# Patient Record
Sex: MALE | Race: WHITE | NOT HISPANIC OR LATINO | Employment: OTHER | URBAN - METROPOLITAN AREA
[De-identification: names, ages, dates, MRNs, and addresses within clinical notes are randomized per-mention and may not be internally consistent; named-entity substitution may affect disease eponyms.]

---

## 2017-01-17 ENCOUNTER — ALLSCRIPTS OFFICE VISIT (OUTPATIENT)
Dept: OTHER | Facility: OTHER | Age: 67
End: 2017-01-17

## 2017-03-31 ENCOUNTER — ALLSCRIPTS OFFICE VISIT (OUTPATIENT)
Dept: OTHER | Facility: OTHER | Age: 67
End: 2017-03-31

## 2017-04-18 ENCOUNTER — TRANSCRIBE ORDERS (OUTPATIENT)
Dept: ADMINISTRATIVE | Facility: HOSPITAL | Age: 67
End: 2017-04-18

## 2017-04-18 DIAGNOSIS — I71.4 ABDOMINAL AORTIC ANEURYSM WITHOUT RUPTURE (HCC): Primary | ICD-10-CM

## 2017-05-19 ENCOUNTER — ALLSCRIPTS OFFICE VISIT (OUTPATIENT)
Dept: OTHER | Facility: OTHER | Age: 67
End: 2017-05-19

## 2017-06-09 ENCOUNTER — GENERIC CONVERSION - ENCOUNTER (OUTPATIENT)
Dept: OTHER | Facility: OTHER | Age: 67
End: 2017-06-09

## 2017-06-19 ENCOUNTER — GENERIC CONVERSION - ENCOUNTER (OUTPATIENT)
Dept: OTHER | Facility: OTHER | Age: 67
End: 2017-06-19

## 2017-06-29 ENCOUNTER — HOSPITAL ENCOUNTER (OUTPATIENT)
Dept: NON INVASIVE DIAGNOSTICS | Facility: CLINIC | Age: 67
Discharge: HOME | End: 2017-06-29
Payer: MEDICARE

## 2017-06-29 DIAGNOSIS — I71.4 ABDOMINAL AORTIC ANEURYSM WITHOUT RUPTURE (HCC): ICD-10-CM

## 2017-06-29 PROCEDURE — 93978 VASCULAR STUDY: CPT

## 2017-08-01 ENCOUNTER — ALLSCRIPTS OFFICE VISIT (OUTPATIENT)
Dept: OTHER | Facility: OTHER | Age: 67
End: 2017-08-01

## 2017-09-27 DIAGNOSIS — R10.9 ABDOMINAL PAIN: ICD-10-CM

## 2017-10-06 ENCOUNTER — HOSPITAL ENCOUNTER (OUTPATIENT)
Dept: RADIOLOGY | Facility: HOSPITAL | Age: 67
Discharge: HOME | End: 2017-10-06
Attending: INTERNAL MEDICINE
Payer: MEDICARE

## 2017-10-06 DIAGNOSIS — R10.9 ABDOMINAL PAIN: ICD-10-CM

## 2017-10-06 PROCEDURE — 74177 CT ABD & PELVIS W/CONTRAST: CPT

## 2017-10-06 RX ADMIN — IOHEXOL 100 ML: 350 INJECTION, SOLUTION INTRAVENOUS at 13:10

## 2017-10-16 ENCOUNTER — GENERIC CONVERSION - ENCOUNTER (OUTPATIENT)
Dept: OTHER | Facility: OTHER | Age: 67
End: 2017-10-16

## 2017-10-19 ENCOUNTER — GENERIC CONVERSION - ENCOUNTER (OUTPATIENT)
Dept: OTHER | Facility: OTHER | Age: 67
End: 2017-10-19

## 2017-11-21 ENCOUNTER — ALLSCRIPTS OFFICE VISIT (OUTPATIENT)
Dept: OTHER | Facility: OTHER | Age: 67
End: 2017-11-21

## 2017-11-21 DIAGNOSIS — R06.02 SHORTNESS OF BREATH: ICD-10-CM

## 2017-11-21 DIAGNOSIS — Z01.810 ENCOUNTER FOR PREPROCEDURAL CARDIOVASCULAR EXAMINATION: ICD-10-CM

## 2017-11-21 DIAGNOSIS — E78.00 PURE HYPERCHOLESTEROLEMIA: ICD-10-CM

## 2017-11-21 DIAGNOSIS — I70.219 ATHEROSCLEROSIS OF NATIVE ARTERIES OF EXTREMITY WITH INTERMITTENT CLAUDICATION (HCC): ICD-10-CM

## 2017-11-21 DIAGNOSIS — I71.4 ABDOMINAL AORTIC ANEURYSM WITHOUT RUPTURE (HCC): ICD-10-CM

## 2017-11-22 NOTE — PROGRESS NOTES
Assessment  1  Aneurysm of abdominal aorta (441 4) (I71 4)    Plan  Aneurysm of abdominal aorta    · (1) BASIC METABOLIC PROFILE; Status:Active; Requested DUK:56HQH2833;    Perform:[facility]; BNP:19HMU8893; Ordered; For:Aneurysm of abdominal aorta; Ordered By:Jeremy Jackson;   · (1) CBC/ PLT (NO DIFF); Status:Active; Requested MLJ:18GSE2781;    Perform:[facility]; SCZ:54WWE8325; Ordered;of abdominal aorta; Ordered By:Ze Jackson;   · CTA ABDOMEN PELVIS W WO CONTRAST; Status:Hold For - Scheduling; Requestedfor:2017;    Perform:[facility]; [phone]; Ordered;of abdominal aorta; Ordered By:Ze Jackson;   · *1 -  [facility] (CARDIOLOGY ) Co-Management  *  Status: Active Requested for: 93GEN9379   Ordered; Aneurysm of abdominal aorta; Ordered By: Glenn Oreilly Performed:  Due: 37GAT4700  Care Summary provided  : Yes   · Schedule Surgery Treatment  Procedure  Status: Hold For - Scheduling  Requested [id number]   Ordered; Aneurysm of abdominal aorta; Ordered By: Glenn Oreilly Performed:  Due: 07KZE1689    Discussion/Summary  Discussion Summary:   5 5 centimeter infrarenal aortic aneurysm appears amenable to endovascular repair  His initial CT scan was with contrast and not a CT angiogram  Therefore I am scheduling a CT angiogram for further definition of his neck as well as the iliac circulation bilaterally  After cardiac risk assessment has been obtained and review of the CT angiogram will proceed with likely endovascular repair of his aortic aneurysm  In the hybrid OR at [place] [address]  Counseling Documentation With Imm: The patient was counseled regarding diagnostic results,-- instructions for management,-- risk factor reductions,-- prognosis,-- patient and family education,-- risks and benefits of treatment options,-- importance of compliance with treatment  total time of encounter was 20 minutes-- and-- 15 minutes was spent counseling        Chief Complaint  Chief Complaint Free Text Note Form: I am here because of pain in my stomach and to review my test results  is new to the practice and referred by Dr Elias Jimenez Patient had CT of the abd/pelvis on 9/27/17  Patient is concerned about the growth of the AAA  Patient has pain in his stomach that has been present for 3 years  The pain has been constant  He admits to stomach pain before, during and after eating  He admits to chronic back pain  He has pain in the legs that is not constant  It is present when he walks around the grocery store  When he rests for a few minutes it resolves  He admits to tobacco use 1PPD  History of Present Illness  HPI: asymptomatic 5 5 centimeter infrarenal aortic aneurysm possible aortoiliac stenosis with claudication symptoms short distance      Review of Systems  Complete Male - Vasc:  Constitutional: No fever or chills, feels well, no tiredness, no recent weight gain or weight loss  Eyes: eyesight problems,-- no sudden vision loss,-- no blurred vision-- and-- no double vision, but-- no eye pain,-- no dryness of the eyes,-- eyes not red,-- no purulent discharge from the eyes,-- no itching of the eyes-- and-- wears glasses  ENT: hearing loss, but-- no earache,-- no nosebleeds,-- no sore throat,-- no nasal discharge-- and-- no hoarseness  Cardiovascular: intermittent leg claudication,-- no painful veins-- and-- no bleeding veins, but-- regular heart rate,-- no chest pain,-- no palpitations-- and-- leg pain with walking  Respiratory: No sob, no wheezing, no cough, no sob with exertion, no orthopnea  Gastrointestinal: nausea-- and-- diarrhea, but-- no vomiting,-- no constipation-- and-- no blood in stools  no hemorrhoids  Genitourinary: no dysuria, no hematuria, No urinary incontinence, no erectile dysfunction  Musculoskeletal: limb pain, but-- lumbar pain,-- no myalgias-- and-- no limb swelling  Integumentary: no rash, no lesions, no wounds, no ulcer    Neurological: no dementia-- and-- limb weakness, but-- no headache,-- no numbness,-- no confusion,-- no dizziness,-- no convulsions,-- no fainting-- and-- no difficulty walking  Psychiatric: anxiety-- and-- no mood disorder, but-- no depression  Hematologic/Lymphatic: no bleeding disorder, no easy bruising  ROS Reviewed:   ROS reviewed  Active Problems  1  Abdominal pain (789 00) (R10 9)   2  Anemia (285 9) (D64 9)   3  Aneurysm of abdominal aorta (441 4) (I71 4)   4  Aortic aneurysm (441 9) (I71 9)   5  Benign colon polyp (211 3) (K63 5)   6  Change in bowel habits (787 99) (R19 4)   7  Chronic pancreatitis (577 1) (K86 1)   8  Constipation (564 00) (K59 00)   9  Gastritis (535 50) (K29 70)   10  Gastroesophageal reflux disease without esophagitis (530 81) (K21 9)   11  Hypercholesteremia (272 0) (E78 00)   12  Hyponatremia (276 1) (E87 1)   13  Pancreatic disorder (577 9) (K86 9)   14  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)   15  Pancreatic pseudocyst (577 2) (K86 3)   16  Shortness of breath (786 05) (R06 02)    Past Medical History  1  History of Benign essential hypertension (401 1) (I10)   2  History of abdominal aortic aneurysm (V12 59) (Z86 79)   3  History of abnormal weight loss (V13 89) (Z87 898)   4  History of acute pancreatitis (V12 79) (Z87 19)  Active Problems And Past Medical History Reviewed: The active problems and past medical history were reviewed and updated today  Surgical History  1  Denied: History of Recent Surgery  Surgical History Reviewed: The surgical history was reviewed and updated today  Family History  Mother    1  Family history of Hypertension (V17 49)  Father    2  Family history of Hypertension (V17 49)  Family History    3  Family history of Essential Hypercholesterolemia   4  Family history of Hypertension (V17 49)  Family History Reviewed: The family history was reviewed and updated today         Social History     · Cigarette smoker (305 1) (F17 210)   · Current Every Day Smoker (305 1)   · Never Drank Alcohol  Social History Reviewed: The social history was reviewed and updated today  Current Meds   1  Acetaminophen CAPS; Therapy: (Recorded:21Nov2017) to Recorded   2  ALPRAZolam 1 MG Oral Tablet; Therapy: 20PIS6970 to Recorded   3  Amitriptyline HCl - 10 MG Oral Tablet; TAKE 1 TABLET AT BEDTIME; Therapy: 50NWO7423 to (Evaluate:59Usv7117)  Requested for: 53KRX1054; Last Rx:30Oct2017 Ordered   4  AmLODIPine Besylate 5 MG Oral Tablet; TAKE 1 TABLET DAILY AS DIRECTED; Therapy: 43YBP6866 to Recorded   5  Caltrate 600+D 600-400 MG-UNIT TABS; Therapy: (Recorded:18Nov2013) to Recorded   6  Centrum Silver Oral Tablet; Therapy: (Recorded:18Nov2013) to Recorded   7  Citalopram Hydrobromide 20 MG Oral Tablet; Therapy: (Recorded:18Nov2013) to Recorded   8  Creon 28871 UNIT Oral Capsule Delayed Release Particles; TAKE 1 CAPSULE WITH MEALS; Therapy: 14ADH4341 to (Evaluate:90Mcn2306)  Requested for: 75DZM5751; Last JX:36BYN6567 Ordered   9  Lisinopril-Hydrochlorothiazide 20-12 5 MG Oral Tablet; Therapy: (Recorded:18Nov2013) to Recorded   10  Metoprolol Tartrate 50 MG Oral Tablet; Therapy: (Recorded:18Nov2013) to Recorded   11  MiraLax PACK; Therapy: ([facility]) to Recorded   12  Mylanta TABS; Therapy: (Recorded:21Nov2017) to Recorded   13  Omeprazole 40 MG Oral Capsule Delayed Release; TAKE 1 CAPSULE DAILY; Therapy: 81HVI0529 to (Evaluate:92Xws8888)  Requested for: 60ODF2681; Last  Rx:15Bms0784 Ordered   14  Pravastatin Sodium 40 MG Oral Tablet; Therapy: (Recorded:18Nov2013) to Recorded   15  Sodium Chloride 1 GM Oral Tablet; Therapy: (Recorded:21Nov2017) to Recorded   16  Tamsulosin HCl - 0 4 MG Oral Capsule; Therapy: (Recorded:18Nov2013) to Recorded   17  Tylenol Extra Strength 500 MG Oral Tablet; Therapy: (Recorded:21Nov2017) to Recorded  Medication List Reviewed: The medication list was reviewed and updated today  Allergies  1   No Known Drug Allergies    Vitals  Vital Signs    Recorded: 21Nov2017 02:21PM   Temperature 95 7 F, Tympanic   Heart Rate 72, R Radial   Pulse Quality Normal, R Radial   Respiration Quality Normal   Respiration 18   Systolic 349, RUE, Sitting   Diastolic 80, RUE, Sitting   Height 5 ft 2 in   Weight 119 lb    BMI Calculated 21 77   BSA Calculated 1 53       Physical Exam   Carotid: right 2+,-- no bruit heard on the right,-- left 2+-- and-- no bruit on the left  Brachial: right 2+-- and-- left 2+  Radial: right 2+-- and-- left 2+  Femoral: right 2+,-- no bruit heard on the right,-- left 2+-- and-- no bruit on the left  Popliteal: right 2+-- and-- left 2+  Posterior tibialis: right 2+-- and-- left 2+  Dorsalis pedis: right 2+-- and-- left 2+  Distal Pulse Exam: Normal Capillary Refill  The heart rate was normal   Abdomen  Abdomen:-- 5 5 centimeter aneurysm palpable infrarenal       Results/Data  Diagnostic Studies Reviewed Vasc: I personally reviewed the films/images/results in the office today  My interpretation follows  CT Scan Review CT scan with contrast reviewed  Future Appointments    Date/Time Provider Specialty Site   02/21/2018 09:00 AM Deepak Prado MD Gastroenterology Adult [address]       Surgery Scheduling Form  Vascular Surgery Scheduling Form [facility] Standard:    Location: [facility],-- OR Type: Hybrid Room Needed   Confirmation Number:   Procedure Date:   Requested Time:  Physician Jackson  Co-Surgeon: IR Physician [facility] Required:   Bed:  Anesthesia: General        PROCEDURE DETAILS  Procedure: endovascular aneurysm repair possible open  excluder graft     Laterality:   Anticipated frozen section:   Procedure Codes:   Pre-op diagnosis:   Diagnosis Code(s):   Case Length:   Equipment:   Equipment Needs:   Implants/Representative:     REGISTRATION & FINANCIAL CLEARANCE     Amount Paid/Date:   FA Initials:   Insurance:   Policy Number: Group Number:     PRE-ADMISSION TESTING & CLINICAL INFORMATION   PAT Location:     Consults Needed   Anesthesia Consult:   Medical Consult:   Cardiac Consult:        ALLERGIES AND ALERTS   Latex Allergy:   Penicillin Allergy:   Malignant Hyperthermia:   Diabetic Patient:     COMMENTS   Scheduling Information Provided By:     IN OFFICE USE   Urgency:   Additional Bed Requirements:   CD to Hospital   Is the patient able to walk up a flight of stairs, walk up a hill or do heavy housework WITHOUT having chest pain or shortness of breath?                      Signatures   Electronically signed by : Ashish Lubin MD; Nov 21 2017  2:58PM EST                       (Author)

## 2017-11-25 ENCOUNTER — HOSPITAL ENCOUNTER (OUTPATIENT)
Dept: CT IMAGING | Facility: HOSPITAL | Age: 67
Discharge: HOME | End: 2017-11-25
Attending: SURGERY
Payer: MEDICARE

## 2017-11-25 DIAGNOSIS — I71.4 ABDOMINAL AORTIC ANEURYSM WITHOUT RUPTURE (HCC): ICD-10-CM

## 2017-11-25 PROCEDURE — 74174 CTA ABD&PLVS W/CONTRAST: CPT

## 2017-11-25 RX ADMIN — IOHEXOL 100 ML: 350 INJECTION, SOLUTION INTRAVENOUS at 08:15

## 2017-12-15 ENCOUNTER — TRANSCRIBE ORDERS (OUTPATIENT)
Dept: ADMINISTRATIVE | Facility: HOSPITAL | Age: 67
End: 2017-12-15

## 2017-12-15 ENCOUNTER — ALLSCRIPTS OFFICE VISIT (OUTPATIENT)
Dept: OTHER | Facility: OTHER | Age: 67
End: 2017-12-15

## 2017-12-16 NOTE — CONSULTS
Assessment  1  Preoperative cardiovascular examination (V72 81) (Z01 810)   2  Aneurysm of abdominal aorta (441 4) (I71 4)   3  Hypercholesteremia (272 0) (E78 00)   4  Benign essential hypertension (401 1) (I10)   5  Atherosclerosis of leg with intermittent claudication (440 21) (I70 219)   6  Shortness of breath (786 05) (R06 02)    Plan  Atherosclerosis of leg with intermittent claudication, Hypercholesteremia, Preoperativecardiovascular examination, Shortness of breath    · ECHO COMPLETE WITH CONTRAST IF INDICATED; Status:Hold For - Scheduling;Requested for:29Uql2838;    Perform:[facility]; Due:35Cvx1076; Ordered; For:Atherosclerosis of leg with intermittent claudication, Hypercholesteremia, Preoperative cardiovascular examination, Shortness of breath; Ordered By:Dakota Coronado;   · NM MYOCARDIAL PERFUSION SPECT (RX STRESS AND/OR REST); Status:Hold For -Scheduling; Requested for:75Toa4148;    Perform:[facility]; Due:77Wwp4048; Ordered; For:Atherosclerosis of leg with intermittent claudication, Hypercholesteremia, Preoperative cardiovascular examination, Shortness of breath; Ordered By:Dakota Coronado;   · Follow-up Visit in 4 Weeks Evaluation and Treatment  Follow-up  Status: Complete Done: 99SCN2977   Ordered; For: Atherosclerosis of leg with intermittent claudication, Hypercholesteremia, Preoperative cardiovascular examination, Shortness of breath; Ordered By: Ryan Frost Performed:  Due: 94VGB8962; Last Updated By: Alphonso Lee; 12/15/2017 2:21:56 PM  Preoperative cardiovascular examination,     · EKG/ECG- POC; Status:Complete;   Done: 98LAZ6660 02:08PM   Perform: In Office;Ordered; For:Preoperative cardiovascular examination, ; Ordered By:Rosana Coronado;    Discussion/Summary    Dear Dr Bertha Schilder for sending Marly Baldwin to see me for a preoperative risk assessment prior to undergoing vascular surgery for the intervention over recently enlarging AAA  He has peripheral vascular disease with claudication   He has exertional shortness of breath  He is a long-time smoker  He has controlled hypertension, takes statin therapy but unknown lipid status  We will need records  While he does have a normal EKG, his current symptoms and higher risk status considering the diagnosis of peripheral vascular disease certainly suggest the possibility of coronary artery disease and therefore he will need workup prior to vascular intervention  Recommend a Lexiscan Myoview stress test  Recommend echocardiogram Will follow up on these tests and have recommended a four-week follow-up  If abnormal I will discuss further testing at my follow-up  However if these tests are unremarkable he will be released/cleared for surgery prior to my visit  Chief Complaint  New patient evaluation Ref by Dr Meryle Leigh Clearance AAA      History of Present Illness  Cardiology HPI Free Text Note Form [facility]: Mike Ghotra is a thin 56-year-old smoker with recently discovered enlarging AAA, at the point of needing intervention and following with Dr Ilsa Juárez   He is here for preoperative risk assessment the request of Dr Emily Brody has attempted to quit smoking before, but has been unable to with many interventions tells not eager to discuss Ezequiel Soto has hypertension, this is treated primarily by primary care physician and it is well controlled he takes pravastatin, seems to tolerate it well and lipid status is unknown to us this time Ezequiel Soto has exertional claudication of the bilateral lower extremities, but denies any skin lesions, sores or nonhealing Ezequiel Soto denies any lightheadedness, chest pain but does have exertional shortness of breath which has been present for a little bit over a year he has not had any recent cardiovascular testing his EKG in the office today normal  His recent vascular studies aside from his abdominal aortic aneurysm also revealed moderate to severe bilateral peripheral vascular disease consistent with his claudication symptoms      Review of Systems     Cardiac: no chest pain,-- no rhythm problems-- and-- no palpitations present  Skin: No complaints of nonhealing sores or skin rash  Genitourinary: No complaints of recurrent urinary tract infections, frequent urination at night, difficult urination, blood in urine, kidney stones, loss of bladder control, no kidney or prostate problems, no erectile dysfunction  Psychological: No complaints of feeling depressed, anxiety, panic attacks, or difficulty concentrating  General: No complaints of trouble sleeping, lack of energy, fatigue, appetite changes, weight changes, fever, frequent infections, or night sweats  Respiratory: shortness of breath-- and-- cough/sputum  HEENT: No complaints of serious problems, hearing problems, nose problems, throat problems, or snoring  Gastrointestinal: No complaints of liver problems, nausea, vomiting, heartburn, constipation, bloody stools, diarrhea, problems swallowing, adbominal pain, or rectal bleeding  Hematologic: No complaints of bleeding disorders, anemia, blood clots, or excessive brusing  Neurological: No complaints of numbness, tingling, dizziness, weakness, seizures, headaches, syncope or fainting, AM fatigue, daytime sleepiness, no witnessed apnea episodes  Musculoskeletal: back pain-- and-- swelling/pain     ROS reviewed  Active Problems  1  Abdominal pain (789 00) (R10 9)   2  Anemia (285 9) (D64 9)   3  Aneurysm of abdominal aorta (441 4) (I71 4)   4  Aortic aneurysm (441 9) (I71 9)   5  Benign colon polyp (211 3) (K63 5)   6  Change in bowel habits (787 99) (R19 4)   7  Chronic pancreatitis (577 1) (K86 1)   8  Constipation (564 00) (K59 00)   9  Gastritis (535 50) (K29 70)   10  Gastroesophageal reflux disease without esophagitis (530 81) (K21 9)   11  Hypercholesteremia (272 0) (E78 00)   12  Hyponatremia (276 1) (E87 1)   13  Pancreatic disorder (577 9) (K86 9)   14   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)   15  Pancreatic pseudocyst (577 2) (K86 3)   16  Shortness of breath (786 05) (R06 02)    Past Medical History   · History of abdominal aortic aneurysm (V12 59) (Z86 79)   · History of abnormal weight loss (V13 89) (Q61 109)   · History of acute pancreatitis (V12 79) (Z87 19)    The active problems and past medical history were reviewed and updated today  Surgical History   · Denied: History of Recent Surgery    The surgical history was reviewed and updated today  Family History  Mother    · Family history of Hypertension (V17 49)  Father    · Family history of Hypertension (V17 49)  Family History    · Family history of Essential Hypercholesterolemia   · Family history of Hypertension (V17 49)  Family History Reviewed: The family history was reviewed and updated today  Social History   · Cigarette smoker (305 1) (F17 210)   · Current Every Day Smoker (305 1)   · Never Drank Alcohol  The social history was reviewed and updated today  The social history was reviewed and is unchanged  Current Meds   1  Acetaminophen-Codeine #2 300-15 MG Oral Tablet; TAKE 1 TABLET 3 times daily PRN; Therapy: 82XVV6211 to Recorded   2  ALPRAZolam 1 MG Oral Tablet; TAKE 1 TABLET TWICE DAILY; Therapy: 45OTV6642 to Recorded   3  Amitriptyline HCl - 10 MG Oral Tablet; TAKE 1 TABLET AT BEDTIME; Therapy: 00AWP6062 to (Evaluate:26Bmc4944)  Requested for: 03OQU2869; Last Rx:30Oct2017 Ordered   4  AmLODIPine Besylate 5 MG Oral Tablet; TAKE 1 TABLET DAILY AS DIRECTED; Therapy: 61YRC4652 to Recorded   5  Caltrate 600+D 600-400 MG-UNIT TABS; Therapy: (Recorded:18Nov2013) to Recorded   6  Centrum Silver Oral Tablet; Therapy: (Recorded:18Nov2013) to Recorded   7  Citalopram Hydrobromide 20 MG Oral Tablet; Take 1 tablet daily; Therapy: (Recorded:79Akw0870) to Recorded   8  Creon 00109 UNIT Oral Capsule Delayed Release Particles; TAKE ONE CAPSULE BY MOUTH DAILY WITH MEALS;  Therapy: 75QBT9615 to ([facility])  Requested for: 22Nov2017; Last Rx:22Nov2017 Ordered   9  HydroCHLOROthiazide 12 5 MG Oral Tablet; TAKE 1 TABLET DAILY; Therapy: 46GBV6032 to Recorded   10  Lisinopril 40 MG Oral Tablet; TAKE 1 TABLET DAILY; Therapy: 41WCM3606 to Recorded   11  Metoprolol Tartrate 50 MG Oral Tablet; TAKE 1 TABLET DAILY; Therapy: (Recorded:81Cug4747) to Recorded   12  MiraLax PACK; Therapy: (Recorded:21Nov2017) to Recorded   13  Mylanta TABS; Therapy: (Recorded:21Nov2017) to Recorded   14  Omeprazole 40 MG Oral Capsule Delayed Release; TAKE 1 CAPSULE DAILY; Therapy: 14IVO7490 to (Evaluate:48Ogl3030)  Requested for: 00HZN1060; Last  Rx:14Ius1879 Ordered   15  Pravastatin Sodium 40 MG Oral Tablet; Therapy: (Recorded:18Nov2013) to Recorded   16  Sodium Chloride 1 GM Oral Tablet; TAKE 1 TABLET 3 times daily; Therapy: (Recorded:76Vff9868) to Recorded   17  Tamsulosin HCl - 0 4 MG Oral Capsule; Therapy: (Recorded:18Nov2013) to Recorded   18  Tylenol Extra Strength 500 MG Oral Tablet; Therapy: (Recorded:21Nov2017) to Recorded    The medication list was reviewed and updated today  Allergies  1  No Known Drug Allergies    Vitals  Signs   Heart Rate: 76, Apical  Pulse Quality: Regular, Apical  Respiration: 16  Systolic: 434, LUE, Sitting  Diastolic: 72, LUE, Sitting  Height: 5 ft 2 in  Weight: 120 lb 2 oz  BMI Calculated: 21 97  BSA Calculated: 1 54    Physical Exam   Constitutional  General appearance: No acute distress, well appearing and well nourished  Eyes  Conjunctiva and Sclera examination: Conjunctiva pink, sclera anicteric  Ears, Nose, Mouth, and Throat - Oropharynx: Clear, nares are clear, mucous membranes are moist   Neck  Neck and thyroid: Normal, supple, trachea midline, no thyromegaly  Pulmonary  Respiratory effort: No increased work of breathing or signs of respiratory distress  Auscultation of lungs: Clear to auscultation, no rales, no rhonchi, no wheezing, good air movement  Cardiovascular  Auscultation of heart: Normal rate and rhythm, normal S1 and S2, no murmurs  Carotid pulses: Normal, 2+ bilaterally  Peripheral vascular exam: Normal pulses throughout, no tenderness, erythema or swelling  Pedal pulses: Normal, 2+ bilaterally  -- Mildly reduced bilateral pedal pulses  Abdomen  Abdomen: Non-tender and no distention  Liver and spleen: No hepatomegaly or splenomegaly  Musculoskeletal Gait and station: Normal gait  -- Digits and nails: Normal without clubbing or cyanosis  -- Inspection/palpation of joints, bones, and muscles: Normal, ROM normal    Skin - Skin and subcutaneous tissue: Normal without rashes or lesions  Skin is warm and well perfused, normal turgor  Neurologic - Cranial nerves: II - XII intact  -- Speech: Normal    Psychiatric - Orientation to person, place, and time: Normal -- Mood and affect: Normal       Results/Data  12/17 - Normal sinus rhythm, normal EKG      Future Appointments    Date/Time Provider Specialty Site   02/21/2018 09:00 AM Dale Sandy MD Gastroenterology Adult [address]     End of Encounter Meds  1  Amitriptyline HCl - 10 MG Oral Tablet; TAKE 1 TABLET AT BEDTIME; Therapy: 54EYJ8035 to (Evaluate:48Ghp0127)  Requested for: 59WVU9778; Last Rx:30Oct2017 Ordered  2  Omeprazole 40 MG Oral Capsule Delayed Release; TAKE 1 CAPSULE DAILY; Therapy: 21BSR0299 to (Evaluate:13Lwv4558)  Requested for: 14PXX7136; Last Rx:52Tho9679 Ordered  3  Acetaminophen-Codeine #2 300-15 MG Oral Tablet; TAKE 1 TABLET 3 times daily PRN; Therapy: 65BCC3130 to Recorded  4  Creon 83730 UNIT Oral Capsule Delayed Release Particles; TAKE ONE CAPSULE BY MOUTH DAILY WITH MEALS; Therapy: 99UPL0385 to (Evaluate:76Hac3825)  Requested for: 22Nov2017; Last Rx:22Nov2017 Ordered  5  ALPRAZolam 1 MG Oral Tablet; TAKE 1 TABLET TWICE DAILY; Therapy: 68BDS2371 to Recorded   6  AmLODIPine Besylate 5 MG Oral Tablet; TAKE 1 TABLET DAILY AS DIRECTED;  Therapy: 51NVF1940 to Recorded   7  Caltrate 600+D 600-400 MG-UNIT TABS; Therapy: (Recorded:18Nov2013) to Recorded   8  Centrum Silver Oral Tablet; Therapy: (Recorded:18Nov2013) to Recorded   9  Citalopram Hydrobromide 20 MG Oral Tablet; Take 1 tablet daily; Therapy: (Recorded:25Jjh1620) to Recorded   10  HydroCHLOROthiazide 12 5 MG Oral Tablet; TAKE 1 TABLET DAILY; Therapy: 93FKI6489 to Recorded   11  Lisinopril 40 MG Oral Tablet; TAKE 1 TABLET DAILY; Therapy: 87ZUT8989 to Recorded   12  Metoprolol Tartrate 50 MG Oral Tablet; TAKE 1 TABLET DAILY; Therapy: (Recorded:91Xps8926) to Recorded   13  MiraLax PACK (Polyethylene Glycol 3350); Therapy: (Recorded:21Nov2017) to Recorded   14  Mylanta TABS; Therapy: (Recorded:21Nov2017) to Recorded   15  Pravastatin Sodium 40 MG Oral Tablet; Therapy: (Recorded:18Nov2013) to Recorded   16  Sodium Chloride 1 GM Oral Tablet; TAKE 1 TABLET 3 times daily; Therapy: (Recorded:40Waw6342) to Recorded   17  Tamsulosin HCl - 0 4 MG Oral Capsule; Therapy: (Recorded:18Nov2013) to Recorded   18  Tylenol Extra Strength 500 MG Oral Tablet;   Therapy: (Murel Lunch) to Recorded    Signatures   Electronically signed by : Genoveva Jason MD; Dec 15 2017  2:27PM EST                       (Author)

## 2018-01-03 ENCOUNTER — GENERIC CONVERSION - ENCOUNTER (OUTPATIENT)
Dept: CARDIOLOGY CLINIC | Facility: CLINIC | Age: 68
End: 2018-01-03

## 2018-01-03 ENCOUNTER — HOSPITAL ENCOUNTER (OUTPATIENT)
Dept: NON INVASIVE DIAGNOSTICS | Facility: CLINIC | Age: 68
Discharge: HOME | End: 2018-01-03
Payer: MEDICARE

## 2018-01-03 DIAGNOSIS — R06.02 SHORTNESS OF BREATH: ICD-10-CM

## 2018-01-03 DIAGNOSIS — E78.00 PURE HYPERCHOLESTEROLEMIA: ICD-10-CM

## 2018-01-03 DIAGNOSIS — Z01.810 ENCOUNTER FOR PREPROCEDURAL CARDIOVASCULAR EXAMINATION: ICD-10-CM

## 2018-01-03 DIAGNOSIS — I70.219 ATHEROSCLEROSIS OF NATIVE ARTERIES OF EXTREMITY WITH INTERMITTENT CLAUDICATION (HCC): ICD-10-CM

## 2018-01-03 PROCEDURE — 78452 HT MUSCLE IMAGE SPECT MULT: CPT

## 2018-01-03 PROCEDURE — 93306 TTE W/DOPPLER COMPLETE: CPT

## 2018-01-03 PROCEDURE — A9502 TC99M TETROFOSMIN: HCPCS

## 2018-01-03 PROCEDURE — 93017 CV STRESS TEST TRACING ONLY: CPT

## 2018-01-03 RX ADMIN — REGADENOSON 0.4 MG: 0.08 INJECTION, SOLUTION INTRAVENOUS at 09:52

## 2018-01-08 LAB
CHEST PAIN STATEMENT: NORMAL
MAX DIASTOLIC BP: 78 MMHG
MAX HEART RATE: 91 BPM
MAX PREDICTED HEART RATE: 152 BPM
MAX. SYSTOLIC BP: 133 MMHG
PROTOCOL NAME: NORMAL
REASON FOR TERMINATION: NORMAL
TARGET HR FORMULA: NORMAL
TEST INDICATION: NORMAL
TIME IN EXERCISE PHASE: NORMAL

## 2018-01-09 ENCOUNTER — GENERIC CONVERSION - ENCOUNTER (OUTPATIENT)
Dept: OTHER | Facility: OTHER | Age: 68
End: 2018-01-09

## 2018-01-09 NOTE — MISCELLANEOUS
Message  I spoke to Rosalia and reviewed his normal EKG results  I will start low dose amitriptyline at bedtime as this can cause drowsiness  I explained that the medication can take 6-8 weeks to start working  He expressed understanding and all questions were answered   He will follow-up with Dr Jeremy Anaya in August       Plan  Abdominal pain    · Amitriptyline HCl - 10 MG Oral Tablet; TAKE 1 TABLET AT BEDTIME    Signatures   Electronically signed by : YOLANDA Osuna; Jun 19 2017  5:13PM EST                       (Author)

## 2018-01-10 NOTE — RESULT NOTES
Verified Results  * CT ABDOMEN PELVIS W CONTRAST [id number] 06:52AM Mecca Kapadia Order Number: [id number]    - Patient Instructions: To schedule this appointment, please contact Central Scheduling at [phone]   Order Number: [id number]    - Patient Instructions: To schedule this appointment, please contact Central Scheduling at [phone]  Test Name Result Flag Reference   CT ABDOMEN PELVIS W CONTRAST (Report)     CT ABDOMEN AND PELVIS WITH IV CONTRAST     INDICATION: Follow-up pancreatic pseudocyst status post drainage  Also history of infrarenal abdominal aortic aneurysm  COMPARISON: CT 6/13/2016  TECHNIQUE: CT examination of the abdomen and pelvis  Axial, sagittal and coronal reformatted projections were created  This examination, like all CT scans performed in the [facility], was performed utilizing techniques to    minimize radiation dose exposure, including the use of iterative reconstruction and automated exposure control  IV Contrast: iohexol (OMNIPAQUE) 350 MG/ML injection (MULTI-DOSE) 100 mL Note: (SINGLE DOSE/MULTI DOSE) information refers to the container from which the contrast was acquired  Contrast was injected one time intravenously without immediate    complication  Enteric Contrast: Enteric contrast was not administered  FINDINGS:     ABDOMEN     LOWER CHEST: Emphysema is present  Stable likely scarring within the right middle lobe  LIVER/BILIARY TREE: Unremarkable  GALLBLADDER: No calcified gallstones  No pericholecystic inflammatory change  SPLEEN: Unremarkable  PANCREAS: No peripancreatic fluid collection or cystic lesion identified  No ductal dilatation  No peripancreatic stranding  ADRENAL GLANDS: Unremarkable  KIDNEYS/URETERS: Unremarkable  No hydronephrosis  STOMACH AND BOWEL: Unremarkable  APPENDIX: A normal appendix was visualized       ABDOMINOPELVIC CAVITY: No ascites or free intraperitoneal air  No lymphadenopathy  VESSELS: Infrarenal abdominal aortic aneurysm is stable in size, measuring 5 0 x 4 9 cm  PELVIS     REPRODUCTIVE ORGANS: Calcifications are again seen within the prostate  URINARY BLADDER: There is unchanged diffuse mild wall thickening of the bladder, likely related to chronic outlet obstruction  There is a thin tract extending from the dome of the bladder cranially without extension to the skin surface (series 602,    image 56), possibly representing urachal remnant  ABDOMINAL WALL/INGUINAL REGIONS: Likely sebaceous cyst noted within the subcutaneous tissues of the lower back on the right (series 2, image 43) sees  OSSEOUS STRUCTURES: No acute fracture or destructive osseous lesion  IMPRESSION:       1  Unremarkable appearance of the pancreas without evidence of residual or recurrent cyst    2  Stable infrarenal abdominal aortic aneurysm  3  Unchanged diffuse wall thickening of the bladder, likely related to chronic outlet obstruction  Possible urachal remnant noted         Workstation performed: [id number]     Signed by:   Khadar South MD   12/13/16

## 2018-01-10 NOTE — RESULT NOTES
Verified Results  * CT ABDOMEN PELVIS W CONTRAST 13Jun2016 09:16AM Ezio Conklin Order Number: [id number]   Performing Comments: IV contrast   - Patient Instructions: To schedule this appointment, please contact Central Scheduling at [phone]  Test Name Result Flag Reference   CT ABDOMEN PELVIS W CONTRAST (Report)     CT ABDOMEN AND PELVIS WITH IV CONTRAST     INDICATION: Follow-up pancreatic cyst  Status post drainage of the cyst  Known aortic aneurysm  Former smoker  COMPARISON: CT scan 12/7/2015     TECHNIQUE: CT examination of the abdomen and pelvis was performed after the administration of intravenous contrast  This examination, like all CT scans performed in the [facility], was performed utilizing techniques to minimize    radiation dose exposure, including the use of iterative reconstruction and automated exposure control  Axial, sagittal, and coronal reformatted images were submitted for interpretation  100 cc of intravenous Omnipaque 350 was administered for this    examination  Enteric contrast was given  FINDINGS:     ABDOMEN     LOWER CHEST: No significant abnormalities identified in the lower chest      LIVER/BILIARY TREE: Unremarkable  GALLBLADDER: No calcified gallstones  No pericholecystic inflammatory change  SPLEEN: Unremarkable  PANCREAS: Interval drainage of the pancreatic cyst  No residual cyst identified  ADRENAL GLANDS: Unremarkable  KIDNEYS/URETERS: Unremarkable  No hydronephrosis  STOMACH AND BOWEL: There is colonic diverticulosis without evidence of acute diverticulitis  APPENDIX: No findings to suggest appendicitis  ABDOMINOPELVIC CAVITY: No ascites or free intraperitoneal air  No lymphadenopathy  VESSELS: Enlarging infrarenal abdominal aortic aneurysm now measuring 4 9 x 4 9 cm (previous 4 8 x 4 6 cm)  PELVIS     REPRODUCTIVE ORGANS: Prostatomegaly with central zone calcifications       URINARY BLADDER: Mild diffuse bladder wall thickening similar to prior exam      ABDOMINAL WALL/INGUINAL REGIONS: Unremarkable  OSSEOUS STRUCTURES: No acute fracture or destructive osseous lesion  IMPRESSION:     Interval drainage of the pancreatic cyst  No residual cyst identified  Enlarging infrarenal abdominal aortic aneurysm now measuring 4 9 x 4 9 cm (previous 4 8 x 4 6 cm)  Stable prostatomegaly with mild bladder wall thickening likely related to chronic outlet obstruction  ##sigslh##sigslh                Workstation performed: [id number]     Signed by:    Pa Brito MD   6/14/16

## 2018-01-12 VITALS
TEMPERATURE: 96.5 F | HEART RATE: 68 BPM | HEIGHT: 62 IN | WEIGHT: 121.25 LBS | BODY MASS INDEX: 22.31 KG/M2 | OXYGEN SATURATION: 98 % | SYSTOLIC BLOOD PRESSURE: 118 MMHG | DIASTOLIC BLOOD PRESSURE: 72 MMHG

## 2018-01-12 NOTE — RESULT NOTES
Discussion/Summary   The CT scan showed normal-appearing pancreas without any cyst   The aneurysm had slightly enlarged  I would suggest following up with Dr Jose Pineda  Verified Results  * CT ABDOMEN PELVIS W CONTRAST [id number] 12:37PM Kaylan Galeano Order Number: [id number]    - Patient Instructions: To schedule this appointment, please contact Central Scheduling at [phone]  Test Name Result Flag Reference   CT ABDOMEN PELVIS W CONTRAST (Report)     CT ABDOMEN AND PELVIS WITH IV CONTRAST     INDICATION: Abdominal pain in the mid upper abdomen  History of prior pancreatic cyst drainage     COMPARISON: December 12, 2016     TECHNIQUE: CT examination of the abdomen and pelvis was performed  In addition to portal venous phase postcontrast scanning through the abdomen and pelvis, delayed phase postcontrast scanning was performed through the upper abdominal viscera  Reformatted images were created in axial, sagittal, and coronal planes  Radiation dose length product (DLP) for this visit: 459 09 mGy-cm   This examination, like all CT scans performed in the [facility], was performed utilizing techniques to minimize radiation dose exposure, including the use of iterative   reconstruction and automated exposure control  IV Contrast: 100 mL of iohexol (OMNIPAQUE)      Enteric Contrast: Enteric contrast was administered  FINDINGS:     ABDOMEN     LOWER CHEST: There is emphysema  LIVER/BILIARY TREE: Unremarkable  GALLBLADDER: No calcified gallstones  No pericholecystic inflammatory change  SPLEEN: Unremarkable  PANCREAS: The pancreas appears normal  No cystic or solid masses are seen  The duct is not dilated  No inflammation  ADRENAL GLANDS: Unremarkable  KIDNEYS/URETERS: Unremarkable  No hydronephrosis  STOMACH AND BOWEL: Unremarkable  APPENDIX: A normal appendix was visualized       ABDOMINOPELVIC CAVITY: No ascites or free intraperitoneal air  No lymphadenopathy  VESSELS: There is a relatively large infrarenal abdominal aortic aneurysm  This was present on the prior exam  Maximum caliber of the aneurysm sac is 5 5 x 5 3 cm and the aneurysm has fusiform shape extending from just below the renal arteries to the    aortic bifurcation spanning craniocaudal length of 10 7 cm  There is a large amount of thrombus within the aneurysm sac  There is heavy calcification of bilateral iliac arteries which appear mildly to moderately narrowed  There is no evidence of    retroperitoneal hemorrhage or fibrosis  PELVIS     REPRODUCTIVE ORGANS: There are calcifications centrally in the prostate  The prostate appears normal in size  URINARY BLADDER: The bladder is thick-walled  This is stable when compared with the previous study and might indicate sequela of chronic partial bladder outlet obstruction with bladder wall hypertrophy  Cystitis is not excluded  Correlate with    urinalysis  No stones or diverticula or pedunculated masses are seen  ABDOMINAL WALL/INGUINAL REGIONS: Unremarkable  OSSEOUS STRUCTURES: No acute fracture or destructive osseous lesion  IMPRESSION:     Mild interval enlargement of fusiform infrarenal abdominal aortic aneurysm now measuring up to 5 5 cm maximum diameter  No evidence of retroperitoneal hemorrhage  Consider consultation with interventional radiology and or vascular surgery for    management options  Pulmonary emphysema  Stable appearance of thickening of the wall of the urinary bladder which might indicate hypertrophy due to chronic partial bladder outlet obstruction  Correlate with urinalysis to help exclude cystitis          ##sigslh##sigslh       Workstation performed: [id number]     Signed by:   Lynette Aceves MD   10/11/17       Signatures   Electronically signed by : Janae Shoemaker MD; Oct 31 2017 11:02AM EST                          Electronically signed by : Tonie Davey MD; Nov 1 2017 5:53PM EST                       (Author)

## 2018-01-12 NOTE — PROGRESS NOTES
Assessment    1  Pancreatic pseudocyst (577 2) (K86 3)   2  Abdominal pain (789 00) (R10 9)    Plan  Abdominal pain, Pancreatic pseudocyst    · * CT ABDOMEN PELVIS W CONTRAST; Status:Hold For - Scheduling; Requested  for:25Apr2016;    Perform:[facility]; Order Comments:IV contrast; Due:25Apr2017;Ordered; For:Abdominal pain, Pancreatic pseudocyst; Ordered By:Joanna Comer;  Pancreatic pseudocyst    · (1) BASIC METABOLIC PROFILE; Status:Active; Requested for:25Apr2016;    Perform:[facility]; Due:25Apr2017;Ordered; For:Pancreatic pseudocyst; Ordered By:Joanna Comer;  Unlinked    · Hydrocodone-Acetaminophen 5-500 MG Oral Tablet   Dispense: 0 Days ; #:0 TABS; Refill: 0; RICHARD = N; Record; Last Updated By: Armen Caldwell; 4/25/2016 3:50:47 PM   · Hydrocodone-Acetaminophen 7 5-300 MG Oral Tablet   Dispense: 30 Days ; #:60 TABS; Refill: 0; RICHARD = N; Record; Last Updated By: Armen Caldwell; 4/25/2016 3:50:55 PM    Discussion/Summary  Discussion Summary:   43-year-old male with history of pancreatitis c/b pancreatic cyst formation status post cyst gastrostomy complaining of persistent LUQ pain, decreased appetite, early satiety, and fatigue  Last CAT scan from 12/15 showed interval enlargement in the pancreatic cyst     1) Order CAT scan abdomen/pelvis with IV contrast to re-evaluate pancreatic cyst  Patient was instructed to get blood work done 1 week prior to his CAT scan  2) Obtain CBC, CMP, and lipase reports from [address] [place] [address] laboratory  Patient requests these reports be forwarded to Dr Hayes Amen office  3) Continue Creon with every meal  4) Follow-up in 2-3 months  Understands and agrees with treatment plan: The treatment plan was reviewed with the patient/guardian   The patient/guardian understands and agrees with the treatment plan   Counseling Documentation With Imm: The patient was counseled regarding diagnostic results, instructions for management, risk factor reductions, prognosis, patient and family education, impressions, risks and benefits of treatment options, importance of compliance with treatment  Chief Complaint  Chief Complaint Free Text Note Form: pt here for follow pt presents with l side abdominal pain   Chief Complaint Chronic Condition [facility]: Patient is here today for follow up of chronic conditions described in HPI  History of Present Illness  HPI: 39-year-old male with history of pancreatitis c/b pancreatic cyst status post cyst gastrostomy presenting for follow-up of LUQ pain  Patient reports continued LUQ pain  The pain is intermittent and sharp  Nonradiating  Nothing seems to make the pain better or worse  The pain is no better than at his last visit  He also complains of increased fatigue, decreased appetite, and early satiety  CAT scan from December 2015 revealed slight increase in size of the pancreatic cyst  He reports having blood work done in early April, although reports cannot be found in the computer system at present time  He denies recent fevers, chills, weight loss, nausea, vomiting  His constipation is well controlled with fiber and MiraLAX  He denies any blood in the stool  History Reviewed: The history was obtained today from the patient and I agree with the documented history  Review of Systems  Complete-Male GI Adult:   Constitutional: no fever, no chills and no recent weight loss  Eyes: eyesight problems and glasses  ENT: no sore throat  Cardiovascular: no chest pain  Respiratory: no shortness of breath  Gastrointestinal: abdominal pain, constipation and diarrhea  Genitourinary: no dysuria  Musculoskeletal: arthralgias  Integumentary: no rashes  Neurological: no headache  Psychiatric: no sleep disturbances  Hematologic/Lymphatic: no swollen glands in the neck  ROS Reviewed:   ROS reviewed  Active Problems    1  Abdominal pain (789 00) (R10 9)   2  Acute pancreatitis (577 0) (K85 9)   3   Anemia (285  9) (D64 9)   4  Aneurysm of abdominal aorta (441 4) (I71 4)   5  Aortic aneurysm (441 9) (I71 9)   6  Benign colon polyp (211 3) (K63 5)   7  Change in bowel habits (787 99) (R19 4)   8  Constipation (564 00) (K59 00)   9  Gastritis (535 50) (K29 70)   10  Hypercholesteremia (272 0) (E78 0)   11  Pancreatic disorder (577 9) (K86 9)   12  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)   13  Pancreatic pseudocyst (577 2) (K86 3)   14  Shortness of breath (786 05) (R06 02)    Past Medical History    1  History of Benign essential hypertension (401 1) (I10)   2  History of abdominal aortic aneurysm (V12 59) (Z86 79)   3  History of abnormal weight loss (V13 89) (H79 525)  Active Problems And Past Medical History Reviewed: The active problems and past medical history were reviewed and updated today  Surgical History    1  Denied: History of Recent Surgery  Surgical History Reviewed: The surgical history was reviewed and updated today  Family History  Mother    1  Family history of Hypertension (V17 49)  Father    2  Family history of Hypertension (V17 49)  Family History    3  Family history of Essential Hypercholesterolemia   4  Family history of Hypertension (V17 49)  Family History Reviewed: The family history was reviewed and updated today  Social History    · Cigarette smoker (305 1) (F17 210)   · Current Every Day Smoker (305 1)   · Never Drank Alcohol  Social History Reviewed: The social history was reviewed and updated today  Current Meds   1  ALPRAZolam 1 MG Oral Tablet; Therapy: 41XJR0116 to Recorded   2  Caltrate 600+D 600-400 MG-UNIT TABS; Therapy: (Recorded:18Nov2013) to Recorded   3  Centrum Silver Oral Tablet; Therapy: (Recorded:18Nov2013) to Recorded   4  Citalopram Hydrobromide 20 MG Oral Tablet; Therapy: (Recorded:18Nov2013) to Recorded   5  Creon 66397 UNIT Oral Capsule Delayed Release Particles; TAKE 1 CAPSULE Before   meals;    Therapy: 23Apr2014 to (Evaluate:01Jun2016)  Requested for: 22HVI4434; Last   Rx:[id number] Ordered   6  Creon 6000 UNIT Oral Capsule Delayed Release Particles; Therapy: 30NUE8270 to Recorded   7  Dulcolax 5 MG Oral Tablet Delayed Release; TAKE 1 TABLET DAILY AS NEEDED   Recorded   8  Hydrocodone-Acetaminophen  MG Oral Tablet Recorded   9  Hydrocodone-Acetaminophen 5-500 MG Oral Tablet; Therapy: (Recorded:18Nov2013) to Recorded   10  Hydrocodone-Acetaminophen 7 5-300 MG Oral Tablet; Therapy: 57KXR7639 to Recorded   11  Lisinopril-Hydrochlorothiazide 20-12 5 MG Oral Tablet; Therapy: (Recorded:18Nov2013) to Recorded   12  Magnesium Citrate SOLN;    Therapy: (Recorded:39Hhd8370) to Recorded   13  Metoprolol Tartrate 50 MG Oral Tablet; Therapy: (Recorded:18Nov2013) to Recorded   14  MiraLax Oral Powder Recorded   15  Omeprazole 40 MG Oral Capsule Delayed Release; TAKE 1 CAPSULE DAILY; Therapy: 46YYC6368 to (Evaluate:15Jan2015)  Requested for: 58Mdt4729; Last    Rx:[id number] Ordered   16  Pravastatin Sodium 40 MG Oral Tablet; Therapy: (Recorded:18Nov2013) to Recorded   17  Sertraline HCl - 25 MG Oral Tablet; Therapy: 70Exd3061 to Recorded   18  Tamsulosin HCl - 0 4 MG Oral Capsule; Therapy: (Recorded:18Nov2013) to Recorded  Medication List Reviewed: The medication list was reviewed and updated today  Allergies    1  No Known Drug Allergies    Vitals  Vital Signs [Data Includes: Current Encounter]    Recorded: 25Apr2016 03:47PM   Heart Rate 78   Respiration 20   Systolic 982   Diastolic 79   Height 5 ft 2 in   Weight 130 lb    BMI Calculated 23 78   BSA Calculated 1 59     Physical Exam    Constitutional   General appearance: No acute distress, well appearing and well nourished  Eyes No scleral icterus  Ears, Nose, Mouth, and Throat Moist mucous membranes  Pulmonary   Respiratory effort: No increased work of breathing or signs of respiratory distress      Auscultation of lungs: Clear to auscultation, equal breath sounds bilaterally, no wheezes, no rales, no rhonci  Cardiovascular   Auscultation of heart: Normal rate and rhythm, normal S1 and S2, without murmurs  Examination of extremities for edema and/or varicosities: Normal     Abdomen   Abdomen: Non-tender, no masses  Liver and spleen: No hepatomegaly or splenomegaly  Lymphatic   Palpation of lymph nodes in neck: No lymphadenopathy  Musculoskeletal   Gait and station: Normal     Skin No jaundice  Psychiatric   Orientation to person, place and time: Normal     Mood and affect: Normal          Results/Data  Results   * CT Abdomen/Pelvis With Contrast [id number] 06:52AM Bernie Waller     Test Name Result Flag Reference   CT Abd/Pelvis W/ (Report)     [address];;[place];[address]   12/07/2015 0725 12/07/2015 0745   N/A     CT ABDOMEN AND PELVIS WITH IV CONTRAST     INDICATION- Follow-up pancreas cyst, cyst drainage  COMPARISON- CT abdomen and pelvis 4/6/2015     TECHNIQUE- CT examination of the abdomen and pelvis was performed   after the administration of intravenous contrast  Examination was   performed utilizing techniques to minimize radiation dose, including   the use of dose reduction software  Axial, sagittal, and coronal   reformatted images were submitted for interpretation  100 cc of   intravenous Omnipaque 350 was administered for this examination  Enteric contrast was given  FINDINGS-     ABDOMEN     LOWER CHEST- Emphysematous changes in the lung bases  No   consolidation  LIVER/BILIARY TREE- Fatty liver  GALLBLADDER- No calcified gallstones  No pericholecystic inflammatory   change  SPLEEN- Unremarkable  PANCREAS- 2 0 x 1 7 x 2 6 cm cyst in the body appears slightly larger,   previously measuring 1 8 x 1 4 x 1 9 cm measured in a similar fashion  No discernible mural nodularity  Mild atrophy and duct prominence in the pancreatic tail as before  ADRENAL GLANDS- Unremarkable  KIDNEYS/URETERS- Unremarkable  No hydronephrosis  STOMACH AND BOWEL-    Small bowel is normal caliber  Scattered colonic diverticulosis without evidence of diverticulitis  APPENDIX- No findings to suggest appendicitis  ABDOMINOPELVIC CAVITY- No ascites or free intraperitoneal air  No   lymphadenopathy  VESSELS- 4 8 x 4 6 cm infrarenal abdominal aortic aneurysm appears   stable  PELVIS     REPRODUCTIVE ORGANS- Coarse prostate calcifications  URINARY BLADDER- There is mild diffuse circumferential bladder wall   thickening which may be accentuated by underdistention  ABDOMINAL WALL/INGUINAL REGIONS- Unremarkable  OSSEOUS STRUCTURES- No acute fracture or destructive osseous lesion  IMPRESSION-      Slight enlargement of pancreatic body cyst  No suspicious internal   interval change  Continued cross-sectional imaging surveillance is   warranted in 4-6 months  Stable AAA  Fatty liver     Colonic diverticulosis  Mild diffuse circumferential bladder wall thickening which may be   accentuated by underdistention  Correlate clinically for cystitis and   if relevant with urinalysis  ##sigslh##sigslh         ##fuslh6##fuslh6     Transcribed on- [id number]     - Mickeal Faes, RAD DO   Reading Radiologist- Mickeal Faes, RAD DO   Electronically Signed- Mickeal Faes, RAD DO   Released Date Time- 12/08/15 1209   ------------------------------------------------------------------------------   64[address]^MICH WONG   417[address]^MICH WONG     Health Management  Benign colon polyp   COLONOSCOPY; every 3 years; Next Due: 84MYS0747; Active    Attending Note  Collaborating Physician Note: Collaborating Physician: I interviewed and examined the patient        Future Appointments    Date/Time Provider Specialty Site   07/27/2016 02:00 PM Howard Cmoer, [facility] [place] [address] Electronically signed by : YOLANDA Levi;  Apr 25 2016  5:07PM EST                       (Author)    Electronically signed by : Garo Sandra MD; Apr 26 2016  9:46AM EST                       (Author)

## 2018-01-13 VITALS
DIASTOLIC BLOOD PRESSURE: 66 MMHG | HEART RATE: 72 BPM | WEIGHT: 127.4 LBS | BODY MASS INDEX: 23.3 KG/M2 | RESPIRATION RATE: 16 BRPM | SYSTOLIC BLOOD PRESSURE: 118 MMHG | OXYGEN SATURATION: 99 % | TEMPERATURE: 94.4 F

## 2018-01-13 VITALS
HEART RATE: 72 BPM | RESPIRATION RATE: 18 BRPM | SYSTOLIC BLOOD PRESSURE: 118 MMHG | DIASTOLIC BLOOD PRESSURE: 80 MMHG | WEIGHT: 119 LBS | TEMPERATURE: 95.7 F | HEIGHT: 62 IN | BODY MASS INDEX: 21.9 KG/M2

## 2018-01-14 VITALS
DIASTOLIC BLOOD PRESSURE: 80 MMHG | OXYGEN SATURATION: 95 % | WEIGHT: 125 LBS | HEART RATE: 73 BPM | HEIGHT: 62 IN | BODY MASS INDEX: 23 KG/M2 | SYSTOLIC BLOOD PRESSURE: 130 MMHG

## 2018-01-14 VITALS
TEMPERATURE: 98.5 F | OXYGEN SATURATION: 98 % | RESPIRATION RATE: 18 BRPM | HEIGHT: 62 IN | WEIGHT: 126.38 LBS | HEART RATE: 79 BPM | SYSTOLIC BLOOD PRESSURE: 105 MMHG | BODY MASS INDEX: 23.25 KG/M2 | DIASTOLIC BLOOD PRESSURE: 64 MMHG

## 2018-01-15 NOTE — MISCELLANEOUS
Message  GI Reminder Recall [facility]:   Date: 10/16/2017   Dear Flor Valentin:     Review of our records shows you are due for the following: Colonoscopy  Our records indicate that you are due at this time to have a follow-up examination for a colonoscopy  As you now, these tests are done to prevent colon cancer, a very common disease in the [place] and responsible for the thousands of patient deaths each year  We at [facility] are concerned for your health, and would very much appreciate you getting in touch with us at your earliest convenience, Again, this examination is vital to your proper health maintenance and for the prevention of cancer  Please call the following office to schedule your appointment:   [address] [place] [address] [phone]  We look forward to hearing from you! Sincerely,     [facility]      Signatures   Electronically signed by :  Yassine Cheung, ; Oct 16 2017  9:53AM EST                       (Author)

## 2018-01-15 NOTE — MISCELLANEOUS
Message  I discussed with Dr Emelia Sifuentes' recent blood work from 4/6/16 which shows hyponatremia of 126  He would like to rule-out SIADH  He asked me to order a BMP, urine os, serum os, and urine sodium  He has a follow-up with Dr Kd Magaña scheduled on 5/3/16  Patient should get blood work drawn prior to his follow-up with Dr Kd Magaña  I informed the patient that I faxed the blood work order to [facility] in [place], [place]  He expressed understanding and all questions were answered  Plan  Hyponatremia    · (1) BASIC METABOLIC PROFILE; Status:Active; Requested for:29Apr2016;    · (1) OSMOLALITY, SERUM; Status:Active; Requested for:29Apr2016;    · (1) OSMOLALITY, URINE; Status:Active; Requested for:29Apr2016;    · (1) SODIUM, URINE RANDOM; Status:Active; Requested for:29Apr2016;     Signatures   Electronically signed by : YOLANDA Rivera;  Apr 29 2016  3:11PM EST                       (Author)

## 2018-01-16 ENCOUNTER — GENERIC CONVERSION - ENCOUNTER (OUTPATIENT)
Dept: OTHER | Facility: OTHER | Age: 68
End: 2018-01-16

## 2018-01-19 ENCOUNTER — GENERIC CONVERSION - ENCOUNTER (OUTPATIENT)
Dept: OTHER | Facility: OTHER | Age: 68
End: 2018-01-19

## 2018-01-19 DIAGNOSIS — I71.4 ABDOMINAL AORTIC ANEURYSM WITHOUT RUPTURE (HCC): ICD-10-CM

## 2018-01-22 VITALS
RESPIRATION RATE: 16 BRPM | DIASTOLIC BLOOD PRESSURE: 72 MMHG | SYSTOLIC BLOOD PRESSURE: 112 MMHG | BODY MASS INDEX: 22.11 KG/M2 | HEIGHT: 62 IN | WEIGHT: 120.13 LBS | HEART RATE: 76 BPM

## 2018-01-22 VITALS
HEART RATE: 75 BPM | SYSTOLIC BLOOD PRESSURE: 125 MMHG | TEMPERATURE: 98 F | HEIGHT: 62 IN | BODY MASS INDEX: 21.9 KG/M2 | RESPIRATION RATE: 18 BRPM | WEIGHT: 119 LBS | DIASTOLIC BLOOD PRESSURE: 70 MMHG

## 2018-01-23 NOTE — MISCELLANEOUS
Message   Recorded as Task   Date: 01/11/2018 02:29 PM, Created By: Abraham Moore   Task Name: Follow Up   Assigned To: Maricarmen Begum   Regarding Patient: Jose Beasley, Status: Active   CommentNohector Fraser - 11 Jan 2018 2:29 PM     TASK CREATED  pt had his testing and Dr Saintclair Gosselin informed Dr Gabriela Bravo he is cleared  he also completed the pre op risk assessment  just an fyi he is also sched to see Dr Saintclair Gosselin 1/23/17, not sure it that apt is still needed  if not please let pt know  Thank you  Melida Oliver - 12 Jan 2018 12:43 PM     TASK REPLIED TO: Previously Assigned To [facility]  pt does not need an appt  S/W pt and cancelled        Active Problems    1  Abdominal pain (789 00) (R10 9)   2  Anemia (285 9) (D64 9)   3  Aneurysm of abdominal aorta (441 4) (I71 4)   4  Aortic aneurysm (441 9) (I71 9)   5  Atherosclerosis of leg with intermittent claudication (440 21) (I70 219)   6  Benign colon polyp (211 3) (K63 5)   7  Benign essential hypertension (401 1) (I10)   8  Change in bowel habits (787 99) (R19 4)   9  Chronic pancreatitis (577 1) (K86 1)   10  Constipation (564 00) (K59 00)   11  Gastritis (535 50) (K29 70)   12  Gastroesophageal reflux disease without esophagitis (530 81) (K21 9)   13  Hypercholesteremia (272 0) (E78 00)   14  Hyponatremia (276 1) (E87 1)   15  Pancreatic disorder (577 9) (K86 9)   16  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)   17  Pancreatic pseudocyst (577 2) (K86 3)   18  Preoperative cardiovascular examination (V72 81) (Z01 810)   19  Shortness of breath (786 05) (R06 02)    Current Meds   1  Acetaminophen-Codeine #2 300-15 MG Oral Tablet; TAKE 1 TABLET 3 times daily PRN; Therapy: 70OKB4324 to Recorded   2  ALPRAZolam 1 MG Oral Tablet; TAKE 1 TABLET TWICE DAILY; Therapy: 64LVP5719 to Recorded   3  Amitriptyline HCl - 10 MG Oral Tablet; TAKE 1 TABLET AT BEDTIME;    Therapy: 68VDU4865 to (Evaluate:12Anu5580)  Requested for: 66JUS3179; Last   Rx:30Oct2017 Ordered   4  AmLODIPine Besylate 5 MG Oral Tablet; TAKE 1 TABLET DAILY AS DIRECTED; Therapy: 61SQQ3052 to Recorded   5  Caltrate 600+D 600-400 MG-UNIT TABS; Therapy: (Recorded:18Nov2013) to Recorded   6  Centrum Silver Oral Tablet; Therapy: (Recorded:18Nov2013) to Recorded   7  Citalopram Hydrobromide 20 MG Oral Tablet; Take 1 tablet daily; Therapy: (Recorded:92Pgr4615) to Recorded   8  Creon 83260 UNIT Oral Capsule Delayed Release Particles; TAKE ONE CAPSULE BY   MOUTH DAILY WITH MEALS; Therapy: 18KVZ0866 to (Evaluate:89Uzm7681)  Requested for: 22Nov2017; Last   Rx:22Nov2017 Ordered   9  HydroCHLOROthiazide 12 5 MG Oral Tablet; TAKE 1 TABLET DAILY; Therapy: 28NIY5914 to Recorded   10  Lisinopril 40 MG Oral Tablet; TAKE 1 TABLET DAILY; Therapy: 06CHX0850 to Recorded   11  Metoprolol Tartrate 50 MG Oral Tablet; TAKE 1 TABLET DAILY; Therapy: (Recorded:26Lbl8408) to Recorded   12  MiraLax PACK (Polyethylene Glycol 3350); Therapy: (Recorded:21Nov2017) to Recorded   13  Mylanta TABS; Therapy: (Recorded:21Nov2017) to Recorded   14  Omeprazole 40 MG Oral Capsule Delayed Release; TAKE 1 CAPSULE DAILY; Therapy: 17AJJ4556 to (Evaluate:30Tbj2352)  Requested for: 72NXG1269; Last    Rx:15Eco9203 Ordered   15  Pravastatin Sodium 40 MG Oral Tablet; Therapy: (Recorded:18Nov2013) to Recorded   16  Sodium Chloride 1 GM Oral Tablet; TAKE 1 TABLET 3 times daily; Therapy: (Recorded:77Pck9368) to Recorded   17  Tamsulosin HCl - 0 4 MG Oral Capsule; Therapy: (Recorded:18Nov2013) to Recorded   18  Tylenol Extra Strength 500 MG Oral Tablet; Therapy: (Recorded:21Nov2017) to Recorded    Allergies    1   No Known Drug Allergies    Signatures   Electronically signed by : Vidhya Smith, ; Jan 16 2018  7:49AM EST                       (Author)

## 2018-01-23 NOTE — PROGRESS NOTES
Preliminary Nursing Report                Patient Information    Initial Encounter Entry Date:   2018 11:04 AM EST (Automated Transmission Automated Transmission)       Last Modified:   {Merrill Skinner}              Legal Name: Jaskaran Flores        Social Security Number:        YOB: 1950        Age (years): 75        Gender: M        Body Mass Index (BMI): 22 kg/m2        Height: 62 in  Weight: 120 lbs (54 kgs)           Address:   [facility]              Phone: [phone]   (consent to leave messages)        Email:        Ethnicity: Decline to State        Yarsanism:        Marital Status:        Preferred Language: English        Race: Other Race                    Patient Insurance Information        Primary Insurance Information Carrier Name: {Primary  CarrierName}           Carrier Address:   {Primary  CarrierAddress}              Carrier Phone: {Primary  CarrierPhone}          Group Number: {Primary  GroupNumber}          Policy Number: {Primary  PolicyNumber}          Insured Name: {Primary  InsuredName}          Insured : {Primary  InsuredDOB}          Relationship to Insured: {Primary  RelationshiptoInsured}           Secondary Insurance Information Carrier Name: {Secondary  CarrierName}           Carrier Address:   {Secondary  CarrierAddress}              Carrier Phone: {Secondary  CarrierPhone}          Group Number: {Secondary  GroupNumber}          Policy Number: {Secondary  PolicyNumber}          Insured Name: {Secondary  InsuredName}          Insured : {Secondary  InsuredDOB}          Relationship to Insured: {Secondary  RelationshiptoInsured}                       Health Profile   Booking #:   Lolis Arango #: [id number]               DOS: 2018    Surgery : Endovascular repair of infrarenal abdominal aortic aneurysm or dissection; using aorto-aortic tube prosthesis    Add'l Procedures/Notes:     Surgery Risk: Major Precautions          Allergies    No Known Drug Allergies             Medications    Acetaminophen-Codeine #2 300-15 MG Oral Tablet       ALPRAZolam 1 MG Oral Tablet       Amitriptyline HCl - 10 MG Oral Tablet       AmLODIPine Besylate 5 MG Oral Tablet       Caltrate 600+D 600-400 MG-UNIT TABS       Centrum Silver Oral Tablet       Citalopram Hydrobromide 20 MG Oral Tablet       Creon 11687 UNIT Oral Capsule Delayed Release Particles       HydroCHLOROthiazide 12 5 MG Oral Tablet       Lisinopril 40 MG Oral Tablet       Metoprolol Tartrate 50 MG Oral Tablet       MiraLax PACK       Mylanta TABS       Omeprazole 40 MG Oral Capsule Delayed Release       Pravastatin Sodium 40 MG Oral Tablet       Sodium Chloride 1 GM Oral Tablet       Tamsulosin HCl - 0 4 MG Oral Capsule       Tylenol Extra Strength 500 MG Oral Tablet               Conditions    Abdominal pain       Anemia       Aneurysm of abdominal aorta       Aortic aneurysm       Atherosclerosis of leg with intermittent claudication       Benign colon polyp       Benign essential hypertension       Change in bowel habits       Chronic pancreatitis       Constipation       Gastritis       Gastroesophageal reflux disease without esophagitis       Hypercholesteremia       Hyponatremia       Pancreatic disorder       Pancreatic Neoplasm Of Uncertain Malignant Potential       Pancreatic pseudocyst       Preoperative cardiovascular examination       Shortness of breath               Family History    None             Surgical History    None             Social History    Cigarette smoker       Current Every Day Smoker       Never Drank Alcohol                               Patient Instructions       Medical Procedure Risk  NPO Instructions   The day before surgery it is recommended to have a light dinner at your usual time and you are allowed a light snack early in the evening   Do not eat anything heavy or eat a big meal after 7pm  Do not eat or drink anything after midnight prior to your surgery  If you are supposed to take any of your medications, do so with a sip of water  Failure to follow these instructions can lead to an increased risk of lung complications and may result in a delay or cancellation of your procedure  If you have any questions, contact your institution for further instructions  No candy, no gum, no mints, no chewing tobacco          Lisinopril 40 MG Oral Tablet  Medication Instruction (ACE/ARB - Blood Pressure Medication) 1  Please continue the following medications up to the evening before surgery, but do not take it on the day of surgery  Please restart your medications as soon as clinically feasible  ALPRAZolam 1 MG Oral Tablet  Medication Instruction (Benzodiazepine) 15  Please continue the following medications, if needed, up to and including the day of surgery (with a sip of water)  AmLODIPine Besylate 5 MG Oral Tablet  Calcium Blocker (Blood Pressure Medication) 3, 4, 6, 5, 2  Please continue to take this medication on your normal schedule  If this is an oral medication and you take in the morning, you may do so with a sip of water  HydroCHLOROthiazide 12 5 MG Oral Tablet  Medication Instruction (Diuretics - Water Pills) 28, 29  Please continue the following medications up to the evening before surgery, but do not take it on the day of surgery  Acetaminophen-Codeine #2 300-15 MG Oral Tablet  Medication Instruction (Opioids - Pain Medication) 62  Please continue the following medications, if needed, up to and including the day of surgery (with a sip of water)  Gastroesophageal reflux disease without esophagitis  Medication Instruction (Reflux Disease)   Please continue to take this medication on your normal schedule  If this is an oral medication and you take in the morning, you may do so with a sip of water           Current Every Day Smoker, Cigarette smoker  Smoking Cessation   Smoking before and after surgery can lead to complications  Patients who quit smoking at least eight weeks before surgery have complication rates almost as low as non-smokers  Smokers who can stop smoking 24 or 48 hours before surgery may also benefit from decreased amounts of nicotine and carbon monoxide in the body  For help quitting smoking, speak with your physician or contact the [facility] or [employer]  Please visit the following web address for assistance with quitting  [email or web address]  aspx  Testing Considerations       ? Chest X-ray (CXR) t  Consider a Chest X-Ray (CXR) if patient is having respiratory symptoms  Triggered by: Aneurysm of abdominal aorta, Aortic aneurysm, Shortness of breath, Age or Facility Rec         ? Coagulation Tests (PT/PTT/INR) t  Triggered by: Aneurysm of abdominal aorta, Aortic aneurysm, Chronic pancreatitis         ? Complete Blood Count (CBC) t, client, client  If test was completed and normal within last six months, repeat test is not necessary  Triggered by: Anemia, Aneurysm of abdominal aorta, Aortic aneurysm, Chronic pancreatitis, Shortness of breath, Age or Facility Rec         ? Comprehensive Metabolic Panel (CMP) t  If test was completed and normal within last six months, repeat test is not necessary  Triggered by: Aneurysm of abdominal aorta, Aortic aneurysm, Chronic pancreatitis, Shortness of breath         ? Electrocardiogram (ECG) t  Patient does not need new test if normal ECG is present within the last six months and no change in clinical condition  Triggered by: Aneurysm of abdominal aorta, Aortic aneurysm, Benign essential hypertension, Hyponatremia, Shortness of breath, Age or Facility Rec         ? Type and Screen client  Type and Screen - Blood:  If there is anticipated or possible large blood loss with this procedure, then a Type and Screen for Blood should be ordered  Triggered by: Age or Facility Rec         ? Urinalysis t  Urinalysis may be appropriate if recent urinary symptoms or implants are being placed in surgical procedure  Triggered by: Medical Procedure               Consultations       ? Cardiac Evaluation   Further evaluation of cardiopulmonary status should be strongly considered  Triggered by: Aneurysm of abdominal aorta, Aortic aneurysm         ? PCP eval/Echo t  Triggered by: Aortic aneurysm         ? Primary Care Physician Evaluation   Primary care physician may need to evaluate patient prior to surgery  This is likely NOT necessary if the listed conditions are chronic and stable  Triggered by: Anemia, Shortness of breath               Miscellaneous Questions         Question: Are you able to walk up a flight of stairs, walk up a hill or do heavy housework WITHOUT having chest pain or shortness of breath? Answer: YES                   Allergies/Conditions/Medications Not Found        The following were not recognized by our system when generating the recommendations  Please consider if this would impact any preoperative protocols  ? Never Drank Alcohol       ? Pancreatic Neoplasm Of Uncertain Malignant Potential       ? Amitriptyline HCl - 10 MG Oral Tablet       ? Centrum Silver Oral Tablet       ? Citalopram Hydrobromide 20 MG Oral Tablet       ? Metoprolol Tartrate 50 MG Oral Tablet       ? MiraLax PACK       ? Mylanta TABS       ? Pravastatin Sodium 40 MG Oral Tablet       ? Tylenol Extra Strength 500 MG Oral Tablet                  Appointment Information         Date:    02/09/2018        Location:    [place]        Address:           Directions:                      Footnotes revision 14      ?? Denotes a free-text entry  Legal Disclaimer: Any and all recommendations and services provided herein are designed to assist in the preoperative care of the patient   Nothing contained herein is designed to replace, eliminate or alleviate the responsibility of the attending physician to supervise and determine the patient?s preoperative care and course of treatment  Failure to provide complete, accurate information may negatively impact the system?s ability to recommend the proper preoperative protocol  THE ATTENDING PHYSICIAN IS RESPONSIBLE TO REVIEW THE SUGGESTED PREOPERATIVE PROTOCOLS/COURSE OF TREATMENT AND PRESCRIBE THE FINAL COURSE OF PREOPERATIVE TREATMENT IN CONSULTATION WITH THE PATIENT  THE ePREOP SYSTEM AND ITS MATERIALS ARE PROVIDED ? AS IS? WITHOUT WARRANTY OF ANY KIND, EXPRESS OR IMPLIED, INCLUDING, BUT NOT LIMITED TO, WARRANTIES OF PERFORMANCE OR MERCHANTABILITY OR FITNESS FOR A PARTICULAR PURPOSE  PATIENT AND PHYSICIANS HEREBY AGREE THAT THEIR USE OF THE MATERIALS AND RESOURCES ACT AS A CONSENT TO RELEASE AND WAIVE ePREOP FROM ANY AND ALL CLAIMS OF WARRANTY, TORT OR CONTRACT LAW OF ANY KIND

## 2018-01-23 NOTE — PROGRESS NOTES
Preliminary Nursing Report                Patient Information    Initial Encounter Entry Date:   2018 12:54 PM EST (Automated Transmission Automated Transmission)       Last Modified:   {Merrill Skinner}              Legal Name: Zandra Cooks        Social Security Number:        YOB: 1950        Age (years): 75        Gender: M        Body Mass Index (BMI): 22 kg/m2        Height: 62 in  Weight: 120 lbs (54 kgs)           Address:   [facility]              Phone: [phone]   (consent to leave messages)        Email:        Ethnicity: Decline to State        Mormon:        Marital Status:        Preferred Language: English        Race: Other Race                    Patient Insurance Information        Primary Insurance Information Carrier Name: {Primary  CarrierName}           Carrier Address:   {Primary  CarrierAddress}              Carrier Phone: {Primary  CarrierPhone}          Group Number: {Primary  GroupNumber}          Policy Number: {Primary  PolicyNumber}          Insured Name: {Primary  InsuredName}          Insured : {Primary  InsuredDOB}          Relationship to Insured: {Primary  RelationshiptoInsured}           Secondary Insurance Information Carrier Name: {Secondary  CarrierName}           Carrier Address:   {Secondary  CarrierAddress}              Carrier Phone: {Secondary  CarrierPhone}          Group Number: {Secondary  GroupNumber}          Policy Number: {Secondary  PolicyNumber}          Insured Name: {Secondary  InsuredName}          Insured : {Secondary  InsuredDOB}          Relationship to Insured: {Secondary  RelationshiptoInsured}                       Health Profile   Booking #:   Kym Theodore #: [id number]               DOS: 2018    Surgery : Endovascular repair of infrarenal abdominal aortic aneurysm or dissection; using modular bifurcated prosthesis (one docking limb)    Add'l Procedures/Notes:     Surgery Risk: Major          Precautions          Allergies    No Known Drug Allergies             Medications    Acetaminophen-Codeine #2 300-15 MG Oral Tablet       ALPRAZolam 1 MG Oral Tablet       Amitriptyline HCl - 10 MG Oral Tablet       AmLODIPine Besylate 5 MG Oral Tablet       Caltrate 600+D 600-400 MG-UNIT TABS       Centrum Silver Oral Tablet       Citalopram Hydrobromide 20 MG Oral Tablet       Creon 10322 UNIT Oral Capsule Delayed Release Particles       HydroCHLOROthiazide 12 5 MG Oral Tablet       Lisinopril 40 MG Oral Tablet       Metoprolol Tartrate 50 MG Oral Tablet       MiraLax PACK       Mylanta TABS       Omeprazole 40 MG Oral Capsule Delayed Release       Pravastatin Sodium 40 MG Oral Tablet       Sodium Chloride 1 GM Oral Tablet       Tamsulosin HCl - 0 4 MG Oral Capsule       Tylenol Extra Strength 500 MG Oral Tablet               Conditions    Abdominal pain       Anemia       Aneurysm of abdominal aorta       Aortic aneurysm       Atherosclerosis of leg with intermittent claudication       Benign colon polyp       Benign essential hypertension       Change in bowel habits       Chronic pancreatitis       Constipation       Gastritis       Gastroesophageal reflux disease without esophagitis       Hypercholesteremia       Hyponatremia       Pancreatic disorder       Pancreatic Neoplasm Of Uncertain Malignant Potential       Pancreatic pseudocyst       Preoperative cardiovascular examination       Shortness of breath               Family History    None             Surgical History    None             Social History    Cigarette smoker       Current Every Day Smoker       Never Drank Alcohol                               Patient Instructions       Medical Procedure Risk  NPO Instructions   The day before surgery it is recommended to have a light dinner at your usual time and you are allowed a light snack early in the evening   Do not eat anything heavy or eat a big meal after 7pm  Do not eat or drink anything after midnight prior to your surgery  If you are supposed to take any of your medications, do so with a sip of water  Failure to follow these instructions can lead to an increased risk of lung complications and may result in a delay or cancellation of your procedure  If you have any questions, contact your institution for further instructions  No candy, no gum, no mints, no chewing tobacco          Lisinopril 40 MG Oral Tablet  Medication Instruction (ACE/ARB - Blood Pressure Medication) 1  Please continue the following medications up to the evening before surgery, but do not take it on the day of surgery  Please restart your medications as soon as clinically feasible  ALPRAZolam 1 MG Oral Tablet  Medication Instruction (Benzodiazepine) 15  Please continue the following medications, if needed, up to and including the day of surgery (with a sip of water)  AmLODIPine Besylate 5 MG Oral Tablet  Calcium Blocker (Blood Pressure Medication) 3, 4, 6, 5, 2  Please continue to take this medication on your normal schedule  If this is an oral medication and you take in the morning, you may do so with a sip of water  HydroCHLOROthiazide 12 5 MG Oral Tablet  Medication Instruction (Diuretics - Water Pills) 28, 29  Please continue the following medications up to the evening before surgery, but do not take it on the day of surgery  Acetaminophen-Codeine #2 300-15 MG Oral Tablet  Medication Instruction (Opioids - Pain Medication) 62  Please continue the following medications, if needed, up to and including the day of surgery (with a sip of water)  Gastroesophageal reflux disease without esophagitis  Medication Instruction (Reflux Disease)   Please continue to take this medication on your normal schedule  If this is an oral medication and you take in the morning, you may do so with a sip of water           Current Every Day Smoker, Cigarette smoker  Smoking Cessation   Smoking before and after surgery can lead to complications  Patients who quit smoking at least eight weeks before surgery have complication rates almost as low as non-smokers  Smokers who can stop smoking 24 or 48 hours before surgery may also benefit from decreased amounts of nicotine and carbon monoxide in the body  For help quitting smoking, speak with your physician or contact the [facility] or American [employer]  Please visit the following web address for assistance with quitting  [facility]  [facility]/Anesthesia-Topics/Wpu-Slb-xq-Quit-Smoking  aspx  Testing Considerations       ? Chest X-ray (CXR) t  Consider a Chest X-Ray (CXR) if patient is having respiratory symptoms  Triggered by: Aneurysm of abdominal aorta, Aortic aneurysm, Shortness of breath, Age or Facility Rec         ? Coagulation Tests (PT/PTT/INR) t  Triggered by: Aneurysm of abdominal aorta, Aortic aneurysm, Chronic pancreatitis         ? Complete Blood Count (CBC) t, client, client  If test was completed and normal within last six months, repeat test is not necessary  Triggered by: Anemia, Aneurysm of abdominal aorta, Aortic aneurysm, Chronic pancreatitis, Shortness of breath, Age or Facility Rec         ? Comprehensive Metabolic Panel (CMP) t  If test was completed and normal within last six months, repeat test is not necessary  Triggered by: Aneurysm of abdominal aorta, Aortic aneurysm, Chronic pancreatitis, Shortness of breath         ? Electrocardiogram (ECG) t  Patient does not need new test if normal ECG is present within the last six months and no change in clinical condition  Triggered by: Aneurysm of abdominal aorta, Aortic aneurysm, Benign essential hypertension, Hyponatremia, Shortness of breath, Age or Facility Rec         ? Type and Screen client  Type and Screen - Blood:  If there is anticipated or possible large blood loss with this procedure, then a Type and Screen for Blood should be ordered  Triggered by: Age or Facility Rec         ? Urinalysis t  Urinalysis may be appropriate if recent urinary symptoms or implants are being placed in surgical procedure  Triggered by: Medical Procedure               Consultations       ? Cardiac Evaluation   Further evaluation of cardiopulmonary status should be strongly considered  Triggered by: Aneurysm of abdominal aorta, Aortic aneurysm         ? PCP eval/Echo t  Triggered by: Aortic aneurysm         ? Primary Care Physician Evaluation   Primary care physician may need to evaluate patient prior to surgery  This is likely NOT necessary if the listed conditions are chronic and stable  Triggered by: Anemia, Shortness of breath               Miscellaneous Questions         Question: Are you able to walk up a flight of stairs, walk up a hill or do heavy housework WITHOUT having chest pain or shortness of breath? Answer: YES                   Allergies/Conditions/Medications Not Found        The following were not recognized by our system when generating the recommendations  Please consider if this would impact any preoperative protocols  ? Never Drank Alcohol       ? Pancreatic Neoplasm Of Uncertain Malignant Potential       ? Amitriptyline HCl - 10 MG Oral Tablet       ? Centrum Silver Oral Tablet       ? Citalopram Hydrobromide 20 MG Oral Tablet       ? Metoprolol Tartrate 50 MG Oral Tablet       ? MiraLax PACK       ? Mylanta TABS       ? Pravastatin Sodium 40 MG Oral Tablet       ? Tylenol Extra Strength 500 MG Oral Tablet                  Appointment Information         Date:    02/09/2018        Location:    [place]        Address:           Directions:                      Footnotes revision 14      ?? Denotes a free-text entry  Legal Disclaimer: Any and all recommendations and services provided herein are designed to assist in the preoperative care of the patient   Nothing contained herein is designed to replace, eliminate or alleviate the responsibility of the attending physician to supervise and determine the patient?s preoperative care and course of treatment  Failure to provide complete, accurate information may negatively impact the system?s ability to recommend the proper preoperative protocol  THE ATTENDING PHYSICIAN IS RESPONSIBLE TO REVIEW THE SUGGESTED PREOPERATIVE PROTOCOLS/COURSE OF TREATMENT AND PRESCRIBE THE FINAL COURSE OF PREOPERATIVE TREATMENT IN CONSULTATION WITH THE PATIENT  THE ePREOP SYSTEM AND ITS MATERIALS ARE PROVIDED ? AS IS? WITHOUT WARRANTY OF ANY KIND, EXPRESS OR IMPLIED, INCLUDING, BUT NOT LIMITED TO, WARRANTIES OF PERFORMANCE OR MERCHANTABILITY OR FITNESS FOR A PARTICULAR PURPOSE  PATIENT AND PHYSICIANS HEREBY AGREE THAT THEIR USE OF THE MATERIALS AND RESOURCES ACT AS A CONSENT TO RELEASE AND WAIVE ePREOP FROM ANY AND ALL CLAIMS OF WARRANTY, TORT OR CONTRACT LAW OF ANY KIND

## 2018-01-23 NOTE — MISCELLANEOUS
Message   Recorded as Task   Date: 11/21/2017 03:24 PM, Created By: Pa Freeman   Task Name: Go to Order   Assigned To: vascular cardiac clearances,Team   Regarding Patient: Kayla Koroma, Status: In Progress   Rosalindfaustino Alegria - 21 Nov 2017 3:24 PM     TASK CREATED  patient scheduled 12/15 @ 140 with dr Andrew Prtaher in [facility]  patient needs CLR for EVAR possible Open   Maricarmen Begum - 21 Nov 2017 5:13 PM     TASK IN PROGRESS   Zabrina Murphy - 18 Dec 2017 9:20 AM     TASK EDITED  echo and stress 1/3 and dr Toya Snowden appt 1/23   see pre op risk assessment, pt cleared by Dr Barnes Nab  copy tasked to Dr Leland Rosario for rev and given to surgery sched  1        1 Amended By: Blade Capps; Jan 11 2018 2:31 PM EST    Active Problems   1  Abdominal pain (789 00) (R10 9)  2  Anemia (285 9) (D64 9)  3  Aneurysm of abdominal aorta (441 4) (I71 4)  4  Aortic aneurysm (441 9) (I71 9)  5  Atherosclerosis of leg with intermittent claudication (440 21) (I70 219)  6  Benign colon polyp (211 3) (K63 5)  7  Benign essential hypertension (401 1) (I10)  8  Change in bowel habits (787 99) (R19 4)  9  Chronic pancreatitis (577 1) (K86 1)  10  Constipation (564 00) (K59 00)  11  Gastritis (535 50) (K29 70)  12  Gastroesophageal reflux disease without esophagitis (530 81) (K21 9)  13  Hypercholesteremia (272 0) (E78 00)  14  Hyponatremia (276 1) (E87 1)  15  Pancreatic disorder (577 9) (K86 9)  16  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)  17  Pancreatic pseudocyst (577 2) (K86 3)  18  Preoperative cardiovascular examination (V72 81) (Z01 810)  19  Shortness of breath (786 05) (R06 02)    Current Meds  1  Acetaminophen-Codeine #2 300-15 MG Oral Tablet; TAKE 1 TABLET 3 times daily PRN; Therapy: 01WNA2091 to Recorded  2  ALPRAZolam 1 MG Oral Tablet; TAKE 1 TABLET TWICE DAILY; Therapy: 91CCQ9292 to Recorded  3  Amitriptyline HCl - 10 MG Oral Tablet; TAKE 1 TABLET AT BEDTIME;    Therapy: 73OYA8154 to (Evaluate:45Kiu8744)  Requested for: 90VWN3674; Last   Rx:30Oct2017 Ordered  4  AmLODIPine Besylate 5 MG Oral Tablet; TAKE 1 TABLET DAILY AS DIRECTED; Therapy: 68IFA8650 to Recorded  5  Caltrate 600+D 600-400 MG-UNIT TABS; Therapy: (Recorded:18Nov2013) to Recorded  6  Centrum Silver Oral Tablet; Therapy: (Recorded:18Nov2013) to Recorded  7  Citalopram Hydrobromide 20 MG Oral Tablet; Take 1 tablet daily; Therapy: (Recorded:94Zqg1561) to Recorded  8  Creon 08247 UNIT Oral Capsule Delayed Release Particles; TAKE ONE CAPSULE BY   MOUTH DAILY WITH MEALS; Therapy: 59DWM4840 to (Evaluate:53Dng5177)  Requested for: 22Nov2017; Last   Rx:22Nov2017 Ordered  9  HydroCHLOROthiazide 12 5 MG Oral Tablet; TAKE 1 TABLET DAILY; Therapy: 84WJT7279 to Recorded  10  Lisinopril 40 MG Oral Tablet; TAKE 1 TABLET DAILY; Therapy: 44FNQ0448 to Recorded  11  Metoprolol Tartrate 50 MG Oral Tablet; TAKE 1 TABLET DAILY; Therapy: (Recorded:88Thu3923) to Recorded  12  MiraLax PACK (Polyethylene Glycol 3350); Therapy: (Recorded:21Nov2017) to Recorded  13  Mylanta TABS; Therapy: (Recorded:21Nov2017) to Recorded  14  Omeprazole 40 MG Oral Capsule Delayed Release; TAKE 1 CAPSULE DAILY; Therapy: 24UKP9684 to (Evaluate:80Cwt0336)  Requested for: 44MAD1705; Last    Rx:80Cqh8410 Ordered  15  Pravastatin Sodium 40 MG Oral Tablet; Therapy: (Recorded:18Nov2013) to Recorded  16  Sodium Chloride 1 GM Oral Tablet; TAKE 1 TABLET 3 times daily; Therapy: (Recorded:35Alq1496) to Recorded  17  Tamsulosin HCl - 0 4 MG Oral Capsule; Therapy: (Recorded:18Nov2013) to Recorded  18  Tylenol Extra Strength 500 MG Oral Tablet; Therapy: (Recorded:21Nov2017) to Recorded    Allergies   1   No Known Drug Allergies    Signatures   Electronically signed by : Marta Ch RN; Jan 9 2018  4:11PM EST                       (Author)    Electronically signed by : Chelsey Alegria, ; Jan 11 2018  2:31PM EST                       (Author)

## 2018-01-24 ENCOUNTER — TELEPHONE (OUTPATIENT)
Dept: VASCULAR SURGERY | Facility: CLINIC | Age: 68
End: 2018-01-24

## 2018-01-24 NOTE — TELEPHONE ENCOUNTER
Recorded as Task   Date: 11/21/2017 03:24 PM, Created By: Vishnu Clifford   Task Name: Go to Order   Assigned To: vascular cardiac clearances,Team   Regarding Patient: Yesika Villarreal, Status: In Progress   Blaine Ulloa - 21 Nov 2017 3:24 PM    TASK CREATED  patient scheduled 12/15 @ 140 with dr Elaine Cui [facility] [place]  patient needs CLR for EVAR possible Open   Maricarmen Begum - 21 Nov 2017 5:13 PM    TASK IN PROGRESS   Zabrina Murphy - 18 Dec 2017 9:20 AM    TASK EDITED  echo and stress 1/3 and dr Eron Laguerre appt 1/23   see pre op risk assessment, pt cleared by Dr Keli Brewer  copy tasked to Dr Robert Todd for rev and given to surgery sched  1

## 2018-01-25 ENCOUNTER — TRANSCRIBE ORDERS (OUTPATIENT)
Dept: ADMINISTRATIVE | Facility: HOSPITAL | Age: 68
End: 2018-01-25

## 2018-01-25 ENCOUNTER — APPOINTMENT (OUTPATIENT)
Dept: LAB | Facility: HOSPITAL | Age: 68
End: 2018-01-25
Attending: SURGERY
Payer: MEDICARE

## 2018-01-25 ENCOUNTER — HOSPITAL ENCOUNTER (OUTPATIENT)
Dept: RADIOLOGY | Facility: HOSPITAL | Age: 68
Discharge: HOME | End: 2018-01-25
Attending: SURGERY
Payer: MEDICARE

## 2018-01-25 DIAGNOSIS — I71.4 ABDOMINAL AORTIC ANEURYSM WITHOUT RUPTURE (HCC): ICD-10-CM

## 2018-01-25 LAB
ANION GAP SERPL CALCULATED.3IONS-SCNC: 6 MMOL/L (ref 4–13)
BUN SERPL-MCNC: 11 MG/DL (ref 5–25)
CALCIUM SERPL-MCNC: 9.4 MG/DL (ref 8.3–10.1)
CHLORIDE SERPL-SCNC: 96 MMOL/L (ref 100–108)
CO2 SERPL-SCNC: 31 MMOL/L (ref 21–32)
CREAT SERPL-MCNC: 0.88 MG/DL (ref 0.6–1.3)
ERYTHROCYTE [DISTWIDTH] IN BLOOD BY AUTOMATED COUNT: 13.6 % (ref 11.6–15.1)
EST. AVERAGE GLUCOSE BLD GHB EST-MCNC: 120 MG/DL
GFR SERPL CREATININE-BSD FRML MDRD: 88 ML/MIN/1.73SQ M
GLUCOSE P FAST SERPL-MCNC: 96 MG/DL (ref 65–99)
HBA1C MFR BLD: 5.8 % (ref 4.2–6.3)
HCT VFR BLD AUTO: 41.5 % (ref 36.5–49.3)
HGB BLD-MCNC: 14.6 G/DL (ref 12–17)
MCH RBC QN AUTO: 33 PG (ref 26.8–34.3)
MCHC RBC AUTO-ENTMCNC: 35.2 G/DL (ref 31.4–37.4)
MCV RBC AUTO: 94 FL (ref 82–98)
PLATELET # BLD AUTO: 238 THOUSANDS/UL (ref 149–390)
PMV BLD AUTO: 9.9 FL (ref 8.9–12.7)
POTASSIUM SERPL-SCNC: 4.3 MMOL/L (ref 3.5–5.3)
RBC # BLD AUTO: 4.42 MILLION/UL (ref 3.88–5.62)
SODIUM SERPL-SCNC: 133 MMOL/L (ref 136–145)
WBC # BLD AUTO: 8.21 THOUSAND/UL (ref 4.31–10.16)

## 2018-01-25 PROCEDURE — 83036 HEMOGLOBIN GLYCOSYLATED A1C: CPT

## 2018-01-25 PROCEDURE — 80048 BASIC METABOLIC PNL TOTAL CA: CPT

## 2018-01-25 PROCEDURE — 36415 COLL VENOUS BLD VENIPUNCTURE: CPT

## 2018-01-25 PROCEDURE — 71046 X-RAY EXAM CHEST 2 VIEWS: CPT

## 2018-01-25 PROCEDURE — 85027 COMPLETE CBC AUTOMATED: CPT

## 2018-01-29 DIAGNOSIS — I71.4 ABDOMINAL AORTIC ANEURYSM WITHOUT RUPTURE (HCC): ICD-10-CM

## 2018-01-30 RX ORDER — LISINOPRIL 40 MG/1
40 TABLET ORAL DAILY
COMMUNITY
End: 2021-03-20 | Stop reason: SDUPTHER

## 2018-01-30 RX ORDER — SODIUM CHLORIDE 1000 MG
1 TABLET, SOLUBLE MISCELLANEOUS 3 TIMES DAILY
COMMUNITY
End: 2020-12-15

## 2018-01-30 RX ORDER — ACETAMINOPHEN AND CODEINE PHOSPHATE 300; 15 MG/1; MG/1
1 TABLET ORAL EVERY 4 HOURS PRN
COMMUNITY
End: 2019-01-04 | Stop reason: ALTCHOICE

## 2018-01-30 NOTE — PRE-PROCEDURE INSTRUCTIONS
Pre-Surgery Instructions:   Medication Instructions    acetaminophen-codeine (TYLENOL #2) 300-15 MG per tablet Instructed patient per Anesthesia Guidelines   ALPRAZolam (XANAX) 1 mg tablet Instructed patient per Anesthesia Guidelines   Calcium Carbonate-Vitamin D (CALTRATE 600+D PO) Instructed patient per Anesthesia Guidelines   lisinopril (ZESTRIL) 40 mg tablet Instructed patient per Anesthesia Guidelines   metoprolol tartrate (LOPRESSOR) 50 mg tablet Instructed patient per Anesthesia Guidelines   Multiple Vitamins-Minerals (CENTRUM SILVER PO) Instructed patient per Anesthesia Guidelines   omeprazole (PriLOSEC) 40 MG capsule Instructed patient per Anesthesia Guidelines   Pancrelipase, Lip-Prot-Amyl, (CREON PO) Instructed patient per Anesthesia Guidelines   polyethylene glycol (MIRALAX) 17 g packet Instructed patient per Anesthesia Guidelines   pravastatin (PRAVACHOL) 40 mg tablet Instructed patient per Anesthesia Guidelines   sodium chloride 1 g tablet Instructed patient per Anesthesia Guidelines   Wheat Dextrin (BENEFIBER PO) Instructed patient per Anesthesia Guidelines  ACE/ARB Med Class     Do not take this medication the day before and the morning of the day of surgery/procedure  Acetaminophen Med Class     Continue to take this medication on your normal schedule  If this is an oral medication and you take it in the morning, then you may take this medicine with a sip of water  Benzodiazepine antagonist Med Class     If this medication is needed please continue to take on your normal schedule  If you take it in the morning, then you may take this medicine with a sip of water  Beta blocker Med Class     Continue to take this heart medication on your normal schedule  If this is an oral medication and you take it in the morning, then you may take this medicine with a sip of water  Opioid Med Class     Continue to take this medication on your normal schedule    If this is an oral medication and you take it in the morning, then you may take this medicine with a sip of water  Statin Med Class     Continue to take this medication on your normal schedule    If this is an oral medication and you take it in the morning, then you may take this medicine with a sip of water  PRE OP INSTRUCTIONS REVIEWED WITH PATIENT ON 1/30 4049 [facility]

## 2018-02-01 ENCOUNTER — HOSPITAL ENCOUNTER (OUTPATIENT)
Dept: NON INVASIVE DIAGNOSTICS | Facility: CLINIC | Age: 68
Discharge: HOME | End: 2018-02-01
Payer: MEDICARE

## 2018-02-01 DIAGNOSIS — I71.4 ABDOMINAL AORTIC ANEURYSM WITHOUT RUPTURE (HCC): ICD-10-CM

## 2018-02-09 ENCOUNTER — ANESTHESIA EVENT (OUTPATIENT)
Dept: PERIOP | Facility: HOSPITAL | Age: 68
DRG: 268 | End: 2018-02-09
Payer: MEDICARE

## 2018-02-09 ENCOUNTER — ANESTHESIA (OUTPATIENT)
Dept: PERIOP | Facility: HOSPITAL | Age: 68
DRG: 268 | End: 2018-02-09
Payer: MEDICARE

## 2018-02-09 ENCOUNTER — HOSPITAL ENCOUNTER (INPATIENT)
Facility: HOSPITAL | Age: 68
LOS: 1 days | Discharge: HOME | DRG: 268 | End: 2018-02-10
Attending: SURGERY | Admitting: SURGERY
Payer: MEDICARE

## 2018-02-09 ENCOUNTER — APPOINTMENT (OUTPATIENT)
Dept: RADIOLOGY | Facility: HOSPITAL | Age: 68
DRG: 268 | End: 2018-02-09
Attending: SURGERY
Payer: MEDICARE

## 2018-02-09 DIAGNOSIS — I71.4 ABDOMINAL AORTIC ANEURYSM WITHOUT RUPTURE (HCC): ICD-10-CM

## 2018-02-09 DIAGNOSIS — I71.4 ABDOMINAL AORTIC ANEURYSM (AAA) WITHOUT RUPTURE (HCC): Primary | ICD-10-CM

## 2018-02-09 PROBLEM — I71.40 ABDOMINAL AORTIC ANEURYSM (AAA) WITHOUT RUPTURE: Status: ACTIVE | Noted: 2018-02-09

## 2018-02-09 LAB
ABO GROUP BLD: NORMAL
ANION GAP SERPL CALCULATED.3IONS-SCNC: 9 MMOL/L (ref 4–13)
APTT PPP: 30 SECONDS (ref 23–35)
ATRIAL RATE: 57 BPM
BLD GP AB SCN SERPL QL: NEGATIVE
BUN SERPL-MCNC: 10 MG/DL (ref 5–25)
CALCIUM SERPL-MCNC: 7.2 MG/DL (ref 8.3–10.1)
CHLORIDE SERPL-SCNC: 105 MMOL/L (ref 100–108)
CO2 SERPL-SCNC: 23 MMOL/L (ref 21–32)
CREAT SERPL-MCNC: 0.76 MG/DL (ref 0.6–1.3)
ERYTHROCYTE [DISTWIDTH] IN BLOOD BY AUTOMATED COUNT: 13.4 % (ref 11.6–15.1)
GFR SERPL CREATININE-BSD FRML MDRD: 94 ML/MIN/1.73SQ M
GLUCOSE P FAST SERPL-MCNC: 139 MG/DL (ref 65–99)
GLUCOSE SERPL-MCNC: 139 MG/DL (ref 65–140)
GLUCOSE SERPL-MCNC: 149 MG/DL (ref 65–140)
HCT VFR BLD AUTO: 26.7 % (ref 36.5–49.3)
HGB BLD-MCNC: 9.3 G/DL (ref 12–17)
INR PPP: 1.01 (ref 0.86–1.16)
KCT BLD-ACNC: 189 SEC (ref 89–137)
KCT BLD-ACNC: 200 SEC (ref 89–137)
KCT BLD-ACNC: 206 SEC (ref 89–137)
KCT BLD-ACNC: 206 SEC (ref 89–137)
KCT BLD-ACNC: 218 SEC (ref 89–137)
MCH RBC QN AUTO: 32.7 PG (ref 26.8–34.3)
MCHC RBC AUTO-ENTMCNC: 34.8 G/DL (ref 31.4–37.4)
MCV RBC AUTO: 94 FL (ref 82–98)
P AXIS: 80 DEGREES
PLATELET # BLD AUTO: 119 THOUSANDS/UL (ref 149–390)
PLATELET # BLD AUTO: 149 THOUSANDS/UL (ref 149–390)
PMV BLD AUTO: 9.1 FL (ref 8.9–12.7)
PMV BLD AUTO: 9.7 FL (ref 8.9–12.7)
POTASSIUM SERPL-SCNC: 4.4 MMOL/L (ref 3.5–5.3)
PR INTERVAL: 144 MS
PROTHROMBIN TIME: 13.3 SECONDS (ref 12.1–14.4)
QRS AXIS: 61 DEGREES
QRSD INTERVAL: 98 MS
QT INTERVAL: 410 MS
QTC INTERVAL: 399 MS
RBC # BLD AUTO: 2.84 MILLION/UL (ref 3.88–5.62)
RH BLD: POSITIVE
SODIUM SERPL-SCNC: 137 MMOL/L (ref 136–145)
SPECIMEN EXPIRATION DATE: NORMAL
SPECIMEN SOURCE: ABNORMAL
T WAVE AXIS: 76 DEGREES
VENTRICULAR RATE: 57 BPM
WBC # BLD AUTO: 13.85 THOUSAND/UL (ref 4.31–10.16)

## 2018-02-09 PROCEDURE — B41DYZZ FLUOROSCOPY OF AORTA AND BILATERAL LOWER EXTREMITY ARTERIES USING OTHER CONTRAST: ICD-10-PCS | Performed by: SURGERY

## 2018-02-09 PROCEDURE — C1894 INTRO/SHEATH, NON-LASER: HCPCS | Performed by: SURGERY

## 2018-02-09 PROCEDURE — 34705 EVAC RPR A-BIILIAC NDGFT: CPT | Performed by: RADIOLOGY

## 2018-02-09 PROCEDURE — 85049 AUTOMATED PLATELET COUNT: CPT | Performed by: PHYSICIAN ASSISTANT

## 2018-02-09 PROCEDURE — 82948 REAGENT STRIP/BLOOD GLUCOSE: CPT

## 2018-02-09 PROCEDURE — 86923 COMPATIBILITY TEST ELECTRIC: CPT

## 2018-02-09 PROCEDURE — 85610 PROTHROMBIN TIME: CPT | Performed by: SURGERY

## 2018-02-09 PROCEDURE — 34709 PLMT XTN PROSTH EVASC RPR: CPT | Performed by: RADIOLOGY

## 2018-02-09 PROCEDURE — 93010 ELECTROCARDIOGRAM REPORT: CPT | Performed by: INTERNAL MEDICINE

## 2018-02-09 PROCEDURE — 34713 PERQ ACCESS & CLSR FEM ART: CPT | Performed by: SURGERY

## 2018-02-09 PROCEDURE — 85347 COAGULATION TIME ACTIVATED: CPT

## 2018-02-09 PROCEDURE — C1760 CLOSURE DEV, VASC: HCPCS | Performed by: SURGERY

## 2018-02-09 PROCEDURE — 80048 BASIC METABOLIC PNL TOTAL CA: CPT | Performed by: SURGERY

## 2018-02-09 PROCEDURE — 04VC3DZ RESTRICTION OF RIGHT COMMON ILIAC ARTERY WITH INTRALUMINAL DEVICE, PERCUTANEOUS APPROACH: ICD-10-PCS | Performed by: RADIOLOGY

## 2018-02-09 PROCEDURE — 86901 BLOOD TYPING SEROLOGIC RH(D): CPT | Performed by: SURGERY

## 2018-02-09 PROCEDURE — C1725 CATH, TRANSLUMIN NON-LASER: HCPCS | Performed by: SURGERY

## 2018-02-09 PROCEDURE — C1769 GUIDE WIRE: HCPCS | Performed by: SURGERY

## 2018-02-09 PROCEDURE — 86850 RBC ANTIBODY SCREEN: CPT | Performed by: SURGERY

## 2018-02-09 PROCEDURE — 34705 EVAC RPR A-BIILIAC NDGFT: CPT | Performed by: SURGERY

## 2018-02-09 PROCEDURE — 93005 ELECTROCARDIOGRAM TRACING: CPT

## 2018-02-09 PROCEDURE — 85027 COMPLETE CBC AUTOMATED: CPT | Performed by: SURGERY

## 2018-02-09 PROCEDURE — 85730 THROMBOPLASTIN TIME PARTIAL: CPT | Performed by: SURGERY

## 2018-02-09 PROCEDURE — 34709 PLMT XTN PROSTH EVASC RPR: CPT | Performed by: SURGERY

## 2018-02-09 PROCEDURE — 34812 OPN FEM ART EXPOS: CPT | Performed by: SURGERY

## 2018-02-09 PROCEDURE — 76937 US GUIDE VASCULAR ACCESS: CPT

## 2018-02-09 PROCEDURE — 86900 BLOOD TYPING SEROLOGIC ABO: CPT | Performed by: SURGERY

## 2018-02-09 PROCEDURE — 04V03DZ RESTRICTION OF ABDOMINAL AORTA WITH INTRALUMINAL DEVICE, PERCUTANEOUS APPROACH: ICD-10-PCS | Performed by: SURGERY

## 2018-02-09 PROCEDURE — C1876 STENT, NON-COA/NON-COV W/DEL: HCPCS | Performed by: SURGERY

## 2018-02-09 PROCEDURE — 34713 PERQ ACCESS & CLSR FEM ART: CPT | Performed by: RADIOLOGY

## 2018-02-09 DEVICE — PERCLOSE PROGLIDE™ SUTURE-MEDIATED CLOSURE SYSTEM
Type: IMPLANTABLE DEVICE | Site: ARTERIAL | Status: FUNCTIONAL
Brand: PERCLOSE PROGLIDE™

## 2018-02-09 DEVICE — IMPLANTABLE DEVICE: Type: IMPLANTABLE DEVICE | Site: ARTERIAL | Status: FUNCTIONAL

## 2018-02-09 DEVICE — IMPLANTABLE DEVICE: Type: IMPLANTABLE DEVICE | Site: AORTA | Status: FUNCTIONAL

## 2018-02-09 RX ORDER — ALPRAZOLAM 0.5 MG/1
1 TABLET ORAL 2 TIMES DAILY PRN
Status: DISCONTINUED | OUTPATIENT
Start: 2018-02-09 | End: 2018-02-10 | Stop reason: HOSPADM

## 2018-02-09 RX ORDER — AMITRIPTYLINE HYDROCHLORIDE 25 MG/1
25 TABLET, FILM COATED ORAL
Status: DISCONTINUED | OUTPATIENT
Start: 2018-02-09 | End: 2018-02-10 | Stop reason: HOSPADM

## 2018-02-09 RX ORDER — OXYCODONE HYDROCHLORIDE AND ACETAMINOPHEN 5; 325 MG/1; MG/1
1 TABLET ORAL EVERY 4 HOURS PRN
Status: DISCONTINUED | OUTPATIENT
Start: 2018-02-09 | End: 2018-02-10 | Stop reason: HOSPADM

## 2018-02-09 RX ORDER — ALBUMIN, HUMAN INJ 5% 5 %
SOLUTION INTRAVENOUS CONTINUOUS PRN
Status: DISCONTINUED | OUTPATIENT
Start: 2018-02-09 | End: 2018-02-09 | Stop reason: SURG

## 2018-02-09 RX ORDER — SODIUM CHLORIDE 1000 MG
1 TABLET, SOLUBLE MISCELLANEOUS 3 TIMES DAILY
Status: DISCONTINUED | OUTPATIENT
Start: 2018-02-09 | End: 2018-02-10 | Stop reason: HOSPADM

## 2018-02-09 RX ORDER — ACETAMINOPHEN 325 MG/1
650 TABLET ORAL EVERY 6 HOURS PRN
Status: DISCONTINUED | OUTPATIENT
Start: 2018-02-09 | End: 2018-02-10 | Stop reason: HOSPADM

## 2018-02-09 RX ORDER — AMITRIPTYLINE HYDROCHLORIDE 10 MG/1
25 TABLET, FILM COATED ORAL
COMMUNITY
End: 2018-04-08 | Stop reason: SDUPTHER

## 2018-02-09 RX ORDER — CHLORHEXIDINE GLUCONATE 0.12 MG/ML
15 RINSE ORAL ONCE
Status: COMPLETED | OUTPATIENT
Start: 2018-02-09 | End: 2018-02-09

## 2018-02-09 RX ORDER — MIDAZOLAM HYDROCHLORIDE 1 MG/ML
INJECTION INTRAMUSCULAR; INTRAVENOUS AS NEEDED
Status: DISCONTINUED | OUTPATIENT
Start: 2018-02-09 | End: 2018-02-09 | Stop reason: SURG

## 2018-02-09 RX ORDER — TAMSULOSIN HYDROCHLORIDE 0.4 MG/1
0.4 CAPSULE ORAL
Status: DISCONTINUED | OUTPATIENT
Start: 2018-02-09 | End: 2018-02-10 | Stop reason: HOSPADM

## 2018-02-09 RX ORDER — POLYETHYLENE GLYCOL 3350 17 G/17G
17 POWDER, FOR SOLUTION ORAL DAILY
Status: DISCONTINUED | OUTPATIENT
Start: 2018-02-10 | End: 2018-02-10 | Stop reason: HOSPADM

## 2018-02-09 RX ORDER — HYDROCHLOROTHIAZIDE 12.5 MG/1
12.5 TABLET ORAL DAILY
Status: ON HOLD | COMMUNITY
End: 2018-06-22

## 2018-02-09 RX ORDER — PROPOFOL 10 MG/ML
INJECTION, EMULSION INTRAVENOUS AS NEEDED
Status: DISCONTINUED | OUTPATIENT
Start: 2018-02-09 | End: 2018-02-09 | Stop reason: SURG

## 2018-02-09 RX ORDER — ALBUTEROL SULFATE 2.5 MG/3ML
2.5 SOLUTION RESPIRATORY (INHALATION) ONCE AS NEEDED
Status: COMPLETED | OUTPATIENT
Start: 2018-02-09 | End: 2018-02-09

## 2018-02-09 RX ORDER — GLYCOPYRROLATE 0.2 MG/ML
INJECTION INTRAMUSCULAR; INTRAVENOUS AS NEEDED
Status: DISCONTINUED | OUTPATIENT
Start: 2018-02-09 | End: 2018-02-09 | Stop reason: SURG

## 2018-02-09 RX ORDER — SODIUM CHLORIDE, SODIUM LACTATE, POTASSIUM CHLORIDE, CALCIUM CHLORIDE 600; 310; 30; 20 MG/100ML; MG/100ML; MG/100ML; MG/100ML
125 INJECTION, SOLUTION INTRAVENOUS CONTINUOUS
Status: DISCONTINUED | OUTPATIENT
Start: 2018-02-09 | End: 2018-02-09

## 2018-02-09 RX ORDER — ONDANSETRON 2 MG/ML
4 INJECTION INTRAMUSCULAR; INTRAVENOUS EVERY 6 HOURS PRN
Status: DISCONTINUED | OUTPATIENT
Start: 2018-02-09 | End: 2018-02-10 | Stop reason: HOSPADM

## 2018-02-09 RX ORDER — PRAVASTATIN SODIUM 40 MG
40 TABLET ORAL
Status: DISCONTINUED | OUTPATIENT
Start: 2018-02-09 | End: 2018-02-10 | Stop reason: HOSPADM

## 2018-02-09 RX ORDER — AMLODIPINE BESYLATE 5 MG/1
5 TABLET ORAL DAILY
COMMUNITY
End: 2021-01-11 | Stop reason: SDUPTHER

## 2018-02-09 RX ORDER — ACETAMINOPHEN AND CODEINE PHOSPHATE 300; 15 MG/1; MG/1
1 TABLET ORAL EVERY 4 HOURS PRN
Status: DISCONTINUED | OUTPATIENT
Start: 2018-02-09 | End: 2018-02-09

## 2018-02-09 RX ORDER — SODIUM CHLORIDE 9 MG/ML
100 INJECTION, SOLUTION INTRAVENOUS CONTINUOUS
Status: DISCONTINUED | OUTPATIENT
Start: 2018-02-09 | End: 2018-02-10

## 2018-02-09 RX ORDER — ONDANSETRON 2 MG/ML
INJECTION INTRAMUSCULAR; INTRAVENOUS AS NEEDED
Status: DISCONTINUED | OUTPATIENT
Start: 2018-02-09 | End: 2018-02-09 | Stop reason: SURG

## 2018-02-09 RX ORDER — HEPARIN SODIUM 5000 [USP'U]/ML
5000 INJECTION, SOLUTION INTRAVENOUS; SUBCUTANEOUS EVERY 8 HOURS SCHEDULED
Status: DISCONTINUED | OUTPATIENT
Start: 2018-02-09 | End: 2018-02-10 | Stop reason: HOSPADM

## 2018-02-09 RX ORDER — HEPARIN SODIUM 1000 [USP'U]/ML
INJECTION, SOLUTION INTRAVENOUS; SUBCUTANEOUS AS NEEDED
Status: DISCONTINUED | OUTPATIENT
Start: 2018-02-09 | End: 2018-02-09 | Stop reason: SURG

## 2018-02-09 RX ORDER — BUPIVACAINE HYDROCHLORIDE AND EPINEPHRINE 2.5; 5 MG/ML; UG/ML
INJECTION, SOLUTION INFILTRATION; PERINEURAL AS NEEDED
Status: DISCONTINUED | OUTPATIENT
Start: 2018-02-09 | End: 2018-02-09 | Stop reason: HOSPADM

## 2018-02-09 RX ORDER — ONDANSETRON 2 MG/ML
4 INJECTION INTRAMUSCULAR; INTRAVENOUS ONCE AS NEEDED
Status: DISCONTINUED | OUTPATIENT
Start: 2018-02-09 | End: 2018-02-09 | Stop reason: HOSPADM

## 2018-02-09 RX ORDER — METOPROLOL TARTRATE 50 MG/1
50 TABLET, FILM COATED ORAL DAILY
Status: DISCONTINUED | OUTPATIENT
Start: 2018-02-10 | End: 2018-02-10 | Stop reason: HOSPADM

## 2018-02-09 RX ORDER — OXYCODONE HYDROCHLORIDE 5 MG/1
5 TABLET ORAL EVERY 4 HOURS PRN
Status: DISCONTINUED | OUTPATIENT
Start: 2018-02-09 | End: 2018-02-10 | Stop reason: HOSPADM

## 2018-02-09 RX ORDER — TAMSULOSIN HYDROCHLORIDE 0.4 MG/1
0.4 CAPSULE ORAL
Status: ON HOLD | COMMUNITY
End: 2018-06-22

## 2018-02-09 RX ORDER — LABETALOL HYDROCHLORIDE 5 MG/ML
5 INJECTION, SOLUTION INTRAVENOUS
Status: DISCONTINUED | OUTPATIENT
Start: 2018-02-09 | End: 2018-02-09 | Stop reason: HOSPADM

## 2018-02-09 RX ORDER — PROTAMINE SULFATE 10 MG/ML
INJECTION, SOLUTION INTRAVENOUS AS NEEDED
Status: DISCONTINUED | OUTPATIENT
Start: 2018-02-09 | End: 2018-02-09 | Stop reason: SURG

## 2018-02-09 RX ORDER — MEPERIDINE HYDROCHLORIDE 25 MG/ML
12.5 INJECTION INTRAMUSCULAR; INTRAVENOUS; SUBCUTANEOUS AS NEEDED
Status: DISCONTINUED | OUTPATIENT
Start: 2018-02-09 | End: 2018-02-09 | Stop reason: HOSPADM

## 2018-02-09 RX ORDER — FENTANYL CITRATE 50 UG/ML
INJECTION, SOLUTION INTRAMUSCULAR; INTRAVENOUS AS NEEDED
Status: DISCONTINUED | OUTPATIENT
Start: 2018-02-09 | End: 2018-02-09 | Stop reason: SURG

## 2018-02-09 RX ORDER — PANTOPRAZOLE SODIUM 40 MG/1
40 TABLET, DELAYED RELEASE ORAL
Status: DISCONTINUED | OUTPATIENT
Start: 2018-02-10 | End: 2018-02-10 | Stop reason: HOSPADM

## 2018-02-09 RX ORDER — FENTANYL CITRATE/PF 50 MCG/ML
25 SYRINGE (ML) INJECTION
Status: COMPLETED | OUTPATIENT
Start: 2018-02-09 | End: 2018-02-09

## 2018-02-09 RX ORDER — AMLODIPINE BESYLATE 5 MG/1
5 TABLET ORAL DAILY
Status: DISCONTINUED | OUTPATIENT
Start: 2018-02-09 | End: 2018-02-10 | Stop reason: HOSPADM

## 2018-02-09 RX ORDER — ROCURONIUM BROMIDE 10 MG/ML
INJECTION, SOLUTION INTRAVENOUS AS NEEDED
Status: DISCONTINUED | OUTPATIENT
Start: 2018-02-09 | End: 2018-02-09 | Stop reason: SURG

## 2018-02-09 RX ORDER — SODIUM CHLORIDE 9 MG/ML
INJECTION, SOLUTION INTRAVENOUS CONTINUOUS PRN
Status: DISCONTINUED | OUTPATIENT
Start: 2018-02-09 | End: 2018-02-09 | Stop reason: SURG

## 2018-02-09 RX ADMIN — CHLORHEXIDINE GLUCONATE 15 ML: 1.2 RINSE ORAL at 06:33

## 2018-02-09 RX ADMIN — PRAVASTATIN SODIUM 40 MG: 40 TABLET ORAL at 17:15

## 2018-02-09 RX ADMIN — MIDAZOLAM HYDROCHLORIDE 2 MG: 1 INJECTION, SOLUTION INTRAMUSCULAR; INTRAVENOUS at 07:43

## 2018-02-09 RX ADMIN — ROCURONIUM BROMIDE 20 MG: 10 INJECTION INTRAVENOUS at 11:01

## 2018-02-09 RX ADMIN — NEOSTIGMINE METHYLSULFATE 3 MG: 1 INJECTION, SOLUTION INTRAMUSCULAR; INTRAVENOUS; SUBCUTANEOUS at 12:53

## 2018-02-09 RX ADMIN — HEPARIN SODIUM 5000 UNITS: 5000 INJECTION, SOLUTION INTRAVENOUS; SUBCUTANEOUS at 21:54

## 2018-02-09 RX ADMIN — HEPARIN SODIUM 1000 UNITS: 1000 INJECTION INTRAVENOUS; SUBCUTANEOUS at 11:33

## 2018-02-09 RX ADMIN — CEFAZOLIN SODIUM 1000 MG: 1 SOLUTION INTRAVENOUS at 12:06

## 2018-02-09 RX ADMIN — PROPOFOL 120 MG: 10 INJECTION, EMULSION INTRAVENOUS at 07:52

## 2018-02-09 RX ADMIN — HEPARIN SODIUM 5000 UNITS: 1000 INJECTION INTRAVENOUS; SUBCUTANEOUS at 08:52

## 2018-02-09 RX ADMIN — ACETAMINOPHEN 650 MG: 325 TABLET, FILM COATED ORAL at 16:09

## 2018-02-09 RX ADMIN — ALBUMIN HUMAN: 0.05 INJECTION, SOLUTION INTRAVENOUS at 10:27

## 2018-02-09 RX ADMIN — ALBUTEROL SULFATE 2.5 MG: 2.5 SOLUTION RESPIRATORY (INHALATION) at 13:17

## 2018-02-09 RX ADMIN — FENTANYL CITRATE 25 MCG: 50 INJECTION INTRAMUSCULAR; INTRAVENOUS at 14:15

## 2018-02-09 RX ADMIN — ROCURONIUM BROMIDE 10 MG: 10 INJECTION INTRAVENOUS at 09:23

## 2018-02-09 RX ADMIN — ONDANSETRON 4 MG: 2 INJECTION INTRAMUSCULAR; INTRAVENOUS at 19:49

## 2018-02-09 RX ADMIN — TAMSULOSIN HYDROCHLORIDE 0.4 MG: 0.4 CAPSULE ORAL at 17:16

## 2018-02-09 RX ADMIN — SODIUM CHLORIDE: 0.9 INJECTION, SOLUTION INTRAVENOUS at 08:00

## 2018-02-09 RX ADMIN — GLYCOPYRROLATE 0.4 MG: 0.2 INJECTION, SOLUTION INTRAMUSCULAR; INTRAVENOUS at 12:53

## 2018-02-09 RX ADMIN — FENTANYL CITRATE 25 MCG: 50 INJECTION INTRAMUSCULAR; INTRAVENOUS at 13:40

## 2018-02-09 RX ADMIN — NOREPINEPHRINE BITARTRATE 4 MCG/MIN: 1 INJECTION, SOLUTION, CONCENTRATE INTRAVENOUS at 08:06

## 2018-02-09 RX ADMIN — SODIUM CHLORIDE TAB 1 GM 1 G: 1 TAB at 20:01

## 2018-02-09 RX ADMIN — PANCRELIPASE 12000 UNITS: 30000; 6000; 19000 CAPSULE, DELAYED RELEASE PELLETS ORAL at 17:15

## 2018-02-09 RX ADMIN — ROCURONIUM BROMIDE 10 MG: 10 INJECTION INTRAVENOUS at 11:55

## 2018-02-09 RX ADMIN — ALPRAZOLAM 1 MG: 0.5 TABLET ORAL at 20:09

## 2018-02-09 RX ADMIN — ROCURONIUM BROMIDE 40 MG: 10 INJECTION INTRAVENOUS at 07:52

## 2018-02-09 RX ADMIN — PROTAMINE SULFATE 10 MG: 10 INJECTION, SOLUTION INTRAVENOUS at 12:40

## 2018-02-09 RX ADMIN — HEPARIN SODIUM 1000 UNITS: 1000 INJECTION INTRAVENOUS; SUBCUTANEOUS at 10:02

## 2018-02-09 RX ADMIN — HEPARIN SODIUM 1000 UNITS: 1000 INJECTION INTRAVENOUS; SUBCUTANEOUS at 10:44

## 2018-02-09 RX ADMIN — SODIUM CHLORIDE, SODIUM LACTATE, POTASSIUM CHLORIDE, AND CALCIUM CHLORIDE 125 ML/HR: .6; .31; .03; .02 INJECTION, SOLUTION INTRAVENOUS at 14:37

## 2018-02-09 RX ADMIN — FENTANYL CITRATE 50 MCG: 50 INJECTION, SOLUTION INTRAMUSCULAR; INTRAVENOUS at 07:52

## 2018-02-09 RX ADMIN — CEFAZOLIN SODIUM 1000 MG: 1 SOLUTION INTRAVENOUS at 08:10

## 2018-02-09 RX ADMIN — SODIUM CHLORIDE TAB 1 GM 1 G: 1 TAB at 17:15

## 2018-02-09 RX ADMIN — FENTANYL CITRATE 50 MCG: 50 INJECTION, SOLUTION INTRAMUSCULAR; INTRAVENOUS at 09:23

## 2018-02-09 RX ADMIN — AMLODIPINE BESYLATE 5 MG: 5 TABLET ORAL at 17:15

## 2018-02-09 RX ADMIN — ONDANSETRON 4 MG: 2 INJECTION INTRAMUSCULAR; INTRAVENOUS at 12:31

## 2018-02-09 RX ADMIN — OXYCODONE HYDROCHLORIDE 5 MG: 5 TABLET ORAL at 20:01

## 2018-02-09 RX ADMIN — ROCURONIUM BROMIDE 10 MG: 10 INJECTION INTRAVENOUS at 08:42

## 2018-02-09 RX ADMIN — SODIUM CHLORIDE, SODIUM LACTATE, POTASSIUM CHLORIDE, AND CALCIUM CHLORIDE: .6; .31; .03; .02 INJECTION, SOLUTION INTRAVENOUS at 07:27

## 2018-02-09 RX ADMIN — FENTANYL CITRATE 25 MCG: 50 INJECTION INTRAMUSCULAR; INTRAVENOUS at 13:54

## 2018-02-09 RX ADMIN — SODIUM CHLORIDE 100 ML/HR: 0.9 INJECTION, SOLUTION INTRAVENOUS at 15:32

## 2018-02-09 RX ADMIN — HYDROMORPHONE HYDROCHLORIDE 0.5 MG: 1 INJECTION, SOLUTION INTRAMUSCULAR; INTRAVENOUS; SUBCUTANEOUS at 14:34

## 2018-02-09 RX ADMIN — AMITRIPTYLINE HYDROCHLORIDE 25 MG: 25 TABLET, FILM COATED ORAL at 21:54

## 2018-02-09 RX ADMIN — FENTANYL CITRATE 25 MCG: 50 INJECTION INTRAMUSCULAR; INTRAVENOUS at 13:48

## 2018-02-09 NOTE — PERIOPERATIVE NURSING NOTE
Dr Chasity Marks at bedside to evaluate patient in PACU  Pt still slightly wheezy after breathing treatment  Pt complains of difficulty breathing and SOB, denies chest pain   No further orders

## 2018-02-09 NOTE — INTERVAL H&P NOTE
H&P reviewed  After examining the patient I find no changes in the patients condition since the H&P had been written    H&P updated no changes  Plan:EVAR possible open Aneurysm repair

## 2018-02-09 NOTE — RESPIRATORY THERAPY NOTE
RT Protocol Note  Nalini Kennedy 75 y o  male MRN: [id number]  Unit/Bed#: [facility] 550-64 Encounter: [id number]    Assessment    Principal Problem:    Abdominal aortic aneurysm (AAA) without rupture ([address] )      Home Pulmonary Medications:  none    Past Medical History:   Diagnosis Date    Anxiety     GERD (gastroesophageal reflux disease)     H/O abdominal aortic aneurysm     Hyperlipidemia     Hypertension     Vertigo      Social History     Social History    Marital status:      Spouse name: N/A    Number of children: N/A    Years of education: N/A     Social History Main Topics    Smoking status: Current Every Day Smoker     Packs/day: 1 00     Types: Cigarettes    Smokeless tobacco: Never Used    Alcohol use No    Drug use: No    Sexual activity: Not Currently     Other Topics Concern    None     Social History Narrative    None       Subjective         Objective    Physical Exam:   Assessment Type: Assess only  General Appearance: Alert, Drowsy  Respiratory Pattern: Normal  Chest Assessment: Chest expansion symmetrical  Bilateral Breath Sounds: Diminished, Clear  Cough: None    Vitals:  Blood pressure 122/63, pulse 68, temperature 97 7 °F (36 5 °C), temperature source Oral, resp  rate 14, height 5' 2" (1 575 m), weight 54 4 kg (120 lb), SpO2 99 %  Imaging and other studies: no cxr avail          Plan    Respiratory Plan: Discontinue Protocol        Resp Comments: pt evaluated per protocol  pt has no pulm hist  no cxr  no pulm meds  AAA repair  initiated IS  no other therapy needed at this time

## 2018-02-09 NOTE — ANESTHESIA PREPROCEDURE EVALUATION
Review of Systems/Medical History  Patient summary reviewed  Chart reviewed      Cardiovascular  Negative cardio ROS Exercise tolerance: good,  Hyperlipidemia, Hypertension ,    Pulmonary  Negative pulmonary ROS        GI/Hepatic  Negative GI/hepatic ROS   GERD ,        Negative  ROS        Endo/Other  Negative endo/other ROS      GYN  Negative gynecology ROS          Hematology  Negative hematology ROS      Musculoskeletal  Negative musculoskeletal ROS        Neurology  Negative neurology ROS      Psychology   Negative psychology ROS              Physical Exam    Airway    Mallampati score: I  TM Distance: >3 FB       Dental   Comment: Edentulous,     Cardiovascular  Comment: Negative ROS,     Pulmonary      Other Findings        Anesthesia Plan  ASA Score- 3     Anesthesia Type- general with ASA Monitors  Additional Monitors: arterial line  Airway Plan: ETT  Plan Factors-    Induction- intravenous  Postoperative Plan-     Informed Consent- Anesthetic plan and risks discussed with patient  I personally reviewed this patient with the CRNA  Discussed and agreed on the Anesthesia Plan with the CRNA  Jose Tesfaye

## 2018-02-09 NOTE — OP NOTE
OPERATIVE REPORT  PATIENT NAME: Jaylin Villa    :  1950  MRN: [id number]  Pt Location: [facility] OR ROOM 02    SURGERY DATE: 2018    Surgeon(s) and Role:     * Robb Cadena MD - Primary     * Raz Roca PA-C - Assisting     * Rigoberto Aragon PA-C - Luis Gunderson MD - Assisting     * Lakhwinder Howard, [address] [place] [address], DO - Co-surgeon    Preop Diagnosis:  Abdominal aortic aneurysm without rupture ([address] ) [I71 4]    Post-Op Diagnosis Codes:     * Abdominal aortic aneurysm without rupture ([address] ) [I71 4]    Procedure(s) (LRB):  REPAIR ANEURYSM ENDOVASCULAR ABDOMINAL AORTIC  (EVAR) (N/A)    Specimen(s):  * No specimens in log *    Estimated Blood Loss:   300 mL    Drains:       Anesthesia Type:   General    Operative Indications:  Abdominal aortic aneurysm without rupture ([address] ) [I71 4]      Operative Findings:  See op report    Complications:   None      Procedure and Technique:  The patient Jaylin Villa was identified in the operating room after adequate general endotracheal anesthesia was obtained the abdomen and bilateral groins were prepped and draped using chloro prep prep and sterile drapes  Under ultrasound guidance bilateral common femoral arteries were noted to be of good caliber without thrombosis, using a micropuncture needle wire and catheter access was obtained followed by Saen Lipscomb wire bilaterally  2 Pro-glide closure devices were set in each groin but not deployed, 8 [place] short sheaths were placed  Using a Kumpe catheter a stiff Lunderquist  wire was positioned in the thoracic aorta  A pigtail catheter was inserted via the left groin to the T12- L1 level there was difficulty in passing the wire from the right groin up into the aorta and a mid external iliac artery dissection was identified   There was determined that a cutdown was necessary therefore the incision was made over the right common femoral artery sharp dissection down through skin subcutaneous tissue deep fascia was incised the deep proximal common/external iliac artery was identified vessel loop placed above the circumflex branches  The superficial femoral artery was then looped with a vessel loop and the profunda clamp was placed on the profunda  The previously placed 8 Pakistani sheath was removed and arteriotomy was made  A wire was then passed up and over from the left side and retrieved in the right groin this was followed by a 4 Pakistani angled glide catheter which was determined to be in the intra arterial position and a wire was then passed easily up to the thoracic aorta, an 18 Pakistani dry seal [place]Jose sheath  was then positioned in the aneurysm sac, an arteriogram was performed identifying the lowest renal artery  The main body a 28 millimeter by 12 millimeter x 16 centimeter main  C3 trunk was positioned and temporarily deployed  Access to the contra gate was performed by Dr Skylar Barrera, utilizing an angle Glidewire and a Kumpe catheter successfully gaining access and confirming access by spinning the pigtail catheter at the neck  The 8 Pakistani short sheath in the left groin was then removed and replaced with a 12 Pakistani dry seal [place]-Jose sheath  Using the marker pigtail catheter to measure the distance A 12 millimeter x 12 centimeter contra limb was deployed  A second time out was then conducted and agreed upon the position of the graft and the final deployment of the graft was performed successfully  Using a Q-50 balloon each seal zone area was treated, repeat arteriogram showed one small area of narrowing in the left limb which was then re-dilated with a Q-50 balloon, repeat arteriogram showed small type 1 endoleak therefore a 28 millimeter x 3 3 centimeter aortic cuff was positioned and repeat arteriogram showed no evidence of endoleak       Note that the right iliac system was pre-dilated with a 7 x 6 centimeter balloon a 12 by 6 centimeter self expanding stent was then placed in the distal external iliac artery covering the dissection  The patient had excellent flow into the right groin  The left Pro-glide closure device  worked successfully  and the patient had easily palpable dorsalis pedis pulses bilaterally  He was taken to the recovery room in stable condition  A co-surgeon was required because of skills and techniques relevant to speciality       I was present for the entire procedure, A qualified resident physician was not available, A co-surgeon was required because of skills and techniques relevant to speciality and A physician assistant was required during the procedure for retraction tissue handling,dissection and suturing    Patient Disposition:  Critical Care Unit and hemodynamically stable    SIGNATURE: Chi Spencer MD  DATE: February 9, 2018  TIME: 1:16 PM

## 2018-02-09 NOTE — ANESTHESIA POSTPROCEDURE EVALUATION
Post-Op Assessment Note      CV Status:  Stable    Mental Status:  Alert and awake    Hydration Status:  Stable    PONV Controlled:  None    Airway Patency:  Patent    Post Op Vitals Reviewed: Yes          Staff: CRNA           BP (P) 107/58 (02/09/18 1308)    Temp (!) (P) 97 4 °F (36 3 °C) (02/09/18 1308)    Pulse (P) 68 (02/09/18 1308)   Resp (P) 18 (02/09/18 1308)    SpO2

## 2018-02-09 NOTE — ANESTHESIA PROCEDURE NOTES
Arterial Line Insertion  Date/Time: 2/9/2018 7:55 AM  Performed by: Oziel Krause  Authorized by: Zhang Jensen   Consent: Written consent obtained  Consent given by: patient  Patient understanding: patient states understanding of the procedure being performed  Required items: required blood products, implants, devices, and special equipment available  Patient identity confirmed: arm band  Time out: Immediately prior to procedure a "time out" was called to verify the correct patient, procedure, equipment, support staff and site/side marked as required  Preparation: Patient was prepped and draped in the usual sterile fashion    Indications: hemodynamic monitoring  Orientation:  RightLocation: radial artery  Temo's test normal: yes  Needle gauge: 20  Seldinger technique: Seldinger technique used  Number of attempts: 1  Post-procedure: dressing applied  Patient tolerance: Patient tolerated the procedure well with no immediate complications

## 2018-02-09 NOTE — PROGRESS NOTES
Bilateral groin sites clean dry and intact, no evidence of bleeding   Adequate circulation to bilateral lower ext

## 2018-02-09 NOTE — OR NURSING
Staff from [facility] Waiting Room called in to OR at 1020  to receive report for family  Update given

## 2018-02-10 VITALS
DIASTOLIC BLOOD PRESSURE: 55 MMHG | HEART RATE: 89 BPM | RESPIRATION RATE: 20 BRPM | SYSTOLIC BLOOD PRESSURE: 118 MMHG | HEIGHT: 62 IN | BODY MASS INDEX: 21.83 KG/M2 | OXYGEN SATURATION: 95 % | TEMPERATURE: 98.2 F | WEIGHT: 118.61 LBS

## 2018-02-10 LAB
ANION GAP SERPL CALCULATED.3IONS-SCNC: 7 MMOL/L (ref 4–13)
BUN SERPL-MCNC: 11 MG/DL (ref 5–25)
CALCIUM SERPL-MCNC: 7.8 MG/DL (ref 8.3–10.1)
CHLORIDE SERPL-SCNC: 101 MMOL/L (ref 100–108)
CO2 SERPL-SCNC: 26 MMOL/L (ref 21–32)
CREAT SERPL-MCNC: 0.67 MG/DL (ref 0.6–1.3)
ERYTHROCYTE [DISTWIDTH] IN BLOOD BY AUTOMATED COUNT: 13.8 % (ref 11.6–15.1)
GFR SERPL CREATININE-BSD FRML MDRD: 99 ML/MIN/1.73SQ M
GLUCOSE SERPL-MCNC: 104 MG/DL (ref 65–140)
HCT VFR BLD AUTO: 21.8 % (ref 36.5–49.3)
HCT VFR BLD AUTO: 22.9 % (ref 36.5–49.3)
HGB BLD-MCNC: 7.7 G/DL (ref 12–17)
HGB BLD-MCNC: 8.3 G/DL (ref 12–17)
MCH RBC QN AUTO: 33.3 PG (ref 26.8–34.3)
MCHC RBC AUTO-ENTMCNC: 35.3 G/DL (ref 31.4–37.4)
MCV RBC AUTO: 94 FL (ref 82–98)
PLATELET # BLD AUTO: 111 THOUSANDS/UL (ref 149–390)
PMV BLD AUTO: 9.1 FL (ref 8.9–12.7)
POTASSIUM SERPL-SCNC: 3.5 MMOL/L (ref 3.5–5.3)
RBC # BLD AUTO: 2.31 MILLION/UL (ref 3.88–5.62)
SODIUM SERPL-SCNC: 134 MMOL/L (ref 136–145)
WBC # BLD AUTO: 8.6 THOUSAND/UL (ref 4.31–10.16)

## 2018-02-10 PROCEDURE — 85027 COMPLETE CBC AUTOMATED: CPT | Performed by: PHYSICIAN ASSISTANT

## 2018-02-10 PROCEDURE — 99024 POSTOP FOLLOW-UP VISIT: CPT | Performed by: STUDENT IN AN ORGANIZED HEALTH CARE EDUCATION/TRAINING PROGRAM

## 2018-02-10 PROCEDURE — 80048 BASIC METABOLIC PNL TOTAL CA: CPT | Performed by: PHYSICIAN ASSISTANT

## 2018-02-10 PROCEDURE — 85014 HEMATOCRIT: CPT | Performed by: SURGERY

## 2018-02-10 PROCEDURE — 85018 HEMOGLOBIN: CPT | Performed by: SURGERY

## 2018-02-10 PROCEDURE — 99024 POSTOP FOLLOW-UP VISIT: CPT | Performed by: SURGERY

## 2018-02-10 RX ORDER — POTASSIUM CHLORIDE 20 MEQ/1
40 TABLET, EXTENDED RELEASE ORAL ONCE
Status: COMPLETED | OUTPATIENT
Start: 2018-02-10 | End: 2018-02-10

## 2018-02-10 RX ORDER — POTASSIUM CHLORIDE 20 MEQ/1
20 TABLET, EXTENDED RELEASE ORAL ONCE
Status: COMPLETED | OUTPATIENT
Start: 2018-02-10 | End: 2018-02-10

## 2018-02-10 RX ORDER — OXYCODONE HYDROCHLORIDE AND ACETAMINOPHEN 5; 325 MG/1; MG/1
1 TABLET ORAL EVERY 4 HOURS PRN
Qty: 30 TABLET | Refills: 0 | Status: SHIPPED | OUTPATIENT
Start: 2018-02-10 | End: 2018-02-20

## 2018-02-10 RX ADMIN — HEPARIN SODIUM 5000 UNITS: 5000 INJECTION, SOLUTION INTRAVENOUS; SUBCUTANEOUS at 05:31

## 2018-02-10 RX ADMIN — AMLODIPINE BESYLATE 5 MG: 5 TABLET ORAL at 09:14

## 2018-02-10 RX ADMIN — PANCRELIPASE 12000 UNITS: 30000; 6000; 19000 CAPSULE, DELAYED RELEASE PELLETS ORAL at 11:43

## 2018-02-10 RX ADMIN — PANTOPRAZOLE SODIUM 40 MG: 40 TABLET, DELAYED RELEASE ORAL at 05:31

## 2018-02-10 RX ADMIN — POLYETHYLENE GLYCOL 3350 17 G: 17 POWDER, FOR SOLUTION ORAL at 09:14

## 2018-02-10 RX ADMIN — POTASSIUM CHLORIDE 20 MEQ: 1500 TABLET, EXTENDED RELEASE ORAL at 11:43

## 2018-02-10 RX ADMIN — POTASSIUM CHLORIDE 40 MEQ: 1500 TABLET, EXTENDED RELEASE ORAL at 09:14

## 2018-02-10 RX ADMIN — PANCRELIPASE 12000 UNITS: 30000; 6000; 19000 CAPSULE, DELAYED RELEASE PELLETS ORAL at 07:47

## 2018-02-10 RX ADMIN — ACETAMINOPHEN 650 MG: 325 TABLET, FILM COATED ORAL at 03:44

## 2018-02-10 RX ADMIN — SODIUM CHLORIDE TAB 1 GM 1 G: 1 TAB at 09:14

## 2018-02-10 NOTE — PROGRESS NOTES
Progress Note - General Surgery   Siena Cancino 75 y o  male MRN: [id number]  Unit/Bed#: [facility] 507-01 Encounter: [id number]    Assessment:  67M w/AAA s/p EVAR 2/9    Plan:  - diet as tolerated  - d/c IVF  - d/c A line, Nicole  - f/u SLIM  - prn pain control  - OOB/ambulate  - SQH/SCDs  - potential d/c today      Subjective/Objective   Subjective: ON had some tingling in feet, has since resolved  Tolerated liquids but hasn't yet had any food  Passing flatus but no BM    Objective:    Blood pressure 98/67, pulse 84, temperature 98 3 °F (36 8 °C), resp  rate 20, height 5' 2" (1 575 m), weight 53 8 kg (118 lb 9 7 oz), SpO2 98 %  ,Body mass index is 21 69 kg/m²  I/O last 24 hours:   In: 2860 4 [P O :360; I V :2250 4; IV Piggyback:250]  Out: 4811 [LZTVS:4043; Blood:300]    Invasive Devices     Peripheral Intravenous Line            Peripheral IV 02/09/18 Left Forearm 1 day    Peripheral IV 02/09/18 Left Wrist less than 1 day          Arterial Line            Arterial Line 02/09/18 Right Radial less than 1 day          Drain            Urethral Catheter Latex 16 Fr  less than 1 day                Physical Exam:   NAD  Norm resp effort  RRR  Abd soft, NT/ND  L groin cdi small incision  R groin with mepilex, cdi, soft  -c/c/e, palp DP b/l    Lab, Imaging and other studies:  Lab Results   Component Value Date    WBC 8 60 02/10/2018    HGB 7 7 (L) 02/10/2018    HCT 21 8 (L) 02/10/2018    MCV 94 02/10/2018     (L) 02/10/2018      Lab Results   Component Value Date    GLUCOSE 104 02/10/2018    CALCIUM 7 8 (L) 02/10/2018     (L) 02/10/2018    K 3 5 02/10/2018    CO2 26 02/10/2018     02/10/2018    BUN 11 02/10/2018    CREATININE 0 67 02/10/2018       VTE Pharmacologic Prophylaxis: Heparin  VTE Mechanical Prophylaxis: sequential compression device

## 2018-02-10 NOTE — PROGRESS NOTES
Post-Op Check    Assessment: 75 y o  M s/p EVAR    Plan:  Regular diet  Yo@Templ[employer]  Cont lang Rivera - d/c in AM  Anticipate d/c in AM    Subjective:  Pain well controlled        Vitals:    02/09/18 2130   BP: (!) 144/44   Pulse: 78   Resp: 14   Temp:    SpO2: 95%       PE:  Gen: NAD, AAOx3  CV: RRR  Pulm: no resp distress  Abd: Soft, non-tender, non-distended  Groin sites c/d/i  Intact distal pulses    Junaid Tse PGY2  General Surgery

## 2018-02-10 NOTE — DISCHARGE SUMMARY
Discharge Summary - Vascular Surgery   Breanna Coleman 75 y o  male MRN: [id number]  [facility] Encounter: [id number]    Admission Date: 2/9/2018     Discharge Date: 2/10/2018    Admitting Diagnosis: Abdominal aortic aneurysm without rupture [facility]) [I71 4]    Discharge Diagnosis:  AAA    Attending:  Dr Tom Houston Physician(s):  None    Procedures Performed: No orders of the defined types were placed in this encounter  Pathology:  None    Hospital Course:  Patient was admitted on 02/09 for planned repair of AAA  He underwent EVAR without complication  There was some blood loss intraoperatively, and hemoglobin postop was 9 3 from preoperative 14  On postop day 1, the patient was doing well  His abdominal exam was benign and he had palpable pulses distally, with groin site clean dry and intact  However, his hemoglobin did result at 7 7  A recheck in the afternoon showed that it was stable at 8 3  As he was tolerating a normal diet, ambulating, and voiding on his own, he was deemed stable for discharge and sent home with instructions to follow up with the vascular center in the next 2 weeks  Condition at Discharge: good     Discharge instructions/Information to patient and family:   See after visit summary for information provided to patient and family  Provisions for Follow-Up Care:  See after visit summary for information related to follow-up care and any pertinent home health orders  Disposition: Home    Planned Readmission: No    Discharge Statement   I spent 30 minutes discharging the patient  This time was spent on the day of discharge  I had direct contact with the patient on the day of discharge  Additional documentation is required if more than 30 minutes were spent on discharge  Discharge Medications:  See after visit summary for reconciled discharge medications provided to patient and family

## 2018-02-10 NOTE — PROGRESS NOTES
Patient complains of "pins and needles" sensation in his right foot with diminished sensation of his anterior right foot and ankle  Palpable +2 pedal pulse noted  Blue sx notified and at bedside  Will continue to monitor

## 2018-02-10 NOTE — DISCHARGE INSTRUCTIONS
Nonruptured Abdominal Aortic Aneurysm   AMBULATORY CARE:   What you need to know about an abdominal aortic aneurysm (AAA): The aorta is a large blood vessel that extends from your heart to your abdomen  The part of the aorta that extends into your abdomen is called your abdominal aorta  Your abdominal aorta brings blood to your stomach, pelvis, and legs  An AAA is a bulging or weak area in your abdominal aorta  Over time, the bulge may grow and is at risk for tearing or rupturing  An AAA that ruptures is a life-threatening emergency  Signs and symptoms: An AAA usually does not have signs or symptoms if it has not ruptured  If the AAA starts to leak or ruptures, you may have any of the following:  · Sudden pain in your abdomen, groin, back, legs, or buttocks    · Nausea and vomiting    · A lump or swelling in your abdomen    · Stiff abdominal muscles    · Numbness or tingling in your legs    · Pale, sweaty, or clammy skin    · Dizziness, fainting or loss of consciousness  Call 911 or have someone else call for any of the following:   · You faint or lose consciousness  · You cannot be woken  Seek care immediately if:  The following signs or symptoms may mean the AAA is at risk of rupturing:  · You have sudden sharp pain in your abdomen, groin, back, legs, or buttocks  · You have nausea and vomiting  · You feel dizzy  · You have stiffness or swelling in your abdomen, or a lump in your abdomen  · You have numbness or tingling in your legs  · Your skin is pale, sweaty, or clammy  Contact your healthcare provider if:   · You have questions or concerns about your condition or care  Treatment of an AAA  may not be needed  Your healthcare provider may monitor the size of your AAA with tests, such as an ultrasound  You may be given medicines to prevent the AAA from growing  Examples include blood pressure medicine and medicine to lower your cholesterol   If your AAA gets bigger, starts to leak, or ruptures, you may need any of the following:  · Endovascular repair  is a procedure that uses a graft to repair your AAA  The graft stops blood flow to the aneurysm and protects your abdominal aorta  You may need to have more than 1 endovascular repair  · Open repair  is surgery to repair or remove an AAA  Manage a nonruptured AAA:  You can help prevent your AAA from growing or rupturing by doing the following:  · Do not smoke  Nicotine and other chemicals in cigarettes and cigars can increase your blood pressure  It can also damage your aorta and increase the size of your AAA  Ask your healthcare provider for information if you currently smoke and need help to quit  E-cigarettes or smokeless tobacco still contain nicotine  Talk to your healthcare provider before you use these products  · Exercise as directed  Exercise can help control your blood pressure and cholesterol level  Ask your healthcare provider how much exercise you need each day and which exercises are best for you  · Follow the meal plan recommended by your healthcare provider  Talk to your dietitian about a heart-healthy or low-sodium eating plan  Meal plans will help you lower your cholesterol and blood pressure  They will also help you reach a healthy weight  · Do not lift anything heavier than 10 pounds  Heavy lifting can increase pressure in your abdominal aorta  This can increase your risk for a ruptured AAA  Follow up with your healthcare provider as directed: You will need regular tests and follow-up visits to monitor the size of your AAA  Keep all appointments  Write down your questions so you remember to ask them during your visits  © 2017 [address] Information is for End User's use only and may not be sold, redistributed or otherwise used for commercial purposes  All illustrations and images included in CareNotes® are the copyrighted property of [employer]  or Kraig Andreson    The above information is an  only  It is not intended as medical advice for individual conditions or treatments  Talk to your doctor, nurse or pharmacist before following any medical regimen to see if it is safe and effective for you

## 2018-02-12 LAB
ABO GROUP BLD BPU: NORMAL
BPU ID: NORMAL
UNIT DISPENSE STATUS: NORMAL
UNIT PRODUCT CODE: NORMAL
UNIT RH: NORMAL

## 2018-02-12 NOTE — CASE MANAGEMENT
Initial Clinical Review    Age/Sex: 75 y o  male    Surgery Date:   2/9/2018    Procedure: REPAIR ANEURYSM ENDOVASCULAR ABDOMINAL AORTIC  (EVAR) (N/A)    Anesthesia: General    Operative Indications:  Abdominal aortic aneurysm without rupture ([address] ) [I71 4]      Operative Findings:  See op report     Complications:   None       Admission Orders: Date/Time/Statement: 2/9/18 @ 1442     Orders Placed This Encounter   Procedures    Inpatient Admission     Standing Status:   Standing     Number of Occurrences:   1     Order Specific Question:   Admitting Physician     Answer:   Ursula Márquez     Order Specific Question:   Level of Care     Answer:   Level 1 Stepdown [13]     Order Specific Question:   Estimated length of stay     Answer:   Inpatient Only Surgery       Vital Signs: /55 (BP Location: Left arm)   Pulse 89   Temp 98 2 °F (36 8 °C) (Oral)   Resp 20   Ht 5' 2" (1 575 m)   Wt 53 8 kg (118 lb 9 7 oz)   SpO2 95%   BMI 21 69 kg/m²     Diet:  Regular    Mobility: activity as tolerated    DVT Prophylaxis: SCD's    Pain Control:   Pain Medications             acetaminophen-codeine (TYLENOL #2) 300-15 MG per tablet Take 1 tablet by mouth every 4 (four) hours as needed for moderate pain    amitriptyline (ELAVIL) 10 mg tablet Take 25 mg by mouth daily at bedtime    oxyCODONE-acetaminophen (PERCOCET) 5-325 mg per tablet Take 1 tablet by mouth every 4 (four) hours as needed for moderate pain for up to 10 days Max Daily Amount: 6 tablets

## 2018-02-14 ENCOUNTER — TELEPHONE (OUTPATIENT)
Dept: GASTROENTEROLOGY | Facility: CLINIC | Age: 68
End: 2018-02-14

## 2018-02-20 ENCOUNTER — OFFICE VISIT (OUTPATIENT)
Dept: VASCULAR SURGERY | Facility: CLINIC | Age: 68
End: 2018-02-20

## 2018-02-20 VITALS
HEART RATE: 76 BPM | HEIGHT: 62 IN | BODY MASS INDEX: 22.31 KG/M2 | DIASTOLIC BLOOD PRESSURE: 68 MMHG | SYSTOLIC BLOOD PRESSURE: 118 MMHG | WEIGHT: 121.2 LBS | RESPIRATION RATE: 18 BRPM

## 2018-02-20 DIAGNOSIS — I71.4 AAA (ABDOMINAL AORTIC ANEURYSM) WITHOUT RUPTURE (HCC): Primary | ICD-10-CM

## 2018-02-20 DIAGNOSIS — Z12.11 SCREENING FOR COLON CANCER: ICD-10-CM

## 2018-02-20 PROCEDURE — 99024 POSTOP FOLLOW-UP VISIT: CPT | Performed by: SURGERY

## 2018-02-20 NOTE — PROGRESS NOTES
Assessment/Plan:    AAA (abdominal aortic aneurysm) without rupture (HCC)  Doing well 12 days status post endovascular aneurysm repair requiring right groin cutdown  That is healed nicely he has easily palpable dorsalis pedis pulses bilaterally, planning endovascular aneurysm repair surveillance protocol  Diagnoses and all orders for this visit:    AAA (abdominal aortic aneurysm) without rupture (HCC)        Subjective:      Patient ID: Annita Whitt is a 75 y o  male  HPI    The following portions of the patient's history were reviewed and updated as appropriate: allergies, current medications, past family history, past medical history, past social history, past surgical history and problem list     Review of Systems      Objective:      /68 (BP Location: Right arm, Patient Position: Sitting, Cuff Size: Standard)   Pulse 76   Resp 18   Ht 5' 2" (1 575 m)   Wt 55 kg (121 lb 3 2 oz)   BMI 22 17 kg/m²          Physical Exam  abdomen soft without palpable masses, pulses palpable femoral popliteal dorsalis pedis bilaterally, staples removed right femoral cutdown will healing, no evidence of erythema or induration  Steri-Strips placed

## 2018-02-20 NOTE — ASSESSMENT & PLAN NOTE
Doing well 12 days status post endovascular aneurysm repair requiring right groin cutdown  That is healed nicely he has easily palpable dorsalis pedis pulses bilaterally, planning endovascular aneurysm repair surveillance protocol

## 2018-02-20 NOTE — PATIENT INSTRUCTIONS
Doing well status post endovascular aneurysm repair approximately 12 days postop  The procedure required a cutdown in the right groin which is healed nicely staples removed Steri-Strips placed  Follow-up endovascular aneurysm repair protocol CT/ultrasound

## 2018-03-20 ENCOUNTER — HOSPITAL ENCOUNTER (OUTPATIENT)
Dept: CT IMAGING | Facility: HOSPITAL | Age: 68
Discharge: HOME | End: 2018-03-20
Attending: SURGERY
Payer: MEDICARE

## 2018-03-20 DIAGNOSIS — I71.4 AAA (ABDOMINAL AORTIC ANEURYSM) WITHOUT RUPTURE (HCC): ICD-10-CM

## 2018-03-20 PROCEDURE — 74176 CT ABD & PELVIS W/O CONTRAST: CPT

## 2018-03-29 ENCOUNTER — TRANSCRIBE ORDERS (OUTPATIENT)
Dept: VASCULAR SURGERY | Facility: CLINIC | Age: 68
End: 2018-03-29

## 2018-03-29 DIAGNOSIS — I71.4 ABDOMINAL AORTIC ANEURYSM WITHOUT RUPTURE (HCC): Primary | ICD-10-CM

## 2018-04-05 ENCOUNTER — OFFICE VISIT (OUTPATIENT)
Dept: GASTROENTEROLOGY | Facility: CLINIC | Age: 68
End: 2018-04-05
Payer: MEDICARE

## 2018-04-05 VITALS
WEIGHT: 115.6 LBS | DIASTOLIC BLOOD PRESSURE: 77 MMHG | HEART RATE: 74 BPM | HEIGHT: 62 IN | TEMPERATURE: 97.4 F | SYSTOLIC BLOOD PRESSURE: 129 MMHG | BODY MASS INDEX: 21.27 KG/M2

## 2018-04-05 DIAGNOSIS — K21.9 GASTROESOPHAGEAL REFLUX DISEASE, ESOPHAGITIS PRESENCE NOT SPECIFIED: ICD-10-CM

## 2018-04-05 DIAGNOSIS — R19.7 DIARRHEA, UNSPECIFIED TYPE: Primary | ICD-10-CM

## 2018-04-05 DIAGNOSIS — K86.1 CHRONIC PANCREATITIS, UNSPECIFIED PANCREATITIS TYPE (HCC): ICD-10-CM

## 2018-04-05 DIAGNOSIS — R10.30 LOWER ABDOMINAL PAIN: ICD-10-CM

## 2018-04-05 PROCEDURE — 99214 OFFICE O/P EST MOD 30 MIN: CPT | Performed by: PHYSICIAN ASSISTANT

## 2018-04-05 RX ORDER — CITALOPRAM 20 MG/1
20 TABLET ORAL DAILY
Refills: 2 | COMMUNITY
Start: 2018-03-05 | End: 2020-10-30

## 2018-04-05 NOTE — PATIENT INSTRUCTIONS
EGD/Colon scheduled with Dr Jeremy Anaya on 6/22/18 at [facility] instructions given in office patient states he has prep at home already

## 2018-04-05 NOTE — PROGRESS NOTES
[facility] - Outpatient Follow-up Note  Emilia Morales 70 y o  male MRN: [id number]  Encounter: [id number]          ASSESSMENT AND PLAN:    1) Chronic post-prandial abdominal pain   -Stable  Abdominal ultrasound last year showed patent celiac and superior mesenteric arteries  Recent CT scan from 2017 shows no acute abnormality     -His pain is likely multifactorial related to chronic pancreatitis, GERD, and functional abdominal pain  He is due for repeat colonoscopy in states that he would prefer to have an upper endoscopy to evaluate his stomach as well and this is not unreasonable as he has not had one in quite some time  We discussed the risks and benefits today and he is agreeable, he will schedule this along with the colonoscopy  -Continue amitriptyline 10 mg daily at bedtime     -Continue omeprazole 40 mg daily     2) Chronic pancreatitis with pseudocyst status post cyst gastrostomy:  -Stable  Most recent CT scan shows normal-appearing pancreas without evidence of cyst formation     -Continue Creon 11989 units with meals  -Given his diarrhea, will obtain fecal elastase in case his Creon should be adjusted     3) IBS with constipation and diarrhea   -He reports  He has had diarrhea intermittently for the last month, he has discontinued the Linzess and MiraLax  -  Obtain stool studies to rule out infectious cause for his diarrhea, also ordered fecal elastase, see 2    - he is also due for colonoscopy, recommend random biopsies to evaluate for microscopic colitis given his diarrhea      4) Benign colon polyp   - Multiple colon polyps removed on colonoscopy from 2014  He is due for repeat colonoscopy  -He will schedule this today  -  Follow-up in the office after his procedures      ______________________________________________________________________    SUBJECTIVE:    1 month diarrhea  aaa repair 2/9/18    REVIEW OF SYSTEMS IS OTHERWISE NEGATIVE  Historical Information   Past Medical History:   Diagnosis Date    Anxiety     GERD (gastroesophageal reflux disease)     H/O abdominal aortic aneurysm     Hyperlipidemia     Hypertension     Vertigo      Past Surgical History:   Procedure Laterality Date    COLONOSCOPY      [place] EVASC RPR DPLMNT AORTO-AORTIC NDGFT N/A 2/9/2018    Procedure: REPAIR ANEURYSM ENDOVASCULAR ABDOMINAL AORTIC  (EVAR);   Surgeon: Kendal Saez MD;  Location: [facility];  Service: Vascular     Social History   History   Alcohol Use No     History   Drug Use No     History   Smoking Status    Current Every Day Smoker    Packs/day: 1 00    Types: Cigarettes   Smokeless Tobacco    Never Used     Comment: Virgil quiting smoking handouts     Family History   Problem Relation Age of Onset    Heart disease Father     Heart attack Father     Diabetes Maternal Grandmother     Diabetes Maternal Grandfather     Diabetes Paternal Grandmother        Meds/Allergies       Current Outpatient Prescriptions:     acetaminophen-codeine (TYLENOL #2) 300-15 MG per tablet    ALPRAZolam (XANAX) 1 mg tablet    amitriptyline (ELAVIL) 10 mg tablet    amLODIPine (NORVASC) 5 mg tablet    Calcium Carbonate-Vitamin D (CALTRATE 600+D) 600-400 MG-UNIT per tablet    citalopram (CeleXA) 20 mg tablet    hydrochlorothiazide (HYDRODIURIL) 12 5 mg tablet    lisinopril (ZESTRIL) 40 mg tablet    metoprolol tartrate (LOPRESSOR) 50 mg tablet    Multiple Vitamins-Minerals (CENTRUM SILVER PO)    omeprazole (PriLOSEC) 40 MG capsule    pancrelipase, Lip-Prot-Amyl, (CREON) 12,000 units capsule    polyethylene glycol (MIRALAX) 17 g packet    pravastatin (PRAVACHOL) 40 mg tablet    sodium chloride 1 g tablet    tamsulosin (FLOMAX) 0 4 mg    Wheat Dextrin (BENEFIBER PO)    No Known Allergies        Objective     Blood pressure 129/77, pulse 74, temperature (!) 97 4 °F (36 3 °C), temperature source Tympanic, height 5' 2" (1 575 m), weight 52 4 kg (115 lb 9 6 oz)  PHYSICAL EXAM:      General Appearance:   Alert, cooperative, no distress   HEENT:   Normocephalic, atraumatic, anicteric      Neck:  Supple, symmetrical, trachea midline   Lungs:   Clear to auscultation bilaterally; no rales, rhonchi or wheezing; respirations unlabored    Heart[de-identified]   Regular rate and rhythm; no murmur, rub, or gallop  Abdomen:   Soft, non-tender, non-distended; normal bowel sounds; no masses, no organomegaly    Genitalia:   Deferred    Rectal:   Deferred    Extremities:  No cyanosis, clubbing or edema    Pulses:  2+ and symmetric    Skin:  No jaundice, rashes, or lesions    Lymph nodes:  No palpable cervical lymphadenopathy        Lab Results:   No visits with results within 1 Day(s) from this visit     Latest known visit with results is:   Admission on 02/09/2018, Discharged on 02/10/2018   Component Date Value    ABO Grouping 02/09/2018 O     Rh Factor 02/09/2018 Positive     Antibody Screen 02/09/2018 Negative     Specimen Expiration Date 02/09/2018 [id number]     Unit Product Code 02/12/2018 [id number]     Unit Number 02/12/2018 [id number]     Unit ABO 02/12/2018 O     Unit RH 02/12/2018 POS     Unit Dispense Status 02/12/2018 Return to [address] [place] [address] Unit Product Code 02/12/2018 [id number]     Unit Number 02/12/2018 [id number]    Marjan Martines Unit ABO 02/12/2018 O     Unit RH 02/12/2018 POS     Unit Dispense Status 02/12/2018 Return to Inv     Protime 02/09/2018 13 3     INR 02/09/2018 1 01     PTT 02/09/2018 30     Unit Product Code 02/12/2018 [id number]     Unit Number 02/12/2018 [id number]     Unit ABO 02/12/2018 O     Unit RH 02/12/2018 POS     Unit Dispense Status 02/12/2018 Return to [address] [place] [address] Unit Product Code 02/12/2018 [id number]    Marjan Martines Unit Number 02/12/2018 [id number]     Unit ABO 02/12/2018 O     Unit RH 02/12/2018 POS     Unit Dispense Status 02/12/2018 Return to Inv     Ventricular Rate 02/09/2018 57     Atrial Rate 02/09/2018 57     [place] Interval 02/09/2018 144     QRSD Interval 02/09/2018 98     QT Interval 02/09/2018 410     QTC Interval 02/09/2018 399     P Axis 02/09/2018 80     QRS Axis 02/09/2018 61     T Wave Axis 02/09/2018 76     POC Glucose 02/09/2018 149*    WBC 02/09/2018 13 85*    RBC 02/09/2018 2 84*    Hemoglobin 02/09/2018 9 3*    Hematocrit 02/09/2018 26 7*    MCV 02/09/2018 94     MCH 02/09/2018 32 7     MCHC 02/09/2018 34 8     RDW 02/09/2018 13 4     Platelets 20/98/8795 149     MPV 02/09/2018 9 1     Sodium 02/09/2018 137     Potassium 02/09/2018 4 4     Chloride 02/09/2018 105     CO2 02/09/2018 23     Anion Gap 02/09/2018 9     BUN 02/09/2018 10     Creatinine 02/09/2018 0 76     Glucose 02/09/2018 139     Glucose, Fasting 02/09/2018 139*    Calcium 02/09/2018 7 2*    eGFR 02/09/2018 94     Platelets 90/58/8345 119*    MPV 02/09/2018 9 7     Activated Clotting Time,* 02/09/2018 218*    Specimen Type 02/09/2018 ARTERIAL     Activated Clotting Time,* 02/09/2018 200*    Specimen Type 02/09/2018 ARTERIAL     Activated Clotting Time,* 02/09/2018 206*    Specimen Type 02/09/2018 ARTERIAL     Activated Clotting Time,* 02/09/2018 189*    Specimen Type 02/09/2018 ARTERIAL     Activated Clotting Time,* 02/09/2018 206*    Specimen Type 02/09/2018 VENOUS     Sodium 02/10/2018 134*    Potassium 02/10/2018 3 5     Chloride 02/10/2018 101     CO2 02/10/2018 26     Anion Gap 02/10/2018 7     BUN 02/10/2018 11     Creatinine 02/10/2018 0 67     Glucose 02/10/2018 104     Calcium 02/10/2018 7 8*    eGFR 02/10/2018 99     WBC 02/10/2018 8 60     RBC 02/10/2018 2 31*    Hemoglobin 02/10/2018 7 7*    Hematocrit 02/10/2018 21 8*    MCV 02/10/2018 94     MCH 02/10/2018 33 3     MCHC 02/10/2018 35 3     RDW 02/10/2018 13 8     Platelets 95/39/6057 111*    MPV 02/10/2018 9 1     Hemoglobin 02/10/2018 8 3*    Hematocrit 02/10/2018 22 9*         Radiology Results:   Ct Abdomen Pelvis Wo Contrast    Result Date: 3/22/2018  Narrative: CT ABDOMEN AND PELVIS WITHOUT IV CONTRAST INDICATION:   I71 4: Abdominal aortic aneurysm, without rupture  Endovascular aortic aneurysm repair  Reevaluate  COMPARISON: Multiple prior examinations including most recent examination performed prior to the procedure and November 25, 2017  Also available for comparison are images from the endovascular aortic repair which was performed on February 9, 2018  TECHNIQUE:  CT examination of the abdomen and pelvis was performed without intravenous contrast   Axial, sagittal, and coronal 2D reformatted images were created from the source data and submitted for interpretation  Radiation dose length product (DLP) for this visit:  176 mGy-cm   This examination, like all CT scans performed in the [facility], was performed utilizing techniques to minimize radiation dose exposure, including the use of iterative reconstruction and automated exposure control  Enteric contrast was not administered  FINDINGS: ABDOMEN LOWER CHEST:  Emphysematous changes are noted at the lung bases  LIVER/BILIARY TREE:  Unremarkable  GALLBLADDER:  No calcified gallstones  No pericholecystic inflammatory change  SPLEEN:  Unremarkable  PANCREAS:  Unremarkable  ADRENAL GLANDS:  Unremarkable  KIDNEYS/URETERS:  Unremarkable  No hydronephrosis  STOMACH AND BOWEL:  Unremarkable  APPENDIX:  No findings to suggest appendicitis  ABDOMINOPELVIC CAVITY:  No ascites or free intraperitoneal air  No lymphadenopathy  VESSELS:  Aortobiiliac endovascular graft is noted  The graft appears intact and graft configuration appears unchanged when compared with final fluoroscopic images obtained the time of graft placement  Neither aneurysm sac size measures 55 x 50 mm which represents no significant interval change from 56 x 51 mm when measured using similar technique on preprocedural examination    No hemorrhage or edema in the retroperitoneal fat surrounding aortic aneurysm  Expected postprocedural change in the right inguinal region  PELVIS REPRODUCTIVE ORGANS:  Prostate calcifications are noted  Prostate appears mildly enlarged  URINARY BLADDER:  Incompletely distended but nonetheless bladder appears diffusely thick-walled, unchanged from previous examination suggesting either some element of chronic bladder obstruction or chronic cystitis  ABDOMINAL WALL/INGUINAL REGIONS:  An oval 11 mm soft tissue density nodule in the shallow subcutaneous tissues of the upper right buttock on image 40 of series 2 is unchanged from at least as far back as December 2016, most consistent with sebaceous cyst  OSSEOUS STRUCTURES:  No acute fracture or destructive osseous lesion  Impression: Intact endovascular aortic graft with unchanged configuration from previous examination  No interval change in the size of the native aneurysm sac is preprocedural examination  Diffusely thick-walled urinary bladder, similar from prior examinations at least as far back as 2013 suggesting either some element of chronic bladder obstruction or chronic cystitis   Workstation performed: [id number]

## 2018-04-08 DIAGNOSIS — R10.13 CHRONIC EPIGASTRIC PAIN: Primary | ICD-10-CM

## 2018-04-08 DIAGNOSIS — G89.29 CHRONIC EPIGASTRIC PAIN: Primary | ICD-10-CM

## 2018-04-08 RX ORDER — AMITRIPTYLINE HYDROCHLORIDE 10 MG/1
TABLET, FILM COATED ORAL
Qty: 90 TABLET | Refills: 2 | Status: ON HOLD | OUTPATIENT
Start: 2018-04-08 | End: 2018-06-22

## 2018-05-11 ENCOUNTER — APPOINTMENT (OUTPATIENT)
Dept: LAB | Facility: HOSPITAL | Age: 68
End: 2018-05-11
Payer: MEDICARE

## 2018-05-11 DIAGNOSIS — R19.7 DIARRHEA, UNSPECIFIED TYPE: ICD-10-CM

## 2018-05-11 LAB
ALBUMIN SERPL BCP-MCNC: 3.5 G/DL (ref 3.5–5)
ALP SERPL-CCNC: 103 U/L (ref 46–116)
ALT SERPL W P-5'-P-CCNC: 17 U/L (ref 12–78)
ANION GAP SERPL CALCULATED.3IONS-SCNC: 8 MMOL/L (ref 4–13)
AST SERPL W P-5'-P-CCNC: 11 U/L (ref 5–45)
BASOPHILS # BLD AUTO: 0.04 THOUSANDS/ΜL (ref 0–0.1)
BASOPHILS NFR BLD AUTO: 1 % (ref 0–1)
BILIRUB SERPL-MCNC: 0.37 MG/DL (ref 0.2–1)
BUN SERPL-MCNC: 12 MG/DL (ref 5–25)
CALCIUM SERPL-MCNC: 9.1 MG/DL (ref 8.3–10.1)
CHLORIDE SERPL-SCNC: 98 MMOL/L (ref 100–108)
CO2 SERPL-SCNC: 27 MMOL/L (ref 21–32)
CREAT SERPL-MCNC: 0.94 MG/DL (ref 0.6–1.3)
EOSINOPHIL # BLD AUTO: 0.11 THOUSAND/ΜL (ref 0–0.61)
EOSINOPHIL NFR BLD AUTO: 2 % (ref 0–6)
ERYTHROCYTE [DISTWIDTH] IN BLOOD BY AUTOMATED COUNT: 14.3 % (ref 11.6–15.1)
GFR SERPL CREATININE-BSD FRML MDRD: 82 ML/MIN/1.73SQ M
GLUCOSE P FAST SERPL-MCNC: 91 MG/DL (ref 65–99)
HCT VFR BLD AUTO: 35.9 % (ref 36.5–49.3)
HGB BLD-MCNC: 11.6 G/DL (ref 12–17)
IGA SERPL-MCNC: 227 MG/DL (ref 70–400)
LYMPHOCYTES # BLD AUTO: 1.43 THOUSANDS/ΜL (ref 0.6–4.47)
LYMPHOCYTES NFR BLD AUTO: 21 % (ref 14–44)
MCH RBC QN AUTO: 28.2 PG (ref 26.8–34.3)
MCHC RBC AUTO-ENTMCNC: 32.3 G/DL (ref 31.4–37.4)
MCV RBC AUTO: 87 FL (ref 82–98)
MONOCYTES # BLD AUTO: 0.58 THOUSAND/ΜL (ref 0.17–1.22)
MONOCYTES NFR BLD AUTO: 9 % (ref 4–12)
NEUTROPHILS # BLD AUTO: 4.5 THOUSANDS/ΜL (ref 1.85–7.62)
NEUTS SEG NFR BLD AUTO: 67 % (ref 43–75)
NRBC BLD AUTO-RTO: 0 /100 WBCS
PLATELET # BLD AUTO: 265 THOUSANDS/UL (ref 149–390)
PMV BLD AUTO: 9.6 FL (ref 8.9–12.7)
POTASSIUM SERPL-SCNC: 4.1 MMOL/L (ref 3.5–5.3)
PROT SERPL-MCNC: 7.1 G/DL (ref 6.4–8.2)
RBC # BLD AUTO: 4.12 MILLION/UL (ref 3.88–5.62)
SODIUM SERPL-SCNC: 133 MMOL/L (ref 136–145)
TSH SERPL DL<=0.05 MIU/L-ACNC: 1.22 UIU/ML (ref 0.36–3.74)
WBC # BLD AUTO: 6.67 THOUSAND/UL (ref 4.31–10.16)

## 2018-05-11 PROCEDURE — 82784 ASSAY IGA/IGD/IGG/IGM EACH: CPT

## 2018-05-11 PROCEDURE — 84443 ASSAY THYROID STIM HORMONE: CPT

## 2018-05-11 PROCEDURE — 87493 C DIFF AMPLIFIED PROBE: CPT

## 2018-05-11 PROCEDURE — 87329 GIARDIA AG IA: CPT

## 2018-05-11 PROCEDURE — 82656 EL-1 FECAL QUAL/SEMIQ: CPT

## 2018-05-11 PROCEDURE — 36415 COLL VENOUS BLD VENIPUNCTURE: CPT

## 2018-05-11 PROCEDURE — 87177 OVA AND PARASITES SMEARS: CPT

## 2018-05-11 PROCEDURE — 87209 SMEAR COMPLEX STAIN: CPT

## 2018-05-11 PROCEDURE — 85025 COMPLETE CBC W/AUTO DIFF WBC: CPT

## 2018-05-11 PROCEDURE — 80053 COMPREHEN METABOLIC PANEL: CPT

## 2018-05-11 PROCEDURE — 83516 IMMUNOASSAY NONANTIBODY: CPT

## 2018-05-12 LAB
C DIFF TOX GENS STL QL NAA+PROBE: NORMAL
TTG IGA SER-ACNC: <2 U/ML (ref 0–3)

## 2018-05-15 LAB — G LAMBLIA AG STL QL IA: NEGATIVE

## 2018-05-16 LAB — O+P STL CONC: NORMAL

## 2018-05-17 LAB — ELASTASE PANC STL-MCNT: 189 UG ELAST./G

## 2018-05-19 DIAGNOSIS — K86.89 PANCREATIC INSUFFICIENCY: Primary | ICD-10-CM

## 2018-05-21 ENCOUNTER — HOSPITAL ENCOUNTER (OUTPATIENT)
Dept: NON INVASIVE DIAGNOSTICS | Facility: HOSPITAL | Age: 68
Discharge: HOME | End: 2018-05-21
Attending: SURGERY
Payer: MEDICARE

## 2018-05-21 DIAGNOSIS — K86.89 PANCREATIC INSUFFICIENCY: ICD-10-CM

## 2018-05-21 DIAGNOSIS — I71.4 ABDOMINAL AORTIC ANEURYSM WITHOUT RUPTURE (HCC): ICD-10-CM

## 2018-05-21 PROCEDURE — 93978 VASCULAR STUDY: CPT

## 2018-05-22 PROCEDURE — 93978 VASCULAR STUDY: CPT | Performed by: SURGERY

## 2018-05-22 RX ORDER — PANCRELIPASE 60000; 12000; 38000 [USP'U]/1; [USP'U]/1; [USP'U]/1
CAPSULE, DELAYED RELEASE PELLETS ORAL
Qty: 90 CAPSULE | Refills: 5 | Status: SHIPPED | OUTPATIENT
Start: 2018-05-22 | End: 2018-05-23 | Stop reason: SDUPTHER

## 2018-05-23 ENCOUNTER — TELEPHONE (OUTPATIENT)
Dept: GASTROENTEROLOGY | Facility: CLINIC | Age: 68
End: 2018-05-23

## 2018-05-23 DIAGNOSIS — K86.89 PANCREATIC INSUFFICIENCY: ICD-10-CM

## 2018-05-23 NOTE — TELEPHONE ENCOUNTER
JOSELITO PATIENT      He called regarding his creon medication   It was called in as 86325 units but he was told it would be increased so he needs clarification

## 2018-05-23 NOTE — TELEPHONE ENCOUNTER
Please call Janes Larson and let him know I sent a new script for Creon 24,000 units three times daily with meals  Please apologize for the error thank you

## 2018-05-24 ENCOUNTER — TELEPHONE (OUTPATIENT)
Dept: GASTROENTEROLOGY | Facility: CLINIC | Age: 68
End: 2018-05-24

## 2018-05-24 DIAGNOSIS — K86.89 PANCREATIC INSUFFICIENCY: ICD-10-CM

## 2018-06-22 ENCOUNTER — HOSPITAL ENCOUNTER (OUTPATIENT)
Facility: HOSPITAL | Age: 68
Setting detail: OUTPATIENT SURGERY
Discharge: HOME | End: 2018-06-22
Attending: INTERNAL MEDICINE | Admitting: INTERNAL MEDICINE
Payer: MEDICARE

## 2018-06-22 ENCOUNTER — ANESTHESIA (OUTPATIENT)
Dept: GASTROENTEROLOGY | Facility: HOSPITAL | Age: 68
End: 2018-06-22
Payer: MEDICARE

## 2018-06-22 ENCOUNTER — ANESTHESIA EVENT (OUTPATIENT)
Dept: GASTROENTEROLOGY | Facility: HOSPITAL | Age: 68
End: 2018-06-22
Payer: MEDICARE

## 2018-06-22 VITALS
TEMPERATURE: 97 F | HEART RATE: 64 BPM | DIASTOLIC BLOOD PRESSURE: 74 MMHG | BODY MASS INDEX: 20.8 KG/M2 | RESPIRATION RATE: 18 BRPM | SYSTOLIC BLOOD PRESSURE: 132 MMHG | WEIGHT: 113 LBS | OXYGEN SATURATION: 99 % | HEIGHT: 62 IN

## 2018-06-22 DIAGNOSIS — R10.30 LOWER ABDOMINAL PAIN: ICD-10-CM

## 2018-06-22 DIAGNOSIS — R19.7 DIARRHEA, UNSPECIFIED TYPE: ICD-10-CM

## 2018-06-22 DIAGNOSIS — K21.9 GASTROESOPHAGEAL REFLUX DISEASE, ESOPHAGITIS PRESENCE NOT SPECIFIED: ICD-10-CM

## 2018-06-22 PROCEDURE — 88342 IMHCHEM/IMCYTCHM 1ST ANTB: CPT | Performed by: PATHOLOGY

## 2018-06-22 PROCEDURE — 43239 EGD BIOPSY SINGLE/MULTIPLE: CPT | Performed by: INTERNAL MEDICINE

## 2018-06-22 PROCEDURE — 45380 COLONOSCOPY AND BIOPSY: CPT | Performed by: INTERNAL MEDICINE

## 2018-06-22 PROCEDURE — 88305 TISSUE EXAM BY PATHOLOGIST: CPT | Performed by: PATHOLOGY

## 2018-06-22 RX ORDER — PROPOFOL 10 MG/ML
INJECTION, EMULSION INTRAVENOUS AS NEEDED
Status: DISCONTINUED | OUTPATIENT
Start: 2018-06-22 | End: 2018-06-22 | Stop reason: SURG

## 2018-06-22 RX ORDER — DICYCLOMINE HYDROCHLORIDE 10 MG/1
10 CAPSULE ORAL
Qty: 90 CAPSULE | Refills: 1 | Status: SHIPPED | OUTPATIENT
Start: 2018-06-22 | End: 2018-10-25 | Stop reason: SDUPTHER

## 2018-06-22 RX ORDER — GLYCOPYRROLATE 0.2 MG/ML
INJECTION INTRAMUSCULAR; INTRAVENOUS AS NEEDED
Status: DISCONTINUED | OUTPATIENT
Start: 2018-06-22 | End: 2018-06-22 | Stop reason: SURG

## 2018-06-22 RX ORDER — PROPOFOL 10 MG/ML
INJECTION, EMULSION INTRAVENOUS CONTINUOUS PRN
Status: DISCONTINUED | OUTPATIENT
Start: 2018-06-22 | End: 2018-06-22 | Stop reason: SURG

## 2018-06-22 RX ORDER — SODIUM CHLORIDE 9 MG/ML
INJECTION, SOLUTION INTRAVENOUS CONTINUOUS PRN
Status: DISCONTINUED | OUTPATIENT
Start: 2018-06-22 | End: 2018-06-22 | Stop reason: SURG

## 2018-06-22 RX ADMIN — SODIUM CHLORIDE: 0.9 INJECTION, SOLUTION INTRAVENOUS at 07:53

## 2018-06-22 RX ADMIN — PROPOFOL 80 MG: 10 INJECTION, EMULSION INTRAVENOUS at 08:06

## 2018-06-22 RX ADMIN — PROPOFOL 110 MCG/KG/MIN: 10 INJECTION, EMULSION INTRAVENOUS at 08:06

## 2018-06-22 RX ADMIN — LIDOCAINE HYDROCHLORIDE 60 MG: 20 INJECTION, SOLUTION INTRAVENOUS at 08:06

## 2018-06-22 RX ADMIN — GLYCOPYRROLATE 0.1 MG: 0.2 INJECTION, SOLUTION INTRAMUSCULAR; INTRAVENOUS at 08:03

## 2018-06-22 NOTE — H&P
History and Physical - [facility]  Jennifer Ng 70 y o  male MRN: [id number]    HPI: Jennifer Ng is a 70y o  year old male who presents for evaluation for abdominal pain and h/o colon polyps and change in bowel habits (diarrhea)  Review of Systems    Historical Information   Past Medical History:   Diagnosis Date    Anxiety     GERD (gastroesophageal reflux disease)     H/O abdominal aortic aneurysm     Hyperlipidemia     Hypertension     Vertigo      Past Surgical History:   Procedure Laterality Date    COLONOSCOPY      [place] EVASC RPR DPLMNT AORTO-AORTIC NDGFT N/A 2/9/2018    Procedure: REPAIR ANEURYSM ENDOVASCULAR ABDOMINAL AORTIC  (EVAR);   Surgeon: Kena Thomas MD;  Location[facility];  Service: Vascular     Social History   History   Alcohol Use No     History   Drug Use No     History   Smoking Status    Current Every Day Smoker    Packs/day: 1 00    Types: Cigarettes   Smokeless Tobacco    Never Used     Comment: Virgil quiting smoking handouts     Family History   Problem Relation Age of Onset    Heart disease Father     Heart attack Father     Diabetes Maternal Grandmother     Diabetes Maternal Grandfather     Diabetes Paternal Grandmother        Meds/Allergies     Prescriptions Prior to Admission   Medication    ALPRAZolam (XANAX) 1 mg tablet    amLODIPine (NORVASC) 5 mg tablet    citalopram (CeleXA) 20 mg tablet    lisinopril (ZESTRIL) 40 mg tablet    metoprolol tartrate (LOPRESSOR) 50 mg tablet    Multiple Vitamins-Minerals (CENTRUM SILVER PO)    omeprazole (PriLOSEC) 40 MG capsule    pancrelipase, Lip-Prot-Amyl, (CREON) 12,000 units capsule    polyethylene glycol (MIRALAX) 17 g packet    pravastatin (PRAVACHOL) 40 mg tablet    sodium chloride 1 g tablet    Wheat Dextrin (BENEFIBER PO)    acetaminophen-codeine (TYLENOL #2) 300-15 MG per tablet       No Known Allergies    Objective     Blood pressure 151/77, pulse 62, temperature (!) 97 °F (36 1 °C), temperature source Temporal, resp  rate 18, height 5' 2" (1 575 m), weight 51 3 kg (113 lb), SpO2 100 %  PHYSICAL EXAM    Gen: NAD  CV: RRR  CHEST: Clear  ABD: soft, NT/ND  EXT: no edema  Neuro: AAO      ASSESSMENT/PLAN:  This is a 70y o  year old male here for EGD for abdominal pain and colonoscopy for evaluation of prior history of polyps and diarrhea  Last colonoscopy in 2014  PLAN:   Procedure: EGD/Colonoscopy

## 2018-06-22 NOTE — OP NOTE
**** GI/ENDOSCOPY REPORT ****     PATIENT NAME: Missael Pompa ------ VISIT ID:     INTRODUCTION: Colonoscopy - A 70 male patient presents for an outpatient   Colonoscopy at [facility]  PREVIOUS COLONOSCOPY: Patient's last colonoscopy was 4 years ago  INDICATIONS: Screening for personal history of polyps  Change in bowel   habits  Diarrhea  CONSENT:  The benefits, risks, and alternatives to the procedure were   discussed and informed consent was obtained from the patient  PREPARATION: EKG, pulse, pulse oximetry and blood pressure were monitored   throughout the procedure  The patient was identified by myself both   verbally and by visual inspection of ID band  ASA Classification: Class 3   - Patient has severe systemic disturbance that may or may not be related   to the disorder requiring surgery  MEDICATIONS: Anesthesia-check records     PROCEDURE:  The pediatric colonoscope was passed without difficulty   through the anus under direct visualization and advanced to the cecum,   confirmed by appendiceal orifice and ileocecal valve  The scope was   withdrawn and the mucosa was carefully examined  The views were good  The   patient's toleration of the procedure was good  Retroflexion was performed   in the rectum  The quality of the preparation was good  Cecal Intubation   Time: 7 minutes(s)  Scope Withdrawal Time: 13 minutes(s)     RECTAL EXAM: Normal rectal exam      FINDINGS:  There was evidence of moderately severe diverticulosis in the   ascending colon, descending colon, and sigmoid colon  Multiple diminutive   polyps were found in the sigmoid colon and rectum  Polypectomy was not   attempted as these appear to be hyperplastic polyp  Otherwise, the colon   appeared to be normal  Multiple random biopsies was taken  COMPLICATIONS: There were no complications       IMPRESSIONS: Moderately severe diverticulosis found in the ascending   colon, descending colon, and sigmoid colon  Multiple diminutive polyps   found in the sigmoid colon and rectum  Polypectomy not attempted as these   appeared to be hyperplastic polyps  multiple random colon biopsies were   done to rule out microscopic colitis  RECOMMENDATIONS: Follow-up on the results of the biopsy specimens  Follow-up appointment with Dr Kenan Olivo at [phone]  PATHOLOGY SPECIMENS: Multiple random biopsies taken  ESTIMATED BLOOD LOSS:     PROCEDURE CODES:     ICD-9 Codes: V12 72 Personal history of colonic polyps 787 99 Other   symptoms involving digestive system 787 91 Diarrhea 562 10 Diverticulosis   of colon (without mention of hemorrhage) 211 4 Benign neoplasm of rectum   and anal canal 211 3 Benign neoplasm of colon     ICD-10 Codes: Z86 010 Personal history of colonic polyps R19 4 Change in   bowel habit R19 7 Diarrhea, unspecified K57 Diverticular disease of   intestine K63 5 Polyp of colon     PERFORMED BY: DONTE Gage  on 06/22/2018  Version 1, electronically signed by DONTE Gage  on 06/22/2018 at   08:43

## 2018-06-22 NOTE — ANESTHESIA POSTPROCEDURE EVALUATION
Post-Op Assessment Note      CV Status:  Stable    Mental Status:  Alert and awake    Hydration Status:  Euvolemic    PONV Controlled:  Controlled    Airway Patency:  Patent    Post Op Vitals Reviewed: Yes          Staff: CRNA           BP 94/57 (06/22/18 0844)    Temp     Pulse 62 (06/22/18 0844)   Resp 18 (06/22/18 0844)    SpO2 100 % (06/22/18 0844)

## 2018-06-22 NOTE — ANESTHESIA PREPROCEDURE EVALUATION
Review of Systems/Medical History  Patient summary reviewed  Chart reviewed      Cardiovascular  Hyperlipidemia, Hypertension controlled,    Pulmonary       GI/Hepatic    GERD ,             Endo/Other     GYN       Hematology   Musculoskeletal       Neurology   Psychology   Anxiety,              Physical Exam    Airway    Mallampati score: II  TM Distance: >3 FB  Neck ROM: full     Dental   No notable dental hx upper dentures,     Cardiovascular  Rhythm: regular, Rate: normal, Cardiovascular exam normal    Pulmonary  Pulmonary exam normal Breath sounds clear to auscultation,     Other Findings        Anesthesia Plan  ASA Score- 2     Anesthesia Type- IV sedation with anesthesia with ASA Monitors  Additional Monitors:   Airway Plan:         Plan Factors-    Induction- intravenous  Postoperative Plan- Plan for postoperative opioid use  Informed Consent- Anesthetic plan and risks discussed with patient  I personally reviewed this patient with the CRNA  Discussed and agreed on the Anesthesia Plan with the CRNA  Timothy Ayala

## 2018-06-22 NOTE — OP NOTE
**** GI/ENDOSCOPY REPORT ****     PATIENT NAME: Chaya Huitron - VISIT ID:     INTRODUCTION: Esophagogastroduodenoscopy - A 70 male patient presents for   an outpatient Esophagogastroduodenoscopy at [address] [place] [address]  INDICATIONS: Abdominal pain and weight loss  CONSENT: The benefits, risks, and alternatives to the procedure were   discussed and informed consent was obtained from the patient  PREPARATION:  EKG, pulse, pulse oximetry and blood pressure were monitored   throughout the procedure  ASA Classification: Class 3 - Patient has severe   systemic disturbance that may or may not be related to the disorder   requiring surgery  MEDICATIONS: Anesthesia-check records     PROCEDURE:  The endoscope was passed without difficulty through the mouth   under direct visualization and advanced to the 2nd portion of the   duodenum  The scope was withdrawn and the mucosa was carefully examined  FINDINGS:   Esophagus: The proximal third of the esophagus and middle   third of the esophagus appeared to be normal    A possible tongue of   Rhodes's esophagus was found in the distal third of the esophagus,   spanning 1 cm  Two forceps biopsies was taken  The GE junction was   observed 42 cm from the entry site  Stomach: Mild erosive gastritis was   found in the body of the stomach  Two biopsies was taken from the body of   the stomach  Otherwise, the stomach appeared to be normal   Duodenum: The   duodenal bulb and 2nd portion of the duodenum appeared to be normal      COMPLICATIONS: There were no complications  IMPRESSIONS: Possible Rhodes's esophagus noted  Two biopsies taken  Mild   erosive gastritis found in the body of the stomach  Two biopsies taken  Normal duodenal bulb and 2nd portion of the duodenum  RECOMMENDATIONS: Follow-up on the results of the biopsy specimens in 2   weeks  omeprazole 40 mg daily       ESTIMATED BLOOD LOSS:     PATHOLOGY SPECIMENS: Two forceps biopsies taken  Associated finding:   Rhodes's esophagus  Two biopsies taken from body of the stomach  Associated finding: Gastritis  PROCEDURE CODES:     ICD-9 Codes: 530 85 Rhodes's esophagus 535 40 Other specified   gastritides, without mention of hemorrhage     ICD-10 Codes: K22 7 Rhodes's esophagus K29 Gastritis and duodenitis     PERFORMED BY: Dr Verna Primrose, M D  on 06/22/2018  Version 1, electronically signed by Dr Verna Primrose, M D  on 06/22/2018 at   08:17

## 2018-06-29 ENCOUNTER — TELEPHONE (OUTPATIENT)
Dept: GASTROENTEROLOGY | Facility: CLINIC | Age: 68
End: 2018-06-29

## 2018-06-29 NOTE — TELEPHONE ENCOUNTER
Spoke with patient  H/O abdominal pain, pancreatitis, IBS C&D, benign polyp  Patient c/o constipation over the weekend after starting dicyclomine TID, he stopped this on Tuesday and started to have diarrhea BM Thursday after 2 doses of miralax  He had 1 bentyl 10mg today and states his symptoms are much better  Advised patient okay to continue bentyl 10mg daily for symptoms and miralax PRN for constipation  Any other suggestions?

## 2018-06-29 NOTE — TELEPHONE ENCOUNTER
Dr Naa Walden pt    Pt called in to advise he started dicyclomine after his egd and colonoscopy on 6/22 which caused him constipation which he ended up stopping, now he has diarrhea again   The patient would like call back to discuss [phone]

## 2018-07-02 NOTE — TELEPHONE ENCOUNTER
Pt called stating he needed to sched a fu appt with you only but we have nothing avail in the [place] office  Pt also stated is going to start taking the metamucil today as suggested with a half of fabio because he is still constipated  Pt wanted to know when/do he need to come in to see you   Pt can be reached at [phone]

## 2018-07-09 ENCOUNTER — OFFICE VISIT (OUTPATIENT)
Dept: GASTROENTEROLOGY | Facility: CLINIC | Age: 68
End: 2018-07-09
Payer: MEDICARE

## 2018-07-09 VITALS
BODY MASS INDEX: 20.87 KG/M2 | HEART RATE: 69 BPM | SYSTOLIC BLOOD PRESSURE: 135 MMHG | HEIGHT: 62 IN | TEMPERATURE: 97.3 F | DIASTOLIC BLOOD PRESSURE: 84 MMHG | WEIGHT: 113.4 LBS

## 2018-07-09 DIAGNOSIS — R10.13 EPIGASTRIC PAIN: ICD-10-CM

## 2018-07-09 DIAGNOSIS — K58.2 IRRITABLE BOWEL SYNDROME WITH BOTH CONSTIPATION AND DIARRHEA: ICD-10-CM

## 2018-07-09 DIAGNOSIS — K21.00 GASTROESOPHAGEAL REFLUX DISEASE WITH ESOPHAGITIS: Primary | ICD-10-CM

## 2018-07-09 PROBLEM — K21.9 GASTROESOPHAGEAL REFLUX DISEASE: Status: RESOLVED | Noted: 2018-04-05 | Resolved: 2018-07-09

## 2018-07-09 PROCEDURE — 99214 OFFICE O/P EST MOD 30 MIN: CPT | Performed by: INTERNAL MEDICINE

## 2018-07-09 RX ORDER — AMITRIPTYLINE HYDROCHLORIDE 25 MG/1
25 TABLET, FILM COATED ORAL
Qty: 30 TABLET | Refills: 2 | Status: SHIPPED | OUTPATIENT
Start: 2018-07-09 | End: 2018-09-28 | Stop reason: SDUPTHER

## 2018-07-09 NOTE — ASSESSMENT & PLAN NOTE
Diarrhea :   - imodium as needed  - bentyl as needed  - if not improved then consider rifaximin  Constipation :   - miralax as needed  - linzess 72 mcg daily  Hold probiotics  Increase Elavil to 25 mg  FODMAP instructions given

## 2018-07-09 NOTE — PROGRESS NOTES
Assessment/Plan:    GERD (gastroesophageal reflux disease)  1  Stable  2  Continue omeprazole  3  EGD with mild esophagitis  No Rhodes's  Irritable bowel syndrome with both constipation and diarrhea  Diarrhea :   - imodium as needed  - bentyl as needed  - if not improved then consider rifaximin  Constipation :   - miralax as needed  - linzess 72 mcg daily  Hold probiotics  Increase Elavil to 25 mg  FODMAP instructions given  Epigastric pain  DDx : due to IBS vs vascular pathology since this is post prandial    - increased elavil and see if that helps  - if pain persists will need to see vascular surgery  I have sent a message to Dr Juliana Box  Diagnoses and all orders for this visit:    Gastroesophageal reflux disease with esophagitis    Epigastric pain    Irritable bowel syndrome with both constipation and diarrhea  -     amitriptyline (ELAVIL) 25 mg tablet; Take 1 tablet (25 mg total) by mouth daily at bedtime for 30 days  -     Linaclotide (LINZESS) 72 MCG CAPS; Take 72 mcg by mouth daily for 30 days          Subjective:      Patient ID: Kate Gross is a 70 y o  male  HPI     He presents today for evaluation of his constipation/ diarrhea  He was having diarrhea before and had a colonoscopy with random biopsies which were unremarkable for microscopic colitis  He feels that he has intermittent diarrhea/constipation  When he has diarrhea he has 5 - 6 bm/ day  When he gets constipation he may go 2 - 3 days without a BM  This is a cycle  He has abdominal pain after eating  The pain is in the epigastric area  He feels very anxious about stressors in life  He is on celexa and Elavil (10mg)  No weight loss  No bleeding in stools  Bentyl caused him to have constipation       The following portions of the patient's history were reviewed and updated as appropriate: allergies, current medications, past family history, past medical history, past social history, past surgical history and problem list     Review of Systems   Constitutional: Negative  HENT: Negative  Eyes: Negative  Respiratory: Negative  Cardiovascular: Negative  Gastrointestinal: Positive for abdominal pain  See HPI   Endocrine: Negative  Genitourinary: Positive for difficulty urinating  Musculoskeletal: Negative  Skin: Negative  Allergic/Immunologic: Negative  Neurological: Negative  Hematological: Negative  Psychiatric/Behavioral: The patient is nervous/anxious  All other systems reviewed and are negative  Objective:      /84 (BP Location: Left arm, Patient Position: Sitting, Cuff Size: Standard)   Pulse 69   Temp (!) 97 3 °F (36 3 °C) (Tympanic)   Ht 5' 2" (1 575 m)   Wt 51 4 kg (113 lb 6 4 oz)   BMI 20 74 kg/m²          Physical Exam   Constitutional: He is oriented to person, place, and time  Vital signs are normal  He appears well-developed and well-nourished  HENT:   Head: Normocephalic and atraumatic  Eyes: Conjunctivae are normal  Pupils are equal, round, and reactive to light  No scleral icterus  Neck: Normal range of motion  Cardiovascular: Normal rate, regular rhythm and normal heart sounds  Pulmonary/Chest: Effort normal and breath sounds normal  No respiratory distress  Abdominal: Soft  Normal appearance and bowel sounds are normal  He exhibits no distension, no ascites and no mass  There is no hepatosplenomegaly  There is no tenderness  No hernia  Musculoskeletal: Normal range of motion  Lymphadenopathy:     He has no cervical adenopathy  Neurological: He is alert and oriented to person, place, and time  Skin: Skin is warm  Psychiatric: He has a normal mood and affect   His behavior is normal  Thought content normal

## 2018-07-09 NOTE — LETTER
July 9, 2018     Sherry Galeazzi, MD  [address] [address]    Patient: Georgia Conway   YOB: 1950   Date of Visit: 7/9/2018       Dear Dr Gagan Duncan: Thank you for referring Georgia Conway to me for evaluation  Below are my notes for this consultation  If you have questions, please do not hesitate to call me  I look forward to following your patient along with you  Sincerely,        Raman Gunn MD        CC: Sherry Galeazzi, MD Shellia Sailor, MD  7/9/2018  9:29 AM  Incomplete  Assessment/Plan:    No problem-specific Assessment & Plan notes found for this encounter  There are no diagnoses linked to this encounter  Subjective:      Patient ID: Georgia Conway is a 70 y o  male  HPI     He presents today for evaluation of his constipation/ diarrhea  He was having diarrhea before and had a colonoscopy with random biopsies which were unremarkable for microscopic colitis  He feels that he has intermittent diarrhea/constipation  When he has diarrhea he has 5 - 6 bm/ day  When he gets constipation he may go 2 - 3 days without a BM  This is a cycle  He has abdominal pain after eating  The pain is in the epigastric area  He feels very anxious about stressors in life  The following portions of the patient's history were reviewed and updated as appropriate: allergies, current medications, past family history, past medical history, past social history, past surgical history and problem list     Review of Systems   Constitutional: Negative  HENT: Negative  Eyes: Negative  Respiratory: Negative  Cardiovascular: Negative  Gastrointestinal: Positive for abdominal pain  See HPI   Endocrine: Negative  Genitourinary: Positive for difficulty urinating  Musculoskeletal: Negative  Skin: Negative  Allergic/Immunologic: Negative  Neurological: Negative  Hematological: Negative      Psychiatric/Behavioral: The patient is nervous/anxious  All other systems reviewed and are negative  Objective:      /84 (BP Location: Left arm, Patient Position: Sitting, Cuff Size: Standard)   Pulse 69   Temp (!) 97 3 °F (36 3 °C) (Tympanic)   Ht 5' 2" (1 575 m)   Wt 51 4 kg (113 lb 6 4 oz)   BMI 20 74 kg/m²           Physical Exam   Constitutional: He is oriented to person, place, and time  Vital signs are normal  He appears well-developed and well-nourished  HENT:   Head: Normocephalic and atraumatic  Eyes: Conjunctivae are normal  Pupils are equal, round, and reactive to light  No scleral icterus  Neck: Normal range of motion  Cardiovascular: Normal rate, regular rhythm and normal heart sounds  Pulmonary/Chest: Effort normal and breath sounds normal  No respiratory distress  Abdominal: Soft  Normal appearance and bowel sounds are normal  He exhibits no distension, no ascites and no mass  There is no hepatosplenomegaly  There is no tenderness  No hernia  Musculoskeletal: Normal range of motion  Lymphadenopathy:     He has no cervical adenopathy  Neurological: He is alert and oriented to person, place, and time  Skin: Skin is warm  Psychiatric: He has a normal mood and affect   His behavior is normal  Thought content normal

## 2018-07-09 NOTE — ASSESSMENT & PLAN NOTE
DDx : due to IBS vs vascular pathology since this is post prandial    - increased elavil and see if that helps  - if pain persists will need to see vascular surgery  I have sent a message to Dr Woo Myles

## 2018-07-13 DIAGNOSIS — R10.13 EPIGASTRIC PAIN: Primary | ICD-10-CM

## 2018-07-30 ENCOUNTER — OFFICE VISIT (OUTPATIENT)
Dept: GASTROENTEROLOGY | Facility: CLINIC | Age: 68
End: 2018-07-30
Payer: MEDICARE

## 2018-07-30 VITALS
WEIGHT: 116 LBS | SYSTOLIC BLOOD PRESSURE: 126 MMHG | HEIGHT: 62 IN | DIASTOLIC BLOOD PRESSURE: 77 MMHG | TEMPERATURE: 96.7 F | BODY MASS INDEX: 21.35 KG/M2 | HEART RATE: 61 BPM

## 2018-07-30 DIAGNOSIS — K57.30 DIVERTICULOSIS OF LARGE INTESTINE WITHOUT HEMORRHAGE: ICD-10-CM

## 2018-07-30 DIAGNOSIS — K29.70 GASTRITIS DETERMINED BY ENDOSCOPY: ICD-10-CM

## 2018-07-30 DIAGNOSIS — K58.2 IRRITABLE BOWEL SYNDROME WITH BOTH CONSTIPATION AND DIARRHEA: Primary | ICD-10-CM

## 2018-07-30 PROCEDURE — 99213 OFFICE O/P EST LOW 20 MIN: CPT | Performed by: PHYSICIAN ASSISTANT

## 2018-07-30 NOTE — PROGRESS NOTES
[facility] - Outpatient Follow-up Note  Cipriano Mendez 70 y o  male MRN: [id number]  Encounter: [id number]          ASSESSMENT AND PLAN:      Diagnoses and all orders for this visit:    Irritable bowel syndrome with both constipation and diarrhea    Diverticulosis of large intestine without hemorrhage    Gastritis determined by endoscopy    - Patient is presently doing well on his current IBS regimen  He is pleased with his results and we will continue this  He agrees to call if there are any changes in his condition  Will plan routine follow up in 3 months  - 5 year colonoscopy recall  ______________________________________________________________________    SUBJECTIVE:  Patient is a 69-year old male with a history of GERD and IBS, alternating constipation and diarrhea, as well as chronic post-prandial abdominal pain and chronic pancreatitis with pseudocyst s/p cyst gastrostomy  Workup for celiac sprue was negative in May 2018  He was seen by Dr Cyrilla Schilder for EGD and colonoscopy 06/22/18  The EGD showed mild erosive gastritis and question of a tongue of Rhodes's Esophagus with a normal duodenum  Bx were negative for Rhodes's esophagus and H pylori  Colonoscopy showed moderately severe diverticulosis in the ascending, descending and sigmoid colon  Diminutive sigmoid and rectal polyps were noted, appearing hyperplastic, and polypectomy was not attempted  Bx were taken to r/o microscopic colitis and these were negative  He was recommended for 5 year surveillance due to history of colon polyps  Since his last visit, he has been using Bentyl and Imodium PRN as well as Linzess and Miralax PRN  The Bentyl initially caused constipation and he has been using them only once daily  His current regimen is Linzess 72mcg in the morning, Bentyl 10mg in the afternoon  He has also been taking an increased dose of amitriptyline at bedtime (25mg QHS)    He uses Creon daily with meals for management of his chronic pancreatitis  Fecal elastase was 189 in May and Creon dose was adjusted  Today, he states he "is doing great "  He has been tracking his bowel movements for the past 3 weeks and has had 1-3 softly formed BMs most days of the week  He states he had 3 days without a bowel movement mid-month but this resolved with the addition of Miralax  No melena or hematochezia  His weight is stable, our scales show a 3lb weight gain per the patient's report  He is eating well  REVIEW OF SYSTEMS IS OTHERWISE NEGATIVE  Historical Information   Past Medical History:   Diagnosis Date    Anxiety     GERD (gastroesophageal reflux disease)     H/O abdominal aortic aneurysm     Hyperlipidemia     Hypertension     Vertigo      Past Surgical History:   Procedure Laterality Date    COLONOSCOPY      [place] ESOPHAGOGASTRODUODENOSCOPY TRANSORAL DIAGNOSTIC N/A 6/22/2018    Procedure: EGD AND COLONOSCOPY;  Surgeon: Js Tellez MD;  [facility]; Service: Gastroenterology    [place] [address] DPLMNT AORTO-AORTIC NDGFT N/A 2/9/2018    Procedure: REPAIR ANEURYSM ENDOVASCULAR ABDOMINAL AORTIC  (EVAR);   Surgeon: Lisette Yap MD;  [facility];  Service: Vascular    UPPER GASTROINTESTINAL ENDOSCOPY       Social History   History   Alcohol Use No     History   Drug Use No     History   Smoking Status    Current Every Day Smoker    Packs/day: 1 00    Types: Cigarettes   Smokeless Tobacco    Never Used     Comment: Ayseen quiting smoking handouts     Family History   Problem Relation Age of Onset    Heart disease Father     Heart attack Father     Diabetes Maternal Grandmother     Diabetes Maternal Grandfather     Diabetes Paternal Grandmother        Meds/Allergies     Current Outpatient Prescriptions:     acetaminophen-codeine (TYLENOL #2) 300-15 MG per tablet    ALPRAZolam (XANAX) 1 mg tablet    amitriptyline (ELAVIL) 25 mg tablet    amLODIPine (NORVASC) 5 mg tablet   citalopram (CeleXA) 20 mg tablet    Linaclotide (LINZESS) 72 MCG CAPS    lisinopril (ZESTRIL) 40 mg tablet    metoprolol tartrate (LOPRESSOR) 50 mg tablet    Multiple Vitamins-Minerals (CENTRUM SILVER PO)    omeprazole (PriLOSEC) 40 MG capsule    polyethylene glycol (MIRALAX) 17 g packet    pravastatin (PRAVACHOL) 40 mg tablet    sodium chloride 1 g tablet    Wheat Dextrin (BENEFIBER PO)    dicyclomine (BENTYL) 10 mg capsule    pancrelipase, Lip-Prot-Amyl, (CREON) 12,000 units capsule    No Known Allergies        Objective     Blood pressure 126/77, pulse 61, temperature (!) 96 7 °F (35 9 °C), temperature source Tympanic, height 5' 2" (1 575 m), weight 52 6 kg (116 lb)  Body mass index is 21 22 kg/m²  PHYSICAL EXAM:      General Appearance:   Alert, cooperative, no distress   HEENT:   Normocephalic, atraumatic, sclera anicteric      Neck:  Supple, symmetrical, no lymphadenopathy, trachea midline   Lungs:   Clear to auscultation bilaterally; no rales, rhonchi or wheezing; respirations unlabored    Heart[de-identified]   Regular rate and rhythm; no murmur, rub, or gallop     Abdomen:   Soft, non-tender without rebound or guarding, non-distended; positive bowel sounds; no masses, no organomegaly    Genitalia:   Deferred    Rectal:   Deferred    Extremities:  No cyanosis, clubbing or edema    Pulses:  2+ and symmetric    Skin:  No jaundice, rashes, or lesions          Lab Results:   Lab Results   Component Value Date    WBC 6 67 05/11/2018    HGB 11 6 (L) 05/11/2018    HCT 35 9 (L) 05/11/2018    MCV 87 05/11/2018     05/11/2018     Lab Results   Component Value Date     (L) 05/11/2018    K 4 1 05/11/2018    CL 98 (L) 05/11/2018    CO2 27 05/11/2018    ANIONGAP 8 05/11/2018    BUN 12 05/11/2018    CREATININE 0 94 05/11/2018    GLUCOSE 104 02/10/2018    GLUF 91 05/11/2018    CALCIUM 9 1 05/11/2018    AST 11 05/11/2018    ALT 17 05/11/2018    ALKPHOS 103 05/11/2018    PROT 7 1 05/11/2018    BILITOT 0 37 05/11/2018    EGFR 82 05/11/2018         Radiology Results:   No results found          Lidia Ocampo PA-C

## 2018-07-30 NOTE — LETTER
July 30, 2018     Lisa Jimenez MD  [address] [address]    Patient: Cipriano Mendez   YOB: 1950   Date of Visit: 7/30/2018       Dear Dr Juan Patten: Thank you for referring Cipriano Mendez to me for evaluation  Below are my notes for this consultation  If you have questions, please do not hesitate to call me  I look forward to following your patient along with you  Sincerely,        Monalisa Kenny PA-C        CC: No Recipients  Twila Beckmanman  7/30/2018 10:04 AM  Sign at close encounter  [facility] - Outpatient Follow-up Note  Cipriano Mendez 70 y o  male MRN: [id number]  Encounter: [id number]          ASSESSMENT AND PLAN:      Diagnoses and all orders for this visit:    Irritable bowel syndrome with both constipation and diarrhea    Diverticulosis of large intestine without hemorrhage    Gastritis determined by endoscopy    - Patient is presently doing well on his current IBS regimen  He is pleased with his results and we will continue this  He agrees to call if there are any changes in his condition  Will plan routine follow up in 3 months  - 5 year colonoscopy recall  ______________________________________________________________________    SUBJECTIVE:  Patient is a 69-year old male with a history of GERD and IBS, alternating constipation and diarrhea, as well as chronic post-prandial abdominal pain and chronic pancreatitis with pseudocyst s/p cyst gastrostomy  Workup for celiac sprue was negative in May 2018  He was seen by Dr Cyrilla Schilder for EGD and colonoscopy 06/22/18  The EGD showed mild erosive gastritis and question of a tongue of Rhodes's Esophagus with a normal duodenum  Bx were negative for Rhodes's esophagus and H pylori  Colonoscopy showed moderately severe diverticulosis in the ascending, descending and sigmoid colon    Diminutive sigmoid and rectal polyps were noted, appearing hyperplastic, and polypectomy was not attempted  Bx were taken to r/o microscopic colitis and these were negative  He was recommended for 5 year surveillance due to history of colon polyps  Since his last visit, he has been using Bentyl and Imodium PRN as well as Linzess and Miralax PRN  The Bentyl initially caused constipation and he has been using them only once daily  His current regimen is Linzess 72mcg in the morning, Bentyl 10mg in the afternoon  He has also been taking an increased dose of amitriptyline at bedtime (25mg QHS)  He uses Creon daily with meals for management of his chronic pancreatitis  Fecal elastase was 189 in May and Creon dose was adjusted  Today, he states he "is doing great "  He has been tracking his bowel movements for the past 3 weeks and has had 1-3 softly formed BMs most days of the week  He states he had 3 days without a bowel movement mid-month but this resolved with the addition of Miralax  No melena or hematochezia  His weight is stable, our scales show a 3lb weight gain per the patient's report  He is eating well  REVIEW OF SYSTEMS IS OTHERWISE NEGATIVE  Historical Information   Past Medical History:   Diagnosis Date    Anxiety     GERD (gastroesophageal reflux disease)     H/O abdominal aortic aneurysm     Hyperlipidemia     Hypertension     Vertigo      Past Surgical History:   Procedure Laterality Date    COLONOSCOPY      [place] ESOPHAGOGASTRODUODENOSCOPY TRANSORAL DIAGNOSTIC N/A 6/22/2018    Procedure: EGD AND COLONOSCOPY;  Surgeon: Diane Hernandez MD;  [facility]; Service: Gastroenterology    [place] [address] R[address] DPLMNT AORTO-AORTIC NDGFT N/A 2/9/2018    Procedure: REPAIR ANEURYSM ENDOVASCULAR ABDOMINAL AORTIC  (EVAR);   Surgeon: Arnaldo Torrez MD;  [facility];  Service: Vascular    UPPER GASTROINTESTINAL ENDOSCOPY       Social History   History   Alcohol Use No     History   Drug Use No     History   Smoking Status    Current Every Day Smoker    Packs/day: 1 00    Types: Cigarettes   Smokeless Tobacco    Never Used     Comment: Subhashshawnelke quiting smoking handouts     Family History   Problem Relation Age of Onset    Heart disease Father     Heart attack Father     Diabetes Maternal Grandmother     Diabetes Maternal Grandfather     Diabetes Paternal Grandmother        Meds/Allergies     Current Outpatient Prescriptions:     acetaminophen-codeine (TYLENOL #2) 300-15 MG per tablet    ALPRAZolam (XANAX) 1 mg tablet    amitriptyline (ELAVIL) 25 mg tablet    amLODIPine (NORVASC) 5 mg tablet    citalopram (CeleXA) 20 mg tablet    Linaclotide (LINZESS) 72 MCG CAPS    lisinopril (ZESTRIL) 40 mg tablet    metoprolol tartrate (LOPRESSOR) 50 mg tablet    Multiple Vitamins-Minerals (CENTRUM SILVER PO)    omeprazole (PriLOSEC) 40 MG capsule    polyethylene glycol (MIRALAX) 17 g packet    pravastatin (PRAVACHOL) 40 mg tablet    sodium chloride 1 g tablet    Wheat Dextrin (BENEFIBER PO)    dicyclomine (BENTYL) 10 mg capsule    pancrelipase, Lip-Prot-Amyl, (CREON) 12,000 units capsule    No Known Allergies        Objective     Blood pressure 126/77, pulse 61, temperature (!) 96 7 °F (35 9 °C), temperature source Tympanic, height 5' 2" (1 575 m), weight 52 6 kg (116 lb)  Body mass index is 21 22 kg/m²  PHYSICAL EXAM:      General Appearance:   Alert, cooperative, no distress   HEENT:   Normocephalic, atraumatic, sclera anicteric      Neck:  Supple, symmetrical, no lymphadenopathy, trachea midline   Lungs:   Clear to auscultation bilaterally; no rales, rhonchi or wheezing; respirations unlabored    Heart[de-identified]   Regular rate and rhythm; no murmur, rub, or gallop     Abdomen:   Soft, non-tender without rebound or guarding, non-distended; positive bowel sounds; no masses, no organomegaly    Genitalia:   Deferred    Rectal:   Deferred    Extremities:  No cyanosis, clubbing or edema    Pulses:  2+ and symmetric    Skin:  No jaundice, rashes, or lesions          Lab Results:   Lab Results   Component Value Date    WBC 6 67 05/11/2018    HGB 11 6 (L) 05/11/2018    HCT 35 9 (L) 05/11/2018    MCV 87 05/11/2018     05/11/2018     Lab Results   Component Value Date     (L) 05/11/2018    K 4 1 05/11/2018    CL 98 (L) 05/11/2018    CO2 27 05/11/2018    ANIONGAP 8 05/11/2018    BUN 12 05/11/2018    CREATININE 0 94 05/11/2018    GLUCOSE 104 02/10/2018    GLUF 91 05/11/2018    CALCIUM 9 1 05/11/2018    AST 11 05/11/2018    ALT 17 05/11/2018    ALKPHOS 103 05/11/2018    PROT 7 1 05/11/2018    BILITOT 0 37 05/11/2018    EGFR 82 05/11/2018         Radiology Results:   No results found          Sacha Garcia PA-C

## 2018-07-30 NOTE — PATIENT INSTRUCTIONS
- Continue current medication regimen, call for refills when needed (none needed now)  - Call if any change in condition  - Routine follow up in 3 months    - 5 year recall for surveillance of colon polyps

## 2018-09-10 ENCOUNTER — OFFICE VISIT (OUTPATIENT)
Dept: GASTROENTEROLOGY | Facility: CLINIC | Age: 68
End: 2018-09-10
Payer: MEDICARE

## 2018-09-10 VITALS
TEMPERATURE: 97 F | HEIGHT: 62 IN | SYSTOLIC BLOOD PRESSURE: 133 MMHG | BODY MASS INDEX: 21.46 KG/M2 | DIASTOLIC BLOOD PRESSURE: 78 MMHG | WEIGHT: 116.6 LBS | HEART RATE: 66 BPM

## 2018-09-10 DIAGNOSIS — K86.1 OTHER CHRONIC PANCREATITIS (HCC): ICD-10-CM

## 2018-09-10 DIAGNOSIS — K58.2 IRRITABLE BOWEL SYNDROME WITH BOTH CONSTIPATION AND DIARRHEA: Primary | ICD-10-CM

## 2018-09-10 DIAGNOSIS — F17.200 SMOKING: ICD-10-CM

## 2018-09-10 DIAGNOSIS — K21.9 GASTROESOPHAGEAL REFLUX DISEASE WITHOUT ESOPHAGITIS: ICD-10-CM

## 2018-09-10 PROCEDURE — 99213 OFFICE O/P EST LOW 20 MIN: CPT | Performed by: INTERNAL MEDICINE

## 2018-09-10 NOTE — ASSESSMENT & PLAN NOTE
Diarrhea :   - continue imodium as needed  Constipation :   - miralax as needed  - Linzess 72     Overall better  - Bentyl daily    - Elavil - 25 mg daily

## 2018-09-10 NOTE — PROGRESS NOTES
Assessment/Plan:    GERD (gastroesophageal reflux disease)  Continue omeprazole 40 mg daily  Continue Centrum  Irritable bowel syndrome with both constipation and diarrhea  Diarrhea :   - continue imodium as needed  Constipation :   - miralax as needed  - Linzess 72     Overall better  - Bentyl daily    - Elavil - 25 mg daily  Other chronic pancreatitis ([facility] )  He is on Creon - 2 tabs with meals  He is doing well with that  Smoking  Discussed smoking cessation  Diagnoses and all orders for this visit:    Irritable bowel syndrome with both constipation and diarrhea    Gastroesophageal reflux disease without esophagitis    Other chronic pancreatitis ([facility] )    Smoking          Subjective:      Patient ID: Brayden Winter is a 70 y o  male  HPI    He is doing well today  He has felt benefit with Bentyl 10 mg daily and is on Linzess 72 mcg daily (145 mcg was causing him to have diarrhea)  He is on omeprazole once daily  He is on Elavil at night  He feels well  He moves his bowels move 2 - 3 times/ day (small amounts)  He's not bloated  No heartburn or reflux  He is still smoking  The following portions of the patient's history were reviewed and updated as appropriate: allergies, current medications, past family history, past medical history, past social history, past surgical history and problem list     Review of Systems   Constitutional: Negative  HENT: Negative  Eyes: Negative  Respiratory: Negative  Cardiovascular: Negative  Gastrointestinal:        See HPI   Endocrine: Negative  Genitourinary: Negative  Musculoskeletal: Negative  Skin: Negative  Allergic/Immunologic: Negative  Neurological: Negative  Hematological: Negative  Psychiatric/Behavioral: Negative  All other systems reviewed and are negative          Objective:      /78 (BP Location: Left arm, Patient Position: Sitting, Cuff Size: Standard)   Pulse 66   Temp (!) 97 °F (36 1 °C) (Tympanic)   Ht 5' 2" (1 575 m)   Wt 52 9 kg (116 lb 9 6 oz)   BMI 21 33 kg/m²          Physical Exam   Constitutional: He is oriented to person, place, and time  Vital signs are normal  He appears well-developed and well-nourished  HENT:   Head: Normocephalic and atraumatic  Eyes: Conjunctivae are normal  Pupils are equal, round, and reactive to light  No scleral icterus  Neck: Normal range of motion  Cardiovascular: Normal rate, regular rhythm and normal heart sounds  Pulmonary/Chest: Effort normal and breath sounds normal  No respiratory distress  Abdominal: Soft  Normal appearance and bowel sounds are normal  He exhibits no distension, no ascites and no mass  There is no hepatosplenomegaly  There is no tenderness  No hernia  Musculoskeletal: Normal range of motion  Lymphadenopathy:     He has no cervical adenopathy  Neurological: He is alert and oriented to person, place, and time  Skin: Skin is warm  Psychiatric: He has a normal mood and affect   His behavior is normal  Thought content normal

## 2018-09-28 ENCOUNTER — TELEPHONE (OUTPATIENT)
Dept: GASTROENTEROLOGY | Facility: CLINIC | Age: 68
End: 2018-09-28

## 2018-09-28 DIAGNOSIS — K58.2 IRRITABLE BOWEL SYNDROME WITH BOTH CONSTIPATION AND DIARRHEA: ICD-10-CM

## 2018-09-28 RX ORDER — AMITRIPTYLINE HYDROCHLORIDE 25 MG/1
25 TABLET, FILM COATED ORAL
Qty: 30 TABLET | Refills: 2 | Status: SHIPPED | OUTPATIENT
Start: 2018-09-28 | End: 2018-10-06 | Stop reason: SDUPTHER

## 2018-10-06 DIAGNOSIS — K58.2 IRRITABLE BOWEL SYNDROME WITH BOTH CONSTIPATION AND DIARRHEA: ICD-10-CM

## 2018-10-07 RX ORDER — AMITRIPTYLINE HYDROCHLORIDE 25 MG/1
TABLET, FILM COATED ORAL
Qty: 30 TABLET | Refills: 2 | Status: SHIPPED | OUTPATIENT
Start: 2018-10-07 | End: 2019-03-22 | Stop reason: SDUPTHER

## 2018-10-09 DIAGNOSIS — K58.2 IRRITABLE BOWEL SYNDROME WITH BOTH CONSTIPATION AND DIARRHEA: ICD-10-CM

## 2018-10-09 RX ORDER — LINACLOTIDE 72 UG/1
CAPSULE, GELATIN COATED ORAL
Qty: 30 CAPSULE | Refills: 2 | Status: SHIPPED | OUTPATIENT
Start: 2018-10-09 | End: 2019-01-22 | Stop reason: SDUPTHER

## 2018-10-25 DIAGNOSIS — R10.30 LOWER ABDOMINAL PAIN: ICD-10-CM

## 2018-10-25 RX ORDER — DICYCLOMINE HYDROCHLORIDE 10 MG/1
CAPSULE ORAL
Qty: 90 CAPSULE | Refills: 1 | Status: SHIPPED | OUTPATIENT
Start: 2018-10-25 | End: 2018-12-26 | Stop reason: SDUPTHER

## 2018-11-26 DIAGNOSIS — K86.89 PANCREATIC INSUFFICIENCY: ICD-10-CM

## 2018-11-27 ENCOUNTER — HOSPITAL ENCOUNTER (OUTPATIENT)
Dept: NON INVASIVE DIAGNOSTICS | Facility: CLINIC | Age: 68
Discharge: HOME | End: 2018-11-27
Payer: MEDICARE

## 2018-11-27 DIAGNOSIS — I71.4 ABDOMINAL AORTIC ANEURYSM WITHOUT RUPTURE (HCC): ICD-10-CM

## 2018-11-27 PROCEDURE — 93978 VASCULAR STUDY: CPT

## 2018-11-27 RX ORDER — PANCRELIPASE 60000; 12000; 38000 [USP'U]/1; [USP'U]/1; [USP'U]/1
CAPSULE, DELAYED RELEASE PELLETS ORAL
Qty: 540 CAPSULE | Refills: 2 | Status: SHIPPED | OUTPATIENT
Start: 2018-11-27 | End: 2019-08-19 | Stop reason: SDUPTHER

## 2018-11-28 PROCEDURE — 93978 VASCULAR STUDY: CPT | Performed by: SURGERY

## 2018-11-29 ENCOUNTER — TELEPHONE (OUTPATIENT)
Dept: ADMINISTRATIVE | Facility: HOSPITAL | Age: 68
End: 2018-11-29

## 2018-11-29 NOTE — TELEPHONE ENCOUNTER
----- Message from Peter Wahl sent at 11/29/2018 10:38 AM EST -----  Call patient for ov with Dr Viet Zimmerman

## 2018-12-05 ENCOUNTER — OFFICE VISIT (OUTPATIENT)
Dept: VASCULAR SURGERY | Facility: CLINIC | Age: 68
End: 2018-12-05
Payer: MEDICARE

## 2018-12-05 VITALS
DIASTOLIC BLOOD PRESSURE: 80 MMHG | TEMPERATURE: 97.4 F | BODY MASS INDEX: 22.08 KG/M2 | HEIGHT: 62 IN | SYSTOLIC BLOOD PRESSURE: 122 MMHG | WEIGHT: 120 LBS | HEART RATE: 70 BPM

## 2018-12-05 DIAGNOSIS — I71.4 AAA (ABDOMINAL AORTIC ANEURYSM) WITHOUT RUPTURE (HCC): Primary | ICD-10-CM

## 2018-12-05 PROCEDURE — 99213 OFFICE O/P EST LOW 20 MIN: CPT | Performed by: SURGERY

## 2018-12-05 NOTE — PATIENT INSTRUCTIONS
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

## 2018-12-05 NOTE — PROGRESS NOTES
Assessment/Plan:    AAA (abdominal aortic aneurysm) without rupture (HCC)  Status post endovascular aneurysm repair within the last year, recent aortic duplex showed evidence of a distal left graft stenosis greater than 75%, he seems to be asymptomatic from this lesion  Plan:  Planning CT angiogram to confirm stenosis and then endovascular intervention to correct soon after  Diagnoses and all orders for this visit:    AAA (abdominal aortic aneurysm) without rupture ([address] )  -     Basic metabolic panel; Future  -     CTA abdomen pelvis w wo contrast; Future        Subjective:      Patient ID: Kirstie Landrum is a 70 y o  male  HPI asymptomatic from a left graft stenosis  The following portions of the patient's history were reviewed and updated as appropriate: allergies, current medications, past family history, past medical history, past social history, past surgical history and problem list     Review of Systems  lower back pain right leg pain, all other systems negative    Objective:      /80 (BP Location: Right arm, Patient Position: Sitting)   Pulse 70   Temp (!) 97 4 °F (36 3 °C) (Tympanic)   Ht 5' 2" (1 575 m)   Wt 54 4 kg (120 lb)   BMI 21 95 kg/m²          Physical Exam      2+ right common femoral pulse with 1+ left common femoral pulse, palpable dorsalis pedis on the right no palpable pulses below the angle ligament on the left      Vitals:    12/05/18 1057   BP: 122/80   BP Location: Right arm   Patient Position: Sitting   Pulse: 70   Temp: (!) 97 4 °F (36 3 °C)   TempSrc: Tympanic   Weight: 54 4 kg (120 lb)   Height: 5' 2" (1 575 m)       Patient Active Problem List   Diagnosis    AAA (abdominal aortic aneurysm) without rupture (HCC)    Epigastric pain    GERD (gastroesophageal reflux disease)    Diarrhea    Irritable bowel syndrome with both constipation and diarrhea    Other chronic pancreatitis ([address] )    Smoking       Past Surgical History:   Procedure Laterality Date    COLONOSCOPY      [place] ESOPHAGOGASTRODUODENOSCOPY TRANSORAL DIAGNOSTIC N/A 6/22/2018    Procedure: EGD AND COLONOSCOPY;  Surgeon: Rupali Bhatt MD;  [facility]; Service: Gastroenterology    [place] [address] DPLMNT AORTO-AORTIC NDGFT N/A 2/9/2018    Procedure: REPAIR ANEURYSM ENDOVASCULAR ABDOMINAL AORTIC  (EVAR); Surgeon: Chi Spencer MD;  [facility];  Service: Vascular    UPPER GASTROINTESTINAL ENDOSCOPY         Family History   Problem Relation Age of Onset    Heart disease Father     Heart attack Father     Diabetes Maternal Grandmother     Diabetes Maternal Grandfather     Diabetes Paternal Grandmother        Social History     Social History    Marital status:      Spouse name: N/A    Number of children: N/A    Years of education: N/A     Occupational History    Not on file       Social History Main Topics    Smoking status: Current Every Day Smoker     Packs/day: 1 00     Types: Cigarettes    Smokeless tobacco: Never Used      Comment: Virgil quiting smoking handouts    Alcohol use No    Drug use: No    Sexual activity: Not Currently     Other Topics Concern    Not on file     Social History Narrative    No narrative on file       No Known Allergies      Current Outpatient Prescriptions:     acetaminophen-codeine (TYLENOL #2) 300-15 MG per tablet, Take 1 tablet by mouth every 4 (four) hours as needed for moderate pain, Disp: , Rfl:     ALPRAZolam (XANAX) 1 mg tablet, Take 1 mg by mouth 2 (two) times a day as needed for anxiety  , Disp: , Rfl:     amitriptyline (ELAVIL) 25 mg tablet, TAKE 1 TABLET BY MOUTH AT BEDTIME, Disp: 30 tablet, Rfl: 2    amLODIPine (NORVASC) 5 mg tablet, Take 5 mg by mouth daily, Disp: , Rfl:     citalopram (CeleXA) 20 mg tablet, Take 20 mg by mouth daily, Disp: , Rfl: 2    CREON 29122 units capsule, TAKE TWO CAPSULES BY MOUTH THREE TIMES A DAY WITH MEALS, Disp: 540 capsule, Rfl: 2    dicyclomine (BENTYL) 10 mg capsule, TAKE ONE CAPSULE BY MOUTH 3 TIMES A DAY BEFORE MEALS, Disp: 90 capsule, Rfl: 1    LINZESS 72 MCG CAPS, TAKE 1 CAPSULE BY MOUTH DAILY, Disp: 30 capsule, Rfl: 2    lisinopril (ZESTRIL) 40 mg tablet, Take 40 mg by mouth daily, Disp: , Rfl:     metoprolol tartrate (LOPRESSOR) 50 mg tablet, Take 50 mg by mouth daily  , Disp: , Rfl:     Multiple Vitamins-Minerals (CENTRUM SILVER PO), Take 1 tablet by mouth daily  , Disp: , Rfl:     omeprazole (PriLOSEC) 40 MG capsule, Take 40 mg by mouth daily, Disp: , Rfl:     pancrelipase, Lip-Prot-Amyl, (CREON) 12,000 units capsule, Take 2 capsules (24,000 Units total) by mouth 3 (three) times a day with meals for 30 days, Disp: 180 capsule, Rfl: 0    polyethylene glycol (MIRALAX) 17 g packet, Take 17 g by mouth daily, Disp: , Rfl:     pravastatin (PRAVACHOL) 40 mg tablet, Take 40 mg by mouth daily, Disp: , Rfl:     sodium chloride 1 g tablet, Take 1 g by mouth 3 (three) times a day, Disp: , Rfl:     Wheat Dextrin (BENEFIBER PO), Take by mouth, Disp: , Rfl:

## 2018-12-07 ENCOUNTER — APPOINTMENT (OUTPATIENT)
Dept: LAB | Facility: HOSPITAL | Age: 68
End: 2018-12-07
Attending: SURGERY
Payer: MEDICARE

## 2018-12-07 DIAGNOSIS — I71.4 AAA (ABDOMINAL AORTIC ANEURYSM) WITHOUT RUPTURE (HCC): ICD-10-CM

## 2018-12-07 LAB
ANION GAP SERPL CALCULATED.3IONS-SCNC: 7 MMOL/L (ref 4–13)
BUN SERPL-MCNC: 16 MG/DL (ref 5–25)
CALCIUM SERPL-MCNC: 8.9 MG/DL (ref 8.3–10.1)
CHLORIDE SERPL-SCNC: 98 MMOL/L (ref 100–108)
CO2 SERPL-SCNC: 29 MMOL/L (ref 21–32)
CREAT SERPL-MCNC: 1.09 MG/DL (ref 0.6–1.3)
GFR SERPL CREATININE-BSD FRML MDRD: 69 ML/MIN/1.73SQ M
GLUCOSE P FAST SERPL-MCNC: 99 MG/DL (ref 65–99)
POTASSIUM SERPL-SCNC: 4.4 MMOL/L (ref 3.5–5.3)
SODIUM SERPL-SCNC: 134 MMOL/L (ref 136–145)

## 2018-12-07 PROCEDURE — 80048 BASIC METABOLIC PNL TOTAL CA: CPT

## 2018-12-07 PROCEDURE — 36415 COLL VENOUS BLD VENIPUNCTURE: CPT

## 2018-12-12 ENCOUNTER — HOSPITAL ENCOUNTER (OUTPATIENT)
Dept: CT IMAGING | Facility: HOSPITAL | Age: 68
Discharge: HOME | End: 2018-12-12
Attending: SURGERY
Payer: MEDICARE

## 2018-12-12 DIAGNOSIS — I71.4 AAA (ABDOMINAL AORTIC ANEURYSM) WITHOUT RUPTURE (HCC): ICD-10-CM

## 2018-12-12 PROCEDURE — 74174 CTA ABD&PLVS W/CONTRAST: CPT

## 2018-12-12 RX ADMIN — IOHEXOL 100 ML: 350 INJECTION, SOLUTION INTRAVENOUS at 09:05

## 2018-12-26 ENCOUNTER — OFFICE VISIT (OUTPATIENT)
Dept: VASCULAR SURGERY | Facility: CLINIC | Age: 68
End: 2018-12-26
Payer: MEDICARE

## 2018-12-26 ENCOUNTER — TELEPHONE (OUTPATIENT)
Dept: GASTROENTEROLOGY | Facility: MEDICAL CENTER | Age: 68
End: 2018-12-26

## 2018-12-26 VITALS
HEART RATE: 76 BPM | DIASTOLIC BLOOD PRESSURE: 80 MMHG | HEIGHT: 62 IN | TEMPERATURE: 95.7 F | BODY MASS INDEX: 22.45 KG/M2 | SYSTOLIC BLOOD PRESSURE: 134 MMHG | WEIGHT: 122 LBS

## 2018-12-26 DIAGNOSIS — R10.30 LOWER ABDOMINAL PAIN: ICD-10-CM

## 2018-12-26 DIAGNOSIS — I71.4 AAA (ABDOMINAL AORTIC ANEURYSM) WITHOUT RUPTURE (HCC): Primary | ICD-10-CM

## 2018-12-26 PROCEDURE — 99213 OFFICE O/P EST LOW 20 MIN: CPT | Performed by: SURGERY

## 2018-12-26 RX ORDER — METOPROLOL SUCCINATE 25 MG/1
25 TABLET, EXTENDED RELEASE ORAL DAILY
Refills: 2 | COMMUNITY
Start: 2018-12-03 | End: 2021-02-05

## 2018-12-26 RX ORDER — DICYCLOMINE HYDROCHLORIDE 10 MG/1
10 CAPSULE ORAL
Qty: 90 CAPSULE | Refills: 1 | Status: SHIPPED | OUTPATIENT
Start: 2018-12-26 | End: 2019-02-17 | Stop reason: SDUPTHER

## 2018-12-26 NOTE — ASSESSMENT & PLAN NOTE
History of endovascular aneurysm repair in February this year, surveillance study showing evidence of proximal external iliac artery critical stenosis      Plan:  Endovascular stenting external iliac artery stenosis on the left for the purpose of clear his claudication symptoms as well as graft patency

## 2018-12-26 NOTE — PATIENT INSTRUCTIONS
Approximately 10 months status post endovascular aneurysm repair with development of left external iliac artery stenosis with both clinical claudication symptoms as well as surveillance imaging  Plan:  Endovascular stenting of left external iliac artery stenosis both for claudication symptoms and maintenance of graft limb patency

## 2018-12-26 NOTE — TELEPHONE ENCOUNTER
Dr Denita Severs pt called stating he needs a refill on his dicyclomine 10mg capsules   Pt can be reached at [phone]

## 2018-12-26 NOTE — PROGRESS NOTES
Assessment/Plan:    AAA (abdominal aortic aneurysm) without rupture (HCC)  History of endovascular aneurysm repair in February this year, surveillance study showing evidence of proximal external iliac artery critical stenosis  Plan:  Endovascular stenting external iliac artery stenosis on the left for the purpose of clear his claudication symptoms as well as graft patency       Diagnoses and all orders for this visit:    AAA (abdominal aortic aneurysm) without rupture ([address] )    Other orders  -     metoprolol succinate (TOPROL-XL) 25 mg 24 hr tablet; Take 25 mg by mouth daily        Subjective:      Patient ID: Kj Mcintosh is a 70 y o  male  HPI mild claudication left lower extremity, surveillance study and CT angiogram shows proximal external iliac artery stenosis    The following portions of the patient's history were reviewed and updated as appropriate: allergies, current medications, past family history, past medical history, past social history, past surgical history and problem list     Review of Systems  mild claudication left lower extremity all other systems negative    Objective:      /80 (BP Location: Left arm, Patient Position: Sitting, Cuff Size: Standard)   Pulse 76   Temp (!) 95 7 °F (35 4 °C) (Tympanic)   Ht 5' 2" (1 575 m)   Wt 55 3 kg (122 lb)   BMI 22 31 kg/m²          Physical Exam      Diminished pulse left femoral artery      Vitals:    12/26/18 1424 12/26/18 1433   BP: 128/82 134/80   BP Location: Right arm Left arm   Patient Position: Sitting Sitting   Cuff Size: Standard Standard   Pulse: 76    Temp: (!) 95 7 °F (35 4 °C)    TempSrc: Tympanic    Weight: 55 3 kg (122 lb)    Height: 5' 2" (1 575 m)        Patient Active Problem List   Diagnosis    AAA (abdominal aortic aneurysm) without rupture (HCC)    Epigastric pain    GERD (gastroesophageal reflux disease)    Diarrhea    Irritable bowel syndrome with both constipation and diarrhea    Other chronic pancreatitis ([facility] )    Smoking       Past Surgical History:   Procedure Laterality Date    COLONOSCOPY      [place] ESOPHAGOGASTRODUODENOSCOPY TRANSORAL DIAGNOSTIC N/A 6/22/2018    Procedure: EGD AND COLONOSCOPY;  Surgeon: Feedrico Bonds MD;  [facility]; Service: Gastroenterology    [place] [address] DPLMNT AORTO-AORTIC NDGFT N/A 2/9/2018    Procedure: REPAIR ANEURYSM ENDOVASCULAR ABDOMINAL AORTIC  (EVAR); Surgeon: Zora Franco MD;  Location:  [facility];  Service: Vascular    UPPER GASTROINTESTINAL ENDOSCOPY         Family History   Problem Relation Age of Onset    Heart disease Father     Heart attack Father     Diabetes Maternal Grandmother     Diabetes Maternal Grandfather     Diabetes Paternal Grandmother        Social History     Social History    Marital status:      Spouse name: N/A    Number of children: N/A    Years of education: N/A     Occupational History    Not on file       Social History Main Topics    Smoking status: Current Every Day Smoker     Packs/day: 1 00     Types: Cigarettes    Smokeless tobacco: Never Used      Comment: [employer] quiting smoking handouts    Alcohol use No    Drug use: No    Sexual activity: Not Currently     Other Topics Concern    Not on file     Social History Narrative    No narrative on file       No Known Allergies      Current Outpatient Prescriptions:     ALPRAZolam (XANAX) 1 mg tablet, Take 1 mg by mouth 2 (two) times a day as needed for anxiety  , Disp: , Rfl:     amitriptyline (ELAVIL) 25 mg tablet, TAKE 1 TABLET BY MOUTH AT BEDTIME, Disp: 30 tablet, Rfl: 2    amLODIPine (NORVASC) 5 mg tablet, Take 5 mg by mouth daily, Disp: , Rfl:     citalopram (CeleXA) 20 mg tablet, Take 20 mg by mouth daily, Disp: , Rfl: 2    CREON 59925 units capsule, TAKE TWO CAPSULES BY MOUTH THREE TIMES A DAY WITH MEALS, Disp: 540 capsule, Rfl: 2    dicyclomine (BENTYL) 10 mg capsule, Take 1 capsule (10 mg total) by mouth 3 (three) times a day before meals, Disp: 90 capsule, Rfl: 1    LINZESS 72 MCG CAPS, TAKE 1 CAPSULE BY MOUTH DAILY, Disp: 30 capsule, Rfl: 2    lisinopril (ZESTRIL) 40 mg tablet, Take 40 mg by mouth daily, Disp: , Rfl:     metoprolol succinate (TOPROL-XL) 25 mg 24 hr tablet, Take 25 mg by mouth daily, Disp: , Rfl: 2    Multiple Vitamins-Minerals (CENTRUM SILVER PO), Take 1 tablet by mouth daily  , Disp: , Rfl:     omeprazole (PriLOSEC) 40 MG capsule, Take 40 mg by mouth daily, Disp: , Rfl:     polyethylene glycol (MIRALAX) 17 g packet, Take 17 g by mouth daily, Disp: , Rfl:     pravastatin (PRAVACHOL) 40 mg tablet, Take 40 mg by mouth daily, Disp: , Rfl:     sodium chloride 1 g tablet, Take 1 g by mouth 3 (three) times a day, Disp: , Rfl:     acetaminophen-codeine (TYLENOL #2) 300-15 MG per tablet, Take 1 tablet by mouth every 4 (four) hours as needed for moderate pain, Disp: , Rfl:     metoprolol tartrate (LOPRESSOR) 50 mg tablet, Take 50 mg by mouth daily  , Disp: , Rfl:     pancrelipase, Lip-Prot-Amyl, (CREON) 12,000 units capsule, Take 2 capsules (24,000 Units total) by mouth 3 (three) times a day with meals for 30 days, Disp: 180 capsule, Rfl: 0    Wheat Dextrin (BENEFIBER PO), Take by mouth, Disp: , Rfl:

## 2018-12-28 ENCOUNTER — TELEPHONE (OUTPATIENT)
Dept: VASCULAR SURGERY | Facility: CLINIC | Age: 68
End: 2018-12-28

## 2018-12-28 NOTE — TELEPHONE ENCOUNTER
Spoke to patient to schedule surgery on 1-4-19 at [facility]/ IR  Patient was told to have blood work done ASAP for the surgery No medications to be held    Patient confirmed and understood instructions LLF

## 2018-12-29 ENCOUNTER — APPOINTMENT (OUTPATIENT)
Dept: LAB | Facility: HOSPITAL | Age: 68
End: 2018-12-29
Attending: SURGERY
Payer: MEDICARE

## 2018-12-29 DIAGNOSIS — I71.4 AAA (ABDOMINAL AORTIC ANEURYSM) WITHOUT RUPTURE (HCC): ICD-10-CM

## 2018-12-29 LAB
ANION GAP SERPL CALCULATED.3IONS-SCNC: 8 MMOL/L (ref 4–13)
BUN SERPL-MCNC: 12 MG/DL (ref 5–25)
CALCIUM SERPL-MCNC: 8.7 MG/DL (ref 8.3–10.1)
CHLORIDE SERPL-SCNC: 97 MMOL/L (ref 100–108)
CO2 SERPL-SCNC: 29 MMOL/L (ref 21–32)
CREAT SERPL-MCNC: 1.06 MG/DL (ref 0.6–1.3)
ERYTHROCYTE [DISTWIDTH] IN BLOOD BY AUTOMATED COUNT: 16.6 % (ref 11.6–15.1)
GFR SERPL CREATININE-BSD FRML MDRD: 71 ML/MIN/1.73SQ M
GLUCOSE SERPL-MCNC: 125 MG/DL (ref 65–140)
HCT VFR BLD AUTO: 36.8 % (ref 36.5–49.3)
HGB BLD-MCNC: 11.9 G/DL (ref 12–17)
MCH RBC QN AUTO: 28.6 PG (ref 26.8–34.3)
MCHC RBC AUTO-ENTMCNC: 32.3 G/DL (ref 31.4–37.4)
MCV RBC AUTO: 89 FL (ref 82–98)
PLATELET # BLD AUTO: 259 THOUSANDS/UL (ref 149–390)
PMV BLD AUTO: 9.8 FL (ref 8.9–12.7)
POTASSIUM SERPL-SCNC: 4.2 MMOL/L (ref 3.5–5.3)
RBC # BLD AUTO: 4.16 MILLION/UL (ref 3.88–5.62)
SODIUM SERPL-SCNC: 134 MMOL/L (ref 136–145)
WBC # BLD AUTO: 7.52 THOUSAND/UL (ref 4.31–10.16)

## 2018-12-29 PROCEDURE — 36415 COLL VENOUS BLD VENIPUNCTURE: CPT

## 2018-12-29 PROCEDURE — 85027 COMPLETE CBC AUTOMATED: CPT

## 2018-12-29 PROCEDURE — 80048 BASIC METABOLIC PNL TOTAL CA: CPT

## 2018-12-31 ENCOUNTER — TELEPHONE (OUTPATIENT)
Dept: RADIOLOGY | Facility: HOSPITAL | Age: 68
End: 2018-12-31

## 2018-12-31 RX ORDER — SODIUM CHLORIDE 9 MG/ML
75 INJECTION, SOLUTION INTRAVENOUS CONTINUOUS
Status: CANCELLED | OUTPATIENT
Start: 2019-01-04

## 2019-01-04 ENCOUNTER — HOSPITAL ENCOUNTER (OUTPATIENT)
Dept: RADIOLOGY | Facility: HOSPITAL | Age: 69
Discharge: HOME | End: 2019-01-04
Attending: SURGERY | Admitting: RADIOLOGY
Payer: MEDICARE

## 2019-01-04 VITALS
TEMPERATURE: 97.5 F | DIASTOLIC BLOOD PRESSURE: 72 MMHG | BODY MASS INDEX: 22.45 KG/M2 | HEIGHT: 62 IN | WEIGHT: 122 LBS | HEART RATE: 70 BPM | OXYGEN SATURATION: 94 % | RESPIRATION RATE: 17 BRPM | SYSTOLIC BLOOD PRESSURE: 127 MMHG

## 2019-01-04 DIAGNOSIS — I71.4 AAA (ABDOMINAL AORTIC ANEURYSM) WITHOUT RUPTURE (HCC): ICD-10-CM

## 2019-01-04 LAB
INR PPP: 0.95 (ref 0.86–1.17)
PROTHROMBIN TIME: 12.8 SECONDS (ref 11.8–14.2)

## 2019-01-04 PROCEDURE — C1894 INTRO/SHEATH, NON-LASER: HCPCS

## 2019-01-04 PROCEDURE — 99153 MOD SED SAME PHYS/QHP EA: CPT

## 2019-01-04 PROCEDURE — 37221 HB ILIAC REVASC W/STENT: CPT

## 2019-01-04 PROCEDURE — C1769 GUIDE WIRE: HCPCS

## 2019-01-04 PROCEDURE — 1123F ACP DISCUSS/DSCN MKR DOCD: CPT | Performed by: RADIOLOGY

## 2019-01-04 PROCEDURE — C1760 CLOSURE DEV, VASC: HCPCS

## 2019-01-04 PROCEDURE — 99152 MOD SED SAME PHYS/QHP 5/>YRS: CPT | Performed by: RADIOLOGY

## 2019-01-04 PROCEDURE — 85610 PROTHROMBIN TIME: CPT | Performed by: RADIOLOGY

## 2019-01-04 PROCEDURE — 99152 MOD SED SAME PHYS/QHP 5/>YRS: CPT

## 2019-01-04 PROCEDURE — C1876 STENT, NON-COA/NON-COV W/DEL: HCPCS

## 2019-01-04 PROCEDURE — 37221 PR REVASCULARIZE ILIAC ARTERY,ANGIOPLASTY/STENT, INITIAL VESSEL: CPT | Performed by: RADIOLOGY

## 2019-01-04 RX ORDER — FENTANYL CITRATE 50 UG/ML
INJECTION, SOLUTION INTRAMUSCULAR; INTRAVENOUS CODE/TRAUMA/SEDATION MEDICATION
Status: COMPLETED | OUTPATIENT
Start: 2019-01-04 | End: 2019-01-04

## 2019-01-04 RX ORDER — SODIUM CHLORIDE 9 MG/ML
75 INJECTION, SOLUTION INTRAVENOUS CONTINUOUS
Status: DISCONTINUED | OUTPATIENT
Start: 2019-01-04 | End: 2019-01-05 | Stop reason: HOSPADM

## 2019-01-04 RX ORDER — MIDAZOLAM HYDROCHLORIDE 1 MG/ML
INJECTION INTRAMUSCULAR; INTRAVENOUS CODE/TRAUMA/SEDATION MEDICATION
Status: COMPLETED | OUTPATIENT
Start: 2019-01-04 | End: 2019-01-04

## 2019-01-04 RX ADMIN — FENTANYL CITRATE 50 MCG: 50 INJECTION INTRAMUSCULAR; INTRAVENOUS at 09:10

## 2019-01-04 RX ADMIN — IODIXANOL 40 ML: 320 INJECTION, SOLUTION INTRAVASCULAR at 09:39

## 2019-01-04 RX ADMIN — FENTANYL CITRATE 25 MCG: 50 INJECTION INTRAMUSCULAR; INTRAVENOUS at 09:08

## 2019-01-04 RX ADMIN — MIDAZOLAM 1 MG: 1 INJECTION INTRAMUSCULAR; INTRAVENOUS at 08:46

## 2019-01-04 RX ADMIN — MIDAZOLAM 1 MG: 1 INJECTION INTRAMUSCULAR; INTRAVENOUS at 08:37

## 2019-01-04 RX ADMIN — FENTANYL CITRATE 25 MCG: 50 INJECTION INTRAMUSCULAR; INTRAVENOUS at 09:03

## 2019-01-04 RX ADMIN — FENTANYL CITRATE 25 MCG: 50 INJECTION INTRAMUSCULAR; INTRAVENOUS at 08:48

## 2019-01-04 RX ADMIN — FENTANYL CITRATE 25 MCG: 50 INJECTION INTRAMUSCULAR; INTRAVENOUS at 08:46

## 2019-01-04 RX ADMIN — FENTANYL CITRATE 25 MCG: 50 INJECTION INTRAMUSCULAR; INTRAVENOUS at 08:53

## 2019-01-04 RX ADMIN — SODIUM CHLORIDE 75 ML/HR: 0.9 INJECTION, SOLUTION INTRAVENOUS at 07:33

## 2019-01-04 RX ADMIN — MIDAZOLAM 1 MG: 1 INJECTION INTRAMUSCULAR; INTRAVENOUS at 09:03

## 2019-01-04 RX ADMIN — FENTANYL CITRATE 25 MCG: 50 INJECTION INTRAMUSCULAR; INTRAVENOUS at 08:37

## 2019-01-04 NOTE — INTERVAL H&P NOTE
Update: (This section must be completed if the H&P was completed greater than 24 hrs to procedure or admission)    H&P reviewed  After examining the patient, I find no changed to the H&P since it had been written  Patient re-evaluated   Accept as history and physical     Akilah Fernández MD/January 4, 2019/8:40 AM

## 2019-01-04 NOTE — DISCHARGE INSTRUCTIONS
ARTERIOGRAM    WHAT YOU SHOULD KNOW:   An angiogram is a procedure to look at arteries in your body  Arteries are the blood vessels that carry blood from your heart to your body  AFTER YOU LEAVE:     Self-care:   · Limit activity: Rest for the remainder of the day of your procedure  Have some one with you until the next morning  Keep your arm or leg straight as much as possible  Rest as much as possible, sitting lying or reclining  Walk only to go to the bathroom, to bed or to eat  If the angiogram catheter was put in your leg, use the stairs as little as possible  No driving  · Keep your wound clean and dry  You may shower 24 hours after your procedure  The bandage you have on should fall off in 2-3 days  If there is any drainage from the puncture site, you should put on a clean bandage  · Watch for bleeding and bruising: It is normal to have a bruise and soreness where the angiogram catheter went in  · Diet:   · You may resume your regular diet, Sips of flat soda will help with mild nausea  · Drink more liquids than usual for the next 24 hours      · IMMEDIATELY Contact Interventional Radiology at [phone] Candice PATIENTS: Contact Interventional Radiology at [phone]) Morales Nogueira PATIENTS: Contact Interventional Radiology at [phone]) if any of the following occur:  · If your bruise gets larger or if you notice any active bleeding  APPLY DIRECT PRESSURE TO THE BLEEDING SITE  · If you notice increased swelling or have increased pain at the puncture site   · If you have any numbness or pain in the extremity of the puncture site   · If that extremity seems cold or pale      · You have fever greater than 101  · Persistent nausea or vomitting    Follow up with your primary healthcare provider  as directed: Write down your questions so you remember to ask them during your visits

## 2019-01-04 NOTE — PROGRESS NOTES
D/C instructions reviewed w/ pt & daughter, verbalized understanding  Bedrest maintained  L groin tegaderm/gauze site remains CD&I, no oozing, bruising or tenderness noted  +2 pedal & post tibial pulses notes post procedure  Reminded of limited activity for 48hrs   VSS

## 2019-01-04 NOTE — PROGRESS NOTES
Medications not reconciled by IR , pt told resume meds unless told otherwise by Dr or call to confirm

## 2019-01-04 NOTE — H&P (VIEW-ONLY)
Assessment/Plan:    AAA (abdominal aortic aneurysm) without rupture (HCC)  History of endovascular aneurysm repair in February this year, surveillance study showing evidence of proximal external iliac artery critical stenosis  Plan:  Endovascular stenting external iliac artery stenosis on the left for the purpose of clear his claudication symptoms as well as graft patency       Diagnoses and all orders for this visit:    AAA (abdominal aortic aneurysm) without rupture ([address] )    Other orders  -     metoprolol succinate (TOPROL-XL) 25 mg 24 hr tablet; Take 25 mg by mouth daily        Subjective:      Patient ID: Reina Saenz is a 70 y o  male  HPI mild claudication left lower extremity, surveillance study and CT angiogram shows proximal external iliac artery stenosis    The following portions of the patient's history were reviewed and updated as appropriate: allergies, current medications, past family history, past medical history, past social history, past surgical history and problem list     Review of Systems  mild claudication left lower extremity all other systems negative    Objective:      /80 (BP Location: Left arm, Patient Position: Sitting, Cuff Size: Standard)   Pulse 76   Temp (!) 95 7 °F (35 4 °C) (Tympanic)   Ht 5' 2" (1 575 m)   Wt 55 3 kg (122 lb)   BMI 22 31 kg/m²          Physical Exam      Diminished pulse left femoral artery      Vitals:    12/26/18 1424 12/26/18 1433   BP: 128/82 134/80   BP Location: Right arm Left arm   Patient Position: Sitting Sitting   Cuff Size: Standard Standard   Pulse: 76    Temp: (!) 95 7 °F (35 4 °C)    TempSrc: Tympanic    Weight: 55 3 kg (122 lb)    Height: 5' 2" (1 575 m)        Patient Active Problem List   Diagnosis    AAA (abdominal aortic aneurysm) without rupture (HCC)    Epigastric pain    GERD (gastroesophageal reflux disease)    Diarrhea    Irritable bowel syndrome with both constipation and diarrhea    Other chronic [address]itis ([facility] )    Smoking       Past Surgical History:   Procedure Laterality Date    COLONOSCOPY      [place] ESOPHAGOGASTRODUODENOSCOPY TRANSORAL DIAGNOSTIC N/A 6/22/2018    Procedure: EGD AND COLONOSCOPY;  Surgeon: Garo Sandra MD;  [facility]; Service: Gastroenterology    [place] [address] R[address] DPLMNT AORTO-AORTIC NDGFT N/A 2/9/2018    Procedure: REPAIR ANEURYSM ENDOVASCULAR ABDOMINAL AORTIC  (EVAR); Surgeon: Carey Jimenez MD;  [facility];  Service: Vascular    UPPER GASTROINTESTINAL ENDOSCOPY         Family History   Problem Relation Age of Onset    Heart disease Father     Heart attack Father     Diabetes Maternal Grandmother     Diabetes Maternal Grandfather     Diabetes Paternal Grandmother        Social History     Social History    Marital status:      Spouse name: N/A    Number of children: N/A    Years of education: N/A     Occupational History    Not on file       Social History Main Topics    Smoking status: Current Every Day Smoker     Packs/day: 1 00     Types: Cigarettes    Smokeless tobacco: Never Used      Comment: [employer] quiting smoking handouts    Alcohol use No    Drug use: No    Sexual activity: Not Currently     Other Topics Concern    Not on file     Social History Narrative    No narrative on file       No Known Allergies      Current Outpatient Prescriptions:     ALPRAZolam (XANAX) 1 mg tablet, Take 1 mg by mouth 2 (two) times a day as needed for anxiety  , Disp: , Rfl:     amitriptyline (ELAVIL) 25 mg tablet, TAKE 1 TABLET BY MOUTH AT BEDTIME, Disp: 30 tablet, Rfl: 2    amLODIPine (NORVASC) 5 mg tablet, Take 5 mg by mouth daily, Disp: , Rfl:     citalopram (CeleXA) 20 mg tablet, Take 20 mg by mouth daily, Disp: , Rfl: 2    CREON 13201 units capsule, TAKE TWO CAPSULES BY MOUTH THREE TIMES A DAY WITH MEALS, Disp: 540 capsule, Rfl: 2    dicyclomine (BENTYL) 10 mg capsule, Take 1 capsule (10 mg total) by mouth 3 (three) times a day before meals, Disp: 90 capsule, Rfl: 1    LINZESS 72 MCG CAPS, TAKE 1 CAPSULE BY MOUTH DAILY, Disp: 30 capsule, Rfl: 2    lisinopril (ZESTRIL) 40 mg tablet, Take 40 mg by mouth daily, Disp: , Rfl:     metoprolol succinate (TOPROL-XL) 25 mg 24 hr tablet, Take 25 mg by mouth daily, Disp: , Rfl: 2    Multiple Vitamins-Minerals (CENTRUM SILVER PO), Take 1 tablet by mouth daily  , Disp: , Rfl:     omeprazole (PriLOSEC) 40 MG capsule, Take 40 mg by mouth daily, Disp: , Rfl:     polyethylene glycol (MIRALAX) 17 g packet, Take 17 g by mouth daily, Disp: , Rfl:     pravastatin (PRAVACHOL) 40 mg tablet, Take 40 mg by mouth daily, Disp: , Rfl:     sodium chloride 1 g tablet, Take 1 g by mouth 3 (three) times a day, Disp: , Rfl:     acetaminophen-codeine (TYLENOL #2) 300-15 MG per tablet, Take 1 tablet by mouth every 4 (four) hours as needed for moderate pain, Disp: , Rfl:     metoprolol tartrate (LOPRESSOR) 50 mg tablet, Take 50 mg by mouth daily  , Disp: , Rfl:     pancrelipase, Lip-Prot-Amyl, (CREON) 12,000 units capsule, Take 2 capsules (24,000 Units total) by mouth 3 (three) times a day with meals for 30 days, Disp: 180 capsule, Rfl: 0    Wheat Dextrin (BENEFIBER PO), Take by mouth, Disp: , Rfl:

## 2019-01-22 DIAGNOSIS — K58.2 IRRITABLE BOWEL SYNDROME WITH BOTH CONSTIPATION AND DIARRHEA: ICD-10-CM

## 2019-01-23 ENCOUNTER — OFFICE VISIT (OUTPATIENT)
Dept: VASCULAR SURGERY | Facility: CLINIC | Age: 69
End: 2019-01-23
Payer: MEDICARE

## 2019-01-23 VITALS
DIASTOLIC BLOOD PRESSURE: 70 MMHG | SYSTOLIC BLOOD PRESSURE: 124 MMHG | BODY MASS INDEX: 23 KG/M2 | WEIGHT: 125 LBS | HEIGHT: 62 IN | HEART RATE: 80 BPM | TEMPERATURE: 97.8 F

## 2019-01-23 DIAGNOSIS — I71.4 AAA (ABDOMINAL AORTIC ANEURYSM) WITHOUT RUPTURE (HCC): Primary | ICD-10-CM

## 2019-01-23 PROCEDURE — 99213 OFFICE O/P EST LOW 20 MIN: CPT | Performed by: SURGERY

## 2019-01-23 NOTE — PROGRESS NOTES
Assessment/Plan:    AAA (abdominal aortic aneurysm) without rupture (HCC)  Underwent successful treatment of left external iliac artery stenosis below the endovascular aneurysm repair  He has had cessation of claudication symptoms and has done extremely well post procedure  Plan:  Follow-up aortoiliac ultrasound for endovascular aneurysm repair monitoring as well as left external iliac artery stent patency  Diagnoses and all orders for this visit:    AAA (abdominal aortic aneurysm) without rupture (HCC)        Subjective:      Patient ID: Annita Whitt is a 70 y o  male  HPI doing well status post left external iliac artery stent status post endovascular aneurysm repair  The following portions of the patient's history were reviewed and updated as appropriate: allergies, current medications, past family history, past medical history, past social history, past surgical history and problem list     Review of Systems    all systems negative    Objective:      /70 (BP Location: Right arm, Patient Position: Sitting)   Pulse 80   Temp 97 8 °F (36 6 °C) (Tympanic)   Ht 5' 2" (1 575 m)   Wt 56 7 kg (125 lb)   BMI 22 86 kg/m²          Physical Exam      Abdominal exam benign, easily palpable femoral popliteal and posterior tibial pulses bilaterally      Vitals:    01/23/19 1446   BP: 124/70   BP Location: Right arm   Patient Position: Sitting   Pulse: 80   Temp: 97 8 °F (36 6 °C)   TempSrc: Tympanic   Weight: 56 7 kg (125 lb)   Height: 5' 2" (1 575 m)       Patient Active Problem List   Diagnosis    AAA (abdominal aortic aneurysm) without rupture (HCC)    Epigastric pain    GERD (gastroesophageal reflux disease)    Diarrhea    Irritable bowel syndrome with both constipation and diarrhea    Other chronic [facility] ([facility] )    Smoking       Past Surgical History:   Procedure Laterality Date    COLONOSCOPY      IR LOWER EXTREMITY / INTERVENTION  1/4/2019    [place] ESOPHAGOGASTRODUODENOSCOPY TRANSORAL DIAGNOSTIC N/A 6/22/2018    Procedure: EGD AND COLONOSCOPY;  Surgeon: Dorinda Santiago MD;  [facility]; Service: Gastroenterology    [place] [address] R[address] DPLMNT AORTO-AORTIC NDGFT N/A 2/9/2018    Procedure: REPAIR ANEURYSM ENDOVASCULAR ABDOMINAL AORTIC  (EVAR); Surgeon: Jessica Vargas MD;  [facility];  Service: Vascular    UPPER GASTROINTESTINAL ENDOSCOPY         Family History   Problem Relation Age of Onset    Heart disease Father     Heart attack Father     Diabetes Maternal Grandmother     Diabetes Maternal Grandfather     Diabetes Paternal Grandmother        Social History     Social History    Marital status:      Spouse name: N/A    Number of children: N/A    Years of education: N/A     Occupational History    Not on file       Social History Main Topics    Smoking status: Current Every Day Smoker     Packs/day: 1 00     Types: Cigarettes    Smokeless tobacco: Never Used      Comment: Virgil quiting smoking handouts    Alcohol use No    Drug use: No    Sexual activity: Not Currently     Other Topics Concern    Not on file     Social History Narrative    No narrative on file       No Known Allergies      Current Outpatient Prescriptions:     ALPRAZolam (XANAX) 1 mg tablet, Take 1 mg by mouth 2 (two) times a day as needed for anxiety  , Disp: , Rfl:     amitriptyline (ELAVIL) 25 mg tablet, TAKE 1 TABLET BY MOUTH AT BEDTIME, Disp: 30 tablet, Rfl: 2    amLODIPine (NORVASC) 5 mg tablet, Take 5 mg by mouth daily, Disp: , Rfl:     citalopram (CeleXA) 20 mg tablet, Take 20 mg by mouth daily, Disp: , Rfl: 2    CREON 66921 units capsule, TAKE TWO CAPSULES BY MOUTH THREE TIMES A DAY WITH MEALS, Disp: 540 capsule, Rfl: 2    dicyclomine (BENTYL) 10 mg capsule, Take 1 capsule (10 mg total) by mouth 3 (three) times a day before meals (Patient taking differently: Take 10 mg by mouth 3 (three) times a day before meals Only takes once a day ), Disp: 90 capsule, Rfl: 1    lisinopril (ZESTRIL) 40 mg tablet, Take 40 mg by mouth daily, Disp: , Rfl:     metoprolol succinate (TOPROL-XL) 25 mg 24 hr tablet, Take 25 mg by mouth daily, Disp: , Rfl: 2    metoprolol tartrate (LOPRESSOR) 50 mg tablet, Take 50 mg by mouth daily  , Disp: , Rfl:     Multiple Vitamins-Minerals (CENTRUM SILVER PO), Take 1 tablet by mouth daily  , Disp: , Rfl:     omeprazole (PriLOSEC) 40 MG capsule, Take 40 mg by mouth daily, Disp: , Rfl:     pravastatin (PRAVACHOL) 40 mg tablet, Take 40 mg by mouth daily, Disp: , Rfl:     sodium chloride 1 g tablet, Take 1 g by mouth 3 (three) times a day, Disp: , Rfl:     Wheat Dextrin (BENEFIBER PO), Take by mouth daily as needed  , Disp: , Rfl:     Linaclotide (LINZESS) 72 MCG CAPS, Take 1 capsule by mouth daily (Patient not taking: Reported on 1/23/2019 ), Disp: 30 capsule, Rfl: 5

## 2019-01-23 NOTE — PATIENT INSTRUCTIONS
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

## 2019-02-17 DIAGNOSIS — R10.30 LOWER ABDOMINAL PAIN: ICD-10-CM

## 2019-02-18 RX ORDER — DICYCLOMINE HYDROCHLORIDE 10 MG/1
10 CAPSULE ORAL
Qty: 90 CAPSULE | Refills: 1 | Status: SHIPPED | OUTPATIENT
Start: 2019-02-18 | End: 2019-05-28 | Stop reason: SDUPTHER

## 2019-03-12 ENCOUNTER — OFFICE VISIT (OUTPATIENT)
Dept: GASTROENTEROLOGY | Facility: CLINIC | Age: 69
End: 2019-03-12
Payer: MEDICARE

## 2019-03-12 VITALS
HEIGHT: 62 IN | WEIGHT: 125.2 LBS | BODY MASS INDEX: 23.04 KG/M2 | SYSTOLIC BLOOD PRESSURE: 136 MMHG | TEMPERATURE: 95.7 F | HEART RATE: 71 BPM | DIASTOLIC BLOOD PRESSURE: 83 MMHG

## 2019-03-12 DIAGNOSIS — K21.9 GERD WITHOUT ESOPHAGITIS: Primary | ICD-10-CM

## 2019-03-12 DIAGNOSIS — K58.2 IRRITABLE BOWEL SYNDROME WITH BOTH CONSTIPATION AND DIARRHEA: ICD-10-CM

## 2019-03-12 DIAGNOSIS — F17.200 SMOKING: ICD-10-CM

## 2019-03-12 DIAGNOSIS — K86.1 OTHER CHRONIC PANCREATITIS (HCC): ICD-10-CM

## 2019-03-12 PROCEDURE — 99214 OFFICE O/P EST MOD 30 MIN: CPT | Performed by: PHYSICIAN ASSISTANT

## 2019-03-12 RX ORDER — RANITIDINE 300 MG/1
300 TABLET ORAL
Qty: 90 TABLET | Refills: 2 | Status: SHIPPED | OUTPATIENT
Start: 2019-03-12 | End: 2019-04-15 | Stop reason: DRUGHIGH

## 2019-03-12 NOTE — PROGRESS NOTES
[facility] - Outpatient Follow-up Note  Sharlene Valdivia 78 y o  male MRN: [id number]  Encounter: [id number]          ASSESSMENT AND PLAN:      1  GERD without esophagitis  He is asymptomatic on PPI therapy  We discussed side effects associated long-term PPI therapy  Recommend stopping omeprazole and starting Zantac as needed  Gave patient educational handout on GERD  Discussed cutting back on coffee consumption and quitting smoking     - ranitidine (ZANTAC) 300 MG tablet; Take 1 tablet (300 mg total) by mouth daily at bedtime  Dispense: 90 tablet; Refill: 2    2  Irritable bowel syndrome with both constipation and diarrhea  Well controlled with Linzess 72 mcg daily  Continue Bentyl 3 times daily and Elavil daily at bedtime for abdominal pain  3  Other chronic pancreatitis (HCC)  Continue Creon 2 capsules with each meal     4  Smoking  Again, I encouraged smoking cessation  Follow-up in 6 months  ______________________________________________________________________    SUBJECTIVE:  60-year-old male with history of chronic pancreatitis, tobacco abuse, IBS, GERD, and anxiety presenting for office follow-up  He has been doing well overall  He denies heartburn and indigestion  He still takes omeprazole 40 mg daily before breakfast  He denies nausea and vomiting  He does occasionally have epigastric discomfort after eating bread  He takes Creon 2 capsules with each meal  Recent CTA abdomen and pelvis w and wo contrast shows no pancreatic abnormalities  He takes Bentyl 3 times daily and amitriptyline 25 mg daily at bedtime for abdominal pain  These medications seem to control his abdominal pain  He denies diarrhea and constipation  He has [place] type 3 or 4 stools daily  He does take low-dose Linzess daily  He denies any rectal bleeding or abnormal weight loss  Last EGD and colonoscopy was June 2018   EGD showed gastritis and possible Rhodes's esophagus although biopsies were negative  Colonoscopy revealed diverticulosis and multiple small hyperplastic appearing colon polyps in the sigmoid colon and rectum  Repeat colonoscopy recommended in 5 years  REVIEW OF SYSTEMS IS OTHERWISE NEGATIVE  Historical Information   Past Medical History:   Diagnosis Date    Anxiety     GERD (gastroesophageal reflux disease)     H/O abdominal aortic aneurysm     Hyperlipidemia     Hypertension     Vertigo      Past Surgical History:   Procedure Laterality Date    COLONOSCOPY      IR LOWER EXTREMITY / INTERVENTION  1/4/2019    [place] ESOPHAGOGASTRODUODENOSCOPY TRANSORAL DIAGNOSTIC N/A 6/22/2018    Procedure: EGD AND COLONOSCOPY;  Surgeon: Garo Sandra MD;  [facility]; Service: Gastroenterology    [place] [address] DPLMNT AORTO-AORTIC NDGFT N/A 2/9/2018    Procedure: REPAIR ANEURYSM ENDOVASCULAR ABDOMINAL AORTIC  (EVAR);   Surgeon: Lito Michael MD;  [facility];  Service: Vascular    UPPER GASTROINTESTINAL ENDOSCOPY       Social History   Social History     Substance and Sexual Activity   Alcohol Use No     Social History     Substance and Sexual Activity   Drug Use No     Social History     Tobacco Use   Smoking Status Current Every Day Smoker    Packs/day: 1 00    Types: Cigarettes   Smokeless Tobacco Never Used   Tobacco Comment    Virgil quiting smoking handouts     Family History   Problem Relation Age of Onset    Heart disease Father     Heart attack Father     Diabetes Maternal Grandmother     Diabetes Maternal Grandfather     Diabetes Paternal Grandmother        Meds/Allergies       Current Outpatient Medications:     ALPRAZolam (XANAX) 1 mg tablet    amitriptyline (ELAVIL) 25 mg tablet    amLODIPine (NORVASC) 5 mg tablet    citalopram (CeleXA) 20 mg tablet    CREON 46156 units capsule    dicyclomine (BENTYL) 10 mg capsule    Linaclotide (LINZESS) 72 MCG CAPS    lisinopril (ZESTRIL) 40 mg tablet    metoprolol succinate (TOPROL-XL) 25 mg 24 hr tablet   Multiple Vitamins-Minerals (CENTRUM SILVER PO)    omeprazole (PriLOSEC) 40 MG capsule    pravastatin (PRAVACHOL) 40 mg tablet    sodium chloride 1 g tablet    Wheat Dextrin (BENEFIBER PO)    metoprolol tartrate (LOPRESSOR) 50 mg tablet    ranitidine (ZANTAC) 300 MG tablet    No Known Allergies        Objective     Blood pressure 136/83, pulse 71, temperature (!) 95 7 °F (35 4 °C), temperature source Tympanic, height 5' 2" (1 575 m), weight 56 8 kg (125 lb 3 2 oz)  Body mass index is 22 9 kg/m²  PHYSICAL EXAM:      General Appearance:   Alert, pleasant male, cooperative, no distress   HEENT:   Normocephalic, atraumatic, anicteric      Neck:  Supple, symmetrical, trachea midline   Lungs:   Decreased breath sounds bilaterally   Heart[de-identified]   Regular rate and rhythm   Abdomen:   Soft, non-tender, non-distended; normal bowel sounds; no masses, no organomegaly    Genitalia:   Deferred    Rectal:   Deferred    Extremities:  No cyanosis, clubbing or edema    Pulses:  2+ and symmetric    Skin:  No jaundice, rashes, or lesions    Lymph nodes:  No palpable cervical lymphadenopathy        Lab Results:   No visits with results within 1 Day(s) from this visit  Latest known visit with results is:   Hospital Outpatient Visit on 01/04/2019   Component Date Value    Protime 01/04/2019 12 8     INR 01/04/2019 0 95          Radiology Results:   No results found

## 2019-03-12 NOTE — LETTER
March 12, 2019     Ozzie Allen MD  [address] [address]    Patient: Gerri Oconnor   YOB: 1950   Date of Visit: 3/12/2019       Dear Dr Jeanne Perez: Thank you for referring Gerri Oconnor to me for evaluation  Below are my notes for this consultation  If you have questions, please do not hesitate to call me  I look forward to following your patient along with you  Sincerely,        Willie Gandhi PA-C        CC: No Recipients  Willie Gandhi PA-C  3/12/2019 11:01 AM  Sign at close encounter  [facility] - Outpatient Follow-up Note  Gerri Oconnor 78 y o  male MRN: [id number]  Encounter: [id number]          ASSESSMENT AND PLAN:      1  GERD without esophagitis  He is asymptomatic on PPI therapy  We discussed side effects associated long-term PPI therapy  Recommend stopping omeprazole and starting Zantac as needed  Gave patient educational handout on GERD  Discussed cutting back on coffee consumption and quitting smoking     - ranitidine (ZANTAC) 300 MG tablet; Take 1 tablet (300 mg total) by mouth daily at bedtime  Dispense: 90 tablet; Refill: 2    2  Irritable bowel syndrome with both constipation and diarrhea  Well controlled with Linzess 72 mcg daily  Continue Bentyl 3 times daily and Elavil daily at bedtime for abdominal pain  3  Other chronic pancreatitis (HCC)  Continue Creon 2 capsules with each meal     4  Smoking  Again, I encouraged smoking cessation  Follow-up in 6 months  ______________________________________________________________________    SUBJECTIVE:  26-year-old male with history of chronic pancreatitis, tobacco abuse, IBS, GERD, and anxiety presenting for office follow-up  He has been doing well overall  He denies heartburn and indigestion  He still takes omeprazole 40 mg daily before breakfast  He denies nausea and vomiting   He does occasionally have epigastric discomfort after eating bread  He takes Creon 2 capsules with each meal  Recent CTA abdomen and pelvis w and wo contrast shows no pancreatic abnormalities  He takes Bentyl 3 times daily and amitriptyline 25 mg daily at bedtime for abdominal pain  These medications seem to control his abdominal pain  He denies diarrhea and constipation  He has [place] type 3 or 4 stools daily  He does take low-dose Linzess daily  He denies any rectal bleeding or abnormal weight loss  Last EGD and colonoscopy was June 2018  EGD showed gastritis and possible Rhodes's esophagus although biopsies were negative  Colonoscopy revealed diverticulosis and multiple small hyperplastic appearing colon polyps in the sigmoid colon and rectum  Repeat colonoscopy recommended in 5 years  REVIEW OF SYSTEMS IS OTHERWISE NEGATIVE  Historical Information   Past Medical History:   Diagnosis Date    Anxiety     GERD (gastroesophageal reflux disease)     H/O abdominal aortic aneurysm     Hyperlipidemia     Hypertension     Vertigo      Past Surgical History:   Procedure Laterality Date    COLONOSCOPY      IR LOWER EXTREMITY / INTERVENTION  1/4/2019    [place] ESOPHAGOGASTRODUODENOSCOPY TRANSORAL DIAGNOSTIC N/A 6/22/2018    Procedure: EGD AND COLONOSCOPY;  Surgeon: Wanda Falcon MD;  [facility]; Service: Gastroenterology    [place] [address] DPLMNT AORTO-AORTIC NDGFT N/A 2/9/2018    Procedure: REPAIR ANEURYSM ENDOVASCULAR ABDOMINAL AORTIC  (EVAR);   Surgeon: Ken Ly MD;  [facility];  Service: Vascular    UPPER GASTROINTESTINAL ENDOSCOPY       Social History   Social History     Substance and Sexual Activity   Alcohol Use No     Social History     Substance and Sexual Activity   Drug Use No     Social History     Tobacco Use   Smoking Status Current Every Day Smoker    Packs/day: 1 00    Types: Cigarettes   Smokeless Tobacco Never Used   Tobacco Comment    [place] quiting smoking handouts     Family History   Problem Relation Age of Onset    Heart disease Father     Heart attack Father     Diabetes Maternal Grandmother     Diabetes Maternal Grandfather     Diabetes Paternal Grandmother        Meds/Allergies       Current Outpatient Medications:     ALPRAZolam (XANAX) 1 mg tablet    amitriptyline (ELAVIL) 25 mg tablet    amLODIPine (NORVASC) 5 mg tablet    citalopram (CeleXA) 20 mg tablet    CREON 84003 units capsule    dicyclomine (BENTYL) 10 mg capsule    Linaclotide (LINZESS) 72 MCG CAPS    lisinopril (ZESTRIL) 40 mg tablet    metoprolol succinate (TOPROL-XL) 25 mg 24 hr tablet    Multiple Vitamins-Minerals (CENTRUM SILVER PO)    omeprazole (PriLOSEC) 40 MG capsule    pravastatin (PRAVACHOL) 40 mg tablet    sodium chloride 1 g tablet    Wheat Dextrin (BENEFIBER PO)    metoprolol tartrate (LOPRESSOR) 50 mg tablet    ranitidine (ZANTAC) 300 MG tablet    No Known Allergies        Objective     Blood pressure 136/83, pulse 71, temperature (!) 95 7 °F (35 4 °C), temperature source Tympanic, height 5' 2" (1 575 m), weight 56 8 kg (125 lb 3 2 oz)  Body mass index is 22 9 kg/m²  PHYSICAL EXAM:      General Appearance:   Alert, pleasant male, cooperative, no distress   HEENT:   Normocephalic, atraumatic, anicteric      Neck:  Supple, symmetrical, trachea midline   Lungs:   Decreased breath sounds bilaterally   Heart[de-identified]   Regular rate and rhythm   Abdomen:   Soft, non-tender, non-distended; normal bowel sounds; no masses, no organomegaly    Genitalia:   Deferred    Rectal:   Deferred    Extremities:  No cyanosis, clubbing or edema    Pulses:  2+ and symmetric    Skin:  No jaundice, rashes, or lesions    Lymph nodes:  No palpable cervical lymphadenopathy        Lab Results:   No visits with results within 1 Day(s) from this visit  Latest known visit with results is:   Hospital Outpatient Visit on 01/04/2019   Component Date Value    Protime 01/04/2019 12 8     INR 01/04/2019 0 95          Radiology Results:   No results found

## 2019-03-12 NOTE — PATIENT INSTRUCTIONS
You can stop taking omeprazole (Prilosec) daily  If your heartburn returns, you may start Zantac as needed  Gastroesophageal Reflux Disease   WHAT YOU NEED TO KNOW:   Gastroesophageal reflux occurs when acid and food in the stomach back up into the esophagus  Gastroesophageal reflux disease (GERD) is reflux that occurs more than twice a week for a few weeks  It usually causes heartburn and other symptoms  GERD can cause other health problems over time if it is not treated  DISCHARGE INSTRUCTIONS:   Seek care immediately if:   · You feel full and cannot burp or vomit  · You have severe chest pain and sudden trouble breathing  · Your bowel movements are black, bloody, or tarry-looking  · Your vomit looks like coffee grounds or has blood in it  Contact your healthcare provider if:   · You vomit large amounts, or you vomit often  · You have trouble breathing after you vomit  · You have trouble swallowing, or pain with swallowing  · You are losing weight without trying  · Your symptoms get worse or do not improve with treatment  · You have questions or concerns about your condition or care  Medicines:   · Medicines  are used to decrease stomach acid  Medicine may also be used to help your lower esophageal sphincter and stomach contract (tighten) more  · Take your medicine as directed  Contact your healthcare provider if you think your medicine is not helping or if you have side effects  Tell him or her if you are allergic to any medicine  Keep a list of the medicines, vitamins, and herbs you take  Include the amounts, and when and why you take them  Bring the list or the pill bottles to follow-up visits  Carry your medicine list with you in case of an emergency  Manage GERD:   · Do not have foods or drinks that may increase heartburn  These include chocolate, peppermint, fried or fatty foods, drinks that contain caffeine, or carbonated drinks (soda)   Other foods include spicy foods, onions, tomatoes, and tomato-based foods  Do not have foods or drinks that can irritate your esophagus, such as citrus fruits, juices, and alcohol  · Do not eat large meals  When you eat a lot of food at one time, your stomach needs more acid to digest it  Eat 6 small meals each day instead of 3 large ones, and eat slowly  Do not eat meals 2 to 3 hours before bedtime  · Elevate the head of your bed  Place 6-inch blocks under the head of your bed frame  You may also use more than one pillow under your head and shoulders while you sleep  · Maintain a healthy weight  If you are overweight, weight loss may help relieve symptoms of GERD  · Do not smoke  Smoking weakens the lower esophageal sphincter and increases the risk of GERD  Ask your healthcare provider for information if you currently smoke and need help to quit  E-cigarettes or smokeless tobacco still contain nicotine  Talk to your healthcare provider before you use these products  · Do not wear clothing that is tight around your waist   Tight clothing can put pressure on your stomach and cause or worsen GERD symptoms  Follow up with your healthcare provider as directed:  Write down your questions so you remember to ask them during your visits  © 2017 [employer]  Information is for End User's use only and may not be sold, redistributed or otherwise used for commercial purposes  All illustrations and images included in CareNotes® are the copyrighted property of A D [employer] M [employer] Inc  or [employer]ig Anderson  The above information is an  only  It is not intended as medical advice for individual conditions or treatments  Talk to your doctor, nurse or pharmacist before following any medical regimen to see if it is safe and effective for you

## 2019-03-22 DIAGNOSIS — K58.2 IRRITABLE BOWEL SYNDROME WITH BOTH CONSTIPATION AND DIARRHEA: ICD-10-CM

## 2019-03-22 RX ORDER — AMITRIPTYLINE HYDROCHLORIDE 25 MG/1
TABLET, FILM COATED ORAL
Qty: 30 TABLET | Refills: 2 | Status: SHIPPED | OUTPATIENT
Start: 2019-03-22 | End: 2019-06-20 | Stop reason: SDUPTHER

## 2019-04-08 ENCOUNTER — TELEPHONE (OUTPATIENT)
Dept: GASTROENTEROLOGY | Facility: AMBULARY SURGERY CENTER | Age: 69
End: 2019-04-08

## 2019-04-15 ENCOUNTER — TELEPHONE (OUTPATIENT)
Dept: GASTROENTEROLOGY | Facility: MEDICAL CENTER | Age: 69
End: 2019-04-15

## 2019-04-15 DIAGNOSIS — K21.9 GERD WITHOUT ESOPHAGITIS: Primary | ICD-10-CM

## 2019-04-15 RX ORDER — RANITIDINE 150 MG/1
150 CAPSULE ORAL EVERY EVENING
Qty: 30 CAPSULE | Refills: 2 | Status: SHIPPED | OUTPATIENT
Start: 2019-04-15 | End: 2019-07-07 | Stop reason: SDUPTHER

## 2019-05-03 ENCOUNTER — APPOINTMENT (OUTPATIENT)
Dept: LAB | Facility: HOSPITAL | Age: 69
End: 2019-05-03
Payer: MEDICARE

## 2019-05-03 ENCOUNTER — TRANSCRIBE ORDERS (OUTPATIENT)
Dept: ADMINISTRATIVE | Facility: HOSPITAL | Age: 69
End: 2019-05-03

## 2019-05-03 DIAGNOSIS — I10 ESSENTIAL HYPERTENSION, MALIGNANT: Primary | ICD-10-CM

## 2019-05-03 DIAGNOSIS — E78.2 MIXED HYPERLIPIDEMIA: ICD-10-CM

## 2019-05-03 DIAGNOSIS — I10 ESSENTIAL HYPERTENSION, MALIGNANT: ICD-10-CM

## 2019-05-03 LAB
ALBUMIN SERPL BCP-MCNC: 3.5 G/DL (ref 3.5–5)
ALP SERPL-CCNC: 88 U/L (ref 46–116)
ALT SERPL W P-5'-P-CCNC: 19 U/L (ref 12–78)
ANION GAP SERPL CALCULATED.3IONS-SCNC: 7 MMOL/L (ref 4–13)
AST SERPL W P-5'-P-CCNC: 16 U/L (ref 5–45)
BILIRUB SERPL-MCNC: 0.3 MG/DL (ref 0.2–1)
BUN SERPL-MCNC: 9 MG/DL (ref 5–25)
CALCIUM SERPL-MCNC: 8.7 MG/DL (ref 8.3–10.1)
CHLORIDE SERPL-SCNC: 97 MMOL/L (ref 100–108)
CHOLEST SERPL-MCNC: 151 MG/DL (ref 50–200)
CO2 SERPL-SCNC: 29 MMOL/L (ref 21–32)
CREAT SERPL-MCNC: 1.04 MG/DL (ref 0.6–1.3)
ERYTHROCYTE [DISTWIDTH] IN BLOOD BY AUTOMATED COUNT: 16.8 % (ref 11.6–15.1)
GFR SERPL CREATININE-BSD FRML MDRD: 72 ML/MIN/1.73SQ M
GLUCOSE P FAST SERPL-MCNC: 93 MG/DL (ref 65–99)
HCT VFR BLD AUTO: 35.2 % (ref 36.5–49.3)
HDLC SERPL-MCNC: 52 MG/DL (ref 40–60)
HGB BLD-MCNC: 11.4 G/DL (ref 12–17)
LDLC SERPL CALC-MCNC: 68 MG/DL (ref 0–100)
MCH RBC QN AUTO: 28.3 PG (ref 26.8–34.3)
MCHC RBC AUTO-ENTMCNC: 32.4 G/DL (ref 31.4–37.4)
MCV RBC AUTO: 87 FL (ref 82–98)
NONHDLC SERPL-MCNC: 99 MG/DL
PLATELET # BLD AUTO: 250 THOUSANDS/UL (ref 149–390)
PMV BLD AUTO: 10.3 FL (ref 8.9–12.7)
POTASSIUM SERPL-SCNC: 4.3 MMOL/L (ref 3.5–5.3)
PROT SERPL-MCNC: 6.4 G/DL (ref 6.4–8.2)
RBC # BLD AUTO: 4.03 MILLION/UL (ref 3.88–5.62)
SODIUM SERPL-SCNC: 133 MMOL/L (ref 136–145)
TRIGL SERPL-MCNC: 154 MG/DL
WBC # BLD AUTO: 6.08 THOUSAND/UL (ref 4.31–10.16)

## 2019-05-03 PROCEDURE — 36415 COLL VENOUS BLD VENIPUNCTURE: CPT

## 2019-05-03 PROCEDURE — 85027 COMPLETE CBC AUTOMATED: CPT

## 2019-05-03 PROCEDURE — 80061 LIPID PANEL: CPT

## 2019-05-03 PROCEDURE — 80053 COMPREHEN METABOLIC PANEL: CPT

## 2019-05-28 ENCOUNTER — TELEPHONE (OUTPATIENT)
Dept: GASTROENTEROLOGY | Facility: AMBULARY SURGERY CENTER | Age: 69
End: 2019-05-28

## 2019-05-28 DIAGNOSIS — R10.30 LOWER ABDOMINAL PAIN: ICD-10-CM

## 2019-05-28 RX ORDER — DICYCLOMINE HYDROCHLORIDE 10 MG/1
10 CAPSULE ORAL
Qty: 90 CAPSULE | Refills: 1 | Status: SHIPPED | OUTPATIENT
Start: 2019-05-28 | End: 2019-07-26 | Stop reason: SDUPTHER

## 2019-05-28 RX ORDER — DICYCLOMINE HYDROCHLORIDE 10 MG/1
10 CAPSULE ORAL
Qty: 90 CAPSULE | Refills: 1 | Status: CANCELLED | OUTPATIENT
Start: 2019-05-28

## 2019-06-20 DIAGNOSIS — K58.2 IRRITABLE BOWEL SYNDROME WITH BOTH CONSTIPATION AND DIARRHEA: ICD-10-CM

## 2019-06-20 RX ORDER — AMITRIPTYLINE HYDROCHLORIDE 25 MG/1
TABLET, FILM COATED ORAL
Qty: 90 TABLET | Refills: 0 | Status: SHIPPED | OUTPATIENT
Start: 2019-06-20 | End: 2019-09-13 | Stop reason: SDUPTHER

## 2019-06-21 DIAGNOSIS — I71.4 AAA (ABDOMINAL AORTIC ANEURYSM) WITHOUT RUPTURE (HCC): Primary | ICD-10-CM

## 2019-07-03 ENCOUNTER — TELEPHONE (OUTPATIENT)
Dept: GASTROENTEROLOGY | Facility: AMBULARY SURGERY CENTER | Age: 69
End: 2019-07-03

## 2019-07-03 NOTE — TELEPHONE ENCOUNTER
KERWINI    Patient with history of GERD, IBS - C/D, chronic pancreatitis, anxiety    He is calling today to ask if he can discontinue the Linzess  He actually stopped it about a month ago  When he started taking the Zantac, he was getting severe diarrhea so he halved the tablet - 75mg  Once he did, he began having regular bowel movements each morning  He stopped the Linzess and he continued to have regular BMs  It is now over a month and his bowel habits are excellent  Advised he can stop the Linzess since it has already been a month  He is happy without it  He wanted to let everyone know how much he likes Dr Kruger and the PA's  He is happy with the entire office

## 2019-07-03 NOTE — TELEPHONE ENCOUNTER
tacos patient      He is taking zantac and wants to know if he still needs to take the linzess as well because he was given both    Call back # [phone]

## 2019-07-03 NOTE — TELEPHONE ENCOUNTER
Always nice to hear compliments! I had to pass it on  Also called Darien Moon to confirm it was ok to D/C the Linzess

## 2019-07-07 DIAGNOSIS — K21.9 GERD WITHOUT ESOPHAGITIS: ICD-10-CM

## 2019-07-08 RX ORDER — RANITIDINE 150 MG/1
TABLET ORAL
Qty: 90 TABLET | Refills: 2 | Status: SHIPPED | OUTPATIENT
Start: 2019-07-08

## 2019-07-25 ENCOUNTER — HOSPITAL ENCOUNTER (OUTPATIENT)
Dept: NON INVASIVE DIAGNOSTICS | Facility: CLINIC | Age: 69
Discharge: HOME | End: 2019-07-25
Payer: MEDICARE

## 2019-07-25 DIAGNOSIS — I71.4 AAA (ABDOMINAL AORTIC ANEURYSM) WITHOUT RUPTURE (HCC): ICD-10-CM

## 2019-07-25 PROCEDURE — 93978 VASCULAR STUDY: CPT

## 2019-07-25 PROCEDURE — 93978 VASCULAR STUDY: CPT | Performed by: SURGERY

## 2019-07-26 ENCOUNTER — TELEPHONE (OUTPATIENT)
Dept: GASTROENTEROLOGY | Facility: MEDICAL CENTER | Age: 69
End: 2019-07-26

## 2019-07-26 DIAGNOSIS — R10.30 LOWER ABDOMINAL PAIN: ICD-10-CM

## 2019-07-26 RX ORDER — DICYCLOMINE HYDROCHLORIDE 10 MG/1
10 CAPSULE ORAL 3 TIMES DAILY PRN
Qty: 90 CAPSULE | Refills: 0 | Status: SHIPPED | OUTPATIENT
Start: 2019-07-26 | End: 2019-08-20 | Stop reason: SDUPTHER

## 2019-07-30 ENCOUNTER — TELEPHONE (OUTPATIENT)
Dept: ADMINISTRATIVE | Facility: HOSPITAL | Age: 69
End: 2019-07-30

## 2019-07-30 NOTE — TELEPHONE ENCOUNTER
Dr Krysta Chu,  I spoke with patient to schedule, he stated he is having yulia le pain with walking that goes away with rest, he said this has been going on for a few months  He offers no other complaints  Your first avail office visit in [facility] is 9/11  Is this okay?

## 2019-07-30 NOTE — TELEPHONE ENCOUNTER
----- Message from Sena Bella sent at 7/26/2019  9:55 AM EDT -----  Call patient for OV with Kathleen Gomez

## 2019-08-19 ENCOUNTER — TELEPHONE (OUTPATIENT)
Dept: GASTROENTEROLOGY | Facility: AMBULARY SURGERY CENTER | Age: 69
End: 2019-08-19

## 2019-08-19 DIAGNOSIS — K86.89 PANCREATIC INSUFFICIENCY: ICD-10-CM

## 2019-08-19 NOTE — TELEPHONE ENCOUNTER
tacos patient      Refill Request for Medication: creon    Dose: 22687 units    Quantity: 540    Pharmacy: [facility]

## 2019-08-20 ENCOUNTER — TELEPHONE (OUTPATIENT)
Dept: GASTROENTEROLOGY | Facility: MEDICAL CENTER | Age: 69
End: 2019-08-20

## 2019-08-20 DIAGNOSIS — R10.30 LOWER ABDOMINAL PAIN: ICD-10-CM

## 2019-08-20 RX ORDER — DICYCLOMINE HYDROCHLORIDE 10 MG/1
10 CAPSULE ORAL 3 TIMES DAILY PRN
Qty: 270 CAPSULE | Refills: 2 | Status: SHIPPED | OUTPATIENT
Start: 2019-08-20 | End: 2020-02-19

## 2019-09-09 ENCOUNTER — OFFICE VISIT (OUTPATIENT)
Dept: GASTROENTEROLOGY | Facility: CLINIC | Age: 69
End: 2019-09-09
Payer: MEDICARE

## 2019-09-09 VITALS
WEIGHT: 122 LBS | SYSTOLIC BLOOD PRESSURE: 129 MMHG | HEIGHT: 62 IN | DIASTOLIC BLOOD PRESSURE: 81 MMHG | HEART RATE: 76 BPM | TEMPERATURE: 98.2 F | BODY MASS INDEX: 22.45 KG/M2

## 2019-09-09 DIAGNOSIS — K86.1 OTHER CHRONIC PANCREATITIS (HCC): ICD-10-CM

## 2019-09-09 DIAGNOSIS — K21.9 GERD WITHOUT ESOPHAGITIS: Primary | ICD-10-CM

## 2019-09-09 DIAGNOSIS — F17.200 SMOKING: ICD-10-CM

## 2019-09-09 DIAGNOSIS — K58.2 IRRITABLE BOWEL SYNDROME WITH BOTH CONSTIPATION AND DIARRHEA: ICD-10-CM

## 2019-09-09 PROCEDURE — 99214 OFFICE O/P EST MOD 30 MIN: CPT | Performed by: PHYSICIAN ASSISTANT

## 2019-09-09 RX ORDER — OMEPRAZOLE 20 MG/1
20 CAPSULE, DELAYED RELEASE ORAL DAILY
Qty: 90 CAPSULE | Refills: 2 | Status: SHIPPED | OUTPATIENT
Start: 2019-09-09 | End: 2020-05-15

## 2019-09-09 RX ORDER — POLYETHYLENE GLYCOL 3350 17 G/17G
17 POWDER, FOR SOLUTION ORAL
COMMUNITY

## 2019-09-09 RX ORDER — TAMSULOSIN HYDROCHLORIDE 0.4 MG/1
0.4 CAPSULE ORAL DAILY
Refills: 5 | COMMUNITY
Start: 2019-06-27 | End: 2020-12-28 | Stop reason: SDUPTHER

## 2019-09-09 NOTE — PROGRESS NOTES
[facility] - Outpatient Follow-up Note  Randy Troy 78 y o  male MRN: [id number]  Encounter: [id number]          ASSESSMENT AND PLAN:      1  GERD without esophagitis  His symptoms are well controlled with omeprazole 40 mg daily  We discussed side effects associated long-term PPI therapy  He did try Zantac but this caused diarrhea  Recommend weaning down to omeprazole 20 mg daily  Gave patient educational handout on GERD  Again we discussed the importance of quitting smoking     - omeprazole (PriLOSEC) 20 mg delayed release capsule; Take 1 capsule (20 mg total) by mouth daily for 90 days  Dispense: 90 capsule; Refill: 2    2  Irritable bowel syndrome with both constipation and diarrhea  Currently well controlled off Linzess  Continue amitriptyline and Bentyl daily for abdominal pain  3  Other chronic pancreatitis (HCC)  Continue 2 capsules of Creon with each meal     4  Smoking  I encouraged him to speak to Dr Qian Terrazas regarding methods to quit smoking  Follow-up in 6 months  ______________________________________________________________________    SUBJECTIVE:  65-year-old male with chronic pancreatitis, tobacco abuse, IBS, GERD, and anxiety presenting for 6 month follow-up  He denies frequent heartburn and indigestion  He still takes omeprazole 40 mg daily before breakfast   He tried Zantac but this caused diarrhea  He takes Maalox as needed for heartburn, which is once every few weeks  He denies nausea and vomiting  He denies abdominal pain  He takes 2 capsules of Creon with each meal  He takes Bentyl 3 times daily and amitriptyline 25 mg daily at bedtime for abdominal pain  He denies diarrhea and constipation  He actually stopped taking Linzess because he no longer needs it  He has [place] type 3 or 4 stools daily  He denies any rectal bleeding or abnormal weight loss      Last EGD and colonoscopy was June 2018   EGD showed gastritis and possible Rhodes's esophagus although biopsies were negative  Colonoscopy revealed diverticulosis and multiple small hyperplastic appearing colon polyps in the sigmoid colon and rectum  Repeat colonoscopy recommended in 5 years  REVIEW OF SYSTEMS IS OTHERWISE NEGATIVE  Historical Information   Past Medical History:   Diagnosis Date    Anxiety     GERD (gastroesophageal reflux disease)     H/O abdominal aortic aneurysm     Hyperlipidemia     Hypertension     Vertigo      Past Surgical History:   Procedure Laterality Date    COLONOSCOPY      IR LOWER EXTREMITY / INTERVENTION  1/4/2019    [place] ESOPHAGOGASTRODUODENOSCOPY TRANSORAL DIAGNOSTIC N/A 6/22/2018    Procedure: EGD AND COLONOSCOPY;  Surgeon: Kalli Parmar MD;  [facility]; Service: Gastroenterology    [place] [address] DPLMNT AORTO-AORTIC NDGFT N/A 2/9/2018    Procedure: REPAIR ANEURYSM ENDOVASCULAR ABDOMINAL AORTIC  (EVAR);   Surgeon: Renae Babinski, MD;  [facility];  Service: Vascular    UPPER GASTROINTESTINAL ENDOSCOPY       Social History   Social History     Substance and Sexual Activity   Alcohol Use No     Social History     Substance and Sexual Activity   Drug Use No     Social History     Tobacco Use   Smoking Status Current Every Day Smoker    Packs/day: 1 00    Types: Cigarettes   Smokeless Tobacco Never Used   Tobacco Comment    [place]gil quiting smoking handouts     Family History   Problem Relation Age of Onset    Heart disease Father     Heart attack Father     Diabetes Maternal Grandmother     Diabetes Maternal Grandfather     Diabetes Paternal Grandmother        Meds/Allergies       Current Outpatient Medications:     ALPRAZolam (XANAX) 1 mg tablet    amitriptyline (ELAVIL) 25 mg tablet    amLODIPine (NORVASC) 5 mg tablet    citalopram (CeleXA) 20 mg tablet    dicyclomine (BENTYL) 10 mg capsule    lisinopril (ZESTRIL) 40 mg tablet    metoprolol succinate (TOPROL-XL) 25 mg 24 hr tablet    Multiple Vitamins-Minerals (CENTRUM SILVER PO)   pancrelipase, Lip-Prot-Amyl, (CREON) 12,000 units capsule    polyethylene glycol (MIRALAX) 17 g packet    pravastatin (PRAVACHOL) 40 mg tablet    sodium chloride 1 g tablet    tamsulosin (FLOMAX) 0 4 mg    Linaclotide (LINZESS) 72 MCG CAPS    metoprolol tartrate (LOPRESSOR) 50 mg tablet    omeprazole (PriLOSEC) 20 mg delayed release capsule    ranitidine (ZANTAC) 150 mg tablet    Wheat Dextrin (BENEFIBER PO)    No Known Allergies        Objective     Blood pressure 129/81, pulse 76, temperature 98 2 °F (36 8 °C), temperature source Tympanic, height 5' 2" (1 575 m), weight 55 3 kg (122 lb)  Body mass index is 22 31 kg/m²  PHYSICAL EXAM:      General Appearance:   Alert, cooperative, no distress   HEENT:   Normocephalic, atraumatic, anicteric      Neck:  Supple, symmetrical, trachea midline   Lungs:   Clear to auscultation bilaterally; no rales, rhonchi or wheezing; respirations unlabored    Heart[de-identified]   Regular rate and rhythm; no murmur, rub, or gallop  Abdomen:   Soft, non-tender, non-distended; normal bowel sounds; no masses, no organomegaly    Genitalia:   Deferred    Rectal:   Deferred    Extremities:  No cyanosis, clubbing or edema    Pulses:  2+ and symmetric    Skin:  No jaundice, rashes, or lesions    Lymph nodes:  No palpable cervical lymphadenopathy        Lab Results:   No visits with results within 1 Day(s) from this visit     Latest known visit with results is:   Appointment on 05/03/2019   Component Date Value    Cholesterol 05/03/2019 151     Triglycerides 05/03/2019 154*    HDL, Direct 05/03/2019 52     LDL Calculated 05/03/2019 68     Non-HDL-Chol (CHOL-HDL) 05/03/2019 99     WBC 05/03/2019 6 08     RBC 05/03/2019 4 03     Hemoglobin 05/03/2019 11 4*    Hematocrit 05/03/2019 35 2*    MCV 05/03/2019 87     MCH 05/03/2019 28 3     MCHC 05/03/2019 32 4     RDW 05/03/2019 16 8*    Platelets 06/90/5263 250     MPV 05/03/2019 10 3     Sodium 05/03/2019 133*    Potassium 05/03/2019 4 3     Chloride 05/03/2019 97*    CO2 05/03/2019 29     ANION GAP 05/03/2019 7     BUN 05/03/2019 9     Creatinine 05/03/2019 1 04     Glucose, Fasting 05/03/2019 93     Calcium 05/03/2019 8 7     AST 05/03/2019 16     ALT 05/03/2019 19     Alkaline Phosphatase 05/03/2019 88     Total Protein 05/03/2019 6 4     Albumin 05/03/2019 3 5     Total Bilirubin 05/03/2019 0 30     eGFR 05/03/2019 72          Radiology Results:   No results found

## 2019-09-09 NOTE — PATIENT INSTRUCTIONS
Gastroesophageal Reflux Disease   AMBULATORY CARE:   Gastroesophageal reflux  reflux occurs when acid and food in the stomach back up into the esophagus  Gastroesophageal reflux disease (GERD) is reflux that occurs more than twice a week for a few weeks  It usually causes heartburn and other symptoms  GERD can cause other health problems over time if it is not treated  Common symptoms include:  Heartburn is the most common symptom of GERD  You may feel burning pain in your chest or below the breast bone  This usually occurs after meals and spreads to your neck, jaw, or shoulder  The pain gets better when you change positions  You may also have any of the following:  · Bitter or acid taste in your mouth    · Dry cough    · Trouble swallowing or pain with swallowing    · Hoarseness or sore throat    · Frequent burping or hiccups    · Feeling of fullness soon after you start eating  Seek care immediately if:  · You feel full and cannot burp or vomit  · You have severe chest pain and sudden trouble breathing  · Your bowel movements are black, bloody, or tarry-looking  · Your vomit looks like coffee grounds or has blood in it  Contact your healthcare provider if:   · You vomit large amounts, or you vomit often  · You have trouble breathing after you vomit  · You have trouble swallowing, or pain with swallowing  · You are losing weight without trying  · Your symptoms get worse or do not improve with treatment  · You have questions or concerns about your condition or care  Treatment for GERD:  Your healthcare provider may prescribe medicine to decrease stomach acid  He may also prescribe medicine that help your esophagus and stomach move food and liquid to your intestines  Surgery may be done if other treatments do not work  You may need surgery to wrap the upper part of the stomach around the esophageal sphincter  This will strengthen the sphincter and prevent reflux     Manage GERD: · Do not have foods or drinks that may increase heartburn  These include chocolate, peppermint, fried or fatty foods, drinks that contain caffeine, or carbonated drinks (soda)  Other foods include spicy foods, onions, tomatoes, and tomato-based foods  Do not have foods or drinks that can irritate your esophagus, such as citrus fruits, juices, and alcohol  · Do not eat large meals  When you eat a lot of food at one time, your stomach needs more acid to digest it  Eat 6 small meals each day instead of 3 large ones, and eat slowly  Do not eat meals 2 to 3 hours before bedtime  · Elevate the head of your bed  Place 6-inch blocks under the head of your bed frame  You may also use more than one pillow under your head and shoulders while you sleep  · Maintain a healthy weight  If you are overweight, weight loss may help relieve symptoms of GERD  · Do not smoke  Smoking weakens the lower esophageal sphincter and increases the risk of GERD  Ask your healthcare provider for information if you currently smoke and need help to quit  E-cigarettes or smokeless tobacco still contain nicotine  Talk to your healthcare provider before you use these products  · Do not wear clothing that is tight around your waist   Tight clothing can put pressure on your stomach and cause or worsen GERD symptoms  Follow up with your healthcare provider as directed:  Write down your questions so you remember to ask them during your visits  © 2017 [address] [employer] Information is for End User's use only and may not be sold, redistributed or otherwise used for commercial purposes  All illustrations and images included in CareNotes® are the copyrighted property of A [employer]  or Kraig Anderson  The above information is an  only  It is not intended as medical advice for individual conditions or treatments   Talk to your doctor, nurse or pharmacist before following any medical regimen to see if it is safe and effective for you

## 2019-09-09 NOTE — LETTER
September 9, 2019     Jose Fragoso MD  [id number] [address]    Patient: Cherylene Flor   YOB: 1950   Date of Visit: 9/9/2019       Dear Dr Aman Matamoros: Thank you for referring Cherylene Flor to me for evaluation  Below are my notes for this consultation  If you have questions, please do not hesitate to call me  I look forward to following your patient along with you  Sincerely,        Silvia Baeza PA-C        CC: No Recipients  Silvia Baeza PA-C  9/9/2019  5:18 PM  Incomplete  [facility] - Outpatient Follow-up Note  Cherylene Flor 78 y o  male MRN: [id number]  Encounter: [id number]          ASSESSMENT AND PLAN:      1  GERD without esophagitis  His symptoms are well controlled with omeprazole 40 mg daily  We discussed side effects associated long-term PPI therapy  He did try Zantac but this caused diarrhea  Recommend weaning down to omeprazole 20 mg daily  Gave patient educational handout on GERD  Again we discussed the importance of quitting smoking     - omeprazole (PriLOSEC) 20 mg delayed release capsule; Take 1 capsule (20 mg total) by mouth daily for 90 days  Dispense: 90 capsule; Refill: 2    2  Irritable bowel syndrome with both constipation and diarrhea  Currently well controlled off Linzess  Continue amitriptyline and Bentyl daily for abdominal pain  3  Other chronic pancreatitis (HCC)  Continue 2 capsules of Creon with each meal     4  Smoking  I encouraged him to speak to Dr Aman Matamoros regarding methods to quit smoking  Follow-up in 6 months  ______________________________________________________________________    SUBJECTIVE:  42-year-old male with chronic pancreatitis, tobacco abuse, IBS, GERD, and anxiety presenting for 6 month follow-up  He denies frequent heartburn and indigestion  He still takes omeprazole 40 mg daily before breakfast   He tried Zantac but this caused diarrhea    He takes Maalox as needed for heartburn, which is once every few weeks  He denies nausea and vomiting  He denies abdominal pain  He takes 2 capsules of Creon with each meal  He takes Bentyl 3 times daily and amitriptyline 25 mg daily at bedtime for abdominal pain  He denies diarrhea and constipation  He actually stopped taking Linzess because he no longer needs it  He has [place] type 3 or 4 stools daily  He denies any rectal bleeding or abnormal weight loss      Last EGD and colonoscopy was June 2018  EGD showed gastritis and possible Rhodes's esophagus although biopsies were negative  Colonoscopy revealed diverticulosis and multiple small hyperplastic appearing colon polyps in the sigmoid colon and rectum  Repeat colonoscopy recommended in 5 years  REVIEW OF SYSTEMS IS OTHERWISE NEGATIVE  Historical Information   Past Medical History:   Diagnosis Date    Anxiety     GERD (gastroesophageal reflux disease)     H/O abdominal aortic aneurysm     Hyperlipidemia     Hypertension     Vertigo      Past Surgical History:   Procedure Laterality Date    COLONOSCOPY      IR LOWER EXTREMITY / INTERVENTION  1/4/2019    NM ESOPHAGOGASTRODUODENOSCOPY TRANSORAL DIAGNOSTIC N/A 6/22/2018    Procedure: EGD AND COLONOSCOPY;  Surgeon: Danny Echeverria MD;  [facility]; Service: Gastroenterology    NM [address] R[address] DPLMNT AORTO-AORTIC NDGFT N/A 2/9/2018    Procedure: REPAIR ANEURYSM ENDOVASCULAR ABDOMINAL AORTIC  (EVAR);   Surgeon: Yahaira Stanley MD;  [facility];  Service: Vascular    UPPER GASTROINTESTINAL ENDOSCOPY       Social History   Social History     Substance and Sexual Activity   Alcohol Use No     Social History     Substance and Sexual Activity   Drug Use No     Social History     Tobacco Use   Smoking Status Current Every Day Smoker    Packs/day: 1 00    Types: Cigarettes   Smokeless Tobacco Never Used   Tobacco Comment    Trshawnen quiting smoking handouts     Family History   Problem Relation Age of Onset  Heart disease Father     Heart attack Father     Diabetes Maternal Grandmother     Diabetes Maternal Grandfather     Diabetes Paternal Grandmother        Meds/Allergies       Current Outpatient Medications:     ALPRAZolam (XANAX) 1 mg tablet    amitriptyline (ELAVIL) 25 mg tablet    amLODIPine (NORVASC) 5 mg tablet    citalopram (CeleXA) 20 mg tablet    dicyclomine (BENTYL) 10 mg capsule    lisinopril (ZESTRIL) 40 mg tablet    metoprolol succinate (TOPROL-XL) 25 mg 24 hr tablet    Multiple Vitamins-Minerals (CENTRUM SILVER PO)    pancrelipase, Lip-Prot-Amyl, (CREON) 12,000 units capsule    polyethylene glycol (MIRALAX) 17 g packet    pravastatin (PRAVACHOL) 40 mg tablet    sodium chloride 1 g tablet    tamsulosin (FLOMAX) 0 4 mg    Linaclotide (LINZESS) 72 MCG CAPS    metoprolol tartrate (LOPRESSOR) 50 mg tablet    omeprazole (PriLOSEC) 20 mg delayed release capsule    ranitidine (ZANTAC) 150 mg tablet    Wheat Dextrin (BENEFIBER PO)    No Known Allergies        Objective     Blood pressure 129/81, pulse 76, temperature 98 2 °F (36 8 °C), temperature source Tympanic, height 5' 2" (1 575 m), weight 55 3 kg (122 lb)  Body mass index is 22 31 kg/m²  PHYSICAL EXAM:      General Appearance:   Alert, cooperative, no distress   HEENT:   Normocephalic, atraumatic, anicteric      Neck:  Supple, symmetrical, trachea midline   Lungs:   Clear to auscultation bilaterally; no rales, rhonchi or wheezing; respirations unlabored    Heart[de-identified]   Regular rate and rhythm; no murmur, rub, or gallop  Abdomen:   Soft, non-tender, non-distended; normal bowel sounds; no masses, no organomegaly    Genitalia:   Deferred    Rectal:   Deferred    Extremities:  No cyanosis, clubbing or edema    Pulses:  2+ and symmetric    Skin:  No jaundice, rashes, or lesions    Lymph nodes:  No palpable cervical lymphadenopathy        Lab Results:   No visits with results within 1 Day(s) from this visit     Latest known visit with results is:   Appointment on 05/03/2019   Component Date Value    Cholesterol 05/03/2019 151     Triglycerides 05/03/2019 154*    HDL, Direct 05/03/2019 52     LDL Calculated 05/03/2019 68     Non-HDL-Chol (CHOL-HDL) 05/03/2019 99     WBC 05/03/2019 6 08     RBC 05/03/2019 4 03     Hemoglobin 05/03/2019 11 4*    Hematocrit 05/03/2019 35 2*    MCV 05/03/2019 87     MCH 05/03/2019 28 3     MCHC 05/03/2019 32 4     RDW 05/03/2019 16 8*    Platelets 74/24/3633 250     MPV 05/03/2019 10 3     Sodium 05/03/2019 133*    Potassium 05/03/2019 4 3     Chloride 05/03/2019 97*    CO2 05/03/2019 29     ANION GAP 05/03/2019 7     BUN 05/03/2019 9     Creatinine 05/03/2019 1 04     Glucose, Fasting 05/03/2019 93     Calcium 05/03/2019 8 7     AST 05/03/2019 16     ALT 05/03/2019 19     Alkaline Phosphatase 05/03/2019 88     Total Protein 05/03/2019 6 4     Albumin 05/03/2019 3 5     Total Bilirubin 05/03/2019 0 30     eGFR 05/03/2019 72          Radiology Results:   No results found

## 2019-09-11 ENCOUNTER — OFFICE VISIT (OUTPATIENT)
Dept: VASCULAR SURGERY | Facility: CLINIC | Age: 69
End: 2019-09-11
Payer: MEDICARE

## 2019-09-11 VITALS
HEART RATE: 69 BPM | BODY MASS INDEX: 22.82 KG/M2 | HEIGHT: 62 IN | WEIGHT: 124 LBS | TEMPERATURE: 95.9 F | DIASTOLIC BLOOD PRESSURE: 70 MMHG | SYSTOLIC BLOOD PRESSURE: 106 MMHG

## 2019-09-11 DIAGNOSIS — I71.4 AAA (ABDOMINAL AORTIC ANEURYSM) WITHOUT RUPTURE (HCC): Primary | ICD-10-CM

## 2019-09-11 PROCEDURE — 1123F ACP DISCUSS/DSCN MKR DOCD: CPT | Performed by: SURGERY

## 2019-09-11 PROCEDURE — 99213 OFFICE O/P EST LOW 20 MIN: CPT | Performed by: SURGERY

## 2019-09-11 NOTE — ASSESSMENT & PLAN NOTE
Returns for evaluation of duplex imaging status post endovascular aneurysm repair as well as complaints of bilateral leg discomfort with ambulation  Duplex imaging does show a moderate stenosis in the left limb which was recently intervened upon, there is possibly some intimal hyperplasia and reimaging will take place in 6 months  Bilateral leg discomfort in the setting of bilaterally palpable dorsalis pedis pulses likely points to a neurogenic cause of his leg discomfort likely to be spinal stenosis as he has history of chronic lower back pain  I recommended possible evaluation by pain management however he is not agreeable to that and will refer back to his family doctor for further recommendations possibly physical therapy or neuropathic medications      Plan:  Follow-up endovascular aneurysm repair duplex imaging in 6 months and as well as follow-up with his family physician for further recommendations regarding his lower back pain

## 2019-09-11 NOTE — PROGRESS NOTES
Assessment/Plan:    AAA (abdominal aortic aneurysm) without rupture ([address] )  Returns for evaluation of duplex imaging status post endovascular aneurysm repair as well as complaints of bilateral leg discomfort with ambulation  Duplex imaging does show a moderate stenosis in the left limb which was recently intervened upon, there is possibly some intimal hyperplasia and reimaging will take place in 6 months  Bilateral leg discomfort in the setting of bilaterally palpable dorsalis pedis pulses likely points to a neurogenic cause of his leg discomfort likely to be spinal stenosis as he has history of chronic lower back pain  I recommended possible evaluation by pain management however he is not agreeable to that and will refer back to his family doctor for further recommendations possibly physical therapy or neuropathic medications  Plan:  Follow-up endovascular aneurysm repair duplex imaging in 6 months and as well as follow-up with his family physician for further recommendations regarding his lower back pain       Diagnoses and all orders for this visit:    AAA (abdominal aortic aneurysm) without rupture ([facility])  -     VAS evar endovascular aortic repair duplex; Future        Subjective:      Patient ID: Shandra Robles is a 78 y o  male  HPI chronic lower back pain with bilateral leg discomfort with ambulation different distances on different days  He has easily palpable pedal pulses therefore on likely to be vascular origin  Recommended pain management evaluation which he refuses has had that in the past and did not help  He may benefit from physical therapy as well as neuropathic medication      The following portions of the patient's history were reviewed and updated as appropriate: allergies, current medications, past family history, past medical history, past social history, past surgical history and problem list     Review of Systems  post endovascular aneurysm repair and treatment of left iliac artery dissection, complaints of lower back pain chronic, no GI or  symptoms no constitutional symptoms no skin rash all other systems negative    Objective:      /70 (BP Location: Right arm, Patient Position: Sitting, Cuff Size: Standard)   Pulse 69   Temp (!) 95 9 °F (35 5 °C) (Tympanic)   Ht 5' 2" (1 575 m)   Wt 56 2 kg (124 lb)   BMI 22 68 kg/m²          Physical Exam      Easily palpable femoral popliteal and dorsalis pedis pulses bilaterally  I have reviewed and made appropriate changes to the review of systems input by the medical assistant  Vitals:    09/11/19 1044   BP: 106/70   BP Location: Right arm   Patient Position: Sitting   Cuff Size: Standard   Pulse: 69   Temp: (!) 95 9 °F (35 5 °C)   TempSrc: Tympanic   Weight: 56 2 kg (124 lb)   Height: 5' 2" (1 575 m)       Patient Active Problem List   Diagnosis    AAA (abdominal aortic aneurysm) without rupture (HCC)    Epigastric pain    GERD (gastroesophageal reflux disease)    Diarrhea    Irritable bowel syndrome with both constipation and diarrhea    Other chronic pancreatitis ([facility] [address] )    Smoking       Past Surgical History:   Procedure Laterality Date    COLONOSCOPY      IR LOWER EXTREMITY / INTERVENTION  1/4/2019    NE ESOPHAGOGASTRODUODENOSCOPY TRANSORAL DIAGNOSTIC N/A 6/22/2018    Procedure: EGD AND COLONOSCOPY;  Surgeon: Benita Eric MD;  [facility]; Service: Gastroenterology    NE [address] R[address] DPLMNT AORTO-AORTIC NDGFT N/A 2/9/2018    Procedure: REPAIR ANEURYSM ENDOVASCULAR ABDOMINAL AORTIC  (EVAR); Surgeon: Jose Hinojosa MD;  [facility];  Service: Vascular    UPPER GASTROINTESTINAL ENDOSCOPY         Family History   Problem Relation Age of Onset    Heart disease Father     Heart attack Father     Diabetes Maternal Grandmother     Diabetes Maternal Grandfather     Diabetes Paternal Grandmother        Social History     Socioeconomic History    Marital status:       Spouse name: Not on file    Number of children: Not on file    Years of education: Not on file    Highest education level: Not on file   Occupational History    Not on file   Social Needs    Financial resource strain: Not on file    Food insecurity:     Worry: Not on file     Inability: Not on file    Transportation needs:     Medical: Not on file     Non-medical: Not on file   Tobacco Use    Smoking status: Current Every Day Smoker     Packs/day: 1 00     Types: Cigarettes    Smokeless tobacco: Never Used    Tobacco comment: [employer] quiting smoking handouts   Substance and Sexual Activity    Alcohol use: No    Drug use: No    Sexual activity: Not Currently   Lifestyle    Physical activity:     Days per week: Not on file     Minutes per session: Not on file    Stress: Not on file   Relationships    Social connections:     Talks on phone: Not on file     Gets together: Not on file     Attends Jain service: Not on file     Active member of club or organization: Not on file     Attends meetings of clubs or organizations: Not on file     Relationship status: Not on file    Intimate partner violence:     Fear of current or ex partner: Not on file     Emotionally abused: Not on file     Physically abused: Not on file     Forced sexual activity: Not on file   Other Topics Concern    Not on file   Social History Narrative    Not on file       No Known Allergies      Current Outpatient Medications:     ALPRAZolam (XANAX) 1 mg tablet, Take 1 mg by mouth 2 (two) times a day as needed for anxiety  , Disp: , Rfl:     amitriptyline (ELAVIL) 25 mg tablet, TAKE 1 TABLET BY MOUTH AT BEDTIME, Disp: 90 tablet, Rfl: 0    amLODIPine (NORVASC) 5 mg tablet, Take 5 mg by mouth daily, Disp: , Rfl:     citalopram (CeleXA) 20 mg tablet, Take 20 mg by mouth daily, Disp: , Rfl: 2    dicyclomine (BENTYL) 10 mg capsule, Take 1 capsule (10 mg total) by mouth 3 (three) times a day as needed (cramping/abdominal spasms) for up to 90 days Before meals , Disp: 270 capsule, Rfl: 2    lisinopril (ZESTRIL) 40 mg tablet, Take 40 mg by mouth daily, Disp: , Rfl:     metoprolol succinate (TOPROL-XL) 25 mg 24 hr tablet, Take 25 mg by mouth daily, Disp: , Rfl: 2    omeprazole (PriLOSEC) 20 mg delayed release capsule, Take 1 capsule (20 mg total) by mouth daily for 90 days, Disp: 90 capsule, Rfl: 2    pancrelipase, Lip-Prot-Amyl, (CREON) 12,000 units capsule, Take 2 capsules by mouth 3 times daily with meals  , Disp: 540 capsule, Rfl: 2    polyethylene glycol (MIRALAX) 17 g packet, Take 17 g by mouth, Disp: , Rfl:     pravastatin (PRAVACHOL) 40 mg tablet, Take 40 mg by mouth daily, Disp: , Rfl:     ranitidine (ZANTAC) 150 mg tablet, TAKE 1 TABLET EVERY DAY IN THE EVENING, Disp: 90 tablet, Rfl: 2    sodium chloride 1 g tablet, Take 1 g by mouth 3 (three) times a day, Disp: , Rfl:     tamsulosin (FLOMAX) 0 4 mg, Take 0 4 mg by mouth daily, Disp: , Rfl: 5    Wheat Dextrin (BENEFIBER PO), Take by mouth daily as needed  , Disp: , Rfl:     Linaclotide (LINZESS) 72 MCG CAPS, Take 1 capsule by mouth daily (Patient not taking: Reported on 9/9/2019), Disp: 30 capsule, Rfl: 5    metoprolol tartrate (LOPRESSOR) 50 mg tablet, Take 50 mg by mouth daily  , Disp: , Rfl:     Multiple Vitamins-Minerals (CENTRUM SILVER PO), Take 1 tablet by mouth daily  , Disp: , Rfl:

## 2019-09-11 NOTE — LETTER
September 11, 2019     Lico Soriano MD  [address] [address]    Patient: Rebecca Dexter   YOB: 1950   Date of Visit: 9/11/2019       Dear Dr Adams Nose: Thank you for referring Rebecca Dexter to me for evaluation  Below are my notes for this consultation  If you have questions, please do not hesitate to call me  I look forward to following your patient along with you           Sincerely,        Kraig Banerjee MD        CC: No Recipients

## 2019-09-13 ENCOUNTER — TELEPHONE (OUTPATIENT)
Dept: GASTROENTEROLOGY | Facility: AMBULARY SURGERY CENTER | Age: 69
End: 2019-09-13

## 2019-09-13 DIAGNOSIS — K58.2 IRRITABLE BOWEL SYNDROME WITH BOTH CONSTIPATION AND DIARRHEA: ICD-10-CM

## 2019-09-13 RX ORDER — AMITRIPTYLINE HYDROCHLORIDE 25 MG/1
25 TABLET, FILM COATED ORAL
Qty: 90 TABLET | Refills: 3 | Status: SHIPPED | OUTPATIENT
Start: 2019-09-13 | End: 2020-09-02 | Stop reason: SDUPTHER

## 2019-09-13 NOTE — TELEPHONE ENCOUNTER
tacos patient      Refill Request for Medication: amitriptyline    Dose: 25 mg    Quantity: 90 tabs    Pharmacy: [facility]

## 2019-12-10 ENCOUNTER — APPOINTMENT (OUTPATIENT)
Dept: LAB | Facility: HOSPITAL | Age: 69
End: 2019-12-10
Payer: MEDICARE

## 2019-12-10 ENCOUNTER — TRANSCRIBE ORDERS (OUTPATIENT)
Dept: ADMINISTRATIVE | Facility: HOSPITAL | Age: 69
End: 2019-12-10

## 2019-12-10 DIAGNOSIS — I10 ESSENTIAL HYPERTENSION, MALIGNANT: ICD-10-CM

## 2019-12-10 DIAGNOSIS — N40.0 BENIGN PROSTATIC HYPERPLASIA WITHOUT LOWER URINARY TRACT SYMPTOMS: ICD-10-CM

## 2019-12-10 DIAGNOSIS — E78.2 MIXED HYPERLIPIDEMIA: ICD-10-CM

## 2019-12-10 DIAGNOSIS — I10 ESSENTIAL HYPERTENSION, MALIGNANT: Primary | ICD-10-CM

## 2019-12-10 LAB
ALBUMIN SERPL BCP-MCNC: 3.8 G/DL (ref 3.5–5)
ALP SERPL-CCNC: 90 U/L (ref 46–116)
ALT SERPL W P-5'-P-CCNC: 23 U/L (ref 12–78)
AMORPH PHOS CRY URNS QL MICRO: ABNORMAL /HPF
ANION GAP SERPL CALCULATED.3IONS-SCNC: 4 MMOL/L (ref 4–13)
AST SERPL W P-5'-P-CCNC: 18 U/L (ref 5–45)
BACTERIA UR QL AUTO: ABNORMAL /HPF
BILIRUB SERPL-MCNC: 0.31 MG/DL (ref 0.2–1)
BILIRUB UR QL STRIP: NEGATIVE
BUN SERPL-MCNC: 11 MG/DL (ref 5–25)
CALCIUM SERPL-MCNC: 8.9 MG/DL (ref 8.3–10.1)
CHLORIDE SERPL-SCNC: 101 MMOL/L (ref 100–108)
CHOLEST SERPL-MCNC: 152 MG/DL (ref 50–200)
CLARITY UR: ABNORMAL
CO2 SERPL-SCNC: 28 MMOL/L (ref 21–32)
COLOR UR: YELLOW
CREAT SERPL-MCNC: 1.13 MG/DL (ref 0.6–1.3)
ERYTHROCYTE [DISTWIDTH] IN BLOOD BY AUTOMATED COUNT: 13.7 % (ref 11.6–15.1)
GFR SERPL CREATININE-BSD FRML MDRD: 65 ML/MIN/1.73SQ M
GLUCOSE P FAST SERPL-MCNC: 92 MG/DL (ref 65–99)
GLUCOSE UR STRIP-MCNC: NEGATIVE MG/DL
HCT VFR BLD AUTO: 41.5 % (ref 36.5–49.3)
HDLC SERPL-MCNC: 58 MG/DL
HGB BLD-MCNC: 13.9 G/DL (ref 12–17)
HGB UR QL STRIP.AUTO: ABNORMAL
KETONES UR STRIP-MCNC: NEGATIVE MG/DL
LDLC SERPL CALC-MCNC: 66 MG/DL (ref 0–100)
LEUKOCYTE ESTERASE UR QL STRIP: ABNORMAL
MCH RBC QN AUTO: 33.2 PG (ref 26.8–34.3)
MCHC RBC AUTO-ENTMCNC: 33.5 G/DL (ref 31.4–37.4)
MCV RBC AUTO: 99 FL (ref 82–98)
NITRITE UR QL STRIP: NEGATIVE
NON-SQ EPI CELLS URNS QL MICRO: ABNORMAL /HPF
NONHDLC SERPL-MCNC: 94 MG/DL
PH UR STRIP.AUTO: 7.5 [PH]
PLATELET # BLD AUTO: 237 THOUSANDS/UL (ref 149–390)
PMV BLD AUTO: 10.3 FL (ref 8.9–12.7)
POTASSIUM SERPL-SCNC: 3.9 MMOL/L (ref 3.5–5.3)
PROT SERPL-MCNC: 6.7 G/DL (ref 6.4–8.2)
PROT UR STRIP-MCNC: NEGATIVE MG/DL
PSA SERPL-MCNC: 0.4 NG/ML (ref 0–4)
RBC # BLD AUTO: 4.19 MILLION/UL (ref 3.88–5.62)
RBC #/AREA URNS AUTO: ABNORMAL /HPF
SODIUM SERPL-SCNC: 133 MMOL/L (ref 136–145)
SP GR UR STRIP.AUTO: 1.01 (ref 1–1.03)
TRIGL SERPL-MCNC: 140 MG/DL
UROBILINOGEN UR QL STRIP.AUTO: 0.2 E.U./DL
WBC # BLD AUTO: 7.12 THOUSAND/UL (ref 4.31–10.16)
WBC #/AREA URNS AUTO: ABNORMAL /HPF

## 2019-12-10 PROCEDURE — 80061 LIPID PANEL: CPT

## 2019-12-10 PROCEDURE — 81001 URINALYSIS AUTO W/SCOPE: CPT | Performed by: INTERNAL MEDICINE

## 2019-12-10 PROCEDURE — 85027 COMPLETE CBC AUTOMATED: CPT

## 2019-12-10 PROCEDURE — 36415 COLL VENOUS BLD VENIPUNCTURE: CPT

## 2019-12-10 PROCEDURE — G0103 PSA SCREENING: HCPCS

## 2019-12-10 PROCEDURE — 80053 COMPREHEN METABOLIC PANEL: CPT

## 2020-02-19 DIAGNOSIS — K86.89 PANCREATIC INSUFFICIENCY: ICD-10-CM

## 2020-02-19 DIAGNOSIS — R10.30 LOWER ABDOMINAL PAIN: ICD-10-CM

## 2020-02-19 RX ORDER — DICYCLOMINE HYDROCHLORIDE 10 MG/1
CAPSULE ORAL
Qty: 270 CAPSULE | Refills: 2 | Status: SHIPPED | OUTPATIENT
Start: 2020-02-19 | End: 2020-11-06 | Stop reason: SDUPTHER

## 2020-03-09 ENCOUNTER — OFFICE VISIT (OUTPATIENT)
Dept: GASTROENTEROLOGY | Facility: CLINIC | Age: 70
End: 2020-03-09
Payer: MEDICARE

## 2020-03-09 VITALS
WEIGHT: 127 LBS | HEART RATE: 72 BPM | DIASTOLIC BLOOD PRESSURE: 71 MMHG | BODY MASS INDEX: 23.37 KG/M2 | HEIGHT: 62 IN | TEMPERATURE: 95.4 F | SYSTOLIC BLOOD PRESSURE: 104 MMHG

## 2020-03-09 DIAGNOSIS — K86.1 CHRONIC PANCREATITIS, UNSPECIFIED PANCREATITIS TYPE (HCC): ICD-10-CM

## 2020-03-09 DIAGNOSIS — K58.2 IRRITABLE BOWEL SYNDROME WITH BOTH CONSTIPATION AND DIARRHEA: Primary | ICD-10-CM

## 2020-03-09 DIAGNOSIS — K21.9 GASTROESOPHAGEAL REFLUX DISEASE WITHOUT ESOPHAGITIS: ICD-10-CM

## 2020-03-09 PROCEDURE — 99214 OFFICE O/P EST MOD 30 MIN: CPT | Performed by: PHYSICIAN ASSISTANT

## 2020-03-09 RX ORDER — LISINOPRIL AND HYDROCHLOROTHIAZIDE 20; 12.5 MG/1; MG/1
TABLET ORAL
COMMUNITY
Start: 2020-01-09 | End: 2021-03-20

## 2020-03-09 NOTE — PROGRESS NOTES
[facility] - Outpatient Follow-up Note  Meggan Quintero 78 y o  male MRN: [id number]  Encounter: [id number]          ASSESSMENT AND PLAN:      1  Irritable bowel syndrome with both constipation and diarrhea: he is doing well and denies abdominal pain, diarrhea, constipation or blood in his stool  He is taking amitriptyline at bedtime and bentyl PRN  Will check EKG as last one was 2 years ago and he has been on amitriptyline   - ECG 12 lead; Future  -continue amitriptyline   -continue bentyl    2  Chronic pancreatitis: he is currently asymptomatic and taking 2 capsules of creon with meals    -continue creon    3  GERD: he is on omeprazole 20mg daily and his symptoms are well controlled  We did discuss trying to taper off of this medication, he would like to remain on it for now  -continue omeprazole 20mg daily     ______________________________________________________________________    SUBJECTIVE:  Taqueria Mullins is a 30-year-old male with past medical history of chronic pancreatitis here for follow-up of her irritable bowel syndrome  He is currently taking Creon 3 times daily  He states that he is having regular, formed bowel movements with no evidence of abdominal pain, diarrhea, nausea or vomiting  He denies any weight loss and is eating well  He was previously on Linzess for constipation but states that he no longer needs this and has been doing well off of this medication  He is using omeprazole 20 mg daily for his acid reflux and is also doing well as far as this concern  He has had an EGD and colonoscopy in June 2018  His EGD showed gastritis and possible Rhodes's esophagus with negative biopsies  Colonoscopy revealed diverticulosis and multiple small hyperplastic polyps  Repeat colonoscopy is recommended in 5 years  REVIEW OF SYSTEMS IS OTHERWISE NEGATIVE        Historical Information   Past Medical History:   Diagnosis Date    Anxiety     GERD (gastroesophageal reflux disease)     H/O abdominal aortic aneurysm     Hyperlipidemia     Hypertension     Vertigo      Past Surgical History:   Procedure Laterality Date    COLONOSCOPY      IR LOWER EXTREMITY / INTERVENTION  1/4/2019    GA ESOPHAGOGASTRODUODENOSCOPY TRANSORAL DIAGNOSTIC N/A 6/22/2018    Procedure: EGD AND COLONOSCOPY;  Surgeon: Lety Colby MD;  Location: [facility]; Service: Gastroenterology    [place] [address] R[address] DPLMNT AORTO-AORTIC NDGFT N/A 2/9/2018    Procedure: REPAIR ANEURYSM ENDOVASCULAR ABDOMINAL AORTIC  (EVAR);   Surgeon: Benton Rodgers MD;  [facility];  Service: Vascular    UPPER GASTROINTESTINAL ENDOSCOPY       Social History   Social History     Substance and Sexual Activity   Alcohol Use No     Social History     Substance and Sexual Activity   Drug Use No     Social History     Tobacco Use   Smoking Status Current Every Day Smoker    Packs/day: 1 00    Types: Cigarettes   Smokeless Tobacco Never Used   Tobacco Comment    [place] quiting smoking handouts     Family History   Problem Relation Age of Onset    Heart disease Father     Heart attack Father     Diabetes Maternal Grandmother     Diabetes Maternal Grandfather     Diabetes Paternal Grandmother        Meds/Allergies       Current Outpatient Medications:     ALPRAZolam (XANAX) 1 mg tablet    amitriptyline (ELAVIL) 25 mg tablet    amLODIPine (NORVASC) 5 mg tablet    citalopram (CeleXA) 20 mg tablet    dicyclomine (BENTYL) 10 mg capsule    lisinopril (ZESTRIL) 40 mg tablet    metoprolol succinate (TOPROL-XL) 25 mg 24 hr tablet    pancrelipase, Lip-Prot-Amyl, (Creon) 12,000 units capsule    pravastatin (PRAVACHOL) 40 mg tablet    sodium chloride 1 g tablet    tamsulosin (FLOMAX) 0 4 mg    Linaclotide (LINZESS) 72 MCG CAPS    lisinopril-hydrochlorothiazide (PRINZIDE,ZESTORETIC) 20-12 5 MG per tablet    metoprolol tartrate (LOPRESSOR) 50 mg tablet    Multiple Vitamins-Minerals (CENTRUM SILVER PO)    omeprazole (PriLOSEC) 20 mg delayed release capsule    polyethylene glycol (MIRALAX) 17 g packet    ranitidine (ZANTAC) 150 mg tablet    Wheat Dextrin (BENEFIBER PO)    No Known Allergies        Objective     Blood pressure 104/71, pulse 72, temperature (!) 95 4 °F (35 2 °C), temperature source Tympanic, height 5' 2" (1 575 m), weight 57 6 kg (127 lb)  Body mass index is 23 23 kg/m²  PHYSICAL EXAM:      General Appearance:   Alert, cooperative, no distress   HEENT:   Normocephalic, atraumatic, anicteric  Right eye exhibits no discharge  Left eye exhibits no discharge  No scleral icterus    Neck:  Supple, symmetrical, trachea midline, no stridor    Lungs:   Clear to auscultation bilaterally; no rales, rhonchi or wheezing; respirations unlabored    Heart[de-identified]   Regular rate and rhythm; no murmur, rub, or gallop  Abdomen:   Soft, non-tender, non-distended; normal bowel sounds; no masses, no organomegaly    Genitalia:   Deferred    Rectal:   Deferred    Extremities:  No cyanosis, clubbing or edema        Skin:  No jaundice, rashes, or lesions          Lab Results:   No visits with results within 1 Day(s) from this visit     Latest known visit with results is:   Appointment on 12/10/2019   Component Date Value    WBC 12/10/2019 7 12     RBC 12/10/2019 4 19     Hemoglobin 12/10/2019 13 9     Hematocrit 12/10/2019 41 5     MCV 12/10/2019 99*    MCH 12/10/2019 33 2     MCHC 12/10/2019 33 5     RDW 12/10/2019 13 7     Platelets 06/81/3874 237     MPV 12/10/2019 10 3     Sodium 12/10/2019 133*    Potassium 12/10/2019 3 9     Chloride 12/10/2019 101     CO2 12/10/2019 28     ANION GAP 12/10/2019 4     BUN 12/10/2019 11     Creatinine 12/10/2019 1 13     Glucose, Fasting 12/10/2019 92     Calcium 12/10/2019 8 9     AST 12/10/2019 18     ALT 12/10/2019 23     Alkaline Phosphatase 12/10/2019 90     Total Protein 12/10/2019 6 7     Albumin 12/10/2019 3 8     Total Bilirubin 12/10/2019 0 31     eGFR 12/10/2019 65     Cholesterol 12/10/2019 152     Triglycerides 12/10/2019 140     HDL, Direct 12/10/2019 58     LDL Calculated 12/10/2019 66     Non-HDL-Chol (CHOL-HDL) 12/10/2019 94     PSA 12/10/2019 0 4          Radiology Results:   No results found

## 2020-03-16 ENCOUNTER — HOSPITAL ENCOUNTER (OUTPATIENT)
Dept: NON INVASIVE DIAGNOSTICS | Age: 70
Discharge: HOME | End: 2020-03-16
Payer: MEDICARE

## 2020-03-16 DIAGNOSIS — I71.4 AAA (ABDOMINAL AORTIC ANEURYSM) WITHOUT RUPTURE (HCC): ICD-10-CM

## 2020-03-16 PROCEDURE — 93978 VASCULAR STUDY: CPT

## 2020-03-17 PROCEDURE — 93978 VASCULAR STUDY: CPT | Performed by: SURGERY

## 2020-03-18 ENCOUNTER — HOSPITAL ENCOUNTER (OUTPATIENT)
Dept: NON INVASIVE DIAGNOSTICS | Age: 70
Discharge: HOME | End: 2020-03-18
Payer: MEDICARE

## 2020-03-18 DIAGNOSIS — K58.2 IRRITABLE BOWEL SYNDROME WITH BOTH CONSTIPATION AND DIARRHEA: ICD-10-CM

## 2020-03-18 PROCEDURE — 93005 ELECTROCARDIOGRAM TRACING: CPT

## 2020-03-19 LAB
ATRIAL RATE: 73 BPM
P AXIS: 81 DEGREES
PR INTERVAL: 142 MS
QRS AXIS: 58 DEGREES
QRSD INTERVAL: 98 MS
QT INTERVAL: 396 MS
QTC INTERVAL: 436 MS
T WAVE AXIS: 76 DEGREES
VENTRICULAR RATE: 73 BPM

## 2020-03-19 PROCEDURE — 93010 ELECTROCARDIOGRAM REPORT: CPT | Performed by: INTERNAL MEDICINE

## 2020-05-15 DIAGNOSIS — K21.9 GERD WITHOUT ESOPHAGITIS: ICD-10-CM

## 2020-05-15 RX ORDER — OMEPRAZOLE 20 MG/1
CAPSULE, DELAYED RELEASE ORAL
Qty: 90 CAPSULE | Refills: 2 | Status: SHIPPED | OUTPATIENT
Start: 2020-05-15 | End: 2021-02-05 | Stop reason: SDUPTHER

## 2020-05-28 ENCOUNTER — TRANSCRIBE ORDERS (OUTPATIENT)
Dept: ADMINISTRATIVE | Facility: HOSPITAL | Age: 70
End: 2020-05-28

## 2020-05-28 ENCOUNTER — APPOINTMENT (OUTPATIENT)
Dept: LAB | Facility: HOSPITAL | Age: 70
End: 2020-05-28
Payer: MEDICARE

## 2020-05-28 DIAGNOSIS — E78.5 HYPERLIPIDEMIA, UNSPECIFIED HYPERLIPIDEMIA TYPE: ICD-10-CM

## 2020-05-28 DIAGNOSIS — E78.5 HYPERLIPIDEMIA, UNSPECIFIED HYPERLIPIDEMIA TYPE: Primary | ICD-10-CM

## 2020-05-28 DIAGNOSIS — I10 ESSENTIAL HYPERTENSION, MALIGNANT: ICD-10-CM

## 2020-05-28 LAB
ALBUMIN SERPL BCP-MCNC: 3.7 G/DL (ref 3.5–5)
ALP SERPL-CCNC: 84 U/L (ref 46–116)
ALT SERPL W P-5'-P-CCNC: 23 U/L (ref 12–78)
ANION GAP SERPL CALCULATED.3IONS-SCNC: 4 MMOL/L (ref 4–13)
AST SERPL W P-5'-P-CCNC: 12 U/L (ref 5–45)
BASOPHILS # BLD AUTO: 0.06 THOUSANDS/ΜL (ref 0–0.1)
BASOPHILS NFR BLD AUTO: 1 % (ref 0–1)
BILIRUB SERPL-MCNC: 0.43 MG/DL (ref 0.2–1)
BUN SERPL-MCNC: 11 MG/DL (ref 5–25)
CALCIUM SERPL-MCNC: 9.1 MG/DL (ref 8.3–10.1)
CHLORIDE SERPL-SCNC: 98 MMOL/L (ref 100–108)
CHOLEST SERPL-MCNC: 155 MG/DL (ref 50–200)
CO2 SERPL-SCNC: 28 MMOL/L (ref 21–32)
CREAT SERPL-MCNC: 1.09 MG/DL (ref 0.6–1.3)
EOSINOPHIL # BLD AUTO: 0.17 THOUSAND/ΜL (ref 0–0.61)
EOSINOPHIL NFR BLD AUTO: 2 % (ref 0–6)
ERYTHROCYTE [DISTWIDTH] IN BLOOD BY AUTOMATED COUNT: 13.2 % (ref 11.6–15.1)
GFR SERPL CREATININE-BSD FRML MDRD: 68 ML/MIN/1.73SQ M
GLUCOSE P FAST SERPL-MCNC: 88 MG/DL (ref 65–99)
HCT VFR BLD AUTO: 38.2 % (ref 36.5–49.3)
HDLC SERPL-MCNC: 54 MG/DL
HGB BLD-MCNC: 13.4 G/DL (ref 12–17)
IMM GRANULOCYTES # BLD AUTO: 0.02 THOUSAND/UL (ref 0–0.2)
IMM GRANULOCYTES NFR BLD AUTO: 0 % (ref 0–2)
LDLC SERPL CALC-MCNC: 73 MG/DL (ref 0–100)
LYMPHOCYTES # BLD AUTO: 1.73 THOUSANDS/ΜL (ref 0.6–4.47)
LYMPHOCYTES NFR BLD AUTO: 24 % (ref 14–44)
MCH RBC QN AUTO: 33.9 PG (ref 26.8–34.3)
MCHC RBC AUTO-ENTMCNC: 35.1 G/DL (ref 31.4–37.4)
MCV RBC AUTO: 97 FL (ref 82–98)
MONOCYTES # BLD AUTO: 0.72 THOUSAND/ΜL (ref 0.17–1.22)
MONOCYTES NFR BLD AUTO: 10 % (ref 4–12)
NEUTROPHILS # BLD AUTO: 4.47 THOUSANDS/ΜL (ref 1.85–7.62)
NEUTS SEG NFR BLD AUTO: 63 % (ref 43–75)
NONHDLC SERPL-MCNC: 101 MG/DL
NRBC BLD AUTO-RTO: 0 /100 WBCS
PLATELET # BLD AUTO: 200 THOUSANDS/UL (ref 149–390)
PMV BLD AUTO: 9.6 FL (ref 8.9–12.7)
POTASSIUM SERPL-SCNC: 4 MMOL/L (ref 3.5–5.3)
PROT SERPL-MCNC: 6.7 G/DL (ref 6.4–8.2)
RBC # BLD AUTO: 3.95 MILLION/UL (ref 3.88–5.62)
SODIUM SERPL-SCNC: 130 MMOL/L (ref 136–145)
TRIGL SERPL-MCNC: 142 MG/DL
WBC # BLD AUTO: 7.17 THOUSAND/UL (ref 4.31–10.16)

## 2020-05-28 PROCEDURE — 85025 COMPLETE CBC W/AUTO DIFF WBC: CPT

## 2020-05-28 PROCEDURE — 36415 COLL VENOUS BLD VENIPUNCTURE: CPT

## 2020-05-28 PROCEDURE — 80053 COMPREHEN METABOLIC PANEL: CPT

## 2020-05-28 PROCEDURE — 80061 LIPID PANEL: CPT

## 2020-09-02 DIAGNOSIS — K58.2 IRRITABLE BOWEL SYNDROME WITH BOTH CONSTIPATION AND DIARRHEA: ICD-10-CM

## 2020-09-02 RX ORDER — AMITRIPTYLINE HYDROCHLORIDE 25 MG/1
25 TABLET, FILM COATED ORAL
Qty: 90 TABLET | Refills: 3 | Status: SHIPPED | OUTPATIENT
Start: 2020-09-02 | End: 2021-09-20 | Stop reason: SDUPTHER

## 2020-09-02 NOTE — TELEPHONE ENCOUNTER
GI Physician: Dr Baker Esters for Medication: amitriptyline    Dose: 25 mg    Quantity: 90 tablet    Pharmacy and Location: [facility]

## 2020-09-02 NOTE — TELEPHONE ENCOUNTER
EKG 3/19/20 stable and with no significant QT prolongation, okay to continue on amitriptyline  Refills approved

## 2020-09-04 DIAGNOSIS — F41.9 ANXIETY: Primary | ICD-10-CM

## 2020-09-04 RX ORDER — ALPRAZOLAM 1 MG/1
TABLET ORAL
Qty: 60 TABLET | Refills: 0 | Status: SHIPPED | OUTPATIENT
Start: 2020-09-04 | End: 2020-10-07 | Stop reason: SDUPTHER

## 2020-10-07 DIAGNOSIS — F41.9 ANXIETY: ICD-10-CM

## 2020-10-07 RX ORDER — ALPRAZOLAM 1 MG/1
1 TABLET ORAL 2 TIMES DAILY PRN
Qty: 60 TABLET | Refills: 0 | Status: SHIPPED | OUTPATIENT
Start: 2020-10-07 | End: 2020-11-05 | Stop reason: SDUPTHER

## 2020-10-09 ENCOUNTER — OFFICE VISIT (OUTPATIENT)
Dept: INTERNAL MEDICINE CLINIC | Facility: CLINIC | Age: 70
End: 2020-10-09
Payer: MEDICARE

## 2020-10-09 VITALS
SYSTOLIC BLOOD PRESSURE: 130 MMHG | DIASTOLIC BLOOD PRESSURE: 80 MMHG | WEIGHT: 122 LBS | BODY MASS INDEX: 22.45 KG/M2 | HEART RATE: 70 BPM | TEMPERATURE: 96.1 F | HEIGHT: 62 IN

## 2020-10-09 DIAGNOSIS — Z23 NEED FOR INFLUENZA VACCINATION: ICD-10-CM

## 2020-10-09 DIAGNOSIS — E78.5 HYPERLIPIDEMIA, UNSPECIFIED HYPERLIPIDEMIA TYPE: Primary | ICD-10-CM

## 2020-10-09 PROCEDURE — 90662 IIV NO PRSV INCREASED AG IM: CPT

## 2020-10-09 PROCEDURE — G0008 ADMIN INFLUENZA VIRUS VAC: HCPCS

## 2020-10-09 PROCEDURE — 99214 OFFICE O/P EST MOD 30 MIN: CPT | Performed by: INTERNAL MEDICINE

## 2020-10-19 ENCOUNTER — LAB (OUTPATIENT)
Dept: LAB | Facility: HOSPITAL | Age: 70
End: 2020-10-19
Payer: MEDICARE

## 2020-10-19 DIAGNOSIS — I71.4 AAA (ABDOMINAL AORTIC ANEURYSM) WITHOUT RUPTURE (HCC): Primary | ICD-10-CM

## 2020-10-19 DIAGNOSIS — E78.5 HYPERLIPIDEMIA, UNSPECIFIED HYPERLIPIDEMIA TYPE: ICD-10-CM

## 2020-10-19 LAB
ALBUMIN SERPL BCP-MCNC: 3.9 G/DL (ref 3.5–5)
ALP SERPL-CCNC: 101 U/L (ref 46–116)
ALT SERPL W P-5'-P-CCNC: 16 U/L (ref 12–78)
ANION GAP SERPL CALCULATED.3IONS-SCNC: 3 MMOL/L (ref 4–13)
AST SERPL W P-5'-P-CCNC: 13 U/L (ref 5–45)
BILIRUB SERPL-MCNC: 0.46 MG/DL (ref 0.2–1)
BUN SERPL-MCNC: 10 MG/DL (ref 5–25)
CALCIUM SERPL-MCNC: 9.9 MG/DL (ref 8.3–10.1)
CHLORIDE SERPL-SCNC: 98 MMOL/L (ref 100–108)
CHOLEST SERPL-MCNC: 149 MG/DL (ref 50–200)
CO2 SERPL-SCNC: 30 MMOL/L (ref 21–32)
CREAT SERPL-MCNC: 1.26 MG/DL (ref 0.6–1.3)
GFR SERPL CREATININE-BSD FRML MDRD: 57 ML/MIN/1.73SQ M
GLUCOSE P FAST SERPL-MCNC: 103 MG/DL (ref 65–99)
HDLC SERPL-MCNC: 59 MG/DL
LDLC SERPL CALC-MCNC: 56 MG/DL (ref 0–100)
NONHDLC SERPL-MCNC: 90 MG/DL
POTASSIUM SERPL-SCNC: 4.5 MMOL/L (ref 3.5–5.3)
PROT SERPL-MCNC: 7.2 G/DL (ref 6.4–8.2)
SODIUM SERPL-SCNC: 131 MMOL/L (ref 136–145)
TRIGL SERPL-MCNC: 171 MG/DL

## 2020-10-19 PROCEDURE — 80053 COMPREHEN METABOLIC PANEL: CPT

## 2020-10-19 PROCEDURE — 80061 LIPID PANEL: CPT

## 2020-10-19 PROCEDURE — 36415 COLL VENOUS BLD VENIPUNCTURE: CPT

## 2020-10-30 DIAGNOSIS — F32.9 REACTIVE DEPRESSION: Primary | ICD-10-CM

## 2020-10-30 RX ORDER — CITALOPRAM 20 MG/1
20 TABLET ORAL DAILY
Qty: 90 TABLET | Refills: 1 | Status: SHIPPED | OUTPATIENT
Start: 2020-10-30 | End: 2021-04-22

## 2020-11-05 DIAGNOSIS — F41.9 ANXIETY: ICD-10-CM

## 2020-11-06 DIAGNOSIS — R10.30 LOWER ABDOMINAL PAIN: ICD-10-CM

## 2020-11-06 RX ORDER — DICYCLOMINE HYDROCHLORIDE 10 MG/1
10 CAPSULE ORAL 3 TIMES DAILY PRN
Qty: 270 CAPSULE | Refills: 2 | Status: SHIPPED | OUTPATIENT
Start: 2020-11-06 | End: 2021-07-27

## 2020-11-06 RX ORDER — ALPRAZOLAM 1 MG/1
1 TABLET ORAL 2 TIMES DAILY PRN
Qty: 60 TABLET | Refills: 0 | Status: SHIPPED | OUTPATIENT
Start: 2020-11-06 | End: 2020-12-09 | Stop reason: SDUPTHER

## 2020-11-16 DIAGNOSIS — K86.89 PANCREATIC INSUFFICIENCY: ICD-10-CM

## 2020-11-16 RX ORDER — PANCRELIPASE 60000; 12000; 38000 [USP'U]/1; [USP'U]/1; [USP'U]/1
CAPSULE, DELAYED RELEASE PELLETS ORAL
Qty: 540 CAPSULE | Refills: 6 | Status: SHIPPED | OUTPATIENT
Start: 2020-11-16 | End: 2021-08-10

## 2020-12-09 DIAGNOSIS — F41.9 ANXIETY: ICD-10-CM

## 2020-12-09 RX ORDER — ALPRAZOLAM 1 MG/1
1 TABLET ORAL 2 TIMES DAILY PRN
Qty: 60 TABLET | Refills: 0 | Status: SHIPPED | OUTPATIENT
Start: 2020-12-09 | End: 2021-01-08 | Stop reason: SDUPTHER

## 2020-12-15 DIAGNOSIS — E87.1 HYPONATREMIA: Primary | ICD-10-CM

## 2020-12-15 RX ORDER — SODIUM CHLORIDE 1000 MG
TABLET, SOLUBLE MISCELLANEOUS
Qty: 90 TABLET | Refills: 4 | Status: SHIPPED | OUTPATIENT
Start: 2020-12-15 | End: 2021-05-13

## 2020-12-22 DIAGNOSIS — E78.5 HYPERLIPIDEMIA, UNSPECIFIED: ICD-10-CM

## 2020-12-22 RX ORDER — PRAVASTATIN SODIUM 40 MG
TABLET ORAL
Qty: 90 TABLET | Refills: 2 | Status: SHIPPED | OUTPATIENT
Start: 2020-12-22 | End: 2021-09-19

## 2020-12-28 DIAGNOSIS — N40.1 BENIGN PROSTATIC HYPERPLASIA WITH LOWER URINARY TRACT SYMPTOMS, SYMPTOM DETAILS UNSPECIFIED: Primary | ICD-10-CM

## 2020-12-28 RX ORDER — TAMSULOSIN HYDROCHLORIDE 0.4 MG/1
0.4 CAPSULE ORAL DAILY
Qty: 90 CAPSULE | Refills: 1 | Status: SHIPPED | OUTPATIENT
Start: 2020-12-28 | End: 2021-06-19

## 2021-01-08 DIAGNOSIS — F41.9 ANXIETY: ICD-10-CM

## 2021-01-08 RX ORDER — ALPRAZOLAM 1 MG/1
1 TABLET ORAL 2 TIMES DAILY PRN
Qty: 60 TABLET | Refills: 0 | Status: SHIPPED | OUTPATIENT
Start: 2021-01-08 | End: 2021-02-05 | Stop reason: SDUPTHER

## 2021-01-11 DIAGNOSIS — I10 ESSENTIAL HYPERTENSION: Primary | ICD-10-CM

## 2021-01-11 RX ORDER — AMLODIPINE BESYLATE 5 MG/1
5 TABLET ORAL DAILY
Qty: 90 TABLET | Refills: 1 | Status: SHIPPED | OUTPATIENT
Start: 2021-01-11 | End: 2021-07-03

## 2021-02-05 DIAGNOSIS — K21.9 GERD WITHOUT ESOPHAGITIS: ICD-10-CM

## 2021-02-05 DIAGNOSIS — F41.9 ANXIETY: ICD-10-CM

## 2021-02-05 DIAGNOSIS — I10 ESSENTIAL HYPERTENSION: Primary | ICD-10-CM

## 2021-02-05 RX ORDER — OMEPRAZOLE 20 MG/1
20 CAPSULE, DELAYED RELEASE ORAL DAILY
Qty: 90 CAPSULE | Refills: 3 | Status: SHIPPED | OUTPATIENT
Start: 2021-02-05 | End: 2022-01-25

## 2021-02-05 RX ORDER — METOPROLOL SUCCINATE 25 MG/1
TABLET, EXTENDED RELEASE ORAL
Qty: 90 TABLET | Refills: 2 | Status: SHIPPED | OUTPATIENT
Start: 2021-02-05 | End: 2021-11-04 | Stop reason: SDUPTHER

## 2021-02-05 RX ORDER — ALPRAZOLAM 1 MG/1
1 TABLET ORAL 2 TIMES DAILY PRN
Qty: 60 TABLET | Refills: 0 | Status: SHIPPED | OUTPATIENT
Start: 2021-02-05 | End: 2021-03-12 | Stop reason: SDUPTHER

## 2021-02-05 NOTE — TELEPHONE ENCOUNTER
GI Physician: Dr Lavon Homans for Medication: omeprazole     Dose: 20 mg daily    Quantity: 90 day supply w/ refills      Pharmacy and Location: [facility] [phone]

## 2021-02-05 NOTE — TELEPHONE ENCOUNTER
Patient hx gerd  Last office visit 3/9/20- omeprazole 20 mg daily and symptoms controlled       Script entered

## 2021-02-24 ENCOUNTER — OFFICE VISIT (OUTPATIENT)
Dept: INTERNAL MEDICINE CLINIC | Facility: CLINIC | Age: 71
End: 2021-02-24
Payer: MEDICARE

## 2021-02-24 VITALS
SYSTOLIC BLOOD PRESSURE: 110 MMHG | BODY MASS INDEX: 23 KG/M2 | WEIGHT: 125 LBS | OXYGEN SATURATION: 98 % | HEART RATE: 79 BPM | TEMPERATURE: 98 F | HEIGHT: 62 IN | DIASTOLIC BLOOD PRESSURE: 70 MMHG

## 2021-02-24 DIAGNOSIS — Z00.00 MEDICARE ANNUAL WELLNESS VISIT, SUBSEQUENT: ICD-10-CM

## 2021-02-24 DIAGNOSIS — Z11.59 NEED FOR HEPATITIS C SCREENING TEST: Primary | ICD-10-CM

## 2021-02-24 DIAGNOSIS — I74.5 EMBOLISM AND THROMBOSIS OF ILIAC ARTERY (HCC): ICD-10-CM

## 2021-02-24 DIAGNOSIS — K86.1 CHRONIC PANCREATITIS, UNSPECIFIED PANCREATITIS TYPE (HCC): ICD-10-CM

## 2021-02-24 PROCEDURE — 1123F ACP DISCUSS/DSCN MKR DOCD: CPT | Performed by: INTERNAL MEDICINE

## 2021-02-24 PROCEDURE — G0438 PPPS, INITIAL VISIT: HCPCS | Performed by: INTERNAL MEDICINE

## 2021-02-24 NOTE — PROGRESS NOTES
Assessment and Plan:     Problem List Items Addressed This Visit     None      Visit Diagnoses     Need for hepatitis C screening test    -  Primary           Preventive health issues were discussed with patient, and age appropriate screening tests were ordered as noted in patient's After Visit Summary  Personalized health advice and appropriate referrals for health education or preventive services given if needed, as noted in patient's After Visit Summary  History of Present Illness:     Patient presents for Medicare Annual Wellness visit    Patient Care Team:  Tamara Roca MD as PCP - LILIANE Scott MD (Vascular Surgery)  MD Sameera Son MD as Endoscopist     Problem List:     Patient Active Problem List   Diagnosis    AAA (abdominal aortic aneurysm) without rupture ([facility] )    Epigastric pain    GERD (gastroesophageal reflux disease)    Diarrhea    Irritable bowel syndrome with both constipation and diarrhea    Other chronic pancreatitis ([facility] )    Smoking      Past Medical and Surgical History:     Past Medical History:   Diagnosis Date    Anxiety     Rhodes's esophagus     Chronic neck pain     Depression     GERD (gastroesophageal reflux disease)     H/O abdominal aortic aneurysm 4 5 cm    Hyperlipidemia     Hypertension     Pancreatic pseudocyst     Vertigo      Past Surgical History:   Procedure Laterality Date    COLONOSCOPY  09/30/2014    IR LOWER EXTREMITY / INTERVENTION  1/4/2019    NOSE SURGERY      PA ESOPHAGOGASTRODUODENOSCOPY TRANSORAL DIAGNOSTIC N/A 6/22/2018    Procedure: EGD AND COLONOSCOPY;  Surgeon: Sameera Christensen MD;  [facility]; Service: Gastroenterology    [place] [address] R[address] DPLMNT AORTO-AORTIC NDGFT N/A 2/9/2018    Procedure: REPAIR ANEURYSM ENDOVASCULAR ABDOMINAL AORTIC  (EVAR);   Surgeon: Alanna Arriaga MD;  [facility];  Service: Vascular    UPPER GASTROINTESTINAL ENDOSCOPY        Family History:     Family History Problem Relation Age of Onset    Heart disease Father     Heart attack Father     Diabetes Maternal Grandmother     Diabetes Maternal Grandfather     Diabetes Paternal Grandmother     Hypertension Family     Hyperlipidemia Family       Social History:        Social History     Socioeconomic History    Marital status:       Spouse name: None    Number of children: None    Years of education: None    Highest education level: None   Occupational History    None   Social Needs    Financial resource strain: None    Food insecurity     Worry: None     Inability: None    Transportation needs     Medical: None     Non-medical: None   Tobacco Use    Smoking status: Current Every Day Smoker     Packs/day: 1 00     Types: Cigarettes    Smokeless tobacco: Never Used    Tobacco comment: [place] quiting smoking handouts   Substance and Sexual Activity    Alcohol use: No    Drug use: No    Sexual activity: Not Currently   Lifestyle    Physical activity     Days per week: None     Minutes per session: None    Stress: None   Relationships    Social connections     Talks on phone: None     Gets together: None     Attends Protestant service: None     Active member of club or organization: None     Attends meetings of clubs or organizations: None     Relationship status: None    Intimate partner violence     Fear of current or ex partner: None     Emotionally abused: None     Physically abused: None     Forced sexual activity: None   Other Topics Concern    None   Social History Narrative    None      Medications and Allergies:     Current Outpatient Medications   Medication Sig Dispense Refill    ALPRAZolam (XANAX) 1 mg tablet Take 1 tablet (1 mg total) by mouth 2 (two) times a day as needed (transient anxiety) 60 tablet 0    amitriptyline (ELAVIL) 25 mg tablet Take 1 tablet (25 mg total) by mouth daily at bedtime 90 tablet 3    amLODIPine (NORVASC) 5 mg tablet Take 1 tablet (5 mg total) by mouth daily 90 tablet 1    citalopram (CeleXA) 20 mg tablet Take 1 tablet (20 mg total) by mouth daily 90 tablet 1    dicyclomine (BENTYL) 10 mg capsule Take 1 capsule (10 mg total) by mouth 3 (three) times a day as needed (abdominal cramping/ spasms) 270 capsule 2    lisinopril-hydrochlorothiazide (PRINZIDE,ZESTORETIC) 20-12 5 MG per tablet       metoprolol succinate (TOPROL-XL) 25 mg 24 hr tablet TAKE 1 TABLET BY MOUTH EVERY DAY 90 tablet 2    Multiple Vitamins-Minerals (CENTRUM SILVER PO) Take 1 tablet by mouth daily        pancrelipase, Lip-Prot-Amyl, (Creon) 12,000 units capsule TAKE 2 CAPSULES BY MOUTH 3 TIMES DAILY WITH MEALS  540 capsule 6    polyethylene glycol (MIRALAX) 17 g packet Take 17 g by mouth      pravastatin (PRAVACHOL) 40 mg tablet TAKE 1 TABLET BY MOUTH AT BEDTIME 90 tablet 2    sodium chloride 1 g tablet TAKE 3 TABLETS EVERY DAY 90 tablet 4    tamsulosin (FLOMAX) 0 4 mg Take 1 capsule (0 4 mg total) by mouth daily 90 capsule 1    Wheat Dextrin (BENEFIBER PO) Take by mouth daily as needed        Linaclotide (LINZESS) 72 MCG CAPS Take 1 capsule by mouth daily (Patient not taking: Reported on 9/9/2019) 30 capsule 5    lisinopril (ZESTRIL) 40 mg tablet Take 40 mg by mouth daily      omeprazole (PriLOSEC) 20 mg delayed release capsule Take 1 capsule (20 mg total) by mouth daily (Patient not taking: Reported on 2/24/2021) 90 capsule 3    ranitidine (ZANTAC) 150 mg tablet TAKE 1 TABLET EVERY DAY IN THE EVENING (Patient not taking: Reported on 3/9/2020) 90 tablet 2     No current facility-administered medications for this visit        No Known Allergies   Immunizations:     Immunization History   Administered Date(s) Administered    Influenza, high dose seasonal 0 7 mL 10/09/2020      Health Maintenance:         Topic Date Due    Hepatitis C Screening  1950    Colorectal Cancer Screening  06/22/2028         Topic Date Due    DTaP,Tdap,and Td Vaccines (1 - Tdap) 03/10/1970    Pneumococcal Vaccine: 65+ Years (1 of 1 - PPSV23) 03/10/2014      Medicare Health Risk Assessment:     /70 (BP Location: Right arm, Patient Position: Sitting, Cuff Size: Standard)   Pulse 79   Temp 98 °F (36 7 °C) (Temporal)   Ht 5' 2" (1 575 m)   Wt 56 7 kg (125 lb)   SpO2 98%   BMI 22 86 kg/m²          Health Risk Assessment:   Patient rates overall health as fair  Patient feels that their physical health rating is same  Eyesight was rated as same  Hearing was rated as slightly worse  Patient feels that their emotional and mental health rating is same  Pain experienced in the last 7 days has been none  Patient states that he has experienced no weight loss or gain in last 6 months  Depression Screening:   PHQ-2 Score: 0      Fall Risk Screening: In the past year, patient has experienced: no history of falling in past year      Home Safety:  Patient has trouble with stairs inside or outside of their home  Patient has working smoke alarms and has working carbon monoxide detector  Home safety hazards include: none  Nutrition:   Current diet is Regular  Medications:   Patient is currently taking over-the-counter supplements  OTC medications include: see medication list  Patient is able to manage medications  Activities of Daily Living (ADLs)/Instrumental Activities of Daily Living (IADLs):   Walk and transfer into and out of bed and chair?: Yes  Dress and groom yourself?: Yes    Bathe or shower yourself?: Yes    Feed yourself? Yes  Do your laundry/housekeeping?: Yes  Manage your money, pay your bills and track your expenses?: Yes  Make your own meals?: Yes    Do your own shopping?: Yes    Previous Hospitalizations:   Any hospitalizations or ED visits within the last 12 months?: No      Advance Care Planning:   Living will: Yes    Durable POA for healthcare:  Yes    Advanced directive: Yes      PREVENTIVE SCREENINGS      Cardiovascular Screening:    General: Screening Not Indicated and History Lipid Disorder      Diabetes Screening:     General: Screening Current      Colorectal Cancer Screening:     General: Screening Current      Abdominal Aortic Aneurysm (AAA) Screening:    Risk factors include: age between 73-69 yo and tobacco use        General: Screening Not Indicated and History AAA      Lung Cancer Screening:     General: Screening Not Indicated      Yandy Rojas MD

## 2021-02-24 NOTE — PATIENT INSTRUCTIONS
Medicare Preventive Visit Patient Instructions  Thank you for completing your Welcome to Medicare Visit or Medicare Annual Wellness Visit today  Your next wellness visit will be due in one year (2/24/2022)  The screening/preventive services that you may require over the next 5-10 years are detailed below  Some tests may not apply to you based off risk factors and/or age  Screening tests ordered at today's visit but not completed yet may show as past due  Also, please note that scanned in results may not display below  Preventive Screenings:  Service Recommendations Previous Testing/Comments   Colorectal Cancer Screening  · Colonoscopy    · Fecal Occult Blood Test (FOBT)/Fecal Immunochemical Test (FIT)  · Fecal DNA/Cologuard Test  · Flexible Sigmoidoscopy Age: 54-65 years old   Colonoscopy: every 10 years (May be performed more frequently if at higher risk)  OR  FOBT/FIT: every 1 year  OR  Cologuard: every 3 years  OR  Sigmoidoscopy: every 5 years  Screening may be recommended earlier than age 48 if at higher risk for colorectal cancer  Also, an individualized decision between you and your healthcare provider will decide whether screening between the ages of 74-80 would be appropriate  Colonoscopy: 06/22/2018  FOBT/FIT: Not on file  Cologuard: Not on file  Sigmoidoscopy: Not on file    Screening Current     Prostate Cancer Screening Individualized decision between patient and health care provider in men between ages of 53-78   Medicare will cover every 12 months beginning on the day after your 50th birthday PSA: 0 4 ng/mL          Hepatitis C Screening Once for adults born between 1945 and 1965  More frequently in patients at high risk for Hepatitis C Hep C Antibody: Not on file       Diabetes Screening 1-2 times per year if you're at risk for diabetes or have pre-diabetes Fasting glucose: 103 mg/dL   A1C: 5 8 %    Screening Current   Cholesterol Screening Once every 5 years if you don't have a lipid disorder  May order more often based on risk factors  Lipid panel: 10/19/2020    Screening Not Indicated  History Lipid Disorder      Other Preventive Screenings Covered by Medicare:  1  Abdominal Aortic Aneurysm (AAA) Screening: covered once if your at risk  You're considered to be at risk if you have a family history of AAA or a male between the age of 73-68 who smoking at least 100 cigarettes in your lifetime  2  Lung Cancer Screening: covers low dose CT scan once per year if you meet all of the following conditions: (1) Age 50-69; (2) No signs or symptoms of lung cancer; (3) Current smoker or have quit smoking within the last 15 years; (4) You have a tobacco smoking history of at least 30 pack years (packs per day x number of years you smoked); (5) You get a written order from a healthcare provider  3  Glaucoma Screening: covered annually if you're considered high risk: (1) You have diabetes OR (2) Family history of glaucoma OR (3)  aged 48 and older OR (3)  American aged 72 and older  3  Osteoporosis Screening: covered every 2 years if you meet one of the following conditions: (1) Have a vertebral abnormality; (2) On glucocorticoid therapy for more than 3 months; (3) Have primary hyperparathyroidism; (4) On osteoporosis medications and need to assess response to drug therapy  5  HIV Screening: covered annually if you're between the age of 12-76  Also covered annually if you are younger than 13 and older than 72 with risk factors for HIV infection  For pregnant patients, it is covered up to 3 times per pregnancy      Immunizations:  Immunization Recommendations   Influenza Vaccine Annual influenza vaccination during flu season is recommended for all persons aged >= 6 months who do not have contraindications   Pneumococcal Vaccine (Prevnar and Pneumovax)  * Prevnar = PCV13  * Pneumovax = PPSV23 Adults 25-60 years old: 1-3 doses may be recommended based on certain risk factors  Adults 65+ years old: Prevnar (PCV13) vaccine recommended followed by Pneumovax (PPSV23) vaccine  If already received PPSV23 since turning 65, then PCV13 recommended at least one year after PPSV23 dose  Hepatitis B Vaccine 3 dose series if at intermediate or high risk (ex: diabetes, end stage renal disease, liver disease)   Tetanus (Td) Vaccine - COST NOT COVERED BY MEDICARE PART B Following completion of primary series, a booster dose should be given every 10 years to maintain immunity against tetanus  Td may also be given as tetanus wound prophylaxis  Tdap Vaccine - COST NOT COVERED BY MEDICARE PART B Recommended at least once for all adults  For pregnant patients, recommended with each pregnancy  Shingles Vaccine (Shingrix) - COST NOT COVERED BY MEDICARE PART B  2 shot series recommended in those aged 48 and above     Health Maintenance Due:      Topic Date Due    Hepatitis C Screening  1950    Colorectal Cancer Screening  06/22/2028     Immunizations Due:      Topic Date Due    DTaP,Tdap,and Td Vaccines (1 - Tdap) 03/10/1970    Pneumococcal Vaccine: 65+ Years (1 of 1 - PPSV23) 03/10/2014     Advance Directives   What are advance directives? Advance directives are legal documents that state your wishes and plans for medical care  These plans are made ahead of time in case you lose your ability to make decisions for yourself  Advance directives can apply to any medical decision, such as the treatments you want, and if you want to donate organs  What are the types of advance directives? There are many types of advance directives, and each state has rules about how to use them  You may choose a combination of any of the following:  · Living will: This is a written record of the treatment you want  You can also choose which treatments you do not want, which to limit, and which to stop at a certain time  This includes surgery, medicine, IV fluid, and tube feedings     · Durable power of  for healthcare [facility]): This is a written record that states who you want to make healthcare choices for you when you are unable to make them for yourself  This person, called a proxy, is usually a family member or a friend  You may choose more than 1 proxy  · Do not resuscitate (DNR) order:  A DNR order is used in case your heart stops beating or you stop breathing  It is a request not to have certain forms of treatment, such as CPR  A DNR order may be included in other types of advance directives  · Medical directive: This covers the care that you want if you are in a coma, near death, or unable to make decisions for yourself  You can list the treatments you want for each condition  Treatment may include pain medicine, surgery, blood transfusions, dialysis, IV or tube feedings, and a ventilator (breathing machine)  · Values history: This document has questions about your views, beliefs, and how you feel and think about life  This information can help others choose the care that you would choose  Why are advance directives important? An advance directive helps you control your care  Although spoken wishes may be used, it is better to have your wishes written down  Spoken wishes can be misunderstood, or not followed  Treatments may be given even if you do not want them  An advance directive may make it easier for your family to make difficult choices about your care  Cigarette Smoking and Your Health   Risks to your health if you smoke:  Nicotine and other chemicals found in tobacco damage every cell in your body  Even if you are a light smoker, you have an increased risk for cancer, heart disease, and lung disease  If you are pregnant or have diabetes, smoking increases your risk for complications  Benefits to your health if you stop smoking:   · You decrease respiratory symptoms such as coughing, wheezing, and shortness of breath     · You reduce your risk for cancers of the lung, mouth, throat, kidney, bladder, pancreas, stomach, and cervix  If you already have cancer, you increase the benefits of chemotherapy  You also reduce your risk for cancer returning or a second cancer from developing  · You reduce your risk for heart disease, blood clots, heart attack, and stroke  · You reduce your risk for lung infections, and diseases such as pneumonia, asthma, chronic bronchitis, and emphysema  · Your circulation improves  More oxygen can be delivered to your body  If you have diabetes, you lower your risk for complications, such as kidney, artery, and eye diseases  You also lower your risk for nerve damage  Nerve damage can lead to amputations, poor vision, and blindness  · You improve your body's ability to heal and to fight infections  For more information and support to stop smoking:   · [employer]  gov  Phone: 7- 628 - 469-5897  Web Address: www [employer]  [address] [place], [place] [address] 2018 Information is for End User's use only and may not be sold, redistributed or otherwise used for commercial purposes   All illustrations and images included in CareNotes® are the copyrighted property of [employer]  or [address] [place] [address]

## 2021-03-03 ENCOUNTER — LAB (OUTPATIENT)
Dept: LAB | Facility: HOSPITAL | Age: 71
End: 2021-03-03
Payer: MEDICARE

## 2021-03-03 DIAGNOSIS — Z11.59 NEED FOR HEPATITIS C SCREENING TEST: ICD-10-CM

## 2021-03-03 LAB — HCV AB SER QL: NORMAL

## 2021-03-03 PROCEDURE — 36415 COLL VENOUS BLD VENIPUNCTURE: CPT

## 2021-03-03 PROCEDURE — 86803 HEPATITIS C AB TEST: CPT

## 2021-03-04 ENCOUNTER — TELEPHONE (OUTPATIENT)
Dept: GASTROENTEROLOGY | Facility: CLINIC | Age: 71
End: 2021-03-04

## 2021-03-04 NOTE — TELEPHONE ENCOUNTER
Form from the [place] Department of Aging filled,signed and faxed to ([phone])  This form was in reference to the medication Amitriptyline 25 mg tablet

## 2021-03-09 ENCOUNTER — IMMUNIZATIONS (OUTPATIENT)
Dept: FAMILY MEDICINE CLINIC | Facility: HOSPITAL | Age: 71
End: 2021-03-09

## 2021-03-09 DIAGNOSIS — Z23 ENCOUNTER FOR IMMUNIZATION: Primary | ICD-10-CM

## 2021-03-09 PROCEDURE — 91300 SARS-COV-2 / COVID-19 MRNA VACCINE (PFIZER-BIONTECH) 30 MCG: CPT

## 2021-03-09 PROCEDURE — 0001A SARS-COV-2 / COVID-19 MRNA VACCINE (PFIZER-BIONTECH) 30 MCG: CPT

## 2021-03-12 DIAGNOSIS — F41.9 ANXIETY: ICD-10-CM

## 2021-03-12 RX ORDER — ALPRAZOLAM 1 MG/1
1 TABLET ORAL 2 TIMES DAILY PRN
Qty: 60 TABLET | Refills: 0 | Status: SHIPPED | OUTPATIENT
Start: 2021-03-12 | End: 2021-04-09 | Stop reason: SDUPTHER

## 2021-03-20 DIAGNOSIS — I10 ESSENTIAL HYPERTENSION: Primary | ICD-10-CM

## 2021-03-20 RX ORDER — LISINOPRIL AND HYDROCHLOROTHIAZIDE 20; 12.5 MG/1; MG/1
TABLET ORAL
Qty: 90 TABLET | Refills: 2 | Status: SHIPPED | OUTPATIENT
Start: 2021-03-20 | End: 2021-12-06

## 2021-03-22 ENCOUNTER — HOSPITAL ENCOUNTER (OUTPATIENT)
Dept: NON INVASIVE DIAGNOSTICS | Age: 71
Discharge: HOME | End: 2021-03-22
Payer: MEDICARE

## 2021-03-22 DIAGNOSIS — I71.4 AAA (ABDOMINAL AORTIC ANEURYSM) WITHOUT RUPTURE (HCC): ICD-10-CM

## 2021-03-22 PROCEDURE — 93978 VASCULAR STUDY: CPT

## 2021-03-22 PROCEDURE — 93978 VASCULAR STUDY: CPT | Performed by: SURGERY

## 2021-03-22 PROCEDURE — 93923 UPR/LXTR ART STDY 3+ LVLS: CPT

## 2021-03-23 ENCOUNTER — TELEPHONE (OUTPATIENT)
Dept: VASCULAR SURGERY | Facility: CLINIC | Age: 71
End: 2021-03-23

## 2021-03-23 NOTE — TELEPHONE ENCOUNTER
----- Message from Ebony Carcamo sent at 3/23/2021  1:28 PM EDT -----  Call patient for OV with Pricila Deras

## 2021-03-23 NOTE — TELEPHONE ENCOUNTER
Patient was last seen by Dr Mary Andrade in 2019  Per consensus patient to see him but patient is only willing to go to [place]    Patient is scheduled with Dr Esteban Enriquez on 4/28 in [place]

## 2021-03-25 ENCOUNTER — TRANSCRIBE ORDERS (OUTPATIENT)
Dept: GASTROENTEROLOGY | Facility: CLINIC | Age: 71
End: 2021-03-25

## 2021-03-31 ENCOUNTER — IMMUNIZATIONS (OUTPATIENT)
Dept: FAMILY MEDICINE CLINIC | Facility: HOSPITAL | Age: 71
End: 2021-03-31

## 2021-03-31 DIAGNOSIS — Z23 ENCOUNTER FOR IMMUNIZATION: Primary | ICD-10-CM

## 2021-03-31 PROCEDURE — 91300 SARS-COV-2 / COVID-19 MRNA VACCINE (PFIZER-BIONTECH) 30 MCG: CPT

## 2021-03-31 PROCEDURE — 0002A SARS-COV-2 / COVID-19 MRNA VACCINE (PFIZER-BIONTECH) 30 MCG: CPT

## 2021-04-09 DIAGNOSIS — F41.9 ANXIETY: ICD-10-CM

## 2021-04-09 RX ORDER — ALPRAZOLAM 1 MG/1
1 TABLET ORAL 2 TIMES DAILY PRN
Qty: 60 TABLET | Refills: 0 | Status: SHIPPED | OUTPATIENT
Start: 2021-04-09 | End: 2021-05-06 | Stop reason: SDUPTHER

## 2021-04-22 DIAGNOSIS — F32.9 REACTIVE DEPRESSION: ICD-10-CM

## 2021-04-22 RX ORDER — CITALOPRAM 20 MG/1
TABLET ORAL
Qty: 90 TABLET | Refills: 1 | Status: SHIPPED | OUTPATIENT
Start: 2021-04-22 | End: 2021-10-28 | Stop reason: SDUPTHER

## 2021-04-28 ENCOUNTER — OFFICE VISIT (OUTPATIENT)
Dept: VASCULAR SURGERY | Facility: CLINIC | Age: 71
End: 2021-04-28
Payer: MEDICARE

## 2021-04-28 VITALS
SYSTOLIC BLOOD PRESSURE: 112 MMHG | WEIGHT: 125 LBS | HEART RATE: 81 BPM | TEMPERATURE: 96.8 F | DIASTOLIC BLOOD PRESSURE: 60 MMHG | RESPIRATION RATE: 17 BRPM | HEIGHT: 62 IN | BODY MASS INDEX: 23 KG/M2

## 2021-04-28 DIAGNOSIS — G95.19 NEUROGENIC CLAUDICATION (HCC): ICD-10-CM

## 2021-04-28 DIAGNOSIS — T82.858D STENOSIS OF OTHER VASCULAR PROSTHETIC DEVICES, IMPLANTS AND GRAFTS, SUBSEQUENT ENCOUNTER: ICD-10-CM

## 2021-04-28 DIAGNOSIS — F17.200 SMOKING: ICD-10-CM

## 2021-04-28 DIAGNOSIS — I71.4 AAA (ABDOMINAL AORTIC ANEURYSM) WITHOUT RUPTURE (HCC): ICD-10-CM

## 2021-04-28 DIAGNOSIS — Z95.828 HISTORY OF ENDOVASCULAR STENT GRAFT FOR ABDOMINAL AORTIC ANEURYSM (AAA): Primary | ICD-10-CM

## 2021-04-28 PROBLEM — I74.5 EMBOLISM AND THROMBOSIS OF ILIAC ARTERY (HCC): Status: RESOLVED | Noted: 2021-02-24 | Resolved: 2021-04-28

## 2021-04-28 PROBLEM — R29.818 NEUROGENIC CLAUDICATION: Status: ACTIVE | Noted: 2021-04-28

## 2021-04-28 PROCEDURE — 99214 OFFICE O/P EST MOD 30 MIN: CPT | Performed by: SURGERY

## 2021-04-28 RX ORDER — ASPIRIN 81 MG/1
81 TABLET ORAL DAILY
COMMUNITY

## 2021-04-28 NOTE — ASSESSMENT & PLAN NOTE
Chronic smoker 12 cigarettes daily  Has been unable to quit/cutdown  Has been counseled by many practitioners on importance of complete smoking cessation, particularly in relation to history of aneurysmal disease  Understands recommendations and will work on his own  Not interested in pharmacologic agents

## 2021-04-28 NOTE — ASSESSMENT & PLAN NOTE
Longstanding chronic back pain with radiating pain after walking 2 blocks down both lower extremities  No change after treatment of left external iliac artery dissection/stenosis  Recommend further evaluation/work-up per PCP  Does not walk much and prefers to stay inside, smoke, watch TV  Does not seem very motivated to walk more but perhaps limited by neurogenic claudication symptoms

## 2021-04-28 NOTE — LETTER
April 28, 2021     Nancy Baron MD  [address] [address]    Patient: Nalini Kennedy   YOB: 1950   Date of Visit: 4/28/2021       Dear Dr Resendiz Moder: Thank you for referring Nalini Kennedy to me for evaluation  Below are the relevant portions of my assessment and plan of care  Diagnoses and all orders for this visit:    History of endovascular stent graft for abdominal aortic aneurysm (AAA)  History of EVAR in 2018 with Dr Alfredo Baig for 5 6cm AAA  Aneurysm sac size is decreased to 4 1cm from 4 4cm one year ago  Had evidence of post-op elevated velocities at the distal left iliac limb with >75% stenosis  This was subsequently treated in 2019 and identified 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 with angioplasty and stent placement  He has had persistently elevated velocities in the same segment since treatment  Symptoms before and after treatment are unchanged and inconsistent with arterial claudication  He reports aching pains radiating from his lower back down both lower extremities which has been ongoing for years  He continues to smoke 12 cigarettes daily      -We discussed the pathophysiology of aneurysmal disease and indications for continued surveillance/management after EVAR  He will need a 6 month surveillance EVAR duplex as part of EVAR surveillance protocol   -Discussed potential issues with limb occlusion due to persistent stenosis and counseled on signs/symptoms  Advised to call the office if he develops new worsening claudication symptoms or go to the ED if he develops severe pain or motor/sensory deficits   -Counseled on importance of complete smoking cessation in setting of known aneurysmal disease  He does not want to take any medications and states he will work on this on his own    -Return for office follow-up for continued surveillance/risk factor modification in 1 year        Stenosis of other vascular prosthetic devices, implants and grafts, subsequent encounter  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 treated with angioplasty/stenting in 2019 with Dr Trina Melendez  No arterial claudication symptoms  Has chronic bilateral leg pain radiating from lower back down both legs consistent with neurogenic claudication  Has easily palpable pedal pulses  See section on history of endovascular stent graft repair for AAA      Smoking  Chronic smoker 12 cigarettes daily  Has been unable to quit/cutdown  Has been counseled by many practitioners on importance of complete smoking cessation, particularly in relation to history of aneurysmal disease  Understands recommendations and will work on his own  Not interested in pharmacologic agents      Neurogenic claudication  Longstanding chronic back pain with radiating pain after walking 2 blocks down both lower extremities  No change after treatment of left external iliac artery dissection/stenosis  Recommend further evaluation/work-up per PCP  Does not walk much and prefers to stay inside, smoke, watch TV  Does not seem very motivated to walk more but perhaps limited by neurogenic claudication symptoms          If you have questions, please do not hesitate to call me  I look forward to following Maribell Campbell along with you           Sincerely,        Emilia Pierce MD        CC: No Recipients

## 2021-04-28 NOTE — PROGRESS NOTES
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 with angioplasty and stent placement  He has had persistently elevated velocities in the same segment since treatment  Symptoms before and after treatment are unchanged and inconsistent with arterial claudication  He reports aching pains radiating from his lower back down both lower extremities which has been ongoing for years  He continues to smoke 12 cigarettes daily     -We discussed the pathophysiology of aneurysmal disease and indications for continued surveillance/management after EVAR  He will need a 6 month surveillance EVAR duplex as part of EVAR surveillance protocol   -Discussed potential issues with limb occlusion due to persistent stenosis and counseled on signs/symptoms  Advised to call the office if he develops new worsening claudication symptoms or go to the ED if he develops severe pain or motor/sensory deficits   -Counseled on importance of complete smoking cessation in setting of known aneurysmal disease  He does not want to take any medications and states he will work on this on his own  -Return for office follow-up for continued surveillance/risk factor modification in 1 year      Stenosis of other vascular prosthetic devices, implants and grafts, subsequent encounter  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 treated with angioplasty/stenting in 2019 with Dr Donna Timmons  No arterial claudication symptoms  Has chronic bilateral leg pain radiating from lower back down both legs consistent with neurogenic claudication   Has easily palpable pedal pulses  See section on history of endovascular stent graft repair for AAA  Smoking  Chronic smoker 12 cigarettes daily  Has been unable to quit/cutdown  Has been counseled by many practitioners on importance of complete smoking cessation, particularly in relation to history of aneurysmal disease  Understands recommendations and will work on his own  Not interested in pharmacologic agents  Neurogenic claudication  Longstanding chronic back pain with radiating pain after walking 2 blocks down both lower extremities  No change after treatment of left external iliac artery dissection/stenosis  Recommend further evaluation/work-up per PCP  Does not walk much and prefers to stay inside, smoke, watch TV  Does not seem very motivated to walk more but perhaps limited by neurogenic claudication symptoms  Diagnoses and all orders for this visit:    History of endovascular stent graft for abdominal aortic aneurysm (AAA)  -     VAS evar endovascular aortic repair duplex; Future    AAA (abdominal aortic aneurysm) without rupture (HCC)    Stenosis of other vascular prosthetic devices, implants and grafts, subsequent encounter    Smoking    Neurogenic claudication    Other orders  -     aspirin (ECOTRIN LOW STRENGTH) 81 mg EC tablet; Take 81 mg by mouth daily        I have spent 25 minutes with Patient  today in which greater than 50% of this time was spent in counseling/coordination of care regarding Intructions for management, Importance of tx compliance and Impressions  Subjective:      Patient ID: Lolis Jiang is a 66 y o  male  Patient presents in office for AAA  Patient had a EVAR Duplex done on 03/22/2021  Patient did have EVAR surgery by Dr Rosales Client on 02/09/2018  Patient c o of having lower back pain  Patient is c o of having bilateral pain in calves when walking 2 blocks  Patient reports that when he blends over he gets these dizzy spells and Sob for the last year    Patient denies having change in appetite and pain after eating  No numbness or tingling  Patient is currently taking pravastatin and ASA 81  HPI  Mr Laurent Culver is a 64yo male smoker with GERD, history of AAA s/p EVAR, left external iliac artery dissection s/p PTA/stenting, chronic neurogenic claudication and back pain who presents for follow-up to review surveillance EVAR duplex  He reports chronic back pain and bilateral leg discomfort going from both thighs down the legs  He cannot walk longer than 2 blocks before having to stop  He also reports orthostatic hypotension type symptoms with shortness of breath when bending over or arising from sitting/laying position  He denies any abdominal pain  Denies food fear or weight loss  He does not typically eat very much food but denies loss of appetite  He denies paresthesias in his legs  He continues to smoke 12 cigarettes daily  The following portions of the patient's history were reviewed and updated as appropriate: allergies, current medications, past family history, past medical history, past social history, past surgical history and problem list     Review of Systems   Constitutional: Negative  HENT: Negative  Eyes: Negative  Respiratory: Negative  Cardiovascular: Negative  Gastrointestinal: Negative  Endocrine: Negative  Genitourinary: Negative  Musculoskeletal: Positive for back pain  Skin: Negative  Allergic/Immunologic: Negative  Neurological: Negative  Hematological: Negative  Psychiatric/Behavioral: Negative  I have personally reviewed the ROS entered by MA and agree as documented  Objective:      /60 (BP Location: Right arm, Patient Position: Sitting, Cuff Size: Adult)   Pulse 81   Temp (!) 96 8 °F (36 °C) (Tympanic)   Resp 17   Ht 5' 2" (1 575 m)   Wt 56 7 kg (125 lb)   BMI 22 86 kg/m²          Physical Exam  Constitutional:       Appearance: Normal appearance     HENT:      Head: Normocephalic and atraumatic  Neck:      Musculoskeletal: Normal range of motion  Cardiovascular:      Rate and Rhythm: Normal rate  Pulses:           Dorsalis pedis pulses are 2+ on the right side and 2+ on the left side  Posterior tibial pulses are 1+ on the right side and 1+ on the left side  Pulmonary:      Effort: Pulmonary effort is normal    Abdominal:      General: There is no distension  Palpations: Abdomen is soft  Tenderness: There is no abdominal tenderness  Musculoskeletal: Normal range of motion  Right lower leg: No edema  Left lower leg: No edema  Skin:     General: Skin is warm and dry  Capillary Refill: Capillary refill takes less than 2 seconds  Neurological:      General: No focal deficit present  Mental Status: He is alert and oriented to person, place, and time  Psychiatric:         Mood and Affect: Mood normal          Behavior: Behavior normal          Thought Content:  Thought content normal          Judgment: Judgment normal

## 2021-04-28 NOTE — PATIENT INSTRUCTIONS
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 with angioplasty and stent placement  He has had persistently elevated velocities in the same segment since treatment  Symptoms before and after treatment are unchanged and inconsistent with arterial claudication  He reports aching pains radiating from his lower back down both lower extremities which has been ongoing for years  He continues to smoke 12 cigarettes daily      -We discussed the pathophysiology of aneurysmal disease and indications for continued surveillance/management after EVAR  He will need a 6 month surveillance EVAR duplex as part of EVAR surveillance protocol   -Discussed potential issues with limb occlusion due to persistent stenosis and counseled on signs/symptoms  Advised to call the office if he develops new worsening claudication symptoms or go to the ED if he develops severe pain or motor/sensory deficits   -Counseled on importance of complete smoking cessation in setting of known aneurysmal disease  He does not want to take any medications and states he will work on this on his own  -Return for office follow-up for continued surveillance/risk factor modification in 1 year        Stenosis of other vascular prosthetic devices, implants and grafts, subsequent encounter  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 treated with angioplasty/stenting in 2019 with Dr Elvie Major  No arterial claudication symptoms  Has chronic bilateral leg pain radiating from lower back down both legs consistent with neurogenic claudication   Has easily palpable pedal pulses  See section on history of endovascular stent graft repair for AAA      Smoking  Chronic smoker 12 cigarettes daily  Has been unable to quit/cutdown  Has been counseled by many practitioners on importance of complete smoking cessation, particularly in relation to history of aneurysmal disease  Understands recommendations and will work on his own  Not interested in pharmacologic agents      Neurogenic claudication  Longstanding chronic back pain with radiating pain after walking 2 blocks down both lower extremities  No change after treatment of left external iliac artery dissection/stenosis  Recommend further evaluation/work-up per PCP  Does not walk much and prefers to stay inside, smoke, watch TV   Does not seem very motivated to walk more but perhaps limited by neurogenic claudication symptoms

## 2021-04-28 NOTE — ASSESSMENT & PLAN NOTE
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 with angioplasty and stent placement  He has had persistently elevated velocities in the same segment since treatment  Symptoms before and after treatment are unchanged and inconsistent with arterial claudication  He reports aching pains radiating from his lower back down both lower extremities which has been ongoing for years  He continues to smoke 12 cigarettes daily     -We discussed the pathophysiology of aneurysmal disease and indications for continued surveillance/management after EVAR  He will need a 6 month surveillance EVAR duplex as part of EVAR surveillance protocol   -Discussed potential issues with limb occlusion due to persistent stenosis and counseled on signs/symptoms  Advised to call the office if he develops new worsening claudication symptoms or go to the ED if he develops severe pain or motor/sensory deficits   -Counseled on importance of complete smoking cessation in setting of known aneurysmal disease  He does not want to take any medications and states he will work on this on his own    -Return for office follow-up for continued surveillance/risk factor modification in 1 year

## 2021-05-06 DIAGNOSIS — F41.9 ANXIETY: ICD-10-CM

## 2021-05-06 RX ORDER — ALPRAZOLAM 1 MG/1
1 TABLET ORAL 2 TIMES DAILY PRN
Qty: 60 TABLET | Refills: 0 | Status: SHIPPED | OUTPATIENT
Start: 2021-05-06 | End: 2021-06-10 | Stop reason: SDUPTHER

## 2021-05-13 DIAGNOSIS — E87.1 HYPONATREMIA: ICD-10-CM

## 2021-05-13 RX ORDER — SODIUM CHLORIDE 1000 MG
TABLET, SOLUBLE MISCELLANEOUS
Qty: 90 TABLET | Refills: 4 | Status: SHIPPED | OUTPATIENT
Start: 2021-05-13 | End: 2021-10-09

## 2021-06-09 ENCOUNTER — OFFICE VISIT (OUTPATIENT)
Dept: INTERNAL MEDICINE CLINIC | Facility: CLINIC | Age: 71
End: 2021-06-09
Payer: MEDICARE

## 2021-06-09 VITALS
BODY MASS INDEX: 22.82 KG/M2 | SYSTOLIC BLOOD PRESSURE: 128 MMHG | HEIGHT: 62 IN | DIASTOLIC BLOOD PRESSURE: 76 MMHG | WEIGHT: 124 LBS | OXYGEN SATURATION: 97 % | HEART RATE: 74 BPM | TEMPERATURE: 97.6 F

## 2021-06-09 DIAGNOSIS — I10 ESSENTIAL HYPERTENSION: ICD-10-CM

## 2021-06-09 DIAGNOSIS — I71.4 AAA (ABDOMINAL AORTIC ANEURYSM) WITHOUT RUPTURE (HCC): ICD-10-CM

## 2021-06-09 DIAGNOSIS — K58.2 IRRITABLE BOWEL SYNDROME WITH BOTH CONSTIPATION AND DIARRHEA: Primary | ICD-10-CM

## 2021-06-09 DIAGNOSIS — K86.1 OTHER CHRONIC PANCREATITIS (HCC): ICD-10-CM

## 2021-06-09 PROBLEM — K21.9 GERD (GASTROESOPHAGEAL REFLUX DISEASE): Status: RESOLVED | Noted: 2018-04-05 | Resolved: 2021-06-09

## 2021-06-09 PROCEDURE — 99214 OFFICE O/P EST MOD 30 MIN: CPT | Performed by: INTERNAL MEDICINE

## 2021-06-09 NOTE — PROGRESS NOTES
Assessment/Pl Follow up   Visit  Of  His   Chronic  Medical  Problems  Diagnoses and all orders for this visit:    Irritable bowel syndrome with both constipation and diarrhea    Other chronic pancreatitis ([address] )    AAA (abdominal aortic aneurysm) without rupture (HCC)    Essential hypertension          Subjective:      Patient ID: Breanna Coleman is a 66 y o  male  Follow up  Of  Hypertension, Hyperlipidemia, Chronic  Pancreatitis , hyperlipidemia  Stable  AAA  The following portions of the patient's history were reviewed and updated as appropriate: allergies, current medications, past family history, past medical history, past social history, past surgical history, and problem list     Review of Systems   Constitutional: Negative  HENT: Negative for dental problem, drooling, ear discharge and ear pain  Eyes: Negative for discharge, redness and itching  Respiratory: Negative for apnea, cough and wheezing  Cardiovascular: Negative for chest pain and palpitations  Gastrointestinal: Negative for abdominal pain, blood in stool, diarrhea and vomiting  Endocrine: Negative for polydipsia, polyphagia and polyuria  Genitourinary: Negative for decreased urine volume, dysuria and frequency  Musculoskeletal: Negative for arthralgias, myalgias and neck stiffness  Skin: Negative for pallor and wound  Allergic/Immunologic: Negative for environmental allergies and food allergies  Neurological: Negative for facial asymmetry, light-headedness, numbness and headaches  Hematological: Negative for adenopathy  Does not bruise/bleed easily  Psychiatric/Behavioral: Negative for agitation, behavioral problems and confusion           Objective:      /76 (BP Location: Right arm, Patient Position: Sitting, Cuff Size: Standard)   Pulse 74   Temp 97 6 °F (36 4 °C) (Temporal)   Ht 5' 2" (1 575 m)   Wt 56 2 kg (124 lb)   SpO2 97%   BMI 22 68 kg/m²          Physical Exam  Constitutional: Appearance: Normal appearance  HENT:      Head: Normocephalic  Nose: Nose normal       Mouth/Throat:      Mouth: Mucous membranes are moist    Eyes:      Pupils: Pupils are equal, round, and reactive to light  Neck:      Musculoskeletal: Neck supple  Cardiovascular:      Rate and Rhythm: Regular rhythm  Heart sounds: Normal heart sounds  Pulmonary:      Breath sounds: Normal breath sounds  Abdominal:      Palpations: Abdomen is soft  Musculoskeletal:         General: No swelling  Skin:     General: Skin is warm  Neurological:      General: No focal deficit present  Mental Status: He is alert and oriented to person, place, and time  Psychiatric:         Mood and Affect: Mood normal          Hypertension  Controlled  With  Amlodipine  And  ACE/Hctz    Chronic  Pancreatitis    Continue  With  Pancreatic  Enzymes  Replacement  AAA  Continue  To  Follow  With   ultasound  Yearly  Still  Smoking   Blood  Work  Next  Visit  In October F Migue RIOS

## 2021-06-10 DIAGNOSIS — F41.9 ANXIETY: ICD-10-CM

## 2021-06-10 RX ORDER — ALPRAZOLAM 1 MG/1
1 TABLET ORAL 2 TIMES DAILY PRN
Qty: 60 TABLET | Refills: 0 | Status: SHIPPED | OUTPATIENT
Start: 2021-06-10 | End: 2021-07-08 | Stop reason: SDUPTHER

## 2021-06-19 DIAGNOSIS — N40.1 BENIGN PROSTATIC HYPERPLASIA WITH LOWER URINARY TRACT SYMPTOMS, SYMPTOM DETAILS UNSPECIFIED: ICD-10-CM

## 2021-06-19 RX ORDER — TAMSULOSIN HYDROCHLORIDE 0.4 MG/1
CAPSULE ORAL
Qty: 90 CAPSULE | Refills: 1 | Status: SHIPPED | OUTPATIENT
Start: 2021-06-19 | End: 2021-12-08

## 2021-07-03 DIAGNOSIS — I10 ESSENTIAL HYPERTENSION: ICD-10-CM

## 2021-07-03 RX ORDER — AMLODIPINE BESYLATE 5 MG/1
TABLET ORAL
Qty: 90 TABLET | Refills: 1 | Status: SHIPPED | OUTPATIENT
Start: 2021-07-03 | End: 2021-12-15

## 2021-07-08 DIAGNOSIS — F41.9 ANXIETY: ICD-10-CM

## 2021-07-08 RX ORDER — ALPRAZOLAM 1 MG/1
1 TABLET ORAL 2 TIMES DAILY PRN
Qty: 60 TABLET | Refills: 0 | Status: SHIPPED | OUTPATIENT
Start: 2021-07-08 | End: 2021-08-09 | Stop reason: SDUPTHER

## 2021-07-26 DIAGNOSIS — R10.30 LOWER ABDOMINAL PAIN: ICD-10-CM

## 2021-07-27 RX ORDER — DICYCLOMINE HYDROCHLORIDE 10 MG/1
CAPSULE ORAL
Qty: 270 CAPSULE | Refills: 2 | Status: SHIPPED | OUTPATIENT
Start: 2021-07-27 | End: 2022-04-15

## 2021-08-09 DIAGNOSIS — F41.9 ANXIETY: ICD-10-CM

## 2021-08-09 RX ORDER — ALPRAZOLAM 1 MG/1
1 TABLET ORAL 2 TIMES DAILY PRN
Qty: 60 TABLET | Refills: 0 | Status: SHIPPED | OUTPATIENT
Start: 2021-08-09 | End: 2021-09-09

## 2021-08-12 ENCOUNTER — HOSPITAL ENCOUNTER (OUTPATIENT)
Dept: NON INVASIVE DIAGNOSTICS | Age: 71
Discharge: HOME | End: 2021-08-12

## 2021-08-12 DIAGNOSIS — Z95.828 HISTORY OF ENDOVASCULAR STENT GRAFT FOR ABDOMINAL AORTIC ANEURYSM (AAA): ICD-10-CM

## 2021-09-09 DIAGNOSIS — F41.9 ANXIETY: ICD-10-CM

## 2021-09-09 RX ORDER — ALPRAZOLAM 1 MG/1
1 TABLET ORAL 2 TIMES DAILY PRN
Qty: 60 TABLET | Refills: 0 | Status: SHIPPED | OUTPATIENT
Start: 2021-09-09 | End: 2021-09-13 | Stop reason: SDUPTHER

## 2021-09-10 ENCOUNTER — TELEPHONE (OUTPATIENT)
Dept: INTERNAL MEDICINE CLINIC | Facility: CLINIC | Age: 71
End: 2021-09-10

## 2021-09-10 NOTE — TELEPHONE ENCOUNTER
From med refill line:   Pt called for Xanax refill but was already filled 09/09/2021  Please make sure pt got his meds

## 2021-09-13 DIAGNOSIS — F41.9 ANXIETY: ICD-10-CM

## 2021-09-13 RX ORDER — ALPRAZOLAM 1 MG/1
1 TABLET ORAL 2 TIMES DAILY PRN
Qty: 60 TABLET | Refills: 0 | Status: SHIPPED | OUTPATIENT
Start: 2021-09-13 | End: 2021-10-04 | Stop reason: SDUPTHER

## 2021-09-19 DIAGNOSIS — E78.5 HYPERLIPIDEMIA, UNSPECIFIED: ICD-10-CM

## 2021-09-19 RX ORDER — PRAVASTATIN SODIUM 40 MG
TABLET ORAL
Qty: 90 TABLET | Refills: 2 | Status: SHIPPED | OUTPATIENT
Start: 2021-09-19 | End: 2022-05-25 | Stop reason: SDUPTHER

## 2021-09-20 DIAGNOSIS — K58.2 IRRITABLE BOWEL SYNDROME WITH BOTH CONSTIPATION AND DIARRHEA: ICD-10-CM

## 2021-09-20 RX ORDER — AMITRIPTYLINE HYDROCHLORIDE 25 MG/1
25 TABLET, FILM COATED ORAL
Qty: 90 TABLET | Refills: 3 | Status: SHIPPED | OUTPATIENT
Start: 2021-09-20 | End: 2021-12-08 | Stop reason: SDUPTHER

## 2021-09-23 ENCOUNTER — HOSPITAL ENCOUNTER (OUTPATIENT)
Dept: NON INVASIVE DIAGNOSTICS | Age: 71
Discharge: HOME | End: 2021-09-23
Payer: MEDICARE

## 2021-09-23 PROCEDURE — 93978 VASCULAR STUDY: CPT

## 2021-09-23 PROCEDURE — 93978 VASCULAR STUDY: CPT | Performed by: SURGERY

## 2021-09-23 PROCEDURE — 93923 UPR/LXTR ART STDY 3+ LVLS: CPT

## 2021-10-04 ENCOUNTER — OFFICE VISIT (OUTPATIENT)
Dept: INTERNAL MEDICINE CLINIC | Facility: CLINIC | Age: 71
End: 2021-10-04
Payer: MEDICARE

## 2021-10-04 DIAGNOSIS — I10 HYPERTENSION: ICD-10-CM

## 2021-10-04 DIAGNOSIS — E78.5 HYPERLIPIDEMIA: Primary | ICD-10-CM

## 2021-10-04 DIAGNOSIS — F41.9 ANXIETY: ICD-10-CM

## 2021-10-04 DIAGNOSIS — Z23 NEEDS FLU SHOT: ICD-10-CM

## 2021-10-04 PROCEDURE — 99214 OFFICE O/P EST MOD 30 MIN: CPT | Performed by: INTERNAL MEDICINE

## 2021-10-04 PROCEDURE — G0008 ADMIN INFLUENZA VIRUS VAC: HCPCS

## 2021-10-04 PROCEDURE — 90662 IIV NO PRSV INCREASED AG IM: CPT

## 2021-10-04 RX ORDER — ALPRAZOLAM 1 MG/1
1 TABLET ORAL 2 TIMES DAILY PRN
Qty: 60 TABLET | Refills: 0 | Status: SHIPPED | OUTPATIENT
Start: 2021-10-04 | End: 2021-11-04 | Stop reason: SDUPTHER

## 2021-10-09 DIAGNOSIS — E87.1 HYPONATREMIA: ICD-10-CM

## 2021-10-09 RX ORDER — SODIUM CHLORIDE 1000 MG
TABLET, SOLUBLE MISCELLANEOUS
Qty: 90 TABLET | Refills: 4 | Status: SHIPPED | OUTPATIENT
Start: 2021-10-09 | End: 2022-02-26

## 2021-10-13 ENCOUNTER — LAB (OUTPATIENT)
Dept: LAB | Facility: HOSPITAL | Age: 71
End: 2021-10-13
Payer: MEDICARE

## 2021-10-13 DIAGNOSIS — I10 HYPERTENSION: ICD-10-CM

## 2021-10-13 DIAGNOSIS — E78.5 HYPERLIPIDEMIA: ICD-10-CM

## 2021-10-13 LAB
ALBUMIN SERPL BCP-MCNC: 3.3 G/DL (ref 3.5–5)
ALP SERPL-CCNC: 107 U/L (ref 46–116)
ALT SERPL W P-5'-P-CCNC: 14 U/L (ref 12–78)
ANION GAP SERPL CALCULATED.3IONS-SCNC: 5 MMOL/L (ref 4–13)
AST SERPL W P-5'-P-CCNC: 9 U/L (ref 5–45)
BILIRUB SERPL-MCNC: 0.4 MG/DL (ref 0.2–1)
BUN SERPL-MCNC: 13 MG/DL (ref 5–25)
CALCIUM ALBUM COR SERPL-MCNC: 10.1 MG/DL (ref 8.3–10.1)
CALCIUM SERPL-MCNC: 9.5 MG/DL (ref 8.3–10.1)
CHLORIDE SERPL-SCNC: 99 MMOL/L (ref 100–108)
CHOLEST SERPL-MCNC: 134 MG/DL (ref 50–200)
CO2 SERPL-SCNC: 25 MMOL/L (ref 21–32)
CREAT SERPL-MCNC: 1.43 MG/DL (ref 0.6–1.3)
GFR SERPL CREATININE-BSD FRML MDRD: 49 ML/MIN/1.73SQ M
GLUCOSE P FAST SERPL-MCNC: 99 MG/DL (ref 65–99)
HDLC SERPL-MCNC: 55 MG/DL
LDLC SERPL CALC-MCNC: 54 MG/DL (ref 0–100)
NONHDLC SERPL-MCNC: 79 MG/DL
POTASSIUM SERPL-SCNC: 4.6 MMOL/L (ref 3.5–5.3)
PROT SERPL-MCNC: 6.8 G/DL (ref 6.4–8.2)
SODIUM SERPL-SCNC: 129 MMOL/L (ref 136–145)
TRIGL SERPL-MCNC: 125 MG/DL

## 2021-10-13 PROCEDURE — 80061 LIPID PANEL: CPT

## 2021-10-13 PROCEDURE — 80053 COMPREHEN METABOLIC PANEL: CPT

## 2021-10-13 PROCEDURE — 36415 COLL VENOUS BLD VENIPUNCTURE: CPT

## 2021-10-27 ENCOUNTER — TELEPHONE (OUTPATIENT)
Dept: INTERNAL MEDICINE CLINIC | Facility: CLINIC | Age: 71
End: 2021-10-27

## 2021-10-28 DIAGNOSIS — F32.9 REACTIVE DEPRESSION: ICD-10-CM

## 2021-10-29 RX ORDER — CITALOPRAM 20 MG/1
20 TABLET ORAL DAILY
Qty: 90 TABLET | Refills: 0 | Status: SHIPPED | OUTPATIENT
Start: 2021-10-29 | End: 2021-12-08 | Stop reason: SDUPTHER

## 2021-11-04 DIAGNOSIS — I10 ESSENTIAL HYPERTENSION: ICD-10-CM

## 2021-11-04 DIAGNOSIS — F41.9 ANXIETY: ICD-10-CM

## 2021-11-04 RX ORDER — ALPRAZOLAM 1 MG/1
1 TABLET ORAL 2 TIMES DAILY PRN
Qty: 60 TABLET | Refills: 0 | Status: SHIPPED | OUTPATIENT
Start: 2021-11-04 | End: 2021-12-08 | Stop reason: SDUPTHER

## 2021-11-04 RX ORDER — METOPROLOL SUCCINATE 25 MG/1
25 TABLET, EXTENDED RELEASE ORAL DAILY
Qty: 90 TABLET | Refills: 1 | Status: SHIPPED | OUTPATIENT
Start: 2021-11-04 | End: 2022-05-16 | Stop reason: SDUPTHER

## 2021-11-05 ENCOUNTER — TELEPHONE (OUTPATIENT)
Dept: VASCULAR SURGERY | Facility: CLINIC | Age: 71
End: 2021-11-05

## 2021-11-05 DIAGNOSIS — I71.4 AAA (ABDOMINAL AORTIC ANEURYSM) WITHOUT RUPTURE (HCC): Primary | ICD-10-CM

## 2021-12-06 DIAGNOSIS — I10 ESSENTIAL HYPERTENSION: ICD-10-CM

## 2021-12-06 RX ORDER — LISINOPRIL AND HYDROCHLOROTHIAZIDE 20; 12.5 MG/1; MG/1
TABLET ORAL
Qty: 90 TABLET | Refills: 2 | Status: SHIPPED | OUTPATIENT
Start: 2021-12-06

## 2021-12-08 DIAGNOSIS — N40.1 BENIGN PROSTATIC HYPERPLASIA WITH LOWER URINARY TRACT SYMPTOMS, SYMPTOM DETAILS UNSPECIFIED: ICD-10-CM

## 2021-12-08 DIAGNOSIS — F41.9 ANXIETY: ICD-10-CM

## 2021-12-08 DIAGNOSIS — F32.9 REACTIVE DEPRESSION: ICD-10-CM

## 2021-12-08 RX ORDER — TAMSULOSIN HYDROCHLORIDE 0.4 MG/1
CAPSULE ORAL
Qty: 90 CAPSULE | Refills: 1 | Status: SHIPPED | OUTPATIENT
Start: 2021-12-08 | End: 2022-04-15

## 2021-12-08 RX ORDER — ALPRAZOLAM 1 MG/1
1 TABLET ORAL 2 TIMES DAILY PRN
Qty: 60 TABLET | Refills: 0 | Status: SHIPPED | OUTPATIENT
Start: 2021-12-08 | End: 2022-01-07 | Stop reason: SDUPTHER

## 2021-12-08 RX ORDER — CITALOPRAM 20 MG/1
TABLET ORAL
Qty: 90 TABLET | Refills: 0 | OUTPATIENT
Start: 2021-12-08

## 2021-12-08 RX ORDER — CITALOPRAM 20 MG/1
20 TABLET ORAL DAILY
Qty: 90 TABLET | Refills: 0 | Status: SHIPPED | OUTPATIENT
Start: 2021-12-08 | End: 2022-03-30

## 2021-12-15 DIAGNOSIS — I10 ESSENTIAL HYPERTENSION: ICD-10-CM

## 2021-12-15 RX ORDER — AMLODIPINE BESYLATE 5 MG/1
TABLET ORAL
Qty: 90 TABLET | Refills: 1 | Status: SHIPPED | OUTPATIENT
Start: 2021-12-15 | End: 2022-04-15

## 2022-01-07 DIAGNOSIS — F41.9 ANXIETY: ICD-10-CM

## 2022-01-07 RX ORDER — ALPRAZOLAM 1 MG/1
1 TABLET ORAL 2 TIMES DAILY PRN
Qty: 60 TABLET | Refills: 0 | Status: SHIPPED | OUTPATIENT
Start: 2022-01-07 | End: 2022-02-07 | Stop reason: SDUPTHER

## 2022-01-25 DIAGNOSIS — K21.9 GERD WITHOUT ESOPHAGITIS: ICD-10-CM

## 2022-01-25 RX ORDER — OMEPRAZOLE 20 MG/1
CAPSULE, DELAYED RELEASE ORAL
Qty: 90 CAPSULE | Refills: 3 | Status: SHIPPED | OUTPATIENT
Start: 2022-01-25

## 2022-02-07 ENCOUNTER — OFFICE VISIT (OUTPATIENT)
Dept: INTERNAL MEDICINE CLINIC | Facility: CLINIC | Age: 72
End: 2022-02-07
Payer: MEDICARE

## 2022-02-07 VITALS
OXYGEN SATURATION: 99 % | DIASTOLIC BLOOD PRESSURE: 88 MMHG | BODY MASS INDEX: 21.9 KG/M2 | HEIGHT: 62 IN | SYSTOLIC BLOOD PRESSURE: 150 MMHG | HEART RATE: 73 BPM | TEMPERATURE: 98.4 F | WEIGHT: 119 LBS

## 2022-02-07 DIAGNOSIS — N18.30 CHRONIC KIDNEY DISEASE, STAGE 3 (HCC): ICD-10-CM

## 2022-02-07 DIAGNOSIS — K86.1 CHRONIC PANCREATITIS (HCC): ICD-10-CM

## 2022-02-07 DIAGNOSIS — E78.5 HYPERLIPIDEMIA: ICD-10-CM

## 2022-02-07 DIAGNOSIS — I74.5 EMBOLISM AND THROMBOSIS OF ILIAC ARTERY (HCC): ICD-10-CM

## 2022-02-07 DIAGNOSIS — I10 HYPERTENSION: Primary | ICD-10-CM

## 2022-02-07 DIAGNOSIS — G95.19 NEUROGENIC CLAUDICATION (HCC): ICD-10-CM

## 2022-02-07 DIAGNOSIS — F41.9 ANXIETY: ICD-10-CM

## 2022-02-07 PROCEDURE — 99214 OFFICE O/P EST MOD 30 MIN: CPT | Performed by: INTERNAL MEDICINE

## 2022-02-07 RX ORDER — ALPRAZOLAM 1 MG/1
1 TABLET ORAL 2 TIMES DAILY PRN
Qty: 60 TABLET | Refills: 0 | Status: SHIPPED | OUTPATIENT
Start: 2022-02-07 | End: 2022-03-11 | Stop reason: SDUPTHER

## 2022-02-07 RX ORDER — AMITRIPTYLINE HYDROCHLORIDE 25 MG/1
TABLET, FILM COATED ORAL
COMMUNITY
Start: 2021-12-08 | End: 2022-03-30 | Stop reason: SDUPTHER

## 2022-02-07 RX ORDER — CYCLOSPORINE 0.5 MG/ML
EMULSION OPHTHALMIC
COMMUNITY
Start: 2021-12-30

## 2022-02-07 NOTE — PROGRESS NOTES
Assessment/Plan:    Follow up  Hypertension, Hyperlipidemia     Diagnoses and all orders for this visit:    Hypertension    Hyperlipidemia    Chronic pancreatitis ([address] )    Other orders  -     amitriptyline (ELAVIL) 25 mg tablet  -     Restasis 0 05 % ophthalmic emulsion          Subjective: No  Complaints  Patient ID: Umang Smith is a 66 y o  male  HPI    The following portions of the patient's history were reviewed and updated as appropriate: allergies, current medications, past family history, past medical history, past social history, past surgical history, and problem list     Review of Systems   Constitutional: Negative  HENT: Negative for dental problem, drooling, ear discharge and ear pain  Eyes: Negative for discharge, redness and itching  Respiratory: Negative for apnea, cough and wheezing  Cardiovascular: Negative for chest pain and palpitations  Gastrointestinal: Negative for abdominal pain, blood in stool, diarrhea and vomiting  Endocrine: Negative for polydipsia, polyphagia and polyuria  Genitourinary: Negative for decreased urine volume, dysuria and frequency  Musculoskeletal: Negative for arthralgias, myalgias and neck stiffness  Skin: Negative for pallor and wound  Allergic/Immunologic: Negative for environmental allergies and food allergies  Neurological: Negative for facial asymmetry, light-headedness, numbness and headaches  Hematological: Negative for adenopathy  Does not bruise/bleed easily  Psychiatric/Behavioral: Negative for agitation, behavioral problems and confusion  Objective:      /88 (BP Location: Right arm, Patient Position: Sitting, Cuff Size: Standard)   Pulse 73   Temp 98 4 °F (36 9 °C) (Tympanic)   Ht 5' 2" (1 575 m)   Wt 54 kg (119 lb)   SpO2 99%   BMI 21 77 kg/m²          Physical Exam  Constitutional:       Appearance: Normal appearance  HENT:      Head: Normocephalic        Nose: Nose normal       Mouth/Throat: Mouth: Mucous membranes are moist    Eyes:      Pupils: Pupils are equal, round, and reactive to light  Cardiovascular:      Rate and Rhythm: Regular rhythm  Heart sounds: Normal heart sounds  Pulmonary:      Breath sounds: Normal breath sounds  Abdominal:      Palpations: Abdomen is soft  Musculoskeletal:         General: No swelling  Cervical back: Neck supple  Skin:     General: Skin is warm  Neurological:      General: No focal deficit present  Mental Status: He is alert and oriented to person, place, and time  Psychiatric:         Mood and Affect: Mood normal        Problem#1   Hypertension  Well  Controlled with current  meds    Problem#2  Hyperlipidemia  Well  Controlled with   Statin  Fup  4 months         BLUE Camarena MD

## 2022-02-26 DIAGNOSIS — E87.1 HYPONATREMIA: ICD-10-CM

## 2022-02-26 RX ORDER — SODIUM CHLORIDE 1000 MG
TABLET, SOLUBLE MISCELLANEOUS
Qty: 90 TABLET | Refills: 4 | Status: SHIPPED | OUTPATIENT
Start: 2022-02-26

## 2022-03-11 DIAGNOSIS — F41.9 ANXIETY: ICD-10-CM

## 2022-03-11 RX ORDER — ALPRAZOLAM 1 MG/1
1 TABLET ORAL 2 TIMES DAILY PRN
Qty: 60 TABLET | Refills: 0 | Status: SHIPPED | OUTPATIENT
Start: 2022-03-11 | End: 2022-04-08 | Stop reason: SDUPTHER

## 2022-03-30 DIAGNOSIS — F32.9 REACTIVE DEPRESSION: ICD-10-CM

## 2022-03-30 RX ORDER — CITALOPRAM 20 MG/1
TABLET ORAL
Qty: 90 TABLET | Refills: 0 | Status: SHIPPED | OUTPATIENT
Start: 2022-03-30 | End: 2022-04-15

## 2022-04-08 DIAGNOSIS — F41.9 ANXIETY: ICD-10-CM

## 2022-04-08 RX ORDER — ALPRAZOLAM 1 MG/1
1 TABLET ORAL 2 TIMES DAILY PRN
Qty: 60 TABLET | Refills: 0 | Status: SHIPPED | OUTPATIENT
Start: 2022-04-08 | End: 2022-05-05 | Stop reason: SDUPTHER

## 2022-04-13 DIAGNOSIS — K86.89 PANCREATIC INSUFFICIENCY: ICD-10-CM

## 2022-04-14 ENCOUNTER — TELEPHONE (OUTPATIENT)
Dept: GASTROENTEROLOGY | Facility: AMBULARY SURGERY CENTER | Age: 72
End: 2022-04-14

## 2022-04-14 RX ORDER — PANCRELIPASE 60000; 12000; 38000 [USP'U]/1; [USP'U]/1; [USP'U]/1
24000 CAPSULE, DELAYED RELEASE PELLETS ORAL
Qty: 540 CAPSULE | Refills: 1 | Status: SHIPPED | OUTPATIENT
Start: 2022-04-14

## 2022-04-14 NOTE — TELEPHONE ENCOUNTER
Called pt and made aware refill sent to pharmacy pt verbalized understanding and had no further questions

## 2022-04-14 NOTE — TELEPHONE ENCOUNTER
Patients GI provider:  Dr Shawn Martinez     Number to return call: [phone]    Reason for call: Pt calling insisting for a refill of creon   Patient requesting if it can be filled while he waits for his upcoming appt     Scheduled procedure/appointment date if applicable: Apt/procedure 7/25/22

## 2022-04-15 DIAGNOSIS — R10.30 LOWER ABDOMINAL PAIN: ICD-10-CM

## 2022-04-15 DIAGNOSIS — N40.1 BENIGN PROSTATIC HYPERPLASIA WITH LOWER URINARY TRACT SYMPTOMS, SYMPTOM DETAILS UNSPECIFIED: ICD-10-CM

## 2022-04-15 DIAGNOSIS — F32.9 REACTIVE DEPRESSION: ICD-10-CM

## 2022-04-15 DIAGNOSIS — I10 ESSENTIAL HYPERTENSION: ICD-10-CM

## 2022-04-15 RX ORDER — CITALOPRAM 20 MG/1
TABLET ORAL
Qty: 90 TABLET | Refills: 0 | Status: SHIPPED | OUTPATIENT
Start: 2022-04-15

## 2022-04-15 RX ORDER — TAMSULOSIN HYDROCHLORIDE 0.4 MG/1
CAPSULE ORAL
Qty: 90 CAPSULE | Refills: 1 | Status: SHIPPED | OUTPATIENT
Start: 2022-04-15

## 2022-04-15 RX ORDER — DICYCLOMINE HYDROCHLORIDE 10 MG/1
CAPSULE ORAL
Qty: 270 CAPSULE | Refills: 2 | Status: SHIPPED | OUTPATIENT
Start: 2022-04-15

## 2022-04-15 RX ORDER — AMLODIPINE BESYLATE 5 MG/1
TABLET ORAL
Qty: 90 TABLET | Refills: 1 | Status: SHIPPED | OUTPATIENT
Start: 2022-04-15 | End: 2022-06-15 | Stop reason: DRUGHIGH

## 2022-05-05 DIAGNOSIS — F41.9 ANXIETY: ICD-10-CM

## 2022-05-05 RX ORDER — ALPRAZOLAM 1 MG/1
1 TABLET ORAL 2 TIMES DAILY PRN
Qty: 60 TABLET | Refills: 0 | Status: SHIPPED | OUTPATIENT
Start: 2022-05-05 | End: 2022-05-06 | Stop reason: SDUPTHER

## 2022-05-06 DIAGNOSIS — F41.9 ANXIETY: ICD-10-CM

## 2022-05-06 RX ORDER — ALPRAZOLAM 1 MG/1
1 TABLET ORAL 2 TIMES DAILY PRN
Qty: 60 TABLET | Refills: 0 | Status: SHIPPED | OUTPATIENT
Start: 2022-05-06 | End: 2022-06-03 | Stop reason: SDUPTHER

## 2022-05-16 DIAGNOSIS — I10 ESSENTIAL HYPERTENSION: ICD-10-CM

## 2022-05-16 RX ORDER — METOPROLOL SUCCINATE 25 MG/1
25 TABLET, EXTENDED RELEASE ORAL DAILY
Qty: 90 TABLET | Refills: 1 | Status: SHIPPED | OUTPATIENT
Start: 2022-05-16

## 2022-05-25 DIAGNOSIS — E78.5 HYPERLIPIDEMIA, UNSPECIFIED: ICD-10-CM

## 2022-05-25 DIAGNOSIS — E87.1 HYPONATREMIA: ICD-10-CM

## 2022-05-25 RX ORDER — SODIUM CHLORIDE 1000 MG
3 TABLET, SOLUBLE MISCELLANEOUS DAILY
Qty: 90 TABLET | Refills: 4 | OUTPATIENT
Start: 2022-05-25

## 2022-05-25 RX ORDER — PRAVASTATIN SODIUM 40 MG
40 TABLET ORAL
Qty: 90 TABLET | Refills: 2 | Status: SHIPPED | OUTPATIENT
Start: 2022-05-25

## 2022-06-03 DIAGNOSIS — F41.9 ANXIETY: ICD-10-CM

## 2022-06-03 RX ORDER — ALPRAZOLAM 1 MG/1
1 TABLET ORAL 2 TIMES DAILY PRN
Qty: 60 TABLET | Refills: 0 | Status: SHIPPED | OUTPATIENT
Start: 2022-06-03 | End: 2022-07-15 | Stop reason: SDUPTHER

## 2022-06-15 ENCOUNTER — OFFICE VISIT (OUTPATIENT)
Dept: INTERNAL MEDICINE CLINIC | Facility: CLINIC | Age: 72
End: 2022-06-15
Payer: MEDICARE

## 2022-06-15 VITALS
HEIGHT: 62 IN | OXYGEN SATURATION: 97 % | WEIGHT: 114 LBS | TEMPERATURE: 98.4 F | HEART RATE: 70 BPM | BODY MASS INDEX: 20.98 KG/M2 | SYSTOLIC BLOOD PRESSURE: 140 MMHG | DIASTOLIC BLOOD PRESSURE: 80 MMHG

## 2022-06-15 DIAGNOSIS — F33.9 DEPRESSION, RECURRENT (HCC): ICD-10-CM

## 2022-06-15 DIAGNOSIS — I10 HYPERTENSION, UNSPECIFIED TYPE: ICD-10-CM

## 2022-06-15 DIAGNOSIS — K86.1 CHRONIC PANCREATITIS, UNSPECIFIED PANCREATITIS TYPE (HCC): Primary | ICD-10-CM

## 2022-06-15 DIAGNOSIS — E46 PROTEIN-CALORIE MALNUTRITION, UNSPECIFIED SEVERITY (HCC): ICD-10-CM

## 2022-06-15 PROCEDURE — 99213 OFFICE O/P EST LOW 20 MIN: CPT | Performed by: INTERNAL MEDICINE

## 2022-06-15 NOTE — PROGRESS NOTES
Assessment/Plan:    Follow ujp  Of  Hypertension , chronic pancreatitis     Diagnoses and all orders for this visit:    Hypertension, unspecified type    Chronic pancreatitis, unspecified pancreatitis type ([facility] )          Subjective:      Patient ID: Mariela Blake is a 67 y o  male  HPI    The following portions of the patient's history were reviewed and updated as appropriate: allergies, current medications, past family history, past medical history, past social history, past surgical history, and problem list     Review of Systems   Constitutional: Negative  HENT: Negative for dental problem, drooling, ear discharge and ear pain  Eyes: Negative for discharge, redness and itching  Respiratory: Negative for apnea, cough and wheezing  Cardiovascular: Negative for chest pain and palpitations  Gastrointestinal: Negative for abdominal pain, blood in stool, diarrhea and vomiting  Endocrine: Negative for polydipsia, polyphagia and polyuria  Genitourinary: Negative for decreased urine volume, dysuria and frequency  Musculoskeletal: Negative for arthralgias, myalgias and neck stiffness  Skin: Negative for pallor and wound  Allergic/Immunologic: Negative for environmental allergies and food allergies  Neurological: Negative for facial asymmetry, light-headedness, numbness and headaches  Hematological: Negative for adenopathy  Does not bruise/bleed easily  Psychiatric/Behavioral: Negative for agitation, behavioral problems and confusion  Objective: There were no vitals taken for this visit  Physical Exam  Constitutional:       Appearance: Normal appearance  He is obese  HENT:      Head: Normocephalic  Nose: Nose normal       Mouth/Throat:      Mouth: Mucous membranes are moist    Eyes:      Pupils: Pupils are equal, round, and reactive to light  Cardiovascular:      Rate and Rhythm: Regular rhythm  Heart sounds: Normal heart sounds     Pulmonary: Breath sounds: Normal breath sounds  Abdominal:      Palpations: Abdomen is soft  Musculoskeletal:         General: No swelling  Cervical back: Neck supple  Skin:     General: Skin is warm  Neurological:      General: No focal deficit present  Mental Status: He is alert and oriented to person, place, and time  Psychiatric:         Mood and Affect: Mood normal        Hypertension     BP lower  Then  Needed  With orthostatic  Symptoms  Decrease  Blood  Pressure  Meds  Blood  Work ordered

## 2022-06-15 NOTE — PROGRESS NOTES
Assessment and Plan:     Problem List Items Addressed This Visit    None     Visit Diagnoses     Hypertension, unspecified type    -  Primary    Chronic pancreatitis, unspecified pancreatitis type [facility])               Preventive health issues were discussed with patient, and age appropriate screening tests were ordered as noted in patient's After Visit Summary  Personalized health advice and appropriate referrals for health education or preventive services given if needed, as noted in patient's After Visit Summary       History of Present Illness:     Patient presents for a Medicare Wellness Visit    HPI   Patient Care Team:  Sarah Bae MD as PCP - LILIANE Douglass MD (Vascular Surgery)  MD Julio Nova MD as Endoscopist     Review of Systems:     Review of Systems     Problem List:     Patient Active Problem List   Diagnosis    AAA (abdominal aortic aneurysm) without rupture ([address] )    Epigastric pain    Diarrhea    Irritable bowel syndrome with both constipation and diarrhea    Other chronic pancreatitis ([address] )    Smoking    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 ([address] )    History of endovascular stent graft for abdominal aortic aneurysm (AAA)    Stenosis of other vascular prosthetic devices, implants and grafts, subsequent encounter    Neurogenic claudication ([address] )    Chronic kidney disease, stage 3 ([address] )      Past Medical and Surgical History:     Past Medical History:   Diagnosis Date    Anxiety     Rhodes's esophagus     Chronic neck pain     Depression     GERD (gastroesophageal reflux disease)     H/O abdominal aortic aneurysm 4 5 cm    Hyperlipidemia     Hypertension     Pancreatic pseudocyst     Vertigo      Past Surgical History:   Procedure Laterality Date    COLONOSCOPY  09/30/2014    IR LOWER EXTREMITY / INTERVENTION  1/4/2019    NOSE SURGERY      MA ESOPHAGOGASTRODUODENOSCOPY TRANSORAL DIAGNOSTIC N/A 6/22/2018    Procedure: EGD AND COLONOSCOPY;  Surgeon: Rashaun Valiente MD;  Location: [facility]; Service: Gastroenterology    [place] [address] R[address] DPLMNT AORTO-AORTIC NDGFT N/A 2/9/2018    Procedure: REPAIR ANEURYSM ENDOVASCULAR ABDOMINAL AORTIC  (EVAR); Surgeon: Susan Vu MD;  [facility];  Service: Vascular    UPPER GASTROINTESTINAL ENDOSCOPY        Family History:     Family History   Problem Relation Age of Onset    Heart disease Father     Heart attack Father     Diabetes Maternal Grandmother     Diabetes Maternal Grandfather     Diabetes Paternal Grandmother     Hypertension Family     Hyperlipidemia Family       Social History:     Social History     Socioeconomic History    Marital status:       Spouse name: None    Number of children: None    Years of education: None    Highest education level: None   Occupational History    None   Tobacco Use    Smoking status: Current Every Day Smoker     Packs/day: 0 25     Types: Cigarettes    Smokeless tobacco: Never Used    Tobacco comment: Virgil quiting smoking handouts   Vaping Use    Vaping Use: Never used   Substance and Sexual Activity    Alcohol use: No    Drug use: No    Sexual activity: Not Currently   Other Topics Concern    None   Social History Narrative    None     Social Determinants of Health     Financial Resource Strain: Not on file   Food Insecurity: Not on file   Transportation Needs: Not on file   Physical Activity: Not on file   Stress: Not on file   Social Connections: Not on file   Intimate Partner Violence: Not on file   Housing Stability: Not on file      Medications and Allergies:     Current Outpatient Medications   Medication Sig Dispense Refill    ALPRAZolam (XANAX) 1 mg tablet Take 1 tablet (1 mg total) by mouth 2 (two) times a day as needed (transient anxiety) 60 tablet 0    amLODIPine (NORVASC) 5 mg tablet TAKE 1 TABLET BY MOUTH EVERY DAY 90 tablet 1    aspirin (ECOTRIN LOW STRENGTH) 81 mg EC tablet Take 81 mg by mouth daily      citalopram (CeleXA) 20 mg tablet TAKE 1 TABLET BY MOUTH EVERY DAY 90 tablet 0    Linaclotide (LINZESS) 72 MCG CAPS Take 1 capsule by mouth daily 30 capsule 5    lisinopril-hydrochlorothiazide (PRINZIDE,ZESTORETIC) 20-12 5 MG per tablet TAKE 1 TABLET BY MOUTH ONCE A DAY 90 tablet 2    metoprolol succinate (TOPROL-XL) 25 mg 24 hr tablet Take 1 tablet (25 mg total) by mouth in the morning  90 tablet 1    Multiple Vitamins-Minerals (CENTRUM SILVER PO) Take 1 tablet by mouth daily        omeprazole (PriLOSEC) 20 mg delayed release capsule TAKE 1 CAPSULE BY MOUTH EVERY DAY 90 capsule 3    pancrelipase, Lip-Prot-Amyl, (Creon) 12,000 units capsule Take 2 capsules (24,000 Units total) by mouth 3 (three) times a day with meals 540 capsule 1    polyethylene glycol (MIRALAX) 17 g packet Take 17 g by mouth      pravastatin (PRAVACHOL) 40 mg tablet Take 1 tablet (40 mg total) by mouth daily at bedtime 90 tablet 2    ranitidine (ZANTAC) 150 mg tablet TAKE 1 TABLET EVERY DAY IN THE EVENING 90 tablet 2    Restasis 0 05 % ophthalmic emulsion       sodium chloride 1 g tablet TAKE 3 TABLETS EVERY DAY 90 tablet 4    tamsulosin (FLOMAX) 0 4 mg TAKE 1 CAPSULE BY MOUTH EVERY DAY 90 capsule 1    Wheat Dextrin (BENEFIBER PO) Take by mouth daily as needed        dicyclomine (BENTYL) 10 mg capsule TAKE 1 CAPSULE BY MOUTH 3 TIMES A DAY AS NEEDED (ABDOMINAL CRAMPING/ SPASMS) 270 capsule 2     No current facility-administered medications for this visit       No Known Allergies   Immunizations:     Immunization History   Administered Date(s) Administered    COVID-19 PFIZER VACCINE 0 3 ML IM 03/09/2021, 03/31/2021    Influenza, high dose seasonal 0 7 mL 10/09/2020, 10/04/2021      Health Maintenance:         Topic Date Due    Colorectal Cancer Screening  06/22/2028    Hepatitis C Screening  Completed         Topic Date Due    Pneumococcal Vaccine: 65+ Years (1 - PCV) Never done    DTaP,Tdap,and Td Vaccines (1 - Tdap) Never done  COVID-19 Vaccine (3 - Booster for Pfizer series) 08/31/2021      Medicare Screening Tests and Risk Assessments:         Health Risk Assessment:   Patient rates overall health as poor  Patient feels that their physical health rating is same  Patient is satisfied with their life  Eyesight was rated as slightly worse  Hearing was rated as same  Patient feels that their emotional and mental health rating is same  Patients states they are never, rarely angry  Patient states they are always unusually tired/fatigued  Pain experienced in the last 7 days has been none  Patient states that he has experienced no weight loss or gain in last 6 months  Depression Screening:   PHQ-2 Score: 4  PHQ-9 Score: 9      Fall Risk Screening: In the past year, patient has experienced: no history of falling in past year      Home Safety:  Patient has trouble with stairs inside or outside of their home  Patient has working smoke alarms and has working carbon monoxide detector  Home safety hazards include: none  Nutrition:   Current diet is Regular  Medications:   Patient is currently taking over-the-counter supplements  OTC medications include: see medication list  Patient is able to manage medications  Activities of Daily Living (ADLs)/Instrumental Activities of Daily Living (IADLs):   Walk and transfer into and out of bed and chair?: Yes  Dress and groom yourself?: Yes    Bathe or shower yourself?: Yes    Feed yourself? Yes  Do your laundry/housekeeping?: No  Manage your money, pay your bills and track your expenses?: No  Make your own meals?: No    Do your own shopping?: No    Previous Hospitalizations:   Any hospitalizations or ED visits within the last 12 months?: No      Advance Care Planning:   Living will: Yes    Durable POA for healthcare:  Yes    Advanced directive: Yes      PREVENTIVE SCREENINGS      Cardiovascular Screening:    General: Screening Not Indicated and History Lipid Disorder      Diabetes Screening:     General: Screening Current      Colorectal Cancer Screening:     General: Screening Current      Abdominal Aortic Aneurysm (AAA) Screening:    Risk factors include: age between 73-67 yo and tobacco use        General: Screening Not Indicated and History AAA      Lung Cancer Screening:     General: Screening Not Indicated      Hepatitis C Screening:    General: Screening Current    Screening, Brief Intervention, and Referral to Treatment (SBIRT)    Screening  Typical number of drinks in a day: 0  Typical number of drinks in a week: 0  Interpretation: Low risk drinking behavior      Single Item Drug Screening:  How often have you used an illegal drug (including marijuana) or a prescription medication for non-medical reasons in the past year? never    Single Item Drug Screen Score: 0  Interpretation: Negative screen for possible drug use disorder    No exam data present     Physical Exam:     /80 (BP Location: Right arm, Patient Position: Sitting, Cuff Size: Standard)   Pulse 70   Temp 98 4 °F (36 9 °C) (Tympanic)   Ht 5' 2" (1 575 m)   Wt 51 7 kg (114 lb)   SpO2 97%   BMI 20 85 kg/m²     Physical Exam     Candy Fernandez MD

## 2022-06-17 ENCOUNTER — LAB (OUTPATIENT)
Dept: LAB | Facility: HOSPITAL | Age: 72
End: 2022-06-17
Payer: MEDICARE

## 2022-06-17 DIAGNOSIS — I10 HYPERTENSION, UNSPECIFIED TYPE: ICD-10-CM

## 2022-06-17 DIAGNOSIS — K86.1 CHRONIC PANCREATITIS, UNSPECIFIED PANCREATITIS TYPE (HCC): ICD-10-CM

## 2022-06-17 LAB
ALBUMIN SERPL BCP-MCNC: 3.6 G/DL (ref 3.5–5)
ALP SERPL-CCNC: 84 U/L (ref 46–116)
ALT SERPL W P-5'-P-CCNC: 14 U/L (ref 12–78)
ANION GAP SERPL CALCULATED.3IONS-SCNC: 8 MMOL/L (ref 4–13)
AST SERPL W P-5'-P-CCNC: 14 U/L (ref 5–45)
BASOPHILS # BLD AUTO: 0.06 THOUSANDS/ΜL (ref 0–0.1)
BASOPHILS NFR BLD AUTO: 1 % (ref 0–1)
BILIRUB SERPL-MCNC: 0.55 MG/DL (ref 0.2–1)
BUN SERPL-MCNC: 10 MG/DL (ref 5–25)
CALCIUM SERPL-MCNC: 9.3 MG/DL (ref 8.3–10.1)
CHLORIDE SERPL-SCNC: 98 MMOL/L (ref 100–108)
CHOLEST SERPL-MCNC: 120 MG/DL
CO2 SERPL-SCNC: 25 MMOL/L (ref 21–32)
CREAT SERPL-MCNC: 1.24 MG/DL (ref 0.6–1.3)
EOSINOPHIL # BLD AUTO: 0.13 THOUSAND/ΜL (ref 0–0.61)
EOSINOPHIL NFR BLD AUTO: 2 % (ref 0–6)
ERYTHROCYTE [DISTWIDTH] IN BLOOD BY AUTOMATED COUNT: 19.9 % (ref 11.6–15.1)
GFR SERPL CREATININE-BSD FRML MDRD: 57 ML/MIN/1.73SQ M
GLUCOSE P FAST SERPL-MCNC: 109 MG/DL (ref 65–99)
HCT VFR BLD AUTO: 28.1 % (ref 36.5–49.3)
HDLC SERPL-MCNC: 56 MG/DL
HGB BLD-MCNC: 8.6 G/DL (ref 12–17)
IMM GRANULOCYTES # BLD AUTO: 0.02 THOUSAND/UL (ref 0–0.2)
IMM GRANULOCYTES NFR BLD AUTO: 0 % (ref 0–2)
LDLC SERPL CALC-MCNC: 37 MG/DL (ref 0–100)
LYMPHOCYTES # BLD AUTO: 1.05 THOUSANDS/ΜL (ref 0.6–4.47)
LYMPHOCYTES NFR BLD AUTO: 18 % (ref 14–44)
MCH RBC QN AUTO: 22.2 PG (ref 26.8–34.3)
MCHC RBC AUTO-ENTMCNC: 30.6 G/DL (ref 31.4–37.4)
MCV RBC AUTO: 72 FL (ref 82–98)
MONOCYTES # BLD AUTO: 0.57 THOUSAND/ΜL (ref 0.17–1.22)
MONOCYTES NFR BLD AUTO: 10 % (ref 4–12)
NEUTROPHILS # BLD AUTO: 4.11 THOUSANDS/ΜL (ref 1.85–7.62)
NEUTS SEG NFR BLD AUTO: 69 % (ref 43–75)
NONHDLC SERPL-MCNC: 64 MG/DL
NRBC BLD AUTO-RTO: 0 /100 WBCS
PLATELET # BLD AUTO: 332 THOUSANDS/UL (ref 149–390)
PMV BLD AUTO: 10.1 FL (ref 8.9–12.7)
POTASSIUM SERPL-SCNC: 4.3 MMOL/L (ref 3.5–5.3)
PROT SERPL-MCNC: 6.6 G/DL (ref 6.4–8.2)
RBC # BLD AUTO: 3.88 MILLION/UL (ref 3.88–5.62)
SODIUM SERPL-SCNC: 131 MMOL/L (ref 136–145)
TRIGL SERPL-MCNC: 136 MG/DL
WBC # BLD AUTO: 5.94 THOUSAND/UL (ref 4.31–10.16)

## 2022-06-17 PROCEDURE — 36415 COLL VENOUS BLD VENIPUNCTURE: CPT

## 2022-06-17 PROCEDURE — 85025 COMPLETE CBC W/AUTO DIFF WBC: CPT

## 2022-06-17 PROCEDURE — 80053 COMPREHEN METABOLIC PANEL: CPT

## 2022-06-17 PROCEDURE — 80061 LIPID PANEL: CPT

## 2022-06-22 ENCOUNTER — OFFICE VISIT (OUTPATIENT)
Dept: INTERNAL MEDICINE CLINIC | Facility: CLINIC | Age: 72
End: 2022-06-22
Payer: MEDICARE

## 2022-06-22 VITALS
SYSTOLIC BLOOD PRESSURE: 120 MMHG | OXYGEN SATURATION: 98 % | BODY MASS INDEX: 21.16 KG/M2 | TEMPERATURE: 98.7 F | WEIGHT: 115 LBS | HEART RATE: 72 BPM | DIASTOLIC BLOOD PRESSURE: 70 MMHG | HEIGHT: 62 IN

## 2022-06-22 DIAGNOSIS — F32.A DEPRESSION, UNSPECIFIED DEPRESSION TYPE: Primary | ICD-10-CM

## 2022-06-22 DIAGNOSIS — D50.9 IRON DEFICIENCY ANEMIA, UNSPECIFIED IRON DEFICIENCY ANEMIA TYPE: Primary | ICD-10-CM

## 2022-06-22 PROCEDURE — 1123F ACP DISCUSS/DSCN MKR DOCD: CPT | Performed by: INTERNAL MEDICINE

## 2022-06-22 PROCEDURE — G0439 PPPS, SUBSEQ VISIT: HCPCS | Performed by: INTERNAL MEDICINE

## 2022-06-22 NOTE — TELEPHONE ENCOUNTER
Patient asked for refill during his appointment today  Dr Manuelito Murray instructed me to send it to you to process refill       Amitriptyline 25 mg    Pharmacy: [facility]

## 2022-06-22 NOTE — PROGRESS NOTES
Assessment/Plan:    Iron  Deficiency  Anemia  Rule  Out  Occult  GI bleeding     Diagnoses and all orders for this visit:    Iron deficiency anemia, unspecified iron deficiency anemia type          Subjective:      Patient ID: Jennifer Saldaña is a 67 y o  male  HPI    The following portions of the patient's history were reviewed and updated as appropriate: allergies, current medications, past family history, past medical history, past social history, past surgical history, and problem list     Review of Systems   Constitutional: Negative  HENT: Negative for dental problem, drooling, ear discharge and ear pain  Eyes: Negative for discharge, redness and itching  Respiratory: Negative for apnea, cough and wheezing  Cardiovascular: Negative for chest pain and palpitations  Gastrointestinal: Negative for abdominal pain, blood in stool, diarrhea and vomiting  Endocrine: Negative for polydipsia, polyphagia and polyuria  Genitourinary: Negative for decreased urine volume, dysuria and frequency  Musculoskeletal: Negative for arthralgias, myalgias and neck stiffness  Skin: Negative for pallor and wound  Allergic/Immunologic: Negative for environmental allergies and food allergies  Neurological: Negative for facial asymmetry, light-headedness, numbness and headaches  Hematological: Negative for adenopathy  Does not bruise/bleed easily  Psychiatric/Behavioral: Negative for agitation, behavioral problems and confusion  Objective: There were no vitals taken for this visit  Physical Exam  Constitutional:       Appearance: Normal appearance  HENT:      Head: Normocephalic  Nose: Nose normal       Mouth/Throat:      Mouth: Mucous membranes are moist    Eyes:      Pupils: Pupils are equal, round, and reactive to light  Cardiovascular:      Rate and Rhythm: Regular rhythm        Heart sounds: Normal heart sounds  Pulmonary:      Breath sounds: Normal breath sounds  Abdominal:      Palpations: Abdomen is soft  Musculoskeletal:         General: No swelling  Cervical back: Neck supple  Skin:     General: Skin is warm  Neurological:      General: No focal deficit present  Mental Status: He is alert and oriented to person, place, and time  Psychiatric:         Mood and Affect: Mood normal        Problem  #1  Microcytic  Anemia, with  Hemoglobin   8 3    Referred  To  Kaushik Escort  To  See if  He  Needs  Scope to rule  Out  Occult  GI  Bleeding       Fup  3 months    BLUE Camarena MD

## 2022-06-22 NOTE — PROGRESS NOTES
Assessment and Plan:     Problem List Items Addressed This Visit    None     Visit Diagnoses     Iron deficiency anemia, unspecified iron deficiency anemia type    -  Primary    Relevant Orders    Occult Blood, Fecal Immunochemical    Iron Panel (Includes Ferritin, Iron Sat%, Iron, and TIBC)    Ambulatory Referral to Gastroenterology           Preventive health issues were discussed with patient, and age appropriate screening tests were ordered as noted in patient's After Visit Summary  Personalized health advice and appropriate referrals for health education or preventive services given if needed, as noted in patient's After Visit Summary       History of Present Illness:     Patient presents for a Medicare Wellness Visit    HPI   Patient Care Team:  Rosita Tesfaye MD as PCP - LILIANE Giang MD (Vascular Surgery)  MD Patricia Juares MD as Endoscopist     Review of Systems:     Review of Systems     Problem List:     Patient Active Problem List   Diagnosis    AAA (abdominal aortic aneurysm) without rupture ([address] )    Epigastric pain    Diarrhea    Irritable bowel syndrome with both constipation and diarrhea    Other chronic pancreatitis ([address] )    Smoking    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 ([address] )    History of endovascular stent graft for abdominal aortic aneurysm (AAA)    Stenosis of other vascular prosthetic devices, implants and grafts, subsequent encounter    Neurogenic claudication ([address] )    Chronic kidney disease, stage 3 ([address] )    Protein-calorie malnutrition, unspecified severity ([address] )    Depression, recurrent ([address] )      Past Medical and Surgical History:     Past Medical History:   Diagnosis Date    Anxiety     Rhodes's esophagus     Chronic neck pain     Depression     GERD (gastroesophageal reflux disease)     H/O abdominal aortic aneurysm 4 5 cm    Hyperlipidemia     Hypertension     Pancreatic pseudocyst     Vertigo      Past Surgical History:   Procedure Laterality Date    COLONOSCOPY  09/30/2014    IR LOWER EXTREMITY / INTERVENTION  1/4/2019    NOSE SURGERY      FL ESOPHAGOGASTRODUODENOSCOPY TRANSORAL DIAGNOSTIC N/A 6/22/2018    Procedure: EGD AND COLONOSCOPY;  Surgeon: Tila Thorne MD;  Location:  GI LAB; Service: Gastroenterology    [place] [address] R[address] DPLMNT AORTO-AORTIC NDGFT N/A 2/9/2018    Procedure: REPAIR ANEURYSM ENDOVASCULAR ABDOMINAL AORTIC  (EVAR); Surgeon: Sahara Coleman MD;  Location:  MAIN OR;  Service: Vascular    UPPER GASTROINTESTINAL ENDOSCOPY        Family History:     Family History   Problem Relation Age of Onset    Heart disease Father     Heart attack Father     Diabetes Maternal Grandmother     Diabetes Maternal Grandfather     Diabetes Paternal Grandmother     Hypertension Family     Hyperlipidemia Family       Social History:     Social History     Socioeconomic History    Marital status:       Spouse name: Not on file    Number of children: Not on file    Years of education: Not on file    Highest education level: Not on file   Occupational History    Not on file   Tobacco Use    Smoking status: Current Every Day Smoker     Packs/day: 0 25     Types: Cigarettes    Smokeless tobacco: Never Used    Tobacco comment: Virgil quiting smoking handouts   Vaping Use    Vaping Use: Never used   Substance and Sexual Activity    Alcohol use: No    Drug use: No    Sexual activity: Not Currently   Other Topics Concern    Not on file   Social History Narrative    Not on file     Social Determinants of Health     Financial Resource Strain: Not on file   Food Insecurity: Not on file   Transportation Needs: Not on file   Physical Activity: Not on file   Stress: Not on file   Social Connections: Not on file   Intimate Partner Violence: Not on file   Housing Stability: Not on file      Medications and Allergies:     Current Outpatient Medications   Medication Sig Dispense Refill    ALPRAZolam Noraha Panda) 1 mg tablet Take 1 tablet (1 mg total) by mouth 2 (two) times a day as needed (transient anxiety) 60 tablet 0    aspirin (ECOTRIN LOW STRENGTH) 81 mg EC tablet Take 81 mg by mouth daily      citalopram (CeleXA) 20 mg tablet TAKE 1 TABLET BY MOUTH EVERY DAY 90 tablet 0    dicyclomine (BENTYL) 10 mg capsule TAKE 1 CAPSULE BY MOUTH 3 TIMES A DAY AS NEEDED (ABDOMINAL CRAMPING/ SPASMS) 270 capsule 2    Linaclotide (LINZESS) 72 MCG CAPS Take 1 capsule by mouth daily 30 capsule 5    lisinopril-hydrochlorothiazide (PRINZIDE,ZESTORETIC) 20-12 5 MG per tablet TAKE 1 TABLET BY MOUTH ONCE A DAY 90 tablet 2    metoprolol succinate (TOPROL-XL) 25 mg 24 hr tablet Take 1 tablet (25 mg total) by mouth in the morning  90 tablet 1    Multiple Vitamins-Minerals (CENTRUM SILVER PO) Take 1 tablet by mouth daily        omeprazole (PriLOSEC) 20 mg delayed release capsule TAKE 1 CAPSULE BY MOUTH EVERY DAY 90 capsule 3    pancrelipase, Lip-Prot-Amyl, (Creon) 12,000 units capsule Take 2 capsules (24,000 Units total) by mouth 3 (three) times a day with meals 540 capsule 1    polyethylene glycol (MIRALAX) 17 g packet Take 17 g by mouth      pravastatin (PRAVACHOL) 40 mg tablet Take 1 tablet (40 mg total) by mouth daily at bedtime 90 tablet 2    ranitidine (ZANTAC) 150 mg tablet TAKE 1 TABLET EVERY DAY IN THE EVENING 90 tablet 2    Restasis 0 05 % ophthalmic emulsion       sodium chloride 1 g tablet TAKE 3 TABLETS EVERY DAY 90 tablet 4    tamsulosin (FLOMAX) 0 4 mg TAKE 1 CAPSULE BY MOUTH EVERY DAY 90 capsule 1    Wheat Dextrin (BENEFIBER PO) Take by mouth daily as needed         No current facility-administered medications for this visit       No Known Allergies   Immunizations:     Immunization History   Administered Date(s) Administered    COVID-19 PFIZER VACCINE 0 3 ML IM 03/09/2021, 03/31/2021    Influenza, high dose seasonal 0 7 mL 10/09/2020, 10/04/2021      Health Maintenance:         Topic Date Due    Colorectal Cancer Screening  06/22/2028    Hepatitis C Screening  Completed         Topic Date Due    Pneumococcal Vaccine: 65+ Years (1 - PCV) Never done    DTaP,Tdap,and Td Vaccines (1 - Tdap) Never done    COVID-19 Vaccine (3 - Booster for Pfizer series) 08/31/2021      Medicare Screening Tests and Risk Assessments:     Last Medicare Wellness visit information reviewed, patient interviewed and updates made to the record today  Health Risk Assessment:   Patient rates overall health as poor  Patient feels that their physical health rating is same  Patient is very satisfied with their life  Eyesight was rated as slightly worse  Hearing was rated as slightly worse  Patient feels that their emotional and mental health rating is same  Patients states they are never, rarely angry  Patient states they are always unusually tired/fatigued  Pain experienced in the last 7 days has been some  Patient's pain rating has been 6/10  Patient states that he has experienced no weight loss or gain in last 6 months  Fall Risk Screening: In the past year, patient has experienced: no history of falling in past year      Home Safety:  Patient does not have trouble with stairs inside or outside of their home  Patient has working smoke alarms and has working carbon monoxide detector  Home safety hazards include: none  Nutrition:   Current diet is Regular  Medications:   Patient is currently taking over-the-counter supplements  OTC medications include: see medication list  Patient is not able to manage medications  Activities of Daily Living (ADLs)/Instrumental Activities of Daily Living (IADLs):   Walk and transfer into and out of bed and chair?: Yes  Dress and groom yourself?: Yes    Bathe or shower yourself?: Yes    Feed yourself?  Yes  Do your laundry/housekeeping?: No  Manage your money, pay your bills and track your expenses?: No  Make your own meals?: No    Do your own shopping?: No    Previous Hospitalizations:   Any hospitalizations or ED visits within the last 12 months?: No      Advance Care Planning:   Living will: Yes    Durable POA for healthcare: Yes    Advanced directive: Yes      PREVENTIVE SCREENINGS      Cardiovascular Screening:    General: Screening Not Indicated and History Lipid Disorder      Diabetes Screening:     General: Screening Current      Colorectal Cancer Screening:     General: Screening Current      Abdominal Aortic Aneurysm (AAA) Screening:    Risk factors include: age between 73-69 yo and tobacco use        General: Screening Not Indicated and History AAA      Lung Cancer Screening:     General: Screening Not Indicated      Hepatitis C Screening:    General: Screening Current    Screening, Brief Intervention, and Referral to Treatment (SBIRT)    Screening  Typical number of drinks in a day: 0  Typical number of drinks in a week: 0  Interpretation: Low risk drinking behavior      Single Item Drug Screening:  How often have you used an illegal drug (including marijuana) or a prescription medication for non-medical reasons in the past year? never    Single Item Drug Screen Score: 0  Interpretation: Negative screen for possible drug use disorder    No exam data present     Physical Exam:     /70 (BP Location: Right arm, Patient Position: Sitting, Cuff Size: Standard)   Pulse 72   Temp 98 7 °F (37 1 °C) (Temporal)   Ht 5' 2" (1 575 m)   Wt 52 2 kg (115 lb)   SpO2 98%   BMI 21 03 kg/m²     Physical Exam     Terry Howard MD

## 2022-06-23 RX ORDER — AMITRIPTYLINE HYDROCHLORIDE 25 MG/1
25 TABLET, FILM COATED ORAL
Qty: 30 TABLET | Refills: 4 | Status: SHIPPED | OUTPATIENT
Start: 2022-06-23

## 2022-06-23 RX ORDER — AMITRIPTYLINE HYDROCHLORIDE 25 MG/1
25 TABLET, FILM COATED ORAL
COMMUNITY
End: 2022-06-23 | Stop reason: SDUPTHER

## 2022-06-24 ENCOUNTER — LAB (OUTPATIENT)
Dept: LAB | Facility: HOSPITAL | Age: 72
End: 2022-06-24
Payer: MEDICARE

## 2022-06-24 DIAGNOSIS — D50.9 IRON DEFICIENCY ANEMIA, UNSPECIFIED IRON DEFICIENCY ANEMIA TYPE: ICD-10-CM

## 2022-06-24 LAB
FERRITIN SERPL-MCNC: 28 NG/ML (ref 8–388)
IRON SATN MFR SERPL: 15 % (ref 20–50)
IRON SERPL-MCNC: 56 UG/DL (ref 65–175)
TIBC SERPL-MCNC: 386 UG/DL (ref 250–450)

## 2022-06-24 PROCEDURE — 83540 ASSAY OF IRON: CPT

## 2022-06-24 PROCEDURE — 82728 ASSAY OF FERRITIN: CPT

## 2022-06-24 PROCEDURE — 83550 IRON BINDING TEST: CPT

## 2022-06-24 PROCEDURE — 36415 COLL VENOUS BLD VENIPUNCTURE: CPT

## 2022-07-05 ENCOUNTER — TELEPHONE (OUTPATIENT)
Dept: LAB | Facility: HOSPITAL | Age: 72
End: 2022-07-05

## 2022-07-05 NOTE — TELEPHONE ENCOUNTER
TellFhim  the test collection of  stool  sample  did not  work  We are  going  to  forget about it  Let us see what the  GI  consultant  suggests     Dr Denny Olson

## 2022-07-11 ENCOUNTER — APPOINTMENT (OUTPATIENT)
Dept: LAB | Facility: HOSPITAL | Age: 72
End: 2022-07-11
Payer: MEDICARE

## 2022-07-11 DIAGNOSIS — D50.9 IRON DEFICIENCY ANEMIA, UNSPECIFIED IRON DEFICIENCY ANEMIA TYPE: ICD-10-CM

## 2022-07-11 LAB — HEMOCCULT STL QL IA: POSITIVE

## 2022-07-11 PROCEDURE — G0328 FECAL BLOOD SCRN IMMUNOASSAY: HCPCS

## 2022-07-13 ENCOUNTER — OFFICE VISIT (OUTPATIENT)
Dept: VASCULAR SURGERY | Facility: CLINIC | Age: 72
End: 2022-07-13
Payer: MEDICARE

## 2022-07-13 VITALS
BODY MASS INDEX: 21.16 KG/M2 | WEIGHT: 115 LBS | HEART RATE: 74 BPM | HEIGHT: 62 IN | DIASTOLIC BLOOD PRESSURE: 86 MMHG | SYSTOLIC BLOOD PRESSURE: 130 MMHG

## 2022-07-13 DIAGNOSIS — Z95.828 HISTORY OF ENDOVASCULAR STENT GRAFT FOR ABDOMINAL AORTIC ANEURYSM (AAA): ICD-10-CM

## 2022-07-13 DIAGNOSIS — Z98.890 STATUS POST ENDOVASCULAR ANEURYSM REPAIR (EVAR): Primary | ICD-10-CM

## 2022-07-13 DIAGNOSIS — Z86.79 STATUS POST ENDOVASCULAR ANEURYSM REPAIR (EVAR): Primary | ICD-10-CM

## 2022-07-13 DIAGNOSIS — D50.9 IRON DEFICIENCY ANEMIA, UNSPECIFIED IRON DEFICIENCY ANEMIA TYPE: ICD-10-CM

## 2022-07-13 PROCEDURE — 99213 OFFICE O/P EST LOW 20 MIN: CPT | Performed by: SURGERY

## 2022-07-13 NOTE — PROGRESS NOTES
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 with angioplasty and stent placement  Bilateral palpable 2+ femoral pulses    Aneurysm sac size approximately 4cm on last study with 50-75% stenosis of the distal left iliac limb 9/2021  Continues to have chronic bilateral thigh fatigue and aching  Denies calf claudication  Also with chronic low back pain     -We discussed continued surveillance after EVAR  He is due for a noncontrast CTAP to evaluate the EVAR configuration  Will obtain in the next 1-2 months  If this is unremarkable, will continue with repeat EVAR duplex in the following year  -Bilateral thigh discomfort unlikely related to left iliac stenosis  Suspect neurogenic claudication   -Recommend complete smoking cessation  -Follow-up in 1 year          Diagnoses and all orders for this visit:    Status post endovascular aneurysm repair (EVAR)  -     CT abdomen pelvis wo contrast; Future    History of endovascular stent graft for abdominal aortic aneurysm (AAA)    Iron deficiency anemia, unspecified iron deficiency anemia type        I have spent 25 minutes with Patient  today in which greater than 50% of this time was spent in counseling/coordination of care regarding Intructions for management, Importance of tx compliance and Impressions  Subjective:      Patient ID: Jayy Villa is a 67 y o  male  Patient present to review EVAR duplex done on 9/23/21  Patient EVAR was done back in 2018  Patient is a 1 year follow up   Patient states when the weather is bad his legs and back hurts  Patient states no symptoms and doing well  Patient smokes 0 25PPD  HPI  Mr  Sailaja Holden is a 78yo male smoker with GERD, history of AAA s/p EVAR 2018, 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, chronic neurogenic claudication and back pain who presents for follow-up to review surveillance EVAR duplex  He reports chronic back pain and bilateral leg discomfort going from both thighs which is unchanged  He has not had recent repeat EVAR duplex  This is currently scheduled for 9/2022  He was found to have anemia on recent labwork and did FOBT which demonstrated blood  He is waiting to discuss with his PCP for next steps  The following portions of the patient's history were reviewed and updated as appropriate: allergies, current medications, past family history, past medical history, past social history, past surgical history and problem list     Review of Systems   Constitutional: Negative  HENT: Negative  Eyes: Negative  Respiratory: Negative  Cardiovascular: Negative  Gastrointestinal: Negative  Endocrine: Negative  Genitourinary: Negative  Musculoskeletal: Negative  Skin: Negative  Allergic/Immunologic: Negative  Neurological: Negative  Hematological: Negative  Psychiatric/Behavioral: Negative  I have personally reviewed the ROS entered by MA and agree as documented  Objective:      /86 (BP Location: Right arm, Patient Position: Sitting, Cuff Size: Adult)   Pulse 74   Ht 5' 2" (1 575 m)   Wt 52 2 kg (115 lb)   BMI 21 03 kg/m²          Physical Exam  Constitutional:       Appearance: Normal appearance  HENT:      Head: Normocephalic and atraumatic  Cardiovascular:      Rate and Rhythm: Normal rate  Pulses:           Femoral pulses are 2+ on the right side and 2+ on the left side  Dorsalis pedis pulses are 1+ on the right side and 0 on the left side          Posterior tibial pulses are 0 on the right side and 0 on the left side  Pulmonary:      Effort: Pulmonary effort is normal    Abdominal:      Palpations: Abdomen is soft  Musculoskeletal:         General: Normal range of motion  Cervical back: Normal range of motion and neck supple  Skin:     General: Skin is warm and dry  Capillary Refill: Capillary refill takes less than 2 seconds  Neurological:      General: No focal deficit present  Mental Status: He is alert and oriented to person, place, and time  Psychiatric:         Mood and Affect: Mood normal          Behavior: Behavior normal          Thought Content:  Thought content normal          Judgment: Judgment normal

## 2022-07-13 NOTE — ASSESSMENT & PLAN NOTE
Anemia noted on recent bloodwork  Performed fecal occult blood testing with evidence of blood  He has follow-up with his PCP soon to review findings and discuss next steps

## 2022-07-13 NOTE — ASSESSMENT & PLAN NOTE
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 with angioplasty and stent placement  Bilateral palpable 2+ femoral pulses    Aneurysm sac size approximately 4cm on last study with 50-75% stenosis of the distal left iliac limb 9/2021  Continues to have chronic bilateral thigh fatigue and aching  Denies calf claudication  Also with chronic low back pain     -We discussed continued surveillance after EVAR  He is due for a noncontrast CTAP to evaluate the EVAR configuration  Will obtain in the next 1-2 months  If this is unremarkable, will continue with repeat EVAR duplex in the following year  -Bilateral thigh discomfort unlikely related to left iliac stenosis   Suspect neurogenic claudication   -Recommend complete smoking cessation  -Follow-up in 1 year

## 2022-07-13 NOTE — PATIENT INSTRUCTIONS
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 with angioplasty and stent placement  Bilateral palpable 2+ femoral pulses     Aneurysm sac size approximately 4cm on last study with 50-75% stenosis of the distal left iliac limb 9/2021  Continues to have chronic bilateral thigh fatigue and aching  Denies calf claudication  Also with chronic low back pain      -We discussed continued surveillance after EVAR  He is due for a noncontrast CTAP to evaluate the EVAR configuration  Will obtain in the next 1-2 months  If this is unremarkable, will continue with repeat EVAR duplex in the following year  -Bilateral thigh discomfort unlikely related to left iliac stenosis   Suspect neurogenic claudication   -Recommend complete smoking cessation  -Follow-up in 1 year

## 2022-07-15 DIAGNOSIS — F41.9 ANXIETY: ICD-10-CM

## 2022-07-15 RX ORDER — ALPRAZOLAM 1 MG/1
1 TABLET ORAL 2 TIMES DAILY PRN
Qty: 60 TABLET | Refills: 0 | Status: SHIPPED | OUTPATIENT
Start: 2022-07-15 | End: 2022-08-16 | Stop reason: SDUPTHER

## 2022-07-25 ENCOUNTER — OFFICE VISIT (OUTPATIENT)
Dept: GASTROENTEROLOGY | Facility: CLINIC | Age: 72
End: 2022-07-25
Payer: MEDICARE

## 2022-07-25 VITALS
TEMPERATURE: 97 F | BODY MASS INDEX: 21.02 KG/M2 | DIASTOLIC BLOOD PRESSURE: 70 MMHG | SYSTOLIC BLOOD PRESSURE: 122 MMHG | WEIGHT: 114.2 LBS | HEIGHT: 62 IN

## 2022-07-25 DIAGNOSIS — K58.2 IRRITABLE BOWEL SYNDROME WITH BOTH CONSTIPATION AND DIARRHEA: ICD-10-CM

## 2022-07-25 DIAGNOSIS — K86.1 OTHER CHRONIC PANCREATITIS (HCC): ICD-10-CM

## 2022-07-25 DIAGNOSIS — D50.8 OTHER IRON DEFICIENCY ANEMIA: Primary | ICD-10-CM

## 2022-07-25 DIAGNOSIS — K21.9 GASTROESOPHAGEAL REFLUX DISEASE, UNSPECIFIED WHETHER ESOPHAGITIS PRESENT: ICD-10-CM

## 2022-07-25 DIAGNOSIS — D50.9 IRON DEFICIENCY ANEMIA, UNSPECIFIED IRON DEFICIENCY ANEMIA TYPE: ICD-10-CM

## 2022-07-25 PROCEDURE — 99214 OFFICE O/P EST MOD 30 MIN: CPT | Performed by: PHYSICIAN ASSISTANT

## 2022-07-25 RX ORDER — POLYETHYLENE GLYCOL 3350 17 G/17G
POWDER, FOR SOLUTION ORAL
Qty: 238 G | Refills: 0 | Status: SHIPPED | OUTPATIENT
Start: 2022-07-25 | End: 2022-08-23

## 2022-07-25 RX ORDER — MAGNESIUM CARB/ALUMINUM HYDROX 105-160MG
296 TABLET,CHEWABLE ORAL ONCE
Qty: 296 ML | Refills: 0 | Status: SHIPPED | OUTPATIENT
Start: 2022-07-25 | End: 2022-08-23

## 2022-07-25 NOTE — PROGRESS NOTES
[facility] - Outpatient Follow-up Note  Jose Roberto Lubin 67 y o  male MRN: [id number]  Encounter: [id number]      Assessment and Plan    1  Iron def anemia  The patient was recently found to have iron deficiency anemia with a hemoglobin of 8 6 and iron of 56 but a normal ferritin  He also had a positive fit test   His last EGD and colonoscopy in 2018 as documented below  - monitor CBC and iron with primary care physician  - monitor for overt GI bleeding  - recommend repeat EGD and colonoscopy, patient agreeable    2  GERD  The patient has chronic GERD currently well controlled on omeprazole 20 mg once daily  Last EGD in 2018 with appearance of childress;s however biopsies were negative  - continue omeprazole 20 mg once daily    3  Chronic pacnreatitis  The patient has chronic pancreatitis is currently taking Creon 3 times daily and doing well on this  - continue Creon    4  IBS  The patient has irritable bowel syndrome and is currently on Linzess, MiraLax, and Bentyl as needed with good control of his symptoms  He does admit to some fecal incontinence  - continue Linzess, as needed MiraLax, and as stated Bentyl  - start Metamucil secondary to fecal incontinence    Follow-up after EGD and colonoscopy    ______________________________________________________________________    History of Present Illness  Jose Roberto Lubin is a 67 y o  male with a CKD and a history of a AAA s/p EVAR 2018 here for evaluation of iron def anemia  The patient had recent blood work revealing a hemoglobin of 8 6 down from his baseline in the 13 so  He also had an iron panel with a low iron of 56 but a normal ferritin  This prompted a fit test which came back positive  The patient denies seeing any overt GI bleeding  His last EGD and colonoscopy were in 2018 and are summarized below  EGD 2018  Possible Childress's esophagus noted  Two biopsies taken   Mild erosive gastritis found in the body of the stomach  Two biopsies taken  Normal duodenal bulb and 2nd portion of the duodenum  Path: stomach biopsy negative for h pylori and esophogeal biopsy negative for childress's    Colonoscopy 2018  Moderately severe diverticulosis found in the ascending colon, descending colon, and sigmoid colon  Multiple diminutive polyps found in the sigmoid colon and rectum  Polypectomy not attempted as these appeared to be hyperplastic polyps  multiple random colon biopsies were done to rule out microscopic colitis  Path: random colon biopsies negative for microscopic colitis     The patient is also known us for acid reflux, irritable bowel syndrome, and pancreatitis  He is currently doing well on omeprazole 20 mg once daily, linzess, miralax, bentyl, and Creon with meals  Review of Systems   Constitutional: Negative for activity change, appetite change, chills, fatigue, fever and unexpected weight change  Gastrointestinal: Negative for anal bleeding and blood in stool  Past Medical History  Past Medical History:   Diagnosis Date    Anxiety     Childress's esophagus     Chronic neck pain     Depression     GERD (gastroesophageal reflux disease)     H/O abdominal aortic aneurysm 4 5 cm    Hyperlipidemia     Hypertension     Pancreatic pseudocyst     Vertigo        Past Social history  Past Surgical History:   Procedure Laterality Date    COLONOSCOPY  09/30/2014    IR LOWER EXTREMITY / INTERVENTION  1/4/2019    NOSE SURGERY      [place] ESOPHAGOGASTRODUODENOSCOPY TRANSORAL DIAGNOSTIC N/A 6/22/2018    Procedure: EGD AND COLONOSCOPY;  Surgeon: Garo Sandra MD;  [facility]; Service: Gastroenterology    [place] [address] R[address] DPLMNT AORTO-AORTIC NDGFT N/A 2/9/2018    Procedure: REPAIR ANEURYSM ENDOVASCULAR ABDOMINAL AORTIC  (EVAR); Surgeon: Lito Michael MD;  [facility];  Service: Vascular    UPPER GASTROINTESTINAL ENDOSCOPY       Social History     Socioeconomic History    Marital status:   Spouse name: Not on file    Number of children: Not on file    Years of education: Not on file    Highest education level: Not on file   Occupational History    Not on file   Tobacco Use    Smoking status: Current Every Day Smoker     Packs/day: 0 25     Types: Cigarettes    Smokeless tobacco: Never Used    Tobacco comment: Truven quiting smoking handouts   Vaping Use    Vaping Use: Never used   Substance and Sexual Activity    Alcohol use: No    Drug use: No    Sexual activity: Not Currently   Other Topics Concern    Not on file   Social History Narrative    Not on file     Social Determinants of Health     Financial Resource Strain: Not on file   Food Insecurity: Not on file   Transportation Needs: Not on file   Physical Activity: Not on file   Stress: Not on file   Social Connections: Not on file   Intimate Partner Violence: Not on file   Housing Stability: Not on file     Social History     Substance and Sexual Activity   Alcohol Use No     Social History     Substance and Sexual Activity   Drug Use No     Social History     Tobacco Use   Smoking Status Current Every Day Smoker    Packs/day: 0 25    Types: Cigarettes   Smokeless Tobacco Never Used   Tobacco Comment    Truven quiting smoking handouts       Past Family History  Family History   Problem Relation Age of Onset    Heart disease Father     Heart attack Father     Diabetes Maternal Grandmother     Diabetes Maternal Grandfather     Diabetes Paternal Grandmother     Hypertension Family     Hyperlipidemia Family        Current Medications  Current Outpatient Medications   Medication Sig Dispense Refill    ALPRAZolam (XANAX) 1 mg tablet Take 1 tablet (1 mg total) by mouth 2 (two) times a day as needed (transient anxiety) 60 tablet 0    amitriptyline (ELAVIL) 25 mg tablet Take 1 tablet (25 mg total) by mouth daily at bedtime 30 tablet 4    aspirin (ECOTRIN LOW STRENGTH) 81 mg EC tablet Take 81 mg by mouth daily      citalopram (CeleXA) 20 mg tablet TAKE 1 TABLET BY MOUTH EVERY DAY 90 tablet 0    dicyclomine (BENTYL) 10 mg capsule TAKE 1 CAPSULE BY MOUTH 3 TIMES A DAY AS NEEDED (ABDOMINAL CRAMPING/ SPASMS) 270 capsule 2    Linaclotide (LINZESS) 72 MCG CAPS Take 1 capsule by mouth daily 30 capsule 5    lisinopril-hydrochlorothiazide (PRINZIDE,ZESTORETIC) 20-12 5 MG per tablet TAKE 1 TABLET BY MOUTH ONCE A DAY 90 tablet 2    metoprolol succinate (TOPROL-XL) 25 mg 24 hr tablet Take 1 tablet (25 mg total) by mouth in the morning  90 tablet 1    Multiple Vitamins-Minerals (CENTRUM SILVER PO) Take 1 tablet by mouth daily        omeprazole (PriLOSEC) 20 mg delayed release capsule TAKE 1 CAPSULE BY MOUTH EVERY DAY 90 capsule 3    pancrelipase, Lip-Prot-Amyl, (Creon) 12,000 units capsule Take 2 capsules (24,000 Units total) by mouth 3 (three) times a day with meals 540 capsule 1    polyethylene glycol (MIRALAX) 17 g packet Take 17 g by mouth      pravastatin (PRAVACHOL) 40 mg tablet Take 1 tablet (40 mg total) by mouth daily at bedtime 90 tablet 2    ranitidine (ZANTAC) 150 mg tablet TAKE 1 TABLET EVERY DAY IN THE EVENING 90 tablet 2    Restasis 0 05 % ophthalmic emulsion       sodium chloride 1 g tablet TAKE 3 TABLETS EVERY DAY 90 tablet 4    tamsulosin (FLOMAX) 0 4 mg TAKE 1 CAPSULE BY MOUTH EVERY DAY 90 capsule 1    Wheat Dextrin (BENEFIBER PO) Take by mouth daily as needed         No current facility-administered medications for this visit  Allergies  No Known Allergies      The following portions of the patient's history were reviewed and updated as appropriate: allergies, current medications, past medical history, past social history, past surgical history and problem list       Vitals  There were no vitals filed for this visit  Physical Exam  Constitutional   General appearance: Patient is seated and in no acute distress, well appearing and well nourished     Head and Face   Head and face: Normal     Eyes Conjunctiva and lids: No erythema, swelling or discharge  Anicteric  Ears, Nose, Mouth, and Throat   Hearing: Normal     Neck: Supple, trachea midline  Pulmonary   Respiratory effort: No increased work of breathing or signs of respiratory distress  Lungs: Clear to ascultation, no wheezes, rhonchi, or rales  Cardiovascular   Heart: Regular rate and rhythm, no murmurs gallops or rubs   Examination of extremities for edema and/or varicosities: Normal     Abdomen   Abdomen: Soft, non-tender, no masses, no organomegaly  Normal bowel sounds  Musculoskeletal   Gait and station: Normal     Skin   Skin and subcutaneous tissue: Warm, dry, and intact  No visible jaundice, lesions or rashes  Psychiatric   Judgment and insight: Normal  Recent and remote memory:  Normal  Mood and affect: Normal      Results  No visits with results within 1 Day(s) from this visit  Latest known visit with results is:   Appointment on 07/11/2022   Component Date Value    OCCULT BLD, FECAL IMMUNO* 07/11/2022 Positive (A)       Radiology Results  No results found  Orders  No orders of the defined types were placed in this encounter

## 2022-07-25 NOTE — H&P (VIEW-ONLY)
[facility] - Outpatient Follow-up Note  Sharlene Valdivia 67 y o  male MRN: [id number]  Encounter: [id number]      Assessment and Plan    1  Iron def anemia  The patient was recently found to have iron deficiency anemia with a hemoglobin of 8 6 and iron of 56 but a normal ferritin  He also had a positive fit test   His last EGD and colonoscopy in 2018 as documented below  - monitor CBC and iron with primary care physician  - monitor for overt GI bleeding  - recommend repeat EGD and colonoscopy, patient agreeable    2  GERD  The patient has chronic GERD currently well controlled on omeprazole 20 mg once daily  Last EGD in 2018 with appearance of childress;s however biopsies were negative  - continue omeprazole 20 mg once daily    3  Chronic pacnreatitis  The patient has chronic pancreatitis is currently taking Creon 3 times daily and doing well on this  - continue Creon    4  IBS  The patient has irritable bowel syndrome and is currently on Linzess, MiraLax, and Bentyl as needed with good control of his symptoms  He does admit to some fecal incontinence  - continue Linzess, as needed MiraLax, and as stated Bentyl  - start Metamucil secondary to fecal incontinence    Follow-up after EGD and colonoscopy    ______________________________________________________________________    History of Present Illness  Sharlene Valdivia is a 67 y o  male with a CKD and a history of a AAA s/p EVAR 2018 here for evaluation of iron def anemia  The patient had recent blood work revealing a hemoglobin of 8 6 down from his baseline in the 13 so  He also had an iron panel with a low iron of 56 but a normal ferritin  This prompted a fit test which came back positive  The patient denies seeing any overt GI bleeding  His last EGD and colonoscopy were in 2018 and are summarized below  EGD 2018  Possible Childress's esophagus noted  Two biopsies taken   Mild erosive gastritis found in the body of the stomach  Two biopsies taken  Normal duodenal bulb and 2nd portion of the duodenum  Path: stomach biopsy negative for h pylori and esophogeal biopsy negative for childress's    Colonoscopy 2018  Moderately severe diverticulosis found in the ascending colon, descending colon, and sigmoid colon  Multiple diminutive polyps found in the sigmoid colon and rectum  Polypectomy not attempted as these appeared to be hyperplastic polyps  multiple random colon biopsies were done to rule out microscopic colitis  Path: random colon biopsies negative for microscopic colitis     The patient is also known us for acid reflux, irritable bowel syndrome, and pancreatitis  He is currently doing well on omeprazole 20 mg once daily, linzess, miralax, bentyl, and Creon with meals  Review of Systems   Constitutional: Negative for activity change, appetite change, chills, fatigue, fever and unexpected weight change  Gastrointestinal: Negative for anal bleeding and blood in stool  Past Medical History  Past Medical History:   Diagnosis Date    Anxiety     Childress's esophagus     Chronic neck pain     Depression     GERD (gastroesophageal reflux disease)     H/O abdominal aortic aneurysm 4 5 cm    Hyperlipidemia     Hypertension     Pancreatic pseudocyst     Vertigo        Past Social history  Past Surgical History:   Procedure Laterality Date    COLONOSCOPY  09/30/2014    IR LOWER EXTREMITY / INTERVENTION  1/4/2019    NOSE SURGERY      [place] ESOPHAGOGASTRODUODENOSCOPY TRANSORAL DIAGNOSTIC N/A 6/22/2018    Procedure: EGD AND COLONOSCOPY;  Surgeon: Dorinda Santiago MD;  [facility]; Service: Gastroenterology    [place] [address] R[address] DPLMNT AORTO-AORTIC NDGFT N/A 2/9/2018    Procedure: REPAIR ANEURYSM ENDOVASCULAR ABDOMINAL AORTIC  (EVAR); Surgeon: Jessica Vargas MD;  [facility];  Service: Vascular    UPPER GASTROINTESTINAL ENDOSCOPY       Social History     Socioeconomic History    Marital status:   Spouse name: Not on file    Number of children: Not on file    Years of education: Not on file    Highest education level: Not on file   Occupational History    Not on file   Tobacco Use    Smoking status: Current Every Day Smoker     Packs/day: 0 25     Types: Cigarettes    Smokeless tobacco: Never Used    Tobacco comment: Truven quiting smoking handouts   Vaping Use    Vaping Use: Never used   Substance and Sexual Activity    Alcohol use: No    Drug use: No    Sexual activity: Not Currently   Other Topics Concern    Not on file   Social History Narrative    Not on file     Social Determinants of Health     Financial Resource Strain: Not on file   Food Insecurity: Not on file   Transportation Needs: Not on file   Physical Activity: Not on file   Stress: Not on file   Social Connections: Not on file   Intimate Partner Violence: Not on file   Housing Stability: Not on file     Social History     Substance and Sexual Activity   Alcohol Use No     Social History     Substance and Sexual Activity   Drug Use No     Social History     Tobacco Use   Smoking Status Current Every Day Smoker    Packs/day: 0 25    Types: Cigarettes   Smokeless Tobacco Never Used   Tobacco Comment    Truven quiting smoking handouts       Past Family History  Family History   Problem Relation Age of Onset    Heart disease Father     Heart attack Father     Diabetes Maternal Grandmother     Diabetes Maternal Grandfather     Diabetes Paternal Grandmother     Hypertension Family     Hyperlipidemia Family        Current Medications  Current Outpatient Medications   Medication Sig Dispense Refill    ALPRAZolam (XANAX) 1 mg tablet Take 1 tablet (1 mg total) by mouth 2 (two) times a day as needed (transient anxiety) 60 tablet 0    amitriptyline (ELAVIL) 25 mg tablet Take 1 tablet (25 mg total) by mouth daily at bedtime 30 tablet 4    aspirin (ECOTRIN LOW STRENGTH) 81 mg EC tablet Take 81 mg by mouth daily      citalopram (CeleXA) 20 mg tablet TAKE 1 TABLET BY MOUTH EVERY DAY 90 tablet 0    dicyclomine (BENTYL) 10 mg capsule TAKE 1 CAPSULE BY MOUTH 3 TIMES A DAY AS NEEDED (ABDOMINAL CRAMPING/ SPASMS) 270 capsule 2    Linaclotide (LINZESS) 72 MCG CAPS Take 1 capsule by mouth daily 30 capsule 5    lisinopril-hydrochlorothiazide (PRINZIDE,ZESTORETIC) 20-12 5 MG per tablet TAKE 1 TABLET BY MOUTH ONCE A DAY 90 tablet 2    metoprolol succinate (TOPROL-XL) 25 mg 24 hr tablet Take 1 tablet (25 mg total) by mouth in the morning  90 tablet 1    Multiple Vitamins-Minerals (CENTRUM SILVER PO) Take 1 tablet by mouth daily        omeprazole (PriLOSEC) 20 mg delayed release capsule TAKE 1 CAPSULE BY MOUTH EVERY DAY 90 capsule 3    pancrelipase, Lip-Prot-Amyl, (Creon) 12,000 units capsule Take 2 capsules (24,000 Units total) by mouth 3 (three) times a day with meals 540 capsule 1    polyethylene glycol (MIRALAX) 17 g packet Take 17 g by mouth      pravastatin (PRAVACHOL) 40 mg tablet Take 1 tablet (40 mg total) by mouth daily at bedtime 90 tablet 2    ranitidine (ZANTAC) 150 mg tablet TAKE 1 TABLET EVERY DAY IN THE EVENING 90 tablet 2    Restasis 0 05 % ophthalmic emulsion       sodium chloride 1 g tablet TAKE 3 TABLETS EVERY DAY 90 tablet 4    tamsulosin (FLOMAX) 0 4 mg TAKE 1 CAPSULE BY MOUTH EVERY DAY 90 capsule 1    Wheat Dextrin (BENEFIBER PO) Take by mouth daily as needed         No current facility-administered medications for this visit  Allergies  No Known Allergies      The following portions of the patient's history were reviewed and updated as appropriate: allergies, current medications, past medical history, past social history, past surgical history and problem list       Vitals  There were no vitals filed for this visit  Physical Exam  Constitutional   General appearance: Patient is seated and in no acute distress, well appearing and well nourished     Head and Face   Head and face: Normal     Eyes Conjunctiva and lids: No erythema, swelling or discharge  Anicteric  Ears, Nose, Mouth, and Throat   Hearing: Normal     Neck: Supple, trachea midline  Pulmonary   Respiratory effort: No increased work of breathing or signs of respiratory distress  Lungs: Clear to ascultation, no wheezes, rhonchi, or rales  Cardiovascular   Heart: Regular rate and rhythm, no murmurs gallops or rubs   Examination of extremities for edema and/or varicosities: Normal     Abdomen   Abdomen: Soft, non-tender, no masses, no organomegaly  Normal bowel sounds  Musculoskeletal   Gait and station: Normal     Skin   Skin and subcutaneous tissue: Warm, dry, and intact  No visible jaundice, lesions or rashes  Psychiatric   Judgment and insight: Normal  Recent and remote memory:  Normal  Mood and affect: Normal      Results  No visits with results within 1 Day(s) from this visit  Latest known visit with results is:   Appointment on 07/11/2022   Component Date Value    OCCULT BLD, FECAL IMMUNO* 07/11/2022 Positive (A)       Radiology Results  No results found  Orders  No orders of the defined types were placed in this encounter

## 2022-07-25 NOTE — PATIENT INSTRUCTIONS
Start metamucil daily   Schedule EGD and colonoscopy  Call with blood in your stool       Scheduled date of colonoscopy/egd  (as of today): 8/17/22  Physician performing colonoscopy: Dr Castillo Hidden  Location of colonoscopy: [facility] [place]   Bowel prep reviewed with patient: miralax/mag citrate  Instructions reviewed with patient by: Alex Tejeda  Clearances: n/a

## 2022-08-12 ENCOUNTER — OFFICE VISIT (OUTPATIENT)
Dept: INTERNAL MEDICINE CLINIC | Facility: CLINIC | Age: 72
End: 2022-08-12
Payer: MEDICARE

## 2022-08-12 VITALS
OXYGEN SATURATION: 98 % | TEMPERATURE: 98.2 F | BODY MASS INDEX: 20.61 KG/M2 | HEART RATE: 72 BPM | HEIGHT: 62 IN | DIASTOLIC BLOOD PRESSURE: 100 MMHG | WEIGHT: 112 LBS | SYSTOLIC BLOOD PRESSURE: 222 MMHG

## 2022-08-12 DIAGNOSIS — I10 HYPERTENSION: Primary | ICD-10-CM

## 2022-08-12 DIAGNOSIS — K86.1 CHRONIC PANCREATITIS (HCC): ICD-10-CM

## 2022-08-12 PROCEDURE — 99214 OFFICE O/P EST MOD 30 MIN: CPT | Performed by: INTERNAL MEDICINE

## 2022-08-12 NOTE — PROGRESS NOTES
Assessment/Plan:    Follow up  Hypertension, Chronic  Pancreatitis  GI bleeding     Diagnoses and all orders for this visit:    Hypertension    Chronic pancreatitis ([facility] [address] )      GI bleeding    Subjective:      Patient ID: Mulu Byrd is a 67 y o  male  HPI    The following portions of the patient's history were reviewed and updated as appropriate: allergies, current medications, past family history, past medical history, past social history, past surgical history, and problem list     Review of Systems   Constitutional: Negative  HENT: Negative for dental problem, drooling, ear discharge and ear pain  Eyes: Negative for discharge, redness and itching  Respiratory: Negative for apnea, cough and wheezing  Cardiovascular: Negative for chest pain and palpitations  Gastrointestinal: Negative for abdominal pain, blood in stool, diarrhea and vomiting  Endocrine: Negative for polydipsia, polyphagia and polyuria  Genitourinary: Negative for decreased urine volume, dysuria and frequency  Musculoskeletal: Negative for arthralgias, myalgias and neck stiffness  Skin: Negative for pallor and wound  Allergic/Immunologic: Negative for environmental allergies and food allergies  Neurological: Negative for facial asymmetry, light-headedness, numbness and headaches  Hematological: Negative for adenopathy  Does not bruise/bleed easily  Psychiatric/Behavioral: Negative for agitation, behavioral problems and confusion  Objective:      BP (!) 222/100 (BP Location: Right arm, Patient Position: Sitting, Cuff Size: Standard)   Pulse 72   Temp 98 2 °F (36 8 °C) (Temporal)   Ht 5' 2" (1 575 m)   Wt 50 8 kg (112 lb)   SpO2 98%   BMI 20 49 kg/m²          Physical Exam  Constitutional:       Appearance: Normal appearance  HENT:      Head: Normocephalic        Nose: Nose normal       Mouth/Throat:      Mouth: Mucous membranes are moist    Eyes:      Pupils: Pupils are equal, round, and reactive to light  Cardiovascular:      Rate and Rhythm: Regular rhythm  Heart sounds: Normal heart sounds  Pulmonary:      Breath sounds: Normal breath sounds  Abdominal:      Palpations: Abdomen is soft  Musculoskeletal:         General: No swelling  Cervical back: Neck supple  Skin:     General: Skin is warm  Neurological:      General: No focal deficit present  Mental Status: He is alert and oriented to person, place, and time  Psychiatric:         Mood and Affect: Mood normal        Problem #1   Hypertension  Controlled with  Current meds  Problem#2  Chronic  Pancreatitis  Stable    Problem#3   GI  Bleeding    Will  Be  Scoped nest  Week  Fup  3 anna Camarena MD

## 2022-08-15 ENCOUNTER — HOSPITAL ENCOUNTER (OUTPATIENT)
Dept: CT IMAGING | Facility: HOSPITAL | Age: 72
Discharge: HOME | End: 2022-08-15
Attending: SURGERY
Payer: MEDICARE

## 2022-08-15 DIAGNOSIS — Z98.890 STATUS POST ENDOVASCULAR ANEURYSM REPAIR (EVAR): ICD-10-CM

## 2022-08-15 DIAGNOSIS — Z86.79 STATUS POST ENDOVASCULAR ANEURYSM REPAIR (EVAR): ICD-10-CM

## 2022-08-15 PROCEDURE — G1004 CDSM NDSC: HCPCS

## 2022-08-15 PROCEDURE — 74176 CT ABD & PELVIS W/O CONTRAST: CPT

## 2022-08-16 ENCOUNTER — TELEPHONE (OUTPATIENT)
Dept: INTERNAL MEDICINE CLINIC | Facility: CLINIC | Age: 72
End: 2022-08-16

## 2022-08-16 ENCOUNTER — NURSE TRIAGE (OUTPATIENT)
Dept: OTHER | Facility: OTHER | Age: 72
End: 2022-08-16

## 2022-08-16 DIAGNOSIS — F41.9 ANXIETY: ICD-10-CM

## 2022-08-16 RX ORDER — ALPRAZOLAM 1 MG/1
1 TABLET ORAL 2 TIMES DAILY PRN
Qty: 60 TABLET | Refills: 0 | Status: SHIPPED | OUTPATIENT
Start: 2022-08-16 | End: 2022-10-06 | Stop reason: SDUPTHER

## 2022-08-16 NOTE — TELEPHONE ENCOUNTER
Reason for Disposition   Question about upcoming scheduled test, no triage required and triager able to answer question    Answer Assessment - Initial Assessment Questions  1   REASON FOR CALL or QUESTION: "What is your reason for calling today?" or "How can I best help you?" or "What question do you have that I can help answer?"      "How do I take the miralax/dulcolax prep for my colonoscopy?"    Protocols used: INFORMATION ONLY CALL - [facility]

## 2022-08-17 ENCOUNTER — PREP FOR PROCEDURE (OUTPATIENT)
Dept: GASTROENTEROLOGY | Facility: CLINIC | Age: 72
End: 2022-08-17

## 2022-08-17 ENCOUNTER — HOSPITAL ENCOUNTER (OUTPATIENT)
Dept: GASTROENTEROLOGY | Facility: HOSPITAL | Age: 72
Setting detail: OUTPATIENT SURGERY
Discharge: HOME | End: 2022-08-17
Attending: INTERNAL MEDICINE
Payer: MEDICARE

## 2022-08-17 ENCOUNTER — ANESTHESIA (OUTPATIENT)
Dept: GASTROENTEROLOGY | Facility: HOSPITAL | Age: 72
End: 2022-08-17

## 2022-08-17 ENCOUNTER — ANESTHESIA EVENT (OUTPATIENT)
Dept: GASTROENTEROLOGY | Facility: HOSPITAL | Age: 72
End: 2022-08-17

## 2022-08-17 VITALS
SYSTOLIC BLOOD PRESSURE: 187 MMHG | HEIGHT: 62 IN | WEIGHT: 110.23 LBS | RESPIRATION RATE: 16 BRPM | HEART RATE: 75 BPM | TEMPERATURE: 96.1 F | DIASTOLIC BLOOD PRESSURE: 85 MMHG | OXYGEN SATURATION: 100 % | BODY MASS INDEX: 20.28 KG/M2

## 2022-08-17 DIAGNOSIS — D50.8 OTHER IRON DEFICIENCY ANEMIA: ICD-10-CM

## 2022-08-17 DIAGNOSIS — K25.9 MULTIPLE GASTRIC ULCERS: Primary | ICD-10-CM

## 2022-08-17 PROCEDURE — 88342 IMHCHEM/IMCYTCHM 1ST ANTB: CPT | Performed by: PATHOLOGY

## 2022-08-17 PROCEDURE — 88305 TISSUE EXAM BY PATHOLOGIST: CPT | Performed by: PATHOLOGY

## 2022-08-17 PROCEDURE — NC001 PR NO CHARGE: Performed by: INTERNAL MEDICINE

## 2022-08-17 PROCEDURE — 88313 SPECIAL STAINS GROUP 2: CPT | Performed by: PATHOLOGY

## 2022-08-17 PROCEDURE — 45380 COLONOSCOPY AND BIOPSY: CPT | Performed by: INTERNAL MEDICINE

## 2022-08-17 PROCEDURE — 88341 IMHCHEM/IMCYTCHM EA ADD ANTB: CPT | Performed by: PATHOLOGY

## 2022-08-17 PROCEDURE — 43239 EGD BIOPSY SINGLE/MULTIPLE: CPT | Performed by: INTERNAL MEDICINE

## 2022-08-17 RX ORDER — SODIUM CHLORIDE 9 MG/ML
INJECTION, SOLUTION INTRAVENOUS CONTINUOUS PRN
Status: DISCONTINUED | OUTPATIENT
Start: 2022-08-17 | End: 2022-08-17

## 2022-08-17 RX ORDER — SODIUM CHLORIDE, SODIUM LACTATE, POTASSIUM CHLORIDE, CALCIUM CHLORIDE 600; 310; 30; 20 MG/100ML; MG/100ML; MG/100ML; MG/100ML
50 INJECTION, SOLUTION INTRAVENOUS CONTINUOUS
Status: CANCELLED | OUTPATIENT
Start: 2022-08-17

## 2022-08-17 RX ORDER — PROPOFOL 10 MG/ML
INJECTION, EMULSION INTRAVENOUS AS NEEDED
Status: DISCONTINUED | OUTPATIENT
Start: 2022-08-17 | End: 2022-08-17

## 2022-08-17 RX ORDER — LIDOCAINE HYDROCHLORIDE 10 MG/ML
0.5 INJECTION, SOLUTION EPIDURAL; INFILTRATION; INTRACAUDAL; PERINEURAL ONCE AS NEEDED
Status: CANCELLED | OUTPATIENT
Start: 2022-08-17

## 2022-08-17 RX ORDER — OMEPRAZOLE 40 MG/1
40 CAPSULE, DELAYED RELEASE ORAL
Qty: 60 CAPSULE | Refills: 3 | Status: SHIPPED | OUTPATIENT
Start: 2022-08-17 | End: 2022-09-09

## 2022-08-17 RX ADMIN — PROPOFOL 100 MG: 10 INJECTION, EMULSION INTRAVENOUS at 11:26

## 2022-08-17 RX ADMIN — PROPOFOL 20 MG: 10 INJECTION, EMULSION INTRAVENOUS at 11:49

## 2022-08-17 RX ADMIN — PROPOFOL 30 MG: 10 INJECTION, EMULSION INTRAVENOUS at 11:29

## 2022-08-17 RX ADMIN — PROPOFOL 20 MG: 10 INJECTION, EMULSION INTRAVENOUS at 11:54

## 2022-08-17 RX ADMIN — PROPOFOL 20 MG: 10 INJECTION, EMULSION INTRAVENOUS at 12:16

## 2022-08-17 RX ADMIN — SODIUM CHLORIDE: 0.9 INJECTION, SOLUTION INTRAVENOUS at 11:25

## 2022-08-17 RX ADMIN — PROPOFOL 20 MG: 10 INJECTION, EMULSION INTRAVENOUS at 12:06

## 2022-08-17 RX ADMIN — SODIUM CHLORIDE: 0.9 INJECTION, SOLUTION INTRAVENOUS at 12:17

## 2022-08-17 RX ADMIN — PROPOFOL 30 MG: 10 INJECTION, EMULSION INTRAVENOUS at 11:33

## 2022-08-17 RX ADMIN — PROPOFOL 30 MG: 10 INJECTION, EMULSION INTRAVENOUS at 11:38

## 2022-08-17 RX ADMIN — PROPOFOL 20 MG: 10 INJECTION, EMULSION INTRAVENOUS at 11:43

## 2022-08-17 NOTE — ANESTHESIA PREPROCEDURE EVALUATION
Procedure:  COLONOSCOPY  EGD    Relevant Problems   CARDIO   (+) AAA (abdominal aortic aneurysm) without rupture (HCC)      GI/HEPATIC   (+) Other chronic pancreatitis (HCC)      /RENAL   (+) Chronic kidney disease, stage 3 (HCC)      HEMATOLOGY   (+) Iron deficiency anemia      MUSCULOSKELETAL   (+) Neurogenic claudication (HCC)      NEURO/PSYCH   (+) Depression, recurrent (HCC)   (+) Neurogenic claudication (HCC)      PULMONARY   (+) Smoking        Physical Exam    Airway    Mallampati score: II  TM Distance: >3 FB  Neck ROM: full     Dental   Comment: edentulous,     Cardiovascular      Pulmonary      Other Findings    Cormack I with Mac 3 on 2/9/18    Anesthesia Plan  ASA Score- 3     Anesthesia Type- IV sedation with anesthesia with ASA Monitors  Additional Monitors:   Airway Plan:     Comment: I discussed the risks and benefits of IV sedation anesthesia including the possibility of the need to convert to general anesthesia and the potential risk of awareness  Plan Factors-Exercise tolerance (METS): <4 METS  Exercise comment: Limited by leg pain  Chart reviewed  EKG reviewed  Existing labs reviewed  Patient summary reviewed  Patient is a current smoker  Induction- intravenous  Postoperative Plan-     Informed Consent- Anesthetic plan and risks discussed with patient

## 2022-08-17 NOTE — ANESTHESIA POSTPROCEDURE EVALUATION
Post-Op Assessment Note    CV Status:  Stable  Pain Score: 0    Pain management: adequate     Mental Status:  Sleepy   Hydration Status:  Euvolemic   PONV Controlled:  Controlled   Airway Patency:  Patent      Post Op Vitals Reviewed: Yes      Staff: CRNA         No complications documented      BP     Temp     Pulse     Resp      SpO2

## 2022-08-17 NOTE — H&P
History and Physical - [facility]  Amanda Bartlett 67 y o  male MRN: [id number]                  HPI: Amanda Bartlett is a 67y o  year old male who presents for iron deficiency and acid reflux       REVIEW OF SYSTEMS: Per the HPI, and otherwise unremarkable  Historical Information   Past Medical History:   Diagnosis Date    Anxiety     Rhodes's esophagus     Chronic neck pain     Depression     GERD (gastroesophageal reflux disease)     H/O abdominal aortic aneurysm 4 5 cm    Hyperlipidemia     Hypertension     Pancreatic pseudocyst     Vertigo      Past Surgical History:   Procedure Laterality Date    COLONOSCOPY  09/30/2014    IR LOWER EXTREMITY / INTERVENTION  01/04/2019    [place] ESOPHAGOGASTRODUODENOSCOPY TRANSORAL DIAGNOSTIC N/A 06/22/2018    Procedure: EGD AND COLONOSCOPY;  Surgeon: Rupali Bhatt MD;  Location: [facility]; Service: Gastroenterology    [place] [address] RPR DPLMNT AORTO-AORTIC NDGFT N/A 02/09/2018    Procedure: REPAIR ANEURYSM ENDOVASCULAR ABDOMINAL AORTIC  (EVAR);   Surgeon: Chi Spencer MD;  [facility];  Service: Vascular    UPPER GASTROINTESTINAL ENDOSCOPY       Social History   Social History     Substance and Sexual Activity   Alcohol Use No     Social History     Substance and Sexual Activity   Drug Use No     Social History     Tobacco Use   Smoking Status Current Every Day Smoker    Packs/day: 0 25    Types: Cigarettes   Smokeless Tobacco Never Used   Tobacco Comment    [employer] quiting smoking handouts     Family History   Problem Relation Age of Onset    Heart disease Father     Heart attack Father     Diabetes Maternal Grandmother     Diabetes Maternal Grandfather     Diabetes Paternal Grandmother     Hypertension Family     Hyperlipidemia Family        Meds/Allergies       Current Outpatient Medications:     ALPRAZolam (XANAX) 1 mg tablet    amitriptyline (ELAVIL) 25 mg tablet    aspirin (ECOTRIN LOW STRENGTH) 81 mg EC tablet    citalopram (CeleXA) 20 mg tablet    dicyclomine (BENTYL) 10 mg capsule    Linaclotide (LINZESS) 72 MCG CAPS    lisinopril-hydrochlorothiazide (PRINZIDE,ZESTORETIC) 20-12 5 MG per tablet    metoprolol succinate (TOPROL-XL) 25 mg 24 hr tablet    Multiple Vitamins-Minerals (CENTRUM SILVER PO)    omeprazole (PriLOSEC) 20 mg delayed release capsule    pancrelipase, Lip-Prot-Amyl, (Creon) 12,000 units capsule    polyethylene glycol (GLYCOLAX) 17 GM/SCOOP powder    polyethylene glycol (MIRALAX) 17 g packet    pravastatin (PRAVACHOL) 40 mg tablet    ranitidine (ZANTAC) 150 mg tablet    Restasis 0 05 % ophthalmic emulsion    sodium chloride 1 g tablet    tamsulosin (FLOMAX) 0 4 mg    Wheat Dextrin (BENEFIBER PO)    magnesium citrate (CITROMA) 1 745 g/30 mL oral solution    No Known Allergies    Objective     BP (!) 216/106 Comment: dr Prosper Hanna aware  Pulse 85   Temp (!) 96 8 °F (36 °C) (Tympanic)   Resp 16   Ht 5' 2" (1 575 m)   Wt 50 kg (110 lb 3 7 oz)   SpO2 99%   BMI 20 16 kg/m²       PHYSICAL EXAM    Gen: NAD  Head: NCAT  CV: RRR  CHEST: Clear  ABD: soft, NT/ND  EXT: no edema      ASSESSMENT/PLAN:  This is a 67y o  year old male here for EGD and colonoscopy, and he is stable and optimized for his procedure

## 2022-08-18 ENCOUNTER — HOSPITAL ENCOUNTER (INPATIENT)
Facility: HOSPITAL | Age: 72
LOS: 5 days | Discharge: HOME HEALTH | DRG: 640 | End: 2022-08-23
Attending: EMERGENCY MEDICINE | Admitting: INTERNAL MEDICINE
Payer: MEDICARE

## 2022-08-18 ENCOUNTER — APPOINTMENT (EMERGENCY)
Dept: RADIOLOGY | Facility: HOSPITAL | Age: 72
DRG: 640 | End: 2022-08-18
Payer: MEDICARE

## 2022-08-18 DIAGNOSIS — R10.84 GENERALIZED ABDOMINAL PAIN: ICD-10-CM

## 2022-08-18 DIAGNOSIS — K58.2 IRRITABLE BOWEL SYNDROME WITH BOTH CONSTIPATION AND DIARRHEA: ICD-10-CM

## 2022-08-18 DIAGNOSIS — E87.6 HYPOKALEMIA: ICD-10-CM

## 2022-08-18 DIAGNOSIS — R11.0 NAUSEA: ICD-10-CM

## 2022-08-18 DIAGNOSIS — I10 HYPERTENSION, ESSENTIAL: ICD-10-CM

## 2022-08-18 DIAGNOSIS — R33.9 URINARY RETENTION: ICD-10-CM

## 2022-08-18 DIAGNOSIS — E46 PROTEIN-CALORIE MALNUTRITION, UNSPECIFIED SEVERITY (HCC): ICD-10-CM

## 2022-08-18 DIAGNOSIS — N32.0 BLADDER OUTLET OBSTRUCTION: ICD-10-CM

## 2022-08-18 DIAGNOSIS — I10 HYPERTENSION: ICD-10-CM

## 2022-08-18 DIAGNOSIS — R33.9 URINARY RETENTION WITH INCOMPLETE BLADDER EMPTYING: ICD-10-CM

## 2022-08-18 DIAGNOSIS — N18.30 CHRONIC KIDNEY DISEASE, STAGE 3 (HCC): ICD-10-CM

## 2022-08-18 DIAGNOSIS — E87.1 HYPONATREMIA: Primary | ICD-10-CM

## 2022-08-18 LAB
2HR DELTA HS TROPONIN: 2 NG/L
ALBUMIN SERPL BCP-MCNC: 3.2 G/DL (ref 3.5–5)
ALP SERPL-CCNC: 96 U/L (ref 46–116)
ALT SERPL W P-5'-P-CCNC: 23 U/L (ref 12–78)
ANION GAP SERPL CALCULATED.3IONS-SCNC: 10 MMOL/L (ref 4–13)
AST SERPL W P-5'-P-CCNC: 42 U/L (ref 5–45)
BACTERIA UR QL AUTO: ABNORMAL /HPF
BASOPHILS # BLD AUTO: 0 THOUSANDS/ΜL (ref 0–0.1)
BASOPHILS NFR BLD AUTO: 0 % (ref 0–1)
BILIRUB SERPL-MCNC: 0.29 MG/DL (ref 0.2–1)
BILIRUB UR QL STRIP: NEGATIVE
BUN SERPL-MCNC: 8 MG/DL (ref 5–25)
CALCIUM ALBUM COR SERPL-MCNC: 8.6 MG/DL (ref 8.3–10.1)
CALCIUM SERPL-MCNC: 8 MG/DL (ref 8.3–10.1)
CARDIAC TROPONIN I PNL SERPL HS: 14 NG/L
CARDIAC TROPONIN I PNL SERPL HS: 16 NG/L
CHLORIDE SERPL-SCNC: 82 MMOL/L (ref 96–108)
CLARITY UR: CLEAR
CO2 SERPL-SCNC: 28 MMOL/L (ref 21–32)
COLOR UR: YELLOW
CREAT SERPL-MCNC: 1.05 MG/DL (ref 0.6–1.3)
EOSINOPHIL # BLD AUTO: 0.03 THOUSAND/ΜL (ref 0–0.61)
EOSINOPHIL NFR BLD AUTO: 0 % (ref 0–6)
ERYTHROCYTE [DISTWIDTH] IN BLOOD BY AUTOMATED COUNT: 23.4 % (ref 11.6–15.1)
GFR SERPL CREATININE-BSD FRML MDRD: 70 ML/MIN/1.73SQ M
GLUCOSE SERPL-MCNC: 123 MG/DL (ref 65–140)
GLUCOSE UR STRIP-MCNC: NEGATIVE MG/DL
HCT VFR BLD AUTO: 29.3 % (ref 36.5–49.3)
HGB BLD-MCNC: 10.3 G/DL (ref 12–17)
HGB UR QL STRIP.AUTO: ABNORMAL
IMM GRANULOCYTES # BLD AUTO: 0.04 THOUSAND/UL (ref 0–0.2)
IMM GRANULOCYTES NFR BLD AUTO: 1 % (ref 0–2)
KETONES UR STRIP-MCNC: NEGATIVE MG/DL
LEUKOCYTE ESTERASE UR QL STRIP: NEGATIVE
LIPASE SERPL-CCNC: 99 U/L (ref 73–393)
LYMPHOCYTES # BLD AUTO: 0.56 THOUSANDS/ΜL (ref 0.6–4.47)
LYMPHOCYTES NFR BLD AUTO: 7 % (ref 14–44)
MAGNESIUM SERPL-MCNC: 1.5 MG/DL (ref 1.6–2.6)
MCH RBC QN AUTO: 26.9 PG (ref 26.8–34.3)
MCHC RBC AUTO-ENTMCNC: 35.2 G/DL (ref 31.4–37.4)
MCV RBC AUTO: 77 FL (ref 82–98)
MONOCYTES # BLD AUTO: 0.48 THOUSAND/ΜL (ref 0.17–1.22)
MONOCYTES NFR BLD AUTO: 6 % (ref 4–12)
NEUTROPHILS # BLD AUTO: 7.21 THOUSANDS/ΜL (ref 1.85–7.62)
NEUTS SEG NFR BLD AUTO: 86 % (ref 43–75)
NITRITE UR QL STRIP: NEGATIVE
NON-SQ EPI CELLS URNS QL MICRO: ABNORMAL /HPF
NRBC BLD AUTO-RTO: 0 /100 WBCS
PH UR STRIP.AUTO: 7 [PH] (ref 4.5–8)
PHOSPHATE SERPL-MCNC: 2.2 MG/DL (ref 2.3–4.1)
PLATELET # BLD AUTO: 245 THOUSANDS/UL (ref 149–390)
PMV BLD AUTO: 9.3 FL (ref 8.9–12.7)
POTASSIUM SERPL-SCNC: 2.9 MMOL/L (ref 3.5–5.3)
PROT SERPL-MCNC: 6.8 G/DL (ref 6.4–8.4)
PROT UR STRIP-MCNC: NEGATIVE MG/DL
RBC # BLD AUTO: 3.83 MILLION/UL (ref 3.88–5.62)
RBC #/AREA URNS AUTO: ABNORMAL /HPF
SODIUM SERPL-SCNC: 120 MMOL/L (ref 135–147)
SP GR UR STRIP.AUTO: 1.02 (ref 1–1.03)
UROBILINOGEN UR QL STRIP.AUTO: 0.2 E.U./DL
WBC # BLD AUTO: 8.32 THOUSAND/UL (ref 4.31–10.16)
WBC #/AREA URNS AUTO: ABNORMAL /HPF

## 2022-08-18 PROCEDURE — 85025 COMPLETE CBC W/AUTO DIFF WBC: CPT

## 2022-08-18 PROCEDURE — 96365 THER/PROPH/DIAG IV INF INIT: CPT

## 2022-08-18 PROCEDURE — 83735 ASSAY OF MAGNESIUM: CPT

## 2022-08-18 PROCEDURE — 84484 ASSAY OF TROPONIN QUANT: CPT

## 2022-08-18 PROCEDURE — 36415 COLL VENOUS BLD VENIPUNCTURE: CPT

## 2022-08-18 PROCEDURE — 96368 THER/DIAG CONCURRENT INF: CPT

## 2022-08-18 PROCEDURE — 83935 ASSAY OF URINE OSMOLALITY: CPT

## 2022-08-18 PROCEDURE — 80053 COMPREHEN METABOLIC PANEL: CPT

## 2022-08-18 PROCEDURE — 96366 THER/PROPH/DIAG IV INF ADDON: CPT

## 2022-08-18 PROCEDURE — 99285 EMERGENCY DEPT VISIT HI MDM: CPT

## 2022-08-18 PROCEDURE — 96375 TX/PRO/DX INJ NEW DRUG ADDON: CPT

## 2022-08-18 PROCEDURE — 84100 ASSAY OF PHOSPHORUS: CPT

## 2022-08-18 PROCEDURE — 93005 ELECTROCARDIOGRAM TRACING: CPT

## 2022-08-18 PROCEDURE — 99291 CRITICAL CARE FIRST HOUR: CPT | Performed by: EMERGENCY MEDICINE

## 2022-08-18 PROCEDURE — 74177 CT ABD & PELVIS W/CONTRAST: CPT

## 2022-08-18 PROCEDURE — 84300 ASSAY OF URINE SODIUM: CPT

## 2022-08-18 PROCEDURE — 83690 ASSAY OF LIPASE: CPT

## 2022-08-18 PROCEDURE — G1004 CDSM NDSC: HCPCS

## 2022-08-18 PROCEDURE — 81001 URINALYSIS AUTO W/SCOPE: CPT

## 2022-08-18 PROCEDURE — 83930 ASSAY OF BLOOD OSMOLALITY: CPT

## 2022-08-18 PROCEDURE — 81003 URINALYSIS AUTO W/O SCOPE: CPT | Performed by: STUDENT IN AN ORGANIZED HEALTH CARE EDUCATION/TRAINING PROGRAM

## 2022-08-18 RX ORDER — ONDANSETRON 2 MG/ML
4 INJECTION INTRAMUSCULAR; INTRAVENOUS ONCE
Status: COMPLETED | OUTPATIENT
Start: 2022-08-18 | End: 2022-08-18

## 2022-08-18 RX ORDER — HYDROMORPHONE HCL/PF 1 MG/ML
0.5 SYRINGE (ML) INJECTION ONCE
Status: COMPLETED | OUTPATIENT
Start: 2022-08-18 | End: 2022-08-18

## 2022-08-18 RX ORDER — POTASSIUM CHLORIDE 20 MEQ/1
40 TABLET, EXTENDED RELEASE ORAL ONCE
Status: COMPLETED | OUTPATIENT
Start: 2022-08-18 | End: 2022-08-18

## 2022-08-18 RX ORDER — HYDROMORPHONE HCL/PF 1 MG/ML
0.5 SYRINGE (ML) INJECTION ONCE
Status: COMPLETED | OUTPATIENT
Start: 2022-08-19 | End: 2022-08-19

## 2022-08-18 RX ORDER — POTASSIUM CHLORIDE 14.9 MG/ML
20 INJECTION INTRAVENOUS
Status: DISPENSED | OUTPATIENT
Start: 2022-08-18 | End: 2022-08-18

## 2022-08-18 RX ORDER — ONDANSETRON HYDROCHLORIDE 4 MG/5ML
4 SOLUTION ORAL ONCE
Status: COMPLETED | OUTPATIENT
Start: 2022-08-18 | End: 2022-08-18

## 2022-08-18 RX ADMIN — HYDROMORPHONE HYDROCHLORIDE 0.5 MG: 1 INJECTION, SOLUTION INTRAMUSCULAR; INTRAVENOUS; SUBCUTANEOUS at 20:30

## 2022-08-18 RX ADMIN — SODIUM CHLORIDE, POTASSIUM CHLORIDE, SODIUM LACTATE AND CALCIUM CHLORIDE 500 ML: 600; 310; 30; 20 INJECTION, SOLUTION INTRAVENOUS at 20:31

## 2022-08-18 RX ADMIN — ONDANSETRON 4 MG: 4 SOLUTION ORAL at 18:44

## 2022-08-18 RX ADMIN — ONDANSETRON 4 MG: 2 INJECTION INTRAMUSCULAR; INTRAVENOUS at 22:46

## 2022-08-18 RX ADMIN — SODIUM CHLORIDE, POTASSIUM CHLORIDE, SODIUM LACTATE AND CALCIUM CHLORIDE 500 ML: 600; 310; 30; 20 INJECTION, SOLUTION INTRAVENOUS at 18:43

## 2022-08-18 RX ADMIN — POTASSIUM CHLORIDE 40 MEQ: 1500 TABLET, EXTENDED RELEASE ORAL at 20:31

## 2022-08-18 RX ADMIN — POTASSIUM PHOSPHATE, MONOBASIC AND POTASSIUM PHOSPHATE, DIBASIC 21 MMOL: 224; 236 INJECTION, SOLUTION, CONCENTRATE INTRAVENOUS at 21:43

## 2022-08-18 RX ADMIN — POTASSIUM CHLORIDE 20 MEQ: 14.9 INJECTION, SOLUTION INTRAVENOUS at 20:31

## 2022-08-18 RX ADMIN — IOHEXOL 80 ML: 350 INJECTION, SOLUTION INTRAVENOUS at 20:03

## 2022-08-18 NOTE — ED PROVIDER NOTES
History  Chief Complaint   Patient presents with    Abdominal Pain    Altered Mental Status     PT had colonoscopy and endoscopy yesterday  PT has had altered mental status since  PT states he is dizzy and has dry [facility]  Patient is a 68 y/o M with PMH AAA, GERD c/b gastric ulcers presenting with AMS following colonoscopy and endoscopy yesterday  Daughter is at bedside, states since the procedure yesterday he has been complaining of significant dry mouth, dizziness, nausea, decreased appetite  She also feels he is slightly more confused than usual  Patient currently complaining of lower abdominal pain and shortness of breath  No known history of difficulty with anesthesia  Denies fevers, chest pain, vomiting, diarrhea, dysuria  Pt reports moving bowels and bladder since procedure without issue  Prior to Admission Medications   Prescriptions Last Dose Informant Patient Reported? Taking?    ALPRAZolam (XANAX) 1 mg tablet   No No   Sig: Take 1 tablet (1 mg total) by mouth 2 (two) times a day as needed (transient anxiety)   Linaclotide (LINZESS) 72 MCG CAPS  Self No No   Sig: Take 1 capsule by mouth daily   Multiple Vitamins-Minerals (CENTRUM SILVER PO)  Self Yes No   Sig: Take 1 tablet by mouth daily     Restasis 0 05 % ophthalmic emulsion   Yes No   Wheat Dextrin (BENEFIBER PO)  Self Yes No   Sig: Take by mouth daily as needed     amitriptyline (ELAVIL) 25 mg tablet   No No   Sig: Take 1 tablet (25 mg total) by mouth daily at bedtime   aspirin (ECOTRIN LOW STRENGTH) 81 mg EC tablet  Self Yes No   Sig: Take 81 mg by mouth daily   citalopram (CeleXA) 20 mg tablet   No No   Sig: TAKE 1 TABLET BY MOUTH EVERY DAY   dicyclomine (BENTYL) 10 mg capsule   No No   Sig: TAKE 1 CAPSULE BY MOUTH 3 TIMES A DAY AS NEEDED (ABDOMINAL CRAMPING/ SPASMS)   lisinopril-hydrochlorothiazide (PRINZIDE,ZESTORETIC) 20-12 5 MG per tablet   No No   Sig: TAKE 1 TABLET BY MOUTH ONCE A DAY   magnesium citrate (CITROMA) 1 745 g/30 mL oral solution   No No   Sig: Take 296 mL by mouth once for 1 dose   metoprolol succinate (TOPROL-XL) 25 mg 24 hr tablet   No No   Sig: Take 1 tablet (25 mg total) by mouth in the morning  omeprazole (PriLOSEC) 40 MG capsule   No No   Sig: Take 1 capsule (40 mg total) by mouth 2 (two) times a day before meals   pancrelipase, Lip-Prot-Amyl, (Creon) 12,000 units capsule   No No   Sig: Take 2 capsules (24,000 Units total) by mouth 3 (three) times a day with meals   polyethylene glycol (GLYCOLAX) 17 GM/SCOOP powder   No No   Sig: Take as directed as per written office instructions   polyethylene glycol (MIRALAX) 17 g packet  Self Yes No   Sig: Take 17 g by mouth   polyethylene glycol-electrolytes (TriLyte) 4000 mL solution   No No   Sig: Take 4,000 mL by mouth once for 1 dose Take 4000 mL by mouth once for 1 dose  Use as directed   pravastatin (PRAVACHOL) 40 mg tablet   No No   Sig: Take 1 tablet (40 mg total) by mouth daily at bedtime   ranitidine (ZANTAC) 150 mg tablet  Self No No   Sig: TAKE 1 TABLET EVERY DAY IN THE EVENING   sodium chloride 1 g tablet   No No   Sig: TAKE 3 TABLETS EVERY DAY   tamsulosin (FLOMAX) 0 4 mg   No No   Sig: TAKE 1 CAPSULE BY MOUTH EVERY DAY      Facility-Administered Medications: None       Past Medical History:   Diagnosis Date    Anxiety     Rhodes's esophagus     Chronic neck pain     Depression     GERD (gastroesophageal reflux disease)     H/O abdominal aortic aneurysm 4 5 cm    Hyperlipidemia     Hypertension     Pancreatic pseudocyst     Vertigo        Past Surgical History:   Procedure Laterality Date    COLONOSCOPY  09/30/2014    IR LOWER EXTREMITY / INTERVENTION  01/04/2019    [place] ESOPHAGOGASTRODUODENOSCOPY TRANSORAL DIAGNOSTIC N/A 06/22/2018    Procedure: EGD AND COLONOSCOPY;  Surgeon: Cirilo Agrawal MD;  [facility];   Service: Gastroenterology    [place] [address] RPR DPLMNT AORTO-AORTIC NDGFT N/A 02/09/2018    Procedure: REPAIR ANEURYSM ENDOVASCULAR ABDOMINAL AORTIC  (EVAR); Surgeon: Lidia Velasquez MD;  [facility];  Service: Vascular    UPPER GASTROINTESTINAL ENDOSCOPY         Family History   Problem Relation Age of Onset    Heart disease Father     Heart attack Father     Diabetes Maternal Grandmother     Diabetes Maternal Grandfather     Diabetes Paternal Grandmother     Hypertension Family     Hyperlipidemia Family      I have reviewed and agree with the history as documented  E-Cigarette/Vaping    E-Cigarette Use Never User      E-Cigarette/Vaping Substances    Nicotine No     THC No     CBD No     Flavoring No     Other No     Unknown No      Social History     Tobacco Use    Smoking status: Current Every Day Smoker     Packs/day: 0 25     Types: Cigarettes    Smokeless tobacco: Never Used    Tobacco comment: [employer] quiting smoking handouts   Vaping Use    Vaping Use: Never used   Substance Use Topics    Alcohol use: No    Drug use: No        Review of Systems   Constitutional: Negative for chills and fever  HENT: Negative for ear pain and sore throat  Eyes: Negative for pain and visual disturbance  Respiratory: Positive for shortness of breath  Negative for cough  Cardiovascular: Negative for chest pain, palpitations and leg swelling  Gastrointestinal: Positive for abdominal pain and nausea  Negative for vomiting  Genitourinary: Negative for dysuria and hematuria  Musculoskeletal: Negative for arthralgias and back pain  Skin: Negative for color change and rash  Neurological: Negative for seizures and syncope  Psychiatric/Behavioral: Positive for confusion  All other systems reviewed and are negative        Physical Exam  ED Triage Vitals   Temperature Pulse Respirations Blood Pressure SpO2   08/18/22 1717 08/18/22 1717 08/18/22 1717 08/18/22 1717 08/18/22 1717   98 8 °F (37 1 °C) 78 (!) 26 (!) 178/95 98 %      Temp Source Heart Rate Source Patient Position - Orthostatic VS BP Location FiO2 (%)   08/18/22 1717 08/18/22 1849 08/18/22 1717 08/18/22 1717 --   Oral Monitor Lying Right arm       Pain Score       08/18/22 1904       10 - Worst Possible Pain             Orthostatic Vital Signs  Vitals:    08/18/22 1849 08/18/22 1904 08/18/22 2030 08/18/22 2200   BP:   (!) 201/91 (!) 204/97   Pulse: 75 78 84 76   Patient Position - Orthostatic VS:   Lying Lying       Physical Exam  Vitals and nursing note reviewed  Constitutional:       General: He is not in acute distress  Appearance: He is well-developed  He is not toxic-appearing  HENT:      Head: Normocephalic and atraumatic  Right Ear: External ear normal       Left Ear: External ear normal       Nose: Nose normal       Mouth/Throat:      Pharynx: Oropharynx is clear  No oropharyngeal exudate or posterior oropharyngeal erythema  Eyes:      Extraocular Movements: Extraocular movements intact  Conjunctiva/sclera: Conjunctivae normal       Pupils: Pupils are equal, round, and reactive to light  Cardiovascular:      Rate and Rhythm: Normal rate and regular rhythm  Pulses: Normal pulses  Heart sounds: Normal heart sounds  No murmur heard  No friction rub  No gallop  Pulmonary:      Effort: Pulmonary effort is normal  No respiratory distress  Breath sounds: Normal breath sounds  No wheezing, rhonchi or rales  Abdominal:      General: Abdomen is flat  Bowel sounds are normal       Palpations: Abdomen is soft  Tenderness: There is abdominal tenderness in the periumbilical area  There is guarding  There is no rebound  Musculoskeletal:         General: Normal range of motion  Cervical back: Normal range of motion  No rigidity  Right lower leg: No edema  Left lower leg: No edema  Skin:     General: Skin is warm and dry  Capillary Refill: Capillary refill takes less than 2 seconds  Neurological:      General: No focal deficit present  Mental Status: He is alert  He is disoriented        Comments: AAOx2, unsure of the year   Daughter at bedside states he typically is not disoriented   Psychiatric:         Mood and Affect: Mood normal          ED Medications  Medications   potassium chloride 20 mEq IVPB (premix) (0 mEq Intravenous Stopped 8/18/22 2143)   potassium phosphates 21 mmol in sodium chloride 0 9 % 250 mL infusion (21 mmol Intravenous New Bag 8/18/22 2143)   ondansetron (ZOFRAN) oral solution 4 mg (4 mg Oral Given 8/18/22 1844)   lactated ringers bolus 500 mL (0 mL Intravenous Stopped 8/18/22 1943)   lactated ringers bolus 500 mL (0 mL Intravenous Stopped 8/18/22 2219)   potassium chloride (K-DUR,KLOR-CON) CR tablet 40 mEq (40 mEq Oral Given 8/18/22 2031)   iohexol (OMNIPAQUE) 350 MG/ML injection (SINGLE-DOSE) 100 mL (80 mL Intravenous Given 8/18/22 2003)   HYDROmorphone (DILAUDID) injection 0 5 mg (0 5 mg Intravenous Given 8/18/22 2030)   ondansetron (ZOFRAN) injection 4 mg (4 mg Intravenous Given 8/18/22 2246)       Diagnostic Studies  Results Reviewed     Procedure Component Value Units Date/Time    HS Troponin I 2hr [[id number]]  (Normal) Collected: 08/18/22 2057    Lab Status: Final result Specimen: Blood from Arm, Left Updated: 08/18/22 2149     hs TnI 2hr 16 ng/L      Delta 2hr hsTnI 2 ng/L     Phosphorus [[id number]]  (Abnormal) Collected: 08/18/22 1845    Lab Status: Final result Specimen: Blood from Arm, Left Updated: 08/18/22 2008     Phosphorus 2 2 mg/dL     Magnesium [[id number]]  (Abnormal) Collected: 08/18/22 1845    Lab Status: Final result Specimen: Blood from Arm, Left Updated: 08/18/22 2008     Magnesium 1 5 mg/dL     Urine Microscopic [[id number]]  (Abnormal) Collected: 08/18/22 1853    Lab Status: Final result Specimen: Urine, Other Updated: 08/18/22 1940     RBC, UA 2-4 /hpf      WBC, UA 1-2 /hpf      Epithelial Cells Occasional /hpf      Bacteria, UA None Seen /hpf     HS Troponin 0hr (reflex protocol) [[id number]]  (Normal) Collected: 08/18/22 1845    Lab Status: Final result Specimen: Blood from Arm, Left Updated: 08/18/22 1935     hs TnI 0hr 14 ng/L     Comprehensive metabolic panel [[id number]]  (Abnormal) Collected: 08/18/22 1845    Lab Status: Final result Specimen: Blood from Arm, Left Updated: 08/18/22 1928     Sodium 120 mmol/L      Potassium 2 9 mmol/L      Chloride 82 mmol/L      CO2 28 mmol/L      ANION GAP 10 mmol/L      BUN 8 mg/dL      Creatinine 1 05 mg/dL      Glucose 123 mg/dL      Calcium 8 0 mg/dL      Corrected Calcium 8 6 mg/dL      AST 42 U/L      ALT 23 U/L      Alkaline Phosphatase 96 U/L      Total Protein 6 8 g/dL      Albumin 3 2 g/dL      Total Bilirubin 0 29 mg/dL      eGFR 70 ml/min/1 73sq m     Narrative:      National Kidney Disease Foundation guidelines for Chronic Kidney Disease (CKD):     Stage 1 with normal or high GFR (GFR > 90 mL/min/1 73 square meters)    Stage 2 Mild CKD (GFR = 60-89 mL/min/1 73 square meters)    Stage 3A Moderate CKD (GFR = 45-59 mL/min/1 73 square meters)    Stage 3B Moderate CKD (GFR = 30-44 mL/min/1 73 square meters)    Stage 4 Severe CKD (GFR = 15-29 mL/min/1 73 square meters)    Stage 5 End Stage CKD (GFR <15 mL/min/1 73 square meters)  Note: GFR calculation is accurate only with a steady state creatinine    Lipase [[id number]]  (Normal) Collected: 08/18/22 1845    Lab Status: Final result Specimen: Blood from Arm, Left Updated: 08/18/22 1928     Lipase 99 u/L     CBC and differential [[id number]]  (Abnormal) Collected: 08/18/22 1845    Lab Status: Final result Specimen: Blood from Arm, Left Updated: 08/18/22 1905     WBC 8 32 Thousand/uL      RBC 3 83 Million/uL      Hemoglobin 10 3 g/dL      Hematocrit 29 3 %      MCV 77 fL      MCH 26 9 pg      MCHC 35 2 g/dL      RDW 23 4 %      MPV 9 3 fL      Platelets 150 Thousands/uL      nRBC 0 /100 WBCs      Neutrophils Relative 86 %      Immat GRANS % 1 %      Lymphocytes Relative 7 %      Monocytes Relative 6 %      Eosinophils Relative 0 %      Basophils Relative 0 %      Neutrophils Absolute 7 21 Thousands/µL      Immature Grans Absolute 0 04 Thousand/uL      Lymphocytes Absolute 0 56 Thousands/µL      Monocytes Absolute 0 48 Thousand/µL      Eosinophils Absolute 0 03 Thousand/µL      Basophils Absolute 0 00 Thousands/µL     Urine Macroscopic, POC [[id number]]  (Abnormal) Collected: 08/18/22 1853    Lab Status: Final result Specimen: Urine Updated: 08/18/22 1854     Color, UA Yellow     Clarity, UA Clear     pH, UA 7 0     Leukocytes, UA Negative     Nitrite, UA Negative     Protein, UA Negative mg/dl      Glucose, UA Negative mg/dl      Ketones, UA Negative mg/dl      Urobilinogen, UA 0 2 E U /dl      Bilirubin, UA Negative     Occult Blood, UA Trace     Specific Kirk, UA 1 025    Narrative:      [facility] RESULT                 CT abdomen pelvis w contrast   Final Result by Damian Vila MD (08/18 2142)      1  Mild diffuse thickening of the descending and rectosigmoid colon may be due to nonspecific colitis  2   Distended bladder and bilateral collecting system fullness, correlate for bladder outlet obstruction  Workstation performed: [id number]               Procedures  Procedures      ED Course  ED Course as of 08/18/22 2322   Thu Aug 18, 2022   1928 ECG 12 lead  Ekg NSR, normal axis, no ST elevation/depression, no sig change from prior   2014 Updated family on findings so far, need for admission, all questions answered                             SBIRT 20yo+    Flowsheet Row Most Recent Value   SBIRT (23 yo +)    In order to provide better care to our patients, we are screening all of our patients for alcohol and drug use  Would it be okay to ask you these screening questions? No Filed at: 08/18/2022 1854                MDM  Number of Diagnoses or Management Options  Generalized abdominal pain  Hypokalemia  Hyponatremia  Nausea  Diagnosis management comments: 68 y/o M presenting with confusion, nausea, abd pain following EGD and colonoscopy yesterday   W/u including labs, CT imaging  Pt found hyponatremic and hypokalemic  CT with nonspecific colitis, large bladder  Pt straight cathed as difficulty voiding  Volume resuscitated  Potassium repleted  Admitted to SOD for further management  Disposition  Final diagnoses:   Hyponatremia   Hypokalemia   Nausea   Generalized abdominal pain     Time reflects when diagnosis was documented in both MDM as applicable and the Disposition within this note     Time User Action Codes Description Comment    8/18/2022 10:39 PM Deepak Scottie Add [E87 1] Hyponatremia     8/18/2022 10:39 PM Deepak Scottie Add [E87 6] Hypokalemia     8/18/2022 10:39 PM Dorla Mons [R11 0] Nausea     8/18/2022 10:39 PM Deepak Scottie Add [R10 84] Generalized abdominal pain       ED Disposition     ED Disposition   Admit    Condition   Stable    Date/Time   Thu Aug 18, 2022 10:39 PM    Comment   Case was discussed with Dr Micha Hampton and the patient's admission status was agreed to be Admission Status: inpatient status to the service of Dr Meggan Morgan   Follow-up Information    None         Patient's Medications   Discharge Prescriptions    No medications on file     No discharge procedures on file  PDMP Review       Value Time User    PDMP Reviewed  Yes 6/3/2022  3:39 PM Dayanara Segura MD           ED Provider  Attending physically available and evaluated Julradha Cristian MARTIN managed the patient along with the ED Attending      Electronically Signed by         Chadd Miller MD  08/18/22 7148

## 2022-08-18 NOTE — ED ATTENDING ATTESTATION
8/18/2022  Sharri Ojeda DO, saw and evaluated the patient  I have discussed the patient with the resident/non-physician practitioner and agree with the resident's/non-physician practitioner's findings, Plan of Care, and MDM as documented in the resident's/non-physician practitioner's note, except where noted  All available labs and Radiology studies were reviewed  I was present for key portions of any procedure(s) performed by the resident/non-physician practitioner and I was immediately available to provide assistance  At this point I agree with the current assessment done in the Emergency Department  I have conducted an independent evaluation of this patient a history and physical is as follows:      66 yo male presents for evaluation of dry mouth, dizziness, nausea, confusion, lower abd pain, dyspnea  All started today  He had EGD+cscope yesterday  Symptoms constant, mild to moderate severity, no a/e factors  No other c/o at this time  Imp: multiple c/o unclear etiology plan: CT chest/abd/pelvis eval complication from EGD+cscope, UA, labs, reassess        ED Course         Critical Care Time  CriticalCare Time  Performed by: Sophie Berrios DO  Authorized by: Sophie Berrios DO     Critical care provider statement:     Critical care time (minutes):  31    Critical care time was exclusive of:  Separately billable procedures and treating other patients and teaching time    Critical care was necessary to treat or prevent imminent or life-threatening deterioration of the following conditions:  Metabolic crisis and dehydration    Critical care was time spent personally by me on the following activities:  Blood draw for specimens, obtaining history from patient or surrogate, development of treatment plan with patient or surrogate, evaluation of patient's response to treatment, examination of patient, review of old charts, re-evaluation of patient's condition, ordering and review of laboratory studies and ordering and performing treatments and interventions

## 2022-08-18 NOTE — RESULT ENCOUNTER NOTE
I called him with his negative results  Will plan to repeat EGD in 3 months to re-biopsy stomach and document healing of gastric ulcers

## 2022-08-19 ENCOUNTER — APPOINTMENT (INPATIENT)
Dept: RADIOLOGY | Facility: HOSPITAL | Age: 72
DRG: 640 | End: 2022-08-19
Payer: MEDICARE

## 2022-08-19 PROBLEM — K58.2 IRRITABLE BOWEL SYNDROME WITH BOTH CONSTIPATION AND DIARRHEA: Status: RESOLVED | Noted: 2018-07-09 | Resolved: 2022-08-19

## 2022-08-19 PROBLEM — N32.0 BLADDER OUTLET OBSTRUCTION: Status: ACTIVE | Noted: 2022-08-19

## 2022-08-19 PROBLEM — R33.9 URINARY RETENTION: Status: ACTIVE | Noted: 2022-08-19

## 2022-08-19 PROBLEM — E87.6 HYPOKALEMIA: Status: ACTIVE | Noted: 2022-08-19

## 2022-08-19 PROBLEM — E87.1 HYPONATREMIA: Status: ACTIVE | Noted: 2022-08-19

## 2022-08-19 PROBLEM — I10 HYPERTENSION: Status: ACTIVE | Noted: 2022-08-19

## 2022-08-19 PROBLEM — R41.82 ALTERED MENTAL STATUS: Status: ACTIVE | Noted: 2022-08-19

## 2022-08-19 LAB
ANION GAP SERPL CALCULATED.3IONS-SCNC: 6 MMOL/L (ref 4–13)
ANION GAP SERPL CALCULATED.3IONS-SCNC: 6 MMOL/L (ref 4–13)
ANION GAP SERPL CALCULATED.3IONS-SCNC: 7 MMOL/L (ref 4–13)
ANION GAP SERPL CALCULATED.3IONS-SCNC: 7 MMOL/L (ref 4–13)
ANION GAP SERPL CALCULATED.3IONS-SCNC: 8 MMOL/L (ref 4–13)
ANION GAP SERPL CALCULATED.3IONS-SCNC: 9 MMOL/L (ref 4–13)
ANION GAP SERPL CALCULATED.3IONS-SCNC: 9 MMOL/L (ref 4–13)
ATRIAL RATE: 74 BPM
BASE EX.OXY STD BLDV CALC-SCNC: 77.6 % (ref 60–80)
BASE EXCESS BLDV CALC-SCNC: 1.3 MMOL/L
BASOPHILS # BLD AUTO: 0.01 THOUSANDS/ΜL (ref 0–0.1)
BASOPHILS NFR BLD AUTO: 0 % (ref 0–1)
BUN SERPL-MCNC: 6 MG/DL (ref 5–25)
BUN SERPL-MCNC: 6 MG/DL (ref 5–25)
BUN SERPL-MCNC: 7 MG/DL (ref 5–25)
BUN SERPL-MCNC: 7 MG/DL (ref 5–25)
BUN SERPL-MCNC: 8 MG/DL (ref 5–25)
BUN SERPL-MCNC: 8 MG/DL (ref 5–25)
BUN SERPL-MCNC: 9 MG/DL (ref 5–25)
CALCIUM SERPL-MCNC: 7.8 MG/DL (ref 8.3–10.1)
CALCIUM SERPL-MCNC: 7.8 MG/DL (ref 8.3–10.1)
CALCIUM SERPL-MCNC: 8.1 MG/DL (ref 8.3–10.1)
CALCIUM SERPL-MCNC: 8.2 MG/DL (ref 8.3–10.1)
CALCIUM SERPL-MCNC: 8.3 MG/DL (ref 8.3–10.1)
CALCIUM SERPL-MCNC: 8.3 MG/DL (ref 8.3–10.1)
CALCIUM SERPL-MCNC: 8.6 MG/DL (ref 8.3–10.1)
CHLORIDE SERPL-SCNC: 85 MMOL/L (ref 96–108)
CHLORIDE SERPL-SCNC: 85 MMOL/L (ref 96–108)
CHLORIDE SERPL-SCNC: 87 MMOL/L (ref 96–108)
CHLORIDE SERPL-SCNC: 89 MMOL/L (ref 96–108)
CHLORIDE SERPL-SCNC: 91 MMOL/L (ref 96–108)
CHLORIDE SERPL-SCNC: 93 MMOL/L (ref 96–108)
CHLORIDE SERPL-SCNC: 94 MMOL/L (ref 96–108)
CO2 SERPL-SCNC: 26 MMOL/L (ref 21–32)
CO2 SERPL-SCNC: 26 MMOL/L (ref 21–32)
CO2 SERPL-SCNC: 27 MMOL/L (ref 21–32)
CO2 SERPL-SCNC: 29 MMOL/L (ref 21–32)
CO2 SERPL-SCNC: 29 MMOL/L (ref 21–32)
CORTIS SERPL-MCNC: 38.4 UG/DL
CREAT SERPL-MCNC: 0.99 MG/DL (ref 0.6–1.3)
CREAT SERPL-MCNC: 1 MG/DL (ref 0.6–1.3)
CREAT SERPL-MCNC: 1.01 MG/DL (ref 0.6–1.3)
CREAT SERPL-MCNC: 1.02 MG/DL (ref 0.6–1.3)
CREAT SERPL-MCNC: 1.05 MG/DL (ref 0.6–1.3)
CREAT SERPL-MCNC: 1.06 MG/DL (ref 0.6–1.3)
CREAT SERPL-MCNC: 1.09 MG/DL (ref 0.6–1.3)
EOSINOPHIL # BLD AUTO: 0 THOUSAND/ΜL (ref 0–0.61)
EOSINOPHIL NFR BLD AUTO: 0 % (ref 0–6)
ERYTHROCYTE [DISTWIDTH] IN BLOOD BY AUTOMATED COUNT: 23 % (ref 11.6–15.1)
GFR SERPL CREATININE-BSD FRML MDRD: 66 ML/MIN/1.73SQ M
GFR SERPL CREATININE-BSD FRML MDRD: 69 ML/MIN/1.73SQ M
GFR SERPL CREATININE-BSD FRML MDRD: 70 ML/MIN/1.73SQ M
GFR SERPL CREATININE-BSD FRML MDRD: 72 ML/MIN/1.73SQ M
GFR SERPL CREATININE-BSD FRML MDRD: 73 ML/MIN/1.73SQ M
GFR SERPL CREATININE-BSD FRML MDRD: 74 ML/MIN/1.73SQ M
GFR SERPL CREATININE-BSD FRML MDRD: 75 ML/MIN/1.73SQ M
GLUCOSE SERPL-MCNC: 106 MG/DL (ref 65–140)
GLUCOSE SERPL-MCNC: 109 MG/DL (ref 65–140)
GLUCOSE SERPL-MCNC: 109 MG/DL (ref 65–140)
GLUCOSE SERPL-MCNC: 110 MG/DL (ref 65–140)
GLUCOSE SERPL-MCNC: 114 MG/DL (ref 65–140)
GLUCOSE SERPL-MCNC: 115 MG/DL (ref 65–140)
GLUCOSE SERPL-MCNC: 118 MG/DL (ref 65–140)
GLUCOSE SERPL-MCNC: 123 MG/DL (ref 65–140)
HCO3 BLDV-SCNC: 24.9 MMOL/L (ref 24–30)
HCT VFR BLD AUTO: 28.5 % (ref 36.5–49.3)
HGB BLD-MCNC: 9.6 G/DL (ref 12–17)
IMM GRANULOCYTES # BLD AUTO: 0.06 THOUSAND/UL (ref 0–0.2)
IMM GRANULOCYTES NFR BLD AUTO: 1 % (ref 0–2)
LYMPHOCYTES # BLD AUTO: 0.52 THOUSANDS/ΜL (ref 0.6–4.47)
LYMPHOCYTES NFR BLD AUTO: 6 % (ref 14–44)
MCH RBC QN AUTO: 26.2 PG (ref 26.8–34.3)
MCHC RBC AUTO-ENTMCNC: 33.7 G/DL (ref 31.4–37.4)
MCV RBC AUTO: 78 FL (ref 82–98)
MONOCYTES # BLD AUTO: 0.71 THOUSAND/ΜL (ref 0.17–1.22)
MONOCYTES NFR BLD AUTO: 8 % (ref 4–12)
NEUTROPHILS # BLD AUTO: 8.17 THOUSANDS/ΜL (ref 1.85–7.62)
NEUTS SEG NFR BLD AUTO: 85 % (ref 43–75)
NRBC BLD AUTO-RTO: 0 /100 WBCS
O2 CT BLDV-SCNC: 11.9 ML/DL
OSMOLALITY UR/SERPL-RTO: 252 MMOL/KG (ref 282–298)
OSMOLALITY UR: 257 MMOL/KG
OSMOLALITY UR: 351 MMOL/KG
P AXIS: 75 DEGREES
PCO2 BLDV: 35.6 MM HG (ref 42–50)
PH BLDV: 7.46 [PH] (ref 7.3–7.4)
PLATELET # BLD AUTO: 236 THOUSANDS/UL (ref 149–390)
PLATELET # BLD AUTO: 265 THOUSANDS/UL (ref 149–390)
PMV BLD AUTO: 9 FL (ref 8.9–12.7)
PMV BLD AUTO: 9.6 FL (ref 8.9–12.7)
PO2 BLDV: 44 MM HG (ref 35–45)
POTASSIUM SERPL-SCNC: 3 MMOL/L (ref 3.5–5.3)
POTASSIUM SERPL-SCNC: 3.2 MMOL/L (ref 3.5–5.3)
POTASSIUM SERPL-SCNC: 3.3 MMOL/L (ref 3.5–5.3)
POTASSIUM SERPL-SCNC: 3.3 MMOL/L (ref 3.5–5.3)
POTASSIUM SERPL-SCNC: 3.4 MMOL/L (ref 3.5–5.3)
POTASSIUM SERPL-SCNC: 3.5 MMOL/L (ref 3.5–5.3)
POTASSIUM SERPL-SCNC: 3.5 MMOL/L (ref 3.5–5.3)
PR INTERVAL: 160 MS
QRS AXIS: 86 DEGREES
QRSD INTERVAL: 100 MS
QT INTERVAL: 394 MS
QTC INTERVAL: 437 MS
RBC # BLD AUTO: 3.66 MILLION/UL (ref 3.88–5.62)
SODIUM 24H UR-SCNC: 95 MOL/L
SODIUM SERPL-SCNC: 120 MMOL/L (ref 135–147)
SODIUM SERPL-SCNC: 122 MMOL/L (ref 135–147)
SODIUM SERPL-SCNC: 122 MMOL/L (ref 135–147)
SODIUM SERPL-SCNC: 123 MMOL/L (ref 135–147)
SODIUM SERPL-SCNC: 126 MMOL/L (ref 135–147)
SODIUM SERPL-SCNC: 126 MMOL/L (ref 135–147)
SODIUM SERPL-SCNC: 128 MMOL/L (ref 135–147)
SP GR UR STRIP.AUTO: 1.01 (ref 1–1.03)
T WAVE AXIS: 61 DEGREES
T4 FREE SERPL-MCNC: 1.29 NG/DL (ref 0.76–1.46)
TSH SERPL DL<=0.05 MIU/L-ACNC: 1.57 UIU/ML (ref 0.45–4.5)
VENTRICULAR RATE: 74 BPM
WBC # BLD AUTO: 9.47 THOUSAND/UL (ref 4.31–10.16)

## 2022-08-19 PROCEDURE — 93010 ELECTROCARDIOGRAM REPORT: CPT | Performed by: INTERNAL MEDICINE

## 2022-08-19 PROCEDURE — 70450 CT HEAD/BRAIN W/O DYE: CPT

## 2022-08-19 PROCEDURE — G1004 CDSM NDSC: HCPCS

## 2022-08-19 PROCEDURE — 80048 BASIC METABOLIC PNL TOTAL CA: CPT

## 2022-08-19 PROCEDURE — 92610 EVALUATE SWALLOWING FUNCTION: CPT

## 2022-08-19 PROCEDURE — NC001 PR NO CHARGE: Performed by: INTERNAL MEDICINE

## 2022-08-19 PROCEDURE — 36415 COLL VENOUS BLD VENIPUNCTURE: CPT

## 2022-08-19 PROCEDURE — 99222 1ST HOSP IP/OBS MODERATE 55: CPT | Performed by: UROLOGY

## 2022-08-19 PROCEDURE — 82948 REAGENT STRIP/BLOOD GLUCOSE: CPT

## 2022-08-19 PROCEDURE — 83935 ASSAY OF URINE OSMOLALITY: CPT | Performed by: INTERNAL MEDICINE

## 2022-08-19 PROCEDURE — 82533 TOTAL CORTISOL: CPT | Performed by: STUDENT IN AN ORGANIZED HEALTH CARE EDUCATION/TRAINING PROGRAM

## 2022-08-19 PROCEDURE — 99223 1ST HOSP IP/OBS HIGH 75: CPT | Performed by: INTERNAL MEDICINE

## 2022-08-19 PROCEDURE — 80048 BASIC METABOLIC PNL TOTAL CA: CPT | Performed by: INTERNAL MEDICINE

## 2022-08-19 PROCEDURE — 85049 AUTOMATED PLATELET COUNT: CPT

## 2022-08-19 PROCEDURE — 80048 BASIC METABOLIC PNL TOTAL CA: CPT | Performed by: STUDENT IN AN ORGANIZED HEALTH CARE EDUCATION/TRAINING PROGRAM

## 2022-08-19 PROCEDURE — 85025 COMPLETE CBC W/AUTO DIFF WBC: CPT

## 2022-08-19 PROCEDURE — 82805 BLOOD GASES W/O2 SATURATION: CPT | Performed by: STUDENT IN AN ORGANIZED HEALTH CARE EDUCATION/TRAINING PROGRAM

## 2022-08-19 PROCEDURE — 84439 ASSAY OF FREE THYROXINE: CPT | Performed by: STUDENT IN AN ORGANIZED HEALTH CARE EDUCATION/TRAINING PROGRAM

## 2022-08-19 PROCEDURE — 84443 ASSAY THYROID STIM HORMONE: CPT | Performed by: STUDENT IN AN ORGANIZED HEALTH CARE EDUCATION/TRAINING PROGRAM

## 2022-08-19 PROCEDURE — 99222 1ST HOSP IP/OBS MODERATE 55: CPT | Performed by: INTERNAL MEDICINE

## 2022-08-19 PROCEDURE — NC001 PR NO CHARGE: Performed by: EMERGENCY MEDICINE

## 2022-08-19 RX ORDER — POTASSIUM CHLORIDE 20 MEQ/1
40 TABLET, EXTENDED RELEASE ORAL ONCE
Status: COMPLETED | OUTPATIENT
Start: 2022-08-19 | End: 2022-08-19

## 2022-08-19 RX ORDER — LABETALOL HYDROCHLORIDE 5 MG/ML
10 INJECTION, SOLUTION INTRAVENOUS ONCE
Status: DISCONTINUED | OUTPATIENT
Start: 2022-08-19 | End: 2022-08-19

## 2022-08-19 RX ORDER — SODIUM CHLORIDE 9 MG/ML
75 INJECTION, SOLUTION INTRAVENOUS CONTINUOUS
Status: DISCONTINUED | OUTPATIENT
Start: 2022-08-19 | End: 2022-08-19

## 2022-08-19 RX ORDER — POTASSIUM CHLORIDE 20 MEQ/1
40 TABLET, EXTENDED RELEASE ORAL 2 TIMES DAILY
Status: DISCONTINUED | OUTPATIENT
Start: 2022-08-19 | End: 2022-08-19

## 2022-08-19 RX ORDER — TAMSULOSIN HYDROCHLORIDE 0.4 MG/1
0.4 CAPSULE ORAL
Status: DISCONTINUED | OUTPATIENT
Start: 2022-08-20 | End: 2022-08-23 | Stop reason: HOSPADM

## 2022-08-19 RX ORDER — MAGNESIUM SULFATE HEPTAHYDRATE 40 MG/ML
2 INJECTION, SOLUTION INTRAVENOUS ONCE
Status: COMPLETED | OUTPATIENT
Start: 2022-08-19 | End: 2022-08-19

## 2022-08-19 RX ORDER — LISINOPRIL 20 MG/1
20 TABLET ORAL DAILY
Status: DISCONTINUED | OUTPATIENT
Start: 2022-08-19 | End: 2022-08-20

## 2022-08-19 RX ORDER — LISINOPRIL 20 MG/1
20 TABLET ORAL DAILY
Status: DISCONTINUED | OUTPATIENT
Start: 2022-08-20 | End: 2022-08-19

## 2022-08-19 RX ORDER — LISINOPRIL 20 MG/1
20 TABLET ORAL ONCE
Status: COMPLETED | OUTPATIENT
Start: 2022-08-19 | End: 2022-08-19

## 2022-08-19 RX ORDER — METOPROLOL SUCCINATE 25 MG/1
25 TABLET, EXTENDED RELEASE ORAL DAILY
Status: DISCONTINUED | OUTPATIENT
Start: 2022-08-19 | End: 2022-08-19

## 2022-08-19 RX ORDER — SODIUM CHLORIDE 3 G/100ML
100 INJECTION, SOLUTION INTRAVENOUS ONCE
Status: COMPLETED | OUTPATIENT
Start: 2022-08-19 | End: 2022-08-19

## 2022-08-19 RX ORDER — LISINOPRIL 20 MG/1
20 TABLET ORAL DAILY
Status: DISCONTINUED | OUTPATIENT
Start: 2022-08-19 | End: 2022-08-19

## 2022-08-19 RX ORDER — LABETALOL HYDROCHLORIDE 5 MG/ML
10 INJECTION, SOLUTION INTRAVENOUS EVERY 6 HOURS PRN
Status: DISCONTINUED | OUTPATIENT
Start: 2022-08-19 | End: 2022-08-19

## 2022-08-19 RX ORDER — NICOTINE 21 MG/24HR
1 PATCH, TRANSDERMAL 24 HOURS TRANSDERMAL DAILY
Status: DISCONTINUED | OUTPATIENT
Start: 2022-08-19 | End: 2022-08-23 | Stop reason: HOSPADM

## 2022-08-19 RX ORDER — ONDANSETRON 2 MG/ML
4 INJECTION INTRAMUSCULAR; INTRAVENOUS EVERY 6 HOURS PRN
Status: DISCONTINUED | OUTPATIENT
Start: 2022-08-19 | End: 2022-08-23 | Stop reason: HOSPADM

## 2022-08-19 RX ORDER — POTASSIUM CHLORIDE 20 MEQ/1
40 TABLET, EXTENDED RELEASE ORAL EVERY 4 HOURS
Status: COMPLETED | OUTPATIENT
Start: 2022-08-19 | End: 2022-08-19

## 2022-08-19 RX ORDER — ASPIRIN 81 MG/1
81 TABLET ORAL DAILY
Status: DISCONTINUED | OUTPATIENT
Start: 2022-08-19 | End: 2022-08-23 | Stop reason: HOSPADM

## 2022-08-19 RX ORDER — LABETALOL HYDROCHLORIDE 5 MG/ML
10 INJECTION, SOLUTION INTRAVENOUS ONCE
Status: DISCONTINUED | OUTPATIENT
Start: 2022-08-19 | End: 2022-08-21

## 2022-08-19 RX ORDER — AMITRIPTYLINE HYDROCHLORIDE 25 MG/1
25 TABLET, FILM COATED ORAL
Status: DISCONTINUED | OUTPATIENT
Start: 2022-08-19 | End: 2022-08-21

## 2022-08-19 RX ORDER — METOPROLOL SUCCINATE 25 MG/1
25 TABLET, EXTENDED RELEASE ORAL DAILY
Status: DISCONTINUED | OUTPATIENT
Start: 2022-08-19 | End: 2022-08-23 | Stop reason: HOSPADM

## 2022-08-19 RX ORDER — HEPARIN SODIUM 5000 [USP'U]/ML
5000 INJECTION, SOLUTION INTRAVENOUS; SUBCUTANEOUS EVERY 8 HOURS SCHEDULED
Status: DISCONTINUED | OUTPATIENT
Start: 2022-08-19 | End: 2022-08-23 | Stop reason: HOSPADM

## 2022-08-19 RX ORDER — LABETALOL HYDROCHLORIDE 5 MG/ML
10 INJECTION, SOLUTION INTRAVENOUS EVERY 4 HOURS PRN
Status: DISCONTINUED | OUTPATIENT
Start: 2022-08-19 | End: 2022-08-20

## 2022-08-19 RX ORDER — CITALOPRAM 20 MG/1
20 TABLET ORAL DAILY
Status: DISCONTINUED | OUTPATIENT
Start: 2022-08-20 | End: 2022-08-23 | Stop reason: HOSPADM

## 2022-08-19 RX ORDER — PRAVASTATIN SODIUM 20 MG
40 TABLET ORAL
Status: DISCONTINUED | OUTPATIENT
Start: 2022-08-19 | End: 2022-08-23 | Stop reason: HOSPADM

## 2022-08-19 RX ORDER — DICYCLOMINE HYDROCHLORIDE 10 MG/1
10 CAPSULE ORAL 3 TIMES DAILY PRN
Status: DISCONTINUED | OUTPATIENT
Start: 2022-08-19 | End: 2022-08-19

## 2022-08-19 RX ORDER — POTASSIUM CHLORIDE 20 MEQ/1
40 TABLET, EXTENDED RELEASE ORAL ONCE
Status: DISCONTINUED | OUTPATIENT
Start: 2022-08-19 | End: 2022-08-19

## 2022-08-19 RX ORDER — HEPARIN SODIUM 5000 [USP'U]/ML
5000 INJECTION, SOLUTION INTRAVENOUS; SUBCUTANEOUS EVERY 8 HOURS SCHEDULED
Status: DISCONTINUED | OUTPATIENT
Start: 2022-08-19 | End: 2022-08-19

## 2022-08-19 RX ORDER — PANTOPRAZOLE SODIUM 40 MG/1
40 TABLET, DELAYED RELEASE ORAL
Status: DISCONTINUED | OUTPATIENT
Start: 2022-08-19 | End: 2022-08-23 | Stop reason: HOSPADM

## 2022-08-19 RX ORDER — HYDRALAZINE HYDROCHLORIDE 20 MG/ML
10 INJECTION INTRAMUSCULAR; INTRAVENOUS ONCE
Status: COMPLETED | OUTPATIENT
Start: 2022-08-19 | End: 2022-08-19

## 2022-08-19 RX ORDER — TAMSULOSIN HYDROCHLORIDE 0.4 MG/1
0.4 CAPSULE ORAL ONCE
Status: COMPLETED | OUTPATIENT
Start: 2022-08-19 | End: 2022-08-19

## 2022-08-19 RX ORDER — ACETAMINOPHEN 325 MG/1
650 TABLET ORAL EVERY 6 HOURS PRN
Status: DISCONTINUED | OUTPATIENT
Start: 2022-08-19 | End: 2022-08-23 | Stop reason: HOSPADM

## 2022-08-19 RX ORDER — TAMSULOSIN HYDROCHLORIDE 0.4 MG/1
0.4 CAPSULE ORAL DAILY
Status: DISCONTINUED | OUTPATIENT
Start: 2022-08-19 | End: 2022-08-19

## 2022-08-19 RX ADMIN — PANTOPRAZOLE SODIUM 40 MG: 40 TABLET, DELAYED RELEASE ORAL at 06:30

## 2022-08-19 RX ADMIN — HYDRALAZINE HYDROCHLORIDE 10 MG: 20 INJECTION INTRAMUSCULAR; INTRAVENOUS at 23:37

## 2022-08-19 RX ADMIN — HEPARIN SODIUM 5000 UNITS: 5000 INJECTION INTRAVENOUS; SUBCUTANEOUS at 22:11

## 2022-08-19 RX ADMIN — SODIUM CHLORIDE 100 ML: 3 INJECTION, SOLUTION INTRAVENOUS at 12:13

## 2022-08-19 RX ADMIN — MAGNESIUM SULFATE HEPTAHYDRATE 2 G: 40 INJECTION, SOLUTION INTRAVENOUS at 09:10

## 2022-08-19 RX ADMIN — MAGNESIUM SULFATE HEPTAHYDRATE 2 G: 40 INJECTION, SOLUTION INTRAVENOUS at 03:20

## 2022-08-19 RX ADMIN — LABETALOL HYDROCHLORIDE 10 MG: 5 INJECTION, SOLUTION INTRAVENOUS at 20:35

## 2022-08-19 RX ADMIN — NICOTINE 1 PATCH: 14 PATCH, EXTENDED RELEASE TRANSDERMAL at 09:08

## 2022-08-19 RX ADMIN — LISINOPRIL 20 MG: 20 TABLET ORAL at 03:18

## 2022-08-19 RX ADMIN — PRAVASTATIN SODIUM 40 MG: 20 TABLET ORAL at 03:18

## 2022-08-19 RX ADMIN — GLYCERIN 1 DROP: .002; .002; .01 SOLUTION/ DROPS OPHTHALMIC at 18:39

## 2022-08-19 RX ADMIN — POTASSIUM CHLORIDE 40 MEQ: 1500 TABLET, EXTENDED RELEASE ORAL at 12:46

## 2022-08-19 RX ADMIN — METOPROLOL SUCCINATE 25 MG: 25 TABLET, EXTENDED RELEASE ORAL at 18:39

## 2022-08-19 RX ADMIN — POTASSIUM CHLORIDE 40 MEQ: 1500 TABLET, EXTENDED RELEASE ORAL at 04:27

## 2022-08-19 RX ADMIN — HEPARIN SODIUM 5000 UNITS: 5000 INJECTION INTRAVENOUS; SUBCUTANEOUS at 15:44

## 2022-08-19 RX ADMIN — LABETALOL HYDROCHLORIDE 10 MG: 5 INJECTION, SOLUTION INTRAVENOUS at 08:49

## 2022-08-19 RX ADMIN — LABETALOL HYDROCHLORIDE 10 MG: 5 INJECTION, SOLUTION INTRAVENOUS at 01:42

## 2022-08-19 RX ADMIN — NICARDIPINE HYDROCHLORIDE 1 MG/HR: 2.5 INJECTION, SOLUTION INTRAVENOUS at 10:17

## 2022-08-19 RX ADMIN — HYDROMORPHONE HYDROCHLORIDE 0.5 MG: 1 INJECTION, SOLUTION INTRAMUSCULAR; INTRAVENOUS; SUBCUTANEOUS at 00:24

## 2022-08-19 RX ADMIN — LABETALOL HYDROCHLORIDE 10 MG: 5 INJECTION, SOLUTION INTRAVENOUS at 22:22

## 2022-08-19 RX ADMIN — SODIUM CHLORIDE 100 ML: 3 INJECTION, SOLUTION INTRAVENOUS at 09:46

## 2022-08-19 RX ADMIN — POTASSIUM CHLORIDE 40 MEQ: 1500 TABLET, EXTENDED RELEASE ORAL at 20:13

## 2022-08-19 RX ADMIN — PRAVASTATIN SODIUM 40 MG: 20 TABLET ORAL at 22:12

## 2022-08-19 RX ADMIN — HEPARIN SODIUM 5000 UNITS: 5000 INJECTION INTRAVENOUS; SUBCUTANEOUS at 01:42

## 2022-08-19 RX ADMIN — TAMSULOSIN HYDROCHLORIDE 0.4 MG: 0.4 CAPSULE ORAL at 03:18

## 2022-08-19 RX ADMIN — AMITRIPTYLINE HYDROCHLORIDE 25 MG: 25 TABLET, FILM COATED ORAL at 22:11

## 2022-08-19 RX ADMIN — PANCRELIPASE 24000 UNITS: 24000; 76000; 120000 CAPSULE, DELAYED RELEASE PELLETS ORAL at 17:07

## 2022-08-19 RX ADMIN — LISINOPRIL 20 MG: 20 TABLET ORAL at 17:46

## 2022-08-19 RX ADMIN — POTASSIUM CHLORIDE 40 MEQ: 1500 TABLET, EXTENDED RELEASE ORAL at 16:21

## 2022-08-19 RX ADMIN — Medication 0.04 MG: at 09:52

## 2022-08-19 RX ADMIN — SODIUM CHLORIDE 75 ML/HR: 0.9 INJECTION, SOLUTION INTRAVENOUS at 03:20

## 2022-08-19 NOTE — ASSESSMENT & PLAN NOTE
S/p EGD/C-scope on 8/17 which showed large clean gastric ulcers and pancolonic diverticulosis, ulcerated stricture in ascending colon (inadequate prep)  Hgb stable    Recent Labs     08/19/22  0421 08/20/22  0538 08/21/22  0652   HGB 9 6* 10 8* 10 2*     · Trend H/H and transfuse for Hgb<7

## 2022-08-19 NOTE — ED NOTES
Patient lethargic, doctors at bedside  Sternal rub patient grabbed chest and fell right back to sleep  Taken for STAT ct head  Will hold PO meds at this time  IV BP meds given       Ileana Millan RN  08/19/22 1344

## 2022-08-19 NOTE — ASSESSMENT & PLAN NOTE
Lab Results   Component Value Date    EGFR 70 08/19/2022    EGFR 72 08/19/2022    EGFR 73 08/19/2022    CREATININE 1 05 08/19/2022    CREATININE 1 02 08/19/2022    CREATININE 1 01 08/19/2022   - Monitor I/O  - trend BUN/creat

## 2022-08-19 NOTE — ASSESSMENT & PLAN NOTE
Metabolic encephalopathy POA in the setting of hyponatremia  Found to be worsened, obtunded the following morning and transferred to MICU for hypertonic saline  Mental status has improved s/p correction of hyponatremia    · q4 neuro checks  · Treatment of hyponatremia as mentioned above

## 2022-08-19 NOTE — PROGRESS NOTES
[address] [facility] Admission Note   Unit/Bed # @DBLINK (NOEL,62080)@ Encounter: [id number]  SOD Team Alberto Loyola Shayla 67 y o  male [phone]       Patient seen and examined  Reviewed H&P per Dr Rich Sears  Agree with the assessment and plan except as stated below  Assessment/Plan: Principal Problem:    Hyponatremia  Active Problems:    Irritable bowel syndrome with both constipation and diarrhea    Other chronic pancreatitis (HCC)    Smoking    History of endovascular stent graft for abdominal aortic aneurysm (AAA)    Chronic kidney disease, stage 3 (HCC)    Depression, recurrent (HCC)    Iron deficiency anemia    Hypokalemia    Urinary retention    Altered mental status    Hypertension    Bladder outlet obstruction     Chelsey Sauer is a 68 yo M with PMH of AAA s/p repair, PUD, IBS, chronic pancreatitis, CKD3, RUBENS who presents for multiple symptomatic complaints  Patient's ability to provide history is limited by his acute distress  Patient went for EGD/C-scope day prior to admission and since then patient complains of dry mouth, dizziness, nausea, confusion, abdominal pain, decreased appetite, and urinary retention despite the urge to urinate  He did not take his medications on day of presentation  He reports that despite the fact that he underwent bowel prep for colonoscopy, he does not believe he had many BMs and he has not had once since his procedure  T 98 8, HR 78, RR 26, /95, spO2 98%  PE remarkable for acute distress, patient writhing and continuously shaking his hand over his suprapubic region, dry mucous membranes, RRR, CTAB, abdomen nondistended with suprapubic tenderness, oriented to self and place but not year  Labs significant for Na 120, K 2 9, Cl 82, HCO3 28, Mg 1 5, Phos 2 2, Hgb 10 3, MCV 77   CTAP showing mild diffuse thickening of the descending and rectosigmoid colon may be due to nonspecific colitis and distended bladder and bilateral collecting system fullness, correlating with bladder outlet obstruction  1  Hyponatremia  Na 120 on presentation  Urine spec grav 1 010, Urine Na 95, Uosm 351, and Sosm 252  Patient appears hypovolemic  Chronically hyponatremic requiring salt tabs as outpatient  Unclear etiology of hyponatremia chronically  Suspect acute drop is 2/2 to GI losses from colonoscopy prep as well as poor PO intake, but may also be SIADH given aforementioned labs  Repeat Na 120 after 1L LR  · Hold salt tabs for now  · Gentle IV normal saline  · Check BMP q4 hours  · Goal for no more than delta of 8 in a 24 hour period  · Nephrology consulted  · Did discuss with CC attending on call who agrees with slow normal saline administration    2  Hypokalemia  2 9 on presentation  Suspect in the setting of GI losses  · Hold home HCTZ  · S/p 80 total mEq in the ED  · Continue to trend and replete as needed    3  Altered mental status  Hyponatremia vs residual effect of anesthesia from scope  · Hold home Xanax, elavil, celexa   · Treatment of hyponatremia as above    4  Urinary retention, history bladder outlet obstruction  Acutely worsened in the setting of anesthesia  S/p multiple doses of opioid pain meds in the ED  · Urinary retention protocol  · Urology consult  · Avoid further opioids if not necessary    5  HTN  PTA meds include lisinopril-HCTZ, toprol-XL  · Continue lisinopril and Toprol-XL  · Hold HCTZ  · PRN IV labetalol    6  CKD3  Cr stable  · Nephro consulted    7  RUBENS  S/p EGD/C-scope on 8/17 which showed large clean gastric ulcers and pancolonic diverticulosis, ulcerated stricture in ascending colon (inadequate prep)  Hgb stable  · Trend H/H and transfuse for Hgb<7    8  Chronic pancreatitis  Hx of pancreatic cyst s/p drainage  · Cont PTA creon    9  IBS  · Holding home linzess in setting of GI losses leading to metabolic derangement    10   Smoking  · Nicotine patch  · Encourage cessation    Disposition:  INPATIENT     Expected LOS: >2 Eloy Bob MD

## 2022-08-19 NOTE — CASE MANAGEMENT
Case Management Assessment & Discharge Planning Note    Patient name Mulu Byrd  Location ED 10/ED 10 MRN [id number]  : 1950 Date 2022       Current Admission Date: 2022  Current Admission Diagnosis:Hyponatremia   Patient Active Problem List    Diagnosis Date Noted    Hyponatremia 2022    Hypokalemia 2022    Urinary retention 2022    Altered mental status 2022    Hypertension 2022    Bladder outlet obstruction 2022    Iron deficiency anemia 2022    Protein-calorie malnutrition, unspecified severity ([facility] ) 06/15/2022    Depression, recurrent ([facility] ) 06/15/2022    Chronic kidney disease, stage 3 ([facility] ) 2022    History of endovascular stent graft for abdominal aortic aneurysm (AAA) 2021    Stenosis of other vascular prosthetic devices, implants and grafts, subsequent encounter 2021    Neurogenic claudication ([facility] ) 2021    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 [facility] 2021    Other chronic pancreatitis ([facility] ) 09/10/2018    Smoking 09/10/2018    Irritable bowel syndrome with both constipation and diarrhea 2018    Epigastric pain 2018    Diarrhea 2018    AAA (abdominal aortic aneurysm) without rupture ([facility] ) 2018      LOS (days): 1  Geometric Mean LOS (GMLOS) (days):   Days to GMLOS:     OBJECTIVE:    Risk of Unplanned Readmission Score: 17 13         Current admission status: Inpatient  Referral Reason: Other (dispo planning)    Preferred Pharmacy:   [id number] [place], [address] [address]  Phone: [phone] Fax: [place] [id number]  Phone: [phone] Fax: [phone]    Primary Care Provider: Brice Litten, MD    Primary Insurance: MEDICARE  Secondary Insurance: [employer]    ASSESSMENT:  Active Health Care Proxies     Ignacio Cotton - Child   Primary Phone: [phone] (Home)  Work Phone: [phone]               Readmission Root Cause  30 Day Readmission: No    Patient Information  Admitted from[de-identified] Home  Mental Status: Confused  During Assessment patient was accompanied by: Not accompanied during assessment  Assessment information provided by[de-identified] Daughter  Primary Caregiver: Self  Support Systems: Self, Daughter  [place] of Residence: [address] [place] [address] do you live in?: [address] [facility] entry access options   Select all that apply : Stairs  Number of steps to enter home : 3  Type of Current Residence: [facility]  In the last 12 months, was there a time when you were not able to pay the mortgage or rent on time?: No  In the last 12 months, how many places have you lived?: 1  In the last 12 months, was there a time when you did not have a steady place to sleep or slept in a shelter (including now)?: No  Homeless/housing insecurity resource given?: No  Living Arrangements: Lives w/ Friend    Activities of Daily Living Prior to Admission  Functional Status: Independent  Completes ADLs independently?: Yes  Ambulates independently?: Yes  Does patient use assisted devices?: Yes  Assisted Devices (DME) used: Yvesyariel Higuera  Does patient currently own DME?: Yes  What DME does the patient currently own?: Sophie Higuera  Does patient have a history of Outpatient Therapy (PT/OT)?: No  Does the patient have a history of Short-Term Rehab?: No  Does patient have a history of HHC?: No  Does patient currently have [place] [address]?: No         Patient Information Continued  Income Source: Pension/halfway  Does patient have prescription coverage?: Yes  Within the past 12 months, you worried that your food would run out before you got the money to buy more : Never true  Within the past 12 months, the food you bought just didn't last and you didn't have money to get more : Never true  Food insecurity resource given?: No  Does patient receive dialysis treatments?: No  Does patient have a history of substance abuse?: No  Does patient have a history of Mental Health Diagnosis?: No         Means of Transportation  Means of Transport to Appts[de-identified] Drives Self  In the past 12 months, has lack of transportation kept you from medical appointments or from getting medications?: No  In the past 12 months, has lack of transportation kept you from meetings, work, or from getting things needed for daily living?: No  Was application for public transport provided?: No    DISCHARGE DETAILS:    Discharge planning discussed with[de-identified] Vanessa Noland; daughter  Freedom of Choice: Yes     CM contacted family/caregiver?: Yes     Contacts  Patient Contacts: Vanessa Noland  Relationship to Patient[de-identified] Family  Contact Method: Phone  Phone Number: [phone]  Reason/Outcome: Discharge Planning, Emergency Contact, Continuity of Care    Additional Comments: CM attempted to meet with patient at bedside however patient was unable to participate due to AMS  CM reached out to patients daughter Amber Charles who was able to complete open with CM  Doekai Melvin states patients is altered to his baseline at this time and has been increasing in severity over the past few weeks

## 2022-08-19 NOTE — ASSESSMENT & PLAN NOTE
POA  Per Urology, likely 2/2 weakness as a result of hyponatremia and underlying obstructive BPH  · Cont Flomax  · Continue finasteride   · Maintain croft catheter  · Cystoscopy with transurethral ultrasound to help with further surgical decision making , inpatient vs outpatient   · Urology following

## 2022-08-19 NOTE — ASSESSMENT & PLAN NOTE
Lab Results   Component Value Date    EGFR 64 08/21/2022    EGFR 56 08/20/2022    EGFR 58 08/20/2022    CREATININE 1 12 08/21/2022    CREATININE 1 26 08/20/2022    CREATININE 1 22 08/20/2022     Cr 1 05 on admission  Plan  · Trend creatinine  · Nephrology following

## 2022-08-19 NOTE — SPEECH THERAPY NOTE
Speech-Language Pathology Bedside Swallow Evaluation    Patient Name: Siena Cancino    GPVWV'U Date: 8/19/2022     Problem List  Principal Problem:    Hyponatremia  Active Problems:    Irritable bowel syndrome with both constipation and diarrhea    Other chronic pancreatitis ([facility] )    Smoking    History of endovascular stent graft for abdominal aortic aneurysm (AAA)    Chronic kidney disease, stage 3 (HCC)    Depression, recurrent (HCC)    Iron deficiency anemia    Hypokalemia    Urinary retention    Altered mental status    Hypertension    Bladder outlet obstruction    Past Medical History  Past Medical History:   Diagnosis Date    Anxiety     Rhodes's esophagus     Chronic neck pain     Depression     GERD (gastroesophageal reflux disease)     H/O abdominal aortic aneurysm 4 5 cm    Hyperlipidemia     Hypertension     Pancreatic pseudocyst     Vertigo      Past Surgical History  Past Surgical History:   Procedure Laterality Date    COLONOSCOPY  09/30/2014    IR LOWER EXTREMITY / INTERVENTION  01/04/2019    SD ESOPHAGOGASTRODUODENOSCOPY TRANSORAL DIAGNOSTIC N/A 06/22/2018    Procedure: EGD AND COLONOSCOPY;  Surgeon: Quentin Woody MD;  [facility]: [facility]; Service: Gastroenterology    SD [address] RPR DPLMNT AORTO-AORTIC NDGFT N/A 02/09/2018    Procedure: REPAIR ANEURYSM ENDOVASCULAR ABDOMINAL AORTIC  (EVAR); Surgeon: Guerita Moon MD;  [facility];  Service: Vascular    UPPER GASTROINTESTINAL ENDOSCOPY         Summary  Pt presented with functional appearing oral and pharyngeal stage swallowing skills with materials administered today  Solids were not assessed as pt does not have his dentures with him, however per pt and family he usually eats mostly soup and yogurt  Pt is able to order softer foods from the menu per his preference  Okay to continue regular diet orders       Risk/s for Aspiration: low    Recommended Diet: regular diet and thin liquids   Recommended Form of Meds: whole with liquid   Aspiration precautions and swallowing strategies: upright posture, only feed when fully alert, slow rate of feeding, small bites/sips and alternating bites and sips  Other Recommendations: Continue frequent oral care      Current Medical Status per H&P 8/19/22  Dana Pinto is a 67 y o  male with PMHx of AAA s/p repair, PUD, chronic pancreatitis, CKD stage 3,and RUBENS who presented to the ED with multiple complaints including feeling dry, difficulty swallowing, N/V, dizziness, abdominal pain and AMS  Patient was initially admitted to medicine service but given worsening AMS and acute on chronic hyponatremia, patient was started on hypertonic saline and admitted to the MICU  Patient went for EGD and colonoscopy two days ago, which showed mild erosive gastritis, moderate to severe diverticulosis and multiple polyps, not removed  History obtained from chart review  Special Studies:  CT head 8/19/22 IMPRESSION:  No acute intracranial abnormality  Chronic microangiopathic changes  Sinus disease        Social/Education/Vocational Hx:  Pt lives with family    Swallow Information   Current Risks for Dysphagia & Aspiration: AMS on admission, improved now  Current Diet: regular diet and thin liquids   Baseline Diet: regular diet and thin liquids      Baseline Assessment   Behavior/Cognition: alert  Speech/Language Status: able to participate in basic conversation and able to follow commands  Patient Positioning: upright in bed  Pain Status/Interventions/Response to Interventions: No report of or nonverbal indications of pain         Swallow Mechanism Exam  Facial: symmetrical  Labial: WFL  Lingual: WFL  Velum: symmetrical  Mandible: adequate ROM  Dentition: edentulous  Vocal quality:clear/adequate   Volitional Cough: adequate   Respiratory Status: on RA     Consistencies Assessed and Performance   Consistencies Administered: thin liquids and puree  Materials administered included water, apple sauce    Oral Stage: WFL for puree  Bolus formation and transfer were functional with no significant oral residue noted  No overt s/s reduced oral control  Pharyngeal Stage: WFL  Swallow Mechanics: Swallowing initiation appeared prompt  Laryngeal rise was palpated and judged to be within functional limits  No coughing, throat clearing, change in vocal quality or respiratory status noted today  Esophageal Concerns: none reported      Summary and Recommendations (see above)    Results Reviewed with: patient, RN and family     Treatment Recommended: No additional ST f/u needed at this time  Please re consult with any new changes or concerns

## 2022-08-19 NOTE — H&P
H&P Exam - Critical Care   Checo Mcguire 67 y o  male MRN: [id number]  Unit/Bed#: [facility] 12 Encounter: [id number]      -------------------------------------------------------------------------------------------------------------  Chief Complaint: AMS    History of Present Illness      HX and PE limited by: Debbi Obrien is a 67 y o  male with PMHx of AAA s/p repair, PUD, chronic pancreatitis, CKD stage 3,and RUBENS who presented to the ED with multiple complaints including feeling dry, difficulty swallowing, N/V, dizziness, abdominal pain and AMS  Patient went for EGD and colonoscopy two days ago, which showed mild erosive gastritis, moderate to severe diverticulosis and multiple polyps, not removed  Reports that symptoms started after the procedure  Patient was initially admitted to medicine service but given worsening AMS and acute on chronic hyponatremia  Patient was reevaluated this morning given worsening AMS  Per nephrology recommendation, patient was started on hypertonic saline and admitted to the MICU  Nicole catheter placed in the ED with 800ml volume  History obtained from chart review and the patient   -------------------------------------------------------------------------------------------------------------  Assessment and Plan:    Neuro:    Diagnosis: AMS, most likely metabolic encephalopathy in the setting of hyponatremia  - CTH with no acute abnormalities  MS improved after hypertonic saline bolus    o Plan:   - Correct metabolic derangements, as described below  - Neuro checks  - CTH for GCS drop > 2 points in 1 hr   Diagnosis: Depression  o Plan:   - Hold home Xanax for now  - Okay to continue Celexa and Amitriptyline       CV:    Diagnosis: Hypertensive urgency, HTN, Hyperlipidemia  o Plan:   - Home meds: Lisinopril-HCTZ 20-12 5mg, Toprol 25mg daily   Dc HCTZ, continue lisinopril and metoprolol   Continue statin  - Nicardipine gtt, can wean off      Pulm:   Diagnosis: No acute issues      GI:    Diagnosis: Chronic pancreatitis, IBS-C&D, Gastric ulcers  - Recent EGD with 2 large gastric ulcers s/p biopsy significant for mild chronic gastritis  o Plan:   - Continue pancrelipase  - Bowel regimen in place  - Continue PPI      :    Diagnosis: CKD stage 3, BL Cr 1 0-1 2  o Plan:   - Trend Cr daily   Diagnosis: Urinary rentention 2/2 BPH  o Plan:   - Urology consulted,   - Continue croft while inpatient  - Outpatient follow up for further workup  - Continue tamsulosin      F/E/N:    Diagnosis: Symptomatic acute on chronic hyponatremia, multifactorial in the setting of poor oral intake, volume depletion, urinary retention and HCTZ use  o Plan:   - Following Nephrology recommendations  - S/p hypertonic saline 100cc bolus x 2  - Goal Na on 126 by 6pm today  - BMP q2hrs  - Fluid restriction 1000cc x 24hrs  - Dc HCTZ   Fluids: None   Electrolytes: Replete as needed to maintain K>4, Mg>2, and Phos>3   Nutrition: Regular diet with fluid restriction 1000cc x 24hrs  Heme/Onc:    Diagnosis: Iron deficiency anemia  - Recent EGD with 2 large gastric ulcers s/p biopsy  o Plan:   - Not on iron supplementation at home  - Monitor Hgb closely, transfuse for Hgb <7   Diagnosis: DVT ppx  o Plan:   - Heparin      Endo:    Diagnosis: No acute issues      ID:    Diagnosis: No acute issues      MSK/Skin:    Diagnosis: No acute issues  o Plan:   - Frequent turning/reporsitioning to prevent skin breakdown  - Close skin surveillance      Disposition: Admit to Critical Care   Code Status: Level 3 - DNAR and DNI  --------------------------------------------------------------------------------------------------------------  Review of Systems    A 12-point, complete review of systems was reviewed and negative except as stated above     Physical Exam  Vitals and nursing note reviewed  Constitutional:       General: He is not in acute distress  Appearance: He is cachectic  He is ill-appearing  HENT:      Head: Normocephalic and atraumatic  Eyes:      Conjunctiva/sclera: Conjunctivae normal    Cardiovascular:      Rate and Rhythm: Normal rate and regular rhythm  Heart sounds: No murmur heard  Pulmonary:      Effort: Pulmonary effort is normal  No respiratory distress  Breath sounds: Normal breath sounds  Abdominal:      Palpations: Abdomen is soft  Tenderness: There is no abdominal tenderness  Musculoskeletal:      Cervical back: Neck supple  Skin:     General: Skin is warm and dry  Neurological:      General: No focal deficit present  Mental Status: He is alert  Comments: Oriented to person and place  Psychiatric:         Mood and Affect: Mood normal          Behavior: Behavior normal        --------------------------------------------------------------------------------------------------------------  Vitals:   Vitals:    08/19/22 1230 08/19/22 1245 08/19/22 1300 08/19/22 1400   BP: 158/76 159/74 163/78 150/75   BP Location:       Pulse: 80 78 80 82   Resp: (!) 31 (!) 24 22 (!) 62   Temp:       TempSrc:       SpO2: 93% 95% 95% 96%   Weight:         Temp  Min: 98 5 °F (36 9 °C)  Max: 98 8 °F (37 1 °C)        Body mass index is 19 56 kg/m²    N/A    Laboratory and Diagnostics:  Results from last 7 days   Lab Units 08/19/22  0421 08/19/22  0151 08/18/22  1845   WBC Thousand/uL 9 47  --  8 32   HEMOGLOBIN g/dL 9 6*  --  10 3*   HEMATOCRIT % 28 5*  --  29 3*   PLATELETS Thousands/uL 236 265 245   NEUTROS PCT % 85*  --  86*   MONOS PCT % 8  --  6     Results from last 7 days   Lab Units 08/19/22  1112 08/19/22  0852 08/19/22  0421 08/19/22  0151 08/18/22  1845   SODIUM mmol/L 123* 122* 122* 120* 120*   POTASSIUM mmol/L 3 3* 3 4* 3 0* 3 5 2 9*   CHLORIDE mmol/L 89* 87* 85* 85* 82*   CO2 mmol/L 27 26 29 29 28   ANION GAP mmol/L 7 9 8 6 10   BUN mg/dL 8 6 7 6 8   CREATININE mg/dL 1 06 1 09 1 00 0 99 1 05   CALCIUM mg/dL 7 8* 8 1* 8 2* 8 3 8 0*   GLUCOSE RANDOM mg/dL 118 110 109 106 123   ALT U/L  --   --   --   --  23   AST U/L  --   --   --   --  42   ALK PHOS U/L  --   --   --   --  96   ALBUMIN g/dL  --   --   --   --  3 2*   TOTAL BILIRUBIN mg/dL  --   --   --   --  0 29     Results from last 7 days   Lab Units 08/18/22  1845   MAGNESIUM mg/dL 1 5*   PHOSPHORUS mg/dL 2 2*                   ABG:    VBG:  Results from last 7 days   Lab Units 08/19/22  1009   PH CASSANDRA  7 463*   PCO2 CASSANDRA mm Hg 35 6*   PO2 CASSANDRA mm Hg 44 0   HCO3 CASSANDRA mmol/L 24 9   BASE EXC CASSANDRA mmol/L 1 3       Imaging: I have personally reviewed pertinent reports  Historical Information   Past Medical History:   Diagnosis Date    Anxiety     Rhodes's esophagus     Chronic neck pain     Depression     GERD (gastroesophageal reflux disease)     H/O abdominal aortic aneurysm 4 5 cm    Hyperlipidemia     Hypertension     Pancreatic pseudocyst     Vertigo      Past Surgical History:   Procedure Laterality Date    COLONOSCOPY  09/30/2014    IR LOWER EXTREMITY / INTERVENTION  01/04/2019    NC ESOPHAGOGASTRODUODENOSCOPY TRANSORAL DIAGNOSTIC N/A 06/22/2018    Procedure: EGD AND COLONOSCOPY;  Surgeon: Alicja Kent MD;  Location: [facility]; Service: Gastroenterology    NC [address] RPR DPLMNT AORTO-AORTIC NDGFT N/A 02/09/2018    Procedure: REPAIR ANEURYSM ENDOVASCULAR ABDOMINAL AORTIC  (EVAR);   Surgeon: Anupam Og MD;  Location: [facility];  Service: Vascular    UPPER GASTROINTESTINAL ENDOSCOPY       Social History   Social History     Substance and Sexual Activity   Alcohol Use No     Social History     Substance and Sexual Activity   Drug Use No     Social History     Tobacco Use   Smoking Status Current Every Day Smoker    Packs/day: 0 25    Types: Cigarettes   Smokeless Tobacco Never Used   Tobacco Comment    Truven quiting smoking handouts     Family History   Problem Relation Age of Onset    Heart disease Father     Heart attack Father     Diabetes Maternal Grandmother     Diabetes Maternal Grandfather  Diabetes Paternal Grandmother     Hypertension Family     Hyperlipidemia Family      I have reviewed this patient's family history and commented on sigificant items within the HPI      Medications:  Current Facility-Administered Medications   Medication Dose Route Frequency    acetaminophen (TYLENOL) tablet 650 mg  650 mg Oral Q6H PRN    aspirin (ECOTRIN LOW STRENGTH) EC tablet 81 mg  81 mg Oral Daily    glycerin-hypromellose- (ARTIFICIAL TEARS) ophthalmic solution 1 drop  1 drop Both Eyes BID    heparin (porcine) subcutaneous injection 5,000 Units  5,000 Units Subcutaneous Q8H [facility]    labetalol (NORMODYNE) injection 10 mg  10 mg Intravenous Q6H PRN    lisinopril (ZESTRIL) tablet 20 mg  20 mg Oral Daily    metoprolol succinate (TOPROL-XL) 24 hr tablet 25 mg  25 mg Oral Daily    niCARdipine (CARDENE) 25 mg (STANDARD CONCENTRATION) in sodium chloride 0 9% 250 mL  1-15 mg/hr Intravenous Titrated    nicotine (NICODERM CQ) 14 mg/24hr TD 24 hr patch 1 patch  1 patch Transdermal Daily    ondansetron (ZOFRAN) injection 4 mg  4 mg Intravenous Q6H PRN    pancrelipase (Lip-Prot-Amyl) (CREON) delayed release capsule 24,000 Units  24,000 Units Oral TID With Meals    pantoprazole (PROTONIX) EC tablet 40 mg  40 mg Oral Early Morning    potassium chloride (K-DUR,KLOR-CON) CR tablet 40 mEq  40 mEq Oral BID    pravastatin (PRAVACHOL) tablet 40 mg  40 mg Oral HS    [START ON 8/20/2022] tamsulosin (FLOMAX) capsule 0 4 mg  0 4 mg Oral Daily With Dinner     Home medications:  Prior to Admission Medications   Prescriptions Last Dose Informant Patient Reported? Taking?    ALPRAZolam (XANAX) 1 mg tablet Unknown at Unknown time  No No   Sig: Take 1 tablet (1 mg total) by mouth 2 (two) times a day as needed (transient anxiety)   Linaclotide (LINZESS) 72 MCG CAPS Unknown at Unknown time Self No No   Sig: Take 1 capsule by mouth daily   Multiple Vitamins-Minerals (CENTRUM SILVER PO) Unknown at Unknown time Self Yes No Sig: Take 1 tablet by mouth daily     Restasis 0 05 % ophthalmic emulsion Unknown at Unknown time  Yes No   Wheat Dextrin (BENEFIBER PO) Unknown at Unknown time Self Yes No   Sig: Take by mouth daily as needed     amitriptyline (ELAVIL) 25 mg tablet Unknown at Unknown time  No No   Sig: Take 1 tablet (25 mg total) by mouth daily at bedtime   aspirin (ECOTRIN LOW STRENGTH) 81 mg EC tablet Unknown at Unknown time Self Yes No   Sig: Take 81 mg by mouth daily   citalopram (CeleXA) 20 mg tablet Unknown at Unknown time  No No   Sig: TAKE 1 TABLET BY MOUTH EVERY DAY   dicyclomine (BENTYL) 10 mg capsule Unknown at Unknown time  No No   Sig: TAKE 1 CAPSULE BY MOUTH 3 TIMES A DAY AS NEEDED (ABDOMINAL CRAMPING/ SPASMS)   lisinopril-hydrochlorothiazide (PRINZIDE,ZESTORETIC) 20-12 5 MG per tablet Unknown at Unknown time  No No   Sig: TAKE 1 TABLET BY MOUTH ONCE A DAY   magnesium citrate (CITROMA) 1 745 g/30 mL oral solution   No No   Sig: Take 296 mL by mouth once for 1 dose   metoprolol succinate (TOPROL-XL) 25 mg 24 hr tablet Unknown at Unknown time  No No   Sig: Take 1 tablet (25 mg total) by mouth in the morning  omeprazole (PriLOSEC) 40 MG capsule Unknown at Unknown time  No No   Sig: Take 1 capsule (40 mg total) by mouth 2 (two) times a day before meals   pancrelipase, Lip-Prot-Amyl, (Creon) 12,000 units capsule Unknown at Unknown time  No No   Sig: Take 2 capsules (24,000 Units total) by mouth 3 (three) times a day with meals   polyethylene glycol (GLYCOLAX) 17 GM/SCOOP powder Unknown at Unknown time  No No   Sig: Take as directed as per written office instructions   polyethylene glycol (MIRALAX) 17 g packet Unknown at Unknown time Self Yes No   Sig: Take 17 g by mouth   polyethylene glycol-electrolytes (TriLyte) 4000 mL solution   No No   Sig: Take 4,000 mL by mouth once for 1 dose Take 4000 mL by mouth once for 1 dose   Use as directed   pravastatin (PRAVACHOL) 40 mg tablet Unknown at Unknown time  No No   Sig: Take 1 tablet (40 mg total) by mouth daily at bedtime   ranitidine (ZANTAC) 150 mg tablet Unknown at Unknown time Self No No   Sig: TAKE 1 TABLET EVERY DAY IN THE EVENING   sodium chloride 1 g tablet Unknown at Unknown time  No No   Sig: TAKE 3 TABLETS EVERY DAY   tamsulosin (FLOMAX) 0 4 mg Unknown at Unknown time  No No   Sig: TAKE 1 CAPSULE BY MOUTH EVERY DAY      Facility-Administered Medications: None     Allergies:  No Known Allergies  ------------------------------------------------------------------------------------------------------------  Advance Directive and Living Will: Yes    Power of :    POLST:    ------------------------------------------------------------------------------------------------------------  Anticipated Length of Stay is > 2 midnights    Care Time Delivered:   Upon my evaluation, this patient had a high probability of imminent or life-threatening deterioration, which required my direct attention, intervention, and personal management  I have personally provided 30 minutes of critical care time, exclusive of procedures, teaching, family meetings, and any prior time recorded by providers other than myself  Tasha Macias MD        Portions of the record may have been created with voice recognition software  Occasional wrong word or "sound a like" substitutions may have occurred due to the inherent limitations of voice recognition software    Read the chart carefully and recognize, using context, where substitutions have occurred

## 2022-08-19 NOTE — ASSESSMENT & PLAN NOTE
Symptomatic  Patient presenting with nausea, abdominal pain, urinary retention, AMS s/p EGD and colonoscopy 1 day prior to presentation  Na 120, Serum Osom 252, Urine Osom 351, Urine Na 95  Acute on chronic  Multifactorial etiology in setting of volume depletion, urinary retention, hydrochlorothiazide use      · S/p hypertonic saline in the MICU for symptomatic hyponatremia with obtundation  Recent Labs     08/20/22  1357 08/20/22  2141 08/21/22  0652   SODIUM 129* 131* 130*     · More awake and alert today   · Nephrology following, HCTZ discontinued  · Continue regular diet with fluid restriction  · Continue with salt tablets   · BMP q 6

## 2022-08-19 NOTE — ASSESSMENT & PLAN NOTE
Presenting with very high blood pressures in setting of not taking his BP meds on day of arrival  PTA meds include lisinopril-HCTZ, toprol-XL      Resistant hypertension/hypertensive urgency     · S/p cardene gtt in the MICU  · Cont home lisinopril and toprol-xl with holding parameters   · HCTZ discontinued   · Nifedipine 30 mg bid  · Monitor vitals as per unit protocol

## 2022-08-19 NOTE — PROGRESS NOTES
CRITICAL CARE TRANSFER NOTE     Name: Aki Gan   Age & Sex: 67 y o  male   MRN: [id number]  Unit/Bed#: MICU 12   Encounter: [id number]  Hospital Stay Days: 1    Reason for ICU Admission: Hyponatremia  Code Status: Level 3 - DNAR and DNI  Condition: stable  Disposition: Patient requires Level 2 Step Down     Accepting Physician: Dr Dawkins Ask    ASSESSMENT/PLAN     Principal Problem:    Hyponatremia  Active Problems:    Other chronic pancreatitis ([address] )    Smoking    History of endovascular stent graft for abdominal aortic aneurysm (AAA)    Chronic kidney disease, stage 3 (HCC)    Depression, recurrent (HCC)    Iron deficiency anemia    Hypokalemia    Urinary retention    Altered mental status    Hypertension    Bladder outlet obstruction    * Hyponatremia  Assessment & Plan  Patient soidum 128, intially 120, baseline 130s  Received 100 cc hypertonic boiluses  - fluid restriction  - q6 BMPs    Bladder outlet obstruction  Assessment & Plan  - continue tamsulosin    Hypertension  Assessment & Plan  - discharged cardene drip  - started on home lisinopril and metoprolol  - PRN labetalol    Altered mental status  Assessment & Plan  Improved mental status, likely due to hyponatremia    - neuro checks q4 hrs    Urinary retention  Assessment & Plan  - continue home tamsulosin    Hypokalemia  Assessment & Plan  -potassium 3 5, due 40 mg PO    Iron deficiency anemia  Assessment & Plan  HGB 9 6    Depression, recurrent (HCC)  Assessment & Plan  - Continue home citalopram    Chronic kidney disease, stage 3 (HCC)  Assessment & Plan  Lab Results   Component Value Date    EGFR 70 08/19/2022    EGFR 72 08/19/2022    EGFR 73 08/19/2022    CREATININE 1 05 08/19/2022    CREATININE 1 02 08/19/2022    CREATININE 1 01 08/19/2022   - Monitor I/O  - trend BUN/creat    History of endovascular stent graft for abdominal aortic aneurysm (AAA)  Assessment & Plan  - cotninue aspirin and pravastatin    Smoking  Assessment & Plan  nicoderm patches    Other chronic pancreatitis [facility])  Assessment & Plan  -Continue home pancrealipase  - continue protonix         VTE Pharmacologic Prophylaxis: Heparin  VTE Mechanical Prophylaxis: sequential compression device    HOSPITAL COURSE     Patient came into ED on 8/18 and was found to be hyponatrmic and AMS  Patient was admitted to medicine service and was reevaluated to be more altered  Patient had recent EGD and colonoscopy 2 days ago  Patient received hyerptonic saline boluses with sodium improving to 128 today  Patient was also started on cardene drip which was transitioned to home BP meds  Stable for floors  OBJECTIVE     Vitals:    08/19/22 1800 08/19/22 1856 08/19/22 1900 08/19/22 2000   BP: 163/77 (!) 176/83 (!) 173/83 (!) 171/81   BP Location:       Pulse: 88 84 84 82   Resp: (!) 24 22 (!) 24 (!) 23   Temp:       TempSrc:       SpO2: 97% 98% 94% 91%   Weight:       Height:         I/O last 24 hours: In: 731 5 [I V :171 5; IV Piggyback:560]  Out: 4706 [Urine:3050]    LABORATORY DATA     Labs: I have personally reviewed pertinent reports  Results from last 7 days   Lab Units 08/19/22  0421 08/19/22  0151 08/18/22  1845   WBC Thousand/uL 9 47  --  8 32   HEMOGLOBIN g/dL 9 6*  --  10 3*   HEMATOCRIT % 28 5*  --  29 3*   PLATELETS Thousands/uL 236 265 245   NEUTROS PCT % 85*  --  86*   MONOS PCT % 8  --  6      Results from last 7 days   Lab Units 08/19/22  1900 08/19/22  1711 08/19/22  1449 08/19/22  0151 08/18/22  1845   POTASSIUM mmol/L 3 5 3 2* 3 3*   < > 2 9*   CHLORIDE mmol/L 94* 91* 93*   < > 82*   CO2 mmol/L 27 26 27   < > 28   BUN mg/dL 9 8 7   < > 8   CREATININE mg/dL 1 05 1 02 1 01   < > 1 05   CALCIUM mg/dL 7 8* 8 3 8 6   < > 8 0*   ALK PHOS U/L  --   --   --   --  96   ALT U/L  --   --   --   --  23   AST U/L  --   --   --   --  42    < > = values in this interval not displayed       Results from last 7 days   Lab Units 08/18/22  1845   MAGNESIUM mg/dL 1 5*     Results from last 7 days   Lab Units 08/18/22  1845   PHOSPHORUS mg/dL 2 2*                  Micro:  Lab Results   Component Value Date    URINECX No Growth <1000 cfu/mL 12/19/2016     IMAGING & DIAGNOSTIC TESTING     Imaging: I have personally reviewed pertinent reports  CT head wo contrast    Result Date: 8/19/2022  Impression: No acute intracranial abnormality  Chronic microangiopathic changes  Sinus disease  Workstation performed: [id number]     CT abdomen pelvis w contrast    Result Date: 8/18/2022  Impression: 1  Mild diffuse thickening of the descending and rectosigmoid colon may be due to nonspecific colitis  2   Distended bladder and bilateral collecting system fullness, correlate for bladder outlet obstruction  Workstation performed: [id number]     EKG, Pathology, and Other Studies: I have personally reviewed pertinent reports       ALLERGIES   No Known Allergies    MEDICATIONS     Current Facility-Administered Medications   Medication Dose Route Frequency Provider Last Rate    acetaminophen  650 mg Oral Q6H PRN Renetta Wooten MD      amitriptyline  25 mg Oral HS Dirk Macias MD      aspirin  81 mg Oral Daily MD Rolf Reyes ON 8/20/2022] citalopram  20 mg Oral Daily Dirk Macias MD      glycerin-hypromellose-  1 drop Both Eyes BID Dirk Macias MD      heparin (porcine)  5,000 Units Subcutaneous [facility] Bryson Shelley DO      labetalol  10 mg Intravenous Q6H PRN Renetta Wooten MD      lisinopril  20 mg Oral Daily Dirk Macias MD      metoprolol succinate  25 mg Oral Daily Dirk Macias MD      niCARdipine  1-15 mg/hr Intravenous Titrated Branden Lopes MD Stopped (08/19/22 2012)    nicotine  1 patch Transdermal Daily Renetta Wooten MD      ondansetron  4 mg Intravenous Q6H PRN Jennifer Up MD      pancrelipase (Lip-Prot-Amyl)  24,000 Units Oral TID With Meals Venice Husain MD      pantoprazole  40 mg Oral Early Morning Venice Husain MD      potassium chloride  40 mEq Oral Q4H Zo Macias MD      pravastatin  40 mg Oral HS Venice Husain MD      Neli Lay ON 8/20/2022] tamsulosin  0 4 mg Oral Daily With Soo Rasmussen MD       niCARdipine, 1-15 mg/hr, Last Rate: Stopped (08/19/22 2012)      acetaminophen, 650 mg, Q6H PRN  labetalol, 10 mg, Q6H PRN  ondansetron, 4 mg, Q6H PRN      ==

## 2022-08-19 NOTE — CONSULTS
[id number]  30[facility] 67 y o  male MRN: [id number]  Unit/Bed#: ED 10 Encounter: [id number]    ASSESSMENT and PLAN:  Francisco Spence is a 67 y o  male who was admitted to [facility] after presenting with several symptoms  A renal consultation is requested today for assistance in the management of hyponatremia      Workup  - Serum osmolality 252 consistent with hypotonic true hyponatremia  - Urine osmolality around 350 suggestive of presence of ADH which can be appropriate or inappropriate, favor appropriate ADH release from hypovolemia currently   - Urine sodium around 90 in setting of hydrochlorothiazide use  - Urine specific gravity of 1 025 suggestive of volume depletion  - TSH within normal limits  - Recent colonoscopy with a stricture in ascending colon, rule out malignancy  - History of smoking in the past, previous chest x-rays with no lung mass    # Acute on chronic hypotonic hyponatremia  - Currently symptomatic with depressed mental status and obtundation  - Multifactorial etiology: Volume depletion, urinary retention, hydrochlorothiazide use    Plan  - Transfer to critical care  - Stop normal saline  - Give 100 cc bolus of hypertonic saline  - Increase serum sodium to at least 125 mEq with hypertonic saline  - Goal serum sodium by 6:00 p m  tonight is 126  - Fluid restriction at 1000 cc per 24 hours  - Discontinue hydrochlorothiazide  - Trend BMP every 2 hours    # Hypokalemia  - Most likely related to hydrochlorothiazide use  - Discontinue hydrochlorothiazide  - Give KCl 40 mEq once     # Hypertensive urgency/emergency  - Home medications: Lisinopril/hydrochlorothiazide 20/12 5 mg daily, Toprol 25 mg daily  - Start nicardipine drip with goal systolic blood pressure [phone]  - Currently has no evidence of volume overload    # History of chronic kidney disease stage II  - Baseline serum creatinine around 1 0-1 2  - Creatinine currently at baseline    # Urinary retention  - Nicole catheter in place  - Follow recommendations from Urology    SUMMARY OF RECOMMENDATIONS:  - Transfer to critical care  - Stop normal saline  - Give 100 cc bolus of hypertonic saline  - Increase serum sodium to at least 125 mEq with hypertonic saline  - Goal serum sodium by 6:00 p m  tonight is 126  - Fluid restriction at 1000 cc per 24 hours  - Discontinue hydrochlorothiazide  - Trend BMP every 2 hours    Plan of care discussed with critical care team and primary medicine team      HISTORY OF PRESENT ILLNESS:  Requesting Physician: Ariel Kent MD  Reason for Consult:  Keerthi Liriano is a 67 y o  male who was admitted to [facility] after presenting with several symptoms  A renal consultation is requested today for assistance in the management of hyponatremia  Patient is currently obtunded and not able to provide any history  Per chart review patient had EGD and colonoscopy 2 days ago and since then had several episodes of vomiting, difficulty swallowing, dizziness, nausea and confusion  EGD showed 2 gastric ulcers  Colonoscopy showed stricture in the ascending colon, biopsy was taken  He has history of chronic hyponatremia with baseline serum sodium around 130-135 and takes salt tablets 1 g 3 times a day at home  PAST MEDICAL HISTORY:  Past Medical History:   Diagnosis Date    Anxiety     Rhodes's esophagus     Chronic neck pain     Depression     GERD (gastroesophageal reflux disease)     H/O abdominal aortic aneurysm 4 5 cm    Hyperlipidemia     Hypertension     Pancreatic pseudocyst     Vertigo        PAST SURGICAL HISTORY:  Past Surgical History:   Procedure Laterality Date    COLONOSCOPY  09/30/2014    IR LOWER EXTREMITY / INTERVENTION  01/04/2019    KS ESOPHAGOGASTRODUODENOSCOPY TRANSORAL DIAGNOSTIC N/A 06/22/2018    Procedure: EGD AND COLONOSCOPY;  Surgeon: Kurtis Coburn MD;  Location: [facility];   Service: Gastroenterology    KS EVASC RPR DPLMNT AORTO-AORTIC NDGFT N/A 02/09/2018    Procedure: REPAIR ANEURYSM ENDOVASCULAR ABDOMINAL AORTIC  (EVAR);   Surgeon: Kaushal Bates MD;  Location: [facility];  Service: Vascular    UPPER GASTROINTESTINAL ENDOSCOPY         ALLERGIES:  No Known Allergies    SOCIAL HISTORY:  Social History     Substance and Sexual Activity   Alcohol Use No     Social History     Substance and Sexual Activity   Drug Use No     Social History     Tobacco Use   Smoking Status Current Every Day Smoker    Packs/day: 0 25    Types: Cigarettes   Smokeless Tobacco Never Used   Tobacco Comment    [employer] quiting smoking handouts       FAMILY HISTORY:  Family History   Problem Relation Age of Onset    Heart disease Father     Heart attack Father     Diabetes Maternal Grandmother     Diabetes Maternal Grandfather     Diabetes Paternal Grandmother     Hypertension Family     Hyperlipidemia Family        MEDICATIONS:    Current Facility-Administered Medications:     acetaminophen (TYLENOL) tablet 650 mg, 650 mg, Oral, Q6H PRN, Waldemar Winkler MD    aspirin (ECOTRIN LOW STRENGTH) EC tablet 81 mg, 81 mg, Oral, Daily, Waldemar Winkler MD    glycerin-hypromellose- (ARTIFICIAL TEARS) ophthalmic solution 1 drop, 1 drop, Both Eyes, BID, Waldemar Winkler MD    heparin (porcine) subcutaneous injection 5,000 Units, 5,000 Units, Subcutaneous, Q8H [address], 5,000 Units at 08/19/22 0142 **AND** [COMPLETED] Platelet count, , , Once, Waldemar Winkler MD    labetalol (NORMODYNE) injection 10 mg, 10 mg, Intravenous, Q6H PRN, Waldemar Winkler MD, 10 mg at 08/19/22 0142    lisinopril (ZESTRIL) tablet 20 mg, 20 mg, Oral, Daily, Sharon Ford DO    magnesium sulfate 2 g/50 mL IVPB (premix) 2 g, 2 g, Intravenous, Once, Sharon Ford DO    metoprolol succinate (TOPROL-XL) 24 hr tablet 25 mg, 25 mg, Oral, Daily, Sharon Ford DO    nicotine (NICODERM CQ) 14 mg/24hr TD 24 hr patch 1 patch, 1 patch, Transdermal, Daily, Priscella Barbone Jayne Gross MD    ondansetron [facility]) injection 4 mg, 4 mg, Intravenous, Q6H PRN, Meredith Dumont MD    pancrelipase (Lip-Prot-Amyl) (CREON) delayed release capsule 24,000 Units, 24,000 Units, Oral, TID With Meals, Angelo Aparicio MD    pantoprazole (PROTONIX) EC tablet 40 mg, 40 mg, Oral, Early Morning, Angelo Aparicio MD, 40 mg at 08/19/22 0630    potassium chloride (K-DUR,KLOR-CON) CR tablet 40 mEq, 40 mEq, Oral, BID, Sharon Ford DO    pravastatin (PRAVACHOL) tablet 40 mg, 40 mg, Oral, HS, Angelo Aparicio MD, 40 mg at 08/19/22 0318    sodium chloride 0 9 % infusion, 75 mL/hr, Intravenous, Continuous, Meredith Dumont MD, Last Rate: 75 mL/hr at 08/19/22 0320, 75 mL/hr at 08/19/22 0320    [START ON 8/20/2022] tamsulosin (FLOMAX) capsule 0 4 mg, 0 4 mg, Oral, Daily With Mark Peralta MD    Current Outpatient Medications:     ALPRAZolam Earleen Bump) 1 mg tablet, Take 1 tablet (1 mg total) by mouth 2 (two) times a day as needed (transient anxiety), Disp: 60 tablet, Rfl: 0    amitriptyline (ELAVIL) 25 mg tablet, Take 1 tablet (25 mg total) by mouth daily at bedtime, Disp: 30 tablet, Rfl: 4    aspirin (ECOTRIN LOW STRENGTH) 81 mg EC tablet, Take 81 mg by mouth daily, Disp: , Rfl:     citalopram (CeleXA) 20 mg tablet, TAKE 1 TABLET BY MOUTH EVERY DAY, Disp: 90 tablet, Rfl: 0    dicyclomine (BENTYL) 10 mg capsule, TAKE 1 CAPSULE BY MOUTH 3 TIMES A DAY AS NEEDED (ABDOMINAL CRAMPING/ SPASMS), Disp: 270 capsule, Rfl: 2    Linaclotide (LINZESS) 72 MCG CAPS, Take 1 capsule by mouth daily, Disp: 30 capsule, Rfl: 5    lisinopril-hydrochlorothiazide (PRINZIDE,ZESTORETIC) 20-12 5 MG per tablet, TAKE 1 TABLET BY MOUTH ONCE A DAY, Disp: 90 tablet, Rfl: 2    magnesium citrate (CITROMA) 1 745 g/30 mL oral solution, Take 296 mL by mouth once for 1 dose, Disp: 296 mL, Rfl: 0    metoprolol succinate (TOPROL-XL) 25 mg 24 hr tablet, Take 1 tablet (25 mg total) by mouth in the morning , Disp: 90 tablet, Rfl: 1    Multiple Vitamins-Minerals (CENTRUM SILVER PO), Take 1 tablet by mouth daily  , Disp: , Rfl:     omeprazole (PriLOSEC) 40 MG capsule, Take 1 capsule (40 mg total) by mouth 2 (two) times a day before meals, Disp: 60 capsule, Rfl: 3    pancrelipase, Lip-Prot-Amyl, (Creon) 12,000 units capsule, Take 2 capsules (24,000 Units total) by mouth 3 (three) times a day with meals, Disp: 540 capsule, Rfl: 1    polyethylene glycol (GLYCOLAX) 17 GM/SCOOP powder, Take as directed as per written office instructions, Disp: 238 g, Rfl: 0    polyethylene glycol (MIRALAX) 17 g packet, Take 17 g by mouth, Disp: , Rfl:     polyethylene glycol-electrolytes (TriLyte) 4000 mL solution, Take 4,000 mL by mouth once for 1 dose Take 4000 mL by mouth once for 1 dose   Use as directed, Disp: 4000 mL, Rfl: 0    pravastatin (PRAVACHOL) 40 mg tablet, Take 1 tablet (40 mg total) by mouth daily at bedtime, Disp: 90 tablet, Rfl: 2    ranitidine (ZANTAC) 150 mg tablet, TAKE 1 TABLET EVERY DAY IN THE EVENING, Disp: 90 tablet, Rfl: 2    Restasis 0 05 % ophthalmic emulsion, , Disp: , Rfl:     sodium chloride 1 g tablet, TAKE 3 TABLETS EVERY DAY, Disp: 90 tablet, Rfl: 4    tamsulosin (FLOMAX) 0 4 mg, TAKE 1 CAPSULE BY MOUTH EVERY DAY, Disp: 90 capsule, Rfl: 1    Wheat Dextrin (BENEFIBER PO), Take by mouth daily as needed  , Disp: , Rfl:     REVIEW OF SYSTEMS:  Review of Systems   Not available as patient is obtunded    PHYSICAL EXAM:  Current Weight:    First Weight:    Vitals:    08/19/22 0318 08/19/22 0400 08/19/22 0500 08/19/22 0515   BP: (!) 203/92 (!) 196/93 (!) 174/81 (!) 172/81   BP Location:  Right arm Right arm Right arm   Pulse:  70 74 70   Resp:  18 18 18   Temp:       TempSrc:       SpO2:  95% 96% 95%       Intake/Output Summary (Last 24 hours) at 8/19/2022 0827  Last data filed at 8/19/2022 9130  Gross per 24 hour   Intake 560 ml   Output 1300 ml   Net -740 ml     Physical Exam  Constitutional:       Appearance: Normal appearance  Comments: Obtunded   HENT:      Head: Normocephalic and atraumatic  Mouth/Throat:      Mouth: Mucous membranes are dry  Pharynx: Oropharynx is clear  Cardiovascular:      Rate and Rhythm: Normal rate and regular rhythm  Pulses: Normal pulses  Heart sounds: Normal heart sounds  Pulmonary:      Effort: Pulmonary effort is normal       Breath sounds: Normal breath sounds  Abdominal:      General: Abdomen is flat  Bowel sounds are normal       Palpations: Abdomen is soft  Musculoskeletal:         General: Normal range of motion  Right lower leg: No edema  Left lower leg: No edema  Skin:     General: Skin is warm and dry  Neurological:      Mental Status: He is disoriented  Invasive Devices:   Urethral Catheter (Active)   Reasons to continue Urinary Catheter  Acute urinary retention/obstruction failing urinary retention protocol 08/19/22 0226   Output (mL) 800 mL 08/19/22 9908     Lab Results:   Results from last 7 days   Lab Units 08/19/22  0421 08/19/22  0151 08/18/22  1845   WBC Thousand/uL 9 47  --  8 32   HEMOGLOBIN g/dL 9 6*  --  10 3*   HEMATOCRIT % 28 5*  --  29 3*   PLATELETS Thousands/uL 236 265 245   POTASSIUM mmol/L 3 0* 3 5 2 9*   CHLORIDE mmol/L 85* 85* 82*   CO2 mmol/L 29 29 28   BUN mg/dL 7 6 8   CREATININE mg/dL 1 00 0 99 1 05   CALCIUM mg/dL 8 2* 8 3 8 0*   MAGNESIUM mg/dL  --   --  1 5*   PHOSPHORUS mg/dL  --   --  2 2*   ALK PHOS U/L  --   --  96   ALT U/L  --   --  23   AST U/L  --   --  42     Other Studies:  CT head with no acute abnormality    Portions of the record may have been created with voice recognition software  Occasional wrong word or "sound a like" substitutions may have occurred due to the inherent limitations of voice recognition software  Read the chart carefully and recognize, using context, where substitutions have occurred   If you have any questions, please contact the dictating provider

## 2022-08-19 NOTE — QUICK NOTE
Na improved to 126 after 2 boluses of hypertonic saline  He is more awake now but still intermittently confused  Hold further hypertonic saline  Continue fluid restriction  Gaol Na by 6 pm to remain at 126  Goal Na by tomorrow am around 130  BP also improved, wean Nicardipine to off  Resume home antihypertensives except hydrochlorothiazide  Check BMP every 4 hours

## 2022-08-19 NOTE — H&P
INTERNAL MEDICINE RESIDENCY ADMISSION H&P     Name: Patti Chiang   Age & Sex: 67 y o  male   MRN: [id number]  Unit/Bed#: ED 10   Encounter: [id number]  Primary Care Provider: Rupali Fernandez MD    Code Status: No Order  Admission Status: INPATIENT   Disposition: Patient requires Med/Surg    Admit to team: SOD Team B     ASSESSMENT/PLAN     Principal Problem:    Hyponatremia  Active Problems:    Irritable bowel syndrome with both constipation and diarrhea    Other chronic pancreatitis ([facility] [address] )    Smoking    History of endovascular stent graft for abdominal aortic aneurysm (AAA)    Chronic kidney disease, stage 3 (HCC)    Depression, recurrent (HCC)    Iron deficiency anemia    Hypokalemia    Urinary retention    Altered mental status    Hypertension    Bladder outlet obstruction      * Hyponatremia  Assessment & Plan  -Patient presenting with nausea, abdominal pain, AMS S/P EDG and colonoscopy 1 day prior to presentation   -8/18 CT AP: 1  Mild diffuse thickening of the descending and rectosigmoid colon may be due to nonspecific colitis  2   Distended bladder and bilateral collecting system fullness, correlate for bladder outlet obstruction   -Symptoms including feeling extremely dry with difficulty swallowing, dizziness, nausea, confusion, shortness of breath, vomiting, decreased appetite, abdominal pain worst in the suprapubic region, unable to urinate despite urge  -, K 2 9 on admission  -HGB 10 3, WBC 8 32  -Phos 2 2, Mg 1 5  -UA showing trace blood   -Patient received 1L fluids in ED, Zofran, K repletion   -On lisinopril-HCTZ at home, held for hypokalemia   -Serum Osom 252, Urine Osom 351, urine sodium pending  Plan  -NPO for now  -BMP Q4   -Urine sodium pending   -Urine specific gravity ordered   -Consult to nephrology for hyponatremia   -Consider IVF based on labs      Hypertension  Assessment & Plan  -Presenting with very high blood pressures, last 204/97   -On lisinopril-HCTZ at home, held for hypokalemia  Plan  -Lisinopril 20 mg daily, Metoprolol 25 mg daily  -Monitor closely   -Labetalol 10 mg PRN for SBP > 180  Altered mental status  Assessment & Plan  -See A&P note for hyponatremia  Urinary retention  Assessment & Plan  -Per patient he has had urge to urinate but has not be able to since procedure    -Patient straight cathed for 500 mL in ED with persistent urge to urinate  -UA with 2-4 RBC, trace occult blood  Plan  -Bladder scan  -Repeat straight cath  -Urology consult  Hypokalemia  Assessment & Plan  -K 2 9 on admission   -On lisinopril-HCTZ at home, held for hypokalemia   -Patient has received KDur 40 and multiple doses IV potassium  Plan  -Q4 BMP  -Trend labs closely  -Replete potassium as needed  Iron deficiency anemia  Assessment & Plan  -Patient had history of RUBENS for which he underwent EGD/colonoscopy prior to current symptoms  -HGB 10 3 on admission    Depression, recurrent ([facility] )  Assessment & Plan  -Home citalopram held due to metabolic abnormalities and AMS  Chronic kidney disease, stage 3 [facility])  Assessment & Plan  Lab Results   Component Value Date    EGFR 70 08/18/2022    EGFR 57 06/17/2022    EGFR 49 10/13/2021    CREATININE 1 05 08/18/2022    CREATININE 1 24 06/17/2022    CREATININE 1 43 (H) 10/13/2021     -Cr 1 05 on admission  Plan  -Trend creatinine   -Nephrology consulted due to hyponatremia   -See A&P for hyponatremia  History of endovascular stent graft for abdominal aortic aneurysm (AAA)  Assessment & Plan  -CT AP 8/18 showing no evidence of endoleak  Smoking  Assessment & Plan  -Patient endorses 60+ year smoking history currently 1/2 packs daily   -Nicotine patch  Other chronic pancreatitis ([facility] [address] )  Assessment & Plan  -Lipase 99 on admission   -Continue home pancrealipase  Irritable bowel syndrome with both constipation and diarrhea  Assessment & Plan  -Chronic per patient        VTE Pharmacologic Prophylaxis: Heparin  VTE Mechanical Prophylaxis: sequential compression device    CHIEF COMPLAINT     Chief Complaint   Patient presents with    Abdominal Pain    Altered Mental Status     PT had colonoscopy and endoscopy yesterday  PT has had altered mental status since  PT states he is dizzy and has dry mounth  HISTORY OF PRESENT ILLNESS     Patient is a 68 y/o male presenting with abdominal pain and nausea S/P EGD and colonoscopy yesterday  PMH includes AAA, MI S/P stent, IBS, chronic pancreatitis, CKD stage 3  Per patient and family, current symptoms started ever since his procedure yesterday  His procedure went well- two gastric ulcers were removed during procedure  Symptoms including feeling extremely dry with difficulty swallowing, dizziness, nausea, confusion, shortness of breath, vomiting, decreased appetite, abdominal pain worst in the suprapubic region, unable to urinate despite urge  He was catheterized earlier and put out "a lot of urine" but still has urge to urinate  In room with patient, patient is trying to urinate with several urges to vomit, looking uncomfortable and reporting 10/10 pain  He reports a headache that he describes as "my head is spinning " He reports weight loss over the last few weeks of 8 pounds  Patient has 60+ year smoking history currently smokes 1/2 pack daily  No alcohol or recreational drug use  Code status discussed with patient and family, he is level 3 DNR/DNI  REVIEW OF SYSTEMS     Review of Systems   Constitutional: Positive for fatigue and unexpected weight change  Negative for chills and fever  HENT: Positive for sore throat  Negative for hearing loss  Very dry throat   Eyes: Negative for visual disturbance  Gastrointestinal: Positive for abdominal pain, blood in stool, constipation, diarrhea, nausea and vomiting  Generalized abdominal pain worst in suprapubic region  Blood in stool was prior to procedure  Constipation/diarrhea chronic due to IBS     Endocrine: Negative for polyuria  Genitourinary: Positive for difficulty urinating  Negative for hematuria  Reports discomfort, urge to urinate but unable to urinate  Musculoskeletal: Positive for back pain  Chronic back pain, generalized pain "everywhere"   Skin: Negative for rash and wound  Neurological: Positive for dizziness, light-headedness and headaches  Negative for tremors, seizures and weakness  Psychiatric/Behavioral: The patient is not nervous/anxious  OBJECTIVE     Vitals:    22 1849 22 1904 22 2030 22 2200   BP:   (!) 201/91 (!) 204/97   BP Location:   Right arm Right arm   Pulse: 75 78 84 76   Resp:    Temp:       TempSrc:       SpO2: 95% 95% 95% 93%      Temperature:   Temp (24hrs), Av 8 °F (37 1 °C), Min:98 8 °F (37 1 °C), Max:98 8 °F (37 1 °C)    Temperature: 98 8 °F (37 1 °C)  Intake & Output:  I/O        0701   0700  0701   0700    IV Piggyback  560    Total Intake  560    Urine  500    Total Output  500    Net  +60              Weights: There is no height or weight on file to calculate BMI  Weight (last 2 days)     None        Physical Exam  Vitals reviewed  Constitutional:       General: He is in acute distress  Appearance: He is ill-appearing  HENT:      Head: Normocephalic  Right Ear: External ear normal       Left Ear: External ear normal       Nose: Nose normal       Mouth/Throat:      Mouth: Mucous membranes are dry  Eyes:      Extraocular Movements: Extraocular movements intact  Pupils: Pupils are equal, round, and reactive to light  Cardiovascular:      Rate and Rhythm: Normal rate and regular rhythm  Pulses: Normal pulses  Heart sounds: Normal heart sounds  Pulmonary:      Breath sounds: Normal breath sounds  Comments: Increased respiratory effort   Abdominal:      General: Abdomen is flat  There is no distension  Palpations: Abdomen is soft        Tenderness: There is no abdominal tenderness  Comments: No point tenderness but reports generalized abdominal pain  Musculoskeletal:      Cervical back: Normal range of motion  Right lower leg: No edema  Left lower leg: No edema  Skin:     Capillary Refill: Capillary refill takes less than 2 seconds  Coloration: Skin is pale  Neurological:      General: No focal deficit present  Mental Status: He is alert  Comments: Oriented x2       PAST MEDICAL HISTORY     Past Medical History:   Diagnosis Date    Anxiety     Rhodes's esophagus     Chronic neck pain     Depression     GERD (gastroesophageal reflux disease)     H/O abdominal aortic aneurysm 4 5 cm    Hyperlipidemia     Hypertension     Pancreatic pseudocyst     Vertigo      PAST SURGICAL HISTORY     Past Surgical History:   Procedure Laterality Date    COLONOSCOPY  09/30/2014    IR LOWER EXTREMITY / INTERVENTION  01/04/2019    [place] ESOPHAGOGASTRODUODENOSCOPY TRANSORAL DIAGNOSTIC N/A 06/22/2018    Procedure: EGD AND COLONOSCOPY;  Surgeon: Gila Leyva MD;  Location: [facility]; Service: Gastroenterology    [place] [address] RPR DPLMNT AORTO-AORTIC NDGFT N/A 02/09/2018    Procedure: REPAIR ANEURYSM ENDOVASCULAR ABDOMINAL AORTIC  (EVAR);   Surgeon: Shaq Narayan MD;  Location: [facility];  Service: Vascular    UPPER GASTROINTESTINAL ENDOSCOPY       SOCIAL & FAMILY HISTORY     Social History     Substance and Sexual Activity   Alcohol Use No     Social History     Substance and Sexual Activity   Drug Use No     Social History     Tobacco Use   Smoking Status Current Every Day Smoker    Packs/day: 0 25    Types: Cigarettes   Smokeless Tobacco Never Used   Tobacco Comment    Truven quiting smoking handouts     Family History   Problem Relation Age of Onset    Heart disease Father     Heart attack Father     Diabetes Maternal Grandmother     Diabetes Maternal Grandfather     Diabetes Paternal Grandmother     Hypertension Family     Hyperlipidemia Family      LABORATORY DATA     Labs: I have personally reviewed pertinent reports  Results from last 7 days   Lab Units 08/18/22  1845   WBC Thousand/uL 8 32   HEMOGLOBIN g/dL 10 3*   HEMATOCRIT % 29 3*   PLATELETS Thousands/uL 245   NEUTROS PCT % 86*   MONOS PCT % 6      Results from last 7 days   Lab Units 08/18/22  1845   POTASSIUM mmol/L 2 9*   CHLORIDE mmol/L 82*   CO2 mmol/L 28   BUN mg/dL 8   CREATININE mg/dL 1 05   CALCIUM mg/dL 8 0*   ALK PHOS U/L 96   ALT U/L 23   AST U/L 42     Results from last 7 days   Lab Units 08/18/22  1845   MAGNESIUM mg/dL 1 5*     Results from last 7 days   Lab Units 08/18/22  1845   PHOSPHORUS mg/dL 2 2*                  Micro:  Lab Results   Component Value Date    URINECX No Growth <1000 cfu/mL 12/19/2016     IMAGING & DIAGNOSTIC TESTS     Imaging: I have personally reviewed pertinent reports  CT abdomen pelvis w contrast    Result Date: 8/18/2022  Impression: 1  Mild diffuse thickening of the descending and rectosigmoid colon may be due to nonspecific colitis  2   Distended bladder and bilateral collecting system fullness, correlate for bladder outlet obstruction  Workstation performed: [id number]     EKG, Pathology, and Other Studies: I have personally reviewed pertinent reports  ALLERGIES   No Known Allergies  MEDICATIONS PRIOR TO ARRIVAL     Prior to Admission medications    Medication Sig Start Date End Date Taking?  Authorizing Provider   ALPRAZolam Martin Niece) 1 mg tablet Take 1 tablet (1 mg total) by mouth 2 (two) times a day as needed (transient anxiety) 8/16/22 9/15/22  Shilpa Patten MD   amitriptyline (ELAVIL) 25 mg tablet Take 1 tablet (25 mg total) by mouth daily at bedtime 6/23/22   Shilpa Patten MD   aspirin (ECOTRIN LOW STRENGTH) 81 mg EC tablet Take 81 mg by mouth daily    Historical Provider, MD   citalopram (CeleXA) 20 mg tablet TAKE 1 TABLET BY MOUTH EVERY DAY 4/15/22   Shilpa Patten MD   dicyclomine (BENTYL) 10 mg capsule TAKE 1 CAPSULE BY MOUTH 3 TIMES A DAY AS NEEDED (ABDOMINAL CRAMPING/ SPASMS) 4/15/22   Geoff Johnston PA-C   Linaclotide [facility]) 72 MCG CAPS Take 1 capsule by mouth daily 1/22/19   Tan Correia PA-C   lisinopril-hydrochlorothiazide (PRINZIDE,ZESTORETIC) 20-12 5 MG per tablet TAKE 1 TABLET BY MOUTH ONCE A DAY 12/6/21   Cedric Shannon MD   magnesium citrate (CITROMA) 1 745 g/30 mL oral solution Take 296 mL by mouth once for 1 dose 7/25/22 7/25/22  Zohreh Patel PA-C   metoprolol succinate (TOPROL-XL) 25 mg 24 hr tablet Take 1 tablet (25 mg total) by mouth in the morning  5/16/22   Cedric Shannon MD   Multiple Vitamins-Minerals (CENTRUM SILVER PO) Take 1 tablet by mouth daily      Historical Provider, MD   omeprazole (PriLOSEC) 40 MG capsule Take 1 capsule (40 mg total) by mouth 2 (two) times a day before meals 8/17/22   Franko Khanna MD   pancrelipase, Lip-Prot-Amyl, (Creon) 12,000 units capsule Take 2 capsules (24,000 Units total) by mouth 3 (three) times a day with meals 4/14/22   Shruthi Hilario PA-C   polyethylene glycol Judithann Ache) 17 GM/SCOOP powder Take as directed as per written office instructions 7/25/22   Parth Rodriguez PA-C   polyethylene glycol (MIRALAX) 17 g packet Take 17 g by mouth    Historical Provider, MD   polyethylene glycol-electrolytes (TriLyte) 4000 mL solution Take 4,000 mL by mouth once for 1 dose Take 4000 mL by mouth once for 1 dose   Use as directed 8/17/22 8/17/22  Franko Khanna MD   pravastatin (PRAVACHOL) 40 mg tablet Take 1 tablet (40 mg total) by mouth daily at bedtime 5/25/22   More Louise MD   ranitidine (ZANTAC) 150 mg tablet TAKE 1 TABLET EVERY DAY IN THE EVENING 7/8/19   Joanna Comer PA-C   Restasis 0 05 % ophthalmic emulsion  12/30/21   Historical Provider, MD   sodium chloride 1 g tablet TAKE 3 TABLETS EVERY DAY 2/26/22   Cedric Shannon MD   tamsulosin (FLOMAX) 0 4 mg TAKE 1 CAPSULE BY MOUTH EVERY DAY 4/15/22   Cedric Shannon MD   Wheat Dextrin (BENEFIBER PO) Take by mouth daily as needed      Historical Provider, MD     MEDICATIONS ADMINISTERED IN LAST 24 HOURS     Medication Administration - last 24 hours from 08/17/2022 2338 to 08/18/2022 2338       Date/Time Order Dose Route Action Action by     08/18/2022 1844 ondansetron (ZOFRAN) oral solution 4 mg 4 mg Oral Given Thao Tejeda LPN     15/61/4967 9010 lactated ringers bolus 500 mL 0 mL Intravenous Stopped Frank Walton RN     08/18/2022 1843 lactated ringers bolus 500 mL 500 mL Intravenous [address] Thao Tejeda LPN     25/32/5784 3704 potassium chloride 20 mEq IVPB (premix) 0 mEq Intravenous Stopped Abdirashid Alfaro RN     08/18/2022 2031 potassium chloride 20 mEq IVPB (premix) 20 mEq Intravenous New Bag Frank Walton RN     08/18/2022 2219 lactated ringers bolus 500 mL 0 mL Intravenous Stopped Abdirashid Alfaro RN     08/18/2022 2031 lactated ringers bolus 500 mL 500 mL Intravenous New Bag Abdirashid Alfaro RN     08/18/2022 2031 potassium chloride (K-DUR,KLOR-CON) CR tablet 40 mEq 40 mEq Oral Given Abdirashid Alfaro RN     08/18/2022 2003 iohexol (OMNIPAQUE) 350 MG/ML injection (SINGLE-DOSE) 100 mL 80 mL Intravenous Given Juliana Player     08/18/2022 2143 potassium phosphates 21 mmol in sodium chloride 0 9 % 250 mL infusion 21 mmol Intravenous New [address] [place] [address] ADRIANO Walton     08/18/2022 2030 HYDROmorphone (DILAUDID) injection 0 5 mg 0 5 mg Intravenous Given Abdirashid Alfaro RN     08/18/2022 2246 ondansetron (ZOFRAN) injection 4 mg 4 mg Intravenous Given Анна Garcia RN        CURRENT MEDICATIONS     Current Facility-Administered Medications   Medication Dose Route Frequency Provider Last Rate    potassium chloride  20 mEq Intravenous Q2H Karol Murrieta MD Stopped (08/18/22 2143)    potassium phosphate  21 mmol Intravenous Once Karol Murrieta MD 21 mmol (08/18/22 2143)             Admission Time  I spent 45 minutes admitting the patient    This involved direct patient contact where I performed a full history and physical, reviewing previous records, and reviewing laboratory and other diagnostic studies  Portions of the record may have been created with voice recognition software  Occasional wrong word or "sound a like" substitutions may have occurred due to the inherent limitations of voice recognition software    Read the chart carefully and recognize, using context, where substitutions have occurred     ==  Hermelindo Mejia MD  [address]  Internal Medicine Residency PGY-1

## 2022-08-19 NOTE — CONSULTS
CONSULT    Patient Name: Dana Pinto  Patient MRN: [id number]  Date of Service: 8/19/2022   Date / Time Note Created: 8/19/2022 1:32 PM   Referring Provider: Allen Gottron, DO  Provider Creating Note: DEACON Contreras    PCP: Leelee Sim  Attending Provider:  Allen Gottron, DO    Reason for Consult: Urinary Retention    HPI --Dana Pinto is a 65-year-old male admitted for hyponatremia and complaints of urinary retention presentation any prior formal urologic consultation, evaluation or  surgical manipulation  He reports longstanding lower urinary tract symptoms including nocturia up to 4-5 times per night, poor weak urinary stream urinary urgency and frequency without flank, abdominal, suprapubic pain, dysuria, gross hematuria, urinary hesitancy, split stream or stream interruption  Nicole catheter was inserted in the emergency room for volume of 800 mL  Of note, patient was prescribed Flomax by PCP  Patient has no awareness of medication indication  He is seen in ICU, alert oriented frail and chronically ill-appearing, tachypneic but in no distress  Renal function within normal limits  Sodium currently 122  No leukocytosis  Microscopic urinalysis negative for bacteria  WBCs only 1-2 per high-power field  CT of the abdomen and pelvis demonstrated normal bilateral collecting system fullness without overt hydronephrosis no obstructing nephroureteral lithiasis, perinephric, dom ureteral or dom vesicle stranding, discrete fluid collection, obstructing nephro lithiasis  Bladder was distended and there was evidence of positive prostatomegaly without bladder calculi or hematoma  No suspicious lymphadenopathy evidence of osseous metastasis  Urologic consultation requested further management recommendations      Source:chart review and the patient           Patient Active Problem List   Diagnosis    AAA (abdominal aortic aneurysm) without rupture ([address] )    Epigastric pain    Diarrhea    Irritable bowel syndrome with both constipation and diarrhea    Other chronic pancreatitis (HCC)    Smoking    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)    History of endovascular stent graft for abdominal aortic aneurysm (AAA)    Stenosis of other vascular prosthetic devices, implants and grafts, subsequent encounter    Neurogenic claudication (HCC)    Chronic kidney disease, stage 3 (HCC)    Protein-calorie malnutrition, unspecified severity ([address] )    Depression, recurrent ([address] )    Iron deficiency anemia    Hyponatremia    Hypokalemia    Urinary retention    Altered mental status    Hypertension    Bladder outlet obstruction             Impressions  · BPH with lower urinary tract symptoms--per PCP  No prior formal urologic evaluation or surgical manipulation  On alpha blockade per EMR review  · Urinary retention--moderate--likely secondary to acute weakness as a result of hyponatremia with underlying obstructive BPH  Patient is not infected or obstructed  Recommendations    1  Continue medical optimization and treatment for primary concern  2  Continue Flomax  3  Maintain Nicole catheter to straight drainage  Do not remove  4  Void trial when patient is clinically improved contingent on length of stay  5  If discharge disposition is for short-term rehab, would discharge with Nicole catheter in-situ and proceed with trial of void within 7 days  6  Follow-up outpatient for overdue BPH workup  7  No further  intervention indicated this hospital stay            Past Medical History:   Diagnosis Date    Anxiety     Rhodes's esophagus     Chronic neck pain     Depression     GERD (gastroesophageal reflux disease)     H/O abdominal aortic aneurysm 4 5 cm    Hyperlipidemia     Hypertension     Pancreatic pseudocyst     Vertigo        Past Surgical History:   Procedure Laterality Date    COLONOSCOPY  09/30/2014    IR LOWER EXTREMITY / INTERVENTION 01/04/2019    CT ESOPHAGOGASTRODUODENOSCOPY TRANSORAL DIAGNOSTIC N/A 06/22/2018    Procedure: EGD AND COLONOSCOPY;  Surgeon: Marianne Eckert MD;  Location: [facility]; Service: Gastroenterology    CT [address] R[address] DPLMNT AORTO-AORTIC NDGFT N/A 02/09/2018    Procedure: REPAIR ANEURYSM ENDOVASCULAR ABDOMINAL AORTIC  (EVAR); Surgeon: Shruthi Perez MD;  Location: [facility];  Service: Vascular    UPPER GASTROINTESTINAL ENDOSCOPY         Family History   Problem Relation Age of Onset    Heart disease Father     Heart attack Father     Diabetes Maternal Grandmother     Diabetes Maternal Grandfather     Diabetes Paternal Grandmother     Hypertension Family     Hyperlipidemia Family        Social History     Socioeconomic History    Marital status:      Spouse name: Not on file    Number of children: Not on file    Years of education: Not on file    Highest education level: Not on file   Occupational History    Not on file   Tobacco Use    Smoking status: Current Every Day Smoker     Packs/day: 0 25     Types: Cigarettes    Smokeless tobacco: Never Used    Tobacco comment: [employer] quiting smoking handouts   Vaping Use    Vaping Use: Never used   Substance and Sexual Activity    Alcohol use: No    Drug use: No    Sexual activity: Not Currently   Other Topics Concern    Not on file   Social History Narrative    Not on file     Social Determinants of Health     Financial Resource Strain: Not on file   Food Insecurity: No Food Insecurity    Worried About Running Out of Food in the Last Year: Never true    [place] of Food in the Last Year: Never true   Transportation Needs: No Transportation Needs    Lack of Transportation (Medical): No    Lack of Transportation (Non-Medical):  No   Physical Activity: Not on file   Stress: Not on file   Social Connections: Not on file   Intimate Partner Violence: Not on file   Housing Stability: Low Risk     Unable to Pay for Housing in the Last Year: No    Number of Places Lived in the Last Year: 1    Unstable Housing in the Last Year: No       No Known Allergies    Review of Systems  Review of Systems - History obtained from chart review and the patient  General ROS: negative for - chills or fever  Respiratory ROS: no cough, shortness of breath, or wheezing  Cardiovascular ROS: no chest pain or dyspnea on exertion  Gastrointestinal ROS: no abdominal pain, change in bowel habits, or black or bloody stools  Genito-Urinary ROS: positive for - change in urinary stream, nocturia and urinary frequency/urgency  negative for - dysuria, hematuria, pelvic pain or scrotal mass/pain  Neurological ROS: no TIA or stroke symptoms    Chart Review   Allergies, current medications, history, problem list    Vital Signs & Physical Exam  General appearance: alert and oriented, in no acute distress, appears stated age, cooperative, no distress, pale and syndromic appearance - Chronically ill-appearing  Head: Normocephalic, without obvious abnormality, atraumatic  Neck: no adenopathy, no carotid bruit, no JVD and supple, symmetrical, trachea midline  Lungs: diminished breath sounds  Heart: regular rate and rhythm, S1, S2 normal, no murmur, click, rub or gallop  Abdomen: soft, non-tender; bowel sounds normal; no masses,  no organomegaly  Extremities: extremities normal, warm and well-perfused; no cyanosis, clubbing, or edema  Pulses: 2+ and symmetric  Neurologic: Grossly normal  Nicole--clear beatriz urine     Laboratory Studies  Lab Results   Component Value Date    HGBA1C 5 8 01/25/2018     (L) 10/14/2014    K 3 3 (L) 08/19/2022    K 3 8 10/14/2014    CL 89 (L) 08/19/2022    CL 99 (L) 10/14/2014    CO2 27 08/19/2022    CO2 29 10/14/2014    GLUCOSE 97 10/14/2014    CREATININE 1 06 08/19/2022    CREATININE 0 74 10/14/2014    BUN 8 08/19/2022    BUN 7 10/14/2014    MG 1 5 (L) 08/18/2022    PHOS 2 2 (L) 08/18/2022               Imaging and Other Studies  )EGD    Result Date: 8/17/2022  Narrative: [facility] [address]: 8/17/22 PHYSICIAN(S): Attending: Raghu Aparicio MD Fellow: Franko Quinones MD INDICATION: Other iron deficiency anemia POST-OP DIAGNOSIS: See the impression below  PREPROCEDURE: Informed consent was obtained for the procedure, including sedation  Risks of perforation, hemorrhage, adverse drug reaction and aspiration were discussed  The patient was placed in the left lateral decubitus position  Patient was explained about the risks and benefits of the procedure  Risks including but not limited to bleeding, infection, and perforation were explained in detail  Also explained about less than 100% sensitivity with the exam and other alternatives  DETAILS OF PROCEDURE: Patient was taken to the procedure room where a time out was performed to confirm correct patient and correct procedure  The patient underwent monitored anesthesia care, which was administered by an anesthesia professional  The patient's blood pressure, heart rate, level of consciousness, respirations and oxygen were monitored throughout the procedure  The scope was advanced to the second part of the duodenum  Retroflexion was performed in the fundus  The patient experienced no blood loss  The procedure was not difficult  The patient tolerated the procedure well  There were no apparent complications  ANESTHESIA INFORMATION: ASA: III Anesthesia Type: IV Sedation with Anesthesia MEDICATIONS: No administrations occurring from 1118 to 1141 on 08/17/22 FINDINGS: 2 large, superficial ulcers in the stomach with clean base (Kd III); performed 4 cold forceps biopsies The esophagus and duodenum appeared normal  Z-line is 42 cm from the incisors   Performed multiple forceps biopsies in the stomach and duodenum SPECIMENS: ID Type Source Tests Collected by Time Destination 1 : r/o celiac Tissue Duodenum TISSUE EXAM Franko Quinones MD 8/17/2022 11:30 AM  2 : r/o h  pylori Tissue Stomach TISSUE EXAM Morena Casper MD 8/17/2022 11:31 AM  3 : gastric ulcer Tissue Stomach TISSUE EXAM Morena Casper MD 8/17/2022 11:35 AM      Impression: Two large clean based gastric ulcers Normal esophagus and duodenum RECOMMENDATION: Await pathology results Increase omeprazole to 40 mg by mouth twice a day Repeat upper endoscopy in three months to document healing of the ulcers Colonoscopy to follow  ATTENDING ATTESTATION:  I was present throughout the entire procedure from insertion to complete withdrawal of the scope  I performed all interventions myself or oversaw the fellow  Marilyn Barber MD     Colonoscopy    Result Date: 8/17/2022  Narrative: [address] [place] [address] Endoscopy [id number]: 8/17/22 PHYSICIAN(S): Attending: Marilyn Barber MD Fellow: Morena Casper MD INDICATION: Other iron deficiency anemia POST-OP DIAGNOSIS: See the impression below  HISTORY: Prior colonoscopy: 4 years ago  BOWEL PREPARATION: Miralax/Dulcolax PREPROCEDURE: Informed consent was obtained for the procedure, including sedation  Risks including but not limited to bleeding, infection, perforation, adverse drug reaction and aspiration were explained in detail  Also explained about less than 100% sensitivity with the exam and other alternatives  The patient was placed in the left lateral decubitus position  DETAILS OF PROCEDURE: Patient was taken to the procedure room where a time out was performed to confirm correct patient and correct procedure  The patient underwent monitored anesthesia care, which was administered by an anesthesia professional  The patient's blood pressure, heart rate, level of consciousness, oxygen and respirations were monitored throughout the procedure  A digital rectal exam was performed  A perianal exam was performed  The scope was introduced through the anus and advanced to the cecum   Retroflexion was performed in the rectum  The quality of bowel preparation was evaluated using the [facility] Bowel Preparation Scale with scores of: right colon = 1, transverse colon = 1, left colon = 1  The total BBPS score was 3  Bowel prep was not adequate  The patient experienced no blood loss  The procedure was moderately difficult due to poor preparation  In response to procedure difficulty, the instrument was changed to a pediatric endoscope  The patient tolerated the procedure well  There were no apparent complications  ANESTHESIA INFORMATION: ASA: III Anesthesia Type: IV Sedation with Anesthesia MEDICATIONS: No administrations occurring from 1118 to 1220 on 08/17/22 FINDINGS: Benign-appearing and inflamed stricture (traversable) in the ascending colon; performed 3 cold forceps biopsies   Ulcerated stricture Poor prep, no large polyps or masses but smaller lesions could have been missed Few small pancolonic diverticula EVENTS: Procedure Events Event Event Time ENDO SCOPE OUT TIME 8/17/2022 11:36 AM ENDO CECUM REACHED 8/17/2022 12:06 PM ENDO SCOPE OUT TIME 8/17/2022 12:19 PM SPECIMENS: ID Type Source Tests Collected by Time Destination 1 : r/o celiac Tissue Duodenum TISSUE EXAM Javi Radford MD 8/17/2022 11:30 AM  2 : r/o h  pylori Tissue Stomach TISSUE EXAM Javi Radford MD 8/17/2022 11:31 AM  3 : gastric ulcer Tissue Stomach TISSUE EXAM Javi Radford MD 8/17/2022 11:35 AM  4 : stricture at the IC valve/ascending colon bx Tissue Colon TISSUE EXAM Kayleen Goldstein MD 8/17/2022 12:12 PM  EQUIPMENT: Colonoscope -[id number] ENDOCUFF VISION SMALL PURPLE ID 10 [id number]     Impression: Ulcerated stricture in the ascending colon which was traversable and biopsied Poor prep, no large polyps or masses but smaller lesions could have been missed Few small pancolonic diverticula RECOMMENDATION: Repeat colonoscopy in 3 months due to an inadequate bowel preparation ATTENDING ATTESTATION:  I was present throughout the entire procedure from insertion to complete withdrawal of the scope  I performed all interventions myself or oversaw the fellow  Jose Luis Blake MD     CT abdomen pelvis wo contrast    Result Date: 8/18/2022  Narrative: CT ABDOMEN AND PELVIS WITHOUT IV CONTRAST INDICATION:   Z98 [id number]: Other specified postprocedural states Z86 79: Personal history of other diseases of the circulatory system  COMPARISON:  CTA abdomen and 12/12/2018  TECHNIQUE:  CT examination of the abdomen and pelvis was performed without intravenous contrast  Axial, sagittal, and coronal 2D reformatted images were created from the source data and submitted for interpretation  Radiation dose length product (DLP) for this visit:  380 49 mGy-cm   This examination, like all CT scans performed in the [facility], was performed utilizing techniques to minimize radiation dose exposure, including the use of iterative  reconstruction and automated exposure control  Enteric contrast was administered  FINDINGS: ABDOMEN LOWER CHEST:  Bibasilar emphysematous changes  LIVER/BILIARY TREE:  Unremarkable  GALLBLADDER:  No calcified gallstones  No pericholecystic inflammatory change  SPLEEN:  Unremarkable  PANCREAS:  Unremarkable  ADRENAL GLANDS:  Bilateral adrenal gland thickening in keeping with hyperplasia  KIDNEYS/URETERS:  Unremarkable  No hydronephrosis  STOMACH AND BOWEL:  Unremarkable  APPENDIX:  Noninflamed  ABDOMINOPELVIC CAVITY:  No ascites  No pneumoperitoneum  No lymphadenopathy  VESSELS:  Reidentified EVAR aortoiliac stent graft in unchanged position  Decreased size of the native aortic aneurysm sac now measuring 3 4 cm previously measured 4 8 cm  PELVIS REPRODUCTIVE ORGANS:  Prostamegaly  URINARY BLADDER:  Chronic circumferential wall thickening likely on the basis of outlet obstruction  ABDOMINAL WALL/INGUINAL REGIONS:  Unremarkable  OSSEOUS STRUCTURES:  No acute fracture or destructive osseous lesion  Impression: No acute findings   Stable positioning of the bar stent graft  Decreased abdominal aortic aneurysm sac size  Workstation performed: [id number]     CT head wo contrast    Result Date: 8/19/2022  Narrative: CT BRAIN - WITHOUT CONTRAST INDICATION:   Delirium Acute mental status changes  COMPARISON:  None  TECHNIQUE:  CT examination of the brain was performed  In addition to axial images, sagittal and coronal 2D reformatted images were created and submitted for interpretation  Radiation dose length product (DLP) for this visit:  841 68 mGy-cm   This examination, like all CT scans performed in the [facility], was performed utilizing techniques to minimize radiation dose exposure, including the use of iterative  reconstruction and automated exposure control  IMAGE QUALITY:  Diagnostic  FINDINGS: PARENCHYMA: Decreased attenuation is noted in periventricular and subcortical white matter demonstrating an appearance that is statistically most likely to represent mild microangiopathic change  No CT signs of acute infarction  No intracranial mass, mass effect or midline shift  No acute parenchymal hemorrhage  VENTRICLES AND EXTRA-AXIAL SPACES:  Normal for the patient's age  VISUALIZED ORBITS AND PARANASAL SINUSES:  Complete opacification of the right maxillary sinus  CALVARIUM AND EXTRACRANIAL SOFT TISSUES:  Normal      Impression: No acute intracranial abnormality  Chronic microangiopathic changes  Sinus disease  Workstation performed: [id number]     CT abdomen pelvis w contrast    Result Date: 8/18/2022  Narrative: CT ABDOMEN AND PELVIS WITH IV CONTRAST INDICATION:   Shortness of breath, abdominal pain post EGD/colonoscopy  COMPARISON:  CT of abdomen pelvis August 15, 2022  TECHNIQUE:  CT examination of the abdomen and pelvis was performed  Axial, sagittal, and coronal 2D reformatted images were created from the source data and submitted for interpretation  Radiation dose length product (DLP) for this visit:  348  77 mGy-cm     This examination, like all CT scans performed in the [facility], was performed utilizing techniques to minimize radiation dose exposure, including the use of iterative  reconstruction and automated exposure control  IV Contrast:  80 mL of iohexol (OMNIPAQUE) Enteric Contrast:  Enteric contrast was not administered  FINDINGS: ABDOMEN LOWER CHEST:  No clinically significant abnormality identified in the visualized lower chest  LIVER/BILIARY TREE:  Unremarkable  GALLBLADDER:  No calcified gallstones  No pericholecystic inflammatory change  SPLEEN:  Unremarkable  PANCREAS:  Atrophic pancreatic tail similar prior examination  No main pancreatic duct dilatation  ADRENAL GLANDS:  Unremarkable  KIDNEYS/URETERS:  Mild bilateral collecting system fullness  No urinary tract calculus  One or more sharply circumscribed subcentimeter renal hypodensities are present, too small to accurately characterize, and statistically most likely benign findings  According to recent literature (Radiology 2019) no further workup of these findings is recommended  STOMACH AND BOWEL:  No bowel obstruction  There is mild diffuse thickening of the descending and rectosigmoid colon which may be due to nonspecific colitis  Scattered colonic diverticula without evidence of diverticulitis  APPENDIX:  A normal appendix was visualized  ABDOMINOPELVIC CAVITY:  Trace pelvic free fluid  No pneumoperitoneum  No lymphadenopathy  VESSELS:  Redemonstrated aortobiiliac stent graft repair  The distal abdominal aorta measures up to 3 2 cm  No evidence of endoleak  PELVIS REPRODUCTIVE ORGANS:  Enlarged prostate  URINARY BLADDER:  Distended  ABDOMINAL WALL/INGUINAL REGIONS:  Unremarkable  OSSEOUS STRUCTURES:  No acute fracture or destructive osseous lesion  Impression: 1  Mild diffuse thickening of the descending and rectosigmoid colon may be due to nonspecific colitis   2   Distended bladder and bilateral collecting system fullness, correlate for bladder outlet obstruction   Workstation performed: [id number]         Medications   Scheduled Meds:  Current Facility-Administered Medications   Medication Dose Route Frequency Provider Last Rate    acetaminophen  650 mg Oral Q6H PRN Phuc Cheatham MD      aspirin  81 mg Oral Daily Phuc Cheatham MD      glycerin-hypromellose-  1 drop Both Eyes BID Phuc Cheatham MD      heparin (porcine)  5,000 Units Subcutaneous [facility] [address] Bryson Shelley, DO      labetalol  10 mg Intravenous Q6H PRN Phuc Cheatham MD      lisinopril  20 mg Oral Daily Capricrodo Ford, DO      metoprolol succinate  25 mg Oral Daily Capricrodo Ford DO      niCARdipine  1-15 mg/hr Intravenous Titrated Hilary Alejandra MD 1 mg/hr (08/19/22 1232)    nicotine  1 patch Transdermal Daily Phuc Cheatham MD      ondansetron  4 mg Intravenous Q6H PRN Cathy Orona MD      pancrelipase (Lip-Prot-Amyl)  24,000 Units Oral TID With Meals Phuc Cheatham MD      pantoprazole  40 mg Oral Early Morning Phuc Cheatham MD      potassium chloride  40 mEq Oral BID Hebercurtis Macias MD      pravastatin  40 mg Oral HS Phuc Cheatham MD      [facility] tamsulosin  0 4 mg Oral Daily With Maranda Gross MD       Continuous Infusions:niCARdipine, 1-15 mg/hr, Last Rate: 1 mg/hr (08/19/22 1232)      PRN Meds:   acetaminophen    labetalol    ondansetron          )DEACON Jessica

## 2022-08-19 NOTE — ASSESSMENT & PLAN NOTE
K 2 9 on admission, in setting of GI losses  Recent Labs     08/18/22  1845 08/19/22  0151 08/20/22  0538 08/20/22  0541 08/20/22  1357 08/20/22  2141 08/21/22  0652   K 2 9*   < >  --    < > 3 7 3 3* 3 6   MG 1 5*  --  1 5*  --   --   --   --     < > = values in this interval not displayed       · Trend and replete as needed  · HCTZ discontinued

## 2022-08-20 ENCOUNTER — APPOINTMENT (OUTPATIENT)
Dept: RADIOLOGY | Facility: HOSPITAL | Age: 72
DRG: 640 | End: 2022-08-20
Payer: MEDICARE

## 2022-08-20 LAB
ANION GAP SERPL CALCULATED.3IONS-SCNC: 6 MMOL/L (ref 4–13)
ANION GAP SERPL CALCULATED.3IONS-SCNC: 6 MMOL/L (ref 4–13)
ANION GAP SERPL CALCULATED.3IONS-SCNC: 8 MMOL/L (ref 4–13)
ANION GAP SERPL CALCULATED.3IONS-SCNC: 9 MMOL/L (ref 4–13)
BASOPHILS # BLD AUTO: 0.01 THOUSANDS/ΜL (ref 0–0.1)
BASOPHILS NFR BLD AUTO: 0 % (ref 0–1)
BUN SERPL-MCNC: 10 MG/DL (ref 5–25)
BUN SERPL-MCNC: 17 MG/DL (ref 5–25)
BUN SERPL-MCNC: 18 MG/DL (ref 5–25)
BUN SERPL-MCNC: 8 MG/DL (ref 5–25)
CALCIUM SERPL-MCNC: 7.1 MG/DL (ref 8.3–10.1)
CALCIUM SERPL-MCNC: 8.1 MG/DL (ref 8.3–10.1)
CALCIUM SERPL-MCNC: 8.2 MG/DL (ref 8.3–10.1)
CALCIUM SERPL-MCNC: 8.4 MG/DL (ref 8.3–10.1)
CHLORIDE SERPL-SCNC: 100 MMOL/L (ref 96–108)
CHLORIDE SERPL-SCNC: 93 MMOL/L (ref 96–108)
CHLORIDE SERPL-SCNC: 95 MMOL/L (ref 96–108)
CHLORIDE SERPL-SCNC: 96 MMOL/L (ref 96–108)
CO2 SERPL-SCNC: 25 MMOL/L (ref 21–32)
CO2 SERPL-SCNC: 25 MMOL/L (ref 21–32)
CO2 SERPL-SCNC: 26 MMOL/L (ref 21–32)
CO2 SERPL-SCNC: 29 MMOL/L (ref 21–32)
CREAT SERPL-MCNC: 0.82 MG/DL (ref 0.6–1.3)
CREAT SERPL-MCNC: 0.97 MG/DL (ref 0.6–1.3)
CREAT SERPL-MCNC: 1.22 MG/DL (ref 0.6–1.3)
CREAT SERPL-MCNC: 1.26 MG/DL (ref 0.6–1.3)
EOSINOPHIL # BLD AUTO: 0 THOUSAND/ΜL (ref 0–0.61)
EOSINOPHIL NFR BLD AUTO: 0 % (ref 0–6)
ERYTHROCYTE [DISTWIDTH] IN BLOOD BY AUTOMATED COUNT: 24.3 % (ref 11.6–15.1)
GFR SERPL CREATININE-BSD FRML MDRD: 56 ML/MIN/1.73SQ M
GFR SERPL CREATININE-BSD FRML MDRD: 58 ML/MIN/1.73SQ M
GFR SERPL CREATININE-BSD FRML MDRD: 77 ML/MIN/1.73SQ M
GFR SERPL CREATININE-BSD FRML MDRD: 87 ML/MIN/1.73SQ M
GLUCOSE SERPL-MCNC: 103 MG/DL (ref 65–140)
GLUCOSE SERPL-MCNC: 106 MG/DL (ref 65–140)
GLUCOSE SERPL-MCNC: 113 MG/DL (ref 65–140)
GLUCOSE SERPL-MCNC: 99 MG/DL (ref 65–140)
HCT VFR BLD AUTO: 33.3 % (ref 36.5–49.3)
HGB BLD-MCNC: 10.8 G/DL (ref 12–17)
IMM GRANULOCYTES # BLD AUTO: 0.05 THOUSAND/UL (ref 0–0.2)
IMM GRANULOCYTES NFR BLD AUTO: 0 % (ref 0–2)
LYMPHOCYTES # BLD AUTO: 0.77 THOUSANDS/ΜL (ref 0.6–4.47)
LYMPHOCYTES NFR BLD AUTO: 6 % (ref 14–44)
MAGNESIUM SERPL-MCNC: 1.5 MG/DL (ref 1.6–2.6)
MCH RBC QN AUTO: 26.2 PG (ref 26.8–34.3)
MCHC RBC AUTO-ENTMCNC: 32.4 G/DL (ref 31.4–37.4)
MCV RBC AUTO: 81 FL (ref 82–98)
MONOCYTES # BLD AUTO: 1.13 THOUSAND/ΜL (ref 0.17–1.22)
MONOCYTES NFR BLD AUTO: 9 % (ref 4–12)
NEUTROPHILS # BLD AUTO: 10.07 THOUSANDS/ΜL (ref 1.85–7.62)
NEUTS SEG NFR BLD AUTO: 85 % (ref 43–75)
NRBC BLD AUTO-RTO: 0 /100 WBCS
PHOSPHATE SERPL-MCNC: 1.9 MG/DL (ref 2.3–4.1)
PLATELET # BLD AUTO: 284 THOUSANDS/UL (ref 149–390)
PMV BLD AUTO: 9.5 FL (ref 8.9–12.7)
POTASSIUM SERPL-SCNC: 3.3 MMOL/L (ref 3.5–5.3)
POTASSIUM SERPL-SCNC: 3.6 MMOL/L (ref 3.5–5.3)
POTASSIUM SERPL-SCNC: 3.7 MMOL/L (ref 3.5–5.3)
POTASSIUM SERPL-SCNC: 4.3 MMOL/L (ref 3.5–5.3)
PROCALCITONIN SERPL-MCNC: 0.06 NG/ML
RBC # BLD AUTO: 4.13 MILLION/UL (ref 3.88–5.62)
SODIUM SERPL-SCNC: 127 MMOL/L (ref 135–147)
SODIUM SERPL-SCNC: 129 MMOL/L (ref 135–147)
SODIUM SERPL-SCNC: 131 MMOL/L (ref 135–147)
SODIUM SERPL-SCNC: 131 MMOL/L (ref 135–147)
WBC # BLD AUTO: 12.03 THOUSAND/UL (ref 4.31–10.16)

## 2022-08-20 PROCEDURE — 80048 BASIC METABOLIC PNL TOTAL CA: CPT | Performed by: INTERNAL MEDICINE

## 2022-08-20 PROCEDURE — 80048 BASIC METABOLIC PNL TOTAL CA: CPT

## 2022-08-20 PROCEDURE — 84145 PROCALCITONIN (PCT): CPT | Performed by: STUDENT IN AN ORGANIZED HEALTH CARE EDUCATION/TRAINING PROGRAM

## 2022-08-20 PROCEDURE — 85025 COMPLETE CBC W/AUTO DIFF WBC: CPT | Performed by: STUDENT IN AN ORGANIZED HEALTH CARE EDUCATION/TRAINING PROGRAM

## 2022-08-20 PROCEDURE — 84100 ASSAY OF PHOSPHORUS: CPT | Performed by: STUDENT IN AN ORGANIZED HEALTH CARE EDUCATION/TRAINING PROGRAM

## 2022-08-20 PROCEDURE — 71045 X-RAY EXAM CHEST 1 VIEW: CPT

## 2022-08-20 PROCEDURE — 99232 SBSQ HOSP IP/OBS MODERATE 35: CPT | Performed by: INTERNAL MEDICINE

## 2022-08-20 PROCEDURE — 99233 SBSQ HOSP IP/OBS HIGH 50: CPT | Performed by: INTERNAL MEDICINE

## 2022-08-20 PROCEDURE — 83735 ASSAY OF MAGNESIUM: CPT | Performed by: STUDENT IN AN ORGANIZED HEALTH CARE EDUCATION/TRAINING PROGRAM

## 2022-08-20 RX ORDER — HYDRALAZINE HYDROCHLORIDE 20 MG/ML
5 INJECTION INTRAMUSCULAR; INTRAVENOUS EVERY 4 HOURS PRN
Status: DISCONTINUED | OUTPATIENT
Start: 2022-08-20 | End: 2022-08-21

## 2022-08-20 RX ORDER — MAGNESIUM SULFATE HEPTAHYDRATE 40 MG/ML
2 INJECTION, SOLUTION INTRAVENOUS ONCE
Status: COMPLETED | OUTPATIENT
Start: 2022-08-20 | End: 2022-08-20

## 2022-08-20 RX ORDER — CYCLOSPORINE 0.5 MG/ML
1 EMULSION OPHTHALMIC 2 TIMES DAILY
Status: DISCONTINUED | OUTPATIENT
Start: 2022-08-20 | End: 2022-08-23 | Stop reason: HOSPADM

## 2022-08-20 RX ORDER — LISINOPRIL 20 MG/1
40 TABLET ORAL DAILY
Status: DISCONTINUED | OUTPATIENT
Start: 2022-08-21 | End: 2022-08-21

## 2022-08-20 RX ORDER — LISINOPRIL 20 MG/1
20 TABLET ORAL DAILY
Status: DISCONTINUED | OUTPATIENT
Start: 2022-08-20 | End: 2022-08-20

## 2022-08-20 RX ORDER — NIFEDIPINE 30 MG/1
30 TABLET, EXTENDED RELEASE ORAL 2 TIMES DAILY
Status: DISCONTINUED | OUTPATIENT
Start: 2022-08-20 | End: 2022-08-21

## 2022-08-20 RX ORDER — LISINOPRIL 20 MG/1
20 TABLET ORAL ONCE
Status: COMPLETED | OUTPATIENT
Start: 2022-08-20 | End: 2022-08-20

## 2022-08-20 RX ORDER — POTASSIUM CHLORIDE 20 MEQ/1
40 TABLET, EXTENDED RELEASE ORAL ONCE
Status: COMPLETED | OUTPATIENT
Start: 2022-08-20 | End: 2022-08-20

## 2022-08-20 RX ORDER — HYDRALAZINE HYDROCHLORIDE 20 MG/ML
10 INJECTION INTRAMUSCULAR; INTRAVENOUS ONCE
Status: COMPLETED | OUTPATIENT
Start: 2022-08-20 | End: 2022-08-20

## 2022-08-20 RX ORDER — ALPRAZOLAM 0.5 MG/1
1 TABLET ORAL 2 TIMES DAILY
Status: DISCONTINUED | OUTPATIENT
Start: 2022-08-20 | End: 2022-08-22

## 2022-08-20 RX ADMIN — ALPRAZOLAM 1 MG: 0.5 TABLET ORAL at 20:20

## 2022-08-20 RX ADMIN — NIFEDIPINE 30 MG: 30 TABLET, FILM COATED, EXTENDED RELEASE ORAL at 09:23

## 2022-08-20 RX ADMIN — HYDRALAZINE HYDROCHLORIDE 10 MG: 20 INJECTION INTRAMUSCULAR; INTRAVENOUS at 07:21

## 2022-08-20 RX ADMIN — DIBASIC SODIUM PHOSPHATE, MONOBASIC POTASSIUM PHOSPHATE AND MONOBASIC SODIUM PHOSPHATE 2 TABLET: 852; 155; 130 TABLET ORAL at 12:09

## 2022-08-20 RX ADMIN — GLYCERIN 1 DROP: .002; .002; .01 SOLUTION/ DROPS OPHTHALMIC at 17:31

## 2022-08-20 RX ADMIN — PANCRELIPASE 24000 UNITS: 24000; 76000; 120000 CAPSULE, DELAYED RELEASE PELLETS ORAL at 12:23

## 2022-08-20 RX ADMIN — HEPARIN SODIUM 5000 UNITS: 5000 INJECTION INTRAVENOUS; SUBCUTANEOUS at 20:21

## 2022-08-20 RX ADMIN — PANTOPRAZOLE SODIUM 40 MG: 40 TABLET, DELAYED RELEASE ORAL at 05:37

## 2022-08-20 RX ADMIN — HYDRALAZINE HYDROCHLORIDE 5 MG: 20 INJECTION INTRAMUSCULAR; INTRAVENOUS at 05:45

## 2022-08-20 RX ADMIN — GLYCERIN 1 DROP: .002; .002; .01 SOLUTION/ DROPS OPHTHALMIC at 08:53

## 2022-08-20 RX ADMIN — NIFEDIPINE 30 MG: 30 TABLET, FILM COATED, EXTENDED RELEASE ORAL at 20:23

## 2022-08-20 RX ADMIN — CYCLOSPORINE 1 DROP: 0.5 EMULSION OPHTHALMIC at 20:23

## 2022-08-20 RX ADMIN — TAMSULOSIN HYDROCHLORIDE 0.4 MG: 0.4 CAPSULE ORAL at 16:13

## 2022-08-20 RX ADMIN — HYDRALAZINE HYDROCHLORIDE 5 MG: 20 INJECTION INTRAMUSCULAR; INTRAVENOUS at 16:13

## 2022-08-20 RX ADMIN — LISINOPRIL 20 MG: 20 TABLET ORAL at 08:52

## 2022-08-20 RX ADMIN — AMITRIPTYLINE HYDROCHLORIDE 25 MG: 25 TABLET, FILM COATED ORAL at 20:22

## 2022-08-20 RX ADMIN — ASPIRIN 81 MG: 81 TABLET, COATED ORAL at 08:51

## 2022-08-20 RX ADMIN — NICOTINE 1 PATCH: 14 PATCH, EXTENDED RELEASE TRANSDERMAL at 08:54

## 2022-08-20 RX ADMIN — POTASSIUM CHLORIDE 40 MEQ: 1500 TABLET, EXTENDED RELEASE ORAL at 22:50

## 2022-08-20 RX ADMIN — PANCRELIPASE 24000 UNITS: 24000; 76000; 120000 CAPSULE, DELAYED RELEASE PELLETS ORAL at 17:31

## 2022-08-20 RX ADMIN — CYCLOSPORINE 1 DROP: 0.5 EMULSION OPHTHALMIC at 12:09

## 2022-08-20 RX ADMIN — METOPROLOL SUCCINATE 25 MG: 25 TABLET, EXTENDED RELEASE ORAL at 08:51

## 2022-08-20 RX ADMIN — ALPRAZOLAM 1 MG: 0.5 TABLET ORAL at 09:56

## 2022-08-20 RX ADMIN — PANCRELIPASE 24000 UNITS: 24000; 76000; 120000 CAPSULE, DELAYED RELEASE PELLETS ORAL at 07:29

## 2022-08-20 RX ADMIN — LISINOPRIL 20 MG: 20 TABLET ORAL at 03:34

## 2022-08-20 RX ADMIN — PRAVASTATIN SODIUM 40 MG: 20 TABLET ORAL at 20:20

## 2022-08-20 RX ADMIN — CITALOPRAM HYDROBROMIDE 20 MG: 20 TABLET ORAL at 08:51

## 2022-08-20 RX ADMIN — MAGNESIUM SULFATE HEPTAHYDRATE 2 G: 40 INJECTION, SOLUTION INTRAVENOUS at 07:09

## 2022-08-20 RX ADMIN — HEPARIN SODIUM 5000 UNITS: 5000 INJECTION INTRAVENOUS; SUBCUTANEOUS at 13:57

## 2022-08-20 RX ADMIN — HEPARIN SODIUM 5000 UNITS: 5000 INJECTION INTRAVENOUS; SUBCUTANEOUS at 05:37

## 2022-08-20 RX ADMIN — DIBASIC SODIUM PHOSPHATE, MONOBASIC POTASSIUM PHOSPHATE AND MONOBASIC SODIUM PHOSPHATE 2 TABLET: 852; 155; 130 TABLET ORAL at 07:09

## 2022-08-20 NOTE — PROGRESS NOTES
Daily Progress Note - Critical Care   Joshua Mcguire 67 y o  male MRN: [id number]  Unit/Bed#: [facility] 12 Encounter: [id number]        ----------------------------------------------------------------------------------------  HPI: Reina Saenz is a 67 y o  male with PMHx of AAA s/p repair, PUD, chronic pancreatitis, CKD stage 3,and RUBENS who presented to the ED with multiple complaints including feeling dry, difficulty swallowing, N/V, dizziness, abdominal pain and AMS  Patient went for EGD and colonoscopy two days ago, which showed mild erosive gastritis, moderate to severe diverticulosis and multiple polyps, not removed  Reports that symptoms started after the procedure  Patient was initially admitted to medicine service but given worsening AMS and acute on chronic hyponatremia  Patient was reevaluated this morning given worsening AMS  Per nephrology recommendation, patient was started on hypertonic saline and admitted to the MICU  Nicole catheter placed in the ED with 800ml volume  24hr events:  · Elevated BP with SBP 210s  Patient got a total of Labetalol 20mg, Hydralazine 10mg and Lisinopril 20mg  · Patient self removed Nicole catheter  Replaced by overnight nurse   ---------------------------------------------------------------------------------------  SUBJECTIVE  Patient reports doing well with no complaints  Denies chest pain, shortness of breath, headaches, visual disturbances, or any other symptoms  Patient alert, oriented and following commands  Review of Systems  Review of systems was reviewed and negative unless stated above in HPI/24-hour events   ---------------------------------------------------------------------------------------  Assessment and Plan:    Neuro:   · Diagnosis: AMS, most likely metabolic encephalopathy in the setting of hyponatremia  - CTH with no acute abnormalities  Mental status improved after Na correction    ? Plan:   § Correct metabolic derangements, as described below  § Neuro checks  § CTH for GCS drop > 2 points in 1 hr  · Diagnosis: Depression  ? Plan:    § Hold home Xanax for now  § Okay to continue Celexa and Amitriptyline         CV:   · Diagnosis: Hypertensive urgency, HTN, Hyperlipidemia  ? Plan:   § Home meds: Lisinopril-HCTZ 20-12 5mg, Toprol 25mg daily  § Dc HCTZ,   § Continue lisinopril and metoprolol  § Continue Hydralazine PRN  § Add Nifedipine XL 30mg BID and uptitrate metoprolol dose  § Secondary hypertension workup needed  § Continue statin  § Nicardipine gtt, can wean off        Pulm:  · Diagnosis: No acute issues        GI:   · Diagnosis: Chronic pancreatitis, IBS-C&D, Gastric ulcers  - Recent EGD with 2 large gastric ulcers s/p biopsy significant for mild chronic gastritis  ? Plan:    § Continue pancrelipase  § Bowel regimen in place  § Continue PPI        :   · Diagnosis: CKD stage 3, BL Cr 1 0-1 2  ? Plan:   § Trend Cr daily  · Diagnosis: Urinary rentention 2/2 BPH  ? Plan:   § Urology consulted   - Continue croft while inpatient  - Outpatient follow up for further workup  § Continue tamsulosin        F/E/N:   · Diagnosis: Symptomatic acute on chronic hyponatremia, multifactorial in the setting of poor oral intake, volume depletion, urinary retention and HCTZ use  ? Plan:   § Following Nephrology recommendations  § S/p hypertonic saline 100cc bolus x 2, with Na corrected to 127  § BMP q8hrs  § Fluid restriction 1000cc x 24hrs  § Will restart salt tabs when BP improves  § Dc HCTZ  · Fluids: None  · Electrolytes: Replete as needed to maintain K>4, Mg>2, and Phos>3  · Nutrition: Regular diet with fluid restriction 1000cc x 24hrs         Heme/Onc:   · Diagnosis: Iron deficiency anemia  - Recent EGD with 2 large gastric ulcers s/p biopsy  ? Plan:   § Not on iron supplementation at home  § Monitor Hgb closely, transfuse for Hgb <7  · Diagnosis: DVT ppx  ?  Plan:   § Heparin        Endo:   · Diagnosis: No acute issues        ID:   · Diagnosis: No acute issues        MSK/Skin:   · Diagnosis: No acute issues  ? Plan:   § Frequent turning/reporsitioning to prevent skin breakdown  § Close skin surveillance    Patient appropriate for transfer out of the ICU today? Yes  Disposition: Continue Stepdown Level 2 level of care   Code Status: Level 3 - DNAR and DNI  ---------------------------------------------------------------------------------------  ICU CORE MEASURES    Prophylaxis   VTE Pharmacologic Prophylaxis: Heparin  VTE Mechanical Prophylaxis: sequential compression device  Stress Ulcer Prophylaxis: Pantoprazole PO    ABCDE Protocol (if indicated)  Plan to perform spontaneous awakening trial today? Not applicable  Plan to perform spontaneous breathing trial today? Not applicable  Obvious barriers to extubation? Not applicable  CAM-ICU: Negative    Invasive Devices Review  Invasive Devices  Report    Peripheral Intravenous Line  Duration           Peripheral IV 22 Dorsal (posterior); Right Hand 2 days    Peripheral IV 22 Left Antecubital 1 day    Peripheral IV 22 Right Antecubital <1 day          Drain  Duration           Urethral Catheter Temperature probe 16 Fr  <1 day              Can any invasive devices be discontinued today?  No  ---------------------------------------------------------------------------------------  OBJECTIVE    Vitals   Vitals:    22 0300 22 0400 22 0500 22 0600   BP: (!) 189/91 (!) 187/90 (!) 205/94 (!) 192/91   Pulse: 82 80 82 84   Resp: (!) 24 (!) 25 (!) 34 (!) 32   Temp:  98 5 °F (36 9 °C)     TempSrc:  Oral     SpO2: 97% 95% 95% 95%   Weight:       Height:         Temp (24hrs), Av 5 °F (36 9 °C), Min:98 2 °F (36 8 °C), Max:98 7 °F (37 1 °C)  Current: Temperature: 98 5 °F (36 9 °C)    Respiratory:  SpO2: SpO2: 95 %  Nasal Cannula O2 Flow Rate (L/min): 2 L/min    Invasive/non-invasive ventilation settings   Respiratory  Report   Lab Data (Last 4 hours)    None         O2/Vent Data (Last 4 hours) None                Physical Exam  Vitals and nursing note reviewed  Constitutional:       Appearance: He is cachectic  HENT:      Head: Normocephalic and atraumatic  Eyes:      Conjunctiva/sclera: Conjunctivae normal    Cardiovascular:      Rate and Rhythm: Normal rate and regular rhythm  Heart sounds: No murmur heard  Pulmonary:      Effort: Pulmonary effort is normal  No respiratory distress  Breath sounds: Normal breath sounds  Abdominal:      Palpations: Abdomen is soft  Tenderness: There is no abdominal tenderness  Genitourinary:     Comments: Nicole in place, bloody urine in the bag  Musculoskeletal:      Cervical back: Neck supple  Skin:     General: Skin is warm and dry  Neurological:      Mental Status: He is alert  Comments: Alert and oriented to person and year but not to place  Follows commands in all extremities  5/5 strength in all extremities           Laboratory and Diagnostics:  Results from last 7 days   Lab Units 08/20/22  0538 08/19/22  0421 08/19/22  0151 08/18/22  1845   WBC Thousand/uL 12 03* 9 47  --  8 32   HEMOGLOBIN g/dL 10 8* 9 6*  --  10 3*   HEMATOCRIT % 33 3* 28 5*  --  29 3*   PLATELETS Thousands/uL 284 236 265 245   NEUTROS PCT % 85* 85*  --  86*   MONOS PCT % 9 8  --  6     Results from last 7 days   Lab Units 08/20/22  0541 08/19/22  2343 08/19/22  1900 08/19/22  1711 08/19/22  1449 08/19/22  1112 08/19/22  0852 08/19/22  0151 08/18/22  1845   SODIUM mmol/L 127* 131* 128* 126* 126* 123* 122*   < > 120*   POTASSIUM mmol/L 4 3 3 6 3 5 3 2* 3 3* 3 3* 3 4*   < > 2 9*   CHLORIDE mmol/L 96 100 94* 91* 93* 89* 87*   < > 82*   CO2 mmol/L 25 25 27 26 27 27 26   < > 28   ANION GAP mmol/L 6 6 7 9 6 7 9   < > 10   BUN mg/dL 10 8 9 8 7 8 6   < > 8   CREATININE mg/dL 0 97 0 82 1 05 1 02 1 01 1 06 1 09   < > 1 05   CALCIUM mg/dL 8 4 7 1* 7 8* 8 3 8 6 7 8* 8 1*   < > 8 0*   GLUCOSE RANDOM mg/dL 103 99 123 109 115 118 110   < > 123   ALT U/L  --   --   --   -- --   --   --   --  23   AST U/L  --   --   --   --   --   --   --   --  42   ALK PHOS U/L  --   --   --   --   --   --   --   --  96   ALBUMIN g/dL  --   --   --   --   --   --   --   --  3 2*   TOTAL BILIRUBIN mg/dL  --   --   --   --   --   --   --   --  0 29    < > = values in this interval not displayed  Results from last 7 days   Lab Units 08/20/22  0538 08/18/22  1845   MAGNESIUM mg/dL 1 5* 1 5*   PHOSPHORUS mg/dL 1 9* 2 2*                   ABG:    VBG:  Results from last 7 days   Lab Units 08/19/22  1009   PH CASSANDRA  7 463*   PCO2 CASSANDRA mm Hg 35 6*   PO2 CASSANDRA mm Hg 44 0   HCO3 CASSANDRA mmol/L 24 9   BASE EXC CASSANDRA mmol/L 1 3       Intake and Output  I/O       08/18 0701 08/19 0700 08/19 0701 08/20 0700 08/20 0701 08/21 0700    P  O   400     I V  (mL/kg)  211 3 (4 4)     IV Piggyback 560      Total Intake(mL/kg) 560 (11 5) 611 3 (12 6)     Urine (mL/kg/hr) 1300 2925 (2 5)     Total Output 1300 2925     Net -880 -4883 7                  Height and Weights   Height: 5' 2" (157 5 cm)  IBW (Ideal Body Weight): 54 6 kg  Body mass index is 19 56 kg/m²  Weight (last 2 days)     Date/Time Weight    08/19/22 1200 48 5 (106 92)    08/19/22 0600 48 5 (106 92)            Nutrition       Diet Orders   (From admission, onward)             Start     Ordered    08/19/22 1248  Diet Regular; Regular House; Fluid Restriction 1000 ML  Diet effective now        References:    Nutrtion Support Algorithm Enteral vs  Parenteral   Question Answer Comment   Diet Type Regular    Regular Regular House    Other Restriction(s): Fluid Restriction 1000 ML    RD to adjust diet per protocol?  Yes        08/19/22 1247                Active Medications  Scheduled Meds:  Current Facility-Administered Medications   Medication Dose Route Frequency Provider Last Rate    acetaminophen  650 mg Oral Q6H PRN Lainey Fisher MD      amitriptyline  25 mg Oral HS Emily Macias MD      aspirin  81 mg Oral Daily Cordelia Lundborg Ladon Duverney, MD      citalopram  20 mg Oral Daily Ivy Macias MD      glycerin-hypromellose-  1 drop Both Eyes BID Ivy Santoseds MD Gilberto      heparin (porcine)  5,000 Units Subcutaneous Q8H [address] Bryson Shelley DO      hydrALAZINE  10 mg Intravenous Once Ivy Macias MD      hydrALAZINE  5 mg Intravenous Q4H PRN Min Eleanor Jason MD      labetalol  10 mg Intravenous Once [address] MD      lisinopril  20 mg Oral Daily Ivy Macias MD      magnesium sulfate  2 g Intravenous Once Ivy Macias MD      metoprolol succinate  25 mg Oral Daily Ivy Macias MD      nicotine  1 patch Transdermal Daily Isha Leslie MD      ondansetron  4 mg Intravenous Q6H PRN Inderjit Macias MD      pancrelipase (Lip-Prot-Amyl)  24,000 Units Oral TID With Meals Isha Leslie MD      pantoprazole  40 mg Oral Early Morning Isha Leslie MD      potassium-sodium phosphateS  2 tablet Oral 4x Daily (with meals and at bedtime) Ivy Macias MD      pravastatin  40 mg Oral HS Isha Leslie MD      tamsulosin  0 4 mg Oral Daily With Tad Medina MD       Continuous Infusions:     PRN Meds:   acetaminophen, 650 mg, Q6H PRN  hydrALAZINE, 5 mg, Q4H PRN  ondansetron, 4 mg, Q6H PRN        Allergies   No Known Allergies  ---------------------------------------------------------------------------------------  Advance Directive and Living Will: Yes    Power of :    POLST:    ---------------------------------------------------------------------------------------  Care Time Delivered:   Upon my evaluation, this patient had a high probability of imminent or life-threatening deterioration, which required my direct attention, intervention, and personal management    I have personally provided 30 minutes of critical care time, exclusive of procedures, teaching, family meetings, and any prior time recorded by providers other than myself  Heber Macias MD      Portions of the record may have been created with voice recognition software  Occasional wrong word or "sound a like" substitutions may have occurred due to the inherent limitations of voice recognition software    Read the chart carefully and recognize, using context, where substitutions have occurred

## 2022-08-20 NOTE — RESPIRATORY THERAPY NOTE
RT Protocol Note  Breanna Coleman 67 y o  male MRN: [id number]  [facility] Encounter: [id number]    Assessment    Principal Problem:    Hyponatremia  Active Problems:    Other chronic pancreatitis ([facility] )    Smoking    History of endovascular stent graft for abdominal aortic aneurysm (AAA)    Chronic kidney disease, stage 3 (HCC)    Depression, recurrent ([facility] )    Iron deficiency anemia    Hypokalemia    Urinary retention    Altered mental status    Hypertension      Home Pulmonary Medications:  none  Home Devices/Therapy: Home O2    Past Medical History:   Diagnosis Date    Anxiety     Rhodes's esophagus     Chronic neck pain     Depression     GERD (gastroesophageal reflux disease)     H/O abdominal aortic aneurysm 4 5 cm    Hyperlipidemia     Hypertension     Pancreatic pseudocyst     Vertigo      Social History     Socioeconomic History    Marital status:      Spouse name: Not on file    Number of children: Not on file    Years of education: Not on file    Highest education level: Not on file   Occupational History    Not on file   Tobacco Use    Smoking status: Current Every Day Smoker     Packs/day: 0 25     Types: Cigarettes    Smokeless tobacco: Never Used    Tobacco comment: [employer] quiting smoking handouts   Vaping Use    Vaping Use: Never used   Substance and Sexual Activity    Alcohol use: No    Drug use: No    Sexual activity: Not Currently   Other Topics Concern    Not on file   Social History Narrative    Not on file     Social Determinants of Health     Financial Resource Strain: Not on file   Food Insecurity: No Food Insecurity    Worried About Running Out of Food in the Last Year: Never true    Ran Out of Food in the Last Year: Never true   Transportation Needs: No Transportation Needs    Lack of Transportation (Medical): No    Lack of Transportation (Non-Medical):  No   Physical Activity: Not on file   Stress: Not on file   Social Connections: Not on file   Intimate Partner Violence: Not on file   Housing Stability: Low Risk     Unable to Pay for Housing in the Last Year: No    Number of Places Lived in the Last Year: 1    Unstable Housing in the Last Year: No       Subjective         Objective    Physical Exam:   Assessment Type: Assess only  General Appearance: Alert, Awake  Respiratory Pattern: Symmetrical, Spontaneous  Chest Assessment: Chest expansion symmetrical  Bilateral Breath Sounds: Clear  Cough: None  O2 Device: nc    Vitals:  Blood pressure (!) 171/79, pulse 88, temperature 98 8 °F (37 1 °C), temperature source Oral, resp  rate (!) 27, height 5' 2" (1 575 m), weight 48 5 kg (106 lb 14 8 oz), SpO2 (P) 97 %  Imaging and other studies: I have personally reviewed pertinent reports  O2 Device: nc     Plan    Respiratory Plan: Discontinue Protocol        Resp Comments: (P) Pt  admitted for hyponatremia found on 1L NC, Spo2 97%, bs clear, no distress  Hx CKD  Pt does not take any resp meds at home  Pt states he "sometimes" wears oxygen at home, but is unable to provide details  Pt is a current smoker, 60 years currently 1/2 ppd  Plan to d/c resp protocol at this time

## 2022-08-20 NOTE — ASSESSMENT & PLAN NOTE
Patient soidum 128, intially 120, baseline 130s  Received 100 cc hypertonic boiluses  - fluid restriction  - q6 BMPs

## 2022-08-20 NOTE — PROGRESS NOTES
INTERNAL MEDICINE RESIDENCY TRANSFER ACCEPTANCE NOTE     Name: Kirstie Landrum   Age & Sex: 67 y o  male   MRN: [id number]  Unit/Bed#: MICU 12   Encounter: [id number]  Hospital Stay Days: 1    Accepting team: SOD Team B   Code Status: Level 3 - DNAR and DNI  Disposition: Patient requires Level 2 Step Down     ASSESSMENT/PLAN     Principal Problem:    Hyponatremia  Active Problems:    Altered mental status    Other chronic pancreatitis ([facility] [address] )    Smoking    History of endovascular stent graft for abdominal aortic aneurysm (AAA)    Chronic kidney disease, stage 3 (HCC)    Depression, recurrent (HCC)    Iron deficiency anemia    Hypokalemia    Urinary retention    Hypertension      * Hyponatremia  Assessment & Plan  Symptomatic  Patient presenting with nausea, abdominal pain, urinary retention, AMS s/p EGD and colonoscopy 1 day prior to presentation  Na 120, Serum Osom 252, Urine Osom 351, Urine Na 95  Acute on chronic  Multifactorial etiology in setting of volume depletion, urinary retention, hydrochlorothiazide use  · S/p hypertonic saline in the MICU for symptomatic hyponatremia with obtundation  · Most recent Na 128  · Per Nephro recs:  · BMP q6 hours  · Goal Na by tomorrow am around 130  · Fluid restrict 1L  · Hold HCTZ    Altered mental status  Assessment & Plan  Metabolic encephalopathy POA in the setting of hyponatremia  Found to be worsened, obtunded the following morning and transferred to MICU for hypertonic saline  Mental status has improved s/p correction of hyponatremia  · q4 neuro checks  · Treatment of hyponatremia as mentioned above    Hypertension  Assessment & Plan  Presenting with very high blood pressures in setting of not taking his BP meds on day of arrival  PTA meds include lisinopril-HCTZ, toprol-XL    · S/p cardene gtt in the MICU  · Cont home lisinopril and toprol-xl  · Holding HCTZ for electrolyte derangement  · PRN labetalol IV  · If not responding, can trial PRN IV hydralazine    Urinary retention  Assessment & Plan  POA  Per Urology, likely 2/2 weakness as a result of hyponatremia and underlying obstructive BPH  · Cont flomax  · Maintain croft catheter  · Voiding trial when clinically improved depending on length of stay  · If discharging to STR, discharge with croft in place and voiding trial within 7 days  · OP Urology follow up    Hypokalemia  Assessment & Plan  K 2 9 on admission, in setting of GI losses  · Trend and replete as needed  · Hold PTA HCTZ    Iron deficiency anemia  Assessment & Plan  S/p EGD/C-scope on 8/17 which showed large clean gastric ulcers and pancolonic diverticulosis, ulcerated stricture in ascending colon (inadequate prep)  Hgb stable  · Trend H/H and transfuse for Hgb<7      Depression, recurrent ([facility] )  Assessment & Plan  · Continue home celexa    Chronic kidney disease, stage 3 [facility])  Assessment & Plan  Lab Results   Component Value Date    EGFR 70 08/19/2022    EGFR 72 08/19/2022    EGFR 73 08/19/2022    CREATININE 1 05 08/19/2022    CREATININE 1 02 08/19/2022    CREATININE 1 01 08/19/2022     Cr 1 05 on admission  Plan  · Trend creatinine  · Nephrology consulted due to hyponatremia  History of endovascular stent graft for abdominal aortic aneurysm (AAA)  Assessment & Plan  CT AP 8/18 showing no evidence of endoleak  · Cont ASA, statin    Smoking  Assessment & Plan  Patient endorses 60+ year smoking history currently 1/2 packs daily  · Nicotine patch    Other chronic pancreatitis ([facility] )  Assessment & Plan  Hx of pancreatic cyst s/p drainage  · Cont PTA creon      Irritable bowel syndrome with both constipation and diarrhea-resolved as of 8/19/2022  Assessment & Plan  -Chronic per patient  VTE Pharmacologic Prophylaxis: Heparin  VTE Mechanical Prophylaxis: sequential compression device    HOSPITAL COURSE     Per Dr Richard Few transfer note on "Patient came into ED on 8/18 and was found to be hyponatrmic and AMS   Patient was admitted to medicine service and was reevaluated to be more altered  Patient had recent EGD and colonoscopy 2 days ago  Patient received hyerptonic saline boluses with sodium improving to 128 today  Patient was also started on cardene drip which was transitioned to home BP meds  Stable for floors "     SUBJECTIVE     Patient evaluated in the MICU  Reports he is feeling much better and all of the symptoms that he presented with have since resolved  He feels he is thinking more clearly now and feels much more relieved after getting croft catheter placed and having BM  OBJECTIVE     Vitals:    08/19/22 1856 08/19/22 1900 08/19/22 2000 08/19/22 2100   BP: (!) 176/83 (!) 173/83 (!) 171/81 (!) 190/88   BP Location:       Pulse: 84 84 82 76   Resp: 22 (!) 24 (!) 23 18   Temp:       TempSrc:       SpO2: 98% 94% 91%    Weight:       Height:         I/O last 24 hours: In: 1021 3 [P O :250; I V :211 3; IV Piggyback:560]  Out: 0126 [Urine:3675]    Physical Exam  Constitutional:       General: He is not in acute distress  HENT:      Mouth/Throat:      Mouth: Mucous membranes are moist    Eyes:      Extraocular Movements: Extraocular movements intact  Conjunctiva/sclera: Conjunctivae normal    Cardiovascular:      Rate and Rhythm: Normal rate and regular rhythm  Heart sounds: Normal heart sounds  Pulmonary:      Effort: Pulmonary effort is normal       Breath sounds: Normal breath sounds  Abdominal:      General: There is no distension  Palpations: Abdomen is soft  Tenderness: There is no abdominal tenderness  Musculoskeletal:      Cervical back: Neck supple  Right lower leg: No edema  Left lower leg: No edema  Skin:     General: Skin is warm and dry  Neurological:      Mental Status: He is alert and oriented to person, place, and time  Psychiatric:         Speech: Speech normal          Behavior: Behavior is cooperative  LABORATORY DATA     Labs:  I have personally reviewed pertinent reports  Results from last 7 days   Lab Units 08/19/22  0421 08/19/22  0151 08/18/22  1845   WBC Thousand/uL 9 47  --  8 32   HEMOGLOBIN g/dL 9 6*  --  10 3*   HEMATOCRIT % 28 5*  --  29 3*   PLATELETS Thousands/uL 236 265 245   NEUTROS PCT % 85*  --  86*   MONOS PCT % 8  --  6      Results from last 7 days   Lab Units 08/19/22  1900 08/19/22  1711 08/19/22  1449 08/19/22  0151 08/18/22  1845   POTASSIUM mmol/L 3 5 3 2* 3 3*   < > 2 9*   CHLORIDE mmol/L 94* 91* 93*   < > 82*   CO2 mmol/L 27 26 27   < > 28   BUN mg/dL 9 8 7   < > 8   CREATININE mg/dL 1 05 1 02 1 01   < > 1 05   CALCIUM mg/dL 7 8* 8 3 8 6   < > 8 0*   ALK PHOS U/L  --   --   --   --  96   ALT U/L  --   --   --   --  23   AST U/L  --   --   --   --  42    < > = values in this interval not displayed  Results from last 7 days   Lab Units 08/18/22  1845   MAGNESIUM mg/dL 1 5*     Results from last 7 days   Lab Units 08/18/22  1845   PHOSPHORUS mg/dL 2 2*                  Micro:  Lab Results   Component Value Date    URINECX No Growth <1000 cfu/mL 12/19/2016     IMAGING & DIAGNOSTIC TESTING     Imaging: I have personally reviewed pertinent reports  CT head wo contrast    Result Date: 8/19/2022  Impression: No acute intracranial abnormality  Chronic microangiopathic changes  Sinus disease  Workstation performed: [id number]     CT abdomen pelvis w contrast    Result Date: 8/18/2022  Impression: 1  Mild diffuse thickening of the descending and rectosigmoid colon may be due to nonspecific colitis  2   Distended bladder and bilateral collecting system fullness, correlate for bladder outlet obstruction  Workstation performed: [id number]     EKG, Pathology, and Other Studies: I have personally reviewed pertinent reports       ALLERGIES   No Known Allergies  MEDICATIONS     Current Facility-Administered Medications   Medication Dose Route Frequency Provider Last Rate    acetaminophen  650 mg Oral Q6H PRN Carolyn Brown MD     [facility] amitriptyline  25 mg Oral HS Zo Macias MD      aspirin  81 mg Oral Daily Venice Husain MD      Neli Lay ON 8/20/2022] citalopram  20 mg Oral Daily Zo Macias MD      glycerin-hypromellose-  1 drop Both Eyes BID Zo Macias MD      heparin (porcine)  5,000 Units Subcutaneous Q8H [address] Bryson Shelley DO      labetalol  10 mg Intravenous Q4H PRN Deidre Mckinney MD      labetalol  10 mg Intravenous Once Anitra Marroquin MD      lisinopril  20 mg Oral Daily Zo Macias MD      metoprolol succinate  25 mg Oral Daily Zo Macias MD      nicotine  1 patch Transdermal Daily Venice Husain MD      ondansetron  4 mg Intravenous Q6H PRN Deidre Mckinney MD      pancrelipase (Lip-Prot-Amyl)  24,000 Units Oral TID With Meals Venice Husain MD      pantoprazole  40 mg Oral Early Morning Venice Husain MD      pravastatin  40 mg Oral HS Venice Husain MD      Neli Lay ON 8/20/2022] tamsulosin  0 4 mg Oral Daily With Soo Rasmussen MD          acetaminophen, 650 mg, Q6H PRN  labetalol, 10 mg, Q4H PRN  ondansetron, 4 mg, Q6H PRN        Portions of the record may have been created with voice recognition software  Occasional wrong word or "sound a like" substitutions may have occurred due to the inherent limitations of voice recognition software    Read the chart carefully and recognize, using context, where substitutions have occurred     ==  Deidre Mckinney, [address] [place] [address]  Internal Medicine Residency PGY-2

## 2022-08-20 NOTE — ASSESSMENT & PLAN NOTE
Known history on imaging  Complaining of urinary retention on presentation requiring croft insertion    ·

## 2022-08-20 NOTE — PROGRESS NOTES
NEPHROLOGY PROGRESS NOTE   Choco Ling 67 y o  male MRN: [id number]  Unit/Bed#: MICU 12 Encounter: [id number]        ASSESSMENT & PLAN    16-year-old male who was admitted after presenting several symptoms complicated by acute on chronic hyponatremia    1  Acute on chronic hypotonic hyponatremia  o With depressed mental stated obtunded was given 3% hypertonic boluses x2  o Sodium 120 on presentation, now 127 which is about 7 mEq rise in 24 hours  o Urine sodium was 90 in the setting of hydrochlorothiazide use serum osmolality was 252 urine awesome was high at 350  o Had a recent colonoscopy and ruling out malignancy  o More awake and alert however remains confused  o Now on a regular house diet with fluid restriction  o Hydrochlorothiazide discontinued  o Once blood pressures improved can initiate salt tablets  o Repeat BMPs every 8 hours     2  Hypokalemia  o Likely secondary to hydrochlorothiazide use and this will be discontinued  o Appropriately repleted    3  Resistant hypertension/hypertensive urgency  o Now off nicardipine drip  o Restarted lisinopril 20 mg daily  o Metoprolol XL 25 mg daily  o Blood pressures remain elevated weight 217/89, 192/91 heart rate is 80 4  o Consider adding vasodilators would use nifedipine XL over amlodipine the 30 mg twice daily and up titrate metoprolol dose  o Goal blood pressure of 230-388 systolic  o Give intermittent doses of labetalol for systolic blood pressures above 108     4  Stage II CKD with a GFR of 70-80 mL/minute    5  Urinary retention-urology following    SUBJECTIVE:    Patient was seen today remains confused overnight did not sleep pulled out his Nicole catheter  Sodium level slightly decreased from yesterday  More alert and answering questions appropriately today    12 point review of systems was otherwise negative besides what is mentioned above      Medications:    Current Facility-Administered Medications:     acetaminophen (TYLENOL) tablet 650 mg, 650 mg, Oral, Q6H PRN, Concetta Akbar MD    amitriptyline (ELAVIL) tablet 25 mg, 25 mg, Oral, HS, Sylvie Macias MD, 25 mg at 08/19/22 2211    aspirin (ECOTRIN LOW STRENGTH) EC tablet 81 mg, 81 mg, Oral, Daily, Concetta Akbar MD    citalopram (CeleXA) tablet 20 mg, 20 mg, Oral, Daily, Sylvie Macias MD    glycerin-hypromellose- (ARTIFICIAL TEARS) ophthalmic solution 1 drop, 1 drop, Both Eyes, BID, Sylvie Macias MD, 1 drop at 08/19/22 1839    heparin (porcine) subcutaneous injection 5,000 Units, 5,000 Units, Subcutaneous, Q8H [address], Abel Dirico, DO, 5,000 Units at 08/20/22 0537    hydrALAZINE (APRESOLINE) injection 5 mg, 5 mg, Intravenous, Q4H PRN, Candelaria Cramer MD, 5 mg at 08/20/22 0545    labetalol (NORMODYNE) injection 10 mg, 10 mg, Intravenous, Once, Candelaria Cramer MD    lisinopril (ZESTRIL) tablet 20 mg, 20 mg, Oral, Daily, Sylvie Macias MD, 20 mg at 08/19/22 1746    magnesium sulfate 2 g/50 mL IVPB (premix) 2 g, 2 g, Intravenous, Once, Sylvie Macias MD, 2 g at 61/62/38 0709    metoprolol succinate (TOPROL-XL) 24 hr tablet 25 mg, 25 mg, Oral, Daily, Sylvie Macias MD, 25 mg at 08/19/22 1839    nicotine (NICODERM CQ) 14 mg/24hr TD 24 hr patch 1 patch, 1 patch, Transdermal, Daily, Concetta Akbar MD, 1 patch at 08/19/22 0908    ondansetron (ZOFRAN) injection 4 mg, 4 mg, Intravenous, Q6H PRN, Clayton Johnston MD    pancrelipase (Lip-Prot-Amyl) (CREON) delayed release capsule 24,000 Units, 24,000 Units, Oral, TID With Meals, Concetta Akbar MD, 24,000 Units at 08/19/22 1707    pantoprazole (PROTONIX) EC tablet 40 mg, 40 mg, Oral, Early Morning, Concetta Akbar MD, 40 mg at 08/20/22 0537    potassium-sodium phosphateS (K-PHOS,PHOSPHA 250) -250 mg tablet 2 tablet, 2 tablet, Oral, 4x Daily (with meals and at bedtime), Sylvie Vogel MD Gilberto, 2 tablet at 08/20/22 0709    pravastatin (PRAVACHOL) tablet 40 mg, 40 mg, Oral, HS, Calvin Herrera MD, 40 mg at 08/19/22 2212    tamsulosin [facility]) capsule 0 4 mg, 0 4 mg, Oral, Daily With Ev Monzon MD    OBJECTIVE:    Vitals:    08/20/22 0400 08/20/22 0500 08/20/22 0600 08/20/22 0700   BP: (!) 187/90 (!) 205/94 (!) 192/91 (!) 217/89   Pulse: 80 82 84 84   Resp: (!) 25 (!) 34 (!) 32 (!) 41   Temp: 98 5 °F (36 9 °C)   98 8 °F (37 1 °C)   TempSrc: Oral   Oral   SpO2: 95% 95% 95% 96%   Weight:       Height:            Temp:  [98 2 °F (36 8 °C)-98 8 °F (37 1 °C)] 98 8 °F (37 1 °C)  HR:  [67-96] 84  Resp:  [16-62] 41  BP: (125-219)/(67-97) 217/89  SpO2:  [89 %-98 %] 96 %     Body mass index is 19 56 kg/m²  Weight (last 2 days)     Date/Time Weight    08/19/22 1200 48 5 (106 92)    08/19/22 0600 48 5 (106 92)          I/O last 3 completed shifts: In: 1171 3 [P O :400; I V :211 3; IV Piggyback:560]  Out: 5622 [Urine:4225]    No intake/output data recorded  Physical exam:    General:  Frail and elderly  Eyes: conjunctivae pink, anicteric sclerae  ENT: lips and mucous membranes moist, no exudates, normal external ears  Neck: ROM intact, no JVD  Chest: No respiratory distress, no accessory muscle use  CVS: normal rate, non pericardial friction rub  Abdomen: soft, non-tender, non-distended, normoactive bowel sounds  Extremities: no edema of both legs  Skin: no rash  Neuro: awake, alert, oriented, grossly intact  Psych:  Pleasant affect    Invasive Devices:   Urethral Catheter Temperature probe 16 Fr   (Active)   Reasons to continue Urinary Catheter  Accurate I&O assessment in critically ill patients (48 hr  max) 08/20/22 0400   Goal for Removal N/A- discharging with croft 08/20/22 0400   Site Assessment Clean;Skin intact 08/20/22 0400   Collection Container Standard drainage bag 08/20/22 0400   Securement Method Securing device (Describe) 08/20/22 0400   Output (mL) 125 mL 08/20/22 0601     Lab Results:   Results from last 7 days   Lab Units 08/20/22  0541 08/20/22  0538 08/19/22  2343 08/19/22  1900 08/19/22  1711 08/19/22  1449 08/19/22  0852 08/19/22  0421 08/19/22  0151 08/18/22  1853 08/18/22  1845   WBC Thousand/uL  --  12 03*  --   --   --   --   --  9 47  --   --  8 32   HEMOGLOBIN g/dL  --  10 8*  --   --   --   --   --  9 6*  --   --  10 3*   HEMATOCRIT %  --  33 3*  --   --   --   --   --  28 5*  --   --  29 3*   PLATELETS Thousands/uL  --  284  --   --   --   --   --  236 265  --  245   POTASSIUM mmol/L 4 3  --  3 6 3 5 3 2* 3 3*   < > 3 0* 3 5  --  2 9*   CHLORIDE mmol/L 96  --  100 94* 91* 93*   < > 85* 85*  --  82*   CO2 mmol/L 25  --  25 27 26 27   < > 29 29  --  28   BUN mg/dL 10  --  8 9 8 7   < > 7 6  --  8   CREATININE mg/dL 0 97  --  0 82 1 05 1 02 1 01   < > 1 00 0 99  --  1 05   CALCIUM mg/dL 8 4  --  7 1* 7 8* 8 3 8 6   < > 8 2* 8 3  --  8 0*   MAGNESIUM mg/dL  --  1 5*  --   --   --   --   --   --   --   --  1 5*   PHOSPHORUS mg/dL  --  1 9*  --   --   --   --   --   --   --   --  2 2*   ALK PHOS U/L  --   --   --   --   --   --   --   --   --   --  96   ALT U/L  --   --   --   --   --   --   --   --   --   --  23   AST U/L  --   --   --   --   --   --   --   --   --   --  42   LEUKOCYTES UA   --   --   --   --   --   --   --   --   --  Negative  --    BLOOD UA   --   --   --   --   --   --   --   --   --  Trace*  --     < > = values in this interval not displayed  Portions of the record may have been created with voice recognition software  Occasional wrong word or "sound a like" substitutions may have occurred due to the inherent limitations of voice recognition software  Read the chart carefully and recognize, using context, where substitutions have occurred  If you have any questions, please contact the dictating provider

## 2022-08-21 PROBLEM — R41.82 ALTERED MENTAL STATUS: Status: RESOLVED | Noted: 2022-08-19 | Resolved: 2022-08-21

## 2022-08-21 LAB
ANION GAP SERPL CALCULATED.3IONS-SCNC: 6 MMOL/L (ref 4–13)
BASOPHILS # BLD AUTO: 0.02 THOUSANDS/ΜL (ref 0–0.1)
BASOPHILS NFR BLD AUTO: 0 % (ref 0–1)
BUN SERPL-MCNC: 18 MG/DL (ref 5–25)
CALCIUM SERPL-MCNC: 8.3 MG/DL (ref 8.3–10.1)
CHLORIDE SERPL-SCNC: 97 MMOL/L (ref 96–108)
CO2 SERPL-SCNC: 27 MMOL/L (ref 21–32)
CREAT SERPL-MCNC: 1.12 MG/DL (ref 0.6–1.3)
EOSINOPHIL # BLD AUTO: 0.14 THOUSAND/ΜL (ref 0–0.61)
EOSINOPHIL NFR BLD AUTO: 1 % (ref 0–6)
GFR SERPL CREATININE-BSD FRML MDRD: 64 ML/MIN/1.73SQ M
GLUCOSE SERPL-MCNC: 100 MG/DL (ref 65–140)
HCT VFR BLD AUTO: 30.5 % (ref 36.5–49.3)
HGB BLD-MCNC: 10.2 G/DL (ref 12–17)
IMM GRANULOCYTES # BLD AUTO: 0.05 THOUSAND/UL (ref 0–0.2)
IMM GRANULOCYTES NFR BLD AUTO: 1 % (ref 0–2)
LYMPHOCYTES # BLD AUTO: 1.54 THOUSANDS/ΜL (ref 0.6–4.47)
LYMPHOCYTES NFR BLD AUTO: 15 % (ref 14–44)
MCH RBC QN AUTO: 26.7 PG (ref 26.8–34.3)
MCHC RBC AUTO-ENTMCNC: 33.4 G/DL (ref 31.4–37.4)
MCV RBC AUTO: 80 FL (ref 82–98)
MONOCYTES # BLD AUTO: 0.85 THOUSAND/ΜL (ref 0.17–1.22)
MONOCYTES NFR BLD AUTO: 8 % (ref 4–12)
NEUTROPHILS # BLD AUTO: 7.59 THOUSANDS/ΜL (ref 1.85–7.62)
NEUTS SEG NFR BLD AUTO: 75 % (ref 43–75)
NRBC BLD AUTO-RTO: 0 /100 WBCS
PLATELET # BLD AUTO: 275 THOUSANDS/UL (ref 149–390)
PMV BLD AUTO: 9.3 FL (ref 8.9–12.7)
POTASSIUM SERPL-SCNC: 3.6 MMOL/L (ref 3.5–5.3)
RBC # BLD AUTO: 3.82 MILLION/UL (ref 3.88–5.62)
SODIUM SERPL-SCNC: 130 MMOL/L (ref 135–147)
WBC # BLD AUTO: 10.19 THOUSAND/UL (ref 4.31–10.16)

## 2022-08-21 PROCEDURE — 99232 SBSQ HOSP IP/OBS MODERATE 35: CPT | Performed by: INTERNAL MEDICINE

## 2022-08-21 PROCEDURE — 85025 COMPLETE CBC W/AUTO DIFF WBC: CPT

## 2022-08-21 PROCEDURE — 80048 BASIC METABOLIC PNL TOTAL CA: CPT

## 2022-08-21 RX ORDER — NIFEDIPINE 30 MG/1
30 TABLET, EXTENDED RELEASE ORAL 2 TIMES DAILY
Status: DISCONTINUED | OUTPATIENT
Start: 2022-08-21 | End: 2022-08-23 | Stop reason: HOSPADM

## 2022-08-21 RX ORDER — SODIUM CHLORIDE 1000 MG
2 TABLET, SOLUBLE MISCELLANEOUS 2 TIMES DAILY
Status: DISCONTINUED | OUTPATIENT
Start: 2022-08-21 | End: 2022-08-22

## 2022-08-21 RX ORDER — FINASTERIDE 5 MG/1
5 TABLET, FILM COATED ORAL DAILY
Status: DISCONTINUED | OUTPATIENT
Start: 2022-08-21 | End: 2022-08-23 | Stop reason: HOSPADM

## 2022-08-21 RX ORDER — LISINOPRIL 20 MG/1
40 TABLET ORAL DAILY
Status: DISCONTINUED | OUTPATIENT
Start: 2022-08-21 | End: 2022-08-23 | Stop reason: HOSPADM

## 2022-08-21 RX ORDER — POTASSIUM CHLORIDE 20 MEQ/1
40 TABLET, EXTENDED RELEASE ORAL ONCE
Status: COMPLETED | OUTPATIENT
Start: 2022-08-21 | End: 2022-08-21

## 2022-08-21 RX ADMIN — GLYCERIN 1 DROP: .002; .002; .01 SOLUTION/ DROPS OPHTHALMIC at 08:36

## 2022-08-21 RX ADMIN — CITALOPRAM HYDROBROMIDE 20 MG: 20 TABLET ORAL at 08:33

## 2022-08-21 RX ADMIN — SODIUM CHLORIDE TAB 1 GM 2 G: 1 TAB at 21:02

## 2022-08-21 RX ADMIN — TAMSULOSIN HYDROCHLORIDE 0.4 MG: 0.4 CAPSULE ORAL at 16:34

## 2022-08-21 RX ADMIN — HEPARIN SODIUM 5000 UNITS: 5000 INJECTION INTRAVENOUS; SUBCUTANEOUS at 05:00

## 2022-08-21 RX ADMIN — PANCRELIPASE 24000 UNITS: 24000; 76000; 120000 CAPSULE, DELAYED RELEASE PELLETS ORAL at 12:29

## 2022-08-21 RX ADMIN — HEPARIN SODIUM 5000 UNITS: 5000 INJECTION INTRAVENOUS; SUBCUTANEOUS at 13:02

## 2022-08-21 RX ADMIN — SODIUM CHLORIDE TAB 1 GM 2 G: 1 TAB at 08:38

## 2022-08-21 RX ADMIN — NIFEDIPINE 30 MG: 30 TABLET, FILM COATED, EXTENDED RELEASE ORAL at 08:34

## 2022-08-21 RX ADMIN — FINASTERIDE 5 MG: 5 TABLET, FILM COATED ORAL at 12:29

## 2022-08-21 RX ADMIN — ALPRAZOLAM 1 MG: 0.5 TABLET ORAL at 21:01

## 2022-08-21 RX ADMIN — CYCLOSPORINE 1 DROP: 0.5 EMULSION OPHTHALMIC at 08:38

## 2022-08-21 RX ADMIN — ASPIRIN 81 MG: 81 TABLET, COATED ORAL at 08:34

## 2022-08-21 RX ADMIN — PANTOPRAZOLE SODIUM 40 MG: 40 TABLET, DELAYED RELEASE ORAL at 05:00

## 2022-08-21 RX ADMIN — NICOTINE 1 PATCH: 14 PATCH, EXTENDED RELEASE TRANSDERMAL at 08:35

## 2022-08-21 RX ADMIN — NIFEDIPINE 30 MG: 30 TABLET, FILM COATED, EXTENDED RELEASE ORAL at 21:01

## 2022-08-21 RX ADMIN — PRAVASTATIN SODIUM 40 MG: 20 TABLET ORAL at 21:01

## 2022-08-21 RX ADMIN — LISINOPRIL 40 MG: 20 TABLET ORAL at 16:33

## 2022-08-21 RX ADMIN — ALPRAZOLAM 1 MG: 0.5 TABLET ORAL at 08:33

## 2022-08-21 RX ADMIN — METOPROLOL SUCCINATE 25 MG: 25 TABLET, EXTENDED RELEASE ORAL at 08:33

## 2022-08-21 RX ADMIN — CYCLOSPORINE 1 DROP: 0.5 EMULSION OPHTHALMIC at 21:02

## 2022-08-21 RX ADMIN — ACETAMINOPHEN 650 MG: 325 TABLET ORAL at 04:11

## 2022-08-21 RX ADMIN — POTASSIUM CHLORIDE 40 MEQ: 1500 TABLET, EXTENDED RELEASE ORAL at 08:34

## 2022-08-21 RX ADMIN — HEPARIN SODIUM 5000 UNITS: 5000 INJECTION INTRAVENOUS; SUBCUTANEOUS at 21:01

## 2022-08-21 RX ADMIN — PANCRELIPASE 24000 UNITS: 24000; 76000; 120000 CAPSULE, DELAYED RELEASE PELLETS ORAL at 17:19

## 2022-08-21 RX ADMIN — GLYCERIN 1 DROP: .002; .002; .01 SOLUTION/ DROPS OPHTHALMIC at 15:17

## 2022-08-21 RX ADMIN — PANCRELIPASE 24000 UNITS: 24000; 76000; 120000 CAPSULE, DELAYED RELEASE PELLETS ORAL at 08:35

## 2022-08-21 NOTE — PLAN OF CARE
Problem: Potential for Falls  Goal: Patient will remain free of falls  Description: INTERVENTIONS:  - Educate patient/family on patient safety including physical limitations  - Instruct patient to call for assistance with activity   - Consult OT/PT to assist with strengthening/mobility   - Keep Call bell within reach  - Keep bed low and locked with side rails adjusted as appropriate  - Keep care items and personal belongings within reach  - Initiate and maintain comfort rounds  - Make Fall Risk Sign visible to staff  - Offer Toileting every  Hours, in advance of need  - Initiate/Maintain alarm  - Obtain necessary fall risk management equipment:   - Apply yellow socks and bracelet for high fall risk patients  - Consider moving patient to room near nurses station  Outcome: Progressing     Problem: MOBILITY - ADULT  Goal: Maintain or return to baseline ADL function  Description: INTERVENTIONS:  -  Assess patient's ability to carry out ADLs; assess patient's baseline for ADL function and identify physical deficits which impact ability to perform ADLs (bathing, care of mouth/teeth, toileting, grooming, dressing, etc )  - Assess/evaluate cause of self-care deficits   - Assess range of motion  - Assess patient's mobility; develop plan if impaired  - Assess patient's need for assistive devices and provide as appropriate  - Encourage maximum independence but intervene and supervise when necessary  - Involve family in performance of ADLs  - Assess for home care needs following discharge   - Consider OT consult to assist with ADL evaluation and planning for discharge  - Provide patient education as appropriate  Outcome: Progressing  Goal: Maintains/Returns to pre admission functional level  Description: INTERVENTIONS:  - Perform BMAT or MOVE assessment daily    - Set and communicate daily mobility goal to care team and patient/family/caregiver     - Collaborate with rehabilitation services on mobility goals if consulted  - Perform Range of Motion  times a day  - Reposition patient every  hours  - Dangle patient  times a day  - Stand patient  times a day  - Ambulate patient times a day  - Out of bed to chair  times a day   - Out of bed for meal times a day  - Out of bed for toileting  - Record patient progress and toleration of activity level   Outcome: Progressing     Problem: Prexisting or High Potential for Compromised Skin Integrity  Goal: Skin integrity is maintained or improved  Description: INTERVENTIONS:  - Identify patients at risk for skin breakdown  - Assess and monitor skin integrity  - Assess and monitor nutrition and hydration status  - Monitor labs   - Assess for incontinence   - Turn and reposition patient  - Assist with mobility/ambulation  - Relieve pressure over bony prominences  - Avoid friction and shearing  - Provide appropriate hygiene as needed including keeping skin clean and dry  - Evaluate need for skin moisturizer/barrier cream  - Collaborate with interdisciplinary team   - Patient/family teaching  - Consider wound care consult   Outcome: Progressing     Problem: Nutrition/Hydration-ADULT  Goal: Nutrient/Hydration intake appropriate for improving, restoring or maintaining nutritional needs  Description: Monitor and assess patient's nutrition/hydration status for malnutrition  Collaborate with interdisciplinary team and initiate plan and interventions as ordered  Monitor patient's weight and dietary intake as ordered or per policy  Utilize nutrition screening tool and intervene as necessary  Determine patient's food preferences and provide high-protein, high-caloric foods as appropriate       INTERVENTIONS:  - Monitor oral intake, urinary output, labs, and treatment plans  - Assess nutrition and hydration status and recommend course of action  - Evaluate amount of meals eaten  - Assist patient with eating if necessary   - Allow adequate time for meals  - Recommend/ encourage appropriate diets, oral nutritional supplements, and vitamin/mineral supplements  - Order, calculate, and assess calorie counts as needed  - Recommend, monitor, and adjust tube feedings and TPN/PPN based on assessed needs  - Assess need for intravenous fluids  - Provide specific nutrition/hydration education as appropriate  - Include patient/family/caregiver in decisions related to nutrition  Outcome: Progressing

## 2022-08-21 NOTE — PROGRESS NOTES
NEPHROLOGY PROGRESS NOTE   Alphonso Higuera 67 y o  male MRN: [id number]  Unit/Bed#: -60 Encounter: [id number]        ASSESSMENT & PLAN    78-year-old male who was admitted after presenting several symptoms complicated by acute on chronic hyponatremia     1  Acute on chronic hypotonic hyponatremia  ? With depressed mental stated obtunded was given 3% hypertonic boluses x2  ? Sodium 120 on presentation, now 127 which is about 7 mEq rise in 24 hours  ? Urine sodium was 90 in the setting of hydrochlorothiazide use serum osmolality was 252 urine awesome was high at 350  ? Had a recent colonoscopy and ruling out malignancy  ? More awake and alert however remains confused overnight but now on a 1-1 restraints have been removed  ? Now on a regular house diet with fluid restriction  ? Hydrochlorothiazide discontinued  ? Blood pressures have improved start salt tablets 2 g b i d  Check BMPs daily                2  Hypokalemia  ? Likely secondary to hydrochlorothiazide use and this will be discontinued  ? Appropriately repleted  ? Would give 40 mEq of potassium chloride as patient's potassium is 3 6     3  Resistant hypertension/hypertensive urgency  ? Now off nicardipine drip  ? Restarted lisinopril 40 mg daily  ? Metoprolol XL 25 mg daily  ? Nifedipine XL 30 mg twice daily  ? Blood pressures now better controlled, 140 over 70 to 137/72 although would be concerned about hypotension placed hold parameters on lisinopril and nifedipine for systolic blood pressure less than 130                4  Stage II CKD with a GFR of 70-80 mL/minute at baseline slightly rising creatinine likely Ace inhibition affect, avoid hypotension     5   Urinary retention-urology following and remains with a Nicole catheter      SUBJECTIVE:    Patient seen today he is less confused although still requiring a 1-1 moved to the floor blood pressure better controlled    12 point review of systems was otherwise negative besides what is mentioned above     Medications:    Current Facility-Administered Medications:     acetaminophen (TYLENOL) tablet 650 mg, 650 mg, Oral, Q6H PRN, Elizabeth Leary MD, 650 mg at 08/21/22 0411    ALPRAZolam (XANAX) tablet 1 mg, 1 mg, Oral, BID, Elizabeth Leary MD, 1 mg at 08/20/22 2020    amitriptyline (ELAVIL) tablet 25 mg, 25 mg, Oral, HS, Elizabeth Leary MD, 25 mg at 08/20/22 2022    aspirin (ECOTRIN LOW STRENGTH) EC tablet 81 mg, 81 mg, Oral, Daily, Elizabeth Leary MD, 81 mg at 08/20/22 0851    citalopram (CeleXA) tablet 20 mg, 20 mg, Oral, Daily, Elizabeth Leary MD, 20 mg at 08/20/22 0851    cycloSPORINE (RESTASIS) 0 05 % ophthalmic emulsion 1 drop, 1 drop, Both Eyes, BID, Elizabeth Leary MD, 1 drop at 08/20/22 2023    glycerin-hypromellose- (ARTIFICIAL TEARS) ophthalmic solution 1 drop, 1 drop, Both Eyes, BID, Elizabeth Leary MD, 1 drop at 08/20/22 1731    heparin (porcine) subcutaneous injection 5,000 Units, 5,000 Units, Subcutaneous, Q8H [address], Elizabeth Leary MD, 5,000 Units at 08/21/22 0500    hydrALAZINE (APRESOLINE) injection 5 mg, 5 mg, Intravenous, Q4H PRN, Elizabeth Leary MD, 5 mg at 08/20/22 1613    lisinopril (ZESTRIL) tablet 40 mg, 40 mg, Oral, Daily, Elizabeth Leary MD    metoprolol succinate (TOPROL-XL) 24 hr tablet 25 mg, 25 mg, Oral, Daily, Sincere Patel MD, 25 mg at 08/20/22 0851    nicotine (NICODERM CQ) 14 mg/24hr TD 24 hr patch 1 patch, 1 patch, Transdermal, Daily, Elizabeth Leary MD, 1 patch at 08/20/22 0854    NIFEdipine (PROCARDIA XL) 24 hr tablet 30 mg, 30 mg, Oral, BID, Elizabeth Leary MD, 30 mg at 08/20/22 2023    ondansetron (ZOFRAN) injection 4 mg, 4 mg, Intravenous, Q6H PRN, Elizabeth Leary MD    pancrelipase (Lip-Prot-Amyl) (CREON) delayed release capsule 24,000 Units, 24,000 Units, Oral, TID With Meals, Elizabeth Leary MD, 24,000 Units at 08/20/22 1731    pantoprazole (PROTONIX) EC tablet 40 mg, 40 mg, Oral, Early Morning, Sincere Patel MD, 40 mg at 08/21/22 0500   pravastatin (PRAVACHOL) tablet 40 mg, 40 mg, Oral, HS, Gracie Landeros MD, 40 mg at 08/20/22 2020    tamsulosin [facility]) capsule 0 4 mg, 0 4 mg, Oral, Daily With Kendra Tsang MD, 0 4 mg at 08/20/22 1613    OBJECTIVE:    Vitals:    08/20/22 2022 08/20/22 2253 08/21/22 0419 08/21/22 0643   BP: 145/76 123/66  136/72   Pulse: 85 83  85   Resp:  17  16   Temp: 98 2 °F (36 8 °C) 98 9 °F (37 2 °C)  97 6 °F (36 4 °C)   TempSrc:       SpO2: 95% 90%  92%   Weight:   46 1 kg (101 lb 10 1 oz)    Height:            Temp:  [97 6 °F (36 4 °C)-98 9 °F (37 2 °C)] 97 6 °F (36 4 °C)  HR:  [76-96] 85  Resp:  [16-34] 16  BP: (107-183)/(62-93) 136/72  SpO2:  [90 %-99 %] 92 %     Body mass index is 18 59 kg/m²  Weight (last 2 days)     Date/Time Weight    08/21/22 0419 46 1 (101 63)    08/19/22 1200 48 5 (106 92)    08/19/22 0600 48 5 (106 92)          I/O last 3 completed shifts: In: 879 8 [P O :840; I V :39 8]  Out: 1835 [Urine:1835]    No intake/output data recorded  Physical exam:    General:  Frail and elderly  Eyes: conjunctivae pink, anicteric sclerae  ENT: lips and mucous membranes moist, no exudates, normal external ears  Neck: ROM intact, no JVD  Chest: No respiratory distress, no accessory muscle use  CVS: normal rate, non pericardial friction rub  Abdomen: soft, non-tender, non-distended, normoactive bowel sounds  Extremities: no edema of both legs  Skin: no rash  Neuro: awake, alert, oriented, grossly intact  Psych:  Pleasant affect    Invasive Devices:   Urethral Catheter Temperature probe 16 Fr   (Active)   Reasons to continue Urinary Catheter  Acute urinary retention/obstruction failing urinary retention protocol 08/20/22 0741   Goal for Removal N/A- discharging with croft 08/20/22 0400   Site Assessment Bleeding 08/21/22 0401   Croft Care Done 08/20/22 2100   Collection Container Standard drainage bag 08/20/22 0741   Securement Method Securing device (Describe) 08/20/22 0741   Output (mL) 125 mL 08/21/22 0601     Lab Results:   Results from last 7 days   Lab Units 08/21/22  0652 08/20/22  2141 08/20/22  1357 08/20/22  0541 08/20/22  0538 08/19/22  2343 08/19/22  0852 08/19/22  0421 08/19/22  0151 08/18/22  1853 08/18/22  1845   WBC Thousand/uL 10 19*  --   --   --  12 03*  --   --  9 47  --   --  8 32   HEMOGLOBIN g/dL 10 2*  --   --   --  10 8*  --   --  9 6*  --   --  10 3*   HEMATOCRIT % 30 5*  --   --   --  33 3*  --   --  28 5*  --   --  29 3*   PLATELETS Thousands/uL 275  --   --   --  284  --   --  236 265  --  245   POTASSIUM mmol/L 3 6 3 3* 3 7 4 3  --  3 6   < > 3 0* 3 5  --  2 9*   CHLORIDE mmol/L 97 93* 95* 96  --  100   < > 85* 85*  --  82*   CO2 mmol/L 27 29 26 25  --  25   < > 29 29  --  28   BUN mg/dL 18 18 17 10  --  8   < > 7 6  --  8   CREATININE mg/dL 1 12 1 26 1 22 0 97  --  0 82   < > 1 00 0 99  --  1 05   CALCIUM mg/dL 8 3 8 1* 8 2* 8 4  --  7 1*   < > 8 2* 8 3  --  8 0*   MAGNESIUM mg/dL  --   --   --   --  1 5*  --   --   --   --   --  1 5*   PHOSPHORUS mg/dL  --   --   --   --  1 9*  --   --   --   --   --  2 2*   ALK PHOS U/L  --   --   --   --   --   --   --   --   --   --  96   ALT U/L  --   --   --   --   --   --   --   --   --   --  23   AST U/L  --   --   --   --   --   --   --   --   --   --  42   LEUKOCYTES UA   --   --   --   --   --   --   --   --   --  Negative  --    BLOOD UA   --   --   --   --   --   --   --   --   --  Trace*  --     < > = values in this interval not displayed  Portions of the record may have been created with voice recognition software  Occasional wrong word or "sound a like" substitutions may have occurred due to the inherent limitations of voice recognition software  Read the chart carefully and recognize, using context, where substitutions have occurred  If you have any questions, please contact the dictating provider

## 2022-08-21 NOTE — PROGRESS NOTES
[address] [facility]  Progress Note - David Dhaliwal 1950, 67 y o  male MRN: [id number]  Unit/Bed#: MS [address] Encounter: [id number]  Primary Care Provider: Latisha Gilliam MD   Date and time admitted to hospital: 8/18/2022  5:27 PM    * Hyponatremia  Assessment & Plan  Symptomatic  Patient presenting with nausea, abdominal pain, urinary retention, AMS s/p EGD and colonoscopy 1 day prior to presentation  Na 120, Serum Osom 252, Urine Osom 351, Urine Na 95  Acute on chronic  Multifactorial etiology in setting of volume depletion, urinary retention, hydrochlorothiazide use  · S/p hypertonic saline in the MICU for symptomatic hyponatremia with obtundation  Recent Labs     08/20/22  1357 08/20/22  2141 08/21/22  0652   SODIUM 129* 131* 130*     · More awake and alert today   · Nephrology following, HCTZ discontinued  · Continue regular diet with fluid restriction  · Continue with salt tablets   · BMP q 6     Hypertension  Assessment & Plan  Presenting with very high blood pressures in setting of not taking his BP meds on day of arrival  PTA meds include lisinopril-HCTZ, toprol-XL  Resistant hypertension/hypertensive urgency     · S/p cardene gtt in the MICU  · Cont home lisinopril and toprol-xl with holding parameters   · HCTZ discontinued   · Nifedipine 30 mg bid  · Monitor vitals as per unit protocol     Urinary retention  Assessment & Plan  POA   Per Urology, likely 2/2 weakness as a result of hyponatremia and underlying obstructive BPH  · Cont Flomax  · Continue finasteride   · Maintain croft catheter  · Cystoscopy with transurethral ultrasound to help with further surgical decision making , inpatient vs outpatient   · Urology following       Hypokalemia  Assessment & Plan  K 2 9 on admission, in setting of GI losses  Recent Labs     08/18/22  1845 08/19/22  0151 08/20/22  0538 08/20/22  0541 08/20/22  1357 08/20/22  2141 08/21/22  0652   K 2 9*   < >  --    < > 3 7 3 3* 3 6   MG 1 5*  --  1 5*  --   --   --   --     < > = values in this interval not displayed  · Trend and replete as needed  · HCTZ discontinued     Iron deficiency anemia  Assessment & Plan  S/p EGD/C-scope on 8/17 which showed large clean gastric ulcers and pancolonic diverticulosis, ulcerated stricture in ascending colon (inadequate prep)  Hgb stable  Recent Labs     08/19/22  0421 08/20/22  0538 08/21/22  0652   HGB 9 6* 10 8* 10 2*     · Trend H/H and transfuse for Hgb<7      Depression, recurrent (HCC)  Assessment & Plan  · Continue home celexa    Chronic kidney disease, stage 3 [facility])  Assessment & Plan  Lab Results   Component Value Date    EGFR 64 08/21/2022    EGFR 56 08/20/2022    EGFR 58 08/20/2022    CREATININE 1 12 08/21/2022    CREATININE 1 26 08/20/2022    CREATININE 1 22 08/20/2022     Cr 1 05 on admission  Plan  · Trend creatinine  · Nephrology following   History of endovascular stent graft for abdominal aortic aneurysm (AAA)  Assessment & Plan  CT AP 8/18 showing no evidence of endoleak  · Cont ASA, statin    Smoking  Assessment & Plan  Patient endorses 60+ year smoking history currently 1/2 packs daily  · Nicotine patch    Other chronic pancreatitis ([facility] [address] )  Assessment & Plan  Hx of pancreatic cyst s/p drainage  · Cont PTA creon      Altered mental status-resolved as of 8/21/2022  Assessment & Plan  Metabolic encephalopathy POA in the setting of hyponatremia  Found to be worsened, obtunded the following morning and transferred to MICU for hypertonic saline  Mental status has improved s/p correction of hyponatremia  · q4 neuro checks  · Treatment of hyponatremia as mentioned above    Irritable bowel syndrome with both constipation and diarrhea-resolved as of 8/19/2022  Assessment & Plan  -Chronic per patient  VTE Pharmacologic Prophylaxis:   VTE Score: 4 Moderate Risk (Score 3-4) - Pharmacological DVT Prophylaxis Ordered: Heparin      Mechanical VTE Prophylaxis in Place: Yes    Patient Centered Rounds: I have performed bedside rounds with nursing staff today  Discussions with Specialists or Other Care Team Provider: yes    Education and Discussions with Family / Patient: yes    Current Length of Stay: 3 day(s)    Current Patient Status: Inpatient     Discharge Plan / Estimated Discharge Date: Anticipate discharge tomorrow to home with home services  Code Status: Level 3 - DNAR and DNI      Subjective:   Seen and examine and bedside  Awake alert and oriented  No complaints today  Much better from prior days  No overnight events reported    Objective:     Vitals:   Temp (24hrs), Av 1 °F (36 7 °C), Min:97 6 °F (36 4 °C), Max:98 9 °F (37 2 °C)    Temp:  [97 6 °F (36 4 °C)-98 9 °F (37 2 °C)] 97 6 °F (36 4 °C)  HR:  [76-90] 90  Resp:  [16-31] 16  BP: (107-183)/(62-93) 136/74  SpO2:  [90 %-97 %] 93 %  Body mass index is 18 59 kg/m²  Input and Output Summary (last 24 hours): Intake/Output Summary (Last 24 hours) at 2022 1021  Last data filed at 2022 0900  Gross per 24 hour   Intake 620 ml   Output 600 ml   Net 20 ml       Physical Exam:     Physical Exam  Vitals reviewed  Constitutional:       Appearance: Normal appearance  HENT:      Head: Atraumatic  Eyes:      General: No scleral icterus  Cardiovascular:      Rate and Rhythm: Normal rate and regular rhythm  Heart sounds: Normal heart sounds  Pulmonary:      Effort: No respiratory distress  Breath sounds: Normal breath sounds  Abdominal:      General: Bowel sounds are normal    Musculoskeletal:      Cervical back: Neck supple  Right lower leg: No edema  Left lower leg: No edema  Skin:     General: Skin is warm and dry  Capillary Refill: Capillary refill takes less than 2 seconds  Neurological:      Mental Status: He is alert and oriented to person, place, and time  Mental status is at baseline     Psychiatric:         Mood and Affect: Mood normal           Additional Data:     Labs:  Results from last 7 days   Lab Units 08/21/22  0652   WBC Thousand/uL 10 19*   HEMOGLOBIN g/dL 10 2*   HEMATOCRIT % 30 5*   PLATELETS Thousands/uL 275   NEUTROS PCT % 75   LYMPHS PCT % 15   MONOS PCT % 8   EOS PCT % 1     Results from last 7 days   Lab Units 08/21/22  0652 08/19/22  0151 08/18/22  1845   SODIUM mmol/L 130*   < > 120*   POTASSIUM mmol/L 3 6   < > 2 9*   CHLORIDE mmol/L 97   < > 82*   CO2 mmol/L 27   < > 28   BUN mg/dL 18   < > 8   CREATININE mg/dL 1 12   < > 1 05   ANION GAP mmol/L 6   < > 10   CALCIUM mg/dL 8 3   < > 8 0*   ALBUMIN g/dL  --   --  3 2*   TOTAL BILIRUBIN mg/dL  --   --  0 29   ALK PHOS U/L  --   --  96   ALT U/L  --   --  23   AST U/L  --   --  42   GLUCOSE RANDOM mg/dL 100   < > 123    < > = values in this interval not displayed  Results from last 7 days   Lab Units 08/19/22  0845   POC GLUCOSE mg/dl 114         Results from last 7 days   Lab Units 08/20/22  2141   PROCALCITONIN ng/ml 0 06       Imaging: No pertinent imaging reviewed      Recent Cultures (last 7 days):           Lines/Drains:  Invasive Devices  Report    Peripheral Intravenous Line  Duration           Peripheral IV 08/18/22 Left Antecubital 2 days    Peripheral IV 08/19/22 Right Antecubital 2 days          Drain  Duration           Urethral Catheter Temperature probe 16 Fr  1 day                Telemetry:        Last 24 Hours Medication List:   Current Facility-Administered Medications   Medication Dose Route Frequency Provider Last Rate    acetaminophen  650 mg Oral Q6H PRN Rahel Gonzalez MD      ALPRAZolam  1 mg Oral BID Rahel Gonzalez MD      aspirin  81 mg Oral Daily Rahel Gonzalez MD      citalopram  20 mg Oral Daily Rahel Gonzalez MD      cycloSPORINE  1 drop Both Eyes BID Rahel Gonzalez MD      finasteride  5 mg Oral Daily Mar Florian MD      glycerin-hypromellose-  1 drop Both Eyes BID Rahel Gonzalez MD      heparin (porcine)  5,000 Units Subcutaneous [facility] [address] Janneth Thompson MD      lisinopril  40 mg Oral Daily Concetta Pandey, DO      metoprolol succinate  25 mg Oral Daily Janneth Thompson MD      nicotine  1 patch Transdermal Daily Janneth Thompson MD      NIFEdipine  30 mg Oral BID Concetta Pandey, DO      ondansetron  4 mg Intravenous Q6H PRN Janneth Thompson MD      pancrelipase (Lip-Prot-Amyl)  24,000 Units Oral TID With Meals Janneth Thompson MD      pantoprazole  40 mg Oral Early Morning Janneth Thompson MD      pravastatin  40 mg Oral HS Janneth Thompson MD      sodium chloride  2 g Oral BID Concetta Pandey,       tamsulosin  0 4 mg Oral Daily With Ras Root MD          Today, Patient Was Seen By: Cipriano Munguia MD    ** Please Note: This note has been constructed using a voice recognition system   **

## 2022-08-22 ENCOUNTER — TELEPHONE (OUTPATIENT)
Dept: VASCULAR SURGERY | Facility: CLINIC | Age: 72
End: 2022-08-22

## 2022-08-22 ENCOUNTER — HOME HEALTH ADMISSION (OUTPATIENT)
Dept: HOME HEALTH SERVICES | Facility: HOME HEALTHCARE | Age: 72
End: 2022-08-22
Payer: MEDICARE

## 2022-08-22 ENCOUNTER — TELEPHONE (OUTPATIENT)
Dept: OTHER | Facility: HOSPITAL | Age: 72
End: 2022-08-22

## 2022-08-22 LAB
ANION GAP SERPL CALCULATED.3IONS-SCNC: 6 MMOL/L (ref 4–13)
BASOPHILS # BLD AUTO: 0.06 THOUSANDS/ΜL (ref 0–0.1)
BASOPHILS NFR BLD AUTO: 1 % (ref 0–1)
BUN SERPL-MCNC: 21 MG/DL (ref 5–25)
CALCIUM SERPL-MCNC: 8.7 MG/DL (ref 8.3–10.1)
CHLORIDE SERPL-SCNC: 98 MMOL/L (ref 96–108)
CO2 SERPL-SCNC: 29 MMOL/L (ref 21–32)
CREAT SERPL-MCNC: 1.15 MG/DL (ref 0.6–1.3)
EOSINOPHIL # BLD AUTO: 0.2 THOUSAND/ΜL (ref 0–0.61)
EOSINOPHIL NFR BLD AUTO: 2 % (ref 0–6)
ERYTHROCYTE [DISTWIDTH] IN BLOOD BY AUTOMATED COUNT: 24.6 % (ref 11.6–15.1)
GFR SERPL CREATININE-BSD FRML MDRD: 62 ML/MIN/1.73SQ M
GLUCOSE SERPL-MCNC: 105 MG/DL (ref 65–140)
HCT VFR BLD AUTO: 32 % (ref 36.5–49.3)
HGB BLD-MCNC: 10.4 G/DL (ref 12–17)
IMM GRANULOCYTES # BLD AUTO: 0.08 THOUSAND/UL (ref 0–0.2)
IMM GRANULOCYTES NFR BLD AUTO: 1 % (ref 0–2)
LYMPHOCYTES # BLD AUTO: 2.04 THOUSANDS/ΜL (ref 0.6–4.47)
LYMPHOCYTES NFR BLD AUTO: 18 % (ref 14–44)
MAGNESIUM SERPL-MCNC: 1.4 MG/DL (ref 1.6–2.6)
MCH RBC QN AUTO: 26.7 PG (ref 26.8–34.3)
MCHC RBC AUTO-ENTMCNC: 32.5 G/DL (ref 31.4–37.4)
MCV RBC AUTO: 82 FL (ref 82–98)
MONOCYTES # BLD AUTO: 0.98 THOUSAND/ΜL (ref 0.17–1.22)
MONOCYTES NFR BLD AUTO: 8 % (ref 4–12)
NEUTROPHILS # BLD AUTO: 8.32 THOUSANDS/ΜL (ref 1.85–7.62)
NEUTS SEG NFR BLD AUTO: 70 % (ref 43–75)
NRBC BLD AUTO-RTO: 0 /100 WBCS
PLATELET # BLD AUTO: 308 THOUSANDS/UL (ref 149–390)
PMV BLD AUTO: 9.6 FL (ref 8.9–12.7)
POTASSIUM SERPL-SCNC: 3.5 MMOL/L (ref 3.5–5.3)
RBC # BLD AUTO: 3.9 MILLION/UL (ref 3.88–5.62)
SODIUM SERPL-SCNC: 133 MMOL/L (ref 135–147)
WBC # BLD AUTO: 11.68 THOUSAND/UL (ref 4.31–10.16)

## 2022-08-22 PROCEDURE — NC001 PR NO CHARGE: Performed by: INTERNAL MEDICINE

## 2022-08-22 PROCEDURE — 83735 ASSAY OF MAGNESIUM: CPT | Performed by: STUDENT IN AN ORGANIZED HEALTH CARE EDUCATION/TRAINING PROGRAM

## 2022-08-22 PROCEDURE — 85025 COMPLETE CBC W/AUTO DIFF WBC: CPT | Performed by: INTERNAL MEDICINE

## 2022-08-22 PROCEDURE — 97162 PT EVAL MOD COMPLEX 30 MIN: CPT

## 2022-08-22 PROCEDURE — 99232 SBSQ HOSP IP/OBS MODERATE 35: CPT | Performed by: INTERNAL MEDICINE

## 2022-08-22 PROCEDURE — 80048 BASIC METABOLIC PNL TOTAL CA: CPT | Performed by: INTERNAL MEDICINE

## 2022-08-22 PROCEDURE — 97166 OT EVAL MOD COMPLEX 45 MIN: CPT

## 2022-08-22 RX ORDER — POLYETHYLENE GLYCOL 3350 17 G/17G
17 POWDER, FOR SOLUTION ORAL DAILY
Status: DISCONTINUED | OUTPATIENT
Start: 2022-08-22 | End: 2022-08-23 | Stop reason: HOSPADM

## 2022-08-22 RX ORDER — DICYCLOMINE HYDROCHLORIDE 10 MG/1
10 CAPSULE ORAL 4 TIMES DAILY PRN
Status: DISCONTINUED | OUTPATIENT
Start: 2022-08-22 | End: 2022-08-23 | Stop reason: HOSPADM

## 2022-08-22 RX ORDER — ALPRAZOLAM 0.5 MG/1
0.5 TABLET ORAL 2 TIMES DAILY
Status: DISCONTINUED | OUTPATIENT
Start: 2022-08-22 | End: 2022-08-23 | Stop reason: HOSPADM

## 2022-08-22 RX ORDER — MAGNESIUM SULFATE HEPTAHYDRATE 40 MG/ML
2 INJECTION, SOLUTION INTRAVENOUS ONCE
Status: COMPLETED | OUTPATIENT
Start: 2022-08-22 | End: 2022-08-22

## 2022-08-22 RX ORDER — POTASSIUM CHLORIDE 20 MEQ/1
20 TABLET, EXTENDED RELEASE ORAL ONCE
Status: COMPLETED | OUTPATIENT
Start: 2022-08-22 | End: 2022-08-22

## 2022-08-22 RX ORDER — TORSEMIDE 5 MG/1
5 TABLET ORAL DAILY
Status: DISCONTINUED | OUTPATIENT
Start: 2022-08-22 | End: 2022-08-23 | Stop reason: HOSPADM

## 2022-08-22 RX ORDER — SENNOSIDES 8.6 MG
1 TABLET ORAL
Status: DISCONTINUED | OUTPATIENT
Start: 2022-08-22 | End: 2022-08-23 | Stop reason: HOSPADM

## 2022-08-22 RX ORDER — POTASSIUM CHLORIDE 20 MEQ/1
40 TABLET, EXTENDED RELEASE ORAL DAILY
Status: DISCONTINUED | OUTPATIENT
Start: 2022-08-22 | End: 2022-08-23 | Stop reason: HOSPADM

## 2022-08-22 RX ORDER — SODIUM CHLORIDE 1000 MG
1 TABLET, SOLUBLE MISCELLANEOUS 2 TIMES DAILY
Status: DISCONTINUED | OUTPATIENT
Start: 2022-08-22 | End: 2022-08-23

## 2022-08-22 RX ORDER — ALPRAZOLAM 0.5 MG/1
1 TABLET ORAL DAILY
Status: DISCONTINUED | OUTPATIENT
Start: 2022-08-23 | End: 2022-08-22

## 2022-08-22 RX ADMIN — HEPARIN SODIUM 5000 UNITS: 5000 INJECTION INTRAVENOUS; SUBCUTANEOUS at 21:20

## 2022-08-22 RX ADMIN — POTASSIUM CHLORIDE 20 MEQ: 1500 TABLET, EXTENDED RELEASE ORAL at 16:15

## 2022-08-22 RX ADMIN — CITALOPRAM HYDROBROMIDE 20 MG: 20 TABLET ORAL at 08:11

## 2022-08-22 RX ADMIN — METOPROLOL SUCCINATE 25 MG: 25 TABLET, EXTENDED RELEASE ORAL at 08:11

## 2022-08-22 RX ADMIN — POLYETHYLENE GLYCOL 3350 17 G: 17 POWDER, FOR SOLUTION ORAL at 13:56

## 2022-08-22 RX ADMIN — ALPRAZOLAM 0.5 MG: 0.5 TABLET ORAL at 08:15

## 2022-08-22 RX ADMIN — PANCRELIPASE 24000 UNITS: 24000; 76000; 120000 CAPSULE, DELAYED RELEASE PELLETS ORAL at 11:30

## 2022-08-22 RX ADMIN — TORSEMIDE 5 MG: 5 TABLET ORAL at 17:00

## 2022-08-22 RX ADMIN — POTASSIUM CHLORIDE 40 MEQ: 1500 TABLET, EXTENDED RELEASE ORAL at 08:20

## 2022-08-22 RX ADMIN — NICOTINE 1 PATCH: 14 PATCH, EXTENDED RELEASE TRANSDERMAL at 08:12

## 2022-08-22 RX ADMIN — DICYCLOMINE HYDROCHLORIDE 10 MG: 10 CAPSULE ORAL at 21:20

## 2022-08-22 RX ADMIN — ASPIRIN 81 MG: 81 TABLET, COATED ORAL at 08:11

## 2022-08-22 RX ADMIN — ALPRAZOLAM 0.5 MG: 0.5 TABLET ORAL at 21:20

## 2022-08-22 RX ADMIN — NIFEDIPINE 30 MG: 30 TABLET, FILM COATED, EXTENDED RELEASE ORAL at 08:20

## 2022-08-22 RX ADMIN — SENNOSIDES 8.6 MG: 8.6 TABLET, FILM COATED ORAL at 13:56

## 2022-08-22 RX ADMIN — HEPARIN SODIUM 5000 UNITS: 5000 INJECTION INTRAVENOUS; SUBCUTANEOUS at 05:07

## 2022-08-22 RX ADMIN — NIFEDIPINE 30 MG: 30 TABLET, FILM COATED, EXTENDED RELEASE ORAL at 21:20

## 2022-08-22 RX ADMIN — GLYCERIN 1 DROP: .002; .002; .01 SOLUTION/ DROPS OPHTHALMIC at 17:04

## 2022-08-22 RX ADMIN — PANCRELIPASE 24000 UNITS: 24000; 76000; 120000 CAPSULE, DELAYED RELEASE PELLETS ORAL at 08:11

## 2022-08-22 RX ADMIN — FINASTERIDE 5 MG: 5 TABLET, FILM COATED ORAL at 08:11

## 2022-08-22 RX ADMIN — LISINOPRIL 40 MG: 20 TABLET ORAL at 08:20

## 2022-08-22 RX ADMIN — SODIUM CHLORIDE TAB 1 GM 2 G: 1 TAB at 08:14

## 2022-08-22 RX ADMIN — SODIUM CHLORIDE TAB 1 GM 1 G: 1 TAB at 21:27

## 2022-08-22 RX ADMIN — MAGNESIUM SULFATE HEPTAHYDRATE 2 G: 40 INJECTION, SOLUTION INTRAVENOUS at 16:50

## 2022-08-22 RX ADMIN — GLYCERIN 1 DROP: .002; .002; .01 SOLUTION/ DROPS OPHTHALMIC at 08:13

## 2022-08-22 RX ADMIN — PANCRELIPASE 24000 UNITS: 24000; 76000; 120000 CAPSULE, DELAYED RELEASE PELLETS ORAL at 17:38

## 2022-08-22 RX ADMIN — TAMSULOSIN HYDROCHLORIDE 0.4 MG: 0.4 CAPSULE ORAL at 16:16

## 2022-08-22 RX ADMIN — PANTOPRAZOLE SODIUM 40 MG: 40 TABLET, DELAYED RELEASE ORAL at 05:07

## 2022-08-22 RX ADMIN — PRAVASTATIN SODIUM 40 MG: 20 TABLET ORAL at 21:20

## 2022-08-22 RX ADMIN — SENNOSIDES 8.6 MG: 8.6 TABLET, FILM COATED ORAL at 21:26

## 2022-08-22 RX ADMIN — CYCLOSPORINE 1 DROP: 0.5 EMULSION OPHTHALMIC at 21:23

## 2022-08-22 RX ADMIN — HEPARIN SODIUM 5000 UNITS: 5000 INJECTION INTRAVENOUS; SUBCUTANEOUS at 13:56

## 2022-08-22 RX ADMIN — CYCLOSPORINE 1 DROP: 0.5 EMULSION OPHTHALMIC at 08:15

## 2022-08-22 NOTE — PROGRESS NOTES
Progress Note - Nephrology   Bello Urbano Mcguire 67 y o  male MRN: [id number]  Unit/Bed#: -56 Encounter: [id number]    ASSESSMENT AND PLAN:  79-year-old male with history of AAA status post repair/PUD/chronic pancreatitis/CKD 3 presenting with difficulty swallowing abdominal pain dizziness decreased oral intake in preparation for colonoscopy and difficulty sleeping  Found to have severe hyponatremia:    1  Hyponatremia:  Clearly chronic with an acute component  Etiology felt in part volume depletion in part hydrochlorothiazide    Workup:  Serum osmolality 252 compatible true hypotonic hyponatremia/urine osmolality approximately 350/serum sodium 95 but in the setting of diuretics/normal a m  Cortisol/normal thyroid profile  -CT the head without any acute abnormality  -CT the abdomen pelvis demonstrates mild diffuse thickening of the descending rectosigmoid colon may be due to nonspecific colitis, distended bladder bilateral collecting system fullness  -recent colonoscopy without malignancy    Patient's sodium improved from 120 on 08/18/2022 at 02 73 91 27 04 with some 3% saline given symptoms 02/01/2027 08/20/2020 0200 hours  Current sodium improved to 133! Recommend:  -check a m  Cortisol for completeness  -check CT the chest for completeness to rule out other causes of possible SIADH given history of smoking history  Treatment:  · Fluid restriction 1500 mL per day  · Replete borderline hypokalemia  · Add torsemide 5 mg daily given hypertension and and may also help water excretion  · Taper sodium chloride to 1 g b i d  As we at the torsemide  · Solute intake with nutritional supplements  · PERMANENT AVOIDANCE OF THIAZIDE DIURETICS USE LOOP DIURETICS    ULTIMATE GOAL IS TO MAINTAIN SERUM SODIUM GREATER THAN 130 CHRONICALLY BUT CLOSER  TO ALLOW FOR BUFFER FOR AN ACUTE EVENT    2  Replete hypokalemia/hypomagnesemia    3  Resistant hypertension:  Still high normal   I would recommend a workup     · Thyroid profile normal  · Check plasma free metanephrines/aldosterone-renin ratio/renal artery duplex  Treatment  · Continue lisinopril 40 mg daily  · Nifedipine XL 30 mg twice a day  · Metoprolol XL 25 mg daily  · Add torsemide 5 mg daily  · Next option add spironolactone after ordering the aldosterone/renin ratio tomorrow given persistent hypokalemia    Discuss with medical team in particular regarding treatment for hyponatremia and workup for hypertension        Subjective:   Patient feels great alert and oriented! No nausea vomiting or diarrhea  No chest pain or shortness of breath  Urinating well    Objective:     Vitals: Blood pressure 162/96, pulse 88, temperature 98 4 °F (36 9 °C), resp  rate 18, height 5' 2" (1 575 m), weight 46 1 kg (101 lb 10 1 oz), SpO2 95 %  ,Body mass index is 18 59 kg/m²  Weight (last 2 days)     Date/Time Weight    08/21/22 0419 46 1 (101 63)            Intake/Output Summary (Last 24 hours) at 8/22/2022 1512  Last data filed at 8/22/2022 1300  Gross per 24 hour   Intake 670 ml   Output 3075 ml   Net -2405 ml       Urethral Catheter Temperature probe 16 Fr   (Active)   Reasons to continue Urinary Catheter  Acute urinary retention/obstruction failing urinary retention protocol 08/20/22 0741   Goal for Removal N/A- discharging with croft 08/20/22 0400   Site Assessment Bleeding 08/21/22 0401   Croft Care Done 08/22/22 0919   Collection Container Standard drainage bag 08/20/22 0741   Securement Method Securing device (Describe) 08/20/22 0741   Output (mL) 375 mL 08/22/22 1134       Physical Exam: General:  No acute distress  Skin:  No acute rash  Eyes:  No scleral icterus and noninjected  ENT:  Moist mucous membranes  Neck:  Supple, no jugular venous distention, trachea midline, overall appearance is normal  Chest:  Clear to auscultation  CVS:  Regular rate and rhythm, without a rub or gallops  Abdomen:  Normal bowel sounds, soft and nontender and nondistended  Extremities:  No edema, and no cyanosis, no significant arthritic changes  Neuro:  No gross focality  Psych:  Alert and oriented and appropriate! Medications:    Scheduled Meds:  Current Facility-Administered Medications   Medication Dose Route Frequency Provider Last Rate    acetaminophen  650 mg Oral Q6H PRN Maryetta Apley, MD      ALPRAZolam  0 5 mg Oral BID Sharyne Labrum, DO      aspirin  81 mg Oral Daily Maryetta Apley, MD      citalopram  20 mg Oral Daily Maryetta Apley, MD      cycloSPORINE  1 drop Both Eyes BID Maryetta Apley, MD      finasteride  5 mg Oral Daily Henry Balderas MD      glycerin-hypromellose-  1 drop Both Eyes BID Henry Balderas MD      glycerin-hypromellose-  1 drop Both Eyes Q6H PRN Henry Balderas MD      heparin (porcine)  5,000 Units Subcutaneous [facility] Maryetta Apley, MD      lisinopril  40 mg Oral Daily Pegge Milks, DO      metoprolol succinate  25 mg Oral Daily Maryetta Apley, MD      nicotine  1 patch Transdermal Daily Maryetta Apley, MD      NIFEdipine  30 mg Oral BID Pegge Milks, DO      ondansetron  4 mg Intravenous Q6H PRN Maryetta Apley, MD      pancrelipase (Lip-Prot-Amyl)  24,000 Units Oral TID With Meals Maryetta Apley, MD      pantoprazole  40 mg Oral Early Morning Maryetta Apley, MD      polyethylene glycol  17 g Oral Daily Caprice Logan, DO      potassium chloride  40 mEq Oral Daily Caprice Logan, DO      pravastatin  40 mg Oral HS Maryetta Apley, MD      senna  1 tablet Oral HS Sharon Ford, DO      sodium chloride  2 g Oral BID Pegge Milks, DO      tamsulosin  0 4 mg Oral Daily With Dalila Bates MD         PRN Meds:   acetaminophen    glycerin-hypromellose-    ondansetron    Continuous Infusions:     Lab, Imaging and other studies: I have personally reviewed pertinent labs    Laboratory Results:  Results from last 7 days   Lab Units 08/22/22  0434 08/21/22  8942 08/20/22  2141 08/20/22  1357 08/20/22  0541 08/20/22  5862 08/19/22  2343 08/19/22  1900 08/19/22  0852 08/19/22  0421 08/19/22  0151 08/18/22  1845   WBC Thousand/uL 11 68* 10 19*  --   --   --  12 03*  --   --   --  9 47  --  8 32   HEMOGLOBIN g/dL 10 4* 10 2*  --   --   --  10 8*  --   --   --  9 6*  --  10 3*   HEMATOCRIT % 32 0* 30 5*  --   --   --  33 3*  --   --   --  28 5*  --  29 3*   PLATELETS Thousands/uL 308 275  --   --   --  284  --   --   --  236 265 245   POTASSIUM mmol/L 3 5 3 6 3 3* 3 7 4 3  --  3 6 3 5   < > 3 0* 3 5 2 9*   CHLORIDE mmol/L 98 97 93* 95* 96  --  100 94*   < > 85* 85* 82*   CO2 mmol/L 29 27 29 26 25  --  25 27   < > 29 29 28   BUN mg/dL 21 18 18 17 10  --  8 9   < > 7 6 8   CREATININE mg/dL 1 15 1 12 1 26 1 22 0 97  --  0 82 1 05   < > 1 00 0 99 1 05   CALCIUM mg/dL 8 7 8 3 8 1* 8 2* 8 4  --  7 1* 7 8*   < > 8 2* 8 3 8 0*   MAGNESIUM mg/dL 1 4*  --   --   --   --  1 5*  --   --   --   --   --  1 5*   PHOSPHORUS mg/dL  --   --   --   --   --  1 9*  --   --   --   --   --  2 2*    < > = values in this interval not displayed  Urinalysis:   Lab Results   Component Value Date    COLORU Yellow 08/18/2022    CLARITYU Clear 08/18/2022    SPECGRAV 1 025 08/18/2022    SPECGRAV 1 010 08/18/2022    PHUR 7 0 08/18/2022    LEUKOCYTESUR Negative 08/18/2022    NITRITE Negative 08/18/2022    GLUCOSEU Negative 08/18/2022    KETONESU Negative 08/18/2022    BILIRUBINUR Negative 08/18/2022    BLOODU Trace (A) 08/18/2022     ABGs: No results found for: [facility]  Radiology review:     Portions of the record may have been created with voice recognition software  Occasional wrong word or "sound a like" substitutions may have occurred due to the inherent limitations of voice recognition software  Read the chart carefully and recognize, using context, where substitutions have occurred

## 2022-08-22 NOTE — OCCUPATIONAL THERAPY NOTE
Occupational Therapy Evaluation     Patient Name: Kristina Mcmillan  YCFIJ'I Date: 8/22/2022  Problem List  Principal Problem:    Hyponatremia  Active Problems:    Other chronic pancreatitis ([facility] [address] )    Smoking    History of endovascular stent graft for abdominal aortic aneurysm (AAA)    Chronic kidney disease, stage 3 (HCC)    Depression, recurrent ([facility] [address] )    Iron deficiency anemia    Hypokalemia    Urinary retention    Hypertension    Past Medical History  Past Medical History:   Diagnosis Date    Anxiety     Rhodes's esophagus     Chronic neck pain     Depression     GERD (gastroesophageal reflux disease)     H/O abdominal aortic aneurysm 4 5 cm    Hyperlipidemia     Hypertension     Pancreatic pseudocyst     Vertigo      Past Surgical History  Past Surgical History:   Procedure Laterality Date    COLONOSCOPY  09/30/2014    IR LOWER EXTREMITY / INTERVENTION  01/04/2019    [place] ESOPHAGOGASTRODUODENOSCOPY TRANSORAL DIAGNOSTIC N/A 06/22/2018    Procedure: EGD AND COLONOSCOPY;  Surgeon: Lu Ma MD;  [facility]; Service: Gastroenterology    WA EVASC RPR DPLMNT AORTO-AORTIC NDGFT N/A 02/09/2018    Procedure: REPAIR ANEURYSM ENDOVASCULAR ABDOMINAL AORTIC  (EVAR);   Surgeon: Tiana Auguste MD;  [facility];  Service: Vascular    UPPER GASTROINTESTINAL ENDOSCOPY           08/22/22 1200   OT Last Visit   OT Visit Date 08/22/22   Note Type   Note type Evaluation   Restrictions/Precautions   Weight Bearing Precautions Per Order No   Pain Assessment   Pain Assessment Tool 0-10   Pain Score No Pain   Home Living   Type of Home Mobile home   Home Layout One level;Stairs to enter with rails   Bathroom Shower/Tub Tub/shower unit   Bathroom Toilet Standard   Home Equipment Walker;Cane   Prior Function   Level of [place] Independent with ADLs and functional mobility   Lives With Significant other   Receives Help From Family   ADL Assistance Independent   IADLs Independent   Falls in the last 6 months 1 to 4   Vocational Retired   Lifestyle   Autonomy I adls and mobility w/o ad - i iadls - shares homemaking with family   Reciprocal Relationships supportive family - reports s/o is able to assist and her sister is also available   Service to Others retired    Intrinsic Gratification mostly sedentary   [address] "I might go home tomorrow"   ADL   [place] 5  [address] [facility] [address]  [address] [facility] [address]  [facility] [address]  5  [facility] [address]  5  Supervision/Setup   Bed Mobility   Additional Comments sitting on EOB on approach   Transfers   Sit to [address] to Sit 5  Supervision   Stand pivot 5  Supervision   Functional Mobility   Functional Mobility 5  Supervision   Additional items Rolling walker   Balance   Static Sitting [address] -   Activity Tolerance   Activity Tolerance Patient tolerated treatment well   RUE Assessment   RUE Assessment WFL   LUE Assessment   LUE Assessment WFL   Cognition   Arousal/Participation Alert; Cooperative   Attention Attends with cues to redirect   Orientation Level Oriented X4   Memory Decreased recall of precautions   Following Commands Follows one step commands without difficulty   Comments distractable but easily redirected   Assessment   Limitation Decreased endurance;Decreased self-care trans;Decreased high-level ADLs   Prognosis Good   Assessment Pt is a 67 y o  male who was admitted to [facility] on 8/18/2022 with Hyponatremia   Pt's problem list also includes PMH of previous surgery and hypovolemic hyponatremia, hypertensive urgency, urinary retention 2* BPH, CKD, chronic pancreatitis, IBS, depression anemia, nicotine dependence   At baseline pt was completing adls and mobility independently w/o ad - I iadls - shares homemaking with family  Pt lives with s/o and family in mobile home with 4 MARLEN  Currently pt requires sba for overall ADLS and sba for functional mobility/transfers  Pt currently presents with impairments in the following categories -steps to enter environment, difficulty performing IADLS  and environment activity tolerance, endurance and standing balance/tolerance  These impairments, as well as pt's fatigue and risk for falls  limit pt's ability to safely engage in all baseline areas of occupation, includingfunctional mobility/transfers, community mobility, laundry , house maintenance, meal prep, cleaning, social participation  and leisure activities however has supportive family who are able to provide assist prn -  From OT standpoint, recommend home with family support upon D/C  No immediate OT needs indicated at this time- ok for d/c home when medically cleared- d/c from caseload   Goals   Patient Goals walk more   Plan   OT Frequency Eval only   Recommendation   OT Discharge Recommendation No rehabilitation needs   AM-PAC Daily Activity Inpatient   Lower Body Dressing 3   Bathing 3   Toileting 4   Upper Body Dressing 4   Grooming 4   Eating 4   Daily Activity Raw Score 22   Daily Activity Standardized Score (Calc for Raw Score >=11) 47  1   AM-[facility] Applied Cognition Inpatient   Following a Speech/Presentation 3   Understanding Ordinary Conversation 4   Taking Medications 4   Remembering Where Things Are Placed or Put Away 3   Remembering List of 4-5 Errands 3   Taking Care of Complicated Tasks 3   Applied Cognition Raw Score 20   Applied Cognition Standardized Score 41 76     The patient's raw score on the AM-PAC Daily Activity inpatient short form is 22, standardized score is 47 1, greater than 39 4  Patients at this level are likely to benefit from discharge to home  Please refer to the recommendation of the Occupational Therapist for safe discharge planning      [place], [place]

## 2022-08-22 NOTE — ASSESSMENT & PLAN NOTE
POA  Per Urology, likely 2/2 weakness as a result of hyponatremia and underlying obstructive BPH  · High volume retention of unclear etiology  · Cont Flomax  · Continue finasteride   · Maintain croft catheter until seen by urology outpatient with plan for voiding trail in 1 week  · Urology f/u outpatient for discussion of procedural options

## 2022-08-22 NOTE — CASE MANAGEMENT
Case Management Discharge Planning Note    Patient name Jaylin Villa  Location /-96 MRN [id number]  : 1950 Date 2022       Current Admission Date: 2022  Current Admission Diagnosis:Hyponatremia   Patient Active Problem List    Diagnosis Date Noted    Hyponatremia 2022    Hypokalemia 2022    Urinary retention 2022    Hypertension 2022    Iron deficiency anemia 2022    Protein-calorie malnutrition, unspecified severity ([address] ) 06/15/2022    Depression, recurrent ([facility] [address] ) 06/15/2022    Chronic kidney disease, stage 3 ([facility] [address] ) 2022    History of endovascular stent graft for abdominal aortic aneurysm (AAA) 2021    Stenosis of other vascular prosthetic devices, implants and grafts, subsequent encounter 2021    Neurogenic claudication ([facility] [address] ) 2021    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 ([facility] [address] ) 2021    Other chronic pancreatitis ([facility] [address] ) 09/10/2018    Smoking 09/10/2018    Epigastric pain 2018    Diarrhea 2018    AAA (abdominal aortic aneurysm) without rupture ([facility] [address] ) 2018      LOS (days): 4  Geometric Mean LOS (GMLOS) (days): 2 60  Days to GMLOS:-1     OBJECTIVE:  Risk of Unplanned Readmission Score: 18 73         Current admission status: Inpatient   Preferred Pharmacy:   [facility]/pharmacy [id number] [place] [address]  Phone: [phone] Fax: [address] [address]  Phone: [phone] Fax: [phone]    Primary Care Provider: Efe Connors MD    Primary Insurance: MEDICARE  Secondary Insurance: [place] [facility]    DISCHARGE DETAILS:    Discharge planning discussed with[de-identified] Patient  Freedom of Choice: Yes        Were Treatment Team discharge recommendations reviewed with patient/caregiver?: Yes  Did patient/caregiver verbalize understanding of patient care needs?: Yes  Were patient/caregiver advised of the risks associated with not following Treatment Team discharge recommendations?: Yes         Requested  [address]         Is the patient interested in [facility] at discharge?: Yes  [address] requested[de-identified] Nursing, Occupational Therapy, Physical [address] Name[de-identified] [facility] Provider[de-identified] PCP  Home Health Services Needed[de-identified] Evaluate Functional Status and Safety, Gait/ADL Training, Urinary Incontinence Catheter Management, Strengthening/Theraputic Exercises to Improve Function  Homebound Criteria Met[de-identified] Uses an Assist Device (i e  cane, walker, etc)  Supporting Clincal Findings[de-identified] Limited Endurance    DME Referral Provided  Referral made for DME?: No    Other Referral/Resources/Interventions Provided:  Interventions: [facility]  Referral Comments: CM spoke with patient regarding therapy recommendations, agreeable to referral for [facility] for SN/PT/OT with care at home   [facility] able to accept  Treatment Team Recommendation: Home with  Trans[facility]  Discharge Destination Plan[de-identified] Home with  Transerv             Patient meets criteria for Care at Home services with Dr Jewels Whitaker acceptance  Notification to [facility]  Reviewed with patient  Patient declined initial MOW service with Care at Home program     IMM reviewed with patient, patient agrees with discharge determination  Patient stated his daughter or friend, Oleta Apgar will be able to provide transport at time of discharge  Patient and daughter made aware [facility] able to accept for SN PT OT  Daughter, Zeynep Jiménez, updated per patient request, stated she would prefer to pick patient up when discharged  Per [facility] B resident Dr Emery Riddle, patient anticipated discharge tomorrow

## 2022-08-22 NOTE — PHYSICAL THERAPY NOTE
Physical Therapy Evaluation     Patient's Name: Aki Gan    Admitting Diagnosis  Hypokalemia [E87 6]  Hyponatremia [E87 1]  Nausea [R11 0]  Abdominal pain [R10 9]  Generalized abdominal pain [R10 84]  Bladder outlet obstruction [N32 0]    Problem List  Patient Active Problem List   Diagnosis    AAA (abdominal aortic aneurysm) without rupture (HCC)    Epigastric pain    Diarrhea    Other chronic pancreatitis (HCC)    Smoking    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 ([facility] [address] )    History of endovascular stent graft for abdominal aortic aneurysm (AAA)    Stenosis of other vascular prosthetic devices, implants and grafts, subsequent encounter    Neurogenic claudication (HCC)    Chronic kidney disease, stage 3 ([facility] [address] )    Protein-calorie malnutrition, unspecified severity (HCC)    Depression, recurrent ([facility] [address] )    Iron deficiency anemia    Hyponatremia    Hypokalemia    Urinary retention    Hypertension       Past Medical History  Past Medical History:   Diagnosis Date    Anxiety     Rhodes's esophagus     Chronic neck pain     Depression     GERD (gastroesophageal reflux disease)     H/O abdominal aortic aneurysm 4 5 cm    Hyperlipidemia     Hypertension     Pancreatic pseudocyst     Vertigo        Past Surgical History  Past Surgical History:   Procedure Laterality Date    COLONOSCOPY  09/30/2014    IR LOWER EXTREMITY / INTERVENTION  01/04/2019    [place] ESOPHAGOGASTRODUODENOSCOPY TRANSORAL DIAGNOSTIC N/A 06/22/2018    Procedure: EGD AND COLONOSCOPY;  Surgeon: Mojgan Beauchamp MD;  [facility]; Service: Gastroenterology    [place] EVASC RPR DPLMNT AORTO-AORTIC NDGFT N/A 02/09/2018    Procedure: REPAIR ANEURYSM ENDOVASCULAR ABDOMINAL AORTIC  (EVAR);   Surgeon: Cooper Hammonds MD;  [facility];  Service: Vascular    UPPER GASTROINTESTINAL ENDOSCOPY          08/22/22 1201   PT Last Visit   PT Visit Date 08/22/22   Note Type   Note type Evaluation   Pain Assessment   Pain Assessment Tool 0-10   Pain Score No Pain Restrictions/Precautions   Weight Bearing Precautions Per Order No   Other Precautions Fall Risk   Home Living   Type of [address] [facility] One level  (5STE)   Bathroom Shower/Tub Tub/shower unit   [address]  (denies use PTA)   Prior Function   Level of [place] Independent with ADLs and functional mobility   Lives With Significant other  (girlfriend)   ADL Assistance Independent   IADLs Independent   Falls in the last 6 months 0   Vocational Retired   Comments +   General   Family/Caregiver Present No   Cognition   Overall Cognitive Status WFL   Arousal/Participation Alert   Orientation Level Oriented X4   Memory Within functional limits   Following Commands Follows all commands and directions without difficulty   Subjective   Subjective Pleasant and agreeable to particiapte in therapy session  Reports the walk feels good  RLE Assessment   RLE Assessment   (functionally 3+/5)   LLE Assessment   LLE Assessment   (functionally 3+/5)   Bed Mobility   Additional Comments Sitting EOB upon PT arrival   Pt left OOB in chair with chair alarm intact  Transfers   Sit to Stand 5  Supervision   Additional items Verbal cues; Increased time required   Stand to Sit 5  Supervision   Additional items Verbal cues   Additional Comments Transfers with RW   VC for hand placement and safety  Ambulation/Elevation   Gait pattern Excessively slow; Short stride; Foward flexed   Gait Assistance 5  Supervision  (Min A with RW)   Additional items Assist x 1;Verbal cues; Tactile cues   Assistive Device Rolling walker;None   Distance 100 ft with RW, 100 ft without AD   Stair Management Assistance 5  Supervision   Stair Management Technique Two rails; Step to pattern; Foreward   Number of Stairs 4   Balance   Static Sitting Fair +   Dynamic Sitting Fair   Static Standing Fair -   Dynamic Standing Fair -   Ambulatory Poor +   Endurance Deficit   Endurance Deficit No   Activity Tolerance   Activity Tolerance Patient tolerated treatment well   Medical Staff Made Aware OT Moody Dandy   Nurse Made Aware RN cleared pt to be seen by PT   Assessment   Prognosis Good   Assessment Pt seen for moderate complexity PT evaluation due to decrease in functional mobility status compared to baseline  Pt with active PT eval/treat orders at this time  Pt is a 67 y o  M who presented to [facility] with N/V, difficulty swallowing, abdominal pain, dizziness on 8/18/22  Pt primary dx is hyponatremia  Pt  has a past medical history of Anxiety, Rhodes's esophagus, Chronic neck pain, Depression, GERD (gastroesophageal reflux disease), H/O abdominal aortic aneurysm (4 5 cm), Hyperlipidemia, Hypertension, Pancreatic pseudocyst, and Vertigo  Pt resides with girlfriend in a trailer with 5STSE  Pt requires supervision for all mobility with RW at this time, Min A for mobility without AD  Recommend continued use of RW at this time  Pt left upright in bedside chair with chair alarm intact with all needs in reach  Pt will benefit from skilled therapy in order to address current impairments and functional limitations  PT to DC pt as pt has no further acute skilled PT needs and recommending HHPT once medically cleared  The patient's AM-PAC Basic Mobility Inpatient Short Form Raw Score is 18  A Raw score of greater than 16 suggests the patient may benefit from discharge to home  Please also refer to the recommendation of the Physical Therapist for safe discharge planning  Barriers to Discharge None   Goals   Patient Goals to walk more   Recommendation   PT Discharge Recommendation Home with home health rehabilitation   Equipment Recommended [place] walker   Change/add to [place]?  No   AM-PAC Basic Mobility Inpatient   Turning in Bed Without Bedrails 3   Lying on Back to Sitting on Edge of Flat Bed 3   Moving Bed to Chair 3   Standing Up From Chair 3   Walk in Room 3   Climb 3-5 Stairs 3   Basic Mobility Inpatient Raw Score 18   Basic Mobility Standardized Score 41 05   Highest Level Of Mobility   -HLM Goal 6: Walk 10 steps or more   JH-HLM Achieved 7: Walk 25 feet or more   Modified [place] Scale   Modified [place] Scale 2           Catherine Urbina, PT, DPT

## 2022-08-22 NOTE — PROGRESS NOTES
08/22/22 1400   Clinical Encounter Type   Visited With Patient   Routine Visit Introduction   Sacramental Encounters   Communion Given Indicator Yes   Sacrament of Sick-Anointing Anointed   Sacrament Other Other (Comment)  ([place] by Fr Patrice Jay)

## 2022-08-22 NOTE — TELEPHONE ENCOUNTER
Hospital followup [facility] urinary retention BPH, has croft for 800ml and chronic LUTS on 8/19  New rx sent  Needs void trial in about 1 week   Then MD visit for cysto/trus for outlet eval after

## 2022-08-22 NOTE — ASSESSMENT & PLAN NOTE
Acute on chronic 2/2 reduced dietary salt intake, EtOH, and recent colonoscopy bowel prep with decreased p o  intake after  -hypovolemic hyponatremia  -nephrology consulted this admission, will continue to follow-up outpatient for HTN and electrolytes  -HCTZ d/c  -salt tables 1 g bid  -repeat BMP in 3-5 days  -regular diet with 1 5L fluid restriction  -goal to keep Na+ 130-135 outpatient to allow a buffer in case of acute sodium drops like this again No

## 2022-08-22 NOTE — ASSESSMENT & PLAN NOTE
Presenting with very high blood pressures in setting of not taking his BP meds on day of arrival  PTA meds include lisinopril-HCTZ, toprol-XL  Resistant hypertension/hypertensive urgency     · S/p cardene gtt in the MICU  · HCTZ discontinued given hypoNa+  · New regimen as follows: · Nifedipine 30 mg bid  · Lisinopril 40 mg  · torsemide 5 mg qd    HTN lab w/u still pending at time of d/c   To be f/u outpatient by PCP/ nephrologist

## 2022-08-22 NOTE — ASSESSMENT & PLAN NOTE
S/p EGD/C-scope on 8/17 which showed large clean gastric ulcers and pancolonic diverticulosis, ulcerated stricture in ascending colon (inadequate prep)  Hgb stable  Recent Labs     08/20/22  0538 08/21/22  0652 08/22/22  0434   HGB 10 8* 10 2* 10 4*     · Stable at time of d/c   F/u outpatient

## 2022-08-22 NOTE — PROGRESS NOTES
INTERNAL MEDICINE RESIDENCY PROGRESS NOTE     Name: Rajiv Gaytan   Age & Sex: 67 y o  male   MRN: [id number]  Unit/Bed#: -[facility]   Encounter: [id number]  Team: SOD Team B     PATIENT INFORMATION     Name: Rajiv Gaytan   Age & Sex: 67 y o  male   MRN: [id number]  Hospital Stay Days: 4    ASSESSMENT/PLAN       * Hyponatremia  Assessment & Plan  Symptomatic  Patient presenting with nausea, abdominal pain, urinary retention, AMS s/p EGD and colonoscopy 1 day prior to presentation  Na 120, Serum Osom 252, Urine Osom 351, Urine Na 95  Acute on chronic  Multifactorial etiology in setting of volume depletion, urinary retention, hydrochlorothiazide use  · S/p hypertonic saline in the MICU for symptomatic hyponatremia with obtundation  ·   Recent Labs     08/20/22  2141 08/21/22  0652 08/22/22  0434   SODIUM 131* 130* 133*     · Continues to be awake and alert today  · Nephrology following, HCTZ discontinued  · Continue regular diet with fluid restriction  · Continue with salt tablets   · BMP q 6   · Sodium at baseline today  · CM arranging home PT/OT and nursing home aide for croft management  · F/o outpatient with PCP and urology   · Plan for discharge tomorrow    Hypertension  Assessment & Plan  Presenting with very high blood pressures in setting of not taking his BP meds on day of arrival  PTA meds include lisinopril-HCTZ, toprol-XL  Resistant hypertension/hypertensive urgency     · S/p cardene gtt in the MICU  · Cont home lisinopril and toprol-xl with holding parameters   · HCTZ discontinued   · Nifedipine 30 mg bid  · Monitor vitals as per unit protocol     Urinary retention  Assessment & Plan  POA   Per Urology, likely 2/2 weakness as a result of hyponatremia and underlying obstructive BPH  · Cont Flomax  · Continue finasteride   · Maintain croft catheter  · Cystoscopy with transurethral ultrasound to help with further surgical decision making , inpatient vs outpatient   · Urology following       Hypokalemia  Assessment & Plan  K 2 9 on admission, in setting of GI losses  Recent Labs     08/20/22  0538 08/20/22  0541 08/20/22  2141 08/21/22  0652 08/22/22  0434   K  --    < > 3 3* 3 6 3 5   MG 1 5*  --   --   --   --     < > = values in this interval not displayed  · Trend and replete as needed  · HCTZ discontinued     Iron deficiency anemia  Assessment & Plan  S/p EGD/C-scope on 8/17 which showed large clean gastric ulcers and pancolonic diverticulosis, ulcerated stricture in ascending colon (inadequate prep)  Hgb stable  Recent Labs     08/20/22  0538 08/21/22  0652 08/22/22  0434   HGB 10 8* 10 2* 10 4*     · Trend H/H and transfuse for Hgb<7      Depression, recurrent (HCC)  Assessment & Plan  · Continue home celexa    Chronic kidney disease, stage 3 [facility])  Assessment & Plan  Lab Results   Component Value Date    EGFR 62 08/22/2022    EGFR 64 08/21/2022    EGFR 56 08/20/2022    CREATININE 1 15 08/22/2022    CREATININE 1 12 08/21/2022    CREATININE 1 26 08/20/2022     Cr 1 05 on admission  Plan  · Trend creatinine  · Nephrology following   History of endovascular stent graft for abdominal aortic aneurysm (AAA)  Assessment & Plan  CT AP 8/18 showing no evidence of endoleak  · Cont ASA, statin    Smoking  Assessment & Plan  Patient endorses 60+ year smoking history currently 1/2 packs daily  · Nicotine patch    Other chronic pancreatitis ([facility] [address] )  Assessment & Plan  Hx of pancreatic cyst s/p drainage  · Cont PTA creon    Altered mental status-resolved as of 8/21/2022  Assessment & Plan  Metabolic encephalopathy POA in the setting of hyponatremia  Found to be worsened, obtunded the following morning and transferred to MICU for hypertonic saline  Mental status has improved s/p correction of hyponatremia    · q4 neuro checks  · Treatment of hyponatremia as mentioned above     Irritable bowel syndrome with both constipation and diarrhea-resolved as of 8/19/2022  Assessment & Plan  -Chronic per patient        Disposition: d/c tomorrow    SUBJECTIVE     Patient seen and examined  No acute events overnight  Awake, alert and cooperative to exam  Said he felt good and was tolerating meals well  Denied headache, fever/chills, nausea, vomiting  No recent BM  Plan for d/c home tomorrow with home PT/OT/VNA for croft management  Per urology croft is to be maintained until they see pt in office in 1-2 weeks given he had high-volume urinary retention  OBJECTIVE     Vitals:    22 1545 22 1634 22 2259 22 0724   BP: 123/67 141/84 135/78 162/96   BP Location:   Left arm    Pulse: 80 82 80 88   Resp: 18  16 18   Temp: (!) 97 2 °F (36 2 °C) (!) 97 2 °F (36 2 °C) 99 1 °F (37 3 °C) 98 4 °F (36 9 °C)   TempSrc:   Oral    SpO2: 95% 95% 93% 95%   Weight:       Height:          Temperature:   Temp (24hrs), Av °F (36 7 °C), Min:97 2 °F (36 2 °C), Max:99 1 °F (37 3 °C)    Temperature: 98 4 °F (36 9 °C)  Intake & Output:  I/O        0701   0700  0701   0700  0701   0700    P  O  440 520 180    I V  (mL/kg)       Total Intake(mL/kg) 440 (9 5) 520 (11 3) 180 (3 9)    Urine (mL/kg/hr) 660 (0 6) 2700 (2 4) 375 (1 3)    Total Output 660 2700 375    Net -220 -2180 -195               Weights:   IBW (Ideal Body Weight): 54 6 kg    Body mass index is 18 59 kg/m²  Weight (last 2 days)     Date/Time Weight    22 0419 46 1 (101 63)        Review of Systems   Constitutional: Negative for fatigue and fever  HENT: Negative for sore throat and trouble swallowing  Respiratory: Positive for cough  Negative for chest tightness and shortness of breath  Cardiovascular: Negative for chest pain and leg swelling  Gastrointestinal: Negative for abdominal pain, nausea and vomiting  Neurological: Negative for weakness and headaches  Psychiatric/Behavioral: Negative for confusion  Physical Exam  Vitals reviewed     Constitutional:       General: He is not in acute distress  HENT:      Mouth/Throat:      Mouth: Mucous membranes are moist       Pharynx: Oropharynx is clear  Eyes:      Extraocular Movements: Extraocular movements intact  Conjunctiva/sclera: Conjunctivae normal    Cardiovascular:      Rate and Rhythm: Normal rate and regular rhythm  Pulses: Normal pulses  Heart sounds: Normal heart sounds  Pulmonary:      Effort: Pulmonary effort is normal       Breath sounds: Normal breath sounds  Abdominal:      General: Abdomen is flat  Palpations: Abdomen is soft  Skin:     General: Skin is warm and dry  Neurological:      Mental Status: He is alert and oriented to person, place, and time  LABORATORY DATA     Labs: I have personally reviewed pertinent reports  Results from last 7 days   Lab Units 08/22/22  0434 08/21/22  0652 08/20/22  0538   WBC Thousand/uL 11 68* 10 19* 12 03*   HEMOGLOBIN g/dL 10 4* 10 2* 10 8*   HEMATOCRIT % 32 0* 30 5* 33 3*   PLATELETS Thousands/uL 308 275 284   NEUTROS PCT % 70 75 85*   MONOS PCT % 8 8 9      Results from last 7 days   Lab Units 08/22/22  0434 08/21/22  0652 08/20/22  2141 08/19/22  0151 08/18/22  1845   POTASSIUM mmol/L 3 5 3 6 3 3*   < > 2 9*   CHLORIDE mmol/L 98 97 93*   < > 82*   CO2 mmol/L 29 27 29   < > 28   BUN mg/dL 21 18 18   < > 8   CREATININE mg/dL 1 15 1 12 1 26   < > 1 05   CALCIUM mg/dL 8 7 8 3 8 1*   < > 8 0*   ALK PHOS U/L  --   --   --   --  96   ALT U/L  --   --   --   --  23   AST U/L  --   --   --   --  42    < > = values in this interval not displayed  Results from last 7 days   Lab Units 08/20/22  0538 08/18/22  1845   MAGNESIUM mg/dL 1 5* 1 5*     Results from last 7 days   Lab Units 08/20/22  0538 08/18/22  1845   PHOSPHORUS mg/dL 1 9* 2 2*                    IMAGING & DIAGNOSTIC TESTING     Radiology Results: I have personally reviewed pertinent reports  XR chest portable    Result Date: 8/21/2022  Impression: No acute cardiopulmonary disease   Workstation performed: [id number]     CT head wo contrast    Result Date: 8/19/2022  Impression: No acute intracranial abnormality  Chronic microangiopathic changes  Sinus disease  Workstation performed: [id number]     CT abdomen pelvis w contrast    Result Date: 8/18/2022  Impression: 1  Mild diffuse thickening of the descending and rectosigmoid colon may be due to nonspecific colitis  2   Distended bladder and bilateral collecting system fullness, correlate for bladder outlet obstruction  Workstation performed: [id number]     Other Diagnostic Testing: I have personally reviewed pertinent reports      ACTIVE MEDICATIONS     Current Facility-Administered Medications   Medication Dose Route Frequency    acetaminophen (TYLENOL) tablet 650 mg  650 mg Oral Q6H PRN    ALPRAZolam (XANAX) tablet 0 5 mg  0 5 mg Oral BID    aspirin (ECOTRIN LOW STRENGTH) EC tablet 81 mg  81 mg Oral Daily    citalopram (CeleXA) tablet 20 mg  20 mg Oral Daily    cycloSPORINE (RESTASIS) 0 05 % ophthalmic emulsion 1 drop  1 drop Both Eyes BID    finasteride (PROSCAR) tablet 5 mg  5 mg Oral Daily    glycerin-hypromellose- (ARTIFICIAL TEARS) ophthalmic solution 1 drop  1 drop Both Eyes BID    glycerin-hypromellose- (ARTIFICIAL TEARS) ophthalmic solution 1 drop  1 drop Both Eyes Q6H PRN    heparin (porcine) subcutaneous injection 5,000 Units  5,000 Units Subcutaneous Q8H [facility]    lisinopril (ZESTRIL) tablet 40 mg  40 mg Oral Daily    metoprolol succinate (TOPROL-XL) 24 hr tablet 25 mg  25 mg Oral Daily    nicotine (NICODERM CQ) 14 mg/24hr TD 24 hr patch 1 patch  1 patch Transdermal Daily    NIFEdipine (PROCARDIA XL) 24 hr tablet 30 mg  30 mg Oral BID    ondansetron (ZOFRAN) injection 4 mg  4 mg Intravenous Q6H PRN    pancrelipase (Lip-Prot-Amyl) (CREON) delayed release capsule 24,000 Units  24,000 Units Oral TID With Meals    pantoprazole (PROTONIX) EC tablet 40 mg  40 mg Oral Early Morning    polyethylene glycol (MIRALAX) packet 17 g  17 g Oral Daily    potassium chloride (K-DUR,KLOR-CON) CR tablet 40 mEq  40 mEq Oral Daily    pravastatin (PRAVACHOL) tablet 40 mg  40 mg Oral HS    senna (SENOKOT) tablet 8 6 mg  1 tablet Oral HS    sodium chloride tablet 2 g  2 g Oral BID    tamsulosin (FLOMAX) capsule 0 4 mg  0 4 mg Oral Daily With Dinner       VTE Pharmacologic Prophylaxis: Heparin  VTE Mechanical Prophylaxis: sequential compression device    Portions of the record may have been created with voice recognition software     ==  Sonja Back 2261 Internal Medicine PGY3

## 2022-08-23 ENCOUNTER — APPOINTMENT (INPATIENT)
Dept: NON INVASIVE DIAGNOSTICS | Facility: HOSPITAL | Age: 72
DRG: 640 | End: 2022-08-23
Payer: MEDICARE

## 2022-08-23 VITALS
BODY MASS INDEX: 18.33 KG/M2 | HEART RATE: 106 BPM | RESPIRATION RATE: 18 BRPM | TEMPERATURE: 98.3 F | SYSTOLIC BLOOD PRESSURE: 147 MMHG | OXYGEN SATURATION: 98 % | HEIGHT: 62 IN | DIASTOLIC BLOOD PRESSURE: 90 MMHG | WEIGHT: 99.6 LBS

## 2022-08-23 LAB
ANION GAP SERPL CALCULATED.3IONS-SCNC: 5 MMOL/L (ref 4–13)
BUN SERPL-MCNC: 22 MG/DL (ref 5–25)
CALCIUM SERPL-MCNC: 9 MG/DL (ref 8.3–10.1)
CHLORIDE SERPL-SCNC: 97 MMOL/L (ref 96–108)
CO2 SERPL-SCNC: 29 MMOL/L (ref 21–32)
CREAT SERPL-MCNC: 1.21 MG/DL (ref 0.6–1.3)
GFR SERPL CREATININE-BSD FRML MDRD: 59 ML/MIN/1.73SQ M
GLUCOSE SERPL-MCNC: 116 MG/DL (ref 65–140)
MAGNESIUM SERPL-MCNC: 2 MG/DL (ref 1.6–2.6)
POTASSIUM SERPL-SCNC: 3.9 MMOL/L (ref 3.5–5.3)
SODIUM SERPL-SCNC: 131 MMOL/L (ref 135–147)

## 2022-08-23 PROCEDURE — 84244 ASSAY OF RENIN: CPT | Performed by: INTERNAL MEDICINE

## 2022-08-23 PROCEDURE — 83835 ASSAY OF METANEPHRINES: CPT | Performed by: INTERNAL MEDICINE

## 2022-08-23 PROCEDURE — 93975 VASCULAR STUDY: CPT | Performed by: SURGERY

## 2022-08-23 PROCEDURE — 99238 HOSP IP/OBS DSCHRG MGMT 30/<: CPT | Performed by: INTERNAL MEDICINE

## 2022-08-23 PROCEDURE — 99232 SBSQ HOSP IP/OBS MODERATE 35: CPT | Performed by: INTERNAL MEDICINE

## 2022-08-23 PROCEDURE — 80048 BASIC METABOLIC PNL TOTAL CA: CPT | Performed by: STUDENT IN AN ORGANIZED HEALTH CARE EDUCATION/TRAINING PROGRAM

## 2022-08-23 PROCEDURE — 82088 ASSAY OF ALDOSTERONE: CPT | Performed by: INTERNAL MEDICINE

## 2022-08-23 PROCEDURE — 93975 VASCULAR STUDY: CPT

## 2022-08-23 PROCEDURE — 83735 ASSAY OF MAGNESIUM: CPT | Performed by: INTERNAL MEDICINE

## 2022-08-23 RX ORDER — SODIUM CHLORIDE 1000 MG
1 TABLET, SOLUBLE MISCELLANEOUS 2 TIMES DAILY
Qty: 90 TABLET | Refills: 0 | Status: SHIPPED | OUTPATIENT
Start: 2022-08-23 | End: 2022-10-06 | Stop reason: SDUPTHER

## 2022-08-23 RX ORDER — TORSEMIDE 5 MG/1
5 TABLET ORAL DAILY
Qty: 30 TABLET | Refills: 0 | Status: SHIPPED | OUTPATIENT
Start: 2022-08-23 | End: 2022-09-22 | Stop reason: SDUPTHER

## 2022-08-23 RX ORDER — LISINOPRIL 40 MG/1
40 TABLET ORAL DAILY
Qty: 60 TABLET | Refills: 0 | Status: SHIPPED | OUTPATIENT
Start: 2022-08-24 | End: 2022-09-06

## 2022-08-23 RX ORDER — SODIUM CHLORIDE 1000 MG
2 TABLET, SOLUBLE MISCELLANEOUS 2 TIMES DAILY
Status: DISCONTINUED | OUTPATIENT
Start: 2022-08-23 | End: 2022-08-23 | Stop reason: HOSPADM

## 2022-08-23 RX ORDER — MAGNESIUM SULFATE HEPTAHYDRATE 40 MG/ML
2 INJECTION, SOLUTION INTRAVENOUS ONCE
Status: DISCONTINUED | OUTPATIENT
Start: 2022-08-23 | End: 2022-08-23

## 2022-08-23 RX ORDER — POTASSIUM CHLORIDE 20 MEQ/1
40 TABLET, EXTENDED RELEASE ORAL DAILY
Qty: 60 TABLET | Refills: 0 | Status: SHIPPED | OUTPATIENT
Start: 2022-08-24

## 2022-08-23 RX ORDER — SENNOSIDES 8.6 MG
8.6 TABLET ORAL
Qty: 30 TABLET | Refills: 0 | Status: SHIPPED | OUTPATIENT
Start: 2022-08-23

## 2022-08-23 RX ORDER — FINASTERIDE 5 MG/1
5 TABLET, FILM COATED ORAL DAILY
Qty: 30 TABLET | Refills: 0 | Status: SHIPPED | OUTPATIENT
Start: 2022-08-24 | End: 2022-09-22 | Stop reason: SDUPTHER

## 2022-08-23 RX ORDER — SODIUM PHOSPHATE, DIBASIC AND SODIUM PHOSPHATE, MONOBASIC 7; 19 G/133ML; G/133ML
1 ENEMA RECTAL ONCE
Status: COMPLETED | OUTPATIENT
Start: 2022-08-23 | End: 2022-08-23

## 2022-08-23 RX ORDER — NIFEDIPINE 30 MG/1
30 TABLET, EXTENDED RELEASE ORAL 2 TIMES DAILY
Qty: 60 TABLET | Refills: 0 | Status: SHIPPED | OUTPATIENT
Start: 2022-08-23 | End: 2022-09-06

## 2022-08-23 RX ADMIN — PANCRELIPASE 24000 UNITS: 24000; 76000; 120000 CAPSULE, DELAYED RELEASE PELLETS ORAL at 12:03

## 2022-08-23 RX ADMIN — HEPARIN SODIUM 5000 UNITS: 5000 INJECTION INTRAVENOUS; SUBCUTANEOUS at 05:09

## 2022-08-23 RX ADMIN — POLYETHYLENE GLYCOL 3350 17 G: 17 POWDER, FOR SOLUTION ORAL at 09:15

## 2022-08-23 RX ADMIN — NICOTINE 1 PATCH: 14 PATCH, EXTENDED RELEASE TRANSDERMAL at 09:15

## 2022-08-23 RX ADMIN — LISINOPRIL 40 MG: 20 TABLET ORAL at 09:15

## 2022-08-23 RX ADMIN — ALPRAZOLAM 0.5 MG: 0.5 TABLET ORAL at 09:14

## 2022-08-23 RX ADMIN — NIFEDIPINE 30 MG: 30 TABLET, FILM COATED, EXTENDED RELEASE ORAL at 09:14

## 2022-08-23 RX ADMIN — GLYCERIN 1 DROP: .002; .002; .01 SOLUTION/ DROPS OPHTHALMIC at 09:17

## 2022-08-23 RX ADMIN — CITALOPRAM HYDROBROMIDE 20 MG: 20 TABLET ORAL at 09:14

## 2022-08-23 RX ADMIN — TORSEMIDE 5 MG: 5 TABLET ORAL at 12:03

## 2022-08-23 RX ADMIN — CYCLOSPORINE 1 DROP: 0.5 EMULSION OPHTHALMIC at 09:17

## 2022-08-23 RX ADMIN — SODIUM PHOSPHATE 1 ENEMA: 7; 19 ENEMA RECTAL at 11:00

## 2022-08-23 RX ADMIN — POTASSIUM CHLORIDE 40 MEQ: 1500 TABLET, EXTENDED RELEASE ORAL at 09:14

## 2022-08-23 RX ADMIN — ASPIRIN 81 MG: 81 TABLET, COATED ORAL at 09:14

## 2022-08-23 RX ADMIN — SODIUM CHLORIDE TAB 1 GM 1 G: 1 TAB at 09:15

## 2022-08-23 RX ADMIN — FINASTERIDE 5 MG: 5 TABLET, FILM COATED ORAL at 09:14

## 2022-08-23 RX ADMIN — PANTOPRAZOLE SODIUM 40 MG: 40 TABLET, DELAYED RELEASE ORAL at 05:09

## 2022-08-23 RX ADMIN — METOPROLOL SUCCINATE 25 MG: 25 TABLET, EXTENDED RELEASE ORAL at 09:14

## 2022-08-23 RX ADMIN — PANCRELIPASE 24000 UNITS: 24000; 76000; 120000 CAPSULE, DELAYED RELEASE PELLETS ORAL at 09:16

## 2022-08-23 NOTE — RESTORATIVE TECHNICIAN NOTE
Restorative Technician Note      Patient Name: David Dhaliwal     Note Type: Mobility  Patient Position Upon Consult: Bedside chair  Activity Performed: Ambulated  Assistive Device: Roller walker  Patient Position at End of Consult: Supine;  All needs within reach; Bed/Chair alarm activated

## 2022-08-23 NOTE — PROGRESS NOTES
Progress Note - Nephrology   Deleta Peng Mcguire 67 y o  male MRN: [id number]  Unit/Bed#: -[facility]in Encounter: [id number]      Assessment / Plan:  1  Acute on chronic hyponatremia-did receive hypertonic bolus initially, had been on HCTZ outpatient with recent colonoscopy to rule out malignancy  -hydrochlorothiazide discontinued  -will increase salt tabs from 1g BID to 2g BID  -continue 1 5 L per day fluid restriction as well as torsemide 5mg daily  -f/u am BMP    2  Hypokalemia - resolved off HCTZ    3  Anemia - Hgb mid 10s and stable, monitor CBC    4  HTN - BP acceptable in light of age, continue lisinopril 40 mg p o  daily, Toprol 25 mg p o  daily, nifedipine 30 mg p o  b i d , torsemide 5 mg p o  daily    5  Urinary retention - urology follows, +croft catheter in place    6  CKD stage 3a - slightly elevated sCr likely d/t ACEi affect    Subjective:   He denies nausea or diarrhea has constipation abdominal pain  He admits to decreased oral intake  He has felt dizzy  No other complaints  Objective:     Vitals: Blood pressure 147/90, pulse (!) 106, temperature 98 3 °F (36 8 °C), temperature source Oral, resp  rate 18, height 5' 2" (1 575 m), weight 45 2 kg (99 lb 9 6 oz), SpO2 98 %  ,Body mass index is 18 22 kg/m²  Temp (24hrs), Av 2 °F (36 8 °C), Min:97 9 °F (36 6 °C), Max:98 5 °F (36 9 °C)      Weight (last 2 days)     Date/Time Weight    22 0600 45 2 (99 6)    22 0419 46 1 (101 63)            Intake/Output Summary (Last 24 hours) at 2022 1354  Last data filed at 2022 0601  Gross per 24 hour   Intake 480 ml   Output 1600 ml   Net -1120 ml     I/O last 24 hours: In: 900 [P O :900]  Out:  [Urine:]        Physical Exam:   Physical Exam  Vitals reviewed  Constitutional:       General: He is not in acute distress  Appearance: He is well-developed  He is not diaphoretic  Comments: thin   HENT:      Head: Normocephalic and atraumatic        Nose: Nose normal  Mouth/Throat:      Mouth: Mucous membranes are moist       Pharynx: No oropharyngeal exudate  Eyes:      General: No scleral icterus  Right eye: No discharge  Left eye: No discharge  Comments: eyeglasses   Neck:      Thyroid: No thyromegaly  Cardiovascular:      Rate and Rhythm: Normal rate and regular rhythm  Heart sounds: Normal heart sounds  Pulmonary:      Effort: Pulmonary effort is normal       Breath sounds: Normal breath sounds  No wheezing or rales  Abdominal:      General: Bowel sounds are normal  There is no distension  Palpations: Abdomen is soft  Tenderness: There is no abdominal tenderness  Genitourinary:     Comments: croft  Musculoskeletal:         General: No swelling  Normal range of motion  Cervical back: Neck supple  Lymphadenopathy:      Cervical: No cervical adenopathy  Skin:     General: Skin is warm and dry  Findings: No rash  Neurological:      General: No focal deficit present  Mental Status: He is alert  Comments: awake   Psychiatric:         Mood and Affect: Mood normal          Behavior: Behavior normal          Invasive Devices  Report    Peripheral Intravenous Line  Duration           Peripheral IV 08/23/22 Ventral (anterior); Left Forearm <1 day          Drain  Duration           Urethral Catheter Temperature probe 16 Fr  3 days                Medications:    Scheduled Meds:  Current Facility-Administered Medications   Medication Dose Route Frequency Provider Last Rate    acetaminophen  650 mg Oral Q6H PRN Estelle Apple MD      ALPRAZolam  0 5 mg Oral BID Sharon Ford DO      aspirin  81 mg Oral Daily Estelle Apple MD      citalopram  20 mg Oral Daily Estelle Apple MD      cycloSPORINE  1 drop Both Eyes BID Estelle Apple MD      dicyclomine  10 mg Oral 4x Daily PRN Mitch Way MD      finasteride  5 mg Oral Daily Oren De La Cruz MD      glycerin-hypromellose-  1 drop Both Eyes BID Juancho Argueta MD      glycerin-hypromellose-  1 drop Both Eyes Q6H PRN Juancho Argueta MD      heparin (porcine)  5,000 Units Subcutaneous [facility] Kieran Fierro MD      lisinopril  40 mg Oral Daily Woomyrna Keith DO      [START ON 8/24/2022] magnesium oxide  400 mg Oral Daily Caprice Orlandoene Edward,       metoprolol succinate  25 mg Oral Daily Kieran Fierro MD      nicotine  1 patch Transdermal Daily Kieran Fierro MD      NIFEdipine  30 mg Oral BID Faiza Terry,       ondansetron  4 mg Intravenous Q6H PRN Kieran Fierro MD      pancrelipase (Lip-Prot-Amyl)  24,000 Units Oral TID With Meals Kieran Fierro MD      pantoprazole  40 mg Oral Early Morning Kieran Fierro MD      polyethylene glycol  17 g Oral Daily Caprice Logan, DO      potassium chloride  40 mEq Oral Daily Caprice Logan, DO      pravastatin  40 mg Oral HS Kieran Fierro MD      senna  1 tablet Oral HS Sharon Ford, DO      sodium chloride  1 g Oral BID Tammy Rasheed MD      tamsulosin  0 4 mg Oral Daily With Eric Nowak MD      torsemide  5 mg Oral Daily Tammy Rasheed MD         PRN Meds:   acetaminophen    dicyclomine    glycerin-hypromellose-    ondansetron    Continuous Infusions:         LAB RESULTS:      Results from last 7 days   Lab Units 08/23/22  0507 08/22/22  0434 08/21/22  0652 08/20/22  2141 08/20/22  1357 08/20/22  0541 08/20/22  0538 08/19/22  2343 08/19/22  0852 08/19/22  0421 08/19/22  0151 08/18/22  1845   WBC Thousand/uL  --  11 68* 10 19*  --   --   --  12 03*  --   --  9 47  --  8 32   HEMOGLOBIN g/dL  --  10 4* 10 2*  --   --   --  10 8*  --   --  9 6*  --  10 3*   HEMATOCRIT %  --  32 0* 30 5*  --   --   --  33 3*  --   --  28 5*  --  29 3*   PLATELETS Thousands/uL  --  308 275  --   --   --  284  --   --  236 265 245   NEUTROS PCT %  --  70 75  --   --   --  85*  --   --  85*  --  86*   LYMPHS PCT %  --  18 15  --   --   --  6*  --   --  6*  --  7*   MONOS PCT %  --  8 8  --   --   --  9  --   --  8  --  6   EOS PCT %  --  2 1  --   --   --  0  --   --  0  --  0   POTASSIUM mmol/L 3 9 3 5 3 6 3 3* 3 7 4 3  --  3 6   < > 3 0* 3 5 2 9*   CHLORIDE mmol/L 97 98 97 93* 95* 96  --  100   < > 85* 85* 82*   CO2 mmol/L 29 29 27 29 26 25  --  25   < > 29 29 28   BUN mg/dL 22 21 18 18 17 10  --  8   < > 7 6 8   CREATININE mg/dL 1 21 1 15 1 12 1 26 1 22 0 97  --  0 82   < > 1 00 0 99 1 05   CALCIUM mg/dL 9 0 8 7 8 3 8 1* 8 2* 8 4  --  7 1*   < > 8 2* 8 3 8 0*   ALK PHOS U/L  --   --   --   --   --   --   --   --   --   --   --  96   ALT U/L  --   --   --   --   --   --   --   --   --   --   --  23   AST U/L  --   --   --   --   --   --   --   --   --   --   --  42   MAGNESIUM mg/dL 2 0 1 4*  --   --   --   --  1 5*  --   --   --   --  1 5*   PHOSPHORUS mg/dL  --   --   --   --   --   --  1 9*  --   --   --   --  2 2*    < > = values in this interval not displayed  CUTURES:  Lab Results   Component Value Date    URINECX No Growth <1000 cfu/mL 12/19/2016                 Portions of the record may have been created with voice recognition software  Occasional wrong word or "sound a like" substitutions may have occurred due to the inherent limitations of voice recognition software  Read the chart carefully and recognize, using context, where substitutions have occurred  If you have any questions, please contact the dictating provider

## 2022-08-23 NOTE — DISCHARGE SUMMARY
INTERNAL MEDICINE RESIDENCY DISCHARGE SUMMARY     [facility]   67 y o  male  MRN: [id number]  Room/Bed: /-01     [address]   Encounter: [id number]    Principal Problem:    Hyponatremia  Active Problems:    Hypertension    Urinary retention    Other chronic pancreatitis ([facility] )    Smoking    History of endovascular stent graft for abdominal aortic aneurysm (AAA)    Chronic kidney disease, stage 3 (HCC)    Depression, recurrent (HCC)    Iron deficiency anemia    Hypokalemia      * Hyponatremia  Assessment & Plan  Acute on chronic 2/2 reduced dietary salt intake, EtOH, and recent colonoscopy bowel prep with decreased p o  intake after  -hypovolemic hyponatremia  -nephrology consulted this admission, will continue to follow-up outpatient for HTN and electrolytes  -HCTZ d/c  -salt tables 1 g bid  -repeat BMP in 3-5 days  -regular diet with 1 5L fluid restriction  -goal to keep Na+ 130-135 outpatient to allow a buffer in case of acute sodium drops like this again    Hypertension  Assessment & Plan  Presenting with very high blood pressures in setting of not taking his BP meds on day of arrival  PTA meds include lisinopril-HCTZ, toprol-XL  Resistant hypertension/hypertensive urgency     · S/p cardene gtt in the MICU  · HCTZ discontinued given hypoNa+  · New regimen as follows: · Nifedipine 30 mg bid  · Lisinopril 40 mg  · torsemide 5 mg qd    HTN lab w/u still pending at time of d/c  To be f/u outpatient by PCP/ nephrologist      Urinary retention  Assessment & Plan  POA   Per Urology, likely 2/2 weakness as a result of hyponatremia and underlying obstructive BPH  · High volume retention of unclear etiology  · Cont Flomax  · Continue finasteride   · Maintain croft catheter until seen by urology outpatient with plan for voiding trail in 1 week  · Urology f/u outpatient for discussion of procedural options      Hypokalemia  Assessment & Plan  K 2 9 on admission, in setting of GI losses  Recent Labs     08/20/22  0538 08/20/22  0541 08/20/22  2141 08/21/22  0652 08/22/22  0434   K  --    < > 3 3* 3 6 3 5   MG 1 5*  --   --   --   --     < > = values in this interval not displayed  · Trend and replete as needed  · HCTZ discontinued     Iron deficiency anemia  Assessment & Plan  S/p EGD/C-scope on 8/17 which showed large clean gastric ulcers and pancolonic diverticulosis, ulcerated stricture in ascending colon (inadequate prep)  Hgb stable  Recent Labs     08/20/22  0538 08/21/22  0652 08/22/22  0434   HGB 10 8* 10 2* 10 4*     · Stable at time of d/c  F/u outpatient      Dep[facility] ([facility] )  Assessment & Plan  · Continue home celexa    Chronic kidney disease, stage 3 (HCC)  Assessment & Plan  · Outpt BMP in 3-5 days   · Nephrology referral for outpatient f/u    History of endovascular stent graft for abdominal aortic aneurysm (AAA)  Assessment & Plan  CT AP 8/18 showing no evidence of endoleak  · Cont ASA, statin    Smoking  Assessment & Plan  Patient endorses 60+ year smoking history currently 1/2 packs daily  · Nicotine patch    Other chronic pancreatitis ([facility] )  Assessment & Plan  Hx of pancreatic cyst s/p drainage  · Cont PTA creon        DETAILS OF HOSPITAL COURSE     68 yo male with PMH of AAA s/p repair, PUD, IBS, chronic pancreatitis, CKD3, RUBENS presented to [facility] ED in acute distress with nausea , suprapubic abdominal pain, dizziness, urinary retention, AMS s/p EDS and colonoscopy 1 day prior to admission  Admission labs: Na 120, K 2 9, Cl 82, HCO3 28, Mg 1 5, Phos 2 2, Hgb 10 3, MCV 77  Urine spec grav 1 010, Urine Na 95, Uosm 351, Sosm 252  UA showed trace blood  Nephrology consulted for management of Na correction of acute on chronic hyponatermia with AMS  Given hypertonic saline in the MICU with fluid restriction  Corrected electrolyte imbalance  Na improved (120 > 122> 123>126>128 > 131> 127> 129  131> 130> 133> 131)  Mental status improved after electrolyte corrections  Imagin/18 CT AP showed mild diffuse thickening of the decending and rectosigmoid colon maybe due to nonspecific colitis  Distended bladder and bilateral collecting system fullness   CT head wo contrast showed no acte intracranial abnormality  Chronic microangiopathic chranges   CXR showed no acute cardiopulmonary disease  Given labetolol in ED for SBP > 180, started on cardene gtt in MICU then transitioned to home BP meds  Patient received IV NS in ED, bladder scanned, straight cathed for urinary retenetion  Urology consulted for management of croft catheter in the setting of BPH and urinary retenetion  Patient discontinued on lisinopril-hydrochlorothiazide  Started on lisinopril 40 mg daily, with nifedipine 30 mg twice a day, and torsemide 5 mg daily  Also started on finasteride 5 mg daily and a daily potassium supplement with daily magnesium  Salt tablets continued 1 g twice a day  Fluid restriction in place, plain water 1 5 L a day until changed by nephrologist      Follow up with urologist in one week post discharge for voiding trial  Until then, croft catheter will stay in place  Home health aide will help with management of catheter  Follow up with PCP for continued blood pressure checks  And electrolyte monitoring  Repeat BMP in 3-5 days post discharge  At time of discharge patient was without fever/chills, abd pain, n/v/d, HA, CP, SOB  Physical Exam  Vitals reviewed  Constitutional:       General: He is not in acute distress  HENT:      Head: Normocephalic  Mouth/Throat:      Mouth: Mucous membranes are moist       Pharynx: Oropharynx is clear  Eyes:      Conjunctiva/sclera: Conjunctivae normal    Cardiovascular:      Rate and Rhythm: Regular rhythm  Tachycardia present  Pulses: Normal pulses  Heart sounds: Normal heart sounds     Pulmonary:      Effort: Pulmonary effort is normal       Breath sounds: Normal breath sounds  Abdominal:      General: Abdomen is flat  Palpations: Abdomen is soft  Skin:     General: Skin is warm and dry  Neurological:      Mental Status: He is alert  Mental status is at baseline  DISCHARGE INFORMATION     PCP at Discharge:  aPdmini Chavez MD    Admitting Provider: Padmini Chavez MD  Admission Date: 8/18/2022    Discharge Provider: Padmini Chavez MD  Discharge Date: 8/23/22    Discharge Disposition: Home/Self Care  Discharge Condition: stable  Discharge with Lines: d/c with croft cath  Discharge Diet: regular diet with 1 5L fluid restriction  Activity Restrictions: none  Test Results Pending at Discharge: HTN w/u labs    Discharge Diagnoses:  Principal Problem:    Hyponatremia  Active Problems:    Hypertension    Urinary retention    Other chronic pancreatitis ([facility] )    Smoking    History of endovascular stent graft for abdominal aortic aneurysm (AAA)    Chronic kidney disease, stage 3 (HCC)    Depression, recurrent (HCC)    Iron deficiency anemia    Hypokalemia  Resolved Problems:    Irritable bowel syndrome with both constipation and diarrhea    Altered mental status      Consulting Providers: ICU, nephrology, urology    Diagnostic & Therapeutic Procedures Performed:  XR chest portable    Result Date: 8/21/2022  Impression: No acute cardiopulmonary disease  Workstation performed: [id number]     CT head wo contrast    Result Date: 8/19/2022  Impression: No acute intracranial abnormality  Chronic microangiopathic changes  Sinus disease  Workstation performed: [id number]     CT abdomen pelvis w contrast    Result Date: 8/18/2022  Impression: 1  Mild diffuse thickening of the descending and rectosigmoid colon may be due to nonspecific colitis  2   Distended bladder and bilateral collecting system fullness, correlate for bladder outlet obstruction   Workstation performed: [id number]       Code Status: Level 3 - DNAR and DNI  Advance Directive & Living Will: Received  Power of :    POLST:      Medications:  Current Discharge Medication List      STOP taking these medications       lisinopril-hydrochlorothiazide (PRINZIDE,ZESTORETIC) 20-12 5 MG per tablet Comments:   Reason for Stopping:         magnesium citrate (CITROMA) 1 745 g/30 mL oral solution Comments:   Reason for Stopping:         polyethylene glycol (GLYCOLAX) 17 GM/SCOOP powder Comments:   Reason for Stopping:         polyethylene glycol-electrolytes (TriLyte) 4000 mL solution Comments:   Reason for Stopping:             Current Discharge Medication List      START taking these medications    Details   finasteride (PROSCAR) 5 mg tablet Take 1 tablet (5 mg total) by mouth daily  Qty: 30 tablet, Refills: 0    Associated Diagnoses: Bladder outlet obstruction; Urinary retention with incomplete bladder emptying      lisinopril (ZESTRIL) 40 mg tablet Take 1 tablet (40 mg total) by mouth daily  Qty: 60 tablet, Refills: 0    Associated Diagnoses: Hypertension      magnesium oxide (MAG-OX) 400 mg Take 1 tablet (400 mg total) by mouth daily  Qty: 30 tablet, Refills: 0    Associated Diagnoses: Hypokalemia; Hypertension      NIFEdipine (PROCARDIA XL) 30 mg 24 hr tablet Take 1 tablet (30 mg total) by mouth 2 (two) times a day  Qty: 60 tablet, Refills: 0    Associated Diagnoses: Hypertension      potassium chloride (K-DUR,KLOR-CON) 20 mEq tablet Take 2 tablets (40 mEq total) by mouth daily  Qty: 60 tablet, Refills: 0    Associated Diagnoses: Hypokalemia;  Hypertension      senna (SENOKOT) 8 6 mg Take 1 tablet (8 6 mg total) by mouth daily at bedtime  Qty: 30 tablet, Refills: 0    Associated Diagnoses: Irritable bowel syndrome with both constipation and diarrhea      torsemide (DEMADEX) 5 MG tablet Take 1 tablet (5 mg total) by mouth daily  Qty: 30 tablet, Refills: 0    Associated Diagnoses: Hypertension           Current Discharge Medication List      CONTINUE these medications which have NOT CHANGED    Details ALPRAZolam (XANAX) 1 mg tablet Take 1 tablet (1 mg total) by mouth 2 (two) times a day as needed (transient anxiety)  Qty: 60 tablet, Refills: 0    Comments: Not to exceed 5 additional fills before 02/05/2022 DX Code Needed    Associated Diagnoses: Anxiety      amitriptyline (ELAVIL) 25 mg tablet Take 1 tablet (25 mg total) by mouth daily at bedtime  Qty: 30 tablet, Refills: 4    Associated Diagnoses: Depression, unspecified depression type      aspirin (ECOTRIN LOW STRENGTH) 81 mg EC tablet Take 81 mg by mouth daily      citalopram (CeleXA) 20 mg tablet TAKE 1 TABLET BY MOUTH EVERY DAY  Qty: 90 tablet, Refills: 0    Associated Diagnoses: Reactive depression      dicyclomine (BENTYL) 10 mg capsule TAKE 1 CAPSULE BY MOUTH 3 TIMES A DAY AS NEEDED (ABDOMINAL CRAMPING/ SPASMS)  Qty: 270 capsule, Refills: 2    Associated Diagnoses: Lower abdominal pain      Linaclotide (LINZESS) 72 MCG CAPS Take 1 capsule by mouth daily  Qty: 30 capsule, Refills: 5    Comments: PT NEEDS REFILLS  Associated Diagnoses: Irritable bowel syndrome with both constipation and diarrhea      metoprolol succinate (TOPROL-XL) 25 mg 24 hr tablet Take 1 tablet (25 mg total) by mouth in the morning  Qty: 90 tablet, Refills: 1    Associated Diagnoses: Essential hypertension      Multiple Vitamins-Minerals (CENTRUM SILVER PO) Take 1 tablet by mouth daily        omeprazole (PriLOSEC) 40 MG capsule Take 1 capsule (40 mg total) by mouth 2 (two) times a day before meals  Qty: 60 capsule, Refills: 3    Associated Diagnoses: Multiple gastric ulcers      pancrelipase, Lip-Prot-Amyl, (Creon) 12,000 units capsule Take 2 capsules (24,000 Units total) by mouth 3 (three) times a day with meals  Qty: 540 capsule, Refills: 1    Comments: DX Code Needed      Associated Diagnoses: Pancreatic insufficiency      polyethylene glycol (MIRALAX) 17 g packet Take 17 g by mouth      pravastatin (PRAVACHOL) 40 mg tablet Take 1 tablet (40 mg total) by mouth daily at bedtime  Qty: 90 tablet, Refills: 2    Associated Diagnoses: Hyperlipidemia, unspecified      ranitidine (ZANTAC) 150 mg tablet TAKE 1 TABLET EVERY DAY IN THE EVENING  Qty: 90 tablet, Refills: 2    Associated Diagnoses: GERD without esophagitis      Restasis 0 05 % ophthalmic emulsion       tamsulosin (FLOMAX) 0 4 mg TAKE 1 CAPSULE BY MOUTH EVERY DAY  Qty: 90 capsule, Refills: 1    Associated Diagnoses: Benign prostatic hyperplasia with lower urinary tract symptoms, symptom details unspecified      Wheat Dextrin (BENEFIBER PO) Take by mouth daily as needed               Allergies:  No Known Allergies    FOLLOW-UP     PCP Outpatient Follow-up:  1-2 weeks    Consulting Providers Follow-up:  Urology in 1 week for voiding trial  nephrology    Active Issues Requiring Follow-up:   Urinary retention with croft  Chronic hyponatremia  HTN  CKD    Discharge Statement:   I spent 1 hour minutes discharging the patient  This time was spent on the day of discharge  I had direct contact with the patient on the day of discharge  Additional documentation is required if more than 30 minutes were spent on discharge  Portions of the record may have been created with voice recognition software  Occasional wrong word or "sound a like" substitutions may have occurred due to the inherent limitations of voice recognition software    Read the chart carefully and recognize, using context, where substitutions have occurred     ==  Sharon Guerrero, DO  [address]  Internal Medicine Resident PGY-3

## 2022-08-23 NOTE — CASE MANAGEMENT
Case Management Discharge Planning Note    Patient name Dana Pinto  Location /-84 MRN [id number]  : 1950 Date 2022       Current Admission Date: 2022  Current Admission Diagnosis:Hyponatremia   Patient Active Problem List    Diagnosis Date Noted    Hyponatremia 2022    Hypokalemia 2022    Urinary retention 2022    Hypertension 2022    Iron deficiency anemia 2022    Protein-calorie malnutrition, unspecified severity ([facility] ) 06/15/2022    Depression, recurrent ([facility] ) 06/15/2022    Chronic kidney disease, stage 3 ([facility] ) 2022    History of endovascular stent graft for abdominal aortic aneurysm (AAA) 2021    Stenosis of other vascular prosthetic devices, implants and grafts, subsequent encounter 2021    Neurogenic claudication ([facility] ) 2021    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    Other chronic pancreatitis ([facility] ) 09/10/2018    Smoking 09/10/2018    Epigastric pain 2018    Diarrhea 2018    AAA (abdominal aortic aneurysm) without rupture ([facility] ) 2018      LOS (days): 5  Geometric Mean LOS (GMLOS) (days): 3 30  Days to GMLOS:-1 3     OBJECTIVE:  Risk of Unplanned Readmission Score: 17 71         Current admission status: Inpatient   Preferred Pharmacy:   [facility]/pharmacy #[id number] [address]  Phone: [phone] Fax: [address] [address]  Phone: [phone] Fax: [phone]    Primary Care Provider: Leelee Sim MD    Primary Insurance: MEDICARE  Secondary Insurance: [facility]    DISCHARGE DETAILS:    Discharge planning discussed with[de-identified] Patient, daughter Lorene Dakin of Choice: Yes     CM contacted family/caregiver?: Yes  Were Treatment Team discharge recommendations reviewed with patient/caregiver?: Yes  Did patient/caregiver verbalize understanding of patient care needs?: Yes  Were patient/caregiver advised of the risks associated with not following Treatment Team discharge recommendations?: Yes    Contacts  Patient Contacts: Corey Webb  Relationship to Patient[de-identified] Family  Contact Method: Phone  Phone Number: [phone]  Reason/Outcome: Discharge Planning              Other Referral/Resources/Interventions Provided:  Referral Comments: Per [facility] resident Dr Aylin Mccormack, patient ready for discharge  CM spoke with patient's daughter to update on discharge, daughter stated she will be at hospital at 1500 to pick patient up           Treatment Team Recommendation: Home with [address] [place] [address]  Discharge Destination Plan[de-identified] Home with [place] at Discharge : Family

## 2022-08-23 NOTE — DISCHARGE INSTR - AVS FIRST PAGE
-please be sure you follow-up with your urologist in 1 week for a voiding trial to see if the urinary croft catheter can be left out  Please keep the catheter in until your urologist says you can remove it  Please call their office to ensure you can go in for a voiding trial in a week   -please schedule an appointment with your PCP and also the kidney doctor to continue having your blood pressure and electrolytes monitored  -in 3-5 days please go to the lab to have your blood electrolytes checked  -you are being discharged with salt tablets which you should be taking twice a day  Please be sure you are eating a normal, balanced diet  Given that your sodium level was so low, it is advised that you LIMIT plain water to 1 5 liters a day  However, if you are taking in extra electrolytes with your fluid, such as gatorade, and eating a normal diet, with guidance from the kidney doctor (nephrologist) and PCP this fluid restriction may be able to be liberalized in the future  -please note that there have been changes to your blood pressure medications  STOP Taking your combination lisinopril-hydrochlorothiazide  Instead, START taking lisinopril 40 mg daily (a single medication), along with nifedipine 30 mg twice ad ay and torsemide 5 mg daily    -You are also to now be taking finasteride 5 mg daily for your prostate  -you need to take a daily potassium supplement (40 mEq) daily, along with daily magnesium, until otherwise specified   Your salt tablet is not 1 g twice a day

## 2022-08-24 ENCOUNTER — TELEPHONE (OUTPATIENT)
Dept: NEPHROLOGY | Facility: CLINIC | Age: 72
End: 2022-08-24

## 2022-08-24 ENCOUNTER — TRANSITIONAL CARE MANAGEMENT (OUTPATIENT)
Dept: INTERNAL MEDICINE CLINIC | Facility: CLINIC | Age: 72
End: 2022-08-24

## 2022-08-24 ENCOUNTER — HOME CARE VISIT (OUTPATIENT)
Dept: HOME HEALTH SERVICES | Facility: HOME HEALTHCARE | Age: 72
End: 2022-08-24

## 2022-08-24 NOTE — TELEPHONE ENCOUNTER
Received a referral for patient to be seen, but patient saw our nephrologist in the hosp  I called to schedule a hosp follow up but patient was unavailable   I will call back later  This is the first attempt

## 2022-08-24 NOTE — TELEPHONE ENCOUNTER
Spoke with Johnathan Levy- galo- and scheduled hfu appointment for 10/10 with Marlena Shine in the [facility]

## 2022-08-24 NOTE — TELEPHONE ENCOUNTER
Called Leroy Rodney and scheduled Maggie Moore for next Wednesday @ 8:30 and 2:00 - she is aware of location

## 2022-08-24 NOTE — TELEPHONE ENCOUNTER
Patient was discharged yesterday  Daughter calling in the speak with a clinical staff member about appts and her father  Patient prefers the [facility] office      Please call daughter Lesly Merlos back at [phone]

## 2022-08-25 ENCOUNTER — HOME CARE VISIT (OUTPATIENT)
Dept: HOME HEALTH SERVICES | Facility: HOME HEALTHCARE | Age: 72
End: 2022-08-25
Payer: MEDICARE

## 2022-08-25 VITALS
SYSTOLIC BLOOD PRESSURE: 130 MMHG | OXYGEN SATURATION: 98 % | DIASTOLIC BLOOD PRESSURE: 70 MMHG | TEMPERATURE: 98.2 F | HEART RATE: 83 BPM | RESPIRATION RATE: 16 BRPM

## 2022-08-25 PROCEDURE — 400013 VN SOC

## 2022-08-25 PROCEDURE — 10330081 VN NO-PAY CLAIM PROCEDURE

## 2022-08-25 PROCEDURE — G0299 HHS/HOSPICE OF RN EA 15 MIN: HCPCS

## 2022-08-26 ENCOUNTER — RA CDI HCC (OUTPATIENT)
Dept: OTHER | Facility: HOSPITAL | Age: 72
End: 2022-08-26

## 2022-08-26 ENCOUNTER — TELEPHONE (OUTPATIENT)
Dept: GASTROENTEROLOGY | Facility: CLINIC | Age: 72
End: 2022-08-26

## 2022-08-26 NOTE — TELEPHONE ENCOUNTER
----- Message from Alexia Mendez sent at 8/26/2022  3:29 PM EDT -----  Regarding: RE: EGD and colonoscopy    ----- Message -----  From: Ting Mead MD  Sent: 8/17/2022  12:23 PM EDT  To: Yennifer Bennett MD, #  Subject: EGD and colonoscopy                              Please schedule him for repeat EGD (gastric ulcers) and colonoscopy (poor prep) with two day prep including Golytely  I placed the orders separately but they can be combined into one double procedure at [place] in approx 3 months  Thanks!

## 2022-08-26 NOTE — PROGRESS NOTES
[facility]  coding opportunities          Chart Reviewed number of suggestions sent to Provider: 1     Patients Insurance     Medicare Insurance: Medicare        [id number]

## 2022-08-27 LAB
ALDOST SERPL-MCNC: 3.6 NG/DL (ref 0–30)
RENIN PLAS-CCNC: 1.32 NG/ML/HR (ref 0.17–5.38)

## 2022-08-29 ENCOUNTER — APPOINTMENT (OUTPATIENT)
Dept: LAB | Facility: HOSPITAL | Age: 72
End: 2022-08-29
Payer: MEDICARE

## 2022-08-29 ENCOUNTER — HOME CARE VISIT (OUTPATIENT)
Dept: HOME HEALTH SERVICES | Facility: HOME HEALTHCARE | Age: 72
End: 2022-08-29
Payer: MEDICARE

## 2022-08-29 VITALS
TEMPERATURE: 97 F | RESPIRATION RATE: 16 BRPM | HEART RATE: 87 BPM | DIASTOLIC BLOOD PRESSURE: 82 MMHG | SYSTOLIC BLOOD PRESSURE: 150 MMHG | OXYGEN SATURATION: 99 %

## 2022-08-29 DIAGNOSIS — E87.6 HYPOKALEMIA: ICD-10-CM

## 2022-08-29 DIAGNOSIS — I10 HYPERTENSION: ICD-10-CM

## 2022-08-29 DIAGNOSIS — E87.1 HYPONATREMIA: ICD-10-CM

## 2022-08-29 DIAGNOSIS — E87.1 HYPOSMOLALITY SYNDROME: ICD-10-CM

## 2022-08-29 DIAGNOSIS — E87.6 HYPOPOTASSEMIA: ICD-10-CM

## 2022-08-29 LAB
ANION GAP SERPL CALCULATED.3IONS-SCNC: 7 MMOL/L (ref 4–13)
BUN SERPL-MCNC: 18 MG/DL (ref 5–25)
CALCIUM SERPL-MCNC: 8.6 MG/DL (ref 8.3–10.1)
CHLORIDE SERPL-SCNC: 97 MMOL/L (ref 96–108)
CO2 SERPL-SCNC: 26 MMOL/L (ref 21–32)
CREAT SERPL-MCNC: 1.23 MG/DL (ref 0.6–1.3)
GFR SERPL CREATININE-BSD FRML MDRD: 57 ML/MIN/1.73SQ M
GLUCOSE SERPL-MCNC: 94 MG/DL (ref 65–140)
MAGNESIUM SERPL-MCNC: 1.9 MG/DL (ref 1.6–2.6)
METANEPH FREE SERPL-MCNC: 38.9 PG/ML (ref 0–88)
NORMETANEPHRINE SERPL-MCNC: 148.2 PG/ML (ref 0–285.2)
POTASSIUM SERPL-SCNC: 4.6 MMOL/L (ref 3.5–5.3)
SODIUM SERPL-SCNC: 130 MMOL/L (ref 135–147)

## 2022-08-29 PROCEDURE — 83735 ASSAY OF MAGNESIUM: CPT

## 2022-08-29 PROCEDURE — G0299 HHS/HOSPICE OF RN EA 15 MIN: HCPCS

## 2022-08-29 PROCEDURE — 80048 BASIC METABOLIC PNL TOTAL CA: CPT

## 2022-08-29 PROCEDURE — 36415 COLL VENOUS BLD VENIPUNCTURE: CPT

## 2022-08-29 NOTE — CASE COMMUNICATION
[facility] has admitted your patient to [facility] service with the following disciplines:      SN and OT  This report is informational only, no responses is needed  Primary focus of home health carehyponatremia   Patient stated goals of care to get stronger   Anticipated visit pattern and next visit date 2w4w 8 29  See medication list -all meds in the home   Significant clinical findings croft catheter in place, voiding trial 8 31  22  Potential barriers to goal achievement patient unable to read  Other pertinent information    Thank you for allowing us to participate in the care of your patient        Allison Pike RN

## 2022-08-30 ENCOUNTER — TELEPHONE (OUTPATIENT)
Dept: UROLOGY | Facility: AMBULATORY SURGERY CENTER | Age: 72
End: 2022-08-30

## 2022-08-30 ENCOUNTER — TELEPHONE (OUTPATIENT)
Dept: INTERNAL MEDICINE CLINIC | Facility: CLINIC | Age: 72
End: 2022-08-30

## 2022-08-30 ENCOUNTER — HOME CARE VISIT (OUTPATIENT)
Dept: HOME HEALTH SERVICES | Facility: HOME HEALTHCARE | Age: 72
End: 2022-08-30
Payer: MEDICARE

## 2022-08-30 VITALS — OXYGEN SATURATION: 97 % | SYSTOLIC BLOOD PRESSURE: 78 MMHG | HEART RATE: 68 BPM | DIASTOLIC BLOOD PRESSURE: 50 MMHG

## 2022-08-30 PROCEDURE — G0152 HHCP-SERV OF OT,EA 15 MIN: HCPCS

## 2022-08-30 NOTE — TELEPHONE ENCOUNTER
Occupational therapist is there to see him  BP was 93/55 when she first got there  After standing went down to 78/50 and patient was dizzy  Also patient is very tired today  A lot of pain every time he moves with his catheter  He took Tylenol for pain  He is scheduled to get the catheter out tomorrow at [address]

## 2022-08-30 NOTE — TELEPHONE ENCOUNTER
Leonel Boyd to verify appointment tomorrow as I saw he had VNA    He said he is coming in verfied location with him

## 2022-08-30 NOTE — TELEPHONE ENCOUNTER
Patient was recently in hospital, he has an appt  With you on Friday, 9/2/22  Left message for therapist to find out what blood pressure medications he takes

## 2022-08-31 ENCOUNTER — PROCEDURE VISIT (OUTPATIENT)
Dept: UROLOGY | Facility: HOSPITAL | Age: 72
End: 2022-08-31
Payer: MEDICARE

## 2022-08-31 VITALS
HEIGHT: 61 IN | WEIGHT: 103.4 LBS | OXYGEN SATURATION: 99 % | DIASTOLIC BLOOD PRESSURE: 64 MMHG | HEART RATE: 84 BPM | SYSTOLIC BLOOD PRESSURE: 100 MMHG | BODY MASS INDEX: 19.52 KG/M2

## 2022-08-31 DIAGNOSIS — R33.9 URINARY RETENTION: Primary | ICD-10-CM

## 2022-08-31 DIAGNOSIS — R33.9 URINARY RETENTION WITH INCOMPLETE BLADDER EMPTYING: ICD-10-CM

## 2022-08-31 LAB — POST-VOID RESIDUAL VOLUME, ML POC: 106 ML

## 2022-08-31 PROCEDURE — 51798 US URINE CAPACITY MEASURE: CPT | Performed by: UROLOGY

## 2022-08-31 PROCEDURE — 99211 OFF/OP EST MAY X REQ PHY/QHP: CPT | Performed by: UROLOGY

## 2022-08-31 NOTE — PROGRESS NOTES
8/31/2022    Timi Mcguire  1950  [id number]    Diagnosis  Chief Complaint     Urinary Retention          Patient presents for void trial  Managed by Dr Surekha Culver  Patient will call for followo up    Procedure Nicole removal/voiding trial    Nicole catheter removed after deflation of an intact balloon  Patient tolerated well  Encouraged patient to hydrate well and return this afternoon for post void residual   he knows he may return early if uncomfortable and unable to urinate  Patient agrees to this plan  Patient returned this afternoon  Patient states able to void  Bladder ultrasound performed and PVR measured 106 ml      Recent Results (from the past 4 hour(s))   POCT Measure PVR    Collection Time: 08/31/22  3:46 PM   Result Value Ref Range    POST-VOID RESIDUAL VOLUME, ML  mL           Vitals:    08/31/22 0828   BP: 100/64   Pulse: 84   SpO2: 99%   Weight: 46 9 kg (103 lb 6 4 oz)   Height: 5' 1" (1 549 m)           Holly Gallo RN

## 2022-09-01 ENCOUNTER — HOME CARE VISIT (OUTPATIENT)
Dept: HOME HEALTH SERVICES | Facility: HOME HEALTHCARE | Age: 72
End: 2022-09-01
Payer: MEDICARE

## 2022-09-01 VITALS
HEART RATE: 77 BPM | OXYGEN SATURATION: 98 % | TEMPERATURE: 98.7 F | RESPIRATION RATE: 16 BRPM | SYSTOLIC BLOOD PRESSURE: 128 MMHG | DIASTOLIC BLOOD PRESSURE: 78 MMHG

## 2022-09-01 PROCEDURE — G0151 HHCP-SERV OF PT,EA 15 MIN: HCPCS

## 2022-09-01 PROCEDURE — G0299 HHS/HOSPICE OF RN EA 15 MIN: HCPCS

## 2022-09-01 NOTE — CASE COMMUNICATION
Physical therapy evaluation completed  Patient amb 75 feet with SPC and CG of one with v cues for safety and posture  Transfer training from household surfaces with supervision and v cues for safety  TUG is 39 seconds with SPC  He would benefit from home based PT for gait training, transfer training, ther exercises and a HEP  PT will visit 1 w 1 and 2 w 1

## 2022-09-02 ENCOUNTER — OFFICE VISIT (OUTPATIENT)
Dept: INTERNAL MEDICINE CLINIC | Facility: CLINIC | Age: 72
End: 2022-09-02
Payer: MEDICARE

## 2022-09-02 VITALS
HEART RATE: 70 BPM | OXYGEN SATURATION: 97 % | WEIGHT: 103 LBS | HEIGHT: 61 IN | BODY MASS INDEX: 19.45 KG/M2 | TEMPERATURE: 98.4 F | DIASTOLIC BLOOD PRESSURE: 70 MMHG | SYSTOLIC BLOOD PRESSURE: 120 MMHG

## 2022-09-02 DIAGNOSIS — E87.1 HYPONATREMIA: ICD-10-CM

## 2022-09-02 DIAGNOSIS — I10 HYPERTENSION: Primary | ICD-10-CM

## 2022-09-02 PROCEDURE — 99495 TRANSJ CARE MGMT MOD F2F 14D: CPT | Performed by: INTERNAL MEDICINE

## 2022-09-02 NOTE — PROGRESS NOTES
Assessment/Plan:    TCM visit  After  Being  Discharged from  [address] [place] [address] of  GI  Bleeding  Diagnoses and all orders for this visit:    Hypertension          Subjective:      Patient ID: Cody Choudhury is a 67 y o  male  HPI    The following portions of the patient's history were reviewed and updated as appropriate: allergies, current medications, past family history, past medical history, past social history, past surgical history, and problem list     Review of Systems   Constitutional: Negative  HENT: Negative for dental problem, drooling, ear discharge and ear pain  Eyes: Negative for discharge, redness and itching  Respiratory: Negative for apnea, cough and wheezing  Cardiovascular: Negative for chest pain and palpitations  Gastrointestinal: Negative for abdominal pain, blood in stool, diarrhea and vomiting  Endocrine: Negative for polydipsia, polyphagia and polyuria  Genitourinary: Negative for decreased urine volume, dysuria and frequency  Musculoskeletal: Negative for arthralgias, myalgias and neck stiffness  Skin: Negative for pallor and wound  Allergic/Immunologic: Negative for environmental allergies and food allergies  Neurological: Negative for facial asymmetry, light-headedness, numbness and headaches  Hematological: Negative for adenopathy  Does not bruise/bleed easily  Psychiatric/Behavioral: Negative for agitation, behavioral problems and confusion  Objective: There were no vitals taken for this visit  Physical Exam  Constitutional:       Appearance: Normal appearance  HENT:      Head: Normocephalic  Nose: Nose normal       Mouth/Throat:      Mouth: Mucous membranes are moist    Eyes:      Pupils: Pupils are equal, round, and reactive to light  Cardiovascular:      Rate and Rhythm: Regular rhythm  Heart sounds: Normal heart sounds  Pulmonary:      Breath sounds: Normal breath sounds     Abdominal: Palpations: Abdomen is soft  Musculoskeletal:         General: No swelling  Cervical back: Neck supple  Skin:     General: Skin is warm  Neurological:      General: No focal deficit present  Mental Status: He is alert and oriented to person, place, and time  Psychiatric:         Mood and Affect: Mood normal        Hypertension   Will   Discontinue  Nifedipine   , BP  Running  Lower  Then  Expected   100/60    Hyponatremia   Stable  Chronic  Pancreatitis    Stable      Fup   1st  Week  Nov F Migue RIOS

## 2022-09-06 ENCOUNTER — HOME CARE VISIT (OUTPATIENT)
Dept: HOME HEALTH SERVICES | Facility: HOME HEALTHCARE | Age: 72
End: 2022-09-06
Payer: MEDICARE

## 2022-09-06 ENCOUNTER — TELEPHONE (OUTPATIENT)
Dept: INTERNAL MEDICINE CLINIC | Facility: CLINIC | Age: 72
End: 2022-09-06

## 2022-09-06 VITALS — DIASTOLIC BLOOD PRESSURE: 58 MMHG | SYSTOLIC BLOOD PRESSURE: 90 MMHG

## 2022-09-06 VITALS
TEMPERATURE: 96 F | HEART RATE: 67 BPM | OXYGEN SATURATION: 100 % | SYSTOLIC BLOOD PRESSURE: 70 MMHG | RESPIRATION RATE: 16 BRPM | DIASTOLIC BLOOD PRESSURE: 40 MMHG

## 2022-09-06 PROCEDURE — G0151 HHCP-SERV OF PT,EA 15 MIN: HCPCS

## 2022-09-06 PROCEDURE — G0299 HHS/HOSPICE OF RN EA 15 MIN: HCPCS

## 2022-09-06 NOTE — TELEPHONE ENCOUNTER
Patient has been dizzy today BP is 90/56  When patient stood and took BP 70/40  He did stop the BP medication about 4 days ago after his appointment with you on 9/2  Please advise       ALIZE# [phone]

## 2022-09-06 NOTE — TELEPHONE ENCOUNTER
Spoke  with the  Visiting  nurse  and  told  her  to  stop  Lisinopril /Hctz  blood  pressure  medication    Rianna Quintero

## 2022-09-08 ENCOUNTER — HOME CARE VISIT (OUTPATIENT)
Dept: HOME HEALTH SERVICES | Facility: HOME HEALTHCARE | Age: 72
End: 2022-09-08
Payer: MEDICARE

## 2022-09-08 VITALS
TEMPERATURE: 97.8 F | DIASTOLIC BLOOD PRESSURE: 70 MMHG | SYSTOLIC BLOOD PRESSURE: 160 MMHG | OXYGEN SATURATION: 100 % | HEART RATE: 74 BPM | RESPIRATION RATE: 16 BRPM

## 2022-09-08 VITALS — DIASTOLIC BLOOD PRESSURE: 80 MMHG | SYSTOLIC BLOOD PRESSURE: 132 MMHG

## 2022-09-08 PROCEDURE — G0299 HHS/HOSPICE OF RN EA 15 MIN: HCPCS

## 2022-09-08 PROCEDURE — G0151 HHCP-SERV OF PT,EA 15 MIN: HCPCS

## 2022-09-12 ENCOUNTER — HOME CARE VISIT (OUTPATIENT)
Dept: HOME HEALTH SERVICES | Facility: HOME HEALTHCARE | Age: 72
End: 2022-09-12
Payer: MEDICARE

## 2022-09-12 VITALS
DIASTOLIC BLOOD PRESSURE: 60 MMHG | OXYGEN SATURATION: 100 % | SYSTOLIC BLOOD PRESSURE: 80 MMHG | HEART RATE: 67 BPM | RESPIRATION RATE: 16 BRPM | TEMPERATURE: 97.6 F

## 2022-09-12 PROCEDURE — G0180 MD CERTIFICATION HHA PATIENT: HCPCS | Performed by: INTERNAL MEDICINE

## 2022-09-12 PROCEDURE — G0299 HHS/HOSPICE OF RN EA 15 MIN: HCPCS

## 2022-09-14 ENCOUNTER — HOME CARE VISIT (OUTPATIENT)
Dept: HOME HEALTH SERVICES | Facility: HOME HEALTHCARE | Age: 72
End: 2022-09-14
Payer: MEDICARE

## 2022-09-14 VITALS — DIASTOLIC BLOOD PRESSURE: 74 MMHG | SYSTOLIC BLOOD PRESSURE: 132 MMHG

## 2022-09-14 PROCEDURE — G0151 HHCP-SERV OF PT,EA 15 MIN: HCPCS

## 2022-09-15 ENCOUNTER — HOME CARE VISIT (OUTPATIENT)
Dept: HOME HEALTH SERVICES | Facility: HOME HEALTHCARE | Age: 72
End: 2022-09-15
Payer: MEDICARE

## 2022-09-15 PROCEDURE — G0299 HHS/HOSPICE OF RN EA 15 MIN: HCPCS

## 2022-09-16 ENCOUNTER — HOME CARE VISIT (OUTPATIENT)
Dept: HOME HEALTH SERVICES | Facility: HOME HEALTHCARE | Age: 72
End: 2022-09-16
Payer: MEDICARE

## 2022-09-16 VITALS — SYSTOLIC BLOOD PRESSURE: 112 MMHG | DIASTOLIC BLOOD PRESSURE: 62 MMHG

## 2022-09-16 PROCEDURE — G0151 HHCP-SERV OF PT,EA 15 MIN: HCPCS

## 2022-09-19 ENCOUNTER — HOME CARE VISIT (OUTPATIENT)
Dept: HOME HEALTH SERVICES | Facility: HOME HEALTHCARE | Age: 72
End: 2022-09-19
Payer: MEDICARE

## 2022-09-19 PROCEDURE — G0299 HHS/HOSPICE OF RN EA 15 MIN: HCPCS

## 2022-09-20 VITALS
SYSTOLIC BLOOD PRESSURE: 130 MMHG | OXYGEN SATURATION: 97 % | HEART RATE: 76 BPM | TEMPERATURE: 95.6 F | DIASTOLIC BLOOD PRESSURE: 70 MMHG | RESPIRATION RATE: 18 BRPM

## 2022-09-22 ENCOUNTER — HOME CARE VISIT (OUTPATIENT)
Dept: HOME HEALTH SERVICES | Facility: HOME HEALTHCARE | Age: 72
End: 2022-09-22
Payer: MEDICARE

## 2022-09-22 ENCOUNTER — TELEMEDICINE (OUTPATIENT)
Dept: GERIATRICS | Facility: OTHER | Age: 72
End: 2022-09-22
Payer: MEDICARE

## 2022-09-22 VITALS
RESPIRATION RATE: 18 BRPM | DIASTOLIC BLOOD PRESSURE: 66 MMHG | HEART RATE: 68 BPM | TEMPERATURE: 96.5 F | OXYGEN SATURATION: 96 % | SYSTOLIC BLOOD PRESSURE: 120 MMHG

## 2022-09-22 DIAGNOSIS — R33.9 URINARY RETENTION: Primary | ICD-10-CM

## 2022-09-22 DIAGNOSIS — I10 HYPERTENSION: ICD-10-CM

## 2022-09-22 DIAGNOSIS — N32.0 BLADDER OUTLET OBSTRUCTION: ICD-10-CM

## 2022-09-22 DIAGNOSIS — E87.6 HYPOKALEMIA: ICD-10-CM

## 2022-09-22 DIAGNOSIS — K86.1 OTHER CHRONIC PANCREATITIS (HCC): ICD-10-CM

## 2022-09-22 DIAGNOSIS — R33.9 URINARY RETENTION WITH INCOMPLETE BLADDER EMPTYING: ICD-10-CM

## 2022-09-22 PROCEDURE — G0299 HHS/HOSPICE OF RN EA 15 MIN: HCPCS

## 2022-09-22 PROCEDURE — 99345 HOME/RES VST NEW HIGH MDM 75: CPT | Performed by: NURSE PRACTITIONER

## 2022-09-22 RX ORDER — FINASTERIDE 5 MG/1
5 TABLET, FILM COATED ORAL DAILY
Qty: 30 TABLET | Refills: 1 | Status: SHIPPED | OUTPATIENT
Start: 2022-09-22 | End: 2022-10-25 | Stop reason: SDUPTHER

## 2022-09-22 RX ORDER — TORSEMIDE 5 MG/1
5 TABLET ORAL DAILY
Qty: 30 TABLET | Refills: 1 | Status: SHIPPED | OUTPATIENT
Start: 2022-09-22 | End: 2022-10-25 | Stop reason: SDUPTHER

## 2022-09-22 NOTE — PROGRESS NOTES
[address]  [phone]  Care at Home  PROGRESS NOTE        NAME: Raciel Mcguire  AGE: 67 y o  SEX: male  :  1950  DATE OF ENCOUNTER: 2022    Chief Complaint   Patient seen and examined for follow up on chronic conditions  History of Present Illness     Serina Song is a patient being seen for Care at home program via secure Teams video visit in conjuction with A nurse Nae Griffiths RN who was in the home to perform assessment  The patient denies sob, chest pain, abdominal pain, headaches, dizziness, nausea/vomiting, or diarrhea/constipation  The patient is eating and drinking without difficulty  The patient only complaint is when he pees he also has a loose BM  I explained this could be due to his Magnesium supplement and the patient also drinking 3 Ensure supplements daily  I encouraged the patient to contact Dr Danii Grey if this continues so that his Magnesium level can be rechecked prior to his next f/u in November  The patient also required refills on the following medications which were sent over to his pharmacy: Torsemide 5 mg po QD  Magnesium Oxide 400 mg QD  Finasteride 5 mg po QD    The following portions of the patient's history were reviewed and updated as appropriate: allergies, current medications, past family history, past medical history, past social history, past surgical history and problem list     Review of Systems     A 12-point comprehensive review of symptoms was obtained with pertinent positives and negatives noted per HPI      History     Past Medical History:   Diagnosis Date   • Anxiety    • Rhodes's esophagus    • Chronic neck pain    • Depression    • GERD (gastroesophageal reflux disease)    • H/O abdominal aortic aneurysm 4 5 cm   • Hyperlipidemia    • Hypertension    • Pancreatic pseudocyst    • Vertigo      Past Surgical History:   Procedure Laterality Date   • COLONOSCOPY  2014   • IR LOWER EXTREMITY / INTERVENTION  01/04/2019   • NE ESOPHAGOGASTRODUODENOSCOPY TRANSORAL DIAGNOSTIC N/A 06/22/2018    Procedure: EGD AND COLONOSCOPY;  Surgeon: Rupali Bhatt MD;  Location: [facility]; Service: Gastroenterology   • NE EVASC RPR DPLMNT AORTO-AORTIC NDGFT N/A 02/09/2018    Procedure: REPAIR ANEURYSM ENDOVASCULAR ABDOMINAL AORTIC  (EVAR); Surgeon: Chi Spencer MD;  [facility]: [facility];  Service: Vascular   • UPPER GASTROINTESTINAL ENDOSCOPY       Family History   Problem Relation Age of Onset   • Heart disease Father    • Heart attack Father    • Diabetes Maternal Grandmother    • Diabetes Maternal Grandfather    • Diabetes Paternal Grandmother    • Hypertension Family    • Hyperlipidemia Family      Social History     Socioeconomic History   • Marital status:      Spouse name: Not on file   • Number of children: Not on file   • Years of education: Not on file   • Highest education level: Not on file   Occupational History   • Not on file   Tobacco Use   • Smoking status: Current Every Day Smoker     Packs/day: 0 25     Types: Cigarettes   • Smokeless tobacco: Never Used   • Tobacco comment: [place]gil quiting smoking handouts   Vaping Use   • Vaping Use: Never used   Substance and Sexual Activity   • Alcohol use: Never   • Drug use: No   • Sexual activity: Not Currently   Other Topics Concern   • Not on file   Social History Narrative   • Not on file     Social Determinants of Health     Financial Resource Strain: Not on file   Food Insecurity: No Food Insecurity   • Worried About Running Out of Food in the Last Year: Never true   • Ran Out of Food in the Last Year: Never true   Transportation Needs: No Transportation Needs   • Lack of Transportation (Medical): No   • Lack of Transportation (Non-Medical):  No   Physical Activity: Not on file   Stress: Not on file   Social Connections: Not on file   Intimate Partner Violence: Not on file   Housing Stability: Low Risk    • Unable to Pay for Housing in the Last Year: No   • Number of Places Lived in the Last Year: 1   • Unstable Housing in the Last Year: No     No Known Allergies    Objective     Vital Signs  BP:120/66       HR:68 T:96 5    RR:18 O2Sat:96%   General: NAD, Well Nourished, Well Developed  Oral: Oropharynx Moist and Clear  Neck: Supple, +ROM  CV: S1, S2, normal rate, regular rhythm, no murmur appreciated  Pulmonary: Lung sounds clear to air, no wheezing, rhonchi, rales  Abdominal:BS + x4 in all quadrants, soft, no mass, no tenderness  Extremities: No edema, +ROM, +Strength  Skin: Warm, Dry, no lesions, no rash, no erythema present, no ecchymosis present  Neurological:/Psych: Alert and oriented times 3, normal mood, no affect, good judgement    Pertinent Laboratory/Diagnostic Studies have been independently reviewed  Current Medications     Current Medications Reviewed and updated in Nursing Home EMR      Assessment and Plan     Hypertension  · BP today within acceptable range  · Continue metoprolol and torsemide  · Avoid hypotension  · Follow-up with PCP/Cardiology      Hypokalemia  · Continue sodium chloride tablets  · Follow-up with PCP    Urinary retention  · Continue finasteride and Flomax  · Follow-up with urology as needed    Other chronic pancreatitis ([address] )  · Continue Creon  · Follow-up with PCP      Chantal Ford [address]  9/22/2022

## 2022-10-03 ENCOUNTER — TELEPHONE (OUTPATIENT)
Dept: NEPHROLOGY | Facility: CLINIC | Age: 72
End: 2022-10-03

## 2022-10-03 NOTE — TELEPHONE ENCOUNTER
Spoke with Lucero Toro to cancel appt  Patients doctor stated the appt was no longer needed   Appointment was cancelled

## 2022-10-06 DIAGNOSIS — F41.9 ANXIETY: ICD-10-CM

## 2022-10-06 DIAGNOSIS — E87.1 HYPONATREMIA: ICD-10-CM

## 2022-10-06 RX ORDER — SODIUM CHLORIDE 1000 MG
1 TABLET, SOLUBLE MISCELLANEOUS 2 TIMES DAILY
Qty: 90 TABLET | Refills: 0 | Status: SHIPPED | OUTPATIENT
Start: 2022-10-06 | End: 2022-10-10 | Stop reason: SDUPTHER

## 2022-10-06 RX ORDER — ALPRAZOLAM 1 MG/1
1 TABLET ORAL 2 TIMES DAILY PRN
Qty: 60 TABLET | Refills: 0 | Status: SHIPPED | OUTPATIENT
Start: 2022-10-06 | End: 2022-11-05

## 2022-10-07 DIAGNOSIS — K86.89 PANCREATIC INSUFFICIENCY: ICD-10-CM

## 2022-10-07 RX ORDER — PANCRELIPASE 60000; 12000; 38000 [USP'U]/1; [USP'U]/1; [USP'U]/1
24000 CAPSULE, DELAYED RELEASE PELLETS ORAL
Qty: 540 CAPSULE | Refills: 1 | Status: SHIPPED | OUTPATIENT
Start: 2022-10-07

## 2022-10-10 DIAGNOSIS — E87.1 HYPONATREMIA: ICD-10-CM

## 2022-10-10 RX ORDER — SODIUM CHLORIDE 1000 MG
1 TABLET, SOLUBLE MISCELLANEOUS 2 TIMES DAILY
Qty: 90 TABLET | Refills: 0 | Status: SHIPPED | OUTPATIENT
Start: 2022-10-10

## 2022-10-10 NOTE — TELEPHONE ENCOUNTER
Refill Request     Sodium Chloride 1g   Amitriptyline 26 mg tab  - PATIENT SAYS THIS IS NOT WORKING AND NEEDS SOMETHING STRONGER     Pharmacy: [facility]     LA: 9/2/22  NA: 11/2/22

## 2022-10-11 DIAGNOSIS — I10 HYPERTENSION: ICD-10-CM

## 2022-10-11 DIAGNOSIS — E87.6 HYPOKALEMIA: ICD-10-CM

## 2022-10-18 NOTE — ASSESSMENT & PLAN NOTE
· BP today within acceptable range  · Continue metoprolol and torsemide  · Avoid hypotension  · Follow-up with PCP/Cardiology

## 2022-10-20 DIAGNOSIS — K25.9 MULTIPLE GASTRIC ULCERS: ICD-10-CM

## 2022-10-20 RX ORDER — OMEPRAZOLE 40 MG/1
CAPSULE, DELAYED RELEASE ORAL
Qty: 180 CAPSULE | Refills: 0 | Status: SHIPPED | OUTPATIENT
Start: 2022-10-20

## 2022-10-25 DIAGNOSIS — N32.0 BLADDER OUTLET OBSTRUCTION: ICD-10-CM

## 2022-10-25 DIAGNOSIS — I10 ESSENTIAL HYPERTENSION: ICD-10-CM

## 2022-10-25 DIAGNOSIS — R33.9 URINARY RETENTION WITH INCOMPLETE BLADDER EMPTYING: ICD-10-CM

## 2022-10-25 DIAGNOSIS — F32.9 REACTIVE DEPRESSION: ICD-10-CM

## 2022-10-25 DIAGNOSIS — I10 HYPERTENSION: ICD-10-CM

## 2022-10-25 RX ORDER — FINASTERIDE 5 MG/1
5 TABLET, FILM COATED ORAL DAILY
Qty: 30 TABLET | Refills: 1 | Status: SHIPPED | OUTPATIENT
Start: 2022-10-25

## 2022-10-25 RX ORDER — TORSEMIDE 5 MG/1
5 TABLET ORAL DAILY
Qty: 30 TABLET | Refills: 1 | Status: SHIPPED | OUTPATIENT
Start: 2022-10-25

## 2022-10-25 RX ORDER — METOPROLOL SUCCINATE 25 MG/1
25 TABLET, EXTENDED RELEASE ORAL DAILY
Qty: 90 TABLET | Refills: 1 | Status: SHIPPED | OUTPATIENT
Start: 2022-10-25

## 2022-10-25 RX ORDER — CITALOPRAM 20 MG/1
20 TABLET ORAL DAILY
Qty: 90 TABLET | Refills: 0 | Status: SHIPPED | OUTPATIENT
Start: 2022-10-25

## 2022-10-25 NOTE — TELEPHONE ENCOUNTER
LOV: 09/02/2022  NOV: 11/02/2022    Patient will call back when he figures out what medications he needs refilled

## 2022-10-25 NOTE — TELEPHONE ENCOUNTER
Patient called back with the names of medications     Metoprolol     [facility] medications:     Citalopram   Finasteride   Torsemide

## 2022-11-02 ENCOUNTER — OFFICE VISIT (OUTPATIENT)
Dept: INTERNAL MEDICINE CLINIC | Facility: CLINIC | Age: 72
End: 2022-11-02

## 2022-11-02 VITALS
OXYGEN SATURATION: 97 % | DIASTOLIC BLOOD PRESSURE: 90 MMHG | HEIGHT: 61 IN | TEMPERATURE: 98 F | HEART RATE: 74 BPM | SYSTOLIC BLOOD PRESSURE: 160 MMHG | BODY MASS INDEX: 20.2 KG/M2 | WEIGHT: 107 LBS

## 2022-11-02 DIAGNOSIS — E87.6 HYPOKALEMIA: ICD-10-CM

## 2022-11-02 DIAGNOSIS — I10 HYPERTENSION: ICD-10-CM

## 2022-11-02 DIAGNOSIS — K85.90 PANCREATITIS: ICD-10-CM

## 2022-11-02 DIAGNOSIS — Z12.11 SCREENING FOR COLON CANCER: Primary | ICD-10-CM

## 2022-11-02 RX ORDER — MAGNESIUM OXIDE 400 MG/1
1 TABLET ORAL DAILY
COMMUNITY
Start: 2022-09-23

## 2022-11-02 RX ORDER — POTASSIUM CHLORIDE 20 MEQ/1
40 TABLET, EXTENDED RELEASE ORAL DAILY
Qty: 60 TABLET | Refills: 0 | Status: SHIPPED | OUTPATIENT
Start: 2022-11-02

## 2022-11-02 NOTE — PROGRESS NOTES
Assessment/Plan:    Follow up hypertension     Diagnoses and all orders for this visit:    Screening for colon cancer  -     Ambulatory referral for colonoscopy; Future    Pancreatitis    Hypokalemia  -     potassium chloride (K-DUR,KLOR-CON) 20 mEq tablet; Take 2 tablets (40 mEq total) by mouth daily    Hypertension  -     potassium chloride (K-DUR,KLOR-CON) 20 mEq tablet; Take 2 tablets (40 mEq total) by mouth daily    Other orders  -     magnesium oxide (MAG-OX) 400 mg tablet; Take 1 tablet by mouth daily          Subjective:      Patient ID: Editha Leventhal is a 67 y o  male  HPI    The following portions of the patient's history were reviewed and updated as appropriate: allergies, current medications, past family history, past medical history, past social history, past surgical history, and problem list     Review of Systems   Constitutional: Negative  HENT: Negative for dental problem, drooling, ear discharge and ear pain  Eyes: Negative for discharge, redness and itching  Respiratory: Negative for apnea, cough and wheezing  Cardiovascular: Negative for chest pain and palpitations  Gastrointestinal: Negative for abdominal pain, blood in stool, diarrhea and vomiting  Endocrine: Negative for polydipsia, polyphagia and polyuria  Genitourinary: Negative for decreased urine volume, dysuria and frequency  Musculoskeletal: Negative for arthralgias, myalgias and neck stiffness  Skin: Negative for pallor and wound  Allergic/Immunologic: Negative for environmental allergies and food allergies  Neurological: Negative for facial asymmetry, light-headedness, numbness and headaches  Hematological: Negative for adenopathy  Does not bruise/bleed easily  Psychiatric/Behavioral: Negative for agitation, behavioral problems and confusion           Objective:      /90 (BP Location: Right arm, Patient Position: Sitting, Cuff Size: Standard)   Pulse 74   Temp 98 °F (36 7 °C) (Temporal) Ht 5' 1" (1 549 m)   Wt 48 5 kg (107 lb)   SpO2 97%   BMI 20 22 kg/m²          Physical Exam  Constitutional:       Appearance: Normal appearance  HENT:      Head: Normocephalic  Nose: Nose normal       Mouth/Throat:      Mouth: Mucous membranes are moist    Eyes:      Pupils: Pupils are equal, round, and reactive to light  Cardiovascular:      Rate and Rhythm: Regular rhythm  Heart sounds: Normal heart sounds  Pulmonary:      Breath sounds: Normal breath sounds  Abdominal:      Palpations: Abdomen is soft  Musculoskeletal:         General: No swelling  Cervical back: Neck supple  Skin:     General: Skin is warm  Neurological:      General: No focal deficit present  Mental Status: He is alert and oriented to person, place, and time     Psychiatric:         Mood and Affect: Mood normal        Hypertension   Stable  On  Current  meds    Chronic  Pancreatitis    Stable      Fup  February F Migue RIOS

## 2022-11-10 DIAGNOSIS — F41.9 ANXIETY: ICD-10-CM

## 2022-11-10 RX ORDER — ALPRAZOLAM 1 MG/1
1 TABLET ORAL 2 TIMES DAILY PRN
Qty: 60 TABLET | Refills: 0 | Status: SHIPPED | OUTPATIENT
Start: 2022-11-10 | End: 2022-12-09 | Stop reason: SDUPTHER

## 2022-11-10 NOTE — TELEPHONE ENCOUNTER
Patient is taking Amitriptyline 25 mg and says it is not working  He is asking for something stronger  Also - Patient is requesting a refill on Alprazolam 1mg       Pharmacy: [facility]     LA: 11/2/22  NA: 2/6/23

## 2022-11-10 NOTE — TELEPHONE ENCOUNTER
Patient may have to wait until Dr Jeanne Perez returns to have a medication prescribed as an alternative to amitriptyline    I cannot prescribe a new medication without evaluating him

## 2022-11-14 DIAGNOSIS — E87.1 HYPONATREMIA: ICD-10-CM

## 2022-11-14 RX ORDER — SODIUM CHLORIDE 1000 MG
1 TABLET, SOLUBLE MISCELLANEOUS 2 TIMES DAILY
Qty: 90 TABLET | Refills: 0 | Status: SHIPPED | OUTPATIENT
Start: 2022-11-14 | End: 2022-11-17 | Stop reason: SDUPTHER

## 2022-11-16 DIAGNOSIS — F32.A DEPRESSION, UNSPECIFIED DEPRESSION TYPE: ICD-10-CM

## 2022-11-16 RX ORDER — AMITRIPTYLINE HYDROCHLORIDE 25 MG/1
25 TABLET, FILM COATED ORAL
Qty: 30 TABLET | Refills: 0 | Status: SHIPPED | OUTPATIENT
Start: 2022-11-16

## 2022-11-16 NOTE — TELEPHONE ENCOUNTER
Refill Request     Amitriptyline 25 mg     Pharmacy: [facility]     LA: 11/12/22  NA: 2/6/23

## 2022-11-17 ENCOUNTER — TELEPHONE (OUTPATIENT)
Dept: GASTROENTEROLOGY | Facility: AMBULARY SURGERY CENTER | Age: 72
End: 2022-11-17

## 2022-11-17 DIAGNOSIS — I10 HYPERTENSION: ICD-10-CM

## 2022-11-17 DIAGNOSIS — E87.1 HYPONATREMIA: ICD-10-CM

## 2022-11-17 DIAGNOSIS — N32.0 BLADDER OUTLET OBSTRUCTION: ICD-10-CM

## 2022-11-17 DIAGNOSIS — R33.9 URINARY RETENTION WITH INCOMPLETE BLADDER EMPTYING: ICD-10-CM

## 2022-11-17 DIAGNOSIS — E83.42 HYPOMAGNESEMIA: Primary | ICD-10-CM

## 2022-11-17 RX ORDER — FINASTERIDE 5 MG/1
5 TABLET, FILM COATED ORAL DAILY
Qty: 30 TABLET | Refills: 1 | Status: SHIPPED | OUTPATIENT
Start: 2022-11-17

## 2022-11-17 RX ORDER — SODIUM CHLORIDE 1000 MG
1 TABLET, SOLUBLE MISCELLANEOUS 2 TIMES DAILY
Qty: 90 TABLET | Refills: 0 | Status: SHIPPED | OUTPATIENT
Start: 2022-11-17

## 2022-11-17 RX ORDER — TORSEMIDE 5 MG/1
5 TABLET ORAL DAILY
Qty: 30 TABLET | Refills: 1 | Status: SHIPPED | OUTPATIENT
Start: 2022-11-17

## 2022-11-17 RX ORDER — MAGNESIUM OXIDE 400 MG/1
1 TABLET ORAL DAILY
Qty: 30 TABLET | Refills: 1 | Status: SHIPPED | OUTPATIENT
Start: 2022-11-17

## 2022-11-17 NOTE — TELEPHONE ENCOUNTER
Patients GI provider:  Dr Glenny Juarez     Number to return call: [phone]     Reason for call: Pt calling to schedule his procedure    Scheduled procedure/appointment date if applicable: Apt/procedure n/a

## 2022-11-17 NOTE — TELEPHONE ENCOUNTER
Refill Request     Finasteride 5 mg  Magnesium Oxide 400 mg   Torsemide 5 mg     Pharmacy: [facility]     LA: 11/2/22  NA: 2/6/23

## 2022-12-01 DIAGNOSIS — E87.6 HYPOKALEMIA: ICD-10-CM

## 2022-12-01 DIAGNOSIS — I10 HYPERTENSION: ICD-10-CM

## 2022-12-01 RX ORDER — POTASSIUM CHLORIDE 20 MEQ/1
40 TABLET, EXTENDED RELEASE ORAL DAILY
Qty: 60 TABLET | Refills: 0 | Status: SHIPPED | OUTPATIENT
Start: 2022-12-01

## 2022-12-09 DIAGNOSIS — F41.9 ANXIETY: ICD-10-CM

## 2022-12-09 DIAGNOSIS — F32.A DEPRESSION, UNSPECIFIED DEPRESSION TYPE: ICD-10-CM

## 2022-12-09 RX ORDER — ALPRAZOLAM 1 MG/1
1 TABLET ORAL 2 TIMES DAILY PRN
Qty: 60 TABLET | Refills: 0 | Status: SHIPPED | OUTPATIENT
Start: 2022-12-09 | End: 2023-01-08

## 2022-12-09 RX ORDER — AMITRIPTYLINE HYDROCHLORIDE 25 MG/1
25 TABLET, FILM COATED ORAL
Qty: 30 TABLET | Refills: 2 | Status: SHIPPED | OUTPATIENT
Start: 2022-12-09

## 2022-12-12 ENCOUNTER — TELEPHONE (OUTPATIENT)
Dept: GASTROENTEROLOGY | Facility: CLINIC | Age: 72
End: 2022-12-12

## 2022-12-12 NOTE — TELEPHONE ENCOUNTER
Scheduled date of colonoscopy (as of today):12/16/2022  Physician performing colonoscopy: Dr Curtis Barrios   [facility]: [facility]  Bowel prep reviewed with patient: 2 day   Instructions reviewed with patient by:Madai   Clearances: N/A

## 2022-12-12 NOTE — TELEPHONE ENCOUNTER
Rescheduled for 12/16/22, offered 12/22/2022 and 01/04/2023  After scheduled pt stated he meant 01/16/2023  Rescheduled for 01/12/2023 @ [facility]       Scheduled date of colonoscopy/EGD (as of today): 01/12/2023  Physician performing colonoscopy: Dr Norman   Location of colonoscopy: [facility]  Bowel prep reviewed with patient: Pt is requesting sutab  Instructions reviewed with patient by: Catheriine   Clearances: N/A

## 2022-12-15 DIAGNOSIS — K25.9 MULTIPLE GASTRIC ULCERS: ICD-10-CM

## 2022-12-15 RX ORDER — OMEPRAZOLE 40 MG/1
40 CAPSULE, DELAYED RELEASE ORAL DAILY
Qty: 180 CAPSULE | Refills: 0 | Status: SHIPPED | OUTPATIENT
Start: 2022-12-15

## 2022-12-19 DIAGNOSIS — E87.1 HYPONATREMIA: ICD-10-CM

## 2022-12-19 RX ORDER — SODIUM CHLORIDE 1000 MG
1 TABLET, SOLUBLE MISCELLANEOUS 2 TIMES DAILY
Qty: 90 TABLET | Refills: 0 | Status: SHIPPED | OUTPATIENT
Start: 2022-12-19

## 2022-12-23 ENCOUNTER — TELEPHONE (OUTPATIENT)
Dept: GASTROENTEROLOGY | Facility: CLINIC | Age: 72
End: 2022-12-23

## 2022-12-26 DIAGNOSIS — D50.9 IRON DEFICIENCY ANEMIA, UNSPECIFIED IRON DEFICIENCY ANEMIA TYPE: Primary | ICD-10-CM

## 2022-12-26 RX ORDER — SOD SULF/POT CHLORIDE/MAG SULF 1.479 G
24 TABLET ORAL ONCE
Qty: 24 TABLET | Refills: 0 | Status: SHIPPED | OUTPATIENT
Start: 2022-12-26 | End: 2022-12-26

## 2022-12-29 DIAGNOSIS — E87.6 HYPOKALEMIA: ICD-10-CM

## 2022-12-29 DIAGNOSIS — N40.1 BENIGN PROSTATIC HYPERPLASIA WITH LOWER URINARY TRACT SYMPTOMS, SYMPTOM DETAILS UNSPECIFIED: ICD-10-CM

## 2022-12-29 DIAGNOSIS — I10 HYPERTENSION: ICD-10-CM

## 2022-12-29 RX ORDER — TAMSULOSIN HYDROCHLORIDE 0.4 MG/1
0.4 CAPSULE ORAL DAILY
Qty: 90 CAPSULE | Refills: 1 | Status: SHIPPED | OUTPATIENT
Start: 2022-12-29

## 2022-12-29 RX ORDER — POTASSIUM CHLORIDE 20 MEQ/1
40 TABLET, EXTENDED RELEASE ORAL DAILY
Qty: 60 TABLET | Refills: 0 | Status: SHIPPED | OUTPATIENT
Start: 2022-12-29

## 2023-01-06 DIAGNOSIS — K25.9 MULTIPLE GASTRIC ULCERS: ICD-10-CM

## 2023-01-06 DIAGNOSIS — F41.9 ANXIETY: ICD-10-CM

## 2023-01-06 RX ORDER — OMEPRAZOLE 40 MG/1
40 CAPSULE, DELAYED RELEASE ORAL DAILY
Qty: 180 CAPSULE | Refills: 0 | Status: SHIPPED | OUTPATIENT
Start: 2023-01-06 | End: 2023-01-06 | Stop reason: SDUPTHER

## 2023-01-06 RX ORDER — OMEPRAZOLE 40 MG/1
40 CAPSULE, DELAYED RELEASE ORAL DAILY
Qty: 180 CAPSULE | Refills: 0 | Status: SHIPPED | OUTPATIENT
Start: 2023-01-06

## 2023-01-06 RX ORDER — ALPRAZOLAM 1 MG/1
1 TABLET ORAL 2 TIMES DAILY PRN
Qty: 60 TABLET | Refills: 0 | Status: SHIPPED | OUTPATIENT
Start: 2023-01-06 | End: 2023-02-05

## 2023-01-12 ENCOUNTER — HOSPITAL ENCOUNTER (OUTPATIENT)
Dept: GASTROENTEROLOGY | Facility: HOSPITAL | Age: 73
Setting detail: OUTPATIENT SURGERY
End: 2023-01-12
Attending: INTERNAL MEDICINE

## 2023-01-12 ENCOUNTER — HOSPITAL ENCOUNTER (EMERGENCY)
Facility: HOSPITAL | Age: 73
Discharge: HOME | End: 2023-01-12
Attending: EMERGENCY MEDICINE

## 2023-01-12 ENCOUNTER — APPOINTMENT (EMERGENCY)
Dept: RADIOLOGY | Facility: HOSPITAL | Age: 73
End: 2023-01-12

## 2023-01-12 ENCOUNTER — APPOINTMENT (EMERGENCY)
Dept: CT IMAGING | Facility: HOSPITAL | Age: 73
End: 2023-01-12
Attending: EMERGENCY MEDICINE

## 2023-01-12 ENCOUNTER — ANESTHESIA (OUTPATIENT)
Dept: GASTROENTEROLOGY | Facility: HOSPITAL | Age: 73
End: 2023-01-12

## 2023-01-12 ENCOUNTER — ANESTHESIA EVENT (OUTPATIENT)
Dept: GASTROENTEROLOGY | Facility: HOSPITAL | Age: 73
End: 2023-01-12

## 2023-01-12 VITALS
DIASTOLIC BLOOD PRESSURE: 103 MMHG | HEART RATE: 85 BPM | HEIGHT: 62 IN | OXYGEN SATURATION: 100 % | RESPIRATION RATE: 34 BRPM | BODY MASS INDEX: 19.32 KG/M2 | SYSTOLIC BLOOD PRESSURE: 220 MMHG | WEIGHT: 105 LBS | TEMPERATURE: 98.9 F

## 2023-01-12 VITALS
HEIGHT: 62 IN | BODY MASS INDEX: 19.32 KG/M2 | SYSTOLIC BLOOD PRESSURE: 197 MMHG | RESPIRATION RATE: 32 BRPM | TEMPERATURE: 98.3 F | WEIGHT: 105 LBS | DIASTOLIC BLOOD PRESSURE: 86 MMHG | OXYGEN SATURATION: 95 % | HEART RATE: 91 BPM

## 2023-01-12 DIAGNOSIS — N40.0 ENLARGED PROSTATE: ICD-10-CM

## 2023-01-12 DIAGNOSIS — R33.9 URINARY RETENTION: ICD-10-CM

## 2023-01-12 DIAGNOSIS — I10 ASYMPTOMATIC HYPERTENSION: Primary | ICD-10-CM

## 2023-01-12 DIAGNOSIS — D50.8 OTHER IRON DEFICIENCY ANEMIA: ICD-10-CM

## 2023-01-12 DIAGNOSIS — D50.9 IRON DEFICIENCY ANEMIA, UNSPECIFIED IRON DEFICIENCY ANEMIA TYPE: ICD-10-CM

## 2023-01-12 DIAGNOSIS — K25.9 MULTIPLE GASTRIC ULCERS: ICD-10-CM

## 2023-01-12 LAB
2HR DELTA HS TROPONIN: 2 NG/L
ANION GAP SERPL CALCULATED.3IONS-SCNC: 11 MMOL/L (ref 4–13)
ATRIAL RATE: 87 BPM
BASOPHILS # BLD AUTO: 0.02 THOUSANDS/ÂΜL (ref 0–0.1)
BASOPHILS NFR BLD AUTO: 0 % (ref 0–1)
BILIRUB UR QL STRIP: NEGATIVE
BUN SERPL-MCNC: 11 MG/DL (ref 5–25)
CALCIUM SERPL-MCNC: 9.1 MG/DL (ref 8.3–10.1)
CARDIAC TROPONIN I PNL SERPL HS: 11 NG/L
CARDIAC TROPONIN I PNL SERPL HS: 9 NG/L
CHLORIDE SERPL-SCNC: 92 MMOL/L (ref 96–108)
CLARITY UR: CLEAR
CO2 SERPL-SCNC: 27 MMOL/L (ref 21–32)
COLOR UR: COLORLESS
CREAT SERPL-MCNC: 1.03 MG/DL (ref 0.6–1.3)
EOSINOPHIL # BLD AUTO: 0.03 THOUSAND/ÂΜL (ref 0–0.61)
EOSINOPHIL NFR BLD AUTO: 0 % (ref 0–6)
ERYTHROCYTE [DISTWIDTH] IN BLOOD BY AUTOMATED COUNT: 15.8 % (ref 11.6–15.1)
GFR SERPL CREATININE-BSD FRML MDRD: 71 ML/MIN/1.73SQ M
GLUCOSE SERPL-MCNC: 99 MG/DL (ref 65–140)
GLUCOSE UR STRIP-MCNC: NEGATIVE MG/DL
HCT VFR BLD AUTO: 31.7 % (ref 36.5–49.3)
HGB BLD-MCNC: 10.3 G/DL (ref 12–17)
HGB UR QL STRIP.AUTO: NEGATIVE
IMM GRANULOCYTES # BLD AUTO: 0.03 THOUSAND/UL (ref 0–0.2)
IMM GRANULOCYTES NFR BLD AUTO: 0 % (ref 0–2)
KETONES UR STRIP-MCNC: ABNORMAL MG/DL
LEUKOCYTE ESTERASE UR QL STRIP: NEGATIVE
LYMPHOCYTES # BLD AUTO: 0.99 THOUSANDS/ÂΜL (ref 0.6–4.47)
LYMPHOCYTES NFR BLD AUTO: 15 % (ref 14–44)
MCH RBC QN AUTO: 25 PG (ref 26.8–34.3)
MCHC RBC AUTO-ENTMCNC: 32.5 G/DL (ref 31.4–37.4)
MCV RBC AUTO: 77 FL (ref 82–98)
MONOCYTES # BLD AUTO: 0.51 THOUSAND/ÂΜL (ref 0.17–1.22)
MONOCYTES NFR BLD AUTO: 8 % (ref 4–12)
NEUTROPHILS # BLD AUTO: 5.2 THOUSANDS/ÂΜL (ref 1.85–7.62)
NEUTS SEG NFR BLD AUTO: 77 % (ref 43–75)
NITRITE UR QL STRIP: NEGATIVE
NRBC BLD AUTO-RTO: 0 /100 WBCS
NT-PROBNP SERPL-MCNC: 184 PG/ML
P AXIS: 77 DEGREES
PH UR STRIP.AUTO: 7.5 [PH]
PLATELET # BLD AUTO: 254 THOUSANDS/UL (ref 149–390)
PMV BLD AUTO: 9 FL (ref 8.9–12.7)
POTASSIUM SERPL-SCNC: 3.5 MMOL/L (ref 3.5–5.3)
PR INTERVAL: 132 MS
PROT UR STRIP-MCNC: NEGATIVE MG/DL
QRS AXIS: 85 DEGREES
QRSD INTERVAL: 96 MS
QT INTERVAL: 378 MS
QTC INTERVAL: 454 MS
RBC # BLD AUTO: 4.12 MILLION/UL (ref 3.88–5.62)
SODIUM SERPL-SCNC: 130 MMOL/L (ref 135–147)
SP GR UR STRIP.AUTO: <1.005 (ref 1–1.03)
T WAVE AXIS: 77 DEGREES
UROBILINOGEN UR STRIP-ACNC: <2 MG/DL
VENTRICULAR RATE: 87 BPM
WBC # BLD AUTO: 6.78 THOUSAND/UL (ref 4.31–10.16)

## 2023-01-12 RX ORDER — METOCLOPRAMIDE HYDROCHLORIDE 5 MG/ML
10 INJECTION INTRAMUSCULAR; INTRAVENOUS ONCE
Status: COMPLETED | OUTPATIENT
Start: 2023-01-12 | End: 2023-01-12

## 2023-01-12 RX ORDER — SODIUM CHLORIDE, SODIUM LACTATE, POTASSIUM CHLORIDE, CALCIUM CHLORIDE 600; 310; 30; 20 MG/100ML; MG/100ML; MG/100ML; MG/100ML
125 INJECTION, SOLUTION INTRAVENOUS CONTINUOUS
Status: DISCONTINUED | OUTPATIENT
Start: 2023-01-12 | End: 2023-01-16 | Stop reason: HOSPADM

## 2023-01-12 RX ORDER — SODIUM CHLORIDE, SODIUM LACTATE, POTASSIUM CHLORIDE, CALCIUM CHLORIDE 600; 310; 30; 20 MG/100ML; MG/100ML; MG/100ML; MG/100ML
500 INJECTION, SOLUTION INTRAVENOUS CONTINUOUS
Status: DISCONTINUED | OUTPATIENT
Start: 2023-01-12 | End: 2023-01-12 | Stop reason: CLARIF

## 2023-01-12 RX ORDER — DIPHENHYDRAMINE HYDROCHLORIDE 50 MG/ML
25 INJECTION INTRAMUSCULAR; INTRAVENOUS ONCE
Status: COMPLETED | OUTPATIENT
Start: 2023-01-12 | End: 2023-01-12

## 2023-01-12 RX ORDER — ACETAMINOPHEN 325 MG/1
975 TABLET ORAL ONCE
Status: COMPLETED | OUTPATIENT
Start: 2023-01-12 | End: 2023-01-12

## 2023-01-12 RX ADMIN — SODIUM CHLORIDE 1000 ML: 0.9 INJECTION, SOLUTION INTRAVENOUS at 13:36

## 2023-01-12 RX ADMIN — DIPHENHYDRAMINE HYDROCHLORIDE 25 MG: 50 INJECTION, SOLUTION INTRAMUSCULAR; INTRAVENOUS at 13:37

## 2023-01-12 RX ADMIN — ACETAMINOPHEN 975 MG: 325 TABLET ORAL at 13:36

## 2023-01-12 RX ADMIN — SODIUM CHLORIDE, SODIUM LACTATE, POTASSIUM CHLORIDE, AND CALCIUM CHLORIDE 125 ML/HR: .6; .31; .03; .02 INJECTION, SOLUTION INTRAVENOUS at 10:07

## 2023-01-12 RX ADMIN — METOCLOPRAMIDE 10 MG: 5 INJECTION, SOLUTION INTRAMUSCULAR; INTRAVENOUS at 13:37

## 2023-01-12 NOTE — PERIOPERATIVE NURSING NOTE
Pt to be discharged from [address] [place] [address] and transferred to ER for further evaluation  Report given to Candido Rolle RN in ED

## 2023-01-12 NOTE — H&P
History and Physical - [facility]    Denis Mcguire 67 y o  male MRN: [id number]      HPI: Choco Ling is a 67y o  year old male who presents for f/u gastric ulcers  H/o iron deficiency anemia  Patient scheduled for EGD/COLON today  Per daughter, pt was not able to tolerate the prep  No Known Allergies      REVIEW OF SYSTEMS: Per the HPI, and otherwise unremarkable  Historical Information     Past Medical History:   Diagnosis Date   • Anxiety    • Rhodes's esophagus    • Chronic neck pain    • Depression    • GERD (gastroesophageal reflux disease)    • H/O abdominal aortic aneurysm 4 5 cm   • Hyperlipidemia    • Hypertension    • Pancreatic pseudocyst    • Vertigo      Past Surgical History:   Procedure Laterality Date   • COLONOSCOPY  09/30/2014   • IR LOWER EXTREMITY / INTERVENTION  01/04/2019   • DE ESOPHAGOGASTRODUODENOSCOPY TRANSORAL DIAGNOSTIC N/A 06/22/2018    Procedure: EGD AND COLONOSCOPY;  Surgeon: Oleg Coleman MD;  [facility]; Service: Gastroenterology   • DE EVASC RPR DPLMNT AORTO-AORTIC NDGFT N/A 02/09/2018    Procedure: REPAIR ANEURYSM ENDOVASCULAR ABDOMINAL AORTIC  (EVAR);   Surgeon: Brennan Kendall MD;  [facility];  Service: Vascular   • UPPER GASTROINTESTINAL ENDOSCOPY       Social History   Social History     Substance and Sexual Activity   Alcohol Use Never     Social History     Substance and Sexual Activity   Drug Use No     Social History     Tobacco Use   Smoking Status Every Day   • Packs/day: 0 25   • Types: Cigarettes   Smokeless Tobacco Never   Tobacco Comments    [place] quiting smoking handouts     Family History   Problem Relation Age of Onset   • Heart disease Father    • Heart attack Father    • Diabetes Maternal Grandmother    • Diabetes Maternal Grandfather    • Diabetes Paternal Grandmother    • Hypertension Family    • Hyperlipidemia Family        Meds/Allergies       Current Outpatient Medications:   • ALPRAZolam (XANAX) 1 mg tablet  •  magnesium oxide (MAG-OX) 400 mg  •  omeprazole (PriLOSEC) 40 MG capsule  •  tamsulosin (FLOMAX) 0 4 mg  •  amitriptyline (ELAVIL) 25 mg tablet  •  aspirin (ECOTRIN LOW STRENGTH) 81 mg EC tablet  •  citalopram (CeleXA) 20 mg tablet  •  Creon 40828-71064 units capsule  •  dicyclomine (BENTYL) 10 mg capsule  •  finasteride (PROSCAR) 5 mg tablet  •  Linaclotide (LINZESS) 72 MCG CAPS  •  magnesium oxide (MAG-OX) 400 mg tablet  •  metoprolol succinate (TOPROL-XL) 25 mg 24 hr tablet  •  Multiple Vitamins-Minerals (CENTRUM SILVER PO)  •  polyethylene glycol (MIRALAX) 17 g packet  •  potassium chloride (K-DUR,KLOR-CON) 20 mEq tablet  •  pravastatin (PRAVACHOL) 40 mg tablet  •  ranitidine (ZANTAC) 150 mg tablet  •  Restasis 0 05 % ophthalmic emulsion  •  senna (SENOKOT) 8 6 mg  •  sodium chloride 1 g tablet  •  torsemide (DEMADEX) 5 MG tablet  •  Wheat Dextrin (BENEFIBER PO)        Objective     BP (!) 209/100 (BP Location: Right arm)   Pulse 81   Temp 98 9 °F (37 2 °C) (Temporal)   Resp 20   Ht 5' 2" (1 575 m)   Wt 47 6 kg (105 lb)   SpO2 95%   BMI 19 20 kg/m²       PHYSICAL EXAM    Gen: NAD AAOx3  Head: Normocephalic, Atraumatic  CV: S1S2 RRR no m/r/g  CHEST: Clear b/l no c/r/w  ABD: soft, +BS NT/ND no masses  EXT: no edema      ASSESSMENT/PLAN:  This is a 67y o  year old male here for EGD/COLON  Admission BP very high so anesthesia will optimize prior to proceeding with procedures  Per daughter, pt did not take all his prep and may not be clear, so will attempt the colonoscopy but pt understands that we may have to abort if he is not cleaned out

## 2023-01-12 NOTE — PROGRESS NOTES
Pt arrives with BP's elevated, despite taking his beta blocker this AM  BPs consistently 200-220s/100-110s  HR 80-90s  Hydralazine 5 mg IV x 2 provided over 20 min period  Metoprolol 3 mg IV provided over 15 min period  Despite attempts to control BP, BPs have rebounded back to 200s/100s, with HR 80s  Pt did c/o of HA symptoms and did have a degree of anxiety with upcoming procedure  IV hydration (total 1 L fluid) provided over 1 hr period, to which HA symptoms abated  Pt also c/o SOB, to which he was placed on FM O2 and requested an anxiolytic  Review of patient chart showed that patient was admitted with HTN urgency after GI procedure in aug 2022  Uncertain if HTN urgency episode was multifactorial  Discussed this case with Dr Lauren Becerra: given his initial BP presentation, attempts to treat BP with rebound elevation in BPs, case cancelled  Pt will be taken to ER for further workup and evaluation  Dr Lauren Becerra and I communicated to patient's PCP and ER doc regarding BP concerns

## 2023-01-12 NOTE — ED PROVIDER NOTES
History  Chief Complaint   Patient presents with   • Hypertension     Patient comes to the ER from [address]  Patient was over about to have an EGD done when it was found that he was hypertensive  Patient was sent to us for evaluation  67 yom presents for evaluation of asymptomatic hypertension  Patient was scheduled for colonoscopy today but blood pressure was noted to be elevated 614 systolic  Anesthesia administered hydralazine successfully but then BP returned to 200s 20 minutes later  Patient denies any specific complaints  Was brought to ED for further evaluation of HTN  Upon arrival to ED, patient reports he has not urinated in a few hours and has suprapubic pressure  Prior to Admission Medications   Prescriptions Last Dose Informant Patient Reported? Taking?    ALPRAZolam (XANAX) 1 mg tablet   No No   Sig: Take 1 tablet (1 mg total) by mouth 2 (two) times a day as needed (transient anxiety)   Creon 54740-73435 units capsule   No No   Sig: TAKE 2 CAPSULES (24,000 UNITS TOTAL) BY MOUTH 3 (THREE) TIMES A DAY WITH MEALS   Linaclotide (LINZESS) 72 MCG CAPS   No No   Sig: Take 1 capsule by mouth daily   Multiple Vitamins-Minerals (CENTRUM SILVER PO)   Yes No   Sig: Take 1 tablet by mouth daily     Restasis 0 05 % ophthalmic emulsion   Yes No   Wheat Dextrin (BENEFIBER PO)   Yes No   Sig: Take by mouth daily as needed     amitriptyline (ELAVIL) 25 mg tablet   No No   Sig: Take 1 tablet (25 mg total) by mouth daily at bedtime   aspirin (ECOTRIN LOW STRENGTH) 81 mg EC tablet   Yes No   Sig: Take 81 mg by mouth daily   citalopram (CeleXA) 20 mg tablet   No No   Sig: Take 1 tablet (20 mg total) by mouth daily   dicyclomine (BENTYL) 10 mg capsule   No No   Sig: TAKE 1 CAPSULE BY MOUTH 3 TIMES A DAY AS NEEDED (ABDOMINAL CRAMPING/ SPASMS)   finasteride (PROSCAR) 5 mg tablet   No No   Sig: Take 1 tablet (5 mg total) by mouth daily   magnesium oxide (MAG-OX) 400 mg   No No   Sig: Take 1 tablet (400 mg total) by mouth daily   magnesium oxide (MAG-OX) 400 mg tablet   No No   Sig: Take 1 tablet (400 mg total) by mouth daily   metoprolol succinate (TOPROL-XL) 25 mg 24 hr tablet   No No   Sig: Take 1 tablet (25 mg total) by mouth daily   omeprazole (PriLOSEC) 40 MG capsule   No No   Sig: Take 1 capsule (40 mg total) by mouth daily   polyethylene glycol (MIRALAX) 17 g packet   Yes No   Sig: Take 17 g by mouth   potassium chloride (K-DUR,KLOR-CON) 20 mEq tablet   No No   Sig: Take 2 tablets (40 mEq total) by mouth daily   pravastatin (PRAVACHOL) 40 mg tablet   No No   Sig: Take 1 tablet (40 mg total) by mouth daily at bedtime   ranitidine (ZANTAC) 150 mg tablet   No No   Sig: TAKE 1 TABLET EVERY DAY IN THE EVENING   senna (SENOKOT) 8 6 mg   No No   Sig: Take 1 tablet (8 6 mg total) by mouth daily at bedtime   sodium chloride 1 g tablet   No No   Sig: Take 1 tablet (1 g total) by mouth 2 (two) times a day   tamsulosin (FLOMAX) 0 4 mg   No No   Sig: Take 1 capsule (0 4 mg total) by mouth daily   torsemide (DEMADEX) 5 MG tablet   No No   Sig: Take 1 tablet (5 mg total) by mouth daily      Facility-Administered Medications: None       Past Medical History:   Diagnosis Date   • Anxiety    • Rhodes's esophagus    • Chronic neck pain    • Depression    • GERD (gastroesophageal reflux disease)    • H/O abdominal aortic aneurysm 4 5 cm   • Hyperlipidemia    • Hypertension    • Pancreatic pseudocyst    • Vertigo        Past Surgical History:   Procedure Laterality Date   • COLONOSCOPY  09/30/2014   • IR LOWER EXTREMITY / INTERVENTION  01/04/2019   • NE ESOPHAGOGASTRODUODENOSCOPY TRANSORAL DIAGNOSTIC N/A 06/22/2018    Procedure: EGD AND COLONOSCOPY;  Surgeon: Christ Serrato MD;  Location: [facility]; Service: Gastroenterology   • NE EVASC RPR DPLMNT AORTO-AORTIC NDGFT N/A 02/09/2018    Procedure: REPAIR ANEURYSM ENDOVASCULAR ABDOMINAL AORTIC  (EVAR);   Surgeon: Ti Aguilar MD;  Location: [facility];  Service: Vascular   • UPPER GASTROINTESTINAL ENDOSCOPY         Family History   Problem Relation Age of Onset   • Heart disease Father    • Heart attack Father    • Diabetes Maternal Grandmother    • Diabetes Maternal Grandfather    • Diabetes Paternal Grandmother    • Hypertension Family    • Hyperlipidemia Family      I have reviewed and agree with the history as documented  E-Cigarette/Vaping   • E-Cigarette Use Never User      E-Cigarette/Vaping Substances   • Nicotine No    • THC No    • CBD No    • Flavoring No    • Other No    • Unknown No      Social History     Tobacco Use   • Smoking status: Every Day     Packs/day: 0 25     Types: Cigarettes   • Smokeless tobacco: Never   • Tobacco comments:     [employer] quiting smoking handouts   Vaping Use   • Vaping Use: Never used   Substance Use Topics   • Alcohol use: Never   • Drug use: No       Review of Systems   Gastrointestinal: Positive for abdominal pain  Physical Exam  Physical Exam  Vitals and nursing note reviewed  Constitutional:       General: He is not in acute distress  Appearance: He is well-developed  HENT:      Head: Normocephalic and atraumatic  Right Ear: External ear normal       Left Ear: External ear normal       Nose: Nose normal    Eyes:      General: No scleral icterus  Pulmonary:      Effort: Pulmonary effort is normal  No respiratory distress  Abdominal:      General: There is no distension  Palpations: Abdomen is soft  Comments: Suprapubic TTP but fully resolved after croft   Musculoskeletal:         General: No deformity  Normal range of motion  Cervical back: Normal range of motion and neck supple  Skin:     General: Skin is warm  Findings: No rash  Neurological:      General: No focal deficit present  Mental Status: He is alert        Gait: Gait normal    Psychiatric:         Mood and Affect: Mood normal          Vital Signs  ED Triage Vitals [01/12/23 1130]   Temperature Pulse Respirations Blood Pressure SpO2 98 3 °F (36 8 °C) 84 20 (!) 206/100 100 %      Temp Source Heart Rate Source Patient Position - Orthostatic VS BP Location FiO2 (%)   Temporal Monitor Lying Right arm --      Pain Score       No Pain           Vitals:    01/12/23 1230 01/12/23 1300 01/12/23 1330 01/12/23 1500   BP: (!) 166/120 (!) 179/95 (!) 202/95 (!) 197/86   Pulse: 101 79 84 91   Patient Position - Orthostatic VS:  Lying Lying          Visual Acuity      ED Medications  Medications   sodium chloride 0 9 % bolus 1,000 mL (0 mL Intravenous Stopped 1/12/23 1528)   metoclopramide (REGLAN) injection 10 mg (10 mg Intravenous Given 1/12/23 1337)   acetaminophen (TYLENOL) tablet 975 mg (975 mg Oral Given 1/12/23 1336)   diphenhydrAMINE (BENADRYL) injection 25 mg (25 mg Intravenous Given 1/12/23 1337)       Diagnostic Studies  Results Reviewed     Procedure Component Value Units Date/Time    HS Troponin I 2hr [[id number]]  (Normal) Collected: 01/12/23 1425    Lab Status: Final result Specimen: Blood from Arm, Right Updated: 01/12/23 1451     hs TnI 2hr 11 ng/L      Delta 2hr hsTnI 2 ng/L     HS Troponin 0hr (reflex protocol) [[id number]]  (Normal) Collected: 01/12/23 1204    Lab Status: Final result Specimen: Blood from Arm, Left Updated: 01/12/23 1230     hs TnI 0hr 9 ng/L     HS Troponin I 4hr [[id number]]     Lab Status: No result Specimen: Blood     NT-BNP PRO [[id number]]  (Abnormal) Collected: 01/12/23 1204    Lab Status: Final result Specimen: Blood from Arm, Left Updated: 01/12/23 1229     NT-proBNP 184 pg/mL     Basic metabolic panel [[id number]]  (Abnormal) Collected: 01/12/23 1204    Lab Status: Final result Specimen: Blood from Arm, Left Updated: 01/12/23 1229     Sodium 130 mmol/L      Potassium 3 5 mmol/L      Chloride 92 mmol/L      CO2 27 mmol/L      ANION GAP 11 mmol/L      BUN 11 mg/dL      Creatinine 1 03 mg/dL      Glucose 99 mg/dL      Calcium 9 1 mg/dL      eGFR 71 ml/min/1 73sq m     Narrative:      [place] guidelines for Chronic Kidney Disease (CKD):   •  Stage 1 with normal or high GFR (GFR > 90 mL/min/1 73 square meters)  •  Stage 2 Mild CKD (GFR = 60-89 mL/min/1 73 square meters)  •  Stage 3A Moderate CKD (GFR = 45-59 mL/min/1 73 square meters)  •  Stage 3B Moderate CKD (GFR = 30-44 mL/min/1 73 square meters)  •  Stage 4 Severe CKD (GFR = 15-29 mL/min/1 73 square meters)  •  Stage 5 End Stage CKD (GFR <15 mL/min/1 73 square meters)  Note: GFR calculation is accurate only with a steady state creatinine    CBC and differential [[id number]]  (Abnormal) Collected: 01/12/23 1204    Lab Status: Final result Specimen: Blood from Arm, Left Updated: 01/12/23 1209     WBC 6 78 Thousand/uL      RBC 4 12 Million/uL      Hemoglobin 10 3 g/dL      Hematocrit 31 7 %      MCV 77 fL      MCH 25 0 pg      MCHC 32 5 g/dL      RDW 15 8 %      MPV 9 0 fL      Platelets 683 Thousands/uL      nRBC 0 /100 WBCs      Neutrophils Relative 77 %      Immat GRANS % 0 %      Lymphocytes Relative 15 %      Monocytes Relative 8 %      Eosinophils Relative 0 %      Basophils Relative 0 %      Neutrophils Absolute 5 20 Thousands/µL      Immature Grans Absolute 0 03 Thousand/uL      Lymphocytes Absolute 0 99 Thousands/µL      Monocytes Absolute 0 51 Thousand/µL      Eosinophils Absolute 0 03 Thousand/µL      Basophils Absolute 0 02 Thousands/µL     UA w Reflex to Microscopic w Reflex to Culture [[id number]]  (Abnormal) Collected: 01/12/23 1143    Lab Status: Final result Specimen: Urine, Clean Catch Updated: 01/12/23 1205     Color, UA Colorless     Clarity, UA Clear     Specific Gravity, UA <1 005     pH, UA 7 5     Leukocytes, UA Negative     Nitrite, UA Negative     Protein, UA Negative mg/dl      Glucose, UA Negative mg/dl      Ketones, UA Trace mg/dl      Urobilinogen, UA <2 0 mg/dl      Bilirubin, UA Negative     Occult Blood, UA Negative                 CT abdomen pelvis wo contrast   Final Result by Chip Schaefer MD (01/12 1421)      1   Mildly dilated renal collecting systems similar to the previous examination  2  Mildly enlarged prostate gland  There is now a Nicole catheter within the decompressed bladder lumen with bladder wall thickening, may be related to collapsed state and/or underlying muscular hypertrophy  Overall findings suspicious for chronic    bladder outlet obstruction  3  No acute inflammatory changes in the abdomen or pelvis         Workstation performed: [id number]         X-ray chest 1 view portable   Final Result by Liliya Tamez MD (01/12 1252)      No acute cardiopulmonary disease  Workstation performed: [id number]                    Procedures  Procedures         ED Course                                             Medical Decision Making  67 yom with asymptomatic HTN  Labs, EKG, delta trop  Nicole catheter inserted without difficulty after bladder scan revealed >500mL  Complete resolution of symptoms afterwards  Discussed all results  FU with urology and PCP  Asymptomatic hypertension: complicated acute illness or injury  Enlarged prostate: complicated acute illness or injury  Urinary retention: self-limited or minor problem  Amount and/or Complexity of Data Reviewed  Labs: ordered  Radiology: ordered  Risk  OTC drugs  Prescription drug management            Disposition  Final diagnoses:   Asymptomatic hypertension   Urinary retention   Enlarged prostate     Time reflects when diagnosis was documented in both MDM as applicable and the Disposition within this note     Time User Action Codes Description Comment    1/12/2023  2:57 PM Christophe Hire Add [I10] Asymptomatic hypertension     1/12/2023  2:57 PM Brewster Hire Add [R33 9] Urinary retention     1/12/2023  2:59 PM Brewster Hire Add [N40 0] Enlarged prostate       ED Disposition     ED Disposition   Discharge    Condition   Stable    Date/Time   Thu Jan 12, 2023  2:57 PM    Comment   Raciel Mcguire discharge to home/self care                Follow-up Information     Follow up With Specialties Details Why Contact Info Additional [address] [facility], MD Internal Medicine   [address]  [address] For Urology [facility] In 2 days  [address] [address] [phone] [facility] [place], [address], [facility] [address], [place], [place], [address]     [address] Emergency Department Emergency Medicine  If symptoms worsen [address] [place],[address] [address]  [address] [facility] Emergency Department, [address], [place], [address]          Discharge Medication List as of 1/12/2023  2:59 PM      CONTINUE these medications which have NOT CHANGED    Details   ALPRAZolam (XANAX) 1 mg tablet Take 1 tablet (1 mg total) by mouth 2 (two) times a day as needed (transient anxiety), Starting Fri 1/6/2023, Until Sun 2/5/2023 at 2359, Normal      amitriptyline (ELAVIL) 25 mg tablet Take 1 tablet (25 mg total) by mouth daily at bedtime, Starting Fri 12/9/2022, Normal      aspirin (ECOTRIN LOW STRENGTH) 81 mg EC tablet Take 81 mg by mouth daily, Historical Med      citalopram (CeleXA) 20 mg tablet Take 1 tablet (20 mg total) by mouth daily, Starting Tue 10/25/2022, Normal      Creon 22519-95836 units capsule TAKE 2 CAPSULES (24,000 UNITS TOTAL) BY MOUTH 3 (THREE) TIMES A DAY WITH MEALS, Starting Fri 10/7/2022, Normal      dicyclomine (BENTYL) 10 mg capsule TAKE 1 CAPSULE BY MOUTH 3 TIMES A DAY AS NEEDED (ABDOMINAL CRAMPING/ SPASMS), Normal      finasteride (PROSCAR) 5 mg tablet Take 1 tablet (5 mg total) by mouth daily, Starting Thu 11/17/2022, Normal      Linaclotide (LINZESS) 72 MCG CAPS Take 1 capsule by mouth daily, Starting Tue 1/22/2019, Normal      magnesium oxide (MAG-OX) 400 mg tablet Take 1 tablet (400 mg total) by mouth daily, Starting Thu 11/17/2022, Normal      magnesium oxide (MAG-OX) 400 mg Take 1 tablet (400 mg total) by mouth daily, Starting Tue 10/11/2022, Normal      metoprolol succinate (TOPROL-XL) 25 mg 24 hr tablet Take 1 tablet (25 mg total) by mouth daily, Starting Tue 10/25/2022, Normal      Multiple Vitamins-Minerals (CENTRUM SILVER PO) Take 1 tablet by mouth daily  , Historical Med      omeprazole (PriLOSEC) 40 MG capsule Take 1 capsule (40 mg total) by mouth daily, Starting Fri 1/6/2023, Normal      polyethylene glycol (MIRALAX) 17 g packet Take 17 g by mouth, Historical Med      potassium chloride (K-DUR,KLOR-CON) 20 mEq tablet Take 2 tablets (40 mEq total) by mouth daily, Starting Thu 12/29/2022, Normal      pravastatin (PRAVACHOL) 40 mg tablet Take 1 tablet (40 mg total) by mouth daily at bedtime, Starting Wed 5/25/2022, Normal      ranitidine (ZANTAC) 150 mg tablet TAKE 1 TABLET EVERY DAY IN THE EVENING, Normal      Restasis 0 05 % ophthalmic emulsion Starting Thu 12/30/2021, Historical Med      senna (SENOKOT) 8 6 mg Take 1 tablet (8 6 mg total) by mouth daily at bedtime, Starting Tue 8/23/2022, Normal      sodium chloride 1 g tablet Take 1 tablet (1 g total) by mouth 2 (two) times a day, Starting Mon 12/19/2022, Normal      tamsulosin (FLOMAX) 0 4 mg Take 1 capsule (0 4 mg total) by mouth daily, Starting Thu 12/29/2022, Normal      torsemide (DEMADEX) 5 MG tablet Take 1 tablet (5 mg total) by mouth daily, Starting Thu 11/17/2022, Normal      Wheat Dextrin (BENEFIBER PO) Take by mouth daily as needed  , Historical Med             No discharge procedures on file      PDMP Review       Value Time User    PDMP Reviewed  Yes 11/10/2022  2:12 PM Benson Duarte MD          ED Provider  Electronically Signed by           Liya Moore DO  01/12/23 0846

## 2023-01-12 NOTE — ANESTHESIA PREPROCEDURE EVALUATION
Procedure:  EGD  COLONOSCOPY    Relevant Problems   CARDIO   (+) Hypertension      GI/HEPATIC   (+) Other chronic pancreatitis (HCC)      /RENAL   (+) Chronic kidney disease, stage 3 (HCC)      HEMATOLOGY   (+) Iron deficiency anemia      MUSCULOSKELETAL   (+) Neurogenic claudication (HCC)      NEURO/PSYCH   (+) Depression, recurrent (HCC)   (+) Neurogenic claudication (HCC)      PULMONARY   (+) Smoking      Other   (+) History of endovascular stent graft for abdominal aortic aneurysm (AAA)   (+) Protein-calorie malnutrition, unspecified severity (HCC)      Hx anxiety  Chronic neck pain  Hx childress's esophagus, hx gastric ulcers, iron deficiency anemia      Physical Exam    Airway    Mallampati score: III  TM Distance: >3 FB  Neck ROM: full     Dental   upper dentures and lower dentures,     Cardiovascular      Pulmonary      Other Findings        Anesthesia Plan  ASA Score- 3     Anesthesia Type- IV sedation with anesthesia with ASA Monitors  Additional Monitors:   Airway Plan:     Comment: BP is 200s/100s, HR 70-80s, pt complains of a HA secondary to fasting from colonoscopy prep  He is also anxious  Metoprolol taken this AM  Pt was admitted on 8/2022 after GI procedure secondary to HTN urgency  Discussed with GI doc  Will treat with hydralazine preop to see if BP can be controlled prior to procedure          Plan Factors-    Chart reviewed  Patient summary reviewed  Patient is not a current smoker  Patient did not smoke on day of surgery  Induction- intravenous      Postoperative Plan-     Informed Consent-

## 2023-01-12 NOTE — PERIOPERATIVE NURSING NOTE
Reconciled home medications with patient, whom reports that housemate distributes medications daily  Pt unsure of what medications were taken this AM   Contacted "Carmen Archer", pt's housemate on pt's cellphone, whom reports that metoprolol and omeprazole were only given this AM   Carmen Archer also reports that he did not give pt his PM medications on 1/11/2023  Pt with elevated BP  Anesthesia at bedside  Dr [place] De La Fuente made aware

## 2023-01-12 NOTE — H&P
Significant delay today due to patient's high blood pressure  Despite attempts to control the BP, patient started to have shortness of breath with some drops in O2 sat, without sig improvement in the BP  Discussed with anesthesia  At this point, we are an hour and a half in to trying to control his BP, and we feel it's best we cancel the procedure and reschedule  In the future, prior to any procedures being scheduled, would advise cardiac evaluation and BP optimization  At this point, given ongoing hypertensive emergency, will send patient to ER for further management  Pt can follow up with GI as outpatient after he is cardiac optimized  Discussed with patient and daughter

## 2023-01-12 NOTE — DISCHARGE INSTRUCTIONS
1  Mildly dilated renal collecting systems similar to the previous examination  2  Mildly enlarged prostate gland  There is now a Nicole catheter within the decompressed bladder lumen with bladder wall thickening, may be related to collapsed state and/or underlying muscular hypertrophy  Overall findings suspicious for chronic   bladder outlet obstruction    3  No acute inflammatory changes in the abdomen or pelvis

## 2023-01-12 NOTE — PERIOPERATIVE NURSING NOTE
Pt continues with elevated BP and respiratory distress  Transferred to ER, report given to Alex, [address]

## 2023-01-13 ENCOUNTER — TELEPHONE (OUTPATIENT)
Dept: UROLOGY | Facility: CLINIC | Age: 73
End: 2023-01-13

## 2023-01-13 NOTE — TELEPHONE ENCOUNTER
Patient was sent to the  in the ER yesterday  He was supposed to have a colonoscopy but his BP was elevated  Patient was not able to urinate and they placed a croft catheter in the ER       Patient can be reached at [phone]

## 2023-01-13 NOTE — PERIOPERATIVE NURSING NOTE
Pt called  Colonoscopy and EGD was cancelled  Patient had gone to the ER  Nicole Cath was placed  Patient Discharged back home  Pt called for follow up  Was told to call Urology about Nicole Catheter care  Pt confirmed he had Urology's number and location on Discharge paper work and he was going to call

## 2023-01-17 ENCOUNTER — TELEPHONE (OUTPATIENT)
Dept: OTHER | Facility: OTHER | Age: 73
End: 2023-01-17

## 2023-01-18 NOTE — TELEPHONE ENCOUNTER
There are duplicate notes  Patient was seen by Amanda Owen in august and had a void trial in [facility]    Scheduled him next Tuesday and Wednesday one with provider and then with nurse - patient aware

## 2023-01-23 DIAGNOSIS — R33.9 URINARY RETENTION WITH INCOMPLETE BLADDER EMPTYING: ICD-10-CM

## 2023-01-23 DIAGNOSIS — I10 HYPERTENSION: ICD-10-CM

## 2023-01-23 DIAGNOSIS — E87.6 HYPOKALEMIA: ICD-10-CM

## 2023-01-23 DIAGNOSIS — N32.0 BLADDER OUTLET OBSTRUCTION: ICD-10-CM

## 2023-01-23 RX ORDER — FINASTERIDE 5 MG/1
5 TABLET, FILM COATED ORAL DAILY
Qty: 30 TABLET | Refills: 1 | Status: SHIPPED | OUTPATIENT
Start: 2023-01-23

## 2023-01-23 RX ORDER — TORSEMIDE 5 MG/1
5 TABLET ORAL DAILY
Qty: 30 TABLET | Refills: 1 | Status: SHIPPED | OUTPATIENT
Start: 2023-01-23

## 2023-01-24 ENCOUNTER — OFFICE VISIT (OUTPATIENT)
Dept: UROLOGY | Facility: HOSPITAL | Age: 73
End: 2023-01-24

## 2023-01-24 VITALS
HEIGHT: 62 IN | HEART RATE: 95 BPM | WEIGHT: 105 LBS | OXYGEN SATURATION: 97 % | DIASTOLIC BLOOD PRESSURE: 82 MMHG | BODY MASS INDEX: 19.32 KG/M2 | SYSTOLIC BLOOD PRESSURE: 148 MMHG

## 2023-01-24 DIAGNOSIS — R33.9 URINARY RETENTION: Primary | ICD-10-CM

## 2023-01-24 NOTE — PROGRESS NOTES
01/24/23    Kristan Germain Mastropieri   1950   [id number]     Assessment  1 Urinary retention     Discussion/Plan  1 Urinary retention    Continue Tamsulosin, Finasteride daily   Hydrate with water, avoid constipation, ambulate    BUN 11, Cr 1 03    Void trial with nursing tomorrow, 1/25/23  Subjective  HPI   Edwar Adam is a 67year old male who presents to the office after ER evaluation for HTN  He had colonoscopy and his BP was >967 systolic  He went to the ER for further workup  He expressed suprapubic discomfort and bladder scan found >500 cc urinary retention  Nicole was placed with relief of symptoms  There was some debris in the catheter bag after insertion which has cleared  He is accompanied by a family member who assists with care  He hydrates with cranberry juice, orange juice, and water  He states he is moving his bowels without difficulty  He is scheduled tomorrow for void trial with nursing  He is managed on Tamsulosin and Finasteride  Review of Systems - History obtained from chart review and the patient  General ROS: negative  Psychological ROS: negative  Respiratory ROS: no cough, shortness of breath, or wheezing  Cardiovascular ROS: no chest pain or dyspnea on exertion  Gastrointestinal ROS: no abdominal pain, change in bowel habits, or black or bloody stools  Genito-Urinary ROS: no dysuria, trouble voiding, or hematuria  Musculoskeletal ROS: negative  Neurological ROS: no TIA or stroke symptoms  Dermatological ROS: negative       Objective  Physical Exam  Vitals and nursing note reviewed  Constitutional:       General: He is awake  He is not in acute distress  Appearance: Normal appearance  He is well-developed, well-groomed and normal weight  He is not ill-appearing, toxic-appearing or diaphoretic  Pulmonary:      Effort: Pulmonary effort is normal    Abdominal:      Tenderness: There is no right CVA tenderness or left CVA tenderness     Musculoskeletal:         General: Normal range of motion  Cervical back: Normal range of motion and neck supple  Skin:     General: Skin is warm  Neurological:      General: No focal deficit present  Mental Status: He is alert and oriented to person, place, and time  Mental status is at baseline  Psychiatric:         Attention and Perception: Attention normal          Mood and Affect: Mood normal          Speech: Speech normal          Behavior: Behavior normal  Behavior is cooperative  Thought Content: Thought content normal          Cognition and Memory: Cognition normal          Judgment: Judgment normal               DEACON Akhtar     I have spent 15 minutes with Patient and family today in which greater than 50% of this time was spent in counseling/coordination of care regarding Patient and family education

## 2023-01-25 ENCOUNTER — PROCEDURE VISIT (OUTPATIENT)
Dept: UROLOGY | Facility: HOSPITAL | Age: 73
End: 2023-01-25

## 2023-01-25 DIAGNOSIS — R33.9 URINARY RETENTION: Primary | ICD-10-CM

## 2023-01-25 NOTE — PROGRESS NOTES
VOID TRIAL     Nicole removed without incident at 0943   Instructed to hydrate, move bowels, ambulate, return at 230   Patient states he took his flomax this AM   Patient cancelled PVR this afternoon and did not return  He reports he is urinating

## 2023-02-01 DIAGNOSIS — E78.5 HYPERLIPIDEMIA, UNSPECIFIED: ICD-10-CM

## 2023-02-01 DIAGNOSIS — E87.6 HYPOKALEMIA: ICD-10-CM

## 2023-02-01 DIAGNOSIS — I10 HYPERTENSION: ICD-10-CM

## 2023-02-01 RX ORDER — POTASSIUM CHLORIDE 20 MEQ/1
40 TABLET, EXTENDED RELEASE ORAL DAILY
Qty: 60 TABLET | Refills: 0 | Status: SHIPPED | OUTPATIENT
Start: 2023-02-01

## 2023-02-01 RX ORDER — PRAVASTATIN SODIUM 40 MG
40 TABLET ORAL
Qty: 90 TABLET | Refills: 2 | Status: SHIPPED | OUTPATIENT
Start: 2023-02-01

## 2023-02-02 NOTE — PROGRESS NOTES
Follow up   after  seen by  Urologist  to  remove Aleshia Spence PT DAILY TREATMENT NOTE     Patient Name: Earlene July  Date:2023  : 1955  [x]  Patient  Verified  Payor: [employer] MEDICARE COMPLETE / Plan: [facility] MEDICARE COMPLETE / Product Type: Managed Care Medicare /    In time:1130  Out time:1215  Total Treatment Time (min): 45  Visit #: 2 of 8    Medicare/BCBS Only   Total Timed Codes (min):  45 1:1 Treatment Time:  40       Treatment Area: Pain in left hip [M25.552]  Trochanteric bursitis, left hip [M70.62]  Pain in right hip [M25.551]    SUBJECTIVE  Pain Level (0-10 scale): 0  Any medication changes, allergies to medications, adverse drug reactions, diagnosis change, or new procedure performed?: [x] No    [] Yes (see summary sheet for update)  Subjective functional status/changes:   [] No changes reported  \"No pain. \"    OBJECTIVE    Modality rationale: Patient declined     Min Type Additional Details    [] Estim:  []Unatt       []IFC  []Premod                        []Other:  []w/ice   []w/heat  Position:  Location:    [] Estim: []Att    []TENS instruct  []NMES                    []Other:  []w/US   []w/ice   []w/heat  Position:  Location:    []  Traction: [] Cervical       []Lumbar                       [] Prone          []Supine                       []Intermittent   []Continuous Lbs:  [] before manual  [] after manual    []  Ultrasound: []Continuous   [] Pulsed                           []1MHz   []3MHz W/cm2:  Location:    []  Iontophoresis with dexamethasone         Location: [] Take home patch   [] In clinic    []  Ice     []  heat  []  Ice massage  []  Laser   []  Anodyne Position:  Location:    []  Laser with stim  []  Other:  Position:  Location:    []  Vasopneumatic Device    []  Right     []  Left  Pre-treatment girth:  Post-treatment girth:  Measured at (location):  Pressure:       [] lo [] med [] hi   Temperature: [] lo [] med [] hi   [] Skin assessment post-treatment:  []intact []redness- no adverse reaction    []redness - adverse reaction:     15 min Therapeutic Exercise:  [x] See flow sheet :   Rationale: increase ROM and increase strength to improve the patients ability to perform ADLs     15 min Therapeutic Activity:  [x]  See flow sheet : functional standing activities, sit to stands   Rationale: increase ROM, increase strength, improve coordination, improve balance, and increase proprioception  to improve the patients ability to improve mobility and ADL performance     15 min Neuromuscular Re-education:  [x]  See flow sheet : hip/glut re-ed activities    Rationale: increase ROM, increase strength, improve coordination, improve balance, and increase proprioception  to improve the patients ability to improve mobility, stance stability, and gait         With   [x] TE   [x] TA   [x] neuro   [] other: Patient Education: [x] Review HEP    [] Progressed/Changed HEP based on:   [x] positioning   [x] body mechanics   [] transfers   [] heat/ice application    [] other:      Other Objective/Functional Measures:      Pain Level (0-10 scale) post treatment: 0    ASSESSMENT/Changes in Function: Pt continues to progress with her strength as she needed less assistance with sidelying hip abduction. Continues to ambulate with a Trendelenburg gait. Pain is well-managed, but still lacking glut max and med strength. Patient will continue to benefit from skilled PT services to modify and progress therapeutic interventions, address functional mobility deficits, address ROM deficits, address strength deficits, analyze and address soft tissue restrictions, analyze and cue movement patterns, analyze and modify body mechanics/ergonomics, assess and modify postural abnormalities, address imbalance/dizziness, and instruct in home and community integration to attain remaining goals. [x]  See Plan of Care  []  See progress note/recertification  []  See Discharge Summary         Progress towards goals / Updated goals:   Increase left LE MMT scores by >/= 1/3 to 2/3 grades and minimal discomfort. Recert:  PROGRESSING; B ER 5/5, left IR 4/5, left Add 5/5, left abd 2-/5 (Was 3+ Abd, 4 Add, 4, IR, and 4+ ER and discomfort at eval). Making progress  2. Ambulation in home safely with SPC and no/minimal lateral lurching. (Was 3+ Abd, 4 Add, 4, IR, and 4+ ER and discomfort at eval). Recert:  PROGRESSING; continues to use SPC with a Trendelenburg gait, but increased walking tolerance (was slowed becky, decreased left stance time/antalgic, decreased bilat step/stride lengths, lateral lurchingat eval). Continues to have a Trendelenburg gait  3. Decrease max pain scale rating to  >/= 1-2/10. Recert:  PROGRESSING; 3/87 at max, but decreased average daily pain to 2/10 compared 3.5/10 at last reassessment.    Pain is well-managed currently     PLAN  []  Upgrade activities as tolerated     [x]  Continue plan of care  []  Update interventions per flow sheet       []  Discharge due to:_  []  Other:_      Sukh Owusu PTA, CSCS 2/2/2023  12:19 PM    Future Appointments   Date Time Provider Anastacia Shoemaker   2/2/2023 11:30 AM [facility]   2/7/2023 12:00 PM [facility]   2/9/2023 11:30 AM [facility] Sen[facility]   2/14/2023 11:30 AM [facility] Rick PT [facility]   2/23/2023 11:30 AM [facility]

## 2023-02-06 ENCOUNTER — OFFICE VISIT (OUTPATIENT)
Dept: INTERNAL MEDICINE CLINIC | Facility: CLINIC | Age: 73
End: 2023-02-06

## 2023-02-06 VITALS
HEIGHT: 62 IN | BODY MASS INDEX: 18.51 KG/M2 | OXYGEN SATURATION: 97 % | HEART RATE: 94 BPM | SYSTOLIC BLOOD PRESSURE: 150 MMHG | WEIGHT: 100.6 LBS | TEMPERATURE: 97.9 F | DIASTOLIC BLOOD PRESSURE: 98 MMHG

## 2023-02-06 DIAGNOSIS — F33.9 MAJOR DEPRESSIVE DISORDER, RECURRENT, UNSPECIFIED (HCC): ICD-10-CM

## 2023-02-06 DIAGNOSIS — K86.1 CHRONIC PANCREATITIS (HCC): ICD-10-CM

## 2023-02-06 DIAGNOSIS — E87.1 HYPONATREMIA: ICD-10-CM

## 2023-02-06 DIAGNOSIS — I74.5 EMBOLISM AND THROMBOSIS OF ILIAC ARTERY (HCC): ICD-10-CM

## 2023-02-06 DIAGNOSIS — E46 PROTEIN-CALORIE MALNUTRITION, UNSPECIFIED SEVERITY (HCC): ICD-10-CM

## 2023-02-06 DIAGNOSIS — N18.31 CHRONIC KIDNEY DISEASE, STAGE 3A (HCC): ICD-10-CM

## 2023-02-06 DIAGNOSIS — F41.9 ANXIETY: ICD-10-CM

## 2023-02-06 DIAGNOSIS — I10 HYPERTENSION: Primary | ICD-10-CM

## 2023-02-06 DIAGNOSIS — G95.19 NEUROGENIC CLAUDICATION (HCC): ICD-10-CM

## 2023-02-06 RX ORDER — SODIUM CHLORIDE 1000 MG
1 TABLET, SOLUBLE MISCELLANEOUS 2 TIMES DAILY
Qty: 90 TABLET | Refills: 0 | Status: SHIPPED | OUTPATIENT
Start: 2023-02-06

## 2023-02-06 RX ORDER — ALPRAZOLAM 1 MG/1
1 TABLET ORAL 2 TIMES DAILY PRN
Qty: 60 TABLET | Refills: 0 | Status: CANCELLED | OUTPATIENT
Start: 2023-02-06 | End: 2023-03-08

## 2023-02-06 RX ORDER — ALPRAZOLAM 1 MG/1
1 TABLET ORAL 2 TIMES DAILY PRN
Qty: 60 TABLET | Refills: 0 | Status: SHIPPED | OUTPATIENT
Start: 2023-02-06 | End: 2023-03-08

## 2023-02-06 NOTE — PROGRESS NOTES
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)    Major depressive disorder, recurrent, unspecified (HCC)    Chronic kidney disease, stage 3a (HCC)          Subjective: dry mouth     Patient ID: Celina Walters is a 67 y o  male  HPI    The following portions of the patient's history were reviewed and updated as appropriate: allergies, current medications, past family history, past medical history, past social history, past surgical history, and problem list     Review of Systems   Constitutional: Negative  HENT: Negative for dental problem, drooling, ear discharge and ear pain  Eyes: Negative for discharge, redness and itching  Respiratory: Negative for apnea, cough and wheezing  Cardiovascular: Negative for chest pain and palpitations  Gastrointestinal: Negative for abdominal pain, blood in stool, diarrhea and vomiting  Endocrine: Negative for polydipsia, polyphagia and polyuria  Genitourinary: Negative for decreased urine volume, dysuria and frequency  Musculoskeletal: Negative for arthralgias, myalgias and neck stiffness  Skin: Negative for pallor and wound  Allergic/Immunologic: Negative for environmental allergies and food allergies  Neurological: Negative for facial asymmetry, light-headedness, numbness and headaches  Hematological: Negative for adenopathy  Does not bruise/bleed easily     Psychiatric/Behavioral: Negative for agitation, behavioral problems and confusion  Objective:      /98 (BP Location: Left arm, Patient Position: Sitting, Cuff Size: Standard)   Pulse 94   Temp 97 9 °F (36 6 °C) (Temporal)   Ht 5' 2" (1 575 m)   Wt 45 6 kg (100 lb 9 6 oz)   SpO2 97% Comment: BISI  BMI 18 40 kg/m²          Physical Exam  Constitutional:       Appearance: Normal appearance  He is obese  HENT:      Head: Normocephalic  Nose: Nose normal       Mouth/Throat:      Mouth: Mucous membranes are moist    Eyes:      Pupils: Pupils are equal, round, and reactive to light  Cardiovascular:      Rate and Rhythm: Regular rhythm  Heart sounds: Normal heart sounds  Pulmonary:      Breath sounds: Normal breath sounds  Abdominal:      Palpations: Abdomen is soft  Tenderness: There is abdominal tenderness  Musculoskeletal:         General: No swelling  Cervical back: Neck supple  Skin:     General: Skin is warm  Neurological:      General: No focal deficit present  Mental Status: He is alert and oriented to person, place, and time  Psychiatric:         Mood and Affect: Mood normal        Hypertension   Controlled  With  Current  meds    D/C amitriptiline    Hyponatremia     Sodium  Stable  At   130    Chronic  Pancreatitis  Stable        Fup  4 months      BLUE Camarena MD

## 2023-02-15 DIAGNOSIS — K86.89 PANCREATIC INSUFFICIENCY: ICD-10-CM

## 2023-02-15 RX ORDER — PANCRELIPASE 60000; 12000; 38000 [USP'U]/1; [USP'U]/1; [USP'U]/1
24000 CAPSULE, DELAYED RELEASE PELLETS ORAL
Qty: 540 CAPSULE | Refills: 1 | Status: SHIPPED | OUTPATIENT
Start: 2023-02-15

## 2023-02-15 NOTE — TELEPHONE ENCOUNTER
Refill Request     Creon 06625 unit capsules 3 times daily     Pharmacy: [facility]    LA: 2/6/23  NA: 6/5/23

## 2023-02-22 DIAGNOSIS — I10 HYPERTENSION: ICD-10-CM

## 2023-02-22 DIAGNOSIS — E87.6 HYPOKALEMIA: ICD-10-CM

## 2023-02-22 DIAGNOSIS — F32.9 REACTIVE DEPRESSION: ICD-10-CM

## 2023-02-22 RX ORDER — CITALOPRAM 20 MG/1
20 TABLET ORAL DAILY
Qty: 90 TABLET | Refills: 0 | Status: SHIPPED | OUTPATIENT
Start: 2023-02-22

## 2023-02-22 RX ORDER — POTASSIUM CHLORIDE 20 MEQ/1
40 TABLET, EXTENDED RELEASE ORAL DAILY
Qty: 60 TABLET | Refills: 0 | Status: SHIPPED | OUTPATIENT
Start: 2023-02-22

## 2023-03-06 DIAGNOSIS — F41.9 ANXIETY: ICD-10-CM

## 2023-03-06 RX ORDER — ALPRAZOLAM 1 MG/1
1 TABLET ORAL 2 TIMES DAILY PRN
Qty: 60 TABLET | Refills: 0 | Status: SHIPPED | OUTPATIENT
Start: 2023-03-06 | End: 2023-04-05

## 2023-03-20 DIAGNOSIS — E87.1 HYPONATREMIA: ICD-10-CM

## 2023-03-20 RX ORDER — SODIUM CHLORIDE 1000 MG
1 TABLET, SOLUBLE MISCELLANEOUS 2 TIMES DAILY
Qty: 90 TABLET | Refills: 0 | Status: SHIPPED | OUTPATIENT
Start: 2023-03-20

## 2023-03-24 DIAGNOSIS — R33.9 URINARY RETENTION WITH INCOMPLETE BLADDER EMPTYING: ICD-10-CM

## 2023-03-24 DIAGNOSIS — I10 HYPERTENSION: ICD-10-CM

## 2023-03-24 DIAGNOSIS — E87.6 HYPOKALEMIA: ICD-10-CM

## 2023-03-24 DIAGNOSIS — E83.42 HYPOMAGNESEMIA: ICD-10-CM

## 2023-03-24 DIAGNOSIS — N32.0 BLADDER OUTLET OBSTRUCTION: ICD-10-CM

## 2023-03-24 RX ORDER — FINASTERIDE 5 MG/1
5 TABLET, FILM COATED ORAL DAILY
Qty: 30 TABLET | Refills: 1 | Status: SHIPPED | OUTPATIENT
Start: 2023-03-24

## 2023-03-24 RX ORDER — TORSEMIDE 5 MG/1
5 TABLET ORAL DAILY
Qty: 30 TABLET | Refills: 1 | Status: SHIPPED | OUTPATIENT
Start: 2023-03-24

## 2023-03-24 RX ORDER — MAGNESIUM OXIDE 400 MG/1
1 TABLET ORAL DAILY
Qty: 30 TABLET | Refills: 1 | Status: SHIPPED | OUTPATIENT
Start: 2023-03-24

## 2023-03-24 RX ORDER — POTASSIUM CHLORIDE 20 MEQ/1
40 TABLET, EXTENDED RELEASE ORAL DAILY
Qty: 60 TABLET | Refills: 0 | Status: SHIPPED | OUTPATIENT
Start: 2023-03-24

## 2023-04-06 DIAGNOSIS — F41.9 ANXIETY: ICD-10-CM

## 2023-04-06 RX ORDER — ALPRAZOLAM 1 MG/1
1 TABLET ORAL 2 TIMES DAILY PRN
Qty: 60 TABLET | Refills: 0 | Status: SHIPPED | OUTPATIENT
Start: 2023-04-06 | End: 2023-05-06

## 2023-04-28 DIAGNOSIS — E87.6 HYPOKALEMIA: ICD-10-CM

## 2023-04-28 DIAGNOSIS — I10 HYPERTENSION: ICD-10-CM

## 2023-04-28 RX ORDER — POTASSIUM CHLORIDE 20 MEQ/1
40 TABLET, EXTENDED RELEASE ORAL DAILY
Qty: 60 TABLET | Refills: 0 | Status: SHIPPED | OUTPATIENT
Start: 2023-04-28

## 2023-05-03 DIAGNOSIS — E87.1 HYPONATREMIA: ICD-10-CM

## 2023-05-04 RX ORDER — SODIUM CHLORIDE 1 G/1
1 TABLET ORAL 2 TIMES DAILY
Qty: 90 TABLET | Refills: 0 | Status: SHIPPED | OUTPATIENT
Start: 2023-05-04

## 2023-05-08 DIAGNOSIS — F41.9 ANXIETY: ICD-10-CM

## 2023-05-08 RX ORDER — ALPRAZOLAM 1 MG/1
1 TABLET ORAL 2 TIMES DAILY PRN
Qty: 60 TABLET | Refills: 0 | Status: SHIPPED | OUTPATIENT
Start: 2023-05-08 | End: 2023-06-07

## 2023-05-12 ENCOUNTER — OFFICE VISIT (OUTPATIENT)
Dept: INTERNAL MEDICINE CLINIC | Facility: CLINIC | Age: 73
End: 2023-05-12

## 2023-05-12 VITALS
HEART RATE: 66 BPM | TEMPERATURE: 98.6 F | SYSTOLIC BLOOD PRESSURE: 110 MMHG | BODY MASS INDEX: 17.3 KG/M2 | OXYGEN SATURATION: 94 % | HEIGHT: 62 IN | WEIGHT: 94 LBS | DIASTOLIC BLOOD PRESSURE: 60 MMHG

## 2023-05-12 DIAGNOSIS — R63.4 UNINTENDED WEIGHT LOSS: Primary | ICD-10-CM

## 2023-05-12 DIAGNOSIS — D64.9 ANEMIA: ICD-10-CM

## 2023-05-12 DIAGNOSIS — Z12.11 SCREENING FOR COLON CANCER: ICD-10-CM

## 2023-05-12 NOTE — PROGRESS NOTES
Assessment/Plan:    Unintended  Weight loss  ,  Microcytic  anemia     Diagnoses and all orders for this visit:    Unintended weight loss  -     Comprehensive metabolic panel; Future  -     TSH + Free T4; Future  -     Ambulatory Referral to Gastroenterology; Future    Anemia  -     Anemia Panel w/Reflex; Future  -     CBC (Includes Diff/Plt) (Refl); Future          Subjective:      Patient ID: Jerel Mccabe is a 75 y o  male  HPI    The following portions of the patient's history were reviewed and updated as appropriate: allergies, current medications, past family history, past medical history, past social history, past surgical history, and problem list     Review of Systems   Constitutional: Negative  HENT: Negative for dental problem, drooling, ear discharge and ear pain  Eyes: Negative for discharge, redness and itching  Respiratory: Negative for apnea, cough and wheezing  Cardiovascular: Negative for chest pain and palpitations  Gastrointestinal: Negative for abdominal pain, blood in stool, diarrhea and vomiting  Endocrine: Negative for polydipsia, polyphagia and polyuria  Genitourinary: Negative for decreased urine volume, dysuria and frequency  Musculoskeletal: Negative for arthralgias, myalgias and neck stiffness  Skin: Negative for pallor and wound  Allergic/Immunologic: Negative for environmental allergies and food allergies  Neurological: Negative for facial asymmetry, light-headedness, numbness and headaches  Hematological: Negative for adenopathy  Does not bruise/bleed easily  Psychiatric/Behavioral: Negative for agitation, behavioral problems and confusion  Objective: There were no vitals taken for this visit  Physical Exam  Constitutional:       Appearance: Normal appearance  HENT:      Head: Normocephalic        Nose: Nose normal       Mouth/Throat:      Mouth: Mucous membranes are moist    Eyes:      Pupils: Pupils are equal, round, and reactive to light  Cardiovascular:      Rate and Rhythm: Regular rhythm  Heart sounds: Normal heart sounds  Pulmonary:      Breath sounds: Normal breath sounds  Abdominal:      Palpations: Abdomen is soft  Musculoskeletal:         General: No swelling  Cervical back: Neck supple  Skin:     General: Skin is warm  Neurological:      General: No focal deficit present  Mental Status: He is alert and oriented to person, place, and time  Psychiatric:         Mood and Affect: Mood normal        Referred  To GI  To  Find out  Why  He  Is  Loosing weight   Blood  Work ordered           Fup   3 months       F Migue RIOS FACP

## 2023-05-16 ENCOUNTER — TELEPHONE (OUTPATIENT)
Dept: GASTROENTEROLOGY | Facility: CLINIC | Age: 73
End: 2023-05-16

## 2023-05-16 NOTE — TELEPHONE ENCOUNTER
Patients GI provider:  Jose Padron    Number to return call:     Reason for call: Pt calling as he would like to schedule EGD with Dr Goodson to stop the bleeding in his stomach  Patient was scheduled for colonoscopy and EGD on 1/12/23 but was cancelled due to high blood pressure  Per note, it states prior to any procedures being scheduled, would advice cardiac evaluation and blood pressure optimization  Patient states that he was seen by cardiology but does not know cardiologist information  Patient is wondering if he can schedule his EGD or if an office visit is needed first  Please advise       Scheduled procedure/appointment date if applicable: N/A

## 2023-05-18 DIAGNOSIS — I10 HYPERTENSION: ICD-10-CM

## 2023-05-18 DIAGNOSIS — R33.9 URINARY RETENTION WITH INCOMPLETE BLADDER EMPTYING: ICD-10-CM

## 2023-05-18 DIAGNOSIS — N32.0 BLADDER OUTLET OBSTRUCTION: ICD-10-CM

## 2023-05-18 DIAGNOSIS — E83.42 HYPOMAGNESEMIA: ICD-10-CM

## 2023-05-18 RX ORDER — FINASTERIDE 5 MG/1
5 TABLET, FILM COATED ORAL DAILY
Qty: 30 TABLET | Refills: 1 | Status: SHIPPED | OUTPATIENT
Start: 2023-05-18

## 2023-05-18 RX ORDER — MAGNESIUM OXIDE 400 MG/1
1 TABLET ORAL DAILY
Qty: 30 TABLET | Refills: 1 | Status: SHIPPED | OUTPATIENT
Start: 2023-05-18

## 2023-05-18 RX ORDER — TORSEMIDE 5 MG/1
5 TABLET ORAL DAILY
Qty: 30 TABLET | Refills: 1 | Status: SHIPPED | OUTPATIENT
Start: 2023-05-18

## 2023-05-18 NOTE — TELEPHONE ENCOUNTER
Refill Request     Finasteride 5 mg   Magnesium Oxide 400 mg tablet   Torsemide 5 mg     Pharmacy: [facility] [place]     LA: 5/12/23  NA: 8/16/23

## 2023-05-19 DIAGNOSIS — I10 HYPERTENSION, UNSPECIFIED TYPE: Primary | ICD-10-CM

## 2023-05-19 NOTE — TELEPHONE ENCOUNTER
I called patient back to get info on where hes going to cardiology  Patient stated he has not seen a cardiologist yet  I told him we can not move forward until this happens for safety reasons  He said ok and his girlfriend would set everything up for him  I placed a referral to get the process started faster

## 2023-05-22 ENCOUNTER — APPOINTMENT (OUTPATIENT)
Dept: LAB | Facility: HOSPITAL | Age: 73
End: 2023-05-22

## 2023-05-22 DIAGNOSIS — R63.4 UNINTENDED WEIGHT LOSS: ICD-10-CM

## 2023-05-22 DIAGNOSIS — D64.9 ANEMIA: ICD-10-CM

## 2023-05-22 LAB
ALBUMIN SERPL BCP-MCNC: 3.2 G/DL (ref 3.5–5)
ALP SERPL-CCNC: 86 U/L (ref 46–116)
ALT SERPL W P-5'-P-CCNC: 18 U/L (ref 12–78)
ANION GAP SERPL CALCULATED.3IONS-SCNC: 4 MMOL/L (ref 4–13)
AST SERPL W P-5'-P-CCNC: 14 U/L (ref 5–45)
BASOPHILS # BLD AUTO: 0.03 THOUSANDS/ÂΜL (ref 0–0.1)
BASOPHILS NFR BLD AUTO: 1 % (ref 0–1)
BILIRUB SERPL-MCNC: 0.3 MG/DL (ref 0.2–1)
BUN SERPL-MCNC: 10 MG/DL (ref 5–25)
CALCIUM ALBUM COR SERPL-MCNC: 9.5 MG/DL (ref 8.3–10.1)
CALCIUM SERPL-MCNC: 8.9 MG/DL (ref 8.3–10.1)
CHLORIDE SERPL-SCNC: 94 MMOL/L (ref 96–108)
CO2 SERPL-SCNC: 28 MMOL/L (ref 21–32)
CREAT SERPL-MCNC: 0.88 MG/DL (ref 0.6–1.3)
EOSINOPHIL # BLD AUTO: 0.13 THOUSAND/ÂΜL (ref 0–0.61)
EOSINOPHIL NFR BLD AUTO: 2 % (ref 0–6)
ERYTHROCYTE [DISTWIDTH] IN BLOOD BY AUTOMATED COUNT: 20.2 % (ref 11.6–15.1)
FERRITIN SERPL-MCNC: 12 NG/ML (ref 24–336)
GFR SERPL CREATININE-BSD FRML MDRD: 84 ML/MIN/1.73SQ M
GLUCOSE P FAST SERPL-MCNC: 97 MG/DL (ref 65–99)
HCT VFR BLD AUTO: 32 % (ref 36.5–49.3)
HGB BLD-MCNC: 10.5 G/DL (ref 12–17)
IMM GRANULOCYTES # BLD AUTO: 0.05 THOUSAND/UL (ref 0–0.2)
IMM GRANULOCYTES NFR BLD AUTO: 1 % (ref 0–2)
LYMPHOCYTES # BLD AUTO: 0.92 THOUSANDS/ÂΜL (ref 0.6–4.47)
LYMPHOCYTES NFR BLD AUTO: 17 % (ref 14–44)
MCH RBC QN AUTO: 27.1 PG (ref 26.8–34.3)
MCHC RBC AUTO-ENTMCNC: 32.8 G/DL (ref 31.4–37.4)
MCV RBC AUTO: 83 FL (ref 82–98)
MONOCYTES # BLD AUTO: 0.53 THOUSAND/ÂΜL (ref 0.17–1.22)
MONOCYTES NFR BLD AUTO: 10 % (ref 4–12)
NEUTROPHILS # BLD AUTO: 3.93 THOUSANDS/ÂΜL (ref 1.85–7.62)
NEUTS SEG NFR BLD AUTO: 69 % (ref 43–75)
NRBC BLD AUTO-RTO: 0 /100 WBCS
PLATELET # BLD AUTO: 346 THOUSANDS/UL (ref 149–390)
PMV BLD AUTO: 9.1 FL (ref 8.9–12.7)
POTASSIUM SERPL-SCNC: 4.1 MMOL/L (ref 3.5–5.3)
PROT SERPL-MCNC: 6.7 G/DL (ref 6.4–8.4)
RBC # BLD AUTO: 3.88 MILLION/UL (ref 3.88–5.62)
RETICS # AUTO: NORMAL 10*3/UL (ref 14356–105094)
RETICS # CALC: 1.52 % (ref 0.37–1.87)
SODIUM SERPL-SCNC: 126 MMOL/L (ref 135–147)
T4 FREE SERPL-MCNC: 0.86 NG/DL (ref 0.61–1.12)
TSH SERPL DL<=0.05 MIU/L-ACNC: 1.8 UIU/ML (ref 0.45–4.5)
WBC # BLD AUTO: 5.59 THOUSAND/UL (ref 4.31–10.16)

## 2023-05-23 LAB
IRON SATN MFR SERPL: 6 % (ref 20–50)
IRON SERPL-MCNC: 21 UG/DL (ref 65–175)
TIBC SERPL-MCNC: 372 UG/DL (ref 250–450)

## 2023-05-31 DIAGNOSIS — K86.89 PANCREATIC INSUFFICIENCY: ICD-10-CM

## 2023-05-31 DIAGNOSIS — K25.9 MULTIPLE GASTRIC ULCERS: ICD-10-CM

## 2023-05-31 RX ORDER — PANCRELIPASE 60000; 12000; 38000 [USP'U]/1; [USP'U]/1; [USP'U]/1
24000 CAPSULE, DELAYED RELEASE PELLETS ORAL
Qty: 540 CAPSULE | Refills: 1 | Status: ON HOLD | OUTPATIENT
Start: 2023-05-31

## 2023-05-31 RX ORDER — OMEPRAZOLE 40 MG/1
40 CAPSULE, DELAYED RELEASE ORAL DAILY
Qty: 180 CAPSULE | Refills: 0 | Status: ON HOLD | OUTPATIENT
Start: 2023-05-31

## 2023-05-31 NOTE — TELEPHONE ENCOUNTER
Refill Request     Ten 12,000 unit capsule   Omeprazole 40 mg capsule     Pharmacy: [address] [place] [address]     LA: 5/12/23  NA: 8/16/23

## 2023-06-05 DIAGNOSIS — F32.9 REACTIVE DEPRESSION: ICD-10-CM

## 2023-06-05 DIAGNOSIS — I10 ESSENTIAL HYPERTENSION: ICD-10-CM

## 2023-06-05 DIAGNOSIS — I10 HYPERTENSION: ICD-10-CM

## 2023-06-05 DIAGNOSIS — F41.9 ANXIETY: ICD-10-CM

## 2023-06-05 DIAGNOSIS — E87.6 HYPOKALEMIA: ICD-10-CM

## 2023-06-05 RX ORDER — METOPROLOL SUCCINATE 25 MG/1
25 TABLET, EXTENDED RELEASE ORAL DAILY
Qty: 90 TABLET | Refills: 1 | Status: SHIPPED | OUTPATIENT
Start: 2023-06-05

## 2023-06-05 RX ORDER — ALPRAZOLAM 1 MG/1
1 TABLET ORAL 2 TIMES DAILY PRN
Qty: 60 TABLET | Refills: 0 | Status: SHIPPED | OUTPATIENT
Start: 2023-06-05 | End: 2023-07-05

## 2023-06-05 RX ORDER — CITALOPRAM 20 MG/1
20 TABLET ORAL DAILY
Qty: 90 TABLET | Refills: 0 | Status: SHIPPED | OUTPATIENT
Start: 2023-06-05 | End: 2023-06-13

## 2023-06-05 RX ORDER — POTASSIUM CHLORIDE 20 MEQ/1
40 TABLET, EXTENDED RELEASE ORAL DAILY
Qty: 60 TABLET | Refills: 0 | Status: SHIPPED | OUTPATIENT
Start: 2023-06-05 | End: 2023-06-13

## 2023-06-05 NOTE — TELEPHONE ENCOUNTER
Refill Request     Alprazolam 1mg   Citalopram 20mg   Metoprolol 25 mg   Potassium Chloride 20 mEq      Pharmacy: [facility] [place]     LA: 5/12/23  NA: 8/16/23

## 2023-06-07 ENCOUNTER — HOSPITAL ENCOUNTER (INPATIENT)
Facility: HOSPITAL | Age: 73
LOS: 6 days | Discharge: HOME HEALTH | DRG: 643 | End: 2023-06-13
Attending: EMERGENCY MEDICINE | Admitting: HOSPITALIST
Payer: MEDICARE

## 2023-06-07 ENCOUNTER — APPOINTMENT (EMERGENCY)
Dept: RADIOLOGY | Facility: HOSPITAL | Age: 73
DRG: 643 | End: 2023-06-07
Payer: MEDICARE

## 2023-06-07 ENCOUNTER — APPOINTMENT (EMERGENCY)
Dept: CT IMAGING | Facility: HOSPITAL | Age: 73
DRG: 643 | End: 2023-06-07
Payer: MEDICARE

## 2023-06-07 DIAGNOSIS — N32.89 BLADDER SPASM: ICD-10-CM

## 2023-06-07 DIAGNOSIS — R93.2 ABNORMAL CT SCAN, GALLBLADDER: ICD-10-CM

## 2023-06-07 DIAGNOSIS — E46 PROTEIN-CALORIE MALNUTRITION, UNSPECIFIED SEVERITY (HCC): ICD-10-CM

## 2023-06-07 DIAGNOSIS — E87.1 HYPONATREMIA: Primary | ICD-10-CM

## 2023-06-07 DIAGNOSIS — R93.89 ABNORMAL CT SCAN: ICD-10-CM

## 2023-06-07 DIAGNOSIS — R33.9 URINE RETENTION: ICD-10-CM

## 2023-06-07 DIAGNOSIS — K83.8 COMMON BILE DUCT DILATATION: ICD-10-CM

## 2023-06-07 DIAGNOSIS — D50.9 IRON DEFICIENCY ANEMIA, UNSPECIFIED IRON DEFICIENCY ANEMIA TYPE: ICD-10-CM

## 2023-06-07 DIAGNOSIS — E22.2 SIADH (SYNDROME OF INAPPROPRIATE ADH PRODUCTION) (HCC): ICD-10-CM

## 2023-06-07 DIAGNOSIS — R33.9 URINARY RETENTION: ICD-10-CM

## 2023-06-07 LAB
2HR DELTA HS TROPONIN: 2 NG/L
4HR DELTA HS TROPONIN: 3 NG/L
ALBUMIN SERPL BCP-MCNC: 3.8 G/DL (ref 3.5–5)
ALP SERPL-CCNC: 80 U/L (ref 34–104)
ALT SERPL W P-5'-P-CCNC: 9 U/L (ref 7–52)
ANION GAP SERPL CALCULATED.3IONS-SCNC: 11 MMOL/L (ref 4–13)
ANION GAP SERPL CALCULATED.3IONS-SCNC: 13 MMOL/L (ref 4–13)
ANION GAP SERPL CALCULATED.3IONS-SCNC: 9 MMOL/L (ref 4–13)
AST SERPL W P-5'-P-CCNC: 15 U/L (ref 13–39)
ATRIAL RATE: 84 BPM
BACTERIA UR QL AUTO: ABNORMAL /HPF
BASOPHILS # BLD AUTO: 0 THOUSANDS/ÂΜL (ref 0–0.1)
BASOPHILS NFR BLD AUTO: 0 % (ref 0–1)
BILIRUB SERPL-MCNC: 0.54 MG/DL (ref 0.2–1)
BILIRUB UR QL STRIP: NEGATIVE
BUN SERPL-MCNC: 8 MG/DL (ref 5–25)
BUN SERPL-MCNC: 8 MG/DL (ref 5–25)
BUN SERPL-MCNC: 9 MG/DL (ref 5–25)
CALCIUM SERPL-MCNC: 8.7 MG/DL (ref 8.4–10.2)
CALCIUM SERPL-MCNC: 9 MG/DL (ref 8.4–10.2)
CALCIUM SERPL-MCNC: 9.1 MG/DL (ref 8.4–10.2)
CARDIAC TROPONIN I PNL SERPL HS: 4 NG/L
CARDIAC TROPONIN I PNL SERPL HS: 6 NG/L
CARDIAC TROPONIN I PNL SERPL HS: 7 NG/L
CHLORIDE SERPL-SCNC: 85 MMOL/L (ref 96–108)
CHLORIDE SERPL-SCNC: 85 MMOL/L (ref 96–108)
CHLORIDE SERPL-SCNC: 86 MMOL/L (ref 96–108)
CLARITY UR: CLEAR
CO2 SERPL-SCNC: 22 MMOL/L (ref 21–32)
CO2 SERPL-SCNC: 25 MMOL/L (ref 21–32)
CO2 SERPL-SCNC: 25 MMOL/L (ref 21–32)
COLOR UR: YELLOW
CREAT SERPL-MCNC: 0.77 MG/DL (ref 0.6–1.3)
CREAT SERPL-MCNC: 0.78 MG/DL (ref 0.6–1.3)
CREAT SERPL-MCNC: 0.85 MG/DL (ref 0.6–1.3)
EOSINOPHIL # BLD AUTO: 0 THOUSAND/ÂΜL (ref 0–0.61)
EOSINOPHIL NFR BLD AUTO: 0 % (ref 0–6)
ERYTHROCYTE [DISTWIDTH] IN BLOOD BY AUTOMATED COUNT: 17.7 % (ref 11.6–15.1)
GFR SERPL CREATININE-BSD FRML MDRD: 85 ML/MIN/1.73SQ M
GFR SERPL CREATININE-BSD FRML MDRD: 88 ML/MIN/1.73SQ M
GFR SERPL CREATININE-BSD FRML MDRD: 89 ML/MIN/1.73SQ M
GLUCOSE SERPL-MCNC: 104 MG/DL (ref 65–140)
GLUCOSE SERPL-MCNC: 105 MG/DL (ref 65–140)
GLUCOSE SERPL-MCNC: 121 MG/DL (ref 65–140)
GLUCOSE UR STRIP-MCNC: NEGATIVE MG/DL
HCT VFR BLD AUTO: 31.4 % (ref 36.5–49.3)
HGB BLD-MCNC: 10.3 G/DL (ref 12–17)
HGB UR QL STRIP.AUTO: ABNORMAL
IMM GRANULOCYTES # BLD AUTO: 0.06 THOUSAND/UL (ref 0–0.2)
IMM GRANULOCYTES NFR BLD AUTO: 1 % (ref 0–2)
KETONES UR STRIP-MCNC: NEGATIVE MG/DL
LEUKOCYTE ESTERASE UR QL STRIP: ABNORMAL
LIPASE SERPL-CCNC: 15 U/L (ref 11–82)
LYMPHOCYTES # BLD AUTO: 0.48 THOUSANDS/ÂΜL (ref 0.6–4.47)
LYMPHOCYTES NFR BLD AUTO: 8 % (ref 14–44)
MCH RBC QN AUTO: 26.4 PG (ref 26.8–34.3)
MCHC RBC AUTO-ENTMCNC: 32.8 G/DL (ref 31.4–37.4)
MCV RBC AUTO: 81 FL (ref 82–98)
MONOCYTES # BLD AUTO: 0.25 THOUSAND/ÂΜL (ref 0.17–1.22)
MONOCYTES NFR BLD AUTO: 4 % (ref 4–12)
MUCOUS THREADS UR QL AUTO: ABNORMAL
NEUTROPHILS # BLD AUTO: 5.32 THOUSANDS/ÂΜL (ref 1.85–7.62)
NEUTS SEG NFR BLD AUTO: 87 % (ref 43–75)
NITRITE UR QL STRIP: NEGATIVE
NON-SQ EPI CELLS URNS QL MICRO: ABNORMAL /HPF
NRBC BLD AUTO-RTO: 0 /100 WBCS
OSMOLALITY UR: 324 MMOL/KG
P AXIS: 85 DEGREES
PH UR STRIP.AUTO: 6.5 [PH]
PLATELET # BLD AUTO: 222 THOUSANDS/UL (ref 149–390)
PMV BLD AUTO: 8.7 FL (ref 8.9–12.7)
POTASSIUM SERPL-SCNC: 3.5 MMOL/L (ref 3.5–5.3)
POTASSIUM SERPL-SCNC: 3.6 MMOL/L (ref 3.5–5.3)
POTASSIUM SERPL-SCNC: 3.7 MMOL/L (ref 3.5–5.3)
PR INTERVAL: 134 MS
PROT SERPL-MCNC: 6.7 G/DL (ref 6.4–8.4)
PROT UR STRIP-MCNC: ABNORMAL MG/DL
QRS AXIS: 82 DEGREES
QRSD INTERVAL: 96 MS
QT INTERVAL: 376 MS
QTC INTERVAL: 444 MS
RBC # BLD AUTO: 3.9 MILLION/UL (ref 3.88–5.62)
RBC #/AREA URNS AUTO: ABNORMAL /HPF
SODIUM 24H UR-SCNC: 35 MOL/L
SODIUM SERPL-SCNC: 120 MMOL/L (ref 135–147)
SODIUM SERPL-SCNC: 120 MMOL/L (ref 135–147)
SODIUM SERPL-SCNC: 121 MMOL/L (ref 135–147)
SP GR UR STRIP.AUTO: <1.005 (ref 1–1.03)
T WAVE AXIS: 91 DEGREES
UROBILINOGEN UR STRIP-ACNC: <2 MG/DL
VENTRICULAR RATE: 84 BPM
WBC # BLD AUTO: 6.11 THOUSAND/UL (ref 4.31–10.16)
WBC #/AREA URNS AUTO: ABNORMAL /HPF

## 2023-06-07 PROCEDURE — 81001 URINALYSIS AUTO W/SCOPE: CPT | Performed by: EMERGENCY MEDICINE

## 2023-06-07 PROCEDURE — 74177 CT ABD & PELVIS W/CONTRAST: CPT

## 2023-06-07 PROCEDURE — 84484 ASSAY OF TROPONIN QUANT: CPT | Performed by: EMERGENCY MEDICINE

## 2023-06-07 PROCEDURE — 99284 EMERGENCY DEPT VISIT MOD MDM: CPT

## 2023-06-07 PROCEDURE — 80048 BASIC METABOLIC PNL TOTAL CA: CPT | Performed by: PHYSICIAN ASSISTANT

## 2023-06-07 PROCEDURE — 71045 X-RAY EXAM CHEST 1 VIEW: CPT

## 2023-06-07 PROCEDURE — 84300 ASSAY OF URINE SODIUM: CPT | Performed by: INTERNAL MEDICINE

## 2023-06-07 PROCEDURE — 96374 THER/PROPH/DIAG INJ IV PUSH: CPT

## 2023-06-07 PROCEDURE — 83690 ASSAY OF LIPASE: CPT | Performed by: EMERGENCY MEDICINE

## 2023-06-07 PROCEDURE — 99222 1ST HOSP IP/OBS MODERATE 55: CPT | Performed by: INTERNAL MEDICINE

## 2023-06-07 PROCEDURE — 85025 COMPLETE CBC W/AUTO DIFF WBC: CPT | Performed by: EMERGENCY MEDICINE

## 2023-06-07 PROCEDURE — 80053 COMPREHEN METABOLIC PANEL: CPT | Performed by: EMERGENCY MEDICINE

## 2023-06-07 PROCEDURE — 36415 COLL VENOUS BLD VENIPUNCTURE: CPT | Performed by: EMERGENCY MEDICINE

## 2023-06-07 PROCEDURE — 83935 ASSAY OF URINE OSMOLALITY: CPT | Performed by: INTERNAL MEDICINE

## 2023-06-07 PROCEDURE — 93010 ELECTROCARDIOGRAM REPORT: CPT | Performed by: INTERNAL MEDICINE

## 2023-06-07 PROCEDURE — 93005 ELECTROCARDIOGRAM TRACING: CPT

## 2023-06-07 PROCEDURE — 99223 1ST HOSP IP/OBS HIGH 75: CPT | Performed by: HOSPITALIST

## 2023-06-07 RX ORDER — LANOLIN ALCOHOL/MO/W.PET/CERES
400 CREAM (GRAM) TOPICAL DAILY
Status: DISCONTINUED | OUTPATIENT
Start: 2023-06-08 | End: 2023-06-13 | Stop reason: HOSPADM

## 2023-06-07 RX ORDER — METOPROLOL SUCCINATE 25 MG/1
25 TABLET, EXTENDED RELEASE ORAL DAILY
Status: DISCONTINUED | OUTPATIENT
Start: 2023-06-07 | End: 2023-06-13 | Stop reason: HOSPADM

## 2023-06-07 RX ORDER — NICOTINE 21 MG/24HR
1 PATCH, TRANSDERMAL 24 HOURS TRANSDERMAL DAILY
Status: DISCONTINUED | OUTPATIENT
Start: 2023-06-08 | End: 2023-06-13 | Stop reason: HOSPADM

## 2023-06-07 RX ORDER — FINASTERIDE 5 MG/1
5 TABLET, FILM COATED ORAL DAILY
Status: DISCONTINUED | OUTPATIENT
Start: 2023-06-08 | End: 2023-06-13 | Stop reason: HOSPADM

## 2023-06-07 RX ORDER — PANTOPRAZOLE SODIUM 40 MG/1
40 TABLET, DELAYED RELEASE ORAL
Status: DISCONTINUED | OUTPATIENT
Start: 2023-06-08 | End: 2023-06-12

## 2023-06-07 RX ORDER — TAMSULOSIN HYDROCHLORIDE 0.4 MG/1
0.4 CAPSULE ORAL DAILY
Status: DISCONTINUED | OUTPATIENT
Start: 2023-06-08 | End: 2023-06-13 | Stop reason: HOSPADM

## 2023-06-07 RX ORDER — KETOROLAC TROMETHAMINE 30 MG/ML
15 INJECTION, SOLUTION INTRAMUSCULAR; INTRAVENOUS ONCE
Status: COMPLETED | OUTPATIENT
Start: 2023-06-07 | End: 2023-06-07

## 2023-06-07 RX ORDER — ACETAMINOPHEN 325 MG/1
650 TABLET ORAL EVERY 6 HOURS PRN
Status: DISCONTINUED | OUTPATIENT
Start: 2023-06-07 | End: 2023-06-13 | Stop reason: HOSPADM

## 2023-06-07 RX ORDER — HYDRALAZINE HYDROCHLORIDE 20 MG/ML
10 INJECTION INTRAMUSCULAR; INTRAVENOUS EVERY 6 HOURS PRN
Status: DISCONTINUED | OUTPATIENT
Start: 2023-06-07 | End: 2023-06-13 | Stop reason: HOSPADM

## 2023-06-07 RX ORDER — PRAVASTATIN SODIUM 40 MG
40 TABLET ORAL
Status: DISCONTINUED | OUTPATIENT
Start: 2023-06-07 | End: 2023-06-13 | Stop reason: HOSPADM

## 2023-06-07 RX ORDER — ALPRAZOLAM 0.5 MG/1
1 TABLET ORAL 2 TIMES DAILY PRN
Status: DISCONTINUED | OUTPATIENT
Start: 2023-06-07 | End: 2023-06-13 | Stop reason: HOSPADM

## 2023-06-07 RX ORDER — FUROSEMIDE 10 MG/ML
20 INJECTION INTRAMUSCULAR; INTRAVENOUS ONCE
Status: COMPLETED | OUTPATIENT
Start: 2023-06-07 | End: 2023-06-07

## 2023-06-07 RX ORDER — AMLODIPINE BESYLATE 5 MG/1
5 TABLET ORAL DAILY
Status: DISCONTINUED | OUTPATIENT
Start: 2023-06-07 | End: 2023-06-10

## 2023-06-07 RX ORDER — SODIUM CHLORIDE 1 G/1
1 TABLET ORAL 2 TIMES DAILY
Status: DISCONTINUED | OUTPATIENT
Start: 2023-06-07 | End: 2023-06-07

## 2023-06-07 RX ADMIN — IOHEXOL 80 ML: 350 INJECTION, SOLUTION INTRAVENOUS at 12:46

## 2023-06-07 RX ADMIN — AMLODIPINE BESYLATE 5 MG: 5 TABLET ORAL at 18:20

## 2023-06-07 RX ADMIN — KETOROLAC TROMETHAMINE 15 MG: 30 INJECTION, SOLUTION INTRAMUSCULAR; INTRAVENOUS at 14:58

## 2023-06-07 RX ADMIN — FUROSEMIDE 20 MG: 10 INJECTION, SOLUTION INTRAMUSCULAR; INTRAVENOUS at 23:32

## 2023-06-07 RX ADMIN — HYDRALAZINE HYDROCHLORIDE 10 MG: 20 INJECTION INTRAMUSCULAR; INTRAVENOUS at 17:36

## 2023-06-07 RX ADMIN — PANCRELIPASE 24000 UNITS: 24000; 76000; 120000 CAPSULE, DELAYED RELEASE PELLETS ORAL at 17:19

## 2023-06-07 RX ADMIN — METOPROLOL SUCCINATE 25 MG: 50 TABLET, EXTENDED RELEASE ORAL at 17:19

## 2023-06-07 RX ADMIN — PRAVASTATIN SODIUM 40 MG: 40 TABLET ORAL at 21:31

## 2023-06-07 RX ADMIN — SODIUM CHLORIDE: 234 INJECTION, SOLUTION INTRAVENOUS at 18:07

## 2023-06-07 NOTE — ASSESSMENT & PLAN NOTE
· Blood pressure elevated while in the ED  · Resume home metoprolol and monitor  · Hydralazine as needed SBP >180

## 2023-06-07 NOTE — ASSESSMENT & PLAN NOTE
· Presented to the emergency department with lightheadedness, headache  Did have a couple episodes of diarrhea yesterday  · Sodium found to be 120 on presentation  Appears that patient had admission in 2022 for hyponatremia  · At that time had been started on salt tabs/torsemide, hctz was discontinued and was placed on fluid restriction  · Noted outpatient blood work did show sodium of 126 on 5/22  · Admits to poor intake in terms of food but does feel he has adequate fluid intake    Question decreased solute intake  · Home regimen includes salt tabs 1 g twice daily in addition to torsemide 5 mg daily  · Patient is also on Celexa which can be contributing  · Appears euvolemic  · Continue fluid restriction  · Follow-up urine osmolality, urine sodium - pending  · Trend BMP  · Consult nephrology

## 2023-06-07 NOTE — ASSESSMENT & PLAN NOTE
· Chronic, follows with urology  · Maintained on Flomax and Proscar  · CT abdomen/pelvis with urinary bladder wall thickening -appears chronic    UA pending  · Urinary retention protocol

## 2023-06-07 NOTE — PLAN OF CARE
Problem: Potential for Falls  Goal: Patient will remain free of falls  Description: INTERVENTIONS:  - Educate patient/family on patient safety including physical limitations  - Instruct patient to call for assistance with activity   - Consult OT/PT to assist with strengthening/mobility   - Keep Call bell within reach  - Keep bed low and locked with side rails adjusted as appropriate  - Keep care items and personal belongings within reach  - Initiate and maintain comfort rounds  - Make Fall Risk Sign visible to staff  - Offer Toileting every  Hours, in advance of need  - Initiate/Maintain alarm  - Obtain necessary fall risk management equipment:   - Apply yellow socks and bracelet for high fall risk patients  - Consider moving patient to room near nurses station  Outcome: Progressing     Problem: MOBILITY - ADULT  Goal: Maintain or return to baseline ADL function  Description: INTERVENTIONS:  -  Assess patient's ability to carry out ADLs; assess patient's baseline for ADL function and identify physical deficits which impact ability to perform ADLs (bathing, care of mouth/teeth, toileting, grooming, dressing, etc )  - Assess/evaluate cause of self-care deficits   - Assess range of motion  - Assess patient's mobility; develop plan if impaired  - Assess patient's need for assistive devices and provide as appropriate  - Encourage maximum independence but intervene and supervise when necessary  - Involve family in performance of ADLs  - Assess for home care needs following discharge   - Consider OT consult to assist with ADL evaluation and planning for discharge  - Provide patient education as appropriate  Outcome: Progressing  Goal: Maintains/Returns to pre admission functional level  Description: INTERVENTIONS:  - Perform BMAT or MOVE assessment daily    - Set and communicate daily mobility goal to care team and patient/family/caregiver     - Collaborate with rehabilitation services on mobility goals if consulted  - Perform Range of Motion  times a day  - Reposition patient every  hours    - Dangle patient  times a day  - Stand patient  times a day  - Ambulate patient  times a day  - Out of bed to chair  times a day   - Out of bed for meals  times a day  - Out of bed for toileting  - Record patient progress and toleration of activity level   Outcome: Progressing     Problem: PAIN - ADULT  Goal: Verbalizes/displays adequate comfort level or baseline comfort level  Description: Interventions:  - Encourage patient to monitor pain and request assistance  - Assess pain using appropriate pain scale  - Administer analgesics based on type and severity of pain and evaluate response  - Implement non-pharmacological measures as appropriate and evaluate response  - Consider cultural and social influences on pain and pain management  - Notify physician/advanced practitioner if interventions unsuccessful or patient reports new pain  Outcome: Progressing     Problem: INFECTION - ADULT  Goal: Absence or prevention of progression during hospitalization  Description: INTERVENTIONS:  - Assess and monitor for signs and symptoms of infection  - Monitor lab/diagnostic results  - Monitor all insertion sites, i e  indwelling lines, tubes, and drains  - Monitor endotracheal if appropriate and nasal secretions for changes in amount and color  - [place] appropriate cooling/warming therapies per order  - Administer medications as ordered  - Instruct and encourage patient and family to use good hand hygiene technique  - Identify and instruct in appropriate isolation precautions for identified infection/condition  Outcome: Progressing  Goal: Absence of fever/infection during neutropenic period  Description: INTERVENTIONS:  - Monitor WBC    Outcome: Progressing     Problem: SAFETY ADULT  Goal: Patient will remain free of falls  Description: INTERVENTIONS:  - Educate patient/family on patient safety including physical limitations  - Instruct patient to call for assistance with activity   - Consult OT/PT to assist with strengthening/mobility   - Keep Call bell within reach  - Keep bed low and locked with side rails adjusted as appropriate  - Keep care items and personal belongings within reach  - Initiate and maintain comfort rounds  - Make Fall Risk Sign visible to staff  - Offer Toileting every  Hours, in advance of need  - Initiate/Maintain alarm  - Obtain necessary fall risk management equipment:   - Apply yellow socks and bracelet for high fall risk patients  - Consider moving patient to room near nurses station  Outcome: Progressing  Goal: Maintain or return to baseline ADL function  Description: INTERVENTIONS:  -  Assess patient's ability to carry out ADLs; assess patient's baseline for ADL function and identify physical deficits which impact ability to perform ADLs (bathing, care of mouth/teeth, toileting, grooming, dressing, etc )  - Assess/evaluate cause of self-care deficits   - Assess range of motion  - Assess patient's mobility; develop plan if impaired  - Assess patient's need for assistive devices and provide as appropriate  - Encourage maximum independence but intervene and supervise when necessary  - Involve family in performance of ADLs  - Assess for home care needs following discharge   - Consider OT consult to assist with ADL evaluation and planning for discharge  - Provide patient education as appropriate  Outcome: Progressing  Goal: Maintains/Returns to pre admission functional level  Description: INTERVENTIONS:  - Perform BMAT or MOVE assessment daily    - Set and communicate daily mobility goal to care team and patient/family/caregiver  - Collaborate with rehabilitation services on mobility goals if consulted  - Perform Range of Motion  times a day  - Reposition patient every  hours    - Dangle patient  times a day  - Stand patient  times a day  - Ambulate patient  times a day  - Out of bed to chair  times a day   - Out of bed for meals  times a day  - Out of bed for toileting  - Record patient progress and toleration of activity level   Outcome: Progressing     Problem: DISCHARGE PLANNING  Goal: Discharge to home or other facility with appropriate resources  Description: INTERVENTIONS:  - Identify barriers to discharge w/patient and caregiver  - Arrange for needed discharge resources and transportation as appropriate  - Identify discharge learning needs (meds, wound care, etc )  - Arrange for interpretive services to assist at discharge as needed  - Refer to Case Management Department for coordinating discharge planning if the patient needs post-hospital services based on physician/advanced practitioner order or complex needs related to functional status, cognitive ability, or social support system  Outcome: Progressing     Problem: Knowledge Deficit  Goal: Patient/family/caregiver demonstrates understanding of disease process, treatment plan, medications, and discharge instructions  Description: Complete learning assessment and assess knowledge base    Interventions:  - Provide teaching at level of understanding  - Provide teaching via preferred learning methods  Outcome: Progressing

## 2023-06-07 NOTE — H&P
[place]  H&P  Name: Viji Foster 75 y o  male I MRN: [id number]  Unit/Bed#: -01 I Date of Admission: 6/7/2023   Date of Service: 6/7/2023 I Hospital Day: 0      Assessment/Plan   * Hyponatremia  Assessment & Plan  · Presented to the emergency department with lightheadedness, headache  Did have a couple episodes of diarrhea yesterday  · Sodium found to be 120 on presentation  Appears that patient had admission in 2022 for hyponatremia  · At that time had been started on salt tabs/torsemide, hctz was discontinued and was placed on fluid restriction  · Noted outpatient blood work did show sodium of 126 on 5/22  · Admits to poor intake in terms of food but does feel he has adequate fluid intake  Question decreased solute intake  · Home regimen includes salt tabs 1 g twice daily in addition to torsemide 5 mg daily  · Patient is also on Celexa which can be contributing  · Appears euvolemic  · Continue fluid restriction  · Follow-up urine osmolality, urine sodium - pending  · Trend BMP  · Consult nephrology    Common bile duct dilation  Assessment & Plan  · Noted on CT abdomen/pelvis: new distention of the central intrahepatic/extrahepatic bile ducts  Consider MRCP if clinically warranted  · LFTs and bilirubin WNL  · Also reporting abdominal pain however noted to have moderate stool on imaging  · Trend LFTs  · MRCP ordered  · Consult gastroenterology    Hypertension  Assessment & Plan  · Blood pressure elevated while in the ED  · Resume home metoprolol and monitor  · Hydralazine as needed SBP >180    Urinary retention  Assessment & Plan  · Chronic, follows with urology  · Maintained on Flomax and Proscar  · CT abdomen/pelvis with urinary bladder wall thickening -appears chronic    UA pending  · Urinary retention protocol    Protein-calorie malnutrition, unspecified [facility] ([facility] )  Assessment & Plan  Malnutrition Findings:   ·  Admits to poor appetite  · Consult nutrition    BMI Findings: There is no height or weight on file to calculate BMI  Chronic kidney disease, stage 3 [facility])  Assessment & Plan  Lab Results   Component Value Date    CREATININE 0 77 06/07/2023    CREATININE 0 88 05/22/2023    CREATININE 1 03 01/12/2023    EGFR 89 06/07/2023    EGFR 84 05/22/2023    EGFR 71 01/12/2023   · Baseline creatinine 0 8-1 2  · Creatinine stable on admission  · Trend BMP        VTE Pharmacologic Prophylaxis: VTE Score: 2 Low Risk (Score 0-2) - Encourage Ambulation  Code Status: Level 3 - DNAR and DNI   Discussion with family: Patient declined call to   Offered to call  Anticipated Length of Stay: Patient will be admitted on an inpatient basis with an anticipated length of stay of greater than 2 midnights secondary to Hyponatremia, new bile duct dilation  Total Time Spent on Date of Encounter in care of patient: 75 minutes This time was spent on one or more of the following: performing physical exam; counseling and coordination of care; obtaining or reviewing history; documenting in the medical record; reviewing/ordering tests, medications or procedures; communicating with other healthcare professionals and discussing with patient's family/caregivers  Chief Complaint: Dizziness, headache    History of Present Illness:  Julio Cesar Bey is a 75 y o  male with a PMH of hyponatremia, GERD, history of abdominal aortic aneurysm s/p repair, hypertension, pancreatic pseudocyst who presents with dizziness and headache  Patient presents to the emergency department due to feeling generally unwell  States that he has had worsening dizziness and headache the last few days  Dizziness is brought on by position change and standing  Admits to poor appetite but has been drinking fluids  Did have several episodes of diarrhea yesterday, none today    Has intermittent chronic epigastric discomfort and states that he was supposed to have an EGD/colonoscopy in January but was canceled due to elevated blood pressure  Currently denies any chest pain/palpitations, shortness of breath, nausea/vomiting or abdominal pain  Denies any fever/chills or recent illness  Denies any headache currently  Review of Systems:  Review of Systems   Constitutional: Positive for fatigue  Negative for chills, fever and unexpected weight change  HENT: Negative for congestion, sore throat and trouble swallowing  Eyes: Negative for photophobia, pain and visual disturbance  Respiratory: Negative for cough, shortness of breath and wheezing  Cardiovascular: Negative for chest pain, palpitations and leg swelling  Gastrointestinal: Positive for abdominal pain  Negative for constipation, diarrhea, nausea and vomiting  Endocrine: Negative for polyuria  Genitourinary: Negative for difficulty urinating, dysuria, flank pain, hematuria and urgency  Musculoskeletal: Negative for back pain, myalgias, neck pain and neck stiffness  Skin: Negative for pallor and rash  Neurological: Positive for dizziness and headaches  Negative for tremors, syncope, speech difficulty, weakness and light-headedness  Hematological: Does not bruise/bleed easily  Psychiatric/Behavioral: Negative for agitation and confusion  Past Medical and Surgical History:   Past Medical History:   Diagnosis Date   • Anxiety    • Rhodes's esophagus    • Chronic neck pain    • Depression    • GERD (gastroesophageal reflux disease)    • H/O abdominal aortic aneurysm 4 5 cm   • Hyperlipidemia    • Hypertension    • Pancreatic pseudocyst    • Vertigo        Past Surgical History:   Procedure Laterality Date   • COLONOSCOPY  09/30/2014   • IR LOWER EXTREMITY / INTERVENTION  01/04/2019   • MD ESOPHAGOGASTRODUODENOSCOPY TRANSORAL DIAGNOSTIC N/A 06/22/2018    Procedure: EGD AND COLONOSCOPY;  Surgeon: Lizy Sears MD;  Location: [facility];   Service: Gastroenterology   • MD EVASC RPR DPLMNT AORTO-AORTIC NDGFT N/A 02/09/2018    Procedure: REPAIR ANEURYSM ENDOVASCULAR ABDOMINAL AORTIC  (EVAR); Surgeon: Mary Cardona MD;  Location: [facility];  Service: Vascular   • UPPER GASTROINTESTINAL ENDOSCOPY         Meds/Allergies:  Prior to Admission medications    Medication Sig Start Date End Date Taking?  Authorizing Provider   ALPRAZolam Alvira Cutting) 1 mg tablet Take 1 tablet (1 mg total) by mouth 2 (two) times a day as needed (transient anxiety) 6/5/23 7/5/23  Blayne Kern MD   aspirin (ECOTRIN LOW STRENGTH) 81 mg EC tablet Take 81 mg by mouth daily    Historical Provider, MD   citalopram (CeleXA) 20 mg tablet Take 1 tablet (20 mg total) by mouth daily 6/5/23   Blayne Kern MD   dicyclomine (BENTYL) 10 mg capsule TAKE 1 CAPSULE BY MOUTH 3 TIMES A DAY AS NEEDED (ABDOMINAL CRAMPING/ SPASMS) 4/15/22   Nettie Arce PA-C   finasteride (PROSCAR) 5 mg tablet Take 1 tablet (5 mg total) by mouth daily 5/18/23   Blayne Kern MD   Linaclotide [facility]) 72 MCG CAPS Take 1 capsule by mouth daily 1/22/19   Jelly Mckeon PA-C   magnesium oxide (MAG-OX) 400 mg tablet Take 1 tablet (400 mg total) by mouth daily 5/18/23   Blayne Kern MD   magnesium oxide (MAG-OX) 400 mg Take 1 tablet (400 mg total) by mouth daily 1/23/23   Blayne Kern MD   metoprolol succinate (TOPROL-XL) 25 mg 24 hr tablet Take 1 tablet (25 mg total) by mouth daily 6/5/23   Blayne Kern MD   Multiple Vitamins-Minerals (CENTRUM SILVER PO) Take 1 tablet by mouth daily      Historical Provider, MD   omeprazole (PriLOSEC) 40 MG capsule Take 1 capsule (40 mg total) by mouth daily 5/31/23   Blayne Kern MD   pancrelipase, Lip-Prot-Amyl, (Creon) 12,000 units capsule Take 2 capsules (24,000 Units total) by mouth 3 (three) times a day with meals 5/31/23   Blayne Kern MD   polyethylene glycol (MIRALAX) 17 g packet Take 17 g by mouth    Historical Provider, MD   potassium chloride (K-DUR,KLOR-CON) 20 mEq tablet Take 2 tablets (40 mEq total) by mouth daily 6/5/23   Judy Perla MD   pravastatin (PRAVACHOL) 40 mg tablet Take 1 tablet (40 mg total) by mouth daily at bedtime 2/1/23   Judy Perla MD   ranitidine (ZANTAC) 150 mg tablet TAKE 1 TABLET EVERY DAY IN THE EVENING 7/8/19   Joanna Comer PA-C   Restasis 0 05 % ophthalmic emulsion  12/30/21   Historical Provider, MD   senna (SENOKOT) 8 6 mg Take 1 tablet (8 6 mg total) by mouth daily at bedtime 8/23/22   Sharon Ford DO   sodium chloride 1 g tablet Take 1 tablet (1 g total) by mouth 2 (two) times a day 5/4/23   Judy Perla MD   tamsulosin [facility]) 0 4 mg Take 1 capsule (0 4 mg total) by mouth daily 12/29/22   Judy Perla MD   torsemide (DEMADEX) 5 MG tablet Take 1 tablet (5 mg total) by mouth daily 5/18/23   Judy Perla MD   Wheat Dextrin (BENEFIBER PO) Take by mouth daily as needed      Historical Provider, MD     I have reviewed home medications with patient personally  Allergies: No Known Allergies    Social History:  Marital Status:     Occupation: Retired  Patient Pre-hospital Living Situation: Home  Patient Pre-hospital Level of Mobility: walks  Patient Pre-hospital Diet Restrictions:   Substance Use History:   Social History     Substance and Sexual Activity   Alcohol Use Never     Social History     Tobacco Use   Smoking Status Every Day   • Packs/day: 0 25   • Types: Cigarettes   Smokeless Tobacco Never   Tobacco Comments    [employer] quiting smoking handouts     Social History     Substance and Sexual Activity   Drug Use No       Family History:  Family History   Problem Relation Age of Onset   • Heart disease Father    • Heart attack Father    • Diabetes Maternal Grandmother    • Diabetes Maternal Grandfather    • Diabetes Paternal Grandmother    • Hypertension Family    • Hyperlipidemia Family        Physical Exam:     Vitals:   Blood Pressure: (!) 191/108 (06/07/23 1653)  Pulse: 76 (06/07/23 1653)  Temperature: 98 5 °F (36 9 °C) (06/07/23 1653)  Temp Source: Oral "(06/07/23 1653)  Respirations: 20 (06/07/23 1653)  Height: 5' 2\" (157 5 cm) (06/07/23 1653)  Weight - Scale: 39 2 kg (86 lb 6 4 oz) (06/07/23 1653)  SpO2: 97 % (06/07/23 1530)    Physical Exam  Vitals and nursing note reviewed  Constitutional:       General: He is not in acute distress  Appearance: He is underweight  Comments: No acute distress   HENT:      Head: Normocephalic and atraumatic  Eyes:      General: No scleral icterus  Extraocular Movements: Extraocular movements intact  Conjunctiva/sclera: Conjunctivae normal    Cardiovascular:      Rate and Rhythm: Normal rate and regular rhythm  Heart sounds: S1 normal and S2 normal  No murmur heard  Pulmonary:      Effort: Pulmonary effort is normal  No respiratory distress  Breath sounds: Normal breath sounds  No wheezing, rhonchi or rales  Abdominal:      General: Bowel sounds are normal       Palpations: Abdomen is soft  Tenderness: There is no abdominal tenderness  There is no guarding or rebound  Musculoskeletal:         General: No swelling  Cervical back: Normal range of motion  Comments: Able to move upper/lower extremities bilaterally, no edema   Skin:     General: Skin is warm and dry  Capillary Refill: Capillary refill takes less than 2 seconds  Neurological:      Mental Status: He is alert and oriented to person, place, and time     Psychiatric:         Mood and Affect: Mood normal          Speech: Speech normal          Behavior: Behavior normal           Additional Data:     Lab Results:  Results from last 7 days   Lab Units 06/07/23  1206   EOS PCT % 0   HEMATOCRIT % 31 4*   HEMOGLOBIN g/dL 10 3*   LYMPHS PCT % 8*   MONOS PCT % 4   NEUTROS PCT % 87*   PLATELETS Thousands/uL 222   WBC Thousand/uL 6 11     Results from last 7 days   Lab Units 06/07/23  1206   ANION GAP mmol/L 9   ALBUMIN g/dL 3 8   ALK PHOS U/L 80   ALT U/L 9   AST U/L 15   BUN mg/dL 9   CALCIUM mg/dL 9 0   CHLORIDE mmol/L 86* " CO2 mmol/L 25   CREATININE mg/dL 0 77   GLUCOSE RANDOM mg/dL 121   POTASSIUM mmol/L 3 6   SODIUM mmol/L 120*   TOTAL BILIRUBIN mg/dL 0 54                       Lines/Drains:  Invasive Devices     Peripheral Intravenous Line  Duration           Peripheral IV 06/07/23 Distal;Left;Upper;Ventral (anterior) Arm <1 day          Drain  Duration           Urethral Catheter Straight-tip 16 Fr  146 days                           Imaging: Reviewed radiology reports from this admission including: chest xray and abdominal/pelvic CT  CT abdomen pelvis with contrast   Final Result by Franny Thompson MD (06/07 3196)      1  New distention of the central intrahepatic and extrahepatic bile ducts through the ampulla  Common duct measures up to 14 mm in caliber  Correlate for clinical and laboratory features of obstructive cholangiopathy  Recommend gastroenterology    consultation  Consider MRCP, if clinically warranted  2   Moderate excess stool in the colon without obstruction or colonic inflammation  3   Urinary bladder wall thickening appears chronic and unchanged when accounting for difference in distention  However, if the patient has suprapubic pain, consider urinalysis to assess for evidence of superimposed infectious cystitis  Note: The study was marked in EPIC for immediate notification  Imaging follow-up reminder notification was scheduled in the electronic medical record  Workstation performed: [id number]         X-ray chest 1 view portable   Final Result by Brad Rojo MD (06/07 1522)      No acute cardiopulmonary disease  Workstation performed: [id number]         MRI inpatient order    (Results Pending)       EKG and Other Studies Reviewed on Admission:   · EKG: NSR  HR 84     ** Please Note: This note has been constructed using a voice recognition system   **

## 2023-06-07 NOTE — QUICK NOTE
Consulted for acute on chronic hyponatremia  Patient presenting with lightheadedness and headache  Also had diarrhea yesterday  Currently on sodium chloride tablets 1 g twice daily  Found to be hypertensive in ED  Previous history of symptomatic hyponatremia requiring 3% saline in 08/2022  Previous urine studies of urine osmolality of 351 and urine sodium of 95 consistent with SIADH-like physiology  Current urine sodium 35 suggest that RAAS system is inactive, urine osmolality is pending      Assessment  · Acute on chronic hyponatremia most likely in setting of SIADH, rule out component of volume depletion on top of SIADH    Suggest  · Fluid restrict 1200 cc per 24 hours  · Start 1 8% saline at 30 cc/h due to symptomatic hyponatremia  · Hold off salt tablets at this time  · Increase serum sodium by 4 mEq in the next few hours to help with symptomatic hyponatremia  · Goal serum sodium by noon tomorrow no more than 126  · Based on the results of BMP in next 4 hours, can uptitrate dose of 1 8% saline +/- IV Lasix  · If blood pressure continues to rise, would provide IV Lasix at that time

## 2023-06-07 NOTE — ED PROVIDER NOTES
History  Chief Complaint   Patient presents with   • Headache     Pt to er via ems from home with complaints of a headache since before august, dry mouth since august, and blurry vision since august - has been being evaluated for his blurred vision  71-year-old male presents for evaluation of multiple complaints including dry eyes, headache, dyspnea on exertion, abdominal pain and diarrhea  Patient states he is acute complaint is worsening shortness of breath and also abdominal pain that started yesterday  Patient states his abdominal pain is not improved but he called 911 today due to his dyspnea on exertion  Patient does not exhibited any signs of conversational dyspnea  Denies chest pain  Denies blood in stool  Prior to Admission Medications   Prescriptions Last Dose Informant Patient Reported? Taking?    ALPRAZolam (XANAX) 1 mg tablet   No No   Sig: Take 1 tablet (1 mg total) by mouth 2 (two) times a day as needed (transient anxiety)   Linaclotide (LINZESS) 72 MCG CAPS  Self No No   Sig: Take 1 capsule by mouth daily   Multiple Vitamins-Minerals (CENTRUM SILVER PO)  Self Yes No   Sig: Take 1 tablet by mouth daily     Restasis 0 05 % ophthalmic emulsion  Self Yes No   Wheat Dextrin (BENEFIBER PO)  Self Yes No   Sig: Take by mouth daily as needed     aspirin (ECOTRIN LOW STRENGTH) 81 mg EC tablet  Self Yes No   Sig: Take 81 mg by mouth daily   citalopram (CeleXA) 20 mg tablet   No No   Sig: Take 1 tablet (20 mg total) by mouth daily   dicyclomine (BENTYL) 10 mg capsule  Self No No   Sig: TAKE 1 CAPSULE BY MOUTH 3 TIMES A DAY AS NEEDED (ABDOMINAL CRAMPING/ SPASMS)   finasteride (PROSCAR) 5 mg tablet   No No   Sig: Take 1 tablet (5 mg total) by mouth daily   magnesium oxide (MAG-OX) 400 mg  Self No No   Sig: Take 1 tablet (400 mg total) by mouth daily   magnesium oxide (MAG-OX) 400 mg tablet   No No   Sig: Take 1 tablet (400 mg total) by mouth daily   metoprolol succinate (TOPROL-XL) 25 mg 24 hr tablet   No No   Sig: Take 1 tablet (25 mg total) by mouth daily   omeprazole (PriLOSEC) 40 MG capsule   No No   Sig: Take 1 capsule (40 mg total) by mouth daily   pancrelipase, Lip-Prot-Amyl, (Creon) 12,000 units capsule   No No   Sig: Take 2 capsules (24,000 Units total) by mouth 3 (three) times a day with meals   polyethylene glycol (MIRALAX) 17 g packet  Self Yes No   Sig: Take 17 g by mouth   potassium chloride (K-DUR,KLOR-CON) 20 mEq tablet   No No   Sig: Take 2 tablets (40 mEq total) by mouth daily   pravastatin (PRAVACHOL) 40 mg tablet   No No   Sig: Take 1 tablet (40 mg total) by mouth daily at bedtime   ranitidine (ZANTAC) 150 mg tablet  Self No No   Sig: TAKE 1 TABLET EVERY DAY IN THE EVENING   senna (SENOKOT) 8 6 mg  Self No No   Sig: Take 1 tablet (8 6 mg total) by mouth daily at bedtime   sodium chloride 1 g tablet   No No   Sig: Take 1 tablet (1 g total) by mouth 2 (two) times a day   tamsulosin (FLOMAX) 0 4 mg  Self No No   Sig: Take 1 capsule (0 4 mg total) by mouth daily   torsemide (DEMADEX) 5 MG tablet   No No   Sig: Take 1 tablet (5 mg total) by mouth daily      Facility-Administered Medications: None       Past Medical History:   Diagnosis Date   • Anxiety    • Rhodes's esophagus    • Chronic neck pain    • Depression    • GERD (gastroesophageal reflux disease)    • H/O abdominal aortic aneurysm 4 5 cm   • Hyperlipidemia    • Hypertension    • Pancreatic pseudocyst    • Vertigo        Past Surgical History:   Procedure Laterality Date   • COLONOSCOPY  09/30/2014   • IR LOWER EXTREMITY / INTERVENTION  01/04/2019   • ID ESOPHAGOGASTRODUODENOSCOPY TRANSORAL DIAGNOSTIC N/A 06/22/2018    Procedure: EGD AND COLONOSCOPY;  Surgeon: Lawrence Jimenez MD;  Location: [facility]; Service: Gastroenterology   • ID EVASC RPR DPLMNT AORTO-AORTIC NDGFT N/A 02/09/2018    Procedure: REPAIR ANEURYSM ENDOVASCULAR ABDOMINAL AORTIC  (EVAR);   Surgeon: Tiffany Medellin MD;  Location: [facility];  Service: Vascular   • UPPER GASTROINTESTINAL ENDOSCOPY         Family History   Problem Relation Age of Onset   • Heart disease Father    • Heart attack Father    • Diabetes Maternal Grandmother    • Diabetes Maternal Grandfather    • Diabetes Paternal Grandmother    • Hypertension Family    • Hyperlipidemia Family      I have reviewed and agree with the history as documented  E-Cigarette/Vaping   • E-Cigarette Use Never User      E-Cigarette/Vaping Substances   • Nicotine No    • THC No    • CBD No    • Flavoring No    • Other No    • Unknown No      Social History     Tobacco Use   • Smoking status: Every Day     Packs/day: 0 25     Types: Cigarettes   • Smokeless tobacco: Never   • Tobacco comments:     Truven quiting smoking handouts   Vaping Use   • Vaping Use: Never used   Substance Use Topics   • Alcohol use: Never   • Drug use: No       Review of Systems   Respiratory: Positive for shortness of breath  Gastrointestinal: Positive for abdominal pain  Physical Exam  Physical Exam  Vitals and nursing note reviewed  Constitutional:       General: He is not in acute distress  Appearance: He is well-developed  HENT:      Head: Normocephalic and atraumatic  Right Ear: External ear normal       Left Ear: External ear normal       Nose: Nose normal    Eyes:      General: No scleral icterus  Pulmonary:      Effort: Pulmonary effort is normal  No respiratory distress  Abdominal:      General: There is no distension  Palpations: Abdomen is soft  Comments: Mildly tender in periumbilical region   Musculoskeletal:         General: No deformity  Normal range of motion  Cervical back: Normal range of motion and neck supple  Skin:     General: Skin is warm  Findings: No rash  Neurological:      General: No focal deficit present  Mental Status: He is alert        Gait: Gait normal    Psychiatric:         Mood and Affect: Mood normal          Vital Signs  ED Triage Vitals   Temperature Pulse Respirations Blood Pressure SpO2   06/07/23 1013 06/07/23 1011 06/07/23 1011 06/07/23 1011 06/07/23 1011   98 3 °F (36 8 °C) 83 18 (!) 180/99 97 %      Temp Source Heart Rate Source Patient Position - Orthostatic VS BP Location FiO2 (%)   06/07/23 1013 06/07/23 1011 06/07/23 1011 06/07/23 1011 --   Oral Monitor Sitting Left arm       Pain Score       06/07/23 1348       6           Vitals:    06/07/23 1011 06/07/23 1230   BP: (!) 180/99 (!) 200/98   Pulse: 83 79   Patient Position - Orthostatic VS: Sitting Lying         Visual Acuity      ED Medications  Medications   iohexol (OMNIPAQUE) 350 MG/ML injection (MULTI-DOSE) 80 mL (80 mL Intravenous Given 6/7/23 1246)   ketorolac (TORADOL) injection 15 mg (15 mg Intravenous Given 6/7/23 1458)       Diagnostic Studies  Results Reviewed     Procedure Component Value Units Date/Time    HS Troponin I 2hr [[id number]]  (Normal) Collected: 06/07/23 1414    Lab Status: Final result Specimen: Blood Updated: 06/07/23 1448     hs TnI 2hr 6 ng/L      Delta 2hr hsTnI 2 ng/L     UA w Reflex to Microscopic w Reflex to Culture [[id number]]     Lab Status: No result Specimen: Urine     HS Troponin I 4hr [[id number]]     Lab Status: No result Specimen: Blood     HS Troponin 0hr (reflex protocol) [[id number]]  (Normal) Collected: 06/07/23 1206    Lab Status: Final result Specimen: Blood from Arm, Right Updated: 06/07/23 1238     hs TnI 0hr 4 ng/L     Comprehensive metabolic panel [[id number]]  (Abnormal) Collected: 06/07/23 1206    Lab Status: Final result Specimen: Blood from Arm, Right Updated: 06/07/23 1230     Sodium 120 mmol/L      Potassium 3 6 mmol/L      Chloride 86 mmol/L      CO2 25 mmol/L      ANION GAP 9 mmol/L      BUN 9 mg/dL      Creatinine 0 77 mg/dL      Glucose 121 mg/dL      Calcium 9 0 mg/dL      AST 15 U/L      ALT 9 U/L      Alkaline Phosphatase 80 U/L      Total Protein 6 7 g/dL      Albumin 3 8 g/dL      Total Bilirubin 0 54 mg/dL      eGFR 89 ml/min/1 73sq m Narrative:      National Kid[employer] Disease Foundation guidelines for Chronic Kidney Disease (CKD):   •  Stage 1 with normal or high GFR (GFR > 90 mL/min/1 73 square meters)  •  Stage 2 Mild CKD (GFR = 60-89 mL/min/1 73 square meters)  •  Stage 3A Moderate CKD (GFR = 45-59 mL/min/1 73 square meters)  •  Stage 3B Moderate CKD (GFR = 30-44 mL/min/1 73 square meters)  •  Stage 4 Severe CKD (GFR = 15-29 mL/min/1 73 square meters)  •  Stage 5 End Stage CKD (GFR <15 mL/min/1 73 square meters)  Note: GFR calculation is accurate only with a steady state creatinine    Lipase [[id number]]  (Normal) Collected: 06/07/23 1206    Lab Status: Final result Specimen: Blood from Arm, Right Updated: 06/07/23 1230     Lipase 15 u/L     CBC and differential [[id number]]  (Abnormal) Collected: 06/07/23 1206    Lab Status: Final result Specimen: Blood from Arm, Right Updated: 06/07/23 1211     WBC 6 11 Thousand/uL      RBC 3 90 Million/uL      Hemoglobin 10 3 g/dL      Hematocrit 31 4 %      MCV 81 fL      MCH 26 4 pg      MCHC 32 8 g/dL      RDW 17 7 %      MPV 8 7 fL      Platelets 595 Thousands/uL      nRBC 0 /100 WBCs      Neutrophils Relative 87 %      Immat GRANS % 1 %      Lymphocytes Relative 8 %      Monocytes Relative 4 %      Eosinophils Relative 0 %      Basophils Relative 0 %      Neutrophils Absolute 5 32 Thousands/µL      Immature Grans Absolute 0 06 Thousand/uL      Lymphocytes Absolute 0 48 Thousands/µL      Monocytes Absolute 0 25 Thousand/µL      Eosinophils Absolute 0 00 Thousand/µL      Basophils Absolute 0 00 Thousands/µL                  CT abdomen pelvis with contrast   Final Result by Lottie Blanca MD (06/07 1826)      1  New distention of the central intrahepatic and extrahepatic bile ducts through the ampulla  Common duct measures up to 14 mm in caliber  Correlate for clinical and laboratory features of obstructive cholangiopathy  Recommend gastroenterology    consultation   Consider MRCP, if clinically warranted  2   Moderate excess stool in the colon without obstruction or colonic inflammation  3   Urinary bladder wall thickening appears chronic and unchanged when accounting for difference in distention  However, if the patient has suprapubic pain, consider urinalysis to assess for evidence of superimposed infectious cystitis  Note: The study was marked in EPIC for immediate notification  Imaging follow-up reminder notification was scheduled in the electronic medical record  Workstation performed: [id number]         X-ray chest 1 view portable   Final Result by Víctor Alvarez MD (06/07 1522)      No acute cardiopulmonary disease  Workstation performed: [id number]                    Procedures  Procedures         ED Course                               SBIRT 22yo+    Flowsheet Row Most Recent Value   Initial Alcohol Screen: US AUDIT-C     1  How often do you have a drink containing alcohol? 0 Filed at: 06/07/2023 1013   2  How many drinks containing alcohol do you have on a typical day you are drinking? 0 Filed at: 06/07/2023 1013   3a  Male UNDER 65: How often do you have five or more drinks on one occasion? 0 Filed at: 06/07/2023 1013   3b  FEMALE Any Age, or MALE 65+: How often do you have 4 or more drinks on one occassion? 0 Filed at: 06/07/2023 1013   Audit-C Score 0 Filed at: 06/07/2023 1013   JAYMIE: How many times in the past year have you    Used an illegal drug or used a prescription medication for non-medical reasons? Never Filed at: 06/07/2023 1013                    Medical Decision Making  78-year-old male with shortness of breath and abdominal pain  Obtain labs, CT abdomen pelvis  Labs reveal worsening hyponatremia and CT reveals distention of intrahepatic and extrahepatic bile ducts  Discussed case with gastroenterology team   Also ordered  Admit to [facility]      Hyponatremia: acute illness or injury  Amount and/or Complexity of Data Reviewed  Labs: ordered  Radiology: ordered  Risk  Prescription drug management  Decision regarding hospitalization  Disposition  Final diagnoses:   Hyponatremia   Abnormal CT scan     Time reflects when diagnosis was documented in both MDM as applicable and the Disposition within this note     Time User Action Codes Description Comment    6/7/2023  3:00 PM Madhu Simpson [E87 1] Hyponatremia     6/7/2023  3:00 PM Madhu Simpson [R93 89] Abnormal CT scan       ED Disposition     ED Disposition   Admit    Condition   Stable    Date/Time   Wed Jun 7, 2023  3:00 PM    Comment   Case was discussed with FELIX and the patient's admission status was agreed to be Admission Status: inpatient status to the service of Dr Lary Valdes   Follow-up Information    None         Patient's Medications   Discharge Prescriptions    No medications on file       No discharge procedures on file      PDMP Review       Value Time User    PDMP Reviewed  Yes 11/10/2022  2:12 PM Shanti Rosario MD          ED Provider  Electronically Signed by           Madhu Gongora DO  06/07/23 8351

## 2023-06-07 NOTE — ASSESSMENT & PLAN NOTE
Malnutrition Findings:   ·  Admits to poor appetite  · Consult nutrition    BMI Findings: There is no height or weight on file to calculate BMI

## 2023-06-07 NOTE — CONSULTS
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  It seems that he is managed on sodium chloride tablets at home so he has been dealing with this for a while  Reviewing his outpatient medications he is on Celexa which is an SSRI and can cause SIADH  His symptoms are vague with fogginess in the head and no other localizing symptoms  Given that his sodium concentration on admission is 120 mill equivalents per liter it certainly could be the etiology  He has normal renal function  Clinically appears euvolemic  Urine sodium and urine osmolality  Started on 1 8% normal saline which will help obligate a higher urine volume to excrete the solute load and may help with free water excretion  Patient on fluid restriction and for serial BMP measurements overnight  Based on these results and response by the morning can determine if would need a dose of Samsca  Celexa has not been ordered and I would discontinue this  2   Hypertension vital signs reviewed and patient with significant hypertension systolic pressures 256 mmHg  It appears he was only on a beta-blocker as an outpatient  Will add amlodipine and monitor  Amlodipine 5 mg daily  Continue metoprolol    History of Present Illness   Physician Requesting Consult: Joan Gillis MD  Reason for Consult / Principal Problem:Hyponatremia and hypertension    Hx and PE limited by:   HPI: Ivett Clements is a 75y o  year old male who apparently has a history of hyponatremia and he presented to the emergency room complaining of lightheadedness and a headache  This is occurred over the last couple days  No other associated symptoms    He was discovered to have a sodium concentration 120 mill equivalents per liter were asked to see him for management recommendations  History was obtained from review of the records and speaking to the patient  Consults    Review of Systems   Constitutional: Negative for appetite change, chills, diaphoresis and fever  HENT: Negative  Eyes: Negative  Respiratory: Negative for cough, chest tightness, shortness of breath and wheezing  Cardiovascular: Negative for chest pain, palpitations and leg swelling  Gastrointestinal: Negative for abdominal pain, diarrhea, nausea and vomiting  Endocrine: Negative  Genitourinary: Negative for dysuria, flank pain, hematuria and urgency  Skin: Negative  Neurological: Negative for dizziness, seizures, syncope and headaches  Psychiatric/Behavioral: Negative for agitation, behavioral problems, confusion and decreased concentration  He says he feels a little foggy in his head         Historical Information   Patient Active Problem List   Diagnosis   • AAA (abdominal aortic aneurysm) without rupture ([facility])   • Epigastric pain   • Diarrhea   • Other chronic pancreatitis ([facility] [address] )   • Smoking   • 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 ([facility] [address] )   • History of endovascular stent graft for abdominal aortic aneurysm (AAA)   • Stenosis of other vascular prosthetic devices, implants and grafts, subsequent encounter   • Neurogenic claudication   • Chronic kidney disease, stage 3 ([address] )   • Protein-calorie malnutrition, unspecified severity ([facility] [address] )   • Depression, recurrent ([facility])   • Iron deficiency anemia   • Hyponatremia   • Hypokalemia   • Urinary retention   • Hypertension   • Common bile duct dilation     Past Medical History:   Diagnosis Date   • Anxiety    • Rhodes's esophagus    • Chronic neck pain    • Depression    • GERD (gastroesophageal reflux disease)    • H/O abdominal aortic aneurysm 4 5 cm   • Hyperlipidemia    • Hypertension    • Pancreatic pseudocyst    • Vertigo      Past Surgical History:   Procedure Laterality Date   • COLONOSCOPY  09/30/2014   • IR LOWER EXTREMITY / INTERVENTION  01/04/2019   • KS ESOPHAGOGASTRODUODENOSCOPY TRANSORAL DIAGNOSTIC N/A 06/22/2018    Procedure: EGD AND COLONOSCOPY;  Surgeon: Lizy Sears MD;  Location: [facility]; Service: Gastroenterology   • KS EVASC RPR DPLMNT AORTO-AORTIC NDGFT N/A 02/09/2018    Procedure: REPAIR ANEURYSM ENDOVASCULAR ABDOMINAL AORTIC  (EVAR);   Surgeon: Kelli Hargrove MD;  [facility];  Service: Vascular   • UPPER GASTROINTESTINAL ENDOSCOPY       Social History   Social History     Substance and Sexual Activity   Alcohol Use Never     Social History     Substance and Sexual Activity   Drug Use No     Social History     Tobacco Use   Smoking Status Every Day   • Packs/day: 0 25   • Types: Cigarettes   Smokeless Tobacco Never   Tobacco Comments    Truven quiting smoking handouts     Family History   Problem Relation Age of Onset   • Heart disease Father    • Heart attack Father    • Diabetes Maternal Grandmother    • Diabetes Maternal Grandfather    • Diabetes Paternal Grandmother    • Hypertension Family    • Hyperlipidemia Family        Meds/Allergies   current meds:   Current Facility-Administered Medications   Medication Dose Route Frequency   • acetaminophen (TYLENOL) tablet 650 mg  650 mg Oral Q6H PRN   • ALPRAZolam (XANAX) tablet 1 mg  1 mg Oral BID PRN   • [START ON 6/8/2023] aspirin (ECOTRIN LOW STRENGTH) EC tablet 81 mg  81 mg Oral Daily   • [START ON 6/8/2023] finasteride (PROSCAR) tablet 5 mg  5 mg Oral Daily   • glycerin-hypromellose- (ARTIFICIAL TEARS) ophthalmic solution 1 drop  1 drop Both Eyes Q4H PRN   • hydrALAZINE (APRESOLINE) injection 10 mg  10 mg Intravenous Q6H PRN   • [START ON 6/8/2023] magnesium Oxide (MAG-OX) tablet 400 mg  400 mg Oral Daily   • metoprolol succinate (TOPROL-XL) 24 hr tablet 25 mg  25 mg Oral Daily   • [START ON 6/8/2023] nicotine (NICODERM CQ) 14 mg/24hr TD 24 hr patch 1 patch  1 patch Transdermal Daily   • pancrelipase "(Lip-Prot-Amyl) (CREON) delayed release capsule 24,000 Units  24,000 Units Oral TID With Meals   • [START ON 6/8/2023] pantoprazole (PROTONIX) EC tablet 40 mg  40 mg Oral Early Morning   • pravastatin (PRAVACHOL) tablet 40 mg  40 mg Oral HS   • sodium chloride 23 4% 308 mEq in sterile water 1,000 mL infusion   Intravenous Continuous   • [START ON 6/8/2023] tamsulosin (FLOMAX) capsule 0 4 mg  0 4 mg Oral Daily     No Known Allergies    Objective   No intake or output data in the 24 hours ending 06/07/23 9347  Body mass index is 15 8 kg/m²  Invasive Devices:   Urethral Catheter Straight-tip 16 Fr  (Active)       PHYSICAL EXAM:  BP (!) 191/108 (BP Location: Left leg)   Pulse 76   Temp 98 5 °F (36 9 °C) (Oral)   Resp 20   Ht 5' 2\" (1 575 m)   Wt 39 2 kg (86 lb 6 4 oz)   SpO2 97%   BMI 15 80 kg/m²     Physical Exam  Constitutional:       General: He is not in acute distress  Appearance: He is not toxic-appearing or diaphoretic  HENT:      Head: Normocephalic and atraumatic  Nose: Nose normal       Mouth/Throat:      Mouth: Mucous membranes are dry  Eyes:      General: No scleral icterus  Extraocular Movements: Extraocular movements intact  Cardiovascular:      Rate and Rhythm: Normal rate and regular rhythm  Heart sounds: No friction rub  No gallop  Comments: No significant edema  Pulmonary:      Effort: Pulmonary effort is normal  No respiratory distress  Breath sounds: No wheezing, rhonchi or rales  Abdominal:      General: Bowel sounds are normal  There is no distension  Palpations: Abdomen is soft  Tenderness: There is no abdominal tenderness  There is no rebound  Musculoskeletal:      Cervical back: Normal range of motion and neck supple  Skin:     General: Skin is warm and dry  Neurological:      General: No focal deficit present  Mental Status: He is alert     Psychiatric:         Mood and Affect: Mood normal          Behavior: Behavior " normal          Thought Content:  Thought content normal          Judgment: Judgment normal            Current Weight: Weight - Scale: 39 2 kg (86 lb 6 4 oz)  First Weight: Weight - Scale: 39 2 kg (86 lb 6 4 oz)    Lab Results:    Results from last 7 days   Lab Units 06/07/23  1206   HEMATOCRIT % 31 4*   HEMOGLOBIN g/dL 10 3*   PLATELETS Thousands/uL 222   WBC Thousand/uL 6 11     Results from last 7 days   Lab Units 06/07/23  1720   BUN mg/dL 8   CALCIUM mg/dL 9 1   CHLORIDE mmol/L 85*   CO2 mmol/L 25   CREATININE mg/dL 0 85   POTASSIUM mmol/L 3 7     Results from last 7 days   Lab Units 06/07/23  1720 06/07/23  1206   ALK PHOS U/L  --  80   ALT U/L  --  9   AST U/L  --  15   BUN mg/dL 8 9   CALCIUM mg/dL 9 1 9 0   CHLORIDE mmol/L 85* 86*   CO2 mmol/L 25 25   CREATININE mg/dL 0 85 0 77   POTASSIUM mmol/L 3 7 3 6

## 2023-06-07 NOTE — ASSESSMENT & PLAN NOTE
Lab Results   Component Value Date    CREATININE 0 77 06/07/2023    CREATININE 0 88 05/22/2023    CREATININE 1 03 01/12/2023    EGFR 89 06/07/2023    EGFR 84 05/22/2023    EGFR 71 01/12/2023   · Baseline creatinine 0 8-1 2  · Creatinine stable on admission  · Trend BMP

## 2023-06-07 NOTE — ASSESSMENT & PLAN NOTE
· Noted on CT abdomen/pelvis: new distention of the central intrahepatic/extrahepatic bile ducts    Consider MRCP if clinically warranted  · LFTs and bilirubin WNL  · Also reporting abdominal pain however noted to have moderate stool on imaging  · Trend LFTs  · MRCP ordered  · Consult gastroenterology

## 2023-06-08 ENCOUNTER — APPOINTMENT (INPATIENT)
Dept: MRI IMAGING | Facility: HOSPITAL | Age: 73
DRG: 643 | End: 2023-06-08
Payer: MEDICARE

## 2023-06-08 LAB
ALBUMIN SERPL BCP-MCNC: 3.7 G/DL (ref 3.5–5)
ALP SERPL-CCNC: 73 U/L (ref 34–104)
ALT SERPL W P-5'-P-CCNC: 10 U/L (ref 7–52)
ANION GAP SERPL CALCULATED.3IONS-SCNC: 10 MMOL/L (ref 4–13)
ANION GAP SERPL CALCULATED.3IONS-SCNC: 8 MMOL/L (ref 4–13)
AST SERPL W P-5'-P-CCNC: 19 U/L (ref 13–39)
BASOPHILS # BLD AUTO: 0 THOUSANDS/ÂΜL (ref 0–0.1)
BASOPHILS NFR BLD AUTO: 0 % (ref 0–1)
BILIRUB DIRECT SERPL-MCNC: 0.05 MG/DL (ref 0–0.2)
BILIRUB SERPL-MCNC: 0.49 MG/DL (ref 0.2–1)
BUN SERPL-MCNC: 10 MG/DL (ref 5–25)
BUN SERPL-MCNC: 14 MG/DL (ref 5–25)
BUN SERPL-MCNC: 16 MG/DL (ref 5–25)
BUN SERPL-MCNC: 8 MG/DL (ref 5–25)
CALCIUM SERPL-MCNC: 8.6 MG/DL (ref 8.4–10.2)
CALCIUM SERPL-MCNC: 8.7 MG/DL (ref 8.4–10.2)
CALCIUM SERPL-MCNC: 9.1 MG/DL (ref 8.4–10.2)
CALCIUM SERPL-MCNC: 9.2 MG/DL (ref 8.4–10.2)
CHLORIDE SERPL-SCNC: 88 MMOL/L (ref 96–108)
CHLORIDE SERPL-SCNC: 91 MMOL/L (ref 96–108)
CHLORIDE SERPL-SCNC: 94 MMOL/L (ref 96–108)
CHLORIDE SERPL-SCNC: 95 MMOL/L (ref 96–108)
CO2 SERPL-SCNC: 24 MMOL/L (ref 21–32)
CO2 SERPL-SCNC: 24 MMOL/L (ref 21–32)
CO2 SERPL-SCNC: 25 MMOL/L (ref 21–32)
CO2 SERPL-SCNC: 26 MMOL/L (ref 21–32)
CREAT SERPL-MCNC: 0.77 MG/DL (ref 0.6–1.3)
CREAT SERPL-MCNC: 0.88 MG/DL (ref 0.6–1.3)
CREAT SERPL-MCNC: 0.99 MG/DL (ref 0.6–1.3)
CREAT SERPL-MCNC: 1.05 MG/DL (ref 0.6–1.3)
EOSINOPHIL # BLD AUTO: 0 THOUSAND/ÂΜL (ref 0–0.61)
EOSINOPHIL NFR BLD AUTO: 0 % (ref 0–6)
ERYTHROCYTE [DISTWIDTH] IN BLOOD BY AUTOMATED COUNT: 17.3 % (ref 11.6–15.1)
FERRITIN SERPL-MCNC: 14 NG/ML (ref 24–336)
GFR SERPL CREATININE-BSD FRML MDRD: 69 ML/MIN/1.73SQ M
GFR SERPL CREATININE-BSD FRML MDRD: 74 ML/MIN/1.73SQ M
GFR SERPL CREATININE-BSD FRML MDRD: 84 ML/MIN/1.73SQ M
GFR SERPL CREATININE-BSD FRML MDRD: 89 ML/MIN/1.73SQ M
GLUCOSE SERPL-MCNC: 102 MG/DL (ref 65–140)
GLUCOSE SERPL-MCNC: 106 MG/DL (ref 65–140)
GLUCOSE SERPL-MCNC: 107 MG/DL (ref 65–140)
GLUCOSE SERPL-MCNC: 112 MG/DL (ref 65–140)
HCT VFR BLD AUTO: 30.5 % (ref 36.5–49.3)
HGB BLD-MCNC: 10.4 G/DL (ref 12–17)
IMM GRANULOCYTES # BLD AUTO: 0.03 THOUSAND/UL (ref 0–0.2)
IMM GRANULOCYTES NFR BLD AUTO: 1 % (ref 0–2)
IRON SATN MFR SERPL: 10 % (ref 20–50)
IRON SERPL-MCNC: 39 UG/DL (ref 65–175)
LYMPHOCYTES # BLD AUTO: 0.78 THOUSANDS/ÂΜL (ref 0.6–4.47)
LYMPHOCYTES NFR BLD AUTO: 12 % (ref 14–44)
MCH RBC QN AUTO: 26.9 PG (ref 26.8–34.3)
MCHC RBC AUTO-ENTMCNC: 34.1 G/DL (ref 31.4–37.4)
MCV RBC AUTO: 79 FL (ref 82–98)
MONOCYTES # BLD AUTO: 0.7 THOUSAND/ÂΜL (ref 0.17–1.22)
MONOCYTES NFR BLD AUTO: 11 % (ref 4–12)
NEUTROPHILS # BLD AUTO: 5.07 THOUSANDS/ÂΜL (ref 1.85–7.62)
NEUTS SEG NFR BLD AUTO: 76 % (ref 43–75)
NRBC BLD AUTO-RTO: 0 /100 WBCS
PLATELET # BLD AUTO: 233 THOUSANDS/UL (ref 149–390)
PMV BLD AUTO: 9 FL (ref 8.9–12.7)
POTASSIUM SERPL-SCNC: 3.1 MMOL/L (ref 3.5–5.3)
POTASSIUM SERPL-SCNC: 3.9 MMOL/L (ref 3.5–5.3)
POTASSIUM SERPL-SCNC: 4.1 MMOL/L (ref 3.5–5.3)
POTASSIUM SERPL-SCNC: 4.2 MMOL/L (ref 3.5–5.3)
PROT SERPL-MCNC: 6.2 G/DL (ref 6.4–8.4)
RBC # BLD AUTO: 3.86 MILLION/UL (ref 3.88–5.62)
SODIUM SERPL-SCNC: 123 MMOL/L (ref 135–147)
SODIUM SERPL-SCNC: 125 MMOL/L (ref 135–147)
SODIUM SERPL-SCNC: 128 MMOL/L (ref 135–147)
SODIUM SERPL-SCNC: 129 MMOL/L (ref 135–147)
TIBC SERPL-MCNC: 399 UG/DL (ref 250–450)
WBC # BLD AUTO: 6.58 THOUSAND/UL (ref 4.31–10.16)

## 2023-06-08 PROCEDURE — 99222 1ST HOSP IP/OBS MODERATE 55: CPT | Performed by: INTERNAL MEDICINE

## 2023-06-08 PROCEDURE — 80048 BASIC METABOLIC PNL TOTAL CA: CPT | Performed by: HOSPITALIST

## 2023-06-08 PROCEDURE — 80048 BASIC METABOLIC PNL TOTAL CA: CPT | Performed by: INTERNAL MEDICINE

## 2023-06-08 PROCEDURE — 99232 SBSQ HOSP IP/OBS MODERATE 35: CPT | Performed by: PHYSICIAN ASSISTANT

## 2023-06-08 PROCEDURE — 70551 MRI BRAIN STEM W/O DYE: CPT

## 2023-06-08 PROCEDURE — 80076 HEPATIC FUNCTION PANEL: CPT | Performed by: HOSPITALIST

## 2023-06-08 PROCEDURE — 80048 BASIC METABOLIC PNL TOTAL CA: CPT | Performed by: STUDENT IN AN ORGANIZED HEALTH CARE EDUCATION/TRAINING PROGRAM

## 2023-06-08 PROCEDURE — 85025 COMPLETE CBC W/AUTO DIFF WBC: CPT | Performed by: HOSPITALIST

## 2023-06-08 PROCEDURE — 99232 SBSQ HOSP IP/OBS MODERATE 35: CPT | Performed by: INTERNAL MEDICINE

## 2023-06-08 PROCEDURE — 86301 IMMUNOASSAY TUMOR CA 19-9: CPT | Performed by: INTERNAL MEDICINE

## 2023-06-08 PROCEDURE — 83540 ASSAY OF IRON: CPT | Performed by: PHYSICIAN ASSISTANT

## 2023-06-08 PROCEDURE — 83550 IRON BINDING TEST: CPT | Performed by: PHYSICIAN ASSISTANT

## 2023-06-08 PROCEDURE — 82728 ASSAY OF FERRITIN: CPT | Performed by: PHYSICIAN ASSISTANT

## 2023-06-08 RX ORDER — POTASSIUM CHLORIDE 14.9 MG/ML
20 INJECTION INTRAVENOUS ONCE
Status: DISCONTINUED | OUTPATIENT
Start: 2023-06-08 | End: 2023-06-08

## 2023-06-08 RX ORDER — TOLVAPTAN 15 MG/1
15 TABLET ORAL ONCE
Status: COMPLETED | OUTPATIENT
Start: 2023-06-08 | End: 2023-06-08

## 2023-06-08 RX ORDER — POTASSIUM CHLORIDE 20 MEQ/1
40 TABLET, EXTENDED RELEASE ORAL ONCE
Status: COMPLETED | OUTPATIENT
Start: 2023-06-08 | End: 2023-06-08

## 2023-06-08 RX ORDER — POTASSIUM CHLORIDE 20 MEQ/1
40 TABLET, EXTENDED RELEASE ORAL ONCE
Status: DISCONTINUED | OUTPATIENT
Start: 2023-06-08 | End: 2023-06-08

## 2023-06-08 RX ADMIN — PRAVASTATIN SODIUM 40 MG: 40 TABLET ORAL at 21:17

## 2023-06-08 RX ADMIN — POTASSIUM CHLORIDE 40 MEQ: 1500 TABLET, EXTENDED RELEASE ORAL at 05:47

## 2023-06-08 RX ADMIN — PANCRELIPASE 24000 UNITS: 24000; 76000; 120000 CAPSULE, DELAYED RELEASE PELLETS ORAL at 17:39

## 2023-06-08 RX ADMIN — TOLVAPTAN 15 MG: 15 TABLET ORAL at 12:05

## 2023-06-08 RX ADMIN — PANCRELIPASE 24000 UNITS: 24000; 76000; 120000 CAPSULE, DELAYED RELEASE PELLETS ORAL at 12:29

## 2023-06-08 RX ADMIN — ASPIRIN 81 MG: 81 TABLET, COATED ORAL at 09:17

## 2023-06-08 RX ADMIN — MAGNESIUM OXIDE TAB 400 MG (241.3 MG ELEMENTAL MG) 400 MG: 400 (241.3 MG) TAB at 09:17

## 2023-06-08 RX ADMIN — PANCRELIPASE 24000 UNITS: 24000; 76000; 120000 CAPSULE, DELAYED RELEASE PELLETS ORAL at 09:25

## 2023-06-08 RX ADMIN — POTASSIUM CHLORIDE 40 MEQ: 1500 TABLET, EXTENDED RELEASE ORAL at 09:17

## 2023-06-08 RX ADMIN — AMLODIPINE BESYLATE 5 MG: 5 TABLET ORAL at 09:20

## 2023-06-08 RX ADMIN — ALPRAZOLAM 1 MG: 0.5 TABLET ORAL at 21:17

## 2023-06-08 RX ADMIN — TAMSULOSIN HYDROCHLORIDE 0.4 MG: 0.4 CAPSULE ORAL at 09:18

## 2023-06-08 RX ADMIN — PANTOPRAZOLE SODIUM 40 MG: 40 TABLET, DELAYED RELEASE ORAL at 05:47

## 2023-06-08 RX ADMIN — FINASTERIDE 5 MG: 5 TABLET, FILM COATED ORAL at 09:17

## 2023-06-08 RX ADMIN — METOPROLOL SUCCINATE 25 MG: 50 TABLET, EXTENDED RELEASE ORAL at 09:18

## 2023-06-08 NOTE — PROGRESS NOTES
[place]  Progress Note  Name: Heather Ritter  MRN: [id number]  Unit/Bed#: -01 I Date of Admission: 6/7/2023   Date of Service: 6/8/2023 I Hospital Day: 1    Assessment/Plan   * Hyponatremia  Assessment & Plan  · Presented to the emergency department with lightheadedness, headache  Did have a couple episodes of diarrhea yesterday  · Sodium found to be 120 on presentation  Appears that patient had admission in 2022 for hyponatremia  · At that time had been started on salt tabs/torsemide, hctz was discontinued and was placed on fluid restriction  · Noted outpatient blood work did show sodium of 126 on 5/22  · Admits to poor intake in terms of food but does feel he has adequate fluid intake  Question decreased solute intake  · Home regimen includes salt tabs 1 g twice daily in addition to torsemide 5 mg daily  · Patient is also on Celexa which can be contributing  · Appears euvolemic  · Continue fluid restriction  · Trend BMP q 6 hours  · Consult nephrology  · Continues on 1 8% saline  · Sodium up to 125 this AM     Common bile duct dilation  Assessment & Plan  · Noted on CT abdomen/pelvis: new distention of the central intrahepatic/extrahepatic bile ducts  Consider MRCP if clinically warranted  · LFTs and bilirubin WNL  · Also reporting abdominal pain however noted to have moderate stool on imaging  · Trend LFTs  · MRCP ordered  · Consult gastroenterology    Hypertension  Assessment & Plan  · Blood pressure elevated while in the ED  · Resume home metoprolol and monitor  · Amlodipine 5 mg daily added  · Hydralazine as needed SBP >180    Urinary retention  Assessment & Plan  · Chronic, follows with urology  · Maintained on Flomax and Proscar  · CT abdomen/pelvis with urinary bladder wall thickening -appears chronic  UA with bacteruria but pt denies any urinary symptoms    Afebrile, no leukocytosis  · Urinary retention protocol    Protein-calorie malnutrition, unspecified severity ([facility] [address] )  Assessment & Plan  Malnutrition Findings:   ·  Admits to poor appetite  · Consult nutrition    BMI Findings: Body mass index is 15 8 kg/m²  Chronic kidney disease, stage 3 [facility])  Assessment & Plan  Lab Results   Component Value Date    CREATININE 0 88 2023    CREATININE 0 77 2023    CREATININE 0 78 2023    EGFR 84 2023    EGFR 89 2023    EGFR 88 2023   · Baseline creatinine 0 8-1 2  · Creatinine stable  · Trend BMP q 6 hrs        VTE Pharmacologic Prophylaxis: VTE Score: 2 Low Risk (Score 0-2) - Encourage Ambulation  Patient Centered Rounds: I performed bedside rounds with nursing staff today  Discussions with Specialists or Other Care Team Provider: CM, nephrology AP    Education and Discussions with Family / Patient: Patient declined call to   Total Time Spent on Date of Encounter in care of patient: 35 minutes This time was spent on one or more of the following: performing physical exam; counseling and coordination of care; obtaining or reviewing history; documenting in the medical record; reviewing/ordering tests, medications or procedures; communicating with other healthcare professionals and discussing with patient's family/caregivers  Current Length of Stay: 1 day(s)  Current Patient Status: Inpatient   Certification Statement: The patient will continue to require additional inpatient hospital stay due to Hyponatremia, MRCP  Discharge Plan: Anticipate discharge in 48 hrs to home  Code Status: Level 3 - DNAR and DNI    Subjective:   Patient states he feels well this morning  Does admit to some occasional dizziness  Denies chest pain/palpitations, shortness of breath, nausea/vomiting, abdominal pain      Objective:     Vitals:   Temp (24hrs), Av 7 °F (37 1 °C), Min:98 5 °F (36 9 °C), Max:99 1 °F (37 3 °C)    Temp:  [98 5 °F (36 9 °C)-99 1 °F (37 3 °C)] 98 7 °F (37 1 °C)  HR:  [76-99] 87  Resp: [19-20] 19  BP: (145-200)/() 158/91  SpO2:  [94 %-98 %] 95 %  Body mass index is 15 8 kg/m²  Input and Output Summary (last 24 hours): Intake/Output Summary (Last 24 hours) at 6/8/2023 1047  Last data filed at 6/8/2023 0958  Gross per 24 hour   Intake 1320 ml   Output 1315 ml   Net 5 ml       Physical Exam:   Physical Exam  Vitals and nursing note reviewed  Constitutional:       General: He is not in acute distress  Appearance: He is underweight  Comments: No acute distress   HENT:      Head: Normocephalic and atraumatic  Eyes:      General: No scleral icterus  Extraocular Movements: Extraocular movements intact  Conjunctiva/sclera: Conjunctivae normal    Cardiovascular:      Rate and Rhythm: Normal rate and regular rhythm  Heart sounds: No murmur heard  Pulmonary:      Effort: Pulmonary effort is normal  No respiratory distress  Breath sounds: Normal breath sounds  No wheezing, rhonchi or rales  Abdominal:      General: Bowel sounds are normal       Palpations: Abdomen is soft  Tenderness: There is no abdominal tenderness  There is no guarding or rebound  Musculoskeletal:         General: No swelling  Cervical back: Normal range of motion  Comments: Able to move upper/lower extremities bilaterally, no edema   Skin:     General: Skin is warm and dry  Capillary Refill: Capillary refill takes less than 2 seconds  Neurological:      Mental Status: He is alert and oriented to person, place, and time     Psychiatric:         Mood and Affect: Mood normal          Speech: Speech normal          Behavior: Behavior normal           Additional Data:     Labs:  Results from last 7 days   Lab Units 06/08/23 0317   EOS PCT % 0   HEMATOCRIT % 30 5*   HEMOGLOBIN g/dL 10 4*   LYMPHS PCT % 12*   MONOS PCT % 11   NEUTROS PCT % 76*   PLATELETS Thousands/uL 233   WBC Thousand/uL 6 58     Results from last 7 days   Lab Units 06/08/23  0915 06/08/23 0317   ANION GAP mmol/L 10 10   ALBUMIN g/dL  --  3 7   ALK PHOS U/L  --  73   ALT U/L  --  10   AST U/L  --  19   BUN mg/dL 10 8   CALCIUM mg/dL 8 7 8 6   CHLORIDE mmol/L 91* 88*   CO2 mmol/L 24 25   CREATININE mg/dL 0 88 0 77   GLUCOSE RANDOM mg/dL 112 102   POTASSIUM mmol/L 3 9 3 1*   SODIUM mmol/L 125* 123*   TOTAL BILIRUBIN mg/dL  --  0 49                       Lines/Drains:  Invasive Devices     Peripheral Intravenous Line  Duration           Peripheral IV 06/08/23 Proximal;Ventral (anterior); Right Forearm <1 day          Drain  Duration           Urethral Catheter Straight-tip 16 Fr  146 days                             Imaging: No pertinent imaging reviewed      Recent Cultures (last 7 days):         Last 24 Hours Medication List:   Current Facility-Administered Medications   Medication Dose Route Frequency Provider Last Rate   • acetaminophen  650 mg Oral Q6H PRN Bronwyn Bolus, PA-C     • ALPRAZolam  1 mg Oral BID PRN Bronwyn Bolus, PA-C     • amLODIPine  5 mg Oral Daily Lisa Cardoso MD     • aspirin  81 mg Oral Daily Riley Bolus, PA-C     • finasteride  5 mg Oral Daily [facility] Nirmala, PA-C     • glycerin-hypromellose-  1 drop Both Eyes Q4H PRN Riley Bolus, PA-C     • hydrALAZINE  10 mg Intravenous Q6H PRN Bronwyn Bolus, PA-C     • magnesium Oxide  400 mg Oral Daily [facility] Nirmala, PA-C     • metoprolol succinate  25 mg Oral Daily Riley Bolus, PA-C     • nicotine  1 patch Transdermal Daily [facility], PA-C     • pancrelipase (Lip-Prot-Amyl)  24,000 Units Oral TID With Meals Bronwyn Bolus, PA-C     • pantoprazole  40 mg Oral Early Morning Riley Bolus, PA-C     • pravastatin  40 mg Oral HS Bronwyn Bolus, PA-C     • sodium chloride 23 4% 308 mEq in sterile water 1,000 mL infusion   Intravenous Continuous Maty Fajardo MD 50 mL/hr at 06/07/23 8511   • tamsulosin  0 4 mg Oral Daily Riley Bolus, PA-C          Today, Patient Was Seen By: Celina Perez LILIANE Dos Santos    **Please Note: This note may have been constructed using a voice recognition system  **

## 2023-06-08 NOTE — QUICK NOTE
Follow-up serum sodium 120 at 10 PM   1 8% saline increased to 50 cc/h and 1 dose of IV Lasix 20 mg provided  Follow-up serum sodium 123 at 3 AM   Continue 1 8% saline at 50 cc/h and check BMP in 4 hours  Serum potassium low at 3 1  Give potassium chloride 40 mill equivalent x1      Lab Results   Component Value Date    BUN 8 06/08/2023    CALCIUM 8 6 06/08/2023    CL 88 (L) 06/08/2023    CO2 25 06/08/2023    CREATININE 0 77 06/08/2023    GLUC 102 06/08/2023    K 3 1 (L) 06/08/2023    SODIUM 123 (L) 06/08/2023

## 2023-06-08 NOTE — ASSESSMENT & PLAN NOTE
· Presented to the emergency department with lightheadedness, headache  Did have a couple episodes of diarrhea yesterday  · Sodium found to be 120 on presentation  Appears that patient had admission in 2022 for hyponatremia  · At that time had been started on salt tabs/torsemide, hctz was discontinued and was placed on fluid restriction  · Noted outpatient blood work did show sodium of 126 on 5/22  · Admits to poor intake in terms of food but does feel he has adequate fluid intake    Question decreased solute intake  · Home regimen includes salt tabs 1 g twice daily in addition to torsemide 5 mg daily  · Patient is also on Celexa which can be contributing  · Appears euvolemic  · Continue fluid restriction  · Trend BMP q 6 hours  · Consult nephrology  · Continues on 1 8% saline  · Sodium up to 125 this AM

## 2023-06-08 NOTE — ASSESSMENT & PLAN NOTE
· Blood pressure elevated while in the ED  · Resume home metoprolol and monitor  · Amlodipine 5 mg daily added  · Hydralazine as needed SBP >180

## 2023-06-08 NOTE — CONSULTS
Consultation - [id number]     Jorge Mcguire 75 y o  male MRN: [id number]  Unit/Bed#: -01 Encounter: [id number]    Consults    ASSESSMENT and PLAN    3  79-year-old male with a history of ulcer disease in 2022 admitted with dizziness weakness and profound hyponatremia  Also some fogginess and change in mental status  Noted to have dilatation of the intrahepatic and extra hepatic ducts by CT scan  Liver enzymes are normal   On questioning he is not really symptomatic with respect to the abdominal pain but has had a 40 pound weight loss  Tough to put this together  I think his acute issue is the hyponatremia change in mental status and dizziness  More chronic issue was the weight loss and abnormal CT scan  With his history of ulcer disease that needs to be considered as does a gastric neoplasm  This could also be a neoplasm in the biliary system  Presently receiving hypertonic saline  Recommend continued supportive care MRCP ordered  I will order a CA 19-9  Thanks we will follow    Chief Complaint   Patient presents with   • Headache     Pt to er via ems from home with complaints of a headache since before august, dry mouth since august, and blurry vision since august - has been being evaluated for his blurred vision  Physician Requesting Consult: Adelina Webber MD    HPI  Aric Krause is a 75y o  year old male who was admitted with dizziness feeling weak and tired and rundown  He has a history of gastric ulcer diagnosed in 2022  Apparently did not have follow-up exam to confirm healing as he was having other issues  He says he is lost about 40 pounds over the last year has no appetite  He denies abdominal pain to me he denies fever sweats or chills  His dizziness is his biggest complaint      Historical Information   Past Medical History:   Diagnosis Date   • Anxiety    • Rhodes's esophagus    • Chronic neck pain    • Depression    • GERD (gastroesophageal reflux disease)    • H/O abdominal aortic aneurysm 4 5 cm   • Hyperlipidemia    • Hypertension    • Pancreatic pseudocyst    • Vertigo      Past Surgical History:   Procedure Laterality Date   • COLONOSCOPY  09/30/2014   • IR LOWER EXTREMITY / INTERVENTION  01/04/2019   • ID ESOPHAGOGASTRODUODENOSCOPY TRANSORAL DIAGNOSTIC N/A 06/22/2018    Procedure: EGD AND COLONOSCOPY;  Surgeon: Christine Justin MD;  Location: [facility]; Service: Gastroenterology   • ID EVASC RPR DPLMNT AORTO-AORTIC NDGFT N/A 02/09/2018    Procedure: REPAIR ANEURYSM ENDOVASCULAR ABDOMINAL AORTIC  (EVAR);   Surgeon: Jong Britton MD;  Location: [facility];  Service: Vascular   • UPPER GASTROINTESTINAL ENDOSCOPY       Social History   Social History     Substance and Sexual Activity   Alcohol Use Never     Social History     Substance and Sexual Activity   Drug Use No     Social History     Tobacco Use   Smoking Status Every Day   • Packs/day: 0 25   • Types: Cigarettes   Smokeless Tobacco Never   Tobacco Comments    Truven quiting smoking handouts     Family History   Problem Relation Age of Onset   • Heart disease Father    • Heart attack Father    • Diabetes Maternal Grandmother    • Diabetes Maternal Grandfather    • Diabetes Paternal Grandmother    • Hypertension Family    • Hyperlipidemia Family        Meds/Allergies     Current Facility-Administered Medications   Medication Dose Route Frequency   • acetaminophen (TYLENOL) tablet 650 mg  650 mg Oral Q6H PRN   • ALPRAZolam (XANAX) tablet 1 mg  1 mg Oral BID PRN   • amLODIPine (NORVASC) tablet 5 mg  5 mg Oral Daily   • aspirin (ECOTRIN LOW STRENGTH) EC tablet 81 mg  81 mg Oral Daily   • finasteride (PROSCAR) tablet 5 mg  5 mg Oral Daily   • glycerin-hypromellose- (ARTIFICIAL TEARS) ophthalmic solution 1 drop  1 drop Both Eyes Q4H PRN   • hydrALAZINE (APRESOLINE) injection 10 mg  10 mg Intravenous Q6H PRN   • magnesium Oxide (MAG-OX) tablet 400 mg  400 mg Oral Daily   • "metoprolol succinate (TOPROL-XL) 24 hr tablet 25 mg  25 mg Oral Daily   • nicotine (NICODERM CQ) 14 mg/24hr TD 24 hr patch 1 patch  1 patch Transdermal Daily   • pancrelipase (Lip-Prot-Amyl) (CREON) delayed release capsule 24,000 Units  24,000 Units Oral TID With Meals   • pantoprazole (PROTONIX) EC tablet 40 mg  40 mg Oral Early Morning   • pravastatin (PRAVACHOL) tablet 40 mg  40 mg Oral HS   • sodium chloride 23 4% 308 mEq in sterile water 1,000 mL infusion   Intravenous Continuous   • tamsulosin (FLOMAX) capsule 0 4 mg  0 4 mg Oral Daily     Medications Prior to Admission   Medication   • ALPRAZolam (XANAX) 1 mg tablet   • aspirin (ECOTRIN LOW STRENGTH) 81 mg EC tablet   • citalopram (CeleXA) 20 mg tablet   • dicyclomine (BENTYL) 10 mg capsule   • finasteride (PROSCAR) 5 mg tablet   • Linaclotide (LINZESS) 72 MCG CAPS   • magnesium oxide (MAG-OX) 400 mg tablet   • magnesium oxide (MAG-OX) 400 mg   • metoprolol succinate (TOPROL-XL) 25 mg 24 hr tablet   • Multiple Vitamins-Minerals (CENTRUM SILVER PO)   • omeprazole (PriLOSEC) 40 MG capsule   • pancrelipase, Lip-Prot-Amyl, (Creon) 12,000 units capsule   • polyethylene glycol (MIRALAX) 17 g packet   • potassium chloride (K-DUR,KLOR-CON) 20 mEq tablet   • pravastatin (PRAVACHOL) 40 mg tablet   • ranitidine (ZANTAC) 150 mg tablet   • Restasis 0 05 % ophthalmic emulsion   • senna (SENOKOT) 8 6 mg   • sodium chloride 1 g tablet   • tamsulosin (FLOMAX) 0 4 mg   • torsemide (DEMADEX) 5 MG tablet   • Wheat Dextrin (BENEFIBER PO)       No Known Allergies    PHYSICAL EXAM    Blood pressure 158/91, pulse 87, temperature 98 7 °F (37 1 °C), resp  rate 19, height 5' 2\" (1 575 m), weight 39 2 kg (86 lb 6 4 oz), SpO2 95 %  Body mass index is 15 8 kg/m²  General Appearance: NAD, cooperative, alert  Eyes: Anicteric, PERRLA, EOMI  ENT:  Normocephalic, atraumatic, normal mucosa      Neck:  Supple, symmetrical, trachea midline  GI:  Soft, non-tender, non-distended; normal bowel " "sounds; no masses, no organomegaly   Rectal: Deferred  Musculoskeletal: No cyanosis, clubbing or edema  Normal ROM  Skin:  No jaundice, rashes, or lesions   Heme/Lymph: No palpable cervical lymphadenopathy  Psych: Normal affect, good eye contact  Neuro: No gross deficits, AAOx3    Lab Results   Component Value Date    BUN 10 06/08/2023    CALCIUM 8 7 06/08/2023    CL 91 (L) 06/08/2023    CO2 24 06/08/2023    CREATININE 0 88 06/08/2023    GLUCOSE 97 10/14/2014    K 3 9 06/08/2023     (L) 10/14/2014     Lab Results   Component Value Date    HCT 30 5 (L) 06/08/2023    HGB 10 4 (L) 06/08/2023    MCV 79 (L) 06/08/2023     06/08/2023    WBC 6 58 06/08/2023     Lab Results   Component Value Date    ALKPHOS 73 06/08/2023    ALT 10 06/08/2023    AST 19 06/08/2023    BILITOT 0 37 10/13/2014     No results found for: \"AMYLASE\"  Lab Results   Component Value Date    LIPASE 15 06/07/2023     Lab Results   Component Value Date    FERRITIN 12 (L) 05/22/2023    IRON 21 (L) 05/22/2023    TIBC 372 05/22/2023     Lab Results   Component Value Date    INR 0 95 01/04/2019       Imaging Studies: I have personally reviewed pertinent reports  EKG, Pathology, and Other Studies: I have personally reviewed pertinent reports  REVIEW OF SYSTEMS:    CONSTITUTIONAL: Denies any fever, chills, rigors, and weight loss  HEENT: No earache or tinnitus  Denies hearing loss or visual disturbances  CARDIOVASCULAR: No chest pain or palpitations  RESPIRATORY: Denies any cough, hemoptysis, shortness of breath or dyspnea on exertion  GASTROINTESTINAL: As noted in the History of Present Illness  GENITOURINARY: No problems with urination  Denies any hematuria or dysuria  NEUROLOGIC: No dizziness or vertigo, denies headaches  MUSCULOSKELETAL: Denies any muscle or joint pain  SKIN: Denies skin rashes or itching  ENDOCRINE: Denies excessive thirst  Denies intolerance to heat or cold    PSYCHOSOCIAL: Denies depression or " anxiety  Denies any recent memory loss

## 2023-06-08 NOTE — ASSESSMENT & PLAN NOTE
· Chronic, follows with urology  · Maintained on Flomax and Proscar  · CT abdomen/pelvis with urinary bladder wall thickening -appears chronic  UA with bacteruria but pt denies any urinary symptoms    Afebrile, no leukocytosis  · Urinary retention protocol

## 2023-06-08 NOTE — ASSESSMENT & PLAN NOTE
Lab Results   Component Value Date    CREATININE 0 88 06/08/2023    CREATININE 0 77 06/08/2023    CREATININE 0 78 06/07/2023    EGFR 84 06/08/2023    EGFR 89 06/08/2023    EGFR 88 06/07/2023   · Baseline creatinine 0 8-1 2  · Creatinine stable  · Trend BMP q 6 hrs

## 2023-06-08 NOTE — PROGRESS NOTES
NEPHROLOGY PROGRESS NOTE    Leticia Munoz 75 y o  male MRN: [id number]  Unit/Bed#: -01 Encounter: [id number]  Reason for Consult: Hyponatremia    The patient is awake and alert relatively stable presently says his headache is less and really had an uneventful evening  ASSESSMENT/PLAN:  1  Hyponatremia    Patient is chronic hyponatremia he was on an SSRI and this has been discontinued but it certainly can cause SIADH  Urine studies were suggestive of SIADH with a concentrated urine osmolality in the setting of hyponatremia  He was started on hypertonic saline at 1 8% sodium chloride and sodium concentration has improved to 125 mill equivalents per liter  He has normal renal function  If the SSRI is the etiology the effect can take a few days or week to wear off so will monitor once corrects to normal     We will discontinue 1 8% saline  Samsca 15 mg x 1  Repeat BMP this evening to follow trend of sodium  Hold fluid restriction when receives Samsca  Celexa was discontinued    2  Hypertension    Patient had significant systolic hypertension he was only on a beta-blocker at home  I added amlodipine his blood pressure came down nicely  Systolic blood pressure ranging in the 140s millimeters mercury primarily  3   GI saw patient and investigating patient for biliary duct dilation with MRI testing and blood work  SUBJECTIVE:  Review of Systems   Constitutional: Negative for chills, diaphoresis, fever and night sweats  HENT: Negative  Eyes: Negative  Cardiovascular: Negative for chest pain, dyspnea on exertion, leg swelling and orthopnea  Respiratory: Negative  Negative for cough, shortness of breath, sputum production and wheezing  Gastrointestinal: Negative for abdominal pain, diarrhea, nausea and vomiting  Genitourinary: Negative  Neurological: Negative for dizziness, focal weakness and light-headedness  Feels a little better with less headache  "  Psychiatric/Behavioral: Negative for altered mental status, hallucinations and hypervigilance  The patient is not nervous/anxious  OBJECTIVE:  Current Weight: Weight - Scale: 39 2 kg (86 lb 6 4 oz)  Vitals:Temp (24hrs), Av 7 °F (37 1 °C), Min:98 5 °F (36 9 °C), Max:99 1 °F (37 3 °C)  Current: Temperature: 98 7 °F (37 1 °C)   Blood pressure 158/91, pulse 87, temperature 98 7 °F (37 1 °C), resp  rate 19, height 5' 2\" (1 575 m), weight 39 2 kg (86 lb 6 4 oz), SpO2 95 %  , Body mass index is 15 8 kg/m²  Intake/Output Summary (Last 24 hours) at 2023 1127  Last data filed at 2023 0958  Gross per 24 hour   Intake 1320 ml   Output 1315 ml   Net 5 ml       Physical Exam: /91   Pulse 87   Temp 98 7 °F (37 1 °C)   Resp 19   Ht 5' 2\" (1 575 m)   Wt 39 2 kg (86 lb 6 4 oz)   SpO2 95%   BMI 15 80 kg/m²   Physical Exam  Constitutional:       General: He is not in acute distress  Appearance: He is not toxic-appearing or diaphoretic  HENT:      Head: Normocephalic and atraumatic  Nose: Nose normal       Mouth/Throat:      Mouth: Mucous membranes are moist    Eyes:      General: No scleral icterus  Extraocular Movements: Extraocular movements intact  Cardiovascular:      Rate and Rhythm: Normal rate and regular rhythm  Heart sounds: No friction rub  No gallop  Pulmonary:      Effort: Pulmonary effort is normal  No respiratory distress  Breath sounds: No wheezing, rhonchi or rales  Abdominal:      General: Bowel sounds are normal  There is no distension  Palpations: Abdomen is soft  Tenderness: There is no abdominal tenderness  There is no rebound  Musculoskeletal:      Cervical back: Normal range of motion and neck supple  Neurological:      Mental Status: He is alert and oriented to person, place, and time  Mental status is at baseline     Psychiatric:         Mood and Affect: Mood normal          Behavior: Behavior normal          Thought " Content:  Thought content normal          Judgment: Judgment normal          Medications:    Current Facility-Administered Medications:   •  acetaminophen (TYLENOL) tablet 650 mg, 650 mg, Oral, Q6H PRN, Calton Spatz, PA-C  •  ALPRAZolam Peggi Friend) tablet 1 mg, 1 mg, Oral, BID PRN, Manish Dos Santos PA-C  •  amLODIPine (NORVASC) tablet 5 mg, 5 mg, Oral, Daily, Sobeida Friedman MD, 5 mg at 06/08/23 0920  •  aspirin (ECOTRIN LOW STRENGTH) EC tablet 81 mg, 81 mg, Oral, Daily, Calton Spatz, PA-C, 81 mg at 06/08/23 0703  •  finasteride (PROSCAR) tablet 5 mg, 5 mg, Oral, Daily, Manish Dos Santos PA-C, 5 mg at 06/08/23 9393  •  glycerin-hypromellose- (ARTIFICIAL TEARS) ophthalmic solution 1 drop, 1 drop, Both Eyes, Q4H PRN, Calton Spatz, PA-C  •  hydrALAZINE (APRESOLINE) injection 10 mg, 10 mg, Intravenous, Q6H PRN, Manish Dos Santos PA-C, 10 mg at 06/07/23 1736  •  magnesium Oxide (MAG-OX) tablet 400 mg, 400 mg, Oral, Daily, Manish Dos Santos PA-C, 400 mg at 06/08/23 4879  •  metoprolol succinate (TOPROL-XL) 24 hr tablet 25 mg, 25 mg, Oral, Daily, Manish Dos Santos PA-C, 25 mg at 06/08/23 7292  •  nicotine (NICODERM CQ) 14 mg/24hr TD 24 hr patch 1 patch, 1 patch, Transdermal, Daily, Calton Spatz, PA-C  •  pancrelipase (Lip-Prot-Amyl) (CREON) delayed release capsule 24,000 Units, 24,000 Units, Oral, TID With Meals, Calton Spatz, PA-C, 24,000 Units at 06/08/23 5459  •  pantoprazole (PROTONIX) EC tablet 40 mg, 40 mg, Oral, Early Morning, Manish Dos Santos PA-C, 40 mg at 06/08/23 0547  •  pravastatin (PRAVACHOL) tablet 40 mg, 40 mg, Oral, HS, Manish Dos Santos PA-C, 40 mg at 06/07/23 2131  •  sodium chloride 23 4% 308 mEq in sterile water 1,000 mL infusion, , Intravenous, Continuous, Katelyn Romo MD, Last Rate: 50 mL/hr at 06/07/23 2331, Rate Change at 06/07/23 2331  •  tamsulosin (FLOMAX) capsule 0 4 mg, 0 4 mg, Oral, Daily, Manish Dos Santos PA-C, 0 4 mg at 06/08/23 1550    Laboratory "Results:  Lab Results   Component Value Date    HCT 30 5 (L) 06/08/2023    HGB 10 4 (L) 06/08/2023    MCV 79 (L) 06/08/2023     06/08/2023    WBC 6 58 06/08/2023     Lab Results   Component Value Date    BUN 10 06/08/2023    CALCIUM 8 7 06/08/2023    CL 91 (L) 06/08/2023    CO2 24 06/08/2023    CREATININE 0 88 06/08/2023    GLUC 112 06/08/2023    K 3 9 06/08/2023    SODIUM 125 (L) 06/08/2023     Lab Results   Component Value Date    CALCIUM 8 7 06/08/2023    PHOS 1 9 (L) 08/20/2022     No results found for: \"LABPROT\"    "

## 2023-06-08 NOTE — PLAN OF CARE
Problem: Potential for Falls  Goal: Patient will remain free of falls  Description: INTERVENTIONS:  - Educate patient/family on patient safety including physical limitations  - Instruct patient to call for assistance with activity   - Consult OT/PT to assist with strengthening/mobility   - Keep Call bell within reach  - Keep bed low and locked with side rails adjusted as appropriate  - Keep care items and personal belongings within reach  - Initiate and maintain comfort rounds  - Make Fall Risk Sign visible to staff  - Offer Toileting every  Hours, in advance of need  - Initiate/Maintain alarm  - Obtain necessary fall risk management equipment:   - Apply yellow socks and bracelet for high fall risk patients  - Consider moving patient to room near nurses station  Outcome: Progressing     Problem: MOBILITY - ADULT  Goal: Maintain or return to baseline ADL function  Description: INTERVENTIONS:  -  Assess patient's ability to carry out ADLs; assess patient's baseline for ADL function and identify physical deficits which impact ability to perform ADLs (bathing, care of mouth/teeth, toileting, grooming, dressing, etc )  - Assess/evaluate cause of self-care deficits   - Assess range of motion  - Assess patient's mobility; develop plan if impaired  - Assess patient's need for assistive devices and provide as appropriate  - Encourage maximum independence but intervene and supervise when necessary  - Involve family in performance of ADLs  - Assess for home care needs following discharge   - Consider OT consult to assist with ADL evaluation and planning for discharge  - Provide patient education as appropriate  Outcome: Progressing  Goal: Maintains/Returns to pre admission functional level  Description: INTERVENTIONS:  - Perform BMAT or MOVE assessment daily    - Set and communicate daily mobility goal to care team and patient/family/caregiver     - Collaborate with rehabilitation services on mobility goals if consulted  - Perform Range of Motion  times a day  - Reposition patient every  hours    - Dangle patient  times a day  - Stand patient  times a day  - Ambulate patient  times a day  - Out of bed to chair  times a day   - Out of bed for meals  times a day  - Out of bed for toileting  - Record patient progress and toleration of activity level   Outcome: Progressing     Problem: PAIN - ADULT  Goal: Verbalizes/displays adequate comfort level or baseline comfort level  Description: Interventions:  - Encourage patient to monitor pain and request assistance  - Assess pain using appropriate pain scale  - Administer analgesics based on type and severity of pain and evaluate response  - Implement non-pharmacological measures as appropriate and evaluate response  - Consider cultural and social influences on pain and pain management  - Notify physician/advanced practitioner if interventions unsuccessful or patient reports new pain  Outcome: Progressing     Problem: INFECTION - ADULT  Goal: Absence or prevention of progression during hospitalization  Description: INTERVENTIONS:  - Assess and monitor for signs and symptoms of infection  - Monitor lab/diagnostic results  - Monitor all insertion sites, i e  indwelling lines, tubes, and drains  - Monitor endotracheal if appropriate and nasal secretions for changes in amount and color  - [place] appropriate cooling/warming therapies per order  - Administer medications as ordered  - Instruct and encourage patient and family to use good hand hygiene technique  - Identify and instruct in appropriate isolation precautions for identified infection/condition  Outcome: Progressing  Goal: Absence of fever/infection during neutropenic period  Description: INTERVENTIONS:  - Monitor WBC    Outcome: Progressing     Problem: SAFETY ADULT  Goal: Patient will remain free of falls  Description: INTERVENTIONS:  - Educate patient/family on patient safety including physical limitations  - Instruct patient to call for assistance with activity   - Consult OT/PT to assist with strengthening/mobility   - Keep Call bell within reach  - Keep bed low and locked with side rails adjusted as appropriate  - Keep care items and personal belongings within reach  - Initiate and maintain comfort rounds  - Make Fall Risk Sign visible to staff  - Offer Toileting every  Hours, in advance of need  - Initiate/Maintain alarm  - Obtain necessary fall risk management equipment:   - Apply yellow socks and bracelet for high fall risk patients  - Consider moving patient to room near nurses station  Outcome: Progressing  Goal: Maintain or return to baseline ADL function  Description: INTERVENTIONS:  -  Assess patient's ability to carry out ADLs; assess patient's baseline for ADL function and identify physical deficits which impact ability to perform ADLs (bathing, care of mouth/teeth, toileting, grooming, dressing, etc )  - Assess/evaluate cause of self-care deficits   - Assess range of motion  - Assess patient's mobility; develop plan if impaired  - Assess patient's need for assistive devices and provide as appropriate  - Encourage maximum independence but intervene and supervise when necessary  - Involve family in performance of ADLs  - Assess for home care needs following discharge   - Consider OT consult to assist with ADL evaluation and planning for discharge  - Provide patient education as appropriate  Outcome: Progressing  Goal: Maintains/Returns to pre admission functional level  Description: INTERVENTIONS:  - Perform BMAT or MOVE assessment daily    - Set and communicate daily mobility goal to care team and patient/family/caregiver  - Collaborate with rehabilitation services on mobility goals if consulted  - Perform Range of Motion  times a day  - Reposition patient every  hours    - Dangle patient  times a day  - Stand patient  times a day  - Ambulate patient  times a day  - Out of bed to chair  times a day   - Out of bed for meals  times a day  - Out of bed for toileting  - Record patient progress and toleration of activity level   Outcome: Progressing     Problem: DISCHARGE PLANNING  Goal: Discharge to home or other facility with appropriate resources  Description: INTERVENTIONS:  - Identify barriers to discharge w/patient and caregiver  - Arrange for needed discharge resources and transportation as appropriate  - Identify discharge learning needs (meds, wound care, etc )  - Arrange for interpretive services to assist at discharge as needed  - Refer to Case Management Department for coordinating discharge planning if the patient needs post-hospital services based on physician/advanced practitioner order or complex needs related to functional status, cognitive ability, or social support system  Outcome: Progressing     Problem: Knowledge Deficit  Goal: Patient/family/caregiver demonstrates understanding of disease process, treatment plan, medications, and discharge instructions  Description: Complete learning assessment and assess knowledge base    Interventions:  - Provide teaching at level of understanding  - Provide teaching via preferred learning methods  Outcome: Progressing     Problem: Prexisting or High Potential for Compromised Skin Integrity  Goal: Skin integrity is maintained or improved  Description: INTERVENTIONS:  - Identify patients at risk for skin breakdown  - Assess and monitor skin integrity  - Assess and monitor nutrition and hydration status  - Monitor labs   - Assess for incontinence   - Turn and reposition patient  - Assist with mobility/ambulation  - Relieve pressure over bony prominences  - Avoid friction and shearing  - Provide appropriate hygiene as needed including keeping skin clean and dry  - Evaluate need for skin moisturizer/barrier cream  - Collaborate with interdisciplinary team   - Patient/family teaching  - Consider wound care consult   Outcome: Progressing

## 2023-06-08 NOTE — PLAN OF CARE
Problem: Potential for Falls  Goal: Patient will remain free of falls  Description: INTERVENTIONS:  - Educate patient/family on patient safety including physical limitations  - Instruct patient to call for assistance with activity   - Consult OT/PT to assist with strengthening/mobility   - Keep Call bell within reach  - Keep bed low and locked with side rails adjusted as appropriate  - Keep care items and personal belongings within reach  - Initiate and maintain comfort rounds  - Make Fall Risk Sign visible to staff  - Offer Toileting every X Hours, in advance of need  - Initiate/Maintain Xalarm  - Obtain necessary fall risk management equipment: X  - Apply yellow socks and bracelet for high fall risk patients  - Consider moving patient to room near nurses station  Outcome: Progressing     Problem: MOBILITY - ADULT  Goal: Maintain or return to baseline ADL function  Description: INTERVENTIONS:  -  Assess patient's ability to carry out ADLs; assess patient's baseline for ADL function and identify physical deficits which impact ability to perform ADLs (bathing, care of mouth/teeth, toileting, grooming, dressing, etc )  - Assess/evaluate cause of self-care deficits   - Assess range of motion  - Assess patient's mobility; develop plan if impaired  - Assess patient's need for assistive devices and provide as appropriate  - Encourage maximum independence but intervene and supervise when necessary  - Involve family in performance of ADLs  - Assess for home care needs following discharge   - Consider OT consult to assist with ADL evaluation and planning for discharge  - Provide patient education as appropriate  Outcome: Progressing  Goal: Maintains/Returns to pre admission functional level  Description: INTERVENTIONS:  - Perform BMAT or MOVE assessment daily    - Set and communicate daily mobility goal to care team and patient/family/caregiver     - Collaborate with rehabilitation services on mobility goals if consulted  - Perform Range of Motion X times a day  - Reposition patient every X hours    - Dangle patient X times a day  - Stand patient X times a day  - Ambulate patient X times a day  - Out of bed to chair X times a day   - Out of bed for meals X times a day  - Out of bed for toileting  - Record patient progress and toleration of activity level   Outcome: Progressing     Problem: PAIN - ADULT  Goal: Verbalizes/displays adequate comfort level or baseline comfort level  Description: Interventions:  - Encourage patient to monitor pain and request assistance  - Assess pain using appropriate pain scale  - Administer analgesics based on type and severity of pain and evaluate response  - Implement non-pharmacological measures as appropriate and evaluate response  - Consider cultural and social influences on pain and pain management  - Notify physician/advanced practitioner if interventions unsuccessful or patient reports new pain  Outcome: Progressing     Problem: INFECTION - ADULT  Goal: Absence or prevention of progression during hospitalization  Description: INTERVENTIONS:  - Assess and monitor for signs and symptoms of infection  - Monitor lab/diagnostic results  - Monitor all insertion sites, i e  indwelling lines, tubes, and drains  - Monitor endotracheal if appropriate and nasal secretions for changes in amount and color  - [place] appropriate cooling/warming therapies per order  - Administer medications as ordered  - Instruct and encourage patient and family to use good hand hygiene technique  - Identify and instruct in appropriate isolation precautions for identified infection/condition  Outcome: Progressing  Goal: Absence of fever/infection during neutropenic period  Description: INTERVENTIONS:  - Monitor WBC    Outcome: Progressing     Problem: SAFETY ADULT  Goal: Patient will remain free of falls  Description: INTERVENTIONS:  - Educate patient/family on patient safety including physical limitations  - Instruct patient to call for assistance with activity   - Consult OT/PT to assist with strengthening/mobility   - Keep Call bell within reach  - Keep bed low and locked with side rails adjusted as appropriate  - Keep care items and personal belongings within reach  - Initiate and maintain comfort rounds  - Make Fall Risk Sign visible to staff  - Offer Toileting every X Hours, in advance of need  - Initiate/Maintain Xalarm  - Obtain necessary fall risk management equipment: X  - Apply yellow socks and bracelet for high fall risk patients  - Consider moving patient to room near nurses station  Outcome: Progressing  Goal: Maintain or return to baseline ADL function  Description: INTERVENTIONS:  -  Assess patient's ability to carry out ADLs; assess patient's baseline for ADL function and identify physical deficits which impact ability to perform ADLs (bathing, care of mouth/teeth, toileting, grooming, dressing, etc )  - Assess/evaluate cause of self-care deficits   - Assess range of motion  - Assess patient's mobility; develop plan if impaired  - Assess patient's need for assistive devices and provide as appropriate  - Encourage maximum independence but intervene and supervise when necessary  - Involve family in performance of ADLs  - Assess for home care needs following discharge   - Consider OT consult to assist with ADL evaluation and planning for discharge  - Provide patient education as appropriate  Outcome: Progressing  Goal: Maintains/Returns to pre admission functional level  Description: INTERVENTIONS:  - Perform BMAT or MOVE assessment daily    - Set and communicate daily mobility goal to care team and patient/family/caregiver  - Collaborate with rehabilitation services on mobility goals if consulted  - Perform Range of Motion X times a day  - Reposition patient every X hours    - Dangle patient X times a day  - Stand patient X times a day  - Ambulate patient X times a day  - Out of bed to chair X times a day   - Out of bed for meals X times a day  - Out of bed for toileting  - Record patient progress and toleration of activity level   Outcome: Progressing     Problem: DISCHARGE PLANNING  Goal: Discharge to home or other facility with appropriate resources  Description: INTERVENTIONS:  - Identify barriers to discharge w/patient and caregiver  - Arrange for needed discharge resources and transportation as appropriate  - Identify discharge learning needs (meds, wound care, etc )  - Arrange for interpretive services to assist at discharge as needed  - Refer to Case Management Department for coordinating discharge planning if the patient needs post-hospital services based on physician/advanced practitioner order or complex needs related to functional status, cognitive ability, or social support system  Outcome: Progressing     Problem: Knowledge Deficit  Goal: Patient/family/caregiver demonstrates understanding of disease process, treatment plan, medications, and discharge instructions  Description: Complete learning assessment and assess knowledge base    Interventions:  - Provide teaching at level of understanding  - Provide teaching via preferred learning methods  Outcome: Progressing     Problem: Prexisting or High Potential for Compromised Skin Integrity  Goal: Skin integrity is maintained or improved  Description: INTERVENTIONS:  - Identify patients at risk for skin breakdown  - Assess and monitor skin integrity  - Assess and monitor nutrition and hydration status  - Monitor labs   - Assess for incontinence   - Turn and reposition patient  - Assist with mobility/ambulation  - Relieve pressure over bony prominences  - Avoid friction and shearing  - Provide appropriate hygiene as needed including keeping skin clean and dry  - Evaluate need for skin moisturizer/barrier cream  - Collaborate with interdisciplinary team   - Patient/family teaching  - Consider wound care consult   Outcome: Progressing

## 2023-06-08 NOTE — ASSESSMENT & PLAN NOTE
Malnutrition Findings:   ·  Admits to poor appetite  · Consult nutrition    BMI Findings: Body mass index is 15 8 kg/m²

## 2023-06-08 NOTE — CASE MANAGEMENT
Case Management Assessment & Discharge Planning Note    Patient name Junious Drop  Location [id number] MRN [id number]  : 1950 Date 2023       Current Admission Date: 2023  Current Admission Diagnosis:Hyponatremia   Patient Active Problem List    Diagnosis Date Noted   • Common bile duct dilation 2023   • Hyponatremia 2022   • Hypokalemia 2022   • Urinary retention 2022   • Hypertension 2022   • Iron deficiency anemia 2022   • Protein-calorie malnutrition, unspecified severity ([facility] [address] ) 06/15/2022   • Depression, recurrent ([facility] [address] ) 06/15/2022   • Chronic kidney disease, stage 3 ([facility] [address] ) 2022   • History of endovascular stent graft for abdominal aortic aneurysm (AAA) 2021   • Stenosis of other vascular prosthetic devices, implants and grafts, subsequent encounter 2021   • Neurogenic claudication 2021   • 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 ([facility] [address] ) 2021   • Other chronic pancreatitis ([facility] [address] ) 09/10/2018   • Smoking 09/10/2018   • Epigastric pain 2018   • Diarrhea 2018   • AAA (abdominal aortic aneurysm) without rupture ([facility] [address] ) 2018      LOS (days): 1  Geometric Mean LOS (GMLOS) (days): 2 60  Days to GMLOS:1 8     OBJECTIVE:    Risk of Unplanned Readmission Score: 11 44         Current admission status: Inpatient       Preferred Pharmacy:   [address] [place], [place] [id number] [place] [id number]  Phone: [phone] Fax: [phone]    Primary Care Provider: Katrine Gitelman, MD    Primary Insurance: MEDICARE  Secondary Insurance: [facility]    ASSESSMENT:  Michael 25 [employer]     [facility], [address] [place] [address] - Child   Primary Phone: [phone] (Home)  Work Phone: [phone]                 Readmission Root Cause  30 Day Readmission: No    Patient Information  Admitted from[de-identified] Home  Mental Status: Alert  During Assessment patient was accompanied by: Not accompanied during assessment  Assessment information provided by[de-identified] Patient  Primary Caregiver: Self  Support Systems: Daughter  [place] of Residence: [address] [facility] do you live in?: [address] [facility] [address] entry access options   Select all that apply : Stairs  Number of steps to enter home : 8  Type of Current Residence: 3 story home  Upon entering residence, is there a bedroom on the main floor (no further steps)?: No  A bedroom is located on the following floor levels of residence (select all that apply):: 2nd Floor  Upon entering residence, is there a bathroom on the main floor (no further steps)?: No  Indicate which floors of current residence have a bathroom (select all the apply):: 2nd Floor  Number of steps to 2nd floor from main floor: One Flight  In the last 12 months, was there a time when you were not able to pay the mortgage or rent on time?: No  In the last 12 months, how many places have you lived?: 1  In the last 12 months, was there a time when you did not have a steady place to sleep or slept in a shelter (including now)?: No  Homeless/housing insecurity resource given?: N/A  Living Arrangements: Lives w/ Friend    Activities of Daily Living Prior to Admission  Functional Status: Independent  Completes ADLs independently?: Yes  Ambulates independently?: Yes  Does patient use assisted devices?: Yes  Assisted Devices (DME) used: [facility]  Does patient currently own DME?: Yes  What DME does the patient currently own?: [facility]  Does patient have a history of Outpatient Therapy (PT/OT)?: No  Does the patient have a history of Short-Term Rehab?: No  Does patient have a history of HHC?: Yes (hx [facility])  Does patient currently have [address]?: No    Patient Information Continued  Does patient have prescription coverage?: Yes  Within the past 12 months, you worried that your food would run out before you got the money to buy more : Never true  Within the past 12 months, the food you bought just didn't last and you didn't have money to get more : Never true  Food insecurity resource given?: N/A  Does patient receive dialysis treatments?: No  Does patient have a history of substance abuse?: No    Means of Transportation  Means of Transport to Appts[de-identified] Drives Self  In the past 12 months, has lack of transportation kept you from medical appointments or from getting medications?: No  In the past 12 months, has lack of transportation kept you from meetings, work, or from getting things needed for daily living?: No  Was application for public transport provided?: N/A    DISCHARGE DETAILS:    Discharge planning discussed with[de-identified] patient  Freedom of Choice: Yes  Comments - Freedom of Choice: no anticipated dc needs  CM contacted family/caregiver?: No- see comments (declined; reports being in contact with family)  Were Treatment Team discharge recommendations reviewed with patient/caregiver?: Yes  Did patient/caregiver verbalize understanding of patient care needs?: Yes  Were patient/caregiver advised of the risks associated with not following Treatment Team discharge recommendations?: Yes    [address] [place] [address]         Is the patient interested in [facility] at discharge?: No    DME Referral Provided  Referral made for DME?: No    Treatment Team Recommendation: Home  Discharge Destination Plan[de-identified] Home  Transport at Discharge : Family        Additional Comments: Met with pt to discuss the role of CM and to discuss any help pt may need prior to dc  Pt lives with his girlfriend Tano in a 3 story home with 8 MARLEN  PT performed ADL's indptly pta, pt uses a RW as needed  Pt has a hx of [facility]  No hx of rehab  No hx of mental health or D&A treatment  Pt drives  Pt's preferred pharmacy is [facility]  Pt's daughter will transport home at dc

## 2023-06-09 ENCOUNTER — APPOINTMENT (INPATIENT)
Dept: MRI IMAGING | Facility: HOSPITAL | Age: 73
DRG: 643 | End: 2023-06-09
Payer: MEDICARE

## 2023-06-09 PROBLEM — E43 SEVERE PROTEIN-CALORIE MALNUTRITION (HCC): Status: ACTIVE | Noted: 2023-06-09

## 2023-06-09 LAB
ALBUMIN SERPL BCP-MCNC: 3.7 G/DL (ref 3.5–5)
ALP SERPL-CCNC: 66 U/L (ref 34–104)
ALT SERPL W P-5'-P-CCNC: 12 U/L (ref 7–52)
ANION GAP SERPL CALCULATED.3IONS-SCNC: 7 MMOL/L (ref 4–13)
AST SERPL W P-5'-P-CCNC: 25 U/L (ref 13–39)
BASOPHILS # BLD AUTO: 0 THOUSANDS/ÂΜL (ref 0–0.1)
BASOPHILS NFR BLD AUTO: 0 % (ref 0–1)
BILIRUB DIRECT SERPL-MCNC: 0.06 MG/DL (ref 0–0.2)
BILIRUB SERPL-MCNC: 0.39 MG/DL (ref 0.2–1)
BUN SERPL-MCNC: 14 MG/DL (ref 5–25)
CALCIUM SERPL-MCNC: 9.2 MG/DL (ref 8.4–10.2)
CHLORIDE SERPL-SCNC: 96 MMOL/L (ref 96–108)
CO2 SERPL-SCNC: 28 MMOL/L (ref 21–32)
CREAT SERPL-MCNC: 0.87 MG/DL (ref 0.6–1.3)
EOSINOPHIL # BLD AUTO: 0.01 THOUSAND/ÂΜL (ref 0–0.61)
EOSINOPHIL NFR BLD AUTO: 0 % (ref 0–6)
ERYTHROCYTE [DISTWIDTH] IN BLOOD BY AUTOMATED COUNT: 18.4 % (ref 11.6–15.1)
GFR SERPL CREATININE-BSD FRML MDRD: 85 ML/MIN/1.73SQ M
GLUCOSE SERPL-MCNC: 97 MG/DL (ref 65–140)
HCT VFR BLD AUTO: 31 % (ref 36.5–49.3)
HGB BLD-MCNC: 10.1 G/DL (ref 12–17)
IMM GRANULOCYTES # BLD AUTO: 0.06 THOUSAND/UL (ref 0–0.2)
IMM GRANULOCYTES NFR BLD AUTO: 1 % (ref 0–2)
LYMPHOCYTES # BLD AUTO: 0.82 THOUSANDS/ÂΜL (ref 0.6–4.47)
LYMPHOCYTES NFR BLD AUTO: 11 % (ref 14–44)
MCH RBC QN AUTO: 26.4 PG (ref 26.8–34.3)
MCHC RBC AUTO-ENTMCNC: 32.6 G/DL (ref 31.4–37.4)
MCV RBC AUTO: 81 FL (ref 82–98)
MONOCYTES # BLD AUTO: 0.83 THOUSAND/ÂΜL (ref 0.17–1.22)
MONOCYTES NFR BLD AUTO: 11 % (ref 4–12)
NEUTROPHILS # BLD AUTO: 6.04 THOUSANDS/ÂΜL (ref 1.85–7.62)
NEUTS SEG NFR BLD AUTO: 77 % (ref 43–75)
NRBC BLD AUTO-RTO: 0 /100 WBCS
PLATELET # BLD AUTO: 231 THOUSANDS/UL (ref 149–390)
PMV BLD AUTO: 8.7 FL (ref 8.9–12.7)
POTASSIUM SERPL-SCNC: 3.6 MMOL/L (ref 3.5–5.3)
PROT SERPL-MCNC: 6.4 G/DL (ref 6.4–8.4)
RBC # BLD AUTO: 3.82 MILLION/UL (ref 3.88–5.62)
SODIUM SERPL-SCNC: 131 MMOL/L (ref 135–147)
WBC # BLD AUTO: 7.76 THOUSAND/UL (ref 4.31–10.16)

## 2023-06-09 PROCEDURE — 99232 SBSQ HOSP IP/OBS MODERATE 35: CPT | Performed by: INTERNAL MEDICINE

## 2023-06-09 PROCEDURE — 74183 MRI ABD W/O CNTR FLWD CNTR: CPT

## 2023-06-09 PROCEDURE — 80076 HEPATIC FUNCTION PANEL: CPT | Performed by: NURSE PRACTITIONER

## 2023-06-09 PROCEDURE — 99233 SBSQ HOSP IP/OBS HIGH 50: CPT | Performed by: PHYSICIAN ASSISTANT

## 2023-06-09 PROCEDURE — A9585 GADOBUTROL INJECTION: HCPCS | Performed by: NURSE PRACTITIONER

## 2023-06-09 PROCEDURE — 85025 COMPLETE CBC W/AUTO DIFF WBC: CPT | Performed by: PHYSICIAN ASSISTANT

## 2023-06-09 PROCEDURE — 80048 BASIC METABOLIC PNL TOTAL CA: CPT | Performed by: INTERNAL MEDICINE

## 2023-06-09 RX ORDER — POTASSIUM CHLORIDE 20 MEQ/1
40 TABLET, EXTENDED RELEASE ORAL ONCE
Status: COMPLETED | OUTPATIENT
Start: 2023-06-09 | End: 2023-06-09

## 2023-06-09 RX ORDER — TOLVAPTAN 15 MG/1
15 TABLET ORAL ONCE
Status: COMPLETED | OUTPATIENT
Start: 2023-06-09 | End: 2023-06-09

## 2023-06-09 RX ADMIN — PANTOPRAZOLE SODIUM 40 MG: 40 TABLET, DELAYED RELEASE ORAL at 05:43

## 2023-06-09 RX ADMIN — PRAVASTATIN SODIUM 40 MG: 40 TABLET ORAL at 21:28

## 2023-06-09 RX ADMIN — POTASSIUM CHLORIDE 40 MEQ: 1500 TABLET, EXTENDED RELEASE ORAL at 09:23

## 2023-06-09 RX ADMIN — METOPROLOL SUCCINATE 25 MG: 50 TABLET, EXTENDED RELEASE ORAL at 09:23

## 2023-06-09 RX ADMIN — MAGNESIUM OXIDE TAB 400 MG (241.3 MG ELEMENTAL MG) 400 MG: 400 (241.3 MG) TAB at 09:23

## 2023-06-09 RX ADMIN — PANCRELIPASE 24000 UNITS: 24000; 76000; 120000 CAPSULE, DELAYED RELEASE PELLETS ORAL at 12:34

## 2023-06-09 RX ADMIN — ACETAMINOPHEN 650 MG: 325 TABLET, FILM COATED ORAL at 13:12

## 2023-06-09 RX ADMIN — NICOTINE 1 PATCH: 14 PATCH, EXTENDED RELEASE TRANSDERMAL at 09:23

## 2023-06-09 RX ADMIN — GADOBUTROL 5 ML: 604.72 INJECTION INTRAVENOUS at 19:59

## 2023-06-09 RX ADMIN — PANCRELIPASE 24000 UNITS: 24000; 76000; 120000 CAPSULE, DELAYED RELEASE PELLETS ORAL at 17:08

## 2023-06-09 RX ADMIN — ALPRAZOLAM 1 MG: 0.5 TABLET ORAL at 21:29

## 2023-06-09 RX ADMIN — IRON SUCROSE 200 MG: 20 INJECTION, SOLUTION INTRAVENOUS at 12:34

## 2023-06-09 RX ADMIN — TAMSULOSIN HYDROCHLORIDE 0.4 MG: 0.4 CAPSULE ORAL at 09:23

## 2023-06-09 RX ADMIN — ASPIRIN 81 MG: 81 TABLET, COATED ORAL at 09:23

## 2023-06-09 RX ADMIN — TOLVAPTAN 15 MG: 15 TABLET ORAL at 14:56

## 2023-06-09 RX ADMIN — ACETAMINOPHEN 650 MG: 325 TABLET, FILM COATED ORAL at 21:29

## 2023-06-09 RX ADMIN — FINASTERIDE 5 MG: 5 TABLET, FILM COATED ORAL at 09:23

## 2023-06-09 RX ADMIN — AMLODIPINE BESYLATE 5 MG: 5 TABLET ORAL at 09:23

## 2023-06-09 RX ADMIN — PANCRELIPASE 24000 UNITS: 24000; 76000; 120000 CAPSULE, DELAYED RELEASE PELLETS ORAL at 08:23

## 2023-06-09 NOTE — ASSESSMENT & PLAN NOTE
Malnutrition Findings:   · Adult Malnutrition type: Chronic illnessAdmits to poor appetite  · Consult nutrition    BMI Findings:  Adult BMI Classifications: Underweight < 18 5        Body mass index is 15 8 kg/m²

## 2023-06-09 NOTE — ASSESSMENT & PLAN NOTE
Malnutrition Findings:   Adult Malnutrition type: Chronic illness  Adult Degree of Malnutrition: Other severe protein calorie malnutrition  Malnutrition Characteristics: Inadequate energy, Weight loss                  360 Statement: Pt presents with severe protein calorie malnutrition as evidenced by <75% po intake > 1 month, 13% wt loss-13 lb wt loss in 3 5 months (6/8/23 87 lbs, 2/26/23 100 lbs)  Treat with Regular diet and Ensure High Protein TID  BMI Findings:  Adult BMI Classifications: Underweight < 18 5        Body mass index is 15 8 kg/m²

## 2023-06-09 NOTE — ASSESSMENT & PLAN NOTE
Lab Results   Component Value Date    CREATININE 0 87 06/09/2023    CREATININE 0 99 06/08/2023    CREATININE 1 05 06/08/2023    EGFR 85 06/09/2023    EGFR 74 06/08/2023    EGFR 69 06/08/2023   · Baseline creatinine 0 8-1 2  · Creatinine stable  · Trend BMP

## 2023-06-09 NOTE — ASSESSMENT & PLAN NOTE
· Noted on CT abdomen/pelvis: new distention of the central intrahepatic/extrahepatic bile ducts    Consider MRCP if clinically warranted  · LFTs and bilirubin WNL  · Also reporting abdominal pain however noted to have moderate stool on imaging  · Trend LFTs  · MRCP ordered  · GI consulted and advising EGD/Colonoscopy possibly Monday  · Follow up CA 19-9

## 2023-06-09 NOTE — PROGRESS NOTES
[facility]  Progress Note  Name: Janice Oleary  MRN: [id number]  Unit/Bed#: -01 I Date of Admission: 6/7/2023   Date of Service: 6/9/2023 I Hospital Day: 2    Assessment/Plan   Severe protein-calorie malnutrition [facility])  Assessment & Plan  Malnutrition Findings:   Adult Malnutrition type: Chronic illness  Adult Degree of Malnutrition: Other severe protein calorie malnutrition  Malnutrition Characteristics: Inadequate energy, Weight loss                  360 Statement: Pt presents with severe protein calorie malnutrition as evidenced by <75% po intake > 1 month, 13% wt loss-13 lb wt loss in 3 5 months (6/8/23 87 lbs, 2/26/23 100 lbs)  Treat with Regular diet and Ensure High Protein TID  BMI Findings:  Adult BMI Classifications: Underweight < 18 5        Body mass index is 15 8 kg/m²  Common bile duct dilation  Assessment & Plan  · Noted on CT abdomen/pelvis: new distention of the central intrahepatic/extrahepatic bile ducts  Consider MRCP if clinically warranted  · LFTs and bilirubin WNL  · Also reporting abdominal pain however noted to have moderate stool on imaging  · Trend LFTs  · MRCP ordered  · GI consulted and advising EGD/Colonoscopy possibly Monday  · Follow up CA 19-9    Hypertension  Assessment & Plan  · Blood pressure elevated while in the ED  · Resume home metoprolol and monitor  · Amlodipine 5 mg daily added  · Hydralazine as needed SBP >180    Urinary retention  Assessment & Plan  · Chronic, follows with urology  · Maintained on Flomax and Proscar  · CT abdomen/pelvis with urinary bladder wall thickening -appears chronic  UA with bacteruria but pt denies any urinary symptoms    Afebrile, no leukocytosis  · Urinary retention protocol    Protein-calorie malnutrition, unspecified severity ([facility] )  Assessment & Plan  Malnutrition Findings:   · Adult Malnutrition type: Chronic illnessAdmits to poor appetite  · Consult nutrition    BMI Findings:  Adult BMI Classifications: Underweight < 18 5        Body mass index is 15 8 kg/m²  Chronic kidney disease, stage 3 [facility])  Assessment & Plan  Lab Results   Component Value Date    CREATININE 0 87 06/09/2023    CREATININE 0 99 06/08/2023    CREATININE 1 05 06/08/2023    EGFR 85 06/09/2023    EGFR 74 06/08/2023    EGFR 69 06/08/2023   · Baseline creatinine 0 8-1 2  · Creatinine stable  · Trend BMP    * Hyponatremia  Assessment & Plan  · Presented to the emergency department with lightheadedness, headache  Did have a couple episodes of diarrhea PTA  · Sodium found to be 120 on presentation  Appears that patient had admission in 2022 for hyponatremia  · At that time had been started on salt tabs/torsemide, hctz was discontinued and was placed on fluid restriction  · Admits to poor intake in terms of food but does feel he has adequate fluid intake  · Home regimen includes salt tabs 1 g twice daily in addition to torsemide 5 mg daily  · Patient is also on Celexa which can be contributing, discontinued  · Consulted nephrology  · S/p 1 8% saline infusion then transitioned off gtt  · Dosed Samsca x1 yesterday with improvement in Na  · Samsca 15 mg repeated x1 today  · Increase fluid restriction tomorrow  · AM BMP       VTE Pharmacologic Prophylaxis: VTE Score: 2 Low Risk (Score 0-2) - Encourage Ambulation  Patient Centered Rounds: I performed bedside rounds with nursing staff today  Discussions with Specialists or Other Care Team Provider: GI    Education and Discussions with Family / Patient: Patient declined call to        Total Time Spent on Date of Encounter in care of patient: 45 minutes This time was spent on one or more of the following: performing physical exam; counseling and coordination of care; obtaining or reviewing history; documenting in the medical record; reviewing/ordering tests, medications or procedures; communicating with other healthcare professionals and discussing with patient's family/caregivers  Current Length of Stay: 2 day(s)  Current Patient Status: Inpatient   Certification Statement: The patient will continue to require additional inpatient hospital stay due to ongoing serum sodium monitoring, MRCP pdg  Discharge Plan: Anticipate discharge in >72 hrs to discharge location to be determined pending rehab evaluations  Code Status: Level 3 - DNAR and DNI    Subjective:   Patient states he feels well today  His dizziness and weakness has significantly improved  Reports resolution of headaches  He does still get intermittently lightheaded upon standing but gives himself time to adjust and then feel steady on his feet  He denies chest pain or shortness of breath  Denies fever or chills  He denies any nausea, vomiting, diarrhea or abdominal pain today  He says his appetite is not great but he did eat breakfast     Objective:     Vitals:   Temp (24hrs), Av 1 °F (36 7 °C), Min:97 9 °F (36 6 °C), Max:98 3 °F (36 8 °C)    Temp:  [97 9 °F (36 6 °C)-98 3 °F (36 8 °C)] 98 3 °F (36 8 °C)  HR:  [73-95] 73  BP: ()/(56-91) 168/89  SpO2:  [88 %-97 %] 96 %  Body mass index is 15 8 kg/m²  Input and Output Summary (last 24 hours): Intake/Output Summary (Last 24 hours) at 2023 1416  Last data filed at 2023 1408  Gross per 24 hour   Intake 660 ml   Output 1850 ml   Net -1190 ml       Physical Exam:   Physical Exam  Constitutional:       General: He is not in acute distress  Appearance: He is not toxic-appearing  Comments: Underweight   HENT:      Head: Normocephalic and atraumatic  Right Ear: External ear normal       Left Ear: External ear normal       Nose: Nose normal       Mouth/Throat:      Mouth: Mucous membranes are moist       Pharynx: Oropharynx is clear  Eyes:      Conjunctiva/sclera: Conjunctivae normal    Cardiovascular:      Rate and Rhythm: Normal rate and regular rhythm  Pulses: Normal pulses  Heart sounds: Normal heart sounds  No murmur heard  No gallop  Pulmonary:      Effort: Pulmonary effort is normal  No respiratory distress  Breath sounds: Normal breath sounds  No stridor  No wheezing, rhonchi or rales  Abdominal:      General: Abdomen is flat  Bowel sounds are normal  There is no distension  Palpations: Abdomen is soft  There is no mass  Tenderness: There is no abdominal tenderness  There is no guarding or rebound  Hernia: No hernia is present  Musculoskeletal:      Cervical back: Normal range of motion  Right lower leg: No edema  Left lower leg: No edema  Skin:     General: Skin is warm and dry  Neurological:      General: No focal deficit present  Mental Status: He is alert and oriented to person, place, and time  Mental status is at baseline  Sensory: No sensory deficit  Motor: No weakness  Psychiatric:         Mood and Affect: Mood normal          Thought Content: Thought content normal           Additional Data:     Labs:  Results from last 7 days   Lab Units 06/09/23  0542   EOS PCT % 0   HEMATOCRIT % 31 0*   HEMOGLOBIN g/dL 10 1*   LYMPHS PCT % 11*   MONOS PCT % 11   NEUTROS PCT % 77*   PLATELETS Thousands/uL 231   WBC Thousand/uL 7 76     Results from last 7 days   Lab Units 06/09/23  0542   ANION GAP mmol/L 7   ALBUMIN g/dL 3 7   ALK PHOS U/L 66   ALT U/L 12   AST U/L 25   BUN mg/dL 14   CALCIUM mg/dL 9 2   CHLORIDE mmol/L 96   CO2 mmol/L 28   CREATININE mg/dL 0 87   GLUCOSE RANDOM mg/dL 97   POTASSIUM mmol/L 3 6   SODIUM mmol/L 131*   TOTAL BILIRUBIN mg/dL 0 39                       Lines/Drains:  Invasive Devices     Peripheral Intravenous Line  Duration           Peripheral IV 06/08/23 Proximal;Ventral (anterior); Right Forearm 1 day                      Imaging: Reviewed radiology reports from this admission including: MRI brain    Recent Cultures (last 7 days):         Last 24 Hours Medication List:   Current Facility-Administered Medications   Medication Dose Route Frequency Provider Last Rate   • acetaminophen  650 mg Oral Q6H PRN Rotan LILIANE Wagner     • ALPRAZolam  1 mg Oral BID PRN Rotan LILIANE Wagner     • amLODIPine  5 mg Oral Daily Sabrina Martinez MD     • aspirin  81 mg Oral Daily Perla Ing LILIANE Silvestre     • finasteride  5 mg Oral Daily Perla [place], [place]     • glycerin-hypromellose-  1 drop Both Eyes Q4H PRN Rotan LILIANE Wagner     • hydrALAZINE  10 mg Intravenous Q6H PRN Milo LILIANE Wagner     • iron sucrose  200 mg Intravenous Daily Almon Prom, CRNP 200 mg (06/09/23 1234)   • magnesium Oxide  400 mg Oral Daily Rotan LILIANE Wagner     • metoprolol succinate  25 mg Oral Daily Rotan [employer] hurley     • nicotine  1 patch Transdermal Daily Perla [facility] LILIANE Silvestre     • pancrelipase (Lip-Prot-Amyl)  24,000 Units Oral TID With Meals [place] LILIANE Wagner     • pantoprazole  40 mg Oral Early Morning Milo FurnLILIANE hurley     • pravastatin  40 [facility] HS [facility]     • tamsulosin  0 4 mg Oral Daily Perla Ing LILIANE Silvestre     • tolvaptan  15 mg Oral Once Sabrina Martinez MD          Today, Patient Was Seen By: Ritu Teresa PA-C    **Please Note: This note may have been constructed using a voice recognition system  **

## 2023-06-09 NOTE — PROGRESS NOTES
Progress note - Gastroenterology   Topher Prather 75 y o  male MRN: [id number]  Unit/Bed#: -01 Encounter: [id number]    ASSESSMENT and PLAN  59-year-old male with a history of RUBENS and gastric ulcer 8/2022 admitted with dizziness weakness, fogginess and profound hyponatremia  CT scan of the abdomen with incidental finding of dilatation of the intrahepatic and extra hepatic ducts  Liver enzymes are normal   Patient admits to recent 40 pound weight loss and decreased appetite    1  Abnormal CT scan abdomen  CT scan abdomen 6/7 with minimal central intrahepatic biliary ductal prominence, CBD dilated to 14 mm, normal CBD 1/2023  No gallstones or acute cholecystitis  LFTs within normal limits  Denies right upper quadrant abdominal pain, no nausea or vomiting  -Await MR/MRCP to evaluate for possible choledocholithiasis or mass  -CA 19-9 pending    2  Gastric ulcer on EGD 8/2022  EGD 8/2022 performed due to iron deficiency anemia  Found to have 2 large superficial clean-based ulcer in the stomach, biopsies negative for H  Pylori  Treated with PPI  Surveillance EGD scheduled but not performed in January 2023  Denies epigastric pain  No nausea or vomiting but does report decreased appetite  No signs of overt GI bleeding-no melena  -Continue pantoprazole 40 mg daily  -Will eventually need surveillance EGD when medically stable  If MRCP abnormal and ERCP indicated, can perform EGD at that time  -Trend H&H    3  Iron deficiency anemia  Hemoglobin on admission 10 3  Has been ranging 10 5-11 since August 2022  MCV 81, microcytic  Noted to have 2 gastric ulcers on EGD 8/2022, likely source of GI blood loss  Colonoscopy 8/2022 showed ulcerated stricture which was traversable at right colon, poor prep  Recall recommended in 3 months due to inadequate prep    Biopsies negative for inflammation or malignancy  Iron panel consistent with iron deficiency  -Treat with IV iron while inpatient  -Trend H&H, transfuse to "keep greater than 7 0  -Plan for outpatient colonoscopy once medically stable    4  Hyponatremia  Nephrology following    Chief Complaint   Patient presents with   • Headache     Pt to er via ems from home with complaints of a headache since before august, dry mouth since august, and blurry vision since august - has been being evaluated for his blurred vision  SUBJECTIVE/HPI   Denies abdominal pain, no nausea or vomiting  Tolerating regular diet well    /89   Pulse 73   Temp 98 3 °F (36 8 °C)   Resp 19   Ht 5' 2\" (1 575 m)   Wt 39 2 kg (86 lb 6 4 oz)   SpO2 96%   BMI 15 80 kg/m²     PHYSICALEXAM  General appearance: alert, appears comfortable  Eyes:  no icterus   Head: Normocephalic, without obvious abnormality, atraumatic  Lungs: clear to auscultation bilaterally  Heart: regular rate and rhythm, S1, S2 normal, no murmur, click, rub or gallop  Abdomen: soft, nondistended, no tenderness at right upper quadrant with palpation ; bowel sounds normal; no masses,  no organomegaly  Appetite good for breakfast this a m    Extremities: extremities normal, atraumatic, no cyanosis or edema  Neurologic: Grossly normal    Lab Results   Component Value Date    BUN 14 06/09/2023    CALCIUM 9 2 06/09/2023    CL 96 06/09/2023    CO2 28 06/09/2023    CREATININE 0 87 06/09/2023    GLUCOSE 97 10/14/2014    K 3 6 06/09/2023     (L) 10/14/2014     Lab Results   Component Value Date    HCT 31 0 (L) 06/09/2023    HGB 10 1 (L) 06/09/2023    MCV 81 (L) 06/09/2023     06/09/2023    WBC 7 76 06/09/2023     Lab Results   Component Value Date    ALKPHOS 66 06/09/2023    ALT 12 06/09/2023    AST 25 06/09/2023    BILITOT 0 37 10/13/2014       Lab Results   Component Value Date    LIPASE 15 06/07/2023     Lab Results   Component Value Date    FERRITIN 14 (L) 06/08/2023    IRON 39 (L) 06/08/2023    TIBC 399 06/08/2023     Lab Results   Component Value Date    INR 0 95 01/04/2019     "

## 2023-06-09 NOTE — MALNUTRITION/BMI
This medical record reflects one or more clinical indicators suggestive of malnutrition and/or morbid obesity  Malnutrition Findings:   Adult Malnutrition type: Chronic illness  Adult Degree of Malnutrition: Other severe protein calorie malnutrition  Malnutrition Characteristics: Inadequate energy, Weight loss                  360 Statement: Pt presents with severe protein calorie malnutrition as evidenced by <75% po intake > 1 month, 13% wt loss-13 lb wt loss in 3 5 months (6/8/23 87 lbs, 2/26/23 100 lbs)  Treat with Regular diet and Ensure High Protein TID  BMI Findings:  Adult BMI Classifications: Underweight < 18 5        Body mass index is 15 8 kg/m²  See Nutrition note dated 6/9/23 for additional details  Completed nutrition assessment is viewable in the nutrition documentation

## 2023-06-09 NOTE — PLAN OF CARE
Problem: PAIN - ADULT  Goal: Verbalizes/displays adequate comfort level or baseline comfort level  Description: Interventions:  - Encourage patient to monitor pain and request assistance  - Assess pain using appropriate pain scale  - Administer analgesics based on type and severity of pain and evaluate response  - Implement non-pharmacological measures as appropriate and evaluate response  - Consider cultural and social influences on pain and pain management  - Notify physician/advanced practitioner if interventions unsuccessful or patient reports new pain  Outcome: Progressing     Problem: INFECTION - ADULT  Goal: Absence or prevention of progression during hospitalization  Description: INTERVENTIONS:  - Assess and monitor for signs and symptoms of infection  - Monitor lab/diagnostic results  - Monitor all insertion sites, i e  indwelling lines, tubes, and drains  - Monitor endotracheal if appropriate and nasal secretions for changes in amount and color  - Fletcher appropriate cooling/warming therapies per order  - Administer medications as ordered  - Instruct and encourage patient and family to use good hand hygiene technique  - Identify and instruct in appropriate isolation precautions for identified infection/condition  Outcome: Progressing  Goal: Absence of fever/infection during neutropenic period  Description: INTERVENTIONS:  - Monitor WBC    Outcome: Progressing

## 2023-06-09 NOTE — CASE MANAGEMENT
Case Management Discharge Planning Note    Patient name Janice Oleary  Location [facility] [id number] MRN [id number]  : 1950 Date 2023       Current Admission Date: 2023  Current Admission Diagnosis:Hyponatremia   Patient Active Problem List    Diagnosis Date Noted   • [facility]cal[facility] 2023   • SIADH (syndrome of inappropriate ADH production) ([facility] )    • Common bile duct dilation 2023   • Hyponatremia 2022   • Hypokalemia 2022   • Urinary retention 2022   • Hypertension 2022   • Iron deficiency anemia 2022   • Protein-calorie malnutrition, unspecified severity ([facility] ) 06/15/2022   • Depression, recurrent ([facility] ) 06/15/2022   • Chronic kidney disease, stage 3 ([facility] ) 2022   • History of endovascular stent graft for abdominal aortic aneurysm (AAA) 2021   • Stenosis of other vascular prosthetic devices, implants and grafts, subsequent encounter 2021   • Neurogenic claudication 2021   • 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    Primary Care Provider: Pete Valdez MD    Primary Insurance: MEDICARE  Secondary Insurance: [facility]    DISCHARGE DETAILS:       IMM Given (Date):: 23  IMM Given to[de-identified] Patient  IMM reviewed with patient, patient agrees with discharge determination

## 2023-06-09 NOTE — PLAN OF CARE
Problem: Potential for Falls  Goal: Patient will remain free of falls  Description: INTERVENTIONS:  - Educate patient/family on patient safety including physical limitations  - Instruct patient to call for assistance with activity   - Consult OT/PT to assist with strengthening/mobility   - Keep Call bell within reach  - Keep bed low and locked with side rails adjusted as appropriate  - Keep care items and personal belongings within reach  - Initiate and maintain comfort rounds  - Make Fall Risk Sign visible to staff  - Offer Toileting every 2 Hours, in advance of need  - Initiate/Maintain bed/ chair alarm  - Obtain necessary fall risk management equipment: walker  - Apply yellow socks and bracelet for high fall risk patients  - Consider moving patient to room near nurses station  Outcome: Progressing     Problem: MOBILITY - ADULT  Goal: Maintain or return to baseline ADL function  Description: INTERVENTIONS:  - Educate patient/family on patient safety including physical limitations  - Instruct patient to call for assistance with activity   - Consult OT/PT to assist with strengthening/mobility   - Keep Call bell within reach  - Keep bed low and locked with side rails adjusted as appropriate  - Keep care items and personal belongings within reach  - Initiate and maintain comfort rounds  - Make Fall Risk Sign visible to staff  - Offer Toileting every 2 Hours, in advance of need  - Initiate/Maintain bed/ chair alarm  - Obtain necessary fall risk management equipment: walker  - Apply yellow socks and bracelet for high fall risk patients  - Consider moving patient to room near nurses station  Outcome: Progressing  Goal: Maintains/Returns to pre admission functional level  Description: INTERVENTIONS:  - Perform BMAT or MOVE assessment daily    - Set and communicate daily mobility goal to care team and patient/family/caregiver     - Collaborate with rehabilitation services on mobility goals if consulted  - Perform Range of Motion 3 times a day  - Reposition patient every 2 hours    - Dangle patient 3 times a day  - Stand patient 3 times a day  - Ambulate patient 3 times a day  - Out of bed to chair 3 times a day   - Out of bed for meals 3 times a day  - Out of bed for toileting  - Record patient progress and toleration of activity level   Outcome: Progressing     Problem: PAIN - ADULT  Goal: Verbalizes/displays adequate comfort level or baseline comfort level  Description: Interventions:  - Encourage patient to monitor pain and request assistance  - Assess pain using appropriate pain scale  - Administer analgesics based on type and severity of pain and evaluate response  - Implement non-pharmacological measures as appropriate and evaluate response  - Consider cultural and social influences on pain and pain management  - Notify physician/advanced practitioner if interventions unsuccessful or patient reports new pain  Outcome: Progressing     Problem: INFECTION - ADULT  Goal: Absence or prevention of progression during hospitalization  Description: INTERVENTIONS:  - Assess and monitor for signs and symptoms of infection  - Monitor lab/diagnostic results  - Monitor all insertion sites, i e  indwelling lines, tubes, and drains  - Monitor endotracheal if appropriate and nasal secretions for changes in amount and color  - [place] appropriate cooling/warming therapies per order  - Administer medications as ordered  - Instruct and encourage patient and family to use good hand hygiene technique  - Identify and instruct in appropriate isolation precautions for identified infection/condition  Outcome: Progressing  Goal: Absence of fever/infection during neutropenic period  Description: INTERVENTIONS:  - Monitor WBC    Outcome: Progressing     Problem: SAFETY ADULT  Goal: Patient will remain free of falls  Description: INTERVENTIONS:  - Educate patient/family on patient safety including physical limitations  - Instruct patient to call for assistance with activity   - Consult OT/PT to assist with strengthening/mobility   - Keep Call bell within reach  - Keep bed low and locked with side rails adjusted as appropriate  - Keep care items and personal belongings within reach  - Initiate and maintain comfort rounds  - Make Fall Risk Sign visible to staff  - Offer Toileting every 2 Hours, in advance of need  - Initiate/Maintain bed/ chair alarm  - Obtain necessary fall risk management equipment: walker  - Apply yellow socks and bracelet for high fall risk patients  - Consider moving patient to room near nurses station  Outcome: Progressing  Goal: Maintain or return to baseline ADL function  Description: INTERVENTIONS:  - Educate patient/family on patient safety including physical limitations  - Instruct patient to call for assistance with activity   - Consult OT/PT to assist with strengthening/mobility   - Keep Call bell within reach  - Keep bed low and locked with side rails adjusted as appropriate  - Keep care items and personal belongings within reach  - Initiate and maintain comfort rounds  - Make Fall Risk Sign visible to staff  - Offer Toileting every 2 Hours, in advance of need  - Initiate/Maintain bed/ chair alarm  - Obtain necessary fall risk management equipment: walker  - Apply yellow socks and bracelet for high fall risk patients  - Consider moving patient to room near nurses station  Outcome: Progressing  Goal: Maintains/Returns to pre admission functional level  Description: INTERVENTIONS:  - Perform BMAT or MOVE assessment daily    - Set and communicate daily mobility goal to care team and patient/family/caregiver  - Collaborate with rehabilitation services on mobility goals if consulted  - Perform Range of Motion 3 times a day  - Reposition patient every 2 hours    - Dangle patient 3 times a day  - Stand patient 3 times a day  - Ambulate patient 3 times a day  - Out of bed to chair 3 times a day   - Out of bed for meals 3 times a day  - Out of bed for toileting  - Record patient progress and toleration of activity level   Outcome: Progressing     Problem: DISCHARGE PLANNING  Goal: Discharge to home or other facility with appropriate resources  Description: INTERVENTIONS:  - Identify barriers to discharge w/patient and caregiver  - Arrange for needed discharge resources and transportation as appropriate  - Identify discharge learning needs (meds, wound care, etc )  - Arrange for interpretive services to assist at discharge as needed  - Refer to Case Management Department for coordinating discharge planning if the patient needs post-hospital services based on physician/advanced practitioner order or complex needs related to functional status, cognitive ability, or social support system  Outcome: Progressing     Problem: Knowledge Deficit  Goal: Patient/family/caregiver demonstrates understanding of disease process, treatment plan, medications, and discharge instructions  Description: Complete learning assessment and assess knowledge base    Interventions:  - Provide teaching at level of understanding  - Provide teaching via preferred learning methods  Outcome: Progressing     Problem: Prexisting or High Potential for Compromised Skin Integrity  Goal: Skin integrity is maintained or improved  Description: INTERVENTIONS:  - Identify patients at risk for skin breakdown  - Assess and monitor skin integrity  - Assess and monitor nutrition and hydration status  - Monitor labs   - Assess for incontinence   - Turn and reposition patient  - Assist with mobility/ambulation  - Relieve pressure over bony prominences  - Avoid friction and shearing  - Provide appropriate hygiene as needed including keeping skin clean and dry  - Evaluate need for skin moisturizer/barrier cream  - Collaborate with interdisciplinary team   - Patient/family teaching  - Consider wound care consult   Outcome: Progressing     Problem: Nutrition/Hydration-ADULT  Goal: Nutrient/Hydration intake appropriate for improving, restoring or maintaining nutritional needs  Description: Monitor and assess patient's nutrition/hydration status for malnutrition  Collaborate with interdisciplinary team and initiate plan and interventions as ordered  Monitor patient's weight and dietary intake as ordered or per policy  Utilize nutrition screening tool and intervene as necessary  Determine patient's food preferences and provide high-protein, high-caloric foods as appropriate       INTERVENTIONS:  - Monitor oral intake, urinary output, labs, and treatment plans  - Assess nutrition and hydration status and recommend course of action  - Evaluate amount of meals eaten  - Assist patient with eating if necessary   - Allow adequate time for meals  - Recommend/ encourage appropriate diets, oral nutritional supplements, and vitamin/mineral supplements  - Order, calculate, and assess calorie counts as needed  - Recommend, monitor, and adjust tube feedings and TPN/PPN based on assessed needs  - Assess need for intravenous fluids  - Provide specific nutrition/hydration education as appropriate  - Include patient/family/caregiver in decisions related to nutrition  Outcome: Progressing

## 2023-06-09 NOTE — ASSESSMENT & PLAN NOTE
· Presented to the emergency department with lightheadedness, headache  Did have a couple episodes of diarrhea PTA  · Sodium found to be 120 on presentation    Appears that patient had admission in 2022 for hyponatremia  · At that time had been started on salt tabs/torsemide, hctz was discontinued and was placed on fluid restriction  · Admits to poor intake in terms of food but does feel he has adequate fluid intake  · Home regimen includes salt tabs 1 g twice daily in addition to torsemide 5 mg daily  · Patient is also on Celexa which can be contributing, discontinued  · Consulted nephrology  · S/p 1 8% saline infusion then transitioned off gtt  · Dosed Samsca x1 yesterday with improvement in Na  · Samsca 15 mg repeated x1 today  · Increase fluid restriction tomorrow  · AM BMP

## 2023-06-09 NOTE — PROGRESS NOTES
NEPHROLOGY PROGRESS NOTE   Tiffany Singer 75 y o  male MRN: [id number]  Unit/Bed#: -01 Encounter: [id number]      HPI/24hr EVENTS:    -79-year-old male past medical history of chronic hyponatremia, BPH  Presented with dizziness and headache  Nephrology consult for management of hyponatremia    -Received Samsca 15 mg yesterday with improvement in sodium level    ASSESSMENT/PLAN:    Acute on chronic hyponatremia  -Baseline sodium appears in the low 130s as far back as 2018  -On sodium chloride tablets 1 g twice daily as outpatient and torsemide 5 mg daily  -Presented with a sodium of 120, most recently 131 and at appropriate rate of correction  -Initially received 1 8% saline which was then discontinued in favor of Samsca 15 mg given 6/8  -Etiology: Celexa use versus underlying SIADH of unclear etiology  -Work-up: Urine osmolality 324, urine sodium 35, TSH normal  -Celexa was subsequently discontinued, restart fluid restrictions, favor redosing with Samsca given ongoing hypertension as sodium chloride tablets will exacerbate this but will defer to attending, reinstituted fluid restrictions until decision is made    Hypertension  -Currently on Norvasc 5 mg daily, Toprol-XL 25 mg daily, as needed hydralazine 10 mg IV for SBP is greater than 180  -Noted to have significant orthostatic hypotension yesterday, blood pressure trends remain elevated but improved from admission    Additional medical problems: Urinary retention on Flomax and Proscar, common bile duct dilation    SUBJECTIVE:  Patient reports he feels well, no complaints    ROS:  Review of Systems   Constitutional: Negative  Respiratory: Negative  Cardiovascular: Negative  Gastrointestinal: Negative  Genitourinary: Negative  Neurological: Negative  A complete 10 point review of systems was performed and found to be negative unless otherwise noted above or in the HPI      OBJECTIVE:  Current Weight: Weight - Scale: 39 2 kg (86 lb 6 4 oz)  Vitals:    06/08/23 1542 06/08/23 2214 06/08/23 2214 06/09/23 0817   BP: (!) 82/56 163/86 163/86 168/89   BP Location:       Pulse: 85 73 76 73   Resp:       Temp:  97 9 °F (36 6 °C) 97 9 °F (36 6 °C) 98 3 °F (36 8 °C)   TempSrc:       SpO2: (!) 88% 92% 93% 96%   Weight:       Height:           Intake/Output Summary (Last 24 hours) at 6/9/2023 1202  Last data filed at 6/9/2023 0930  Gross per 24 hour   Intake 900 ml   Output 1925 ml   Net -1025 ml     Physical Exam  Vitals and nursing note reviewed  Constitutional:       General: He is not in acute distress  Appearance: Normal appearance  He is not toxic-appearing or diaphoretic  Comments: Awake sitting in bed in no acute distress   HENT:      Head: Normocephalic and atraumatic  Nose: Nose normal       Mouth/Throat:      Mouth: Mucous membranes are moist    Eyes:      General: No scleral icterus  Cardiovascular:      Rate and Rhythm: Normal rate and regular rhythm  Pulses: Normal pulses  Pulmonary:      Effort: Pulmonary effort is normal  No respiratory distress  Breath sounds: No wheezing  Abdominal:      General: Abdomen is flat  Musculoskeletal:      Cervical back: Neck supple  Right lower leg: No edema  Left lower leg: No edema  Skin:     General: Skin is warm and dry  Capillary Refill: Capillary refill takes less than 2 seconds  Neurological:      General: No focal deficit present  Mental Status: He is alert and oriented to person, place, and time            Medications:    Current Facility-Administered Medications:   •  acetaminophen (TYLENOL) tablet 650 mg, 650 mg, Oral, Q6H PRN, Su Bustos PA-C  •  ALPRAZolam Myrtis Stakes) tablet 1 mg, 1 mg, Oral, BID PRN, Indy Dos Santos PA-C, 1 mg at 06/08/23 2117  •  amLODIPine (NORVASC) tablet 5 mg, 5 mg, Oral, Daily, Osiel Collier MD, 5 mg at 06/09/23 1515  •  aspirin (ECOTRIN LOW STRENGTH) EC tablet 81 mg, 81 mg, Oral, Daily, Su Bustos PA-C, 81 mg at 06/09/23 9372  •  finasteride (PROSCAR) tablet 5 mg, 5 mg, Oral, Daily, Alondra Dos Santos PA-C, 5 mg at 06/09/23 9918  •  glycerin-hypromellose- (ARTIFICIAL TEARS) ophthalmic solution 1 drop, 1 drop, Both Eyes, Q4H PRN, Irvin Gil PA-C  •  hydrALAZINE (APRESOLINE) injection 10 mg, 10 mg, Intravenous, Q6H PRN, Alondra Dos Santos PA-C, 10 mg at 06/07/23 1736  •  iron sucrose (VENOFER) 200 mg in sodium chloride 0 9% 100 ml IVPB 200 mg, 200 mg, Intravenous, Daily, DEACON Donaldson  •  magnesium Oxide (MAG-OX) tablet 400 mg, 400 mg, Oral, Daily, Alondra Dos Santos PA-C, 400 mg at 06/09/23 9946  •  metoprolol succinate (TOPROL-XL) 24 hr tablet 25 mg, 25 mg, Oral, Daily, Alondra Dos Santos PA-C, 25 mg at 06/09/23 7262  •  nicotine (NICODERM CQ) 14 mg/24hr TD 24 hr patch 1 patch, 1 patch, Transdermal, Daily, Irvin Gil PA-C, 1 patch at 06/09/23 3647  •  pancrelipase (Lip-Prot-Amyl) (CREON) delayed release capsule 24,000 Units, 24,000 Units, Oral, TID With Meals, Irvin Gil PA-C, 24,000 Units at 06/09/23 9402  •  pantoprazole (PROTONIX) EC tablet 40 mg, 40 mg, Oral, Early Morning, Alondra Dos Santos PA-C, 40 mg at 06/09/23 0543  •  pravastatin (PRAVACHOL) tablet 40 mg, 40 mg, Oral, HS, Alondra Dos Santos PA-C, 40 mg at 06/08/23 2117  •  tamsulosin [facility]) capsule 0 4 mg, 0 4 mg, Oral, Daily, Alondra Dos Santos PA-C, 0 4 mg at 06/09/23 4356    Laboratory Results:  Results from last 7 days   Lab Units 06/09/23  0542 06/08/23  2320 06/08/23  1852 06/08/23  0915 06/08/23  0317 06/07/23  2147 06/07/23  1720 06/07/23  1206   BUN mg/dL 14 16 14 10 8 8 8 9   CALCIUM mg/dL 9 2 9 1 9 2 8 7 8 6 8 7 9 1 9 0   CHLORIDE mmol/L 96 95* 94* 91* 88* 85* 85* 86*   CO2 mmol/L 28 26 24 24 25 22 25 25   CREATININE mg/dL 0 87 0 99 1 05 0 88 0 77 0 78 0 85 0 77   HEMATOCRIT % 31 0*  --   --   --  30 5*  --   --  31 4*   HEMOGLOBIN g/dL 10 1*  --   --   --  10 4*  --   --  10 3*   POTASSIUM mmol/L 3 6 4 1 4 2 3 9 3 1* 3 5 3 7 3 6   PLATELETS Thousands/uL 231  --   --   --  233  --   --  222   WBC Thousand/uL 7 76  --   --   --  6 58  --   --  6 11       I have personally reviewed the blood work as stated above and in my note  I have personally reviewed internal medicine and GI note

## 2023-06-10 LAB
ALBUMIN SERPL BCP-MCNC: 3.6 G/DL (ref 3.5–5)
ALP SERPL-CCNC: 76 U/L (ref 34–104)
ALT SERPL W P-5'-P-CCNC: 13 U/L (ref 7–52)
ANION GAP SERPL CALCULATED.3IONS-SCNC: 7 MMOL/L (ref 4–13)
AST SERPL W P-5'-P-CCNC: 24 U/L (ref 13–39)
BASOPHILS # BLD AUTO: 0.01 THOUSANDS/ÂΜL (ref 0–0.1)
BASOPHILS NFR BLD AUTO: 0 % (ref 0–1)
BILIRUB SERPL-MCNC: 0.33 MG/DL (ref 0.2–1)
BUN SERPL-MCNC: 16 MG/DL (ref 5–25)
CALCIUM SERPL-MCNC: 9 MG/DL (ref 8.4–10.2)
CANCER AG19-9 SERPL-ACNC: 58 U/ML (ref 0–35)
CHLORIDE SERPL-SCNC: 95 MMOL/L (ref 96–108)
CO2 SERPL-SCNC: 29 MMOL/L (ref 21–32)
CREAT SERPL-MCNC: 0.9 MG/DL (ref 0.6–1.3)
EOSINOPHIL # BLD AUTO: 0.1 THOUSAND/ÂΜL (ref 0–0.61)
EOSINOPHIL NFR BLD AUTO: 1 % (ref 0–6)
ERYTHROCYTE [DISTWIDTH] IN BLOOD BY AUTOMATED COUNT: 18.7 % (ref 11.6–15.1)
GFR SERPL CREATININE-BSD FRML MDRD: 83 ML/MIN/1.73SQ M
GLUCOSE SERPL-MCNC: 102 MG/DL (ref 65–140)
HCT VFR BLD AUTO: 32.5 % (ref 36.5–49.3)
HGB BLD-MCNC: 10.4 G/DL (ref 12–17)
IMM GRANULOCYTES # BLD AUTO: 0.06 THOUSAND/UL (ref 0–0.2)
IMM GRANULOCYTES NFR BLD AUTO: 1 % (ref 0–2)
LYMPHOCYTES # BLD AUTO: 1.07 THOUSANDS/ÂΜL (ref 0.6–4.47)
LYMPHOCYTES NFR BLD AUTO: 12 % (ref 14–44)
MCH RBC QN AUTO: 26.4 PG (ref 26.8–34.3)
MCHC RBC AUTO-ENTMCNC: 32 G/DL (ref 31.4–37.4)
MCV RBC AUTO: 83 FL (ref 82–98)
MONOCYTES # BLD AUTO: 0.73 THOUSAND/ÂΜL (ref 0.17–1.22)
MONOCYTES NFR BLD AUTO: 8 % (ref 4–12)
NEUTROPHILS # BLD AUTO: 6.91 THOUSANDS/ÂΜL (ref 1.85–7.62)
NEUTS SEG NFR BLD AUTO: 78 % (ref 43–75)
NRBC BLD AUTO-RTO: 0 /100 WBCS
PLATELET # BLD AUTO: 205 THOUSANDS/UL (ref 149–390)
PMV BLD AUTO: 8.7 FL (ref 8.9–12.7)
POTASSIUM SERPL-SCNC: 3.6 MMOL/L (ref 3.5–5.3)
PROT SERPL-MCNC: 6.3 G/DL (ref 6.4–8.4)
RBC # BLD AUTO: 3.94 MILLION/UL (ref 3.88–5.62)
SODIUM SERPL-SCNC: 131 MMOL/L (ref 135–147)
WBC # BLD AUTO: 8.88 THOUSAND/UL (ref 4.31–10.16)

## 2023-06-10 PROCEDURE — 99233 SBSQ HOSP IP/OBS HIGH 50: CPT | Performed by: PHYSICIAN ASSISTANT

## 2023-06-10 PROCEDURE — 99232 SBSQ HOSP IP/OBS MODERATE 35: CPT | Performed by: PHYSICIAN ASSISTANT

## 2023-06-10 PROCEDURE — 85025 COMPLETE CBC W/AUTO DIFF WBC: CPT | Performed by: HOSPITALIST

## 2023-06-10 PROCEDURE — 80053 COMPREHEN METABOLIC PANEL: CPT | Performed by: HOSPITALIST

## 2023-06-10 PROCEDURE — 99232 SBSQ HOSP IP/OBS MODERATE 35: CPT | Performed by: INTERNAL MEDICINE

## 2023-06-10 RX ORDER — POTASSIUM CHLORIDE 20 MEQ/1
40 TABLET, EXTENDED RELEASE ORAL ONCE
Status: COMPLETED | OUTPATIENT
Start: 2023-06-10 | End: 2023-06-10

## 2023-06-10 RX ORDER — AMLODIPINE BESYLATE 5 MG/1
10 TABLET ORAL DAILY
Status: DISCONTINUED | OUTPATIENT
Start: 2023-06-11 | End: 2023-06-10

## 2023-06-10 RX ORDER — AMLODIPINE BESYLATE 5 MG/1
10 TABLET ORAL DAILY
Status: DISCONTINUED | OUTPATIENT
Start: 2023-06-11 | End: 2023-06-13 | Stop reason: HOSPADM

## 2023-06-10 RX ORDER — SODIUM CHLORIDE 1 G/1
1 TABLET ORAL 2 TIMES DAILY WITH MEALS
Status: DISCONTINUED | OUTPATIENT
Start: 2023-06-10 | End: 2023-06-13

## 2023-06-10 RX ADMIN — ALPRAZOLAM 1 MG: 0.5 TABLET ORAL at 20:27

## 2023-06-10 RX ADMIN — ACETAMINOPHEN 650 MG: 325 TABLET, FILM COATED ORAL at 18:05

## 2023-06-10 RX ADMIN — ACETAMINOPHEN 650 MG: 325 TABLET, FILM COATED ORAL at 03:56

## 2023-06-10 RX ADMIN — PANTOPRAZOLE SODIUM 40 MG: 40 TABLET, DELAYED RELEASE ORAL at 05:52

## 2023-06-10 RX ADMIN — POTASSIUM CHLORIDE 40 MEQ: 1500 TABLET, EXTENDED RELEASE ORAL at 10:50

## 2023-06-10 RX ADMIN — SODIUM CHLORIDE 1 G: 1 TABLET ORAL at 17:32

## 2023-06-10 RX ADMIN — PRAVASTATIN SODIUM 40 MG: 40 TABLET ORAL at 20:26

## 2023-06-10 RX ADMIN — METOPROLOL SUCCINATE 25 MG: 50 TABLET, EXTENDED RELEASE ORAL at 09:09

## 2023-06-10 RX ADMIN — IRON SUCROSE 200 MG: 20 INJECTION, SOLUTION INTRAVENOUS at 09:13

## 2023-06-10 RX ADMIN — PANCRELIPASE 24000 UNITS: 24000; 76000; 120000 CAPSULE, DELAYED RELEASE PELLETS ORAL at 17:32

## 2023-06-10 RX ADMIN — PANCRELIPASE 24000 UNITS: 24000; 76000; 120000 CAPSULE, DELAYED RELEASE PELLETS ORAL at 12:37

## 2023-06-10 RX ADMIN — ASPIRIN 81 MG: 81 TABLET, COATED ORAL at 09:09

## 2023-06-10 RX ADMIN — MAGNESIUM OXIDE TAB 400 MG (241.3 MG ELEMENTAL MG) 400 MG: 400 (241.3 MG) TAB at 09:09

## 2023-06-10 RX ADMIN — PANCRELIPASE 24000 UNITS: 24000; 76000; 120000 CAPSULE, DELAYED RELEASE PELLETS ORAL at 05:52

## 2023-06-10 RX ADMIN — AMLODIPINE BESYLATE 5 MG: 5 TABLET ORAL at 09:09

## 2023-06-10 RX ADMIN — TAMSULOSIN HYDROCHLORIDE 0.4 MG: 0.4 CAPSULE ORAL at 09:09

## 2023-06-10 RX ADMIN — SODIUM CHLORIDE 1 G: 1 TABLET ORAL at 12:37

## 2023-06-10 RX ADMIN — FINASTERIDE 5 MG: 5 TABLET, FILM COATED ORAL at 09:09

## 2023-06-10 NOTE — ASSESSMENT & PLAN NOTE
· Presented to the emergency department with lightheadedness, headache  Did have a couple episodes of diarrhea PTA  · Sodium found to be 120 on presentation    Appears that patient had admission in 2022 for hyponatremia  · At that time had been started on salt tabs/torsemide, hctz was discontinued and was placed on fluid restriction  · Admits to poor intake in terms of food but does feel he has adequate fluid intake  · Home regimen includes salt tabs 1 g twice daily in addition to torsemide 5 mg daily  · Patient is also on Celexa which can be contributing, discontinued  · Consulted nephrology  · S/p 1 8% saline infusion then transitioned off gtt  · Dosed Samsca x1 on 6/8 with improvement in Na  · Samsca 15 mg repeated x1 on 6/9  · Pt now stable on fluid restriction of 1 5L per day and home salt tabs of 1g BID  · Stable from Nephrology perspective: repeat BMP in 1 week, follow up with Nephro in 2-3 weeks

## 2023-06-10 NOTE — ASSESSMENT & PLAN NOTE
Lab Results   Component Value Date    CREATININE 0 90 06/10/2023    CREATININE 0 87 06/09/2023    CREATININE 0 99 06/08/2023    EGFR 83 06/10/2023    EGFR 85 06/09/2023    EGFR 74 06/08/2023   · Baseline creatinine 0 8-1 2  · Creatinine stable  · Trend BMP

## 2023-06-10 NOTE — PROGRESS NOTES
"Progress note - [facility]   Nuzhat Mcguire 75 y o  male MRN: [id number]  Unit/Bed#: -[place] Encounter: [id number]    ASSESSMENT and PLAN    1  Hyponatremia  Stable  Per nephrology  - Continue fluid restriction and salt tablets    2  Dilated intra and extrahepatic bile ducts  Unclear significance  LFTs normal   CA 19-9 58  - MRI results pending    3  Iron deficiency anemia  History of gastric ulcer on EGD 8/2022  Colonoscopy 8/2022 with poor prep  - We will need repeat EGD and colonoscopy  May be better done in the hospital where he will have help with his prep  - Monday if not have to do ERCP    Chief Complaint   Patient presents with   • Headache     Pt to er via ems from home with complaints of a headache since before august, dry mouth since august, and blurry vision since august - has been being evaluated for his blurred vision          SUBJECTIVE/HPI   No GI complaints    /83   Pulse 91   Temp 97 9 °F (36 6 °C)   Resp 15   Ht 5' 2\" (1 575 m)   Wt 39 2 kg (86 lb 6 4 oz)   SpO2 95%   BMI 15 80 kg/m²     PHYSICALEXAM  General appearance: alert, appears stated age and cooperative  Eyes: PERLLA, EOMI, no icterus   Head: Normocephalic, without obvious abnormality, atraumatic  Lungs: clear to auscultation bilaterally  Heart: regular rate and rhythm, S1, S2 normal, no murmur, click, rub or gallop  Abdomen: soft, non-tender; bowel sounds normal; no masses,  no organomegaly  Extremities: extremities normal, atraumatic, no cyanosis or edema  Neurologic: Grossly normal    Lab Results   Component Value Date    BUN 16 06/10/2023    CALCIUM 9 0 06/10/2023    CL 95 (L) 06/10/2023    CO2 29 06/10/2023    CREATININE 0 90 06/10/2023    GLUCOSE 97 10/14/2014    K 3 6 06/10/2023     (L) 10/14/2014     Lab Results   Component Value Date    HCT 32 5 (L) 06/10/2023    HGB 10 4 (L) 06/10/2023    MCV 83 06/10/2023     06/10/2023    WBC 8 88 06/10/2023     Lab Results " Component Value Date    ALKPHOS 76 06/10/2023    ALT 13 06/10/2023    AST 24 06/10/2023    BILITOT 0 37 10/13/2014       Lab Results   Component Value Date    LIPASE 15 06/07/2023     Lab Results   Component Value Date    FERRITIN 14 (L) 06/08/2023    IRON 39 (L) 06/08/2023    TIBC 399 06/08/2023     Lab Results   Component Value Date    INR 0 95 01/04/2019

## 2023-06-10 NOTE — PROGRESS NOTES
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  -Admission sodium 120 on 6/7/2023  Sodium 131 today, unchanged after tolvaptan  -Baseline sodium appears to be low 130s meq   -workup: urine osmolality 324, urine sodium 35  -TSH normal  -Imaging: CT A/P with new central intrahepatic and extra hepatic bile duct distention, urinary bladder wall thickening chronic  No obvious masses  -s/p 1 8% saline and Samsca 15 mg x 2 (6/8, 6/9)  -resume fluid restriction of 1 5L/day  -Resume salt tablets  Patient on salt tablets 1 g twice daily at home  -Celexa discontinued  -Pleat potassium  -Strict I/O  -Check BMP Q in a m   -Goal to raise sodium by 8 meq in the next  24 hours  Avoid overcorrection >8 meq   -Call if sodium is <131 or  > 139 in the next 24 hours  -Baseline creatinine normal    Most recent creatinine 0 90  -continue to monitor closely    Hypertension/Volume status:  -BP hypertensive  -1 episode of orthostatic hypotension during admission  -volume status euvolemic  -Home medications: Toprol-XL 25 mg daily, torsemide 5 mg daily  -Current medications: Amlodipine 5 mg daily, Toprol-XL 25 mg daily  -will increase amlodipine to 10 mg daily and follow closely  -Optimize hemodynamic status to avoid delay in renal recovery  -recommend hold parameters on antihypertensive's for SBP <130 mmHg  -Avoid hypotension or fluctuations in blood pressure    Maintain MAP >65  -low sodium (2 gm) diet  -continue to trend    Urinary retention:  -Stable  -On Flomax and Proscar  -Management per primary team      Nephrology Disposition Recommendations  Medically Stable for discharge from a Nephrology Standpoint Patient should have repeat BMP in 1 week with office follow up in  2-3 weeks with repeat labs     SUBJECTIVE:  Patient awake, alert without complaints  Serum sodium unchanged at 131 after tolvaptan  Urine output 1 7L/24 hrs  VSS    A complete review of systems was performed  Pertinent positives and negatives noted in the HPI  Otherwise the review of systems was negative  OBJECTIVE:  Current Weight: Weight - Scale: 39 2 kg (86 lb 6 4 oz)  Vitals:    06/08/23 2214 06/09/23 0817 06/10/23 0403 06/10/23 0742   BP: 163/86 168/89 165/97 164/98   BP Location:   Right arm    Pulse: 76 73 92 87   Resp:   18    Temp: 97 9 °F (36 6 °C) 98 3 °F (36 8 °C) 98 2 °F (36 8 °C) 98 2 °F (36 8 °C)   TempSrc:   Oral    SpO2: 93% 96% 96% 95%   Weight:       Height:           Intake/Output Summary (Last 24 hours) at 6/10/2023 1119  Last data filed at 6/10/2023 0859  Gross per 24 hour   Intake 780 ml   Output 1575 ml   Net -795 ml     General:  Awake, alert, appears comfortable and in no acute distress  Nontoxic  Skin:  No rash, warm, good skin turgor   Eyes:  PERRL, EOMI, sclerae nonicteric   no periorbital edema   ENT:  Moist mucous membranes  Neck:  Trachea midline, symmetric  No JVD  Chest:  Clear to auscultation bilaterally without wheezes, crackles or rhonchi  CVS:  Regular rate and rhythm without murmur, gallop or rub  S1 and S2 identified and normal   No S3, S4    Abdomen:  Soft, nontender, nondistended without masses  Normal bowel sounds x 4 quadrants  No bruit  Extremities:  Warm, pink, motor and sensory intact and well perfused  No cyanosis, pallor  No BLE edema  Neuro:  Awake, alert, oriented x3  Grossly intact  Psych:  Appropriate affect  Mentating appropriately    Normal mental status exam    Medications:    Current Facility-Administered Medications:   •  acetaminophen (TYLENOL) tablet 650 mg, 650 mg, Oral, Q6H PRN, Gisell Dos Santos PA-C, 650 mg at 06/10/23 0356  •  ALPRAZolam [facility]) tablet 1 mg, 1 mg, Oral, BID PRN, Sarahi George PA-C, 1 mg at 06/09/23 2129  •  [START ON 6/11/2023] amLODIPine (NORVASC) tablet 10 mg, 10 mg, Oral, Daily, Keshia Nation PA-C  •  aspirin (ECOTRIN LOW STRENGTH) EC tablet 81 mg, 81 mg, Oral, Daily, Sarahi George PA-C, 81 mg at 06/10/23 8172  •  finasteride (PROSCAR) tablet 5 mg, 5 mg, Oral, Daily, Alex Dos Santos PA-C, 5 mg at 06/10/23 3799  •  glycerin-hypromellose- (ARTIFICIAL TEARS) ophthalmic solution 1 drop, 1 drop, Both Eyes, Q4H PRN, Sarahi George PA-C  •  hydrALAZINE (APRESOLINE) injection 10 mg, 10 mg, Intravenous, Q6H PRN, Sarahi George PA-C, 10 mg at 06/07/23 1736  •  iron sucrose (VENOFER) 200 mg in sodium chloride 0 9% 100 ml IVPB 200 mg, 200 mg, Intravenous, Daily, DEACON Byers, Last Rate: 100 mL/hr at 06/09/23 1234, 200 mg at 06/10/23 0913  •  magnesium Oxide (MAG-OX) tablet 400 mg, 400 mg, Oral, Daily, Alex Dos Santos PA-C, 400 mg at 06/10/23 8884  •  metoprolol succinate (TOPROL-XL) 24 hr tablet 25 mg, 25 mg, Oral, Daily, Alex Dos Santos PA-C, 25 mg at 06/10/23 0097  •  nicotine (NICODERM CQ) 14 mg/24hr TD 24 hr patch 1 patch, 1 patch, Transdermal, Daily, Sarahi George PA-C, 1 patch at 06/09/23 7011  •  pancrelipase (Lip-Prot-Amyl) (CREON) delayed release capsule 24,000 Units, 24,000 Units, Oral, TID With Meals, Sarahi George PA-C, 24,000 Units at 06/10/23 1485  •  pantoprazole (PROTONIX) EC tablet 40 mg, 40 mg, Oral, Early Morning, Sarahi George PA-C, 40 mg at 06/10/23 1660  •  pravastatin (PRAVACHOL) tablet 40 mg, 40 mg, Oral, HS, Sarahi George PA-C, 40 mg at 06/09/23 0628  •  tamsulosin [facility]) capsule 0 4 mg, 0 4 mg, Oral, Daily, Alex Dos Santos PA-C, 0 4 mg at 06/10/23 7196    Laboratory Results:  Results from last 7 days   Lab Units 06/10/23  0405 06/09/23  0542 06/08/23  2320 06/08/23  1852 06/08/23  0915 06/08/23  0317 06/07/23  2147 06/07/23  1720 06/07/23  1206   BUN mg/dL 16 14 16 14 10 8 8   < > 9   CALCIUM mg/dL 9 0 9 2 9 1 9 2 8 7 8 6 8 7   < > 9 0   CHLORIDE mmol/L 95* 96 95* 94* 91* 88* 85*   < > 86*   CO2 mmol/L 29 28 26 24 24 25 22   < > 25   CREATININE mg/dL 0 90 0 87 0 99 1 05 0 88 0 77 0 78   < > 0 77   HEMATOCRIT % 32 5* 31 0*  --   --   --  30 5*  --   --  31 4*   HEMOGLOBIN g/dL 10 4* 10 1*  --   --   --  10 4*  --   --  10 3*   POTASSIUM mmol/L 3 6 3 6 4 1 4 2 3 9 3 1* 3 5   < > 3 6   PLATELETS Thousands/uL 205 231  --   --   --  233  --   --  222   SODIUM mmol/L 131* 131* 129* 128* 125* 123* 120*   < > 120*   WBC Thousand/uL 8 88 7 76  --   --   --  6 58  --   --  6 11    < > = values in this interval not displayed  I have personally reviewed the blood work as stated above and in my note  I have personally reviewed internal Medicine, co-consultants and previous nephrology notes

## 2023-06-10 NOTE — ASSESSMENT & PLAN NOTE
· Noted on CT abdomen/pelvis: new distention of the central intrahepatic/extrahepatic bile ducts    Consider MRCP if clinically warranted  · LFTs and bilirubin WNL  · Also reporting abdominal pain however noted to have moderate stool on imaging  · Trend LFTs  · CA 19-9 slightly elevated at 58  · MRCP completed, pending read  · GI consulted and advising EGD/Colonoscopy possibly Monday

## 2023-06-10 NOTE — PLAN OF CARE
Problem: Potential for Falls  Goal: Patient will remain free of falls  Description: INTERVENTIONS:  - Educate patient/family on patient safety including physical limitations  - Instruct patient to call for assistance with activity   - Consult OT/PT to assist with strengthening/mobility   - Keep Call bell within reach  - Keep bed low and locked with side rails adjusted as appropriate  - Keep care items and personal belongings within reach  - Initiate and maintain comfort rounds  - Make Fall Risk Sign visible to staff  - Offer Toileting every 2 Hours, in advance of need  - Initiate/Maintain bed/ chair alarm  - Obtain necessary fall risk management equipment: walker  - Apply yellow socks and bracelet for high fall risk patients  - Consider moving patient to room near nurses station  Outcome: Progressing     Problem: MOBILITY - ADULT  Goal: Maintain or return to baseline ADL function  Description: INTERVENTIONS:  - Educate patient/family on patient safety including physical limitations  - Instruct patient to call for assistance with activity   - Consult OT/PT to assist with strengthening/mobility   - Keep Call bell within reach  - Keep bed low and locked with side rails adjusted as appropriate  - Keep care items and personal belongings within reach  - Initiate and maintain comfort rounds  - Make Fall Risk Sign visible to staff  - Offer Toileting every 2 Hours, in advance of need  - Initiate/Maintain bed/ chair alarm  - Obtain necessary fall risk management equipment: walker  - Apply yellow socks and bracelet for high fall risk patients  - Consider moving patient to room near nurses station  Outcome: Progressing  Goal: Maintains/Returns to pre admission functional level  Description: INTERVENTIONS:  - Perform BMAT or MOVE assessment daily    - Set and communicate daily mobility goal to care team and patient/family/caregiver     - Collaborate with rehabilitation services on mobility goals if consulted  - Perform Range of Motion 3 times a day  - Reposition patient every 2 hours    - Dangle patient 3 times a day  - Stand patient 3 times a day  - Ambulate patient 3 times a day  - Out of bed to chair 3 times a day   - Out of bed for meals 3 times a day  - Out of bed for toileting  - Record patient progress and toleration of activity level   Outcome: Progressing     Problem: PAIN - ADULT  Goal: Verbalizes/displays adequate comfort level or baseline comfort level  Description: Interventions:  - Encourage patient to monitor pain and request assistance  - Assess pain using appropriate pain scale  - Administer analgesics based on type and severity of pain and evaluate response  - Implement non-pharmacological measures as appropriate and evaluate response  - Consider cultural and social influences on pain and pain management  - Notify physician/advanced practitioner if interventions unsuccessful or patient reports new pain  Outcome: Progressing     Problem: INFECTION - ADULT  Goal: Absence or prevention of progression during hospitalization  Description: INTERVENTIONS:  - Assess and monitor for signs and symptoms of infection  - Monitor lab/diagnostic results  - Monitor all insertion sites, i e  indwelling lines, tubes, and drains  - Monitor endotracheal if appropriate and nasal secretions for changes in amount and color  - [place] appropriate cooling/warming therapies per order  - Administer medications as ordered  - Instruct and encourage patient and family to use good hand hygiene technique  - Identify and instruct in appropriate isolation precautions for identified infection/condition  Outcome: Progressing  Goal: Absence of fever/infection during neutropenic period  Description: INTERVENTIONS:  - Monitor WBC    Outcome: Progressing     Problem: DISCHARGE PLANNING  Goal: Discharge to home or other facility with appropriate resources  Description: INTERVENTIONS:  - Identify barriers to discharge w/patient and caregiver  - Arrange for needed discharge resources and transportation as appropriate  - Identify discharge learning needs (meds, wound care, etc )  - Arrange for interpretive services to assist at discharge as needed  - Refer to Case Management Department for coordinating discharge planning if the patient needs post-hospital services based on physician/advanced practitioner order or complex needs related to functional status, cognitive ability, or social support system  Outcome: Progressing     Problem: Knowledge Deficit  Goal: Patient/family/caregiver demonstrates understanding of disease process, treatment plan, medications, and discharge instructions  Description: Complete learning assessment and assess knowledge base    Interventions:  - Provide teaching at level of understanding  - Provide teaching via preferred learning methods  Outcome: Progressing

## 2023-06-10 NOTE — PROGRESS NOTES
[place]  Progress Note  Name: Suyapa Merchant  MRN: [id number]  Unit/Bed#: -01 I Date of Admission: 6/7/2023   Date of Service: 6/10/2023 I Hospital Day: 3    Assessment/Plan   Severe protein-calorie malnutrition [facility])  Assessment & Plan  Malnutrition Findings:   Adult Malnutrition type: Chronic illness  Adult Degree of Malnutrition: Other severe protein calorie malnutrition  Malnutrition Characteristics: Inadequate energy, Weight loss                  360 Statement: Pt presents with severe protein calorie malnutrition as evidenced by <75% po intake > 1 month, 13% wt loss-13 lb wt loss in 3 5 months (6/8/23 87 lbs, 2/26/23 100 lbs)  Treat with Regular diet and Ensure High Protein TID  BMI Findings:  Adult BMI Classifications: Underweight < 18 5        Body mass index is 15 8 kg/m²  Common bile duct dilation  Assessment & Plan  · Noted on CT abdomen/pelvis: new distention of the central intrahepatic/extrahepatic bile ducts  Consider MRCP if clinically warranted  · LFTs and bilirubin WNL  · Also reporting abdominal pain however noted to have moderate stool on imaging  · Trend LFTs  · CA 19-9 slightly elevated at 58  · MRCP completed, pending read  · GI consulted and advising EGD/Colonoscopy possibly Monday    Hypertension  Assessment & Plan  · Blood pressure elevated while in the ED  · Resume home metoprolol and monitor  · Amlodipine 5 mg daily added  · Hydralazine as needed SBP >180    Urinary retention  Assessment & Plan  · Chronic, follows with urology  · Maintained on Flomax and Proscar  · CT abdomen/pelvis with urinary bladder wall thickening -appears chronic  UA with bacteruria but pt denies any urinary symptoms    Afebrile, no leukocytosis  · Urinary retention protocol    Protein-calorie malnutrition, unspecified severity ([facility] [address] )  Assessment & Plan  Malnutrition Findings:   · Adult Malnutrition type: Chronic illnessAdmits to poor appetite  · Consult nutrition    BMI Findings:  Adult BMI Classifications: Underweight < 18 5        Body mass index is 15 8 kg/m²  Chronic kidney disease, stage 3 [facility])  Assessment & Plan  Lab Results   Component Value Date    CREATININE 0 90 06/10/2023    CREATININE 0 87 06/09/2023    CREATININE 0 99 06/08/2023    EGFR 83 06/10/2023    EGFR 85 06/09/2023    EGFR 74 06/08/2023   · Baseline creatinine 0 8-1 2  · Creatinine stable  · Trend BMP    * Hyponatremia  Assessment & Plan  · Presented to the emergency department with lightheadedness, headache  Did have a couple episodes of diarrhea PTA  · Sodium found to be 120 on presentation  Appears that patient had admission in 2022 for hyponatremia  · At that time had been started on salt tabs/torsemide, hctz was discontinued and was placed on fluid restriction  · Admits to poor intake in terms of food but does feel he has adequate fluid intake  · Home regimen includes salt tabs 1 g twice daily in addition to torsemide 5 mg daily  · Patient is also on Celexa which can be contributing, discontinued  · Consulted nephrology  · S/p 1 8% saline infusion then transitioned off gtt  · Dosed Samsca x1 on 6/8 with improvement in Na  · Samsca 15 mg repeated x1 on 6/9  · Pt now stable on fluid restriction of 1 5L per day and home salt tabs of 1g BID  · Stable from Nephrology perspective: repeat BMP in 1 week, follow up with Nephro in 2-3 weeks        VTE Pharmacologic Prophylaxis: VTE Score: 2 Low Risk (Score 0-2) - Encourage Ambulation  Patient Centered Rounds: I performed bedside rounds with nursing staff today  Discussions with Specialists or Other Care Team Provider: reviewed Nephro recommendations    Education and Discussions with Family / Patient: Updated  (girlfriend) at bedside      Total Time Spent on Date of Encounter in care of patient: 45 minutes This time was spent on one or more of the following: performing physical exam; counseling and coordination of care; "obtaining or reviewing history; documenting in the medical record; reviewing/ordering tests, medications or procedures; communicating with other healthcare professionals and discussing with patient's family/caregivers  Current Length of Stay: 3 day(s)  Current Patient Status: Inpatient   Certification Statement: The patient will continue to require additional inpatient hospital stay due to pending further GI workup  Discharge Plan: Anticipate discharge in 48-72 hrs to discharge location to be determined pending rehab evaluations  Code Status: Level 3 - DNAR and DNI    Subjective:   Patient has no complaints today he is simply frustrated that he is not allowed to get up and walk around  He says he feels less dizzy and lightheaded when he is able to be more mobile  At rest he denies any headache or lightheadedness  He denies chest pain or shortness of breath  Denies lower extremity edema  He denies any abdominal pain, nausea, vomiting or diarrhea  His appetite is still \"so-so\" but he did eat 2 meals today  Objective:     Vitals:   Temp (24hrs), Av 2 °F (36 8 °C), Min:98 2 °F (36 8 °C), Max:98 2 °F (36 8 °C)    Temp:  [98 2 °F (36 8 °C)] 98 2 °F (36 8 °C)  HR:  [87-92] 87  Resp:  [18] 18  BP: (164-165)/(97-98) 164/98  SpO2:  [95 %-96 %] 95 %  Body mass index is 15 8 kg/m²  Input and Output Summary (last 24 hours): Intake/Output Summary (Last 24 hours) at 6/10/2023 1336  Last data filed at 6/10/2023 1308  Gross per 24 hour   Intake 1380 ml   Output 1350 ml   Net 30 ml       Physical Exam:   Physical Exam  Constitutional:       General: He is not in acute distress  Appearance: He is not toxic-appearing  Comments: Underweight   HENT:      Head: Normocephalic and atraumatic  Right Ear: External ear normal       Left Ear: External ear normal       Nose: Nose normal       Mouth/Throat:      Mouth: Mucous membranes are moist       Pharynx: Oropharynx is clear     Eyes:      " Conjunctiva/sclera: Conjunctivae normal    Cardiovascular:      Rate and Rhythm: Normal rate and regular rhythm  Pulses: Normal pulses  Heart sounds: Normal heart sounds  No murmur heard  No gallop  Pulmonary:      Effort: Pulmonary effort is normal  No respiratory distress  Breath sounds: Normal breath sounds  No stridor  No wheezing, rhonchi or rales  Abdominal:      General: Abdomen is flat  Bowel sounds are normal  There is no distension  Palpations: Abdomen is soft  There is no mass  Tenderness: There is no abdominal tenderness  There is no guarding or rebound  Hernia: No hernia is present  Musculoskeletal:      Cervical back: Normal range of motion  Right lower leg: No edema  Left lower leg: No edema  Skin:     General: Skin is warm and dry  Neurological:      General: No focal deficit present  Mental Status: He is alert and oriented to person, place, and time  Mental status is at baseline  Sensory: No sensory deficit  Motor: No weakness  Psychiatric:         Mood and Affect: Mood normal          Thought Content: Thought content normal          Additional Data:     Labs:  Results from last 7 days   Lab Units 06/10/23  0405   EOS PCT % 1   HEMATOCRIT % 32 5*   HEMOGLOBIN g/dL 10 4*   LYMPHS PCT % 12*   MONOS PCT % 8   NEUTROS PCT % 78*   PLATELETS Thousands/uL 205   WBC Thousand/uL 8 88     Results from last 7 days   Lab Units 06/10/23  0405   ANION GAP mmol/L 7   ALBUMIN g/dL 3 6   ALK PHOS U/L 76   ALT U/L 13   AST U/L 24   BUN mg/dL 16   CALCIUM mg/dL 9 0   CHLORIDE mmol/L 95*   CO2 mmol/L 29   CREATININE mg/dL 0 90   GLUCOSE RANDOM mg/dL 102   POTASSIUM mmol/L 3 6   SODIUM mmol/L 131*   TOTAL BILIRUBIN mg/dL 0 33                       Lines/Drains:  Invasive Devices     Peripheral Intravenous Line  Duration           Peripheral IV 06/08/23 Proximal;Ventral (anterior); Right Forearm 2 days                      Imaging: Reviewed radiology reports from this admission including: abdominal/pelvic CT    Recent Cultures (last 7 days):         Last 24 Hours Medication List:   Current Facility-Administered Medications   Medication Dose Route Frequency Provider Last Rate   • acetaminophen  650 mg Oral Q6H PRN Su Bustos PA-C     • ALPRAZolam  1 mg Oral BID PRN Su Bustos PA-C     • [START ON 6/11/2023] amLODIPine  10 mg Oral Daily Quincy Bey PA-C     • aspirin  81 mg Oral Daily Su Bustos PA-C     • finasteride  5 mg Oral Daily [place], [place]     • glycerin-hypromellose-  1 drop Both Eyes Q4H PRN Su Bustos PA-C     • hydrALAZINE  10 mg Intravenous Q6H PRN Su Bustos PA-C     • iron sucrose  200 mg Intravenous Daily [place] Lm, CRNP 200 mg (06/09/23 1234)   • magnesium Oxide  400 mg Oral Daily Su Bustos PA-C     • metoprolol succinate  25 mg Oral Daily Su Bustos PA-C     • nicotine  1 patch Transdermal Daily Indy Silvestre PA-C     • pancrelipase (Lip-Prot-Amyl)  24,000 Units Oral TID With Meals Su Bustos PA-C     • pantoprazole  40 mg Oral Early Morning Su Bustos PA-C     • pravastatin  40 mg Oral HS Su Bustos PA-C     • sodium chloride  1 g Oral BID With Meals Quincy Bey PA-C     • tamsulosin  0 4 mg Oral Daily Su Bustos PA-C          Today, Patient Was Seen By: Linda Carmichael PA-C    **Please Note: This note may have been constructed using a voice recognition system  **

## 2023-06-10 NOTE — PLAN OF CARE
Problem: Potential for Falls  Goal: Patient will remain free of falls  Description: INTERVENTIONS:  - Educate patient/family on patient safety including physical limitations  - Instruct patient to call for assistance with activity   - Consult OT/PT to assist with strengthening/mobility   - Keep Call bell within reach  - Keep bed low and locked with side rails adjusted as appropriate  - Keep care items and personal belongings within reach  - Initiate and maintain comfort rounds  - Make Fall Risk Sign visible to staff  - Offer Toileting every 2 Hours, in advance of need  - Initiate/Maintain bed/ chair alarm  - Obtain necessary fall risk management equipment: walker  - Apply yellow socks and bracelet for high fall risk patients  - Consider moving patient to room near nurses station  Outcome: Progressing     Problem: MOBILITY - ADULT  Goal: Maintain or return to baseline ADL function  Description: INTERVENTIONS:  - Educate patient/family on patient safety including physical limitations  - Instruct patient to call for assistance with activity   - Consult OT/PT to assist with strengthening/mobility   - Keep Call bell within reach  - Keep bed low and locked with side rails adjusted as appropriate  - Keep care items and personal belongings within reach  - Initiate and maintain comfort rounds  - Make Fall Risk Sign visible to staff  - Offer Toileting every 2 Hours, in advance of need  - Initiate/Maintain bed/ chair alarm  - Obtain necessary fall risk management equipment: walker  - Apply yellow socks and bracelet for high fall risk patients  - Consider moving patient to room near nurses station  Outcome: Progressing  Goal: Maintains/Returns to pre admission functional level  Description: INTERVENTIONS:  - Perform BMAT or MOVE assessment daily    - Set and communicate daily mobility goal to care team and patient/family/caregiver     - Collaborate with rehabilitation services on mobility goals if consulted  - Perform Range of Motion 2 times a day  - Reposition patient every 2 hours    - Dangle patient 2 times a day  - Stand patient 2 times a day  - Ambulate patient 2 times a day  - Out of bed to chair 2 times a day   - Out of bed for meals 2 times a day  - Out of bed for toileting  - Record patient progress and toleration of activity level   Outcome: Progressing     Problem: PAIN - ADULT  Goal: Verbalizes/displays adequate comfort level or baseline comfort level  Description: Interventions:  - Encourage patient to monitor pain and request assistance  - Assess pain using appropriate pain scale  - Administer analgesics based on type and severity of pain and evaluate response  - Implement non-pharmacological measures as appropriate and evaluate response  - Consider cultural and social influences on pain and pain management  - Notify physician/advanced practitioner if interventions unsuccessful or patient reports new pain  Outcome: Progressing     Problem: INFECTION - ADULT  Goal: Absence or prevention of progression during hospitalization  Description: INTERVENTIONS:  - Assess and monitor for signs and symptoms of infection  - Monitor lab/diagnostic results  - Monitor all insertion sites, i e  indwelling lines, tubes, and drains  - Monitor endotracheal if appropriate and nasal secretions for changes in amount and color  - [place] appropriate cooling/warming therapies per order  - Administer medications as ordered  - Instruct and encourage patient and family to use good hand hygiene technique  - Identify and instruct in appropriate isolation precautions for identified infection/condition  Outcome: Progressing  Goal: Absence of fever/infection during neutropenic period  Description: INTERVENTIONS:  - Monitor WBC    Outcome: Progressing     Problem: SAFETY ADULT  Goal: Patient will remain free of falls  Description: INTERVENTIONS:  - Educate patient/family on patient safety including physical limitations  - Instruct patient to call for assistance with activity   - Consult OT/PT to assist with strengthening/mobility   - Keep Call bell within reach  - Keep bed low and locked with side rails adjusted as appropriate  - Keep care items and personal belongings within reach  - Initiate and maintain comfort rounds  - Make Fall Risk Sign visible to staff  - Offer Toileting every 2 Hours, in advance of need  - Initiate/Maintain bed/ chair alarm  - Obtain necessary fall risk management equipment: walker  - Apply yellow socks and bracelet for high fall risk patients  - Consider moving patient to room near nurses station  Outcome: Progressing  Goal: Maintain or return to baseline ADL function  Description: INTERVENTIONS:  - Educate patient/family on patient safety including physical limitations  - Instruct patient to call for assistance with activity   - Consult OT/PT to assist with strengthening/mobility   - Keep Call bell within reach  - Keep bed low and locked with side rails adjusted as appropriate  - Keep care items and personal belongings within reach  - Initiate and maintain comfort rounds  - Make Fall Risk Sign visible to staff  - Offer Toileting every 2 Hours, in advance of need  - Initiate/Maintain bed/ chair alarm  - Obtain necessary fall risk management equipment: walker  - Apply yellow socks and bracelet for high fall risk patients  - Consider moving patient to room near nurses station  Outcome: Progressing  Goal: Maintains/Returns to pre admission functional level  Description: INTERVENTIONS:  - Perform BMAT or MOVE assessment daily    - Set and communicate daily mobility goal to care team and patient/family/caregiver  - Collaborate with rehabilitation services on mobility goals if consulted  - Perform Range of Motion 2 times a day  - Reposition patient every 2 hours    - Dangle patient 2 times a day  - Stand patient 2 times a day  - Ambulate patient 2 times a day  - Out of bed to chair 2 times a day   - Out of bed for meals 2 times a day  - Out of bed for toileting  - Record patient progress and toleration of activity level   Outcome: Progressing     Problem: DISCHARGE PLANNING  Goal: Discharge to home or other facility with appropriate resources  Description: INTERVENTIONS:  - Identify barriers to discharge w/patient and caregiver  - Arrange for needed discharge resources and transportation as appropriate  - Identify discharge learning needs (meds, wound care, etc )  - Arrange for interpretive services to assist at discharge as needed  - Refer to Case Management Department for coordinating discharge planning if the patient needs post-hospital services based on physician/advanced practitioner order or complex needs related to functional status, cognitive ability, or social support system  Outcome: Progressing     Problem: Knowledge Deficit  Goal: Patient/family/caregiver demonstrates understanding of disease process, treatment plan, medications, and discharge instructions  Description: Complete learning assessment and assess knowledge base    Interventions:  - Provide teaching at level of understanding  - Provide teaching via preferred learning methods  Outcome: Progressing     Problem: Prexisting or High Potential for Compromised Skin Integrity  Goal: Skin integrity is maintained or improved  Description: INTERVENTIONS:  - Identify patients at risk for skin breakdown  - Assess and monitor skin integrity  - Assess and monitor nutrition and hydration status  - Monitor labs   - Assess for incontinence   - Turn and reposition patient  - Assist with mobility/ambulation  - Relieve pressure over bony prominences  - Avoid friction and shearing  - Provide appropriate hygiene as needed including keeping skin clean and dry  - Evaluate need for skin moisturizer/barrier cream  - Collaborate with interdisciplinary team   - Patient/family teaching  - Consider wound care consult   Outcome: Progressing     Problem: Nutrition/Hydration-ADULT  Goal: Nutrient/Hydration intake appropriate for improving, restoring or maintaining nutritional needs  Description: Monitor and assess patient's nutrition/hydration status for malnutrition  Collaborate with interdisciplinary team and initiate plan and interventions as ordered  Monitor patient's weight and dietary intake as ordered or per policy  Utilize nutrition screening tool and intervene as necessary  Determine patient's food preferences and provide high-protein, high-caloric foods as appropriate       INTERVENTIONS:  - Monitor oral intake, urinary output, labs, and treatment plans  - Assess nutrition and hydration status and recommend course of action  - Evaluate amount of meals eaten  - Assist patient with eating if necessary   - Allow adequate time for meals  - Recommend/ encourage appropriate diets, oral nutritional supplements, and vitamin/mineral supplements  - Order, calculate, and assess calorie counts as needed  - Recommend, monitor, and adjust tube feedings and TPN/PPN based on assessed needs  - Assess need for intravenous fluids  - Provide specific nutrition/hydration education as appropriate  - Include patient/family/caregiver in decisions related to nutrition  Outcome: Progressing

## 2023-06-11 LAB
ALBUMIN SERPL BCP-MCNC: 3.1 G/DL (ref 3.5–5)
ALP SERPL-CCNC: 61 U/L (ref 34–104)
ALT SERPL W P-5'-P-CCNC: 12 U/L (ref 7–52)
ANION GAP SERPL CALCULATED.3IONS-SCNC: 5 MMOL/L (ref 4–13)
ANISOCYTOSIS BLD QL SMEAR: PRESENT
AST SERPL W P-5'-P-CCNC: 17 U/L (ref 13–39)
BASOPHILS # BLD MANUAL: 0 THOUSAND/UL (ref 0–0.1)
BASOPHILS NFR MAR MANUAL: 0 % (ref 0–1)
BILIRUB DIRECT SERPL-MCNC: 0.05 MG/DL (ref 0–0.2)
BILIRUB SERPL-MCNC: 0.29 MG/DL (ref 0.2–1)
BUN SERPL-MCNC: 14 MG/DL (ref 5–25)
CALCIUM SERPL-MCNC: 8.6 MG/DL (ref 8.4–10.2)
CHLORIDE SERPL-SCNC: 97 MMOL/L (ref 96–108)
CO2 SERPL-SCNC: 31 MMOL/L (ref 21–32)
CREAT SERPL-MCNC: 0.74 MG/DL (ref 0.6–1.3)
EOSINOPHIL # BLD MANUAL: 0.15 THOUSAND/UL (ref 0–0.4)
EOSINOPHIL NFR BLD MANUAL: 2 % (ref 0–6)
ERYTHROCYTE [DISTWIDTH] IN BLOOD BY AUTOMATED COUNT: 18.9 % (ref 11.6–15.1)
GFR SERPL CREATININE-BSD FRML MDRD: 90 ML/MIN/1.73SQ M
GLUCOSE SERPL-MCNC: 102 MG/DL (ref 65–140)
HCT VFR BLD AUTO: 29.2 % (ref 36.5–49.3)
HGB BLD-MCNC: 9.6 G/DL (ref 12–17)
LYMPHOCYTES # BLD AUTO: 0.6 THOUSAND/UL (ref 0.6–4.47)
LYMPHOCYTES # BLD AUTO: 8 % (ref 14–44)
MCH RBC QN AUTO: 26.8 PG (ref 26.8–34.3)
MCHC RBC AUTO-ENTMCNC: 32.9 G/DL (ref 31.4–37.4)
MCV RBC AUTO: 82 FL (ref 82–98)
MONOCYTES # BLD AUTO: 0.3 THOUSAND/UL (ref 0–1.22)
MONOCYTES NFR BLD: 4 % (ref 4–12)
NEUTROPHILS # BLD MANUAL: 6.33 THOUSAND/UL (ref 1.85–7.62)
NEUTS BAND NFR BLD MANUAL: 2 % (ref 0–8)
NEUTS SEG NFR BLD AUTO: 82 % (ref 43–75)
PLATELET # BLD AUTO: 157 THOUSANDS/UL (ref 149–390)
PLATELET BLD QL SMEAR: ADEQUATE
PMV BLD AUTO: 9 FL (ref 8.9–12.7)
POTASSIUM SERPL-SCNC: 3.5 MMOL/L (ref 3.5–5.3)
PROT SERPL-MCNC: 5.6 G/DL (ref 6.4–8.4)
RBC # BLD AUTO: 3.58 MILLION/UL (ref 3.88–5.62)
SODIUM SERPL-SCNC: 133 MMOL/L (ref 135–147)
VARIANT LYMPHS # BLD AUTO: 2 %
WBC # BLD AUTO: 7.54 THOUSAND/UL (ref 4.31–10.16)

## 2023-06-11 PROCEDURE — 85027 COMPLETE CBC AUTOMATED: CPT | Performed by: PHYSICIAN ASSISTANT

## 2023-06-11 PROCEDURE — 99233 SBSQ HOSP IP/OBS HIGH 50: CPT | Performed by: PHYSICIAN ASSISTANT

## 2023-06-11 PROCEDURE — 80076 HEPATIC FUNCTION PANEL: CPT | Performed by: PHYSICIAN ASSISTANT

## 2023-06-11 PROCEDURE — 99232 SBSQ HOSP IP/OBS MODERATE 35: CPT | Performed by: INTERNAL MEDICINE

## 2023-06-11 PROCEDURE — 80048 BASIC METABOLIC PNL TOTAL CA: CPT | Performed by: PHYSICIAN ASSISTANT

## 2023-06-11 PROCEDURE — 85007 BL SMEAR W/DIFF WBC COUNT: CPT | Performed by: PHYSICIAN ASSISTANT

## 2023-06-11 PROCEDURE — 99232 SBSQ HOSP IP/OBS MODERATE 35: CPT | Performed by: PHYSICIAN ASSISTANT

## 2023-06-11 RX ORDER — POTASSIUM CHLORIDE 20 MEQ/1
40 TABLET, EXTENDED RELEASE ORAL ONCE
Status: COMPLETED | OUTPATIENT
Start: 2023-06-11 | End: 2023-06-11

## 2023-06-11 RX ADMIN — FINASTERIDE 5 MG: 5 TABLET, FILM COATED ORAL at 09:22

## 2023-06-11 RX ADMIN — METOPROLOL SUCCINATE 25 MG: 50 TABLET, EXTENDED RELEASE ORAL at 09:22

## 2023-06-11 RX ADMIN — SODIUM CHLORIDE 1 G: 1 TABLET ORAL at 05:53

## 2023-06-11 RX ADMIN — ACETAMINOPHEN 650 MG: 325 TABLET, FILM COATED ORAL at 16:32

## 2023-06-11 RX ADMIN — TAMSULOSIN HYDROCHLORIDE 0.4 MG: 0.4 CAPSULE ORAL at 09:22

## 2023-06-11 RX ADMIN — PANCRELIPASE 24000 UNITS: 24000; 76000; 120000 CAPSULE, DELAYED RELEASE PELLETS ORAL at 05:52

## 2023-06-11 RX ADMIN — ASPIRIN 81 MG: 81 TABLET, COATED ORAL at 09:22

## 2023-06-11 RX ADMIN — SODIUM CHLORIDE 1 G: 1 TABLET ORAL at 16:32

## 2023-06-11 RX ADMIN — POLYETHYLENE GLYCOL 3350, SODIUM SULFATE ANHYDROUS, SODIUM BICARBONATE, SODIUM CHLORIDE, POTASSIUM CHLORIDE 4000 ML: 236; 22.74; 6.74; 5.86; 2.97 POWDER, FOR SOLUTION ORAL at 09:29

## 2023-06-11 RX ADMIN — POTASSIUM CHLORIDE 40 MEQ: 1500 TABLET, EXTENDED RELEASE ORAL at 09:22

## 2023-06-11 RX ADMIN — PANTOPRAZOLE SODIUM 40 MG: 40 TABLET, DELAYED RELEASE ORAL at 05:53

## 2023-06-11 RX ADMIN — ALPRAZOLAM 1 MG: 0.5 TABLET ORAL at 21:21

## 2023-06-11 RX ADMIN — ACETAMINOPHEN 650 MG: 325 TABLET, FILM COATED ORAL at 05:53

## 2023-06-11 RX ADMIN — PANCRELIPASE 24000 UNITS: 24000; 76000; 120000 CAPSULE, DELAYED RELEASE PELLETS ORAL at 16:32

## 2023-06-11 RX ADMIN — ACETAMINOPHEN 650 MG: 325 TABLET, FILM COATED ORAL at 21:21

## 2023-06-11 RX ADMIN — IRON SUCROSE 200 MG: 20 INJECTION, SOLUTION INTRAVENOUS at 09:23

## 2023-06-11 RX ADMIN — PANCRELIPASE 24000 UNITS: 24000; 76000; 120000 CAPSULE, DELAYED RELEASE PELLETS ORAL at 11:45

## 2023-06-11 RX ADMIN — PRAVASTATIN SODIUM 40 MG: 40 TABLET ORAL at 21:22

## 2023-06-11 RX ADMIN — MAGNESIUM OXIDE TAB 400 MG (241.3 MG ELEMENTAL MG) 400 MG: 400 (241.3 MG) TAB at 09:22

## 2023-06-11 RX ADMIN — AMLODIPINE BESYLATE 10 MG: 5 TABLET ORAL at 09:22

## 2023-06-11 NOTE — PROGRESS NOTES
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  -Admission sodium 120 on 6/7/2023  Sodium 133 today, appropriate rate of correction  -Baseline sodium appears to be low 130s meq   -workup: urine osmolality 324, urine sodium 35  -TSH normal  -Imaging: CT A/P with new central intrahepatic and extra hepatic bile duct distention, urinary bladder wall thickening chronic  No obvious masses  -s/p 1 8% saline and Samsca 15 mg x 2 (6/8, 6/9)  -continue fluid restriction of 1 5L/day  -continue salt tablets 1 g BID which is home dose  -Replete potassium  -Celexa discontinued  -Strict I/O  -Check BMP Q in a m   -Goal to raise sodium by 8 meq in the next  24 hours  Avoid overcorrection >8 meq   -Call if sodium is <133 or  >  141 in the next 24 hours  -Baseline creatinine normal    Most recent creatinine 0 74, stable at baseline  -continue to monitor  -Patient stable from nephrology standpoint for hospital discharge when medically cleared  Serum sodium remains stable and improving  Continue current management as outlined above  Repeat BMP 1 week after hospital discharge with office follow-up in 2-3 weeks with repeat labs    Message sent to office for scheduling   -Possible EGD/colonoscopy versus ERCP on Monday     Hypertension/Volume status:  -BP acceptable and improved  -1 episode of orthostatic hypotension during admission  -volume status euvolemic  -Home medications: Toprol-XL 25 mg daily, torsemide 5 mg daily  -Current medications: Amlodipine 10 mg daily, Toprol-XL 25 mg daily  -Continue current regimen  -Optimize hemodynamic status to avoid delay in renal recovery  -recommend hold parameters on antihypertensive's for SBP <130 mmHg  -Avoid hypotension or fluctuations in blood pressure  Maintain MAP >65  -low sodium (2 gm) diet  -continue to trend    Hypokalemia:  -Potassium 3 5  -Replete with KCl 40 mEq x 1  -Continue to monitor     Urinary retention:  -Stable  -On Flomax and Proscar  -Management per primary team    Dilated intra and extrahepatic bile ducts:  -LFTs normal  -MRI with mildly dilated extrahepatic common bile duct  No intrahepatic biliary duct dilatation  -GI following         Nephrology Disposition Recommendations  Medically Stable for discharge from a Nephrology Standpoint Patient should have repeat BMP in 1 week after hospital discharge with office follow up in  2-3 weeks with repeat labs  SUBJECTIVE:  Patient awake, alert without complaints  Serum sodium continues to improve and 133  Adequate urine output  Stable renal function  VSS    A complete review of systems was performed  Pertinent positives and negatives noted in the HPI  Otherwise the review of systems was negative  OBJECTIVE:  Current Weight: Weight - Scale: 39 2 kg (86 lb 6 4 oz)  Vitals:    06/10/23 0742 06/10/23 1505 06/11/23 0548 06/11/23 0718   BP: 164/98 144/83 137/80 145/77   BP Location:       Pulse: 87 91 85 81   Resp:  15     Temp: 98 2 °F (36 8 °C) 97 9 °F (36 6 °C) 98 1 °F (36 7 °C) 97 9 °F (36 6 °C)   TempSrc:       SpO2: 95% 95% 94% 98%   Weight:       Height:           Intake/Output Summary (Last 24 hours) at 6/11/2023 0733  Last data filed at 6/11/2023 0601  Gross per 24 hour   Intake 1460 ml   Output 1775 ml   Net -315 ml     General:  Awake, alert, appears comfortable and in no acute distress  Nontoxic  Skin:  No rash, warm, good skin turgor   Eyes:  PERRL, EOMI, sclerae nonicteric   no periorbital edema   ENT:  Moist mucous membranes  Neck:  Trachea midline, symmetric  No JVD    Chest:  Clear to auscultation bilaterally without wheezes, crackles or rhonchi  CVS:  Regular rate and rhythm without murmur, gallop or rub  S1 and S2 identified and normal   No S3, S4    Abdomen:  Soft, nontender, nondistended without masses  Normal bowel sounds x 4 quadrants  No bruit  Extremities:  Warm, pink, motor and sensory intact and well perfused  No cyanosis, pallor  No BLE edema  Neuro:  Awake, alert, oriented x3  Grossly intact  Psych:  Appropriate affect  Mentating appropriately    Normal mental status exam    Medications:    Current Facility-Administered Medications:   •  acetaminophen (TYLENOL) tablet 650 mg, 650 mg, Oral, Q6H PRN, Maggie Mendez PA-C, 650 mg at 06/11/23 6168  •  ALPRAZolam Amador Concha) tablet 1 mg, 1 mg, Oral, BID PRN, Maggie Mendez PA-C, 1 mg at 06/10/23 2027  •  amLODIPine (NORVASC) tablet 10 mg, 10 mg, Oral, Daily, Tres Garg PA-C  •  aspirin (ECOTRIN LOW STRENGTH) EC tablet 81 mg, 81 mg, Oral, Daily, Maggie Mendez PA-C, 81 mg at 06/10/23 3686  •  finasteride (PROSCAR) tablet 5 mg, 5 mg, Oral, Daily, Amberly Dos Santos PA-C, 5 mg at 06/10/23 1191  •  glycerin-hypromellose- (ARTIFICIAL TEARS) ophthalmic solution 1 drop, 1 drop, Both Eyes, Q4H PRN, Maggie Mendez PA-C  •  hydrALAZINE (APRESOLINE) injection 10 mg, 10 mg, Intravenous, Q6H PRN, Maggie Mendez PA-C, 10 mg at 06/07/23 1736  •  iron sucrose (VENOFER) 200 mg in sodium chloride 0 9% 100 ml IVPB 200 mg, 200 mg, Intravenous, Daily, DEACON Sullivan, Last Rate: 100 mL/hr at 06/09/23 1234, 200 mg at 06/10/23 0913  •  magnesium Oxide (MAG-OX) tablet 400 mg, 400 mg, Oral, Daily, Amberly Dos Santos PA-C, 400 mg at 06/10/23 2748  •  metoprolol succinate (TOPROL-XL) 24 hr tablet 25 mg, 25 mg, Oral, Daily, Amberly Dos Santos PA-C, 25 mg at 06/10/23 1331  •  nicotine (NICODERM CQ) 14 mg/24hr TD 24 hr patch 1 patch, 1 patch, Transdermal, Daily, Maggie Mendez PA-C, 1 patch at 06/09/23 5198  •  pancrelipase (Lip-Prot-Amyl) (CREON) delayed release capsule 24,000 Units, 24,000 Units, Oral, TID With Meals, Brandon Rodriguez LILIANE, 24,000 Units at 06/11/23 4316  •  pantoprazole (PROTONIX) EC tablet 40 mg, 40 mg, Oral, Early Morning, Rahel Dos Santos PA-C, 40 mg at 06/11/23 4862  •  pravastatin (PRAVACHOL) tablet 40 mg, 40 mg, Oral, HS, Brandon Rodriguez, PA-C, 40 mg at 06/10/23 2026  •  sodium chloride tablet 1 g, 1 g, Oral, BID With Meals, Felicia Richardson PA-C, 1 g at 06/11/23 0553  •  tamsulosin (FLOMAX) capsule 0 4 mg, 0 4 mg, Oral, Daily, Rahel Dos SantosLILIANE, 0 4 mg at 06/10/23 6195    Laboratory Results:  Results from last 7 days   Lab Units 06/11/23  0550 06/11/23  0514 06/10/23  0405 06/09/23  0542 06/08/23  2320 06/08/23  1852 06/08/23  0915 06/08/23  0317 06/07/23  1720 06/07/23  1206   BUN mg/dL 14  --  16 14 16 14 10 8   < > 9   CALCIUM mg/dL 8 6  --  9 0 9 2 9 1 9 2 8 7 8 6   < > 9 0   CHLORIDE mmol/L 97  --  95* 96 95* 94* 91* 88*   < > 86*   CO2 mmol/L 31  --  29 28 26 24 24 25   < > 25   CREATININE mg/dL 0 74  --  0 90 0 87 0 99 1 05 0 88 0 77   < > 0 77   HEMATOCRIT %  --  29 2* 32 5* 31 0*  --   --   --  30 5*  --  31 4*   HEMOGLOBIN g/dL  --  9 6* 10 4* 10 1*  --   --   --  10 4*  --  10 3*   POTASSIUM mmol/L 3 5  --  3 6 3 6 4 1 4 2 3 9 3 1*   < > 3 6   PLATELETS Thousands/uL  --  157 205 231  --   --   --  233  --  222   SODIUM mmol/L 133*  --  131* 131* 129* 128* 125* 123*   < > 120*   WBC Thousand/uL  --  7 54 8 88 7 76  --   --   --  6 58  --  6 11    < > = values in this interval not displayed  I have personally reviewed the blood work as stated above and in my note     I have personally reviewed internal Medicine, co-consultants and previous nephrology notes

## 2023-06-11 NOTE — PROGRESS NOTES
"Progress note - [address] [facility]   Annabella Mcguire 75 y o  male MRN: [id number]  Unit/Bed#: -[place] Encounter: [id number]    ASSESSMENT and PLAN    1  Hyponatremia  Stable  Per nephrology  - Continue fluid restriction and salt tablets     2   Dilated intra and extrahepatic bile ducts on CT    LFTs normal   CA 19-9 58  MRI with minimally dilated common bile duct only  - Follow for now  No GI work-up needed     3  Iron deficiency anemia  History of gastric ulcer on EGD 8/2022  Colonoscopy 8/2022 with poor prep  - EGD and colonoscopy tomorrow  - IV iron  - Follow H&H    Chief Complaint   Patient presents with   • Headache     Pt to er via ems from home with complaints of a headache since before august, dry mouth since august, and blurry vision since august - has been being evaluated for his blurred vision  SUBJECTIVE/HPI   No GI complaints    Starting to drink bowel prep    /77   Pulse 81   Temp 97 9 °F (36 6 °C)   Resp 15   Ht 5' 2\" (1 575 m)   Wt 39 2 kg (86 lb 6 4 oz)   SpO2 98%   BMI 15 80 kg/m²     PHYSICALEXAM  General appearance: alert, appears stated age and cooperative  Eyes: PERLLA, EOMI, no icterus   Head: Normocephalic, without obvious abnormality, atraumatic  Lungs: clear to auscultation bilaterally  Heart: regular rate and rhythm, S1, S2 normal, no murmur, click, rub or gallop  Abdomen: soft, non-tender; bowel sounds normal; no masses,  no organomegaly  Extremities: extremities normal, atraumatic, no cyanosis or edema  Neurologic: Grossly normal    Lab Results   Component Value Date    BUN 14 06/11/2023    CALCIUM 8 6 06/11/2023    CL 97 06/11/2023    CO2 31 06/11/2023    CREATININE 0 74 06/11/2023    GLUCOSE 97 10/14/2014    K 3 5 06/11/2023     (L) 10/14/2014     Lab Results   Component Value Date    HCT 29 2 (L) 06/11/2023    HGB 9 6 (L) 06/11/2023    MCV 82 06/11/2023     06/11/2023    WBC 7 54 06/11/2023     Lab Results   Component Value " Date    ALKPHOS 61 06/11/2023    ALT 12 06/11/2023    AST 17 06/11/2023    BILITOT 0 37 10/13/2014     Lab Results   Component Value Date    LIPASE 15 06/07/2023     Lab Results   Component Value Date    FERRITIN 14 (L) 06/08/2023    IRON 39 (L) 06/08/2023    TIBC 399 06/08/2023     Lab Results   Component Value Date    INR 0 95 01/04/2019

## 2023-06-11 NOTE — PROGRESS NOTES
[facility]  Progress Note  Name: Mary Ocampo  MRN: [id number]  Unit/Bed#: -01 I Date of Admission: 6/7/2023   Date of Service: 6/11/2023 I Hospital Day: 4    Assessment/Plan   Common bile duct dilation  Assessment & Plan  · Noted on CT abdomen/pelvis: new distention of the central intrahepatic/extrahepatic bile ducts  Consider MRCP if clinically warranted  · LFTs and bilirubin WNL, CA 19-9 slightly elevated at 58  · MRCP with minimally dilated CBD, no ERCP needed  · GI following  · Trend LFTs/bili    Hypertension  Assessment & Plan  · Blood pressure elevated while in the ED  · Resume home metoprolol and monitor  · Amlodipine 5 mg daily added with improvement    Urinary retention  Assessment & Plan  · Chronic, follows with urology  · Maintained on Flomax and Proscar  · CT abdomen/pelvis with urinary bladder wall thickening -appears chronic  UA with bacteruria but pt denies any urinary symptoms  Afebrile, no leukocytosis  · Urinary retention protocol    Iron deficiency anemia  Assessment & Plan  · H/o gastric ulcer   · Hgb 9 6 today  · GI following, recommending IV iron  · Plan for EGD/[place] tomorrow    Protein-calorie malnutrition, unspecified severity ([facility] [address] )  Assessment & Plan  Malnutrition Findings:   · Adult Malnutrition type: Chronic illnessAdmits to poor appetite  · Consult nutrition    BMI Findings:  Adult BMI Classifications: Underweight < 18 5        Body mass index is 15 8 kg/m²  Chronic kidney disease, stage 3 [facility])  Assessment & Plan  Lab Results   Component Value Date    CREATININE 0 74 06/11/2023    CREATININE 0 90 06/10/2023    CREATININE 0 87 06/09/2023    EGFR 90 06/11/2023    EGFR 83 06/10/2023    EGFR 85 06/09/2023   · Baseline creatinine 0 8-1 2  · Creatinine stable  · Trend BMP    * Hyponatremia  Assessment & Plan  · Presented to the emergency department with lightheadedness, headache    Did have a couple episodes of diarrhea PTA  · Sodium found to be 120 on presentation  Appears that patient had admission in 2022 for hyponatremia  · At that time had been started on salt tabs/torsemide, hctz was discontinued and was placed on fluid restriction  · Admits to poor intake in terms of food but does feel he has adequate fluid intake  · Home regimen includes salt tabs 1 g twice daily in addition to torsemide 5 mg daily  · Patient is also on Celexa which can be contributing, discontinued  · Consulted nephrology  · S/p 1 8% saline infusion then transitioned off gtt  · Dosed Samsca x1 on 6/8 and dosed again x1 on 6/9 with improvement of Na  · Resumed on fluid restriction of 1 5L per day and home salt tabs of 1g BID  · Stable from Nephrology perspective: repeat BMP in 1 week, follow up with Nephro in 2-3 weeks          VTE Pharmacologic Prophylaxis: VTE Score: 2 Low Risk (Score 0-2) - Encourage Ambulation  Patient Centered Rounds: I performed bedside rounds with nursing staff today  Discussions with Specialists or Other Care Team Provider: discussed with GI, nursing    Education and Discussions with Family / Patient: Patient declined call to   Total Time Spent on Date of Encounter in care of patient: 45 minutes This time was spent on one or more of the following: performing physical exam; counseling and coordination of care; obtaining or reviewing history; documenting in the medical record; reviewing/ordering tests, medications or procedures; communicating with other healthcare professionals and discussing with patient's family/caregivers  Current Length of Stay: 4 day(s)  Current Patient Status: Inpatient   Certification Statement: The patient will continue to require additional inpatient hospital stay due to awaiting EGD/Colonoscopy  Discharge Plan: Anticipate discharge in 24-48 hrs to discharge location to be determined pending rehab evaluations      Code Status: Level 3 - DNAR and DNI    Subjective:   Patient states he is feeling well today  He denies headache or dizziness  Denies weakness  He is happy to hear the news about his abdominal MRI  He denies any nausea, vomiting, diarrhea or abdominal pain  Ate well this morning  He denies fever or chills  All other review of symptoms negative  Objective:     Vitals:   Temp (24hrs), Av °F (36 7 °C), Min:97 9 °F (36 6 °C), Max:98 1 °F (36 7 °C)    Temp:  [97 9 °F (36 6 °C)-98 1 °F (36 7 °C)] 97 9 °F (36 6 °C)  HR:  [81-91] 81  Resp:  [15] 15  BP: (137-145)/(77-83) 145/77  SpO2:  [94 %-98 %] 98 %  Body mass index is 15 8 kg/m²  Input and Output Summary (last 24 hours): Intake/Output Summary (Last 24 hours) at 2023 1138  Last data filed at 2023 1001  Gross per 24 hour   Intake 1300 ml   Output 1200 ml   Net 100 ml       Physical Exam:   Physical Exam  Constitutional:       General: He is not in acute distress  Appearance: He is not toxic-appearing  Comments: Underweight   HENT:      Head: Normocephalic and atraumatic  Right Ear: External ear normal       Left Ear: External ear normal       Nose: Nose normal       Mouth/Throat:      Mouth: Mucous membranes are moist       Pharynx: Oropharynx is clear  Eyes:      Conjunctiva/sclera: Conjunctivae normal    Cardiovascular:      Rate and Rhythm: Normal rate and regular rhythm  Pulses: Normal pulses  Heart sounds: Normal heart sounds  No murmur heard  No gallop  Pulmonary:      Effort: Pulmonary effort is normal  No respiratory distress  Breath sounds: Normal breath sounds  No stridor  No wheezing, rhonchi or rales  Abdominal:      General: Abdomen is flat  Bowel sounds are normal  There is no distension  Palpations: Abdomen is soft  There is no mass  Tenderness: There is no abdominal tenderness  There is no guarding or rebound  Hernia: No hernia is present  Musculoskeletal:      Cervical back: Normal range of motion  Right lower leg: No edema        Left lower leg: No edema  Skin:     General: Skin is warm and dry  Neurological:      General: No focal deficit present  Mental Status: He is alert and oriented to person, place, and time  Mental status is at baseline  Sensory: No sensory deficit  Motor: No weakness  Psychiatric:         Mood and Affect: Mood normal          Thought Content:  Thought content normal           Additional Data:     Labs:  Results from last 7 days   Lab Units 06/11/23  0514 06/10/23  0405   BANDS PCT % 2  --    EOS PCT % 2 1   HEMATOCRIT % 29 2* 32 5*   HEMOGLOBIN g/dL 9 6* 10 4*   LYMPHS PCT %  --  12*   LYMPHO PCT % 8*  --    MONOS PCT %  --  8   MONO PCT % 4  --    NEUTROS PCT %  --  78*   PLATELETS Thousands/uL 157 205   WBC Thousand/uL 7 54 8 88     Results from last 7 days   Lab Units 06/11/23  0550   ANION GAP mmol/L 5   ALBUMIN g/dL 3 1*   ALK PHOS U/L 61   ALT U/L 12   AST U/L 17   BUN mg/dL 14   CALCIUM mg/dL 8 6   CHLORIDE mmol/L 97   CO2 mmol/L 31   CREATININE mg/dL 0 74   GLUCOSE RANDOM mg/dL 102   POTASSIUM mmol/L 3 5   SODIUM mmol/L 133*   TOTAL BILIRUBIN mg/dL 0 29                       Lines/Drains:  Invasive Devices     Peripheral Intravenous Line  Duration           Peripheral IV 06/11/23 Left;Ventral (anterior) Forearm <1 day                      Imaging: Reviewed radiology reports from this admission including: MRI abdomen/MRCP    Recent Cultures (last 7 days):         Last 24 Hours Medication List:   Current Facility-Administered Medications   Medication Dose Route Frequency Provider Last Rate   • acetaminophen  650 mg Oral Q6H PRN [place] Bolus, LILIANE     • ALPRAZolam  1 mg Oral BID PRN [place] Bolus, LILIANE     • amLODIPine  10 mg Oral Daily [place] f[place]     • aspirin  81 mg Oral Daily Shoalsselam Silvestre PA-C     • finasteride  5 mg Oral Daily Celina Silvestre PA-C     • glycerin-hypromellose-  1 drop Both Eyes Q4H PRN [place] Bolus, LILIANE     • hydrALAZINE  10 mg Intravenous Q6H PRN Ariadna Coleman PA-C     • magnesium Oxide  400 mg Oral Daily [place], [place]     • metoprolol succinate  25 mg Oral Daily [place], [place]     • nicotine  1 patch Transdermal Daily [place], [place]     • pancrelipase (Lip-Prot-Amyl)  24,000 Units Oral TID With Meals Ariadna Coleman PA-C     • pantoprazole  40 mg Oral Early Morning Ariadna Coleman PA-C     • pravastatin  40 mg Oral HS Ariadna Coleman PA-C     • sodium chloride  1 g Oral BID With Meals Mika Jackson PA-C     • tamsulosin  0 4 mg Oral Daily Ariadna Coleman PA-C          Today, Patient Was Seen By: Lolis Salinas PA-C    **Please Note: This note may have been constructed using a voice recognition system  **

## 2023-06-11 NOTE — PLAN OF CARE
Problem: Potential for Falls  Goal: Patient will remain free of falls  Description: INTERVENTIONS:  - Educate patient/family on patient safety including physical limitations  - Instruct patient to call for assistance with activity   - Consult OT/PT to assist with strengthening/mobility   - Keep Call bell within reach  - Keep bed low and locked with side rails adjusted as appropriate  - Keep care items and personal belongings within reach  - Initiate and maintain comfort rounds  - Make Fall Risk Sign visible to staff  - Offer Toileting every 2 Hours, in advance of need  - Initiate/Maintain bed/ chair alarm  - Obtain necessary fall risk management equipment: walker  - Apply yellow socks and bracelet for high fall risk patients  - Consider moving patient to room near nurses station  Outcome: Progressing     Problem: MOBILITY - ADULT  Goal: Maintain or return to baseline ADL function  Description: INTERVENTIONS:  - Educate patient/family on patient safety including physical limitations  - Instruct patient to call for assistance with activity   - Consult OT/PT to assist with strengthening/mobility   - Keep Call bell within reach  - Keep bed low and locked with side rails adjusted as appropriate  - Keep care items and personal belongings within reach  - Initiate and maintain comfort rounds  - Make Fall Risk Sign visible to staff  - Offer Toileting every 2 Hours, in advance of need  - Initiate/Maintain bed/ chair alarm  - Obtain necessary fall risk management equipment: walker  - Apply yellow socks and bracelet for high fall risk patients  - Consider moving patient to room near nurses station  Outcome: Progressing  Goal: Maintains/Returns to pre admission functional level  Description: INTERVENTIONS:  - Perform BMAT or MOVE assessment daily    - Set and communicate daily mobility goal to care team and patient/family/caregiver     - Collaborate with rehabilitation services on mobility goals if consulted  - Perform Range of Motion x times a day  - Reposition patient every x hours    - Dangle patient x times a day  - Stand patient x times a day  - Ambulate patient x times a day  - Out of bed to chair x times a day   - Out of bed for meals x times a day  - Out of bed for toileting  - Record patient progress and toleration of activity level   Outcome: Progressing     Problem: PAIN - ADULT  Goal: Verbalizes/displays adequate comfort level or baseline comfort level  Description: Interventions:  - Encourage patient to monitor pain and request assistance  - Assess pain using appropriate pain scale  - Administer analgesics based on type and severity of pain and evaluate response  - Implement non-pharmacological measures as appropriate and evaluate response  - Consider cultural and social influences on pain and pain management  - Notify physician/advanced practitioner if interventions unsuccessful or patient reports new pain  Outcome: Progressing     Problem: INFECTION - ADULT  Goal: Absence or prevention of progression during hospitalization  Description: INTERVENTIONS:  - Assess and monitor for signs and symptoms of infection  - Monitor lab/diagnostic results  - Monitor all insertion sites, i e  indwelling lines, tubes, and drains  - Monitor endotracheal if appropriate and nasal secretions for changes in amount and color  - Dorris appropriate cooling/warming therapies per order  - Administer medications as ordered  - Instruct and encourage patient and family to use good hand hygiene technique  - Identify and instruct in appropriate isolation precautions for identified infection/condition  Outcome: Progressing  Goal: Absence of fever/infection during neutropenic period  Description: INTERVENTIONS:  - Monitor WBC    Outcome: Progressing     Problem: SAFETY ADULT  Goal: Patient will remain free of falls  Description: INTERVENTIONS:  - Educate patient/family on patient safety including physical limitations  - Instruct patient to call for assistance with activity   - Consult OT/PT to assist with strengthening/mobility   - Keep Call bell within reach  - Keep bed low and locked with side rails adjusted as appropriate  - Keep care items and personal belongings within reach  - Initiate and maintain comfort rounds  - Make Fall Risk Sign visible to staff  - Offer Toileting every 2 Hours, in advance of need  - Initiate/Maintain bed/ chair alarm  - Obtain necessary fall risk management equipment: walker  - Apply yellow socks and bracelet for high fall risk patients  - Consider moving patient to room near nurses station  Outcome: Progressing  Goal: Maintain or return to baseline ADL function  Description: INTERVENTIONS:  - Educate patient/family on patient safety including physical limitations  - Instruct patient to call for assistance with activity   - Consult OT/PT to assist with strengthening/mobility   - Keep Call bell within reach  - Keep bed low and locked with side rails adjusted as appropriate  - Keep care items and personal belongings within reach  - Initiate and maintain comfort rounds  - Make Fall Risk Sign visible to staff  - Offer Toileting every 2 Hours, in advance of need  - Initiate/Maintain bed/ chair alarm  - Obtain necessary fall risk management equipment: walker  - Apply yellow socks and bracelet for high fall risk patients  - Consider moving patient to room near nurses station  Outcome: Progressing  Goal: Maintains/Returns to pre admission functional level  Description: INTERVENTIONS:  - Perform BMAT or MOVE assessment daily    - Set and communicate daily mobility goal to care team and patient/family/caregiver  - Collaborate with rehabilitation services on mobility goals if consulted  - Perform Range of Motion x times a day  - Reposition patient every x hours    - Dangle patient x times a day  - Stand patient x times a day  - Ambulate patient x times a day  - Out of bed to chair x times a day   - Out of bed for meals x times a day  - Out of bed for toileting  - Record patient progress and toleration of activity level   Outcome: Progressing     Problem: DISCHARGE PLANNING  Goal: Discharge to home or other facility with appropriate resources  Description: INTERVENTIONS:  - Identify barriers to discharge w/patient and caregiver  - Arrange for needed discharge resources and transportation as appropriate  - Identify discharge learning needs (meds, wound care, etc )  - Arrange for interpretive services to assist at discharge as needed  - Refer to Case Management Department for coordinating discharge planning if the patient needs post-hospital services based on physician/advanced practitioner order or complex needs related to functional status, cognitive ability, or social support system  Outcome: Progressing     Problem: Knowledge Deficit  Goal: Patient/family/caregiver demonstrates understanding of disease process, treatment plan, medications, and discharge instructions  Description: Complete learning assessment and assess knowledge base    Interventions:  - Provide teaching at level of understanding  - Provide teaching via preferred learning methods  Outcome: Progressing     Problem: Prexisting or High Potential for Compromised Skin Integrity  Goal: Skin integrity is maintained or improved  Description: INTERVENTIONS:  - Identify patients at risk for skin breakdown  - Assess and monitor skin integrity  - Assess and monitor nutrition and hydration status  - Monitor labs   - Assess for incontinence   - Turn and reposition patient  - Assist with mobility/ambulation  - Relieve pressure over bony prominences  - Avoid friction and shearing  - Provide appropriate hygiene as needed including keeping skin clean and dry  - Evaluate need for skin moisturizer/barrier cream  - Collaborate with interdisciplinary team   - Patient/family teaching  - Consider wound care consult   Outcome: Progressing     Problem: Nutrition/Hydration-ADULT  Goal: Nutrient/Hydration intake appropriate for improving, restoring or maintaining nutritional needs  Description: Monitor and assess patient's nutrition/hydration status for malnutrition  Collaborate with interdisciplinary team and initiate plan and interventions as ordered  Monitor patient's weight and dietary intake as ordered or per policy  Utilize nutrition screening tool and intervene as necessary  Determine patient's food preferences and provide high-protein, high-caloric foods as appropriate       INTERVENTIONS:  - Monitor oral intake, urinary output, labs, and treatment plans  - Assess nutrition and hydration status and recommend course of action  - Evaluate amount of meals eaten  - Assist patient with eating if necessary   - Allow adequate time for meals  - Recommend/ encourage appropriate diets, oral nutritional supplements, and vitamin/mineral supplements  - Order, calculate, and assess calorie counts as needed  - Recommend, monitor, and adjust tube feedings and TPN/PPN based on assessed needs  - Assess need for intravenous fluids  - Provide specific nutrition/hydration education as appropriate  - Include patient/family/caregiver in decisions related to nutrition  Outcome: Progressing

## 2023-06-11 NOTE — ASSESSMENT & PLAN NOTE
· Blood pressure elevated while in the ED  · Resume home metoprolol and monitor  · Amlodipine 5 mg daily added with improvement

## 2023-06-11 NOTE — ASSESSMENT & PLAN NOTE
Lab Results   Component Value Date    CREATININE 0 74 06/11/2023    CREATININE 0 90 06/10/2023    CREATININE 0 87 06/09/2023    EGFR 90 06/11/2023    EGFR 83 06/10/2023    EGFR 85 06/09/2023   · Baseline creatinine 0 8-1 2  · Creatinine stable  · Trend BMP

## 2023-06-11 NOTE — PLAN OF CARE
Problem: Potential for Falls  Goal: Patient will remain free of falls  Description: INTERVENTIONS:  - Educate patient/family on patient safety including physical limitations  - Instruct patient to call for assistance with activity   - Consult OT/PT to assist with strengthening/mobility   - Keep Call bell within reach  - Keep bed low and locked with side rails adjusted as appropriate  - Keep care items and personal belongings within reach  - Initiate and maintain comfort rounds  - Make Fall Risk Sign visible to staff  - Offer Toileting every 2 Hours, in advance of need  - Initiate/Maintain bed/ chair alarm  - Obtain necessary fall risk management equipment: walker  - Apply yellow socks and bracelet for high fall risk patients  - Consider moving patient to room near nurses station  Outcome: Progressing     Problem: MOBILITY - ADULT  Goal: Maintain or return to baseline ADL function  Description: INTERVENTIONS:  - Educate patient/family on patient safety including physical limitations  - Instruct patient to call for assistance with activity   - Consult OT/PT to assist with strengthening/mobility   - Keep Call bell within reach  - Keep bed low and locked with side rails adjusted as appropriate  - Keep care items and personal belongings within reach  - Initiate and maintain comfort rounds  - Make Fall Risk Sign visible to staff  - Offer Toileting every 2 Hours, in advance of need  - Initiate/Maintain bed/ chair alarm  - Obtain necessary fall risk management equipment: walker  - Apply yellow socks and bracelet for high fall risk patients  - Consider moving patient to room near nurses station  Outcome: Progressing  Goal: Maintains/Returns to pre admission functional level  Description: INTERVENTIONS:  - Perform BMAT or MOVE assessment daily    - Set and communicate daily mobility goal to care team and patient/family/caregiver     - Collaborate with rehabilitation services on mobility goals if consulted  - Perform Range of Motion 2 times a day  - Reposition patient every 2 hours    - Dangle patient 2 times a day  - Stand patient 2 times a day  - Ambulate patient 2 times a day  - Out of bed to chair 2 times a day   - Out of bed for meals 2 times a day  - Out of bed for toileting  - Record patient progress and toleration of activity level   Outcome: Progressing     Problem: PAIN - ADULT  Goal: Verbalizes/displays adequate comfort level or baseline comfort level  Description: Interventions:  - Encourage patient to monitor pain and request assistance  - Assess pain using appropriate pain scale  - Administer analgesics based on type and severity of pain and evaluate response  - Implement non-pharmacological measures as appropriate and evaluate response  - Consider cultural and social influences on pain and pain management  - Notify physician/advanced practitioner if interventions unsuccessful or patient reports new pain  Outcome: Progressing     Problem: INFECTION - ADULT  Goal: Absence or prevention of progression during hospitalization  Description: INTERVENTIONS:  - Assess and monitor for signs and symptoms of infection  - Monitor lab/diagnostic results  - Monitor all insertion sites, i e  indwelling lines, tubes, and drains  - Monitor endotracheal if appropriate and nasal secretions for changes in amount and color  - [place] appropriate cooling/warming therapies per order  - Administer medications as ordered  - Instruct and encourage patient and family to use good hand hygiene technique  - Identify and instruct in appropriate isolation precautions for identified infection/condition  Outcome: Progressing  Goal: Absence of fever/infection during neutropenic period  Description: INTERVENTIONS:  - Monitor WBC    Outcome: Progressing     Problem: SAFETY ADULT  Goal: Patient will remain free of falls  Description: INTERVENTIONS:  - Educate patient/family on patient safety including physical limitations  - Instruct patient to call for assistance with activity   - Consult OT/PT to assist with strengthening/mobility   - Keep Call bell within reach  - Keep bed low and locked with side rails adjusted as appropriate  - Keep care items and personal belongings within reach  - Initiate and maintain comfort rounds  - Make Fall Risk Sign visible to staff  - Offer Toileting every 2 Hours, in advance of need  - Initiate/Maintain bed/ chair alarm  - Obtain necessary fall risk management equipment: walker  - Apply yellow socks and bracelet for high fall risk patients  - Consider moving patient to room near nurses station  Outcome: Progressing  Goal: Maintain or return to baseline ADL function  Description: INTERVENTIONS:  - Educate patient/family on patient safety including physical limitations  - Instruct patient to call for assistance with activity   - Consult OT/PT to assist with strengthening/mobility   - Keep Call bell within reach  - Keep bed low and locked with side rails adjusted as appropriate  - Keep care items and personal belongings within reach  - Initiate and maintain comfort rounds  - Make Fall Risk Sign visible to staff  - Offer Toileting every 2 Hours, in advance of need  - Initiate/Maintain bed/ chair alarm  - Obtain necessary fall risk management equipment: walker  - Apply yellow socks and bracelet for high fall risk patients  - Consider moving patient to room near nurses station  Outcome: Progressing  Goal: Maintains/Returns to pre admission functional level  Description: INTERVENTIONS:  - Perform BMAT or MOVE assessment daily    - Set and communicate daily mobility goal to care team and patient/family/caregiver  - Collaborate with rehabilitation services on mobility goals if consulted  - Perform Range of Motion 2 times a day  - Reposition patient every 2 hours    - Dangle patient 2 times a day  - Stand patient 2 times a day  - Ambulate patient 2 times a day  - Out of bed to chair 2 times a day   - Out of bed for meals 2 times a day  - Out of bed for toileting  - Record patient progress and toleration of activity level   Outcome: Progressing     Problem: DISCHARGE PLANNING  Goal: Discharge to home or other facility with appropriate resources  Description: INTERVENTIONS:  - Identify barriers to discharge w/patient and caregiver  - Arrange for needed discharge resources and transportation as appropriate  - Identify discharge learning needs (meds, wound care, etc )  - Arrange for interpretive services to assist at discharge as needed  - Refer to Case Management Department for coordinating discharge planning if the patient needs post-hospital services based on physician/advanced practitioner order or complex needs related to functional status, cognitive ability, or social support system  Outcome: Progressing     Problem: Knowledge Deficit  Goal: Patient/family/caregiver demonstrates understanding of disease process, treatment plan, medications, and discharge instructions  Description: Complete learning assessment and assess knowledge base    Interventions:  - Provide teaching at level of understanding  - Provide teaching via preferred learning methods  Outcome: Progressing     Problem: Prexisting or High Potential for Compromised Skin Integrity  Goal: Skin integrity is maintained or improved  Description: INTERVENTIONS:  - Identify patients at risk for skin breakdown  - Assess and monitor skin integrity  - Assess and monitor nutrition and hydration status  - Monitor labs   - Assess for incontinence   - Turn and reposition patient  - Assist with mobility/ambulation  - Relieve pressure over bony prominences  - Avoid friction and shearing  - Provide appropriate hygiene as needed including keeping skin clean and dry  - Evaluate need for skin moisturizer/barrier cream  - Collaborate with interdisciplinary team   - Patient/family teaching  - Consider wound care consult   Outcome: Progressing     Problem: Nutrition/Hydration-ADULT  Goal: Nutrient/Hydration intake appropriate for improving, restoring or maintaining nutritional needs  Description: Monitor and assess patient's nutrition/hydration status for malnutrition  Collaborate with interdisciplinary team and initiate plan and interventions as ordered  Monitor patient's weight and dietary intake as ordered or per policy  Utilize nutrition screening tool and intervene as necessary  Determine patient's food preferences and provide high-protein, high-caloric foods as appropriate       INTERVENTIONS:  - Monitor oral intake, urinary output, labs, and treatment plans  - Assess nutrition and hydration status and recommend course of action  - Evaluate amount of meals eaten  - Assist patient with eating if necessary   - Allow adequate time for meals  - Recommend/ encourage appropriate diets, oral nutritional supplements, and vitamin/mineral supplements  - Order, calculate, and assess calorie counts as needed  - Recommend, monitor, and adjust tube feedings and TPN/PPN based on assessed needs  - Assess need for intravenous fluids  - Provide specific nutrition/hydration education as appropriate  - Include patient/family/caregiver in decisions related to nutrition  Outcome: Progressing

## 2023-06-11 NOTE — ASSESSMENT & PLAN NOTE
· Noted on CT abdomen/pelvis: new distention of the central intrahepatic/extrahepatic bile ducts    Consider MRCP if clinically warranted  · LFTs and bilirubin WNL, CA 19-9 slightly elevated at 58  · MRCP with minimally dilated CBD, no ERCP needed  · GI following  · Trend LFTs/bili

## 2023-06-11 NOTE — ASSESSMENT & PLAN NOTE
· H/o gastric ulcer   · Hgb 9 6 today  · GI following, recommending IV iron  · Plan for EGD/[place] tomorrow

## 2023-06-11 NOTE — ASSESSMENT & PLAN NOTE
· Presented to the emergency department with lightheadedness, headache  Did have a couple episodes of diarrhea PTA  · Sodium found to be 120 on presentation    Appears that patient had admission in 2022 for hyponatremia  · At that time had been started on salt tabs/torsemide, hctz was discontinued and was placed on fluid restriction  · Admits to poor intake in terms of food but does feel he has adequate fluid intake  · Home regimen includes salt tabs 1 g twice daily in addition to torsemide 5 mg daily  · Patient is also on Celexa which can be contributing, discontinued  · Consulted nephrology  · S/p 1 8% saline infusion then transitioned off gtt  · Dosed Samsca x1 on 6/8 and dosed again x1 on 6/9 with improvement of Na  · Resumed on fluid restriction of 1 5L per day and home salt tabs of 1g BID  · Stable from Nephrology perspective: repeat BMP in 1 week, follow up with Nephro in 2-3 weeks

## 2023-06-12 ENCOUNTER — ANESTHESIA (INPATIENT)
Dept: GASTROENTEROLOGY | Facility: HOSPITAL | Age: 73
DRG: 643 | End: 2023-06-12
Payer: MEDICARE

## 2023-06-12 ENCOUNTER — ANESTHESIA EVENT (INPATIENT)
Dept: GASTROENTEROLOGY | Facility: HOSPITAL | Age: 73
DRG: 643 | End: 2023-06-12
Payer: MEDICARE

## 2023-06-12 ENCOUNTER — APPOINTMENT (INPATIENT)
Dept: GASTROENTEROLOGY | Facility: HOSPITAL | Age: 73
DRG: 643 | End: 2023-06-12
Attending: INTERNAL MEDICINE
Payer: MEDICARE

## 2023-06-12 ENCOUNTER — TELEPHONE (OUTPATIENT)
Dept: NEPHROLOGY | Facility: CLINIC | Age: 73
End: 2023-06-12

## 2023-06-12 DIAGNOSIS — E87.1 HYPONATREMIA: Primary | ICD-10-CM

## 2023-06-12 LAB
ANION GAP SERPL CALCULATED.3IONS-SCNC: 7 MMOL/L (ref 4–13)
BACTERIA UR QL AUTO: ABNORMAL /HPF
BASOPHILS # BLD AUTO: 0.02 THOUSANDS/ÂΜL (ref 0–0.1)
BASOPHILS NFR BLD AUTO: 0 % (ref 0–1)
BILIRUB UR QL STRIP: NEGATIVE
BUN SERPL-MCNC: 9 MG/DL (ref 5–25)
CALCIUM SERPL-MCNC: 8.3 MG/DL (ref 8.4–10.2)
CHLORIDE SERPL-SCNC: 95 MMOL/L (ref 96–108)
CLARITY UR: ABNORMAL
CO2 SERPL-SCNC: 33 MMOL/L (ref 21–32)
COLOR UR: YELLOW
CREAT SERPL-MCNC: 0.72 MG/DL (ref 0.6–1.3)
EOSINOPHIL # BLD AUTO: 0.11 THOUSAND/ÂΜL (ref 0–0.61)
EOSINOPHIL NFR BLD AUTO: 2 % (ref 0–6)
ERYTHROCYTE [DISTWIDTH] IN BLOOD BY AUTOMATED COUNT: 19.5 % (ref 11.6–15.1)
GFR SERPL CREATININE-BSD FRML MDRD: 91 ML/MIN/1.73SQ M
GLUCOSE SERPL-MCNC: 94 MG/DL (ref 65–140)
GLUCOSE UR STRIP-MCNC: NEGATIVE MG/DL
HCT VFR BLD AUTO: 29 % (ref 36.5–49.3)
HGB BLD-MCNC: 9.4 G/DL (ref 12–17)
HGB UR QL STRIP.AUTO: ABNORMAL
IMM GRANULOCYTES # BLD AUTO: 0.04 THOUSAND/UL (ref 0–0.2)
IMM GRANULOCYTES NFR BLD AUTO: 1 % (ref 0–2)
KETONES UR STRIP-MCNC: NEGATIVE MG/DL
LEUKOCYTE ESTERASE UR QL STRIP: ABNORMAL
LYMPHOCYTES # BLD AUTO: 0.87 THOUSANDS/ÂΜL (ref 0.6–4.47)
LYMPHOCYTES NFR BLD AUTO: 15 % (ref 14–44)
MAGNESIUM SERPL-MCNC: 1.5 MG/DL (ref 1.9–2.7)
MCH RBC QN AUTO: 27.3 PG (ref 26.8–34.3)
MCHC RBC AUTO-ENTMCNC: 32.4 G/DL (ref 31.4–37.4)
MCV RBC AUTO: 84 FL (ref 82–98)
MONOCYTES # BLD AUTO: 0.77 THOUSAND/ÂΜL (ref 0.17–1.22)
MONOCYTES NFR BLD AUTO: 13 % (ref 4–12)
NEUTROPHILS # BLD AUTO: 4.2 THOUSANDS/ÂΜL (ref 1.85–7.62)
NEUTS SEG NFR BLD AUTO: 69 % (ref 43–75)
NITRITE UR QL STRIP: NEGATIVE
NON-SQ EPI CELLS URNS QL MICRO: ABNORMAL /HPF
NRBC BLD AUTO-RTO: 0 /100 WBCS
PH UR STRIP.AUTO: 7 [PH]
PLATELET # BLD AUTO: 162 THOUSANDS/UL (ref 149–390)
PMV BLD AUTO: 9 FL (ref 8.9–12.7)
POTASSIUM SERPL-SCNC: 3.1 MMOL/L (ref 3.5–5.3)
PROT UR STRIP-MCNC: NEGATIVE MG/DL
RBC # BLD AUTO: 3.44 MILLION/UL (ref 3.88–5.62)
RBC #/AREA URNS AUTO: ABNORMAL /HPF
SODIUM SERPL-SCNC: 135 MMOL/L (ref 135–147)
SP GR UR STRIP.AUTO: 1.01 (ref 1–1.03)
UROBILINOGEN UR STRIP-ACNC: <2 MG/DL
WBC # BLD AUTO: 6.01 THOUSAND/UL (ref 4.31–10.16)
WBC #/AREA URNS AUTO: ABNORMAL /HPF

## 2023-06-12 PROCEDURE — 99233 SBSQ HOSP IP/OBS HIGH 50: CPT | Performed by: PHYSICIAN ASSISTANT

## 2023-06-12 PROCEDURE — 88342 IMHCHEM/IMCYTCHM 1ST ANTB: CPT | Performed by: PATHOLOGY

## 2023-06-12 PROCEDURE — 0DJD8ZZ INSPECTION OF LOWER INTESTINAL TRACT, VIA NATURAL OR ARTIFICIAL OPENING ENDOSCOPIC: ICD-10-PCS | Performed by: INTERNAL MEDICINE

## 2023-06-12 PROCEDURE — 87086 URINE CULTURE/COLONY COUNT: CPT | Performed by: INTERNAL MEDICINE

## 2023-06-12 PROCEDURE — 88305 TISSUE EXAM BY PATHOLOGIST: CPT | Performed by: PATHOLOGY

## 2023-06-12 PROCEDURE — G0105 COLORECTAL SCRN; HI RISK IND: HCPCS | Performed by: INTERNAL MEDICINE

## 2023-06-12 PROCEDURE — 88313 SPECIAL STAINS GROUP 2: CPT | Performed by: PATHOLOGY

## 2023-06-12 PROCEDURE — 83735 ASSAY OF MAGNESIUM: CPT | Performed by: NURSE PRACTITIONER

## 2023-06-12 PROCEDURE — 81001 URINALYSIS AUTO W/SCOPE: CPT | Performed by: INTERNAL MEDICINE

## 2023-06-12 PROCEDURE — 87077 CULTURE AEROBIC IDENTIFY: CPT | Performed by: INTERNAL MEDICINE

## 2023-06-12 PROCEDURE — 87186 SC STD MICRODIL/AGAR DIL: CPT | Performed by: INTERNAL MEDICINE

## 2023-06-12 PROCEDURE — 0T9B70Z DRAINAGE OF BLADDER WITH DRAINAGE DEVICE, VIA NATURAL OR ARTIFICIAL OPENING: ICD-10-PCS | Performed by: UROLOGY

## 2023-06-12 PROCEDURE — 51703 INSERT BLADDER CATH COMPLEX: CPT | Performed by: PHYSICIAN ASSISTANT

## 2023-06-12 PROCEDURE — 99222 1ST HOSP IP/OBS MODERATE 55: CPT | Performed by: PHYSICIAN ASSISTANT

## 2023-06-12 PROCEDURE — 0DB68ZX EXCISION OF STOMACH, VIA NATURAL OR ARTIFICIAL OPENING ENDOSCOPIC, DIAGNOSTIC: ICD-10-PCS | Performed by: INTERNAL MEDICINE

## 2023-06-12 PROCEDURE — 99232 SBSQ HOSP IP/OBS MODERATE 35: CPT | Performed by: INTERNAL MEDICINE

## 2023-06-12 PROCEDURE — 43239 EGD BIOPSY SINGLE/MULTIPLE: CPT | Performed by: INTERNAL MEDICINE

## 2023-06-12 PROCEDURE — 80048 BASIC METABOLIC PNL TOTAL CA: CPT | Performed by: PHYSICIAN ASSISTANT

## 2023-06-12 PROCEDURE — 85025 COMPLETE CBC W/AUTO DIFF WBC: CPT | Performed by: PHYSICIAN ASSISTANT

## 2023-06-12 RX ORDER — POTASSIUM CHLORIDE 20 MEQ/1
20 TABLET, EXTENDED RELEASE ORAL ONCE
Status: COMPLETED | OUTPATIENT
Start: 2023-06-12 | End: 2023-06-12

## 2023-06-12 RX ORDER — SODIUM CHLORIDE 9 MG/ML
50 INJECTION, SOLUTION INTRAVENOUS CONTINUOUS
Status: CANCELLED | OUTPATIENT
Start: 2023-06-12

## 2023-06-12 RX ORDER — PROPOFOL 10 MG/ML
INJECTION, EMULSION INTRAVENOUS AS NEEDED
Status: DISCONTINUED | OUTPATIENT
Start: 2023-06-12 | End: 2023-06-12

## 2023-06-12 RX ORDER — SODIUM CHLORIDE 9 MG/ML
INJECTION, SOLUTION INTRAVENOUS CONTINUOUS PRN
Status: DISCONTINUED | OUTPATIENT
Start: 2023-06-12 | End: 2023-06-12

## 2023-06-12 RX ORDER — PANTOPRAZOLE SODIUM 40 MG/1
40 TABLET, DELAYED RELEASE ORAL
Status: DISCONTINUED | OUTPATIENT
Start: 2023-06-12 | End: 2023-06-13 | Stop reason: HOSPADM

## 2023-06-12 RX ORDER — POTASSIUM CHLORIDE 20 MEQ/1
40 TABLET, EXTENDED RELEASE ORAL ONCE
Status: DISCONTINUED | OUTPATIENT
Start: 2023-06-12 | End: 2023-06-12

## 2023-06-12 RX ORDER — POTASSIUM CHLORIDE 14.9 MG/ML
20 INJECTION INTRAVENOUS ONCE
Status: COMPLETED | OUTPATIENT
Start: 2023-06-12 | End: 2023-06-12

## 2023-06-12 RX ADMIN — PANCRELIPASE 24000 UNITS: 24000; 76000; 120000 CAPSULE, DELAYED RELEASE PELLETS ORAL at 08:10

## 2023-06-12 RX ADMIN — POTASSIUM CHLORIDE 20 MEQ: 14.9 INJECTION, SOLUTION INTRAVENOUS at 08:31

## 2023-06-12 RX ADMIN — POTASSIUM CHLORIDE 20 MEQ: 1500 TABLET, EXTENDED RELEASE ORAL at 16:50

## 2023-06-12 RX ADMIN — PRAVASTATIN SODIUM 40 MG: 40 TABLET ORAL at 21:48

## 2023-06-12 RX ADMIN — TAMSULOSIN HYDROCHLORIDE 0.4 MG: 0.4 CAPSULE ORAL at 09:31

## 2023-06-12 RX ADMIN — PROPOFOL 40 MG: 10 INJECTION, EMULSION INTRAVENOUS at 12:03

## 2023-06-12 RX ADMIN — PROPOFOL 30 MG: 10 INJECTION, EMULSION INTRAVENOUS at 11:53

## 2023-06-12 RX ADMIN — PROPOFOL 50 MG: 10 INJECTION, EMULSION INTRAVENOUS at 11:51

## 2023-06-12 RX ADMIN — METOPROLOL SUCCINATE 25 MG: 50 TABLET, EXTENDED RELEASE ORAL at 09:33

## 2023-06-12 RX ADMIN — SODIUM CHLORIDE 1 G: 1 TABLET ORAL at 16:50

## 2023-06-12 RX ADMIN — SODIUM CHLORIDE 1 G: 1 TABLET ORAL at 08:10

## 2023-06-12 RX ADMIN — PROPOFOL 30 MG: 10 INJECTION, EMULSION INTRAVENOUS at 11:58

## 2023-06-12 RX ADMIN — ACETAMINOPHEN 650 MG: 325 TABLET, FILM COATED ORAL at 21:48

## 2023-06-12 RX ADMIN — SODIUM CHLORIDE: 0.9 INJECTION, SOLUTION INTRAVENOUS at 11:24

## 2023-06-12 RX ADMIN — FINASTERIDE 5 MG: 5 TABLET, FILM COATED ORAL at 09:31

## 2023-06-12 RX ADMIN — ACETAMINOPHEN 650 MG: 325 TABLET, FILM COATED ORAL at 09:31

## 2023-06-12 RX ADMIN — ALPRAZOLAM 1 MG: 0.5 TABLET ORAL at 21:48

## 2023-06-12 RX ADMIN — PANCRELIPASE 24000 UNITS: 24000; 76000; 120000 CAPSULE, DELAYED RELEASE PELLETS ORAL at 16:50

## 2023-06-12 RX ADMIN — PANTOPRAZOLE SODIUM 40 MG: 40 TABLET, DELAYED RELEASE ORAL at 16:50

## 2023-06-12 RX ADMIN — PROPOFOL 30 MG: 10 INJECTION, EMULSION INTRAVENOUS at 12:11

## 2023-06-12 RX ADMIN — ASPIRIN 81 MG: 81 TABLET, COATED ORAL at 09:31

## 2023-06-12 RX ADMIN — AMLODIPINE BESYLATE 10 MG: 5 TABLET ORAL at 09:32

## 2023-06-12 NOTE — QUICK NOTE
Brief EGD report    IMPRESSION:  1  Persistent bleeding based ulcer in gastric body may be slightly smaller compared to pictures  Biopsies taken  2  Small hiatal hernia  3  Normal duodenum  4   Normal esophagus      RECOMMENDATION:  Resume regular diet and medications  Increase Protonix to twice a day  Call the biopsy in 1 to 2 weeks  Okay to discharge from GI standpoint  We will arrange follow-up office visit in 2 months  Should have repeat EGD in 3 months to document complete ulcer healing  We will sign off and see again as needed    Jeffrey Phan MD

## 2023-06-12 NOTE — PROCEDURES
Universal Protocol:  Consent: Verbal consent obtained  Consent given by: patient      Bladder catheterization    Date/Time: 6/12/2023 2:15 AM    Performed by: Rosa Long PA-C  Authorized by: Rosa Long PA-C    Patient location:  Bedside  Consent:     Consent given by:  Patient  Pre-procedure details:     Preparation: Patient was prepped and draped in usual sterile fashion    Procedure details:     Catheter insertion:  Temporary indwelling    Approach: natural orifice      Catheter type:  Coude    Catheter size:  16 Fr    Number of attempts:  1    Successful placement: yes      Urine characteristics:  Clear and yellow    Provider performed due to:  Complicated insertion and nurse unable to complete  Post-procedure details:     Patient tolerance of procedure:   Tolerated well, no immediate complications

## 2023-06-12 NOTE — PLAN OF CARE
Problem: Potential for Falls  Goal: Patient will remain free of falls  Description: INTERVENTIONS:  - Educate patient/family on patient safety including physical limitations  - Instruct patient to call for assistance with activity   - Consult OT/PT to assist with strengthening/mobility   - Keep Call bell within reach  - Keep bed low and locked with side rails adjusted as appropriate  - Keep care items and personal belongings within reach  - Initiate and maintain comfort rounds  - Make Fall Risk Sign visible to staff  - Offer Toileting every 2 Hours, in advance of need  - Initiate/Maintain bed/ chair alarm  - Obtain necessary fall risk management equipment: walker  - Apply yellow socks and bracelet for high fall risk patients  - Consider moving patient to room near nurses station  Outcome: Progressing     Problem: MOBILITY - ADULT  Goal: Maintain or return to baseline ADL function  Description: INTERVENTIONS:  - Educate patient/family on patient safety including physical limitations  - Instruct patient to call for assistance with activity   - Consult OT/PT to assist with strengthening/mobility   - Keep Call bell within reach  - Keep bed low and locked with side rails adjusted as appropriate  - Keep care items and personal belongings within reach  - Initiate and maintain comfort rounds  - Make Fall Risk Sign visible to staff  - Offer Toileting every 2 Hours, in advance of need  - Initiate/Maintain bed/ chair alarm  - Obtain necessary fall risk management equipment: walker  - Apply yellow socks and bracelet for high fall risk patients  - Consider moving patient to room near nurses station  Outcome: Progressing  Goal: Maintains/Returns to pre admission functional level  Description: INTERVENTIONS:  - Perform BMAT or MOVE assessment daily    - Set and communicate daily mobility goal to care team and patient/family/caregiver     - Collaborate with rehabilitation services on mobility goals if consulted  - Perform Range of Motion 3 times a day  - Reposition patient every 2 hours    - Dangle patient 3 times a day  - Stand patient 3 times a day  - Ambulate patient 3 times a day  - Out of bed to chair 3 times a day   - Out of bed for meals 3 times a day  - Out of bed for toileting  - Record patient progress and toleration of activity level   Outcome: Progressing     Problem: PAIN - ADULT  Goal: Verbalizes/displays adequate comfort level or baseline comfort level  Description: Interventions:  - Encourage patient to monitor pain and request assistance  - Assess pain using appropriate pain scale  - Administer analgesics based on type and severity of pain and evaluate response  - Implement non-pharmacological measures as appropriate and evaluate response  - Consider cultural and social influences on pain and pain management  - Notify physician/advanced practitioner if interventions unsuccessful or patient reports new pain  Outcome: Progressing     Problem: INFECTION - ADULT  Goal: Absence or prevention of progression during hospitalization  Description: INTERVENTIONS:  - Assess and monitor for signs and symptoms of infection  - Monitor lab/diagnostic results  - Monitor all insertion sites, i e  indwelling lines, tubes, and drains  - Monitor endotracheal if appropriate and nasal secretions for changes in amount and color  - [place] appropriate cooling/warming therapies per order  - Administer medications as ordered  - Instruct and encourage patient and family to use good hand hygiene technique  - Identify and instruct in appropriate isolation precautions for identified infection/condition  Outcome: Progressing  Goal: Absence of fever/infection during neutropenic period  Description: INTERVENTIONS:  - Monitor WBC    Outcome: Progressing     Problem: SAFETY ADULT  Goal: Patient will remain free of falls  Description: INTERVENTIONS:  - Educate patient/family on patient safety including physical limitations  - Instruct patient to call for assistance with activity   - Consult OT/PT to assist with strengthening/mobility   - Keep Call bell within reach  - Keep bed low and locked with side rails adjusted as appropriate  - Keep care items and personal belongings within reach  - Initiate and maintain comfort rounds  - Make Fall Risk Sign visible to staff  - Offer Toileting every 2 Hours, in advance of need  - Initiate/Maintain bed/ chair alarm  - Obtain necessary fall risk management equipment: walker  - Apply yellow socks and bracelet for high fall risk patients  - Consider moving patient to room near nurses station  Outcome: Progressing  Goal: Maintain or return to baseline ADL function  Description: INTERVENTIONS:  - Educate patient/family on patient safety including physical limitations  - Instruct patient to call for assistance with activity   - Consult OT/PT to assist with strengthening/mobility   - Keep Call bell within reach  - Keep bed low and locked with side rails adjusted as appropriate  - Keep care items and personal belongings within reach  - Initiate and maintain comfort rounds  - Make Fall Risk Sign visible to staff  - Offer Toileting every 2 Hours, in advance of need  - Initiate/Maintain bed/ chair alarm  - Obtain necessary fall risk management equipment: walker  - Apply yellow socks and bracelet for high fall risk patients  - Consider moving patient to room near nurses station  Outcome: Progressing  Goal: Maintains/Returns to pre admission functional level  Description: INTERVENTIONS:  - Perform BMAT or MOVE assessment daily    - Set and communicate daily mobility goal to care team and patient/family/caregiver  - Collaborate with rehabilitation services on mobility goals if consulted  - Perform Range of Motion 3 times a day  - Reposition patient every 2 hours    - Dangle patient 3 times a day  - Stand patient 3 times a day  - Ambulate patient 3 times a day  - Out of bed to chair 3 times a day   - Out of bed for meals 3 times a day  - Out of bed for toileting  - Record patient progress and toleration of activity level   Outcome: Progressing     Problem: DISCHARGE PLANNING  Goal: Discharge to home or other facility with appropriate resources  Description: INTERVENTIONS:  - Identify barriers to discharge w/patient and caregiver  - Arrange for needed discharge resources and transportation as appropriate  - Identify discharge learning needs (meds, wound care, etc )  - Arrange for interpretive services to assist at discharge as needed  - Refer to Case Management Department for coordinating discharge planning if the patient needs post-hospital services based on physician/advanced practitioner order or complex needs related to functional status, cognitive ability, or social support system  Outcome: Progressing     Problem: Knowledge Deficit  Goal: Patient/family/caregiver demonstrates understanding of disease process, treatment plan, medications, and discharge instructions  Description: Complete learning assessment and assess knowledge base    Interventions:  - Provide teaching at level of understanding  - Provide teaching via preferred learning methods  Outcome: Progressing     Problem: Prexisting or High Potential for Compromised Skin Integrity  Goal: Skin integrity is maintained or improved  Description: INTERVENTIONS:  - Identify patients at risk for skin breakdown  - Assess and monitor skin integrity  - Assess and monitor nutrition and hydration status  - Monitor labs   - Assess for incontinence   - Turn and reposition patient  - Assist with mobility/ambulation  - Relieve pressure over bony prominences  - Avoid friction and shearing  - Provide appropriate hygiene as needed including keeping skin clean and dry  - Evaluate need for skin moisturizer/barrier cream  - Collaborate with interdisciplinary team   - Patient/family teaching  - Consider wound care consult   Outcome: Progressing     Problem: Nutrition/Hydration-ADULT  Goal: Nutrient/Hydration intake appropriate for improving, restoring or maintaining nutritional needs  Description: Monitor and assess patient's nutrition/hydration status for malnutrition  Collaborate with interdisciplinary team and initiate plan and interventions as ordered  Monitor patient's weight and dietary intake as ordered or per policy  Utilize nutrition screening tool and intervene as necessary  Determine patient's food preferences and provide high-protein, high-caloric foods as appropriate       INTERVENTIONS:  - Monitor oral intake, urinary output, labs, and treatment plans  - Assess nutrition and hydration status and recommend course of action  - Evaluate amount of meals eaten  - Assist patient with eating if necessary   - Allow adequate time for meals  - Recommend/ encourage appropriate diets, oral nutritional supplements, and vitamin/mineral supplements  - Order, calculate, and assess calorie counts as needed  - Recommend, monitor, and adjust tube feedings and TPN/PPN based on assessed needs  - Assess need for intravenous fluids  - Provide specific nutrition/hydration education as appropriate  - Include patient/family/caregiver in decisions related to nutrition  Outcome: Progressing

## 2023-06-12 NOTE — ASSESSMENT & PLAN NOTE
Lab Results   Component Value Date    CREATININE 0 72 06/12/2023    CREATININE 0 74 06/11/2023    CREATININE 0 90 06/10/2023    EGFR 91 06/12/2023    EGFR 90 06/11/2023    EGFR 83 06/10/2023   · Baseline creatinine 0 8-1 2  · Creatinine stable  · Trend BMP

## 2023-06-12 NOTE — PROGRESS NOTES
Progress note- Nephrology   Julio Cesar Bey 75 y o  male MRN: [id number]  Unit/Bed#: -01 Encounter: [id number]    79-year-old male with past history of chronic hyponatremia, hyperlipidemia, hypertension, GERD, depression/ anxiety and BPH who presented to ER with dizziness and headache    Nephrology consulted and managing for hyponatremia    ASSESSMENT and PLAN:  Acute on chronic hyponatremia  Etiology: Suspect SIADH in the setting of SSRI  • Baseline sodium 130-135 dating back to 2014  • Has had prior hospitalizations for sodium below 130's  • Admission sodium 120 on 6/7/2023  • Current sodium 135  • Previously treated with 0 8% saline and Samsca 15 mg x 2 on 6/8/2023 and 6/9/2023  • Normally on home dose salt tablets 1 g twice daily  Work-up:  · Urine osmolality 324, urine sodium 35, TSH normal  · CT scan of abdomen pelvis with new intrahepatic and extra hepatic bile duct distention, urine bladder wall thickening-chronic; no obvious masses  Plan:  · Currently on fluid restriction 1 5 L daily-continue  · Sodium salt tablet 1 g twice daily-resumed on 6/10/2023-continue  · Sodium remained stable and currently at 135  · Stable from nephrology standpoint for hospital discharge and medically stable  · Nephrology office aware scheduling outpatient follow-up in 2 to 3 weeks  · We will check BMP in a m  if remains hospitalized    Blood pressure/hypertension/volume status:  • Current blood pressure elevated-suspect pain component with BHP history  • Current medications include: Toprol XL 25 mg daily, amlodipine 10 mg daily  • Home Medications include Toprol-XL 25 mg daily and torsemide 5 mg daily  • May need to consider adding torsemide blood pressure remains elevated  • Maximize hemodynamics to maintain MAP >65  • Avoid hypotension or fluctuations in blood pressure  • Will continue to trend  • Patient examining euvolemic    Hypokalemia  • A m  potassium 3 1-status post IV supplementation per primary team  • Plan-20 meq K-Dur this evening-per primary team  • We will add magnesium to a m  level today-reviewed further recommendations will be forthcoming  • Check BMP and potassium in a m  • Management per primary team    Urinary retention  · Status post straight cath intermittently x2 yesterday and overnight  • On Flomax and finasteride  • Consider urology consult  • Discussed with primary team via [facility] text    Dilated intra and extrahepatic bile ducts  · MRI with mild dilated extrahepatic common bile duct, no intrahepatic biliary duct dilatation  · GI following  · LFTs within normal limits  · For possible EGD/colonoscopy versus ERCP    Medical records have been reviewed through [address] [place] [address] everywhere for this patient encounter    Disposition: Patient stable from nephrology standpoint for sodium remained stable  Okay for discharge when medically stable per primary team   Nephrology office will arrange hospital follow-up once discharged  Check BMP in 1 week    Review of Systems  Patient seen and examined at bedside  Review of Systems   Constitutional: Negative  Negative for activity change, appetite change, chills, diaphoresis, fatigue and fever  HENT: Negative  Negative for congestion and facial swelling  Respiratory: Negative  Cardiovascular: Negative  Gastrointestinal: Negative  Endocrine: Negative  Genitourinary: Positive for difficulty urinating and urgency  Patient reports discomfort   Musculoskeletal: Negative  Skin: Negative  Allergic/Immunologic: Negative  Neurological: Negative  Hematological: Negative  Psychiatric/Behavioral: Negative            Physical Exam:  Current Weight: Weight - Scale: 39 2 kg (86 lb 6 4 oz)  Vitals:    06/11/23 1546 06/11/23 2057 06/11/23 2240 06/12/23 0718   BP: 124/68 (!) 174/90 153/84 (!) 175/96   Pulse: (!) 112 90 80 77   Resp:       Temp: 97 5 °F (36 4 °C)   97 7 °F (36 5 °C)   TempSrc:       SpO2: 98% 94% 95% 97%   Weight: Height:           Physical Exam  Vitals and nursing note reviewed  Constitutional:       Appearance: Normal appearance  Comments: No acute distress, thin   HENT:      Head: Normocephalic and atraumatic  Nose: Nose normal       Mouth/Throat:      Mouth: Mucous membranes are moist       Pharynx: Oropharynx is clear  Eyes:      Extraocular Movements: Extraocular movements intact  Conjunctiva/sclera: Conjunctivae normal    Cardiovascular:      Rate and Rhythm: Normal rate and regular rhythm  Pulses: Normal pulses  Heart sounds: Normal heart sounds  Pulmonary:      Effort: Pulmonary effort is normal       Breath sounds: Normal breath sounds  Abdominal:      General: Bowel sounds are normal       Palpations: Abdomen is soft  Musculoskeletal:         General: Normal range of motion  Cervical back: Normal range of motion and neck supple  Skin:     General: Skin is warm and dry  Neurological:      General: No focal deficit present  Mental Status: He is alert and oriented to person, place, and time     Psychiatric:         Mood and Affect: Mood normal          Behavior: Behavior normal             Medications:    Current Facility-Administered Medications:   •  acetaminophen (TYLENOL) tablet 650 mg, 650 mg, Oral, Q6H PRN, Vinita Dos Santos PA-C, 650 mg at 06/12/23 0397  •  ALPRAZolam Levern Citrin) tablet 1 mg, 1 mg, Oral, BID PRN, RADHA SantosC, 1 mg at 06/11/23 2121  •  amLODIPine (NORVASC) tablet 10 mg, 10 mg, Oral, Daily, HILTON Cifuentes-C, 10 mg at 06/12/23 0932  •  aspirin (ECOTRIN LOW STRENGTH) EC tablet 81 mg, 81 mg, Oral, Daily, So Chaudhary PA-C, 81 mg at 06/12/23 5454  •  finasteride (PROSCAR) tablet 5 mg, 5 mg, Oral, Daily, RADHA SantosC, 5 mg at 06/12/23 0826  •  glycerin-hypromellose- (ARTIFICIAL TEARS) ophthalmic solution 1 drop, 1 drop, Both Eyes, Q4H PRN, So Chaudhary PA-C  •  hydrALAZINE (APRESOLINE) injection 10 mg, 10 mg, Intravenous, Q6H PRN, Chuck Kauffman PA-C, 10 mg at 06/07/23 1736  •  magnesium Oxide (MAG-OX) tablet 400 mg, 400 mg, Oral, Daily, Carole Dos Santos PA-C, 400 mg at 06/11/23 9956  •  metoprolol succinate (TOPROL-XL) 24 hr tablet 25 mg, 25 mg, Oral, Daily, Carole Dos Santos PA-C, 25 mg at 06/12/23 8233  •  nicotine (NICODERM CQ) 14 mg/24hr TD 24 hr patch 1 patch, 1 patch, Transdermal, Daily, Chuck Kauffman PA-C, 1 patch at 06/09/23 8243  •  pancrelipase (Lip-Prot-Amyl) (CREON) delayed release capsule 24,000 Units, 24,000 Units, Oral, TID With Meals, Chuck Kauffman PA-C, 24,000 Units at 06/12/23 0810  •  pantoprazole (PROTONIX) EC tablet 40 mg, 40 mg, Oral, Early Morning, Carole Dos Santos PA-C, 40 mg at 06/11/23 0553  •  potassium chloride (K-DUR,KLOR-CON) CR tablet 20 mEq, 20 mEq, Oral, Once,  Airways, PA-C  •  potassium chloride 20 mEq IVPB (premix), 20 mEq, Intravenous, Once,  Airways, LILIANE, Last Rate: 50 mL/hr at 06/12/23 0831, 20 mEq at 06/12/23 0831  •  pravastatin (PRAVACHOL) tablet 40 mg, 40 mg, Oral, HS, Carole Dos Santos PA-C, 40 mg at 06/11/23 2122  •  sodium chloride tablet 1 g, 1 g, Oral, BID With Meals, Destiney Rodriguez PA-C, 1 g at 06/12/23 0810  •  tamsulosin (FLOMAX) capsule 0 4 mg, 0 4 mg, Oral, Daily, Carole Dos Santos PA-C, 0 4 mg at 06/12/23 1397    Laboratory Results:  Results from last 7 days   Lab Units 06/12/23  0542 06/11/23  0550 06/11/23  0514 06/10/23  0405 06/09/23  0542 06/08/23  2320 06/08/23  1852 06/08/23  0915 06/08/23  0317 06/07/23  1720 06/07/23  1206   BUN mg/dL 9 14  --  16 14 16 14 10 8   < > 9   CALCIUM mg/dL 8 3* 8 6  --  9 0 9 2 9 1 9 2 8 7 8 6   < > 9 0   CHLORIDE mmol/L 95* 97  --  95* 96 95* 94* 91* 88*   < > 86*   CO2 mmol/L 33* 31  --  29 28 26 24 24 25   < > 25   CREATININE mg/dL 0 72 0 74  --  0 90 0 87 0 99 1 05 0 88 0 77   < > 0 77   HEMATOCRIT % 29 0*  --  29 2* 32 5* 31 0*  --   --   --  30 5*  --  31 4*   HEMOGLOBIN g/dL "9 4*  --  9 6* 10 4* 10 1*  --   --   --  10 4*  --  10 3*   POTASSIUM mmol/L 3 1* 3 5  --  3 6 3 6 4 1 4 2 3 9 3 1*   < > 3 6   PLATELETS Thousands/uL 162  --  157 205 231  --   --   --  233  --  222   SODIUM mmol/L 135 133*  --  131* 131* 129* 128* 125* 123*   < > 120*   WBC Thousand/uL 6 01  --  7 54 8 88 7 76  --   --   --  6 58  --  6 11    < > = values in this interval not displayed  Portions of the record may have been created with voice recognition software  Occasional wrong word or \"sound a like\" substitutions may have occurred due to the inherent limitations of voice recognition software   Read the chart carefully and recognize,   "

## 2023-06-12 NOTE — ASSESSMENT & PLAN NOTE
· H/o gastric ulcer   · Hgb 9 6 today  · Received IV iron x1 yesterday  · GI following  · S/p EGD/[place] today revealing persistent bleeding based ulcer in gastric body, possibly smaller compared to prior pictures  Small hiatal hernia  Normal duodenum and esophagus  Biopsies taken of ulcer  Colonoscopy with diverticulosis and small hemorrhoids, no polyps or malignancies  · GI recommends resumption of regular diet and increasing Protonix to twice a day  · Follow-up in the office in 2 months  Repeat EGD advised in 3 months to document complete ulcer healing

## 2023-06-12 NOTE — ASSESSMENT & PLAN NOTE
· Chronic, follows with urology  · Maintained on Flomax and Proscar  · CT abdomen/pelvis with urinary bladder wall thickening -appears chronic  UA with bacteruria but pt denies any urinary symptoms    Afebrile, no leukocytosis  · Patient required straight cath x2 thus far, overnight 2 attempts were made and urology had to straight cath with coudé  · Since traumatic cath, patient complaining of suprapubic pain and urethral pain accompanied by urgency  · Urology consulted, recommend ultimate Nicole placement per retention protocol with outpatient trial to void following discharge  · Will follow up UA

## 2023-06-12 NOTE — PROGRESS NOTES
[place]  Progress Note  Name: Sandro Jhaveri  MRN: [id number]  Unit/Bed#: -01 I Date of Admission: 6/7/2023   Date of Service: 6/12/2023 I Hospital Day: 5    Assessment/Plan   Common bile duct dilation  Assessment & Plan  · Noted on CT abdomen/pelvis: new distention of the central intrahepatic/extrahepatic bile ducts  Consider MRCP if clinically warranted  · LFTs and bilirubin WNL, CA 19-9 slightly elevated at 58  · MRCP with minimally dilated CBD, no ERCP needed  · GI following  · Trend LFTs/bili    Hypertension  Assessment & Plan  · Blood pressure elevated while in the ED  · Resume home metoprolol and monitor  · Amlodipine 5 mg daily added with improvement    Urinary retention  Assessment & Plan  · Chronic, follows with urology  · Maintained on Flomax and Proscar  · CT abdomen/pelvis with urinary bladder wall thickening -appears chronic  UA with bacteruria but pt denies any urinary symptoms  Afebrile, no leukocytosis  · Patient required straight cath x2 thus far, overnight 2 attempts were made and urology had to straight cath with coudé  · Since traumatic cath, patient complaining of suprapubic pain and urethral pain accompanied by urgency  · Urology consulted, recommend ultimate Nicole placement per retention protocol with outpatient trial to void following discharge  · Will follow up UA    Iron deficiency anemia  Assessment & Plan  · H/o gastric ulcer   · Hgb 9 6 today  · Received IV iron x1 yesterday  · GI following  · S/p EGD/[place] today revealing persistent bleeding based ulcer in gastric body, possibly smaller compared to prior pictures  Small hiatal hernia  Normal duodenum and esophagus  Biopsies taken of ulcer  Colonoscopy with diverticulosis and small hemorrhoids, no polyps or malignancies  · GI recommends resumption of regular diet and increasing Protonix to twice a day  · Follow-up in the office in 2 months    Repeat EGD advised in 3 months to document complete ulcer healing  Protein-calorie malnutrition, unspecified severity ([facility] [address] )  Assessment & Plan  Malnutrition Findings:   · Adult Malnutrition type: Chronic illnessAdmits to poor appetite  · Consult nutrition    BMI Findings:  Adult BMI Classifications: Underweight < 18 5        Body mass index is 15 8 kg/m²  Chronic kidney disease, stage 3 [facility])  Assessment & Plan  Lab Results   Component Value Date    CREATININE 0 72 06/12/2023    CREATININE 0 74 06/11/2023    CREATININE 0 90 06/10/2023    EGFR 91 06/12/2023    EGFR 90 06/11/2023    EGFR 83 06/10/2023   · Baseline creatinine 0 8-1 2  · Creatinine stable  · Trend BMP    * Hyponatremia  Assessment & Plan  · Presented to the emergency department with lightheadedness, headache  Did have a couple episodes of diarrhea PTA  · Sodium found to be 120 on presentation  Appears that patient had admission in 2022 for hyponatremia  · At that time had been started on salt tabs/torsemide, hctz was discontinued and was placed on fluid restriction  · Admits to poor intake in terms of food but does feel he has adequate fluid intake  · Home regimen includes salt tabs 1 g twice daily in addition to torsemide 5 mg daily  · Patient is also on Celexa which can be contributing, discontinued  · Consulted nephrology  · S/p 1 8% saline infusion then transitioned off gtt  · Dosed Samsca x1 on 6/8 and dosed again x1 on 6/9 with improvement of Na  · Resumed on fluid restriction of 1 5L per day and home salt tabs of 1g BID  · Stable from Nephrology perspective: repeat BMP in 1 week, follow up with Nephro in 2-3 weeks  · PT/OT eval pending given weakness and frailty           VTE Pharmacologic Prophylaxis: VTE Score: 2 Low Risk (Score 0-2) - Encourage Ambulation  Patient Centered Rounds: I performed bedside rounds with nursing staff today    Discussions with Specialists or Other Care Team Provider: Urology, Nephro    Education and Discussions with Family / Patient: Updated  (daughter) via phone  Total Time Spent on Date of Encounter in care of patient: 45 minutes This time was spent on one or more of the following: performing physical exam; counseling and coordination of care; obtaining or reviewing history; documenting in the medical record; reviewing/ordering tests, medications or procedures; communicating with other healthcare professionals and discussing with patient's family/caregivers  Current Length of Stay: 5 day(s)  Current Patient Status: Inpatient   Certification Statement: The patient will continue to require additional inpatient hospital stay due to pending PT/OT eval  Discharge Plan: Anticipate discharge in 24-48 hrs to discharge location to be determined pending rehab evaluations  Code Status: Level 3 - DNAR and DNI    Subjective:   Patient states he is having significant discomfort at his penis and suprapubic region  He states he feels like he has to go to the bathroom all the time and only feels better when he is sitting on the toilet or walking around  Whenever he sits back down he experiences significant discomfort at both sites  He denies any fever or chills  He denies any burning with urination although he has been straight cathed the past 2 times to relieve urine  He denies any nausea, vomiting, abdominal pain or diarrhea  Denies headache or lightheadedness  Objective:     Vitals:   Temp (24hrs), Av 1 °F (36 7 °C), Min:97 7 °F (36 5 °C), Max:98 5 °F (36 9 °C)    Temp:  [97 7 °F (36 5 °C)-98 5 °F (36 9 °C)] 98 2 °F (36 8 °C)  HR:  [70-90] 80  Resp:  [16-18] 16  BP: ()/(55-96) 101/60  SpO2:  [94 %-100 %] 96 %  Body mass index is 15 8 kg/m²  Input and Output Summary (last 24 hours):      Intake/Output Summary (Last 24 hours) at 2023 1628  Last data filed at 2023 1626  Gross per 24 hour   Intake 500 ml   Output 1875 ml   Net -1375 ml       Physical Exam:   Physical Exam  Constitutional:       General: He is not in acute distress  Appearance: He is not toxic-appearing  Comments: Underweight   HENT:      Head: Normocephalic and atraumatic  Right Ear: External ear normal       Left Ear: External ear normal       Nose: Nose normal       Mouth/Throat:      Mouth: Mucous membranes are moist       Pharynx: Oropharynx is clear  Eyes:      Conjunctiva/sclera: Conjunctivae normal    Cardiovascular:      Rate and Rhythm: Normal rate and regular rhythm  Pulses: Normal pulses  Heart sounds: Normal heart sounds  No murmur heard  No gallop  Pulmonary:      Effort: Pulmonary effort is normal  No respiratory distress  Breath sounds: Normal breath sounds  No stridor  No wheezing, rhonchi or rales  Abdominal:      General: Abdomen is flat  Bowel sounds are normal  There is no distension  Palpations: Abdomen is soft  There is no mass  Tenderness: There is no abdominal tenderness  There is no guarding or rebound  Hernia: No hernia is present  Genitourinary:     Comments: Pt is gripping/massaging suprapubic region, ttp over suprapubic area  Musculoskeletal:      Cervical back: Normal range of motion  Right lower leg: No edema  Left lower leg: No edema  Skin:     General: Skin is warm and dry  Neurological:      General: No focal deficit present  Mental Status: He is alert and oriented to person, place, and time  Mental status is at baseline  Sensory: No sensory deficit  Motor: No weakness  Psychiatric:         Mood and Affect: Mood normal          Thought Content:  Thought content normal           Additional Data:     Labs:  Results from last 7 days   Lab Units 06/12/23  0542 06/11/23  0514   BANDS PCT %  --  2   EOS PCT % 2 2   HEMATOCRIT % 29 0* 29 2*   HEMOGLOBIN g/dL 9 4* 9 6*   LYMPHS PCT % 15  --    LYMPHO PCT %  --  8*   MONOS PCT % 13*  --    MONO PCT %  --  4   NEUTROS PCT % 69  --    PLATELETS Thousands/uL 162 157   WBC Thousand/uL 6 01 7 54 Results from last 7 days   Lab Units 06/12/23  0542 06/11/23  0550   ANION GAP mmol/L 7 5   ALBUMIN g/dL  --  3 1*   ALK PHOS U/L  --  61   ALT U/L  --  12   AST U/L  --  17   BUN mg/dL 9 14   CALCIUM mg/dL 8 3* 8 6   CHLORIDE mmol/L 95* 97   CO2 mmol/L 33* 31   CREATININE mg/dL 0 72 0 74   GLUCOSE RANDOM mg/dL 94 102   POTASSIUM mmol/L 3 1* 3 5   SODIUM mmol/L 135 133*   TOTAL BILIRUBIN mg/dL  --  0 29                       Lines/Drains:  Invasive Devices     Peripheral Intravenous Line  Duration           Peripheral IV 06/11/23 Left;Ventral (anterior) Forearm 1 day                      Imaging: No pertinent imaging reviewed      Recent Cultures (last 7 days):         Last 24 Hours Medication List:   Current Facility-Administered Medications   Medication Dose Route Frequency Provider Last Rate   • acetaminophen  650 mg Oral Q6H PRN Kathia Vargas MD     • ALPRAZolam  1 mg Oral BID PRN Kathia Vargas MD     • amLODIPine  10 mg Oral Daily Kathia Vargas MD     • aspirin  81 mg Oral Daily Kathia Vargas MD     • finasteride  5 mg Oral Daily Kathia Vargas MD     • glycerin-hypromellose-  1 drop Both Eyes Q4H PRKOREY Vargas MD     • hydrALAZINE  10 mg Intravenous Q6H PRKOREY Vargas MD     • magnesium Oxide  400 mg Oral Daily Kathia Vargas MD     • metoprolol succinate  25 mg Oral Daily Kathia Vargas MD     • nicotine  1 patch Transdermal Daily Kathia Vargas MD     • pancrelipase (Lip-Prot-Amyl)  24,000 Units Oral TID With Meals Kathia Vargas MD     • pantoprazole  40 mg Oral BID [facility] Kathia Vargas MD     • potassium chloride  20 mEq Oral Once Kathia Vargas MD     • pravastatin  40 mg Oral HS Kathia Vargas MD     • sodium chloride  1 g Oral BID With Meals Kathia Vargas MD     • tamsulosin  0 4 mg Oral Daily Kathia Vargas MD          Today, Patient Was Seen By: Shama Smyth PA-C    **Please Note: This note may have been constructed using a voice recognition system  **

## 2023-06-12 NOTE — ANESTHESIA PREPROCEDURE EVALUATION
Procedure:  COLONOSCOPY  EGD    Relevant Problems   CARDIO   (+) AAA (abdominal aortic aneurysm) without rupture (HCC)   (+) Hypertension      GI/HEPATIC   (+) Other chronic pancreatitis (HCC)      /RENAL   (+) Chronic kidney disease, stage 3 (HCC)      HEMATOLOGY   (+) Iron deficiency anemia      MUSCULOSKELETAL   (+) Neurogenic claudication      NEURO/PSYCH   (+) Depression, recurrent (HCC)   (+) Neurogenic claudication      PULMONARY   (+) Smoking      Other   (+) History of endovascular stent graft for abdominal aortic aneurysm (AAA)     Hemoglobin    9 4             Physical Exam    Airway    Mallampati score: II  TM Distance: >3 FB  Neck ROM: full     Dental       Cardiovascular      Pulmonary      Other Findings        Anesthesia Plan  ASA Score- 3     Anesthesia Type- IV sedation with anesthesia with ASA Monitors  Additional Monitors:   Airway Plan:           Plan Factors-    Chart reviewed  Induction- intravenous  Postoperative Plan-     Informed Consent- Anesthetic plan and risks discussed with patient  I personally reviewed this patient with the CRNA  Discussed and agreed on the Anesthesia Plan with the CRNA  Mikael Hameed

## 2023-06-12 NOTE — ANESTHESIA POSTPROCEDURE EVALUATION
Post-Op Assessment Note    CV Status:  Stable  Pain Score: 0    Pain management: adequate     Mental Status:  Alert and awake   Hydration Status:  Euvolemic   PONV Controlled:  Controlled   Airway Patency:  Patent      Post Op Vitals Reviewed: Yes      Staff: Anesthesiologist, CRNA         No notable events documented      BP  103/56   Temp      Pulse  72   Resp   18   SpO2   98

## 2023-06-12 NOTE — QUICK NOTE
Brief colonoscopy report      IMPRESSION:  · Diverticulosis  · Small hemorrhoids    No polyps or malignancies    RECOMMENDATION:  Resume regular diet and medications   No further screening colonoscopies necessary    · Age greater than 72       Jonathan Burgess MD

## 2023-06-12 NOTE — TELEPHONE ENCOUNTER
----- Message from Roseline Miner PA-C sent at 6/11/2023  7:36 AM EDT -----  Patient admitted at [facility]  Possible discharge within the next 24-48 hours  Diagnosis: Hyponatremia  Patient will get a BMP in 1 week after hospital discharge, prescription given  Please schedule for hospital follow-up in 2-3 weeks with repeat BMP

## 2023-06-12 NOTE — ASSESSMENT & PLAN NOTE
· Presented to the emergency department with lightheadedness, headache  Did have a couple episodes of diarrhea PTA  · Sodium found to be 120 on presentation    Appears that patient had admission in 2022 for hyponatremia  · At that time had been started on salt tabs/torsemide, hctz was discontinued and was placed on fluid restriction  · Admits to poor intake in terms of food but does feel he has adequate fluid intake  · Home regimen includes salt tabs 1 g twice daily in addition to torsemide 5 mg daily  · Patient is also on Celexa which can be contributing, discontinued  · Consulted nephrology  · S/p 1 8% saline infusion then transitioned off gtt  · Dosed Samsca x1 on 6/8 and dosed again x1 on 6/9 with improvement of Na  · Resumed on fluid restriction of 1 5L per day and home salt tabs of 1g BID  · Stable from Nephrology perspective: repeat BMP in 1 week, follow up with Nephro in 2-3 weeks  · PT/OT eval pending given weakness and frailty

## 2023-06-12 NOTE — PLAN OF CARE
Problem: Potential for Falls  Goal: Patient will remain free of falls  Description: INTERVENTIONS:  - Educate patient/family on patient safety including physical limitations  - Instruct patient to call for assistance with activity   - Consult OT/PT to assist with strengthening/mobility   - Keep Call bell within reach  - Keep bed low and locked with side rails adjusted as appropriate  - Keep care items and personal belongings within reach  - Initiate and maintain comfort rounds  - Make Fall Risk Sign visible to staff  - Offer Toileting every 2 Hours, in advance of need  - Initiate/Maintain bed/ chair alarm  - Obtain necessary fall risk management equipment: walker  - Apply yellow socks and bracelet for high fall risk patients  - Consider moving patient to room near nurses station  Outcome: Progressing     Problem: MOBILITY - ADULT  Goal: Maintain or return to baseline ADL function  Description: INTERVENTIONS:  - Educate patient/family on patient safety including physical limitations  - Instruct patient to call for assistance with activity   - Consult OT/PT to assist with strengthening/mobility   - Keep Call bell within reach  - Keep bed low and locked with side rails adjusted as appropriate  - Keep care items and personal belongings within reach  - Initiate and maintain comfort rounds  - Make Fall Risk Sign visible to staff  - Offer Toileting every 2 Hours, in advance of need  - Initiate/Maintain bed/ chair alarm  - Obtain necessary fall risk management equipment: walker  - Apply yellow socks and bracelet for high fall risk patients  - Consider moving patient to room near nurses station  Outcome: Progressing  Goal: Maintains/Returns to pre admission functional level  Description: INTERVENTIONS:  - Perform BMAT or MOVE assessment daily    - Set and communicate daily mobility goal to care team and patient/family/caregiver     - Collaborate with rehabilitation services on mobility goals if consulted  - Out of bed for toileting  - Record patient progress and toleration of activity level   Outcome: Progressing     Problem: INFECTION - ADULT  Goal: Absence or prevention of progression during hospitalization  Description: INTERVENTIONS:  - Assess and monitor for signs and symptoms of infection  - Monitor lab/diagnostic results  - Monitor all insertion sites, i e  indwelling lines, tubes, and drains  - Monitor endotracheal if appropriate and nasal secretions for changes in amount and color  - [place] appropriate cooling/warming therapies per order  - Administer medications as ordered  - Instruct and encourage patient and family to use good hand hygiene technique  - Identify and instruct in appropriate isolation precautions for identified infection/condition  Outcome: Progressing  Goal: Absence of fever/infection during neutropenic period  Description: INTERVENTIONS:  - Monitor WBC    Outcome: Progressing     Problem: SAFETY ADULT  Goal: Patient will remain free of falls  Description: INTERVENTIONS:  - Educate patient/family on patient safety including physical limitations  - Instruct patient to call for assistance with activity   - Consult OT/PT to assist with strengthening/mobility   - Keep Call bell within reach  - Keep bed low and locked with side rails adjusted as appropriate  - Keep care items and personal belongings within reach  - Initiate and maintain comfort rounds  - Make Fall Risk Sign visible to staff  - Offer Toileting every 2 Hours, in advance of need  - Initiate/Maintain bed/ chair alarm  - Obtain necessary fall risk management equipment: walker  - Apply yellow socks and bracelet for high fall risk patients  - Consider moving patient to room near nurses station  Outcome: Progressing  Goal: Maintain or return to baseline ADL function  Description: INTERVENTIONS:  - Educate patient/family on patient safety including physical limitations  - Instruct patient to call for assistance with activity   - Consult OT/PT to assist with strengthening/mobility   - Keep Call bell within reach  - Keep bed low and locked with side rails adjusted as appropriate  - Keep care items and personal belongings within reach  - Initiate and maintain comfort rounds  - Make Fall Risk Sign visible to staff  - Offer Toileting every 2 Hours, in advance of need  - Initiate/Maintain bed/ chair alarm  - Obtain necessary fall risk management equipment: walker  - Apply yellow socks and bracelet for high fall risk patients  - Consider moving patient to room near nurses station  Outcome: Progressing  Goal: Maintains/Returns to pre admission functional level  Description: INTERVENTIONS:  - Perform BMAT or MOVE assessment daily    - Set and communicate daily mobility goal to care team and patient/family/caregiver     - Collaborate with rehabilitation services on mobility goals if consulted  - Out of bed for toileting  - Record patient progress and toleration of activity level   Outcome: Progressing

## 2023-06-12 NOTE — CONSULTS
Consultation - Urology   Екатерина [place] Shayla 75 y o  male MRN: [id number]  Unit/Bed#: -01 Encounter: [id number]               Assessment/Plan        Urinary retention  -Pt with known known urinary retention  Continue with retention protocol  If patient continues to fail retention protocol, please place rcoft cath  -Cont with flomax and proscar  -Cont with PVR, straight cath *2 as of right now  -cont with croft, if one is placed and have patient follow up in office for void trial  -bowel regimen if patient has constipation issues  -rest of the mngt per primary      History of Present Illness    Admit Date:  6/7/2023  Hospital Day:  5 days  Primary Service:  Hospitalist  Attending Provider:  Mercedez Kramer DO    HPI: Tiffany Singer is a 75y o  year old male with pmhx significant for hyponatremia, hx of abdominal aortic aneurysm repair, htn, pancreatic pseudocyst who came to the ER for dizziness and feelings of malaise  Patient admits to poor appetitie   He has intermittent chronic epigastric discomfort  Pt is being worked up for this  He is known to have chronic retention,  LUTS on 8/19  Pt is on flomax and proscar  UA pending  PT denies urinary complains, burning or blood in urine    Inpatient consult to Urology  Consult performed by: Analy Francisco PA-C  Consult ordered by: Caridad Richmond PA-C          Review of Systems     CONSTITUTIONAL: Denies any fever  Positive for fatigue  HEENT: No facial trauma, earache or tinnitus  Denies hearing loss or visual disturbances  CARDIOVASCULAR: No chest pain or palpitations  RESPIRATORY: Denies any cough, hemoptysis, shortness of breath or dyspnea on exertion  GASTROINTESTINAL: +abdominal pain  GENITOURINARY: No problems with urination  Denies any hematuria or dysuria  NEUROLOGIC: +dizziness  MUSCULOSKELETAL: Denies any muscle or joint pain  SKIN: Denies skin rashes or itching     ENDOCRINE: Denies excessive thirst  Denies intolerance to heat or cold       Historical Information   Past Medical History:   Diagnosis Date   • Anxiety    • Rhodes's esophagus    • Chronic neck pain    • Depression    • GERD (gastroesophageal reflux disease)    • H/O abdominal aortic aneurysm 4 5 cm   • Hyperlipidemia    • Hypertension    • Pancreatic pseudocyst    • Vertigo      Past Surgical History:   Procedure Laterality Date   • COLONOSCOPY  09/30/2014   • IR LOWER EXTREMITY / INTERVENTION  01/04/2019   • VA ESOPHAGOGASTRODUODENOSCOPY TRANSORAL DIAGNOSTIC N/A 06/22/2018    Procedure: EGD AND COLONOSCOPY;  Surgeon: Victorino Saxena MD;  Location: [facility]; Service: Gastroenterology   • VA EVASC RPR DPLMNT AORTO-AORTIC NDGFT N/A 02/09/2018    Procedure: REPAIR ANEURYSM ENDOVASCULAR ABDOMINAL AORTIC  (EVAR);   Surgeon: Donnell Felty, MD;  Location: [facility];  Service: Vascular   • UPPER GASTROINTESTINAL ENDOSCOPY       Social History   Social History     Substance and Sexual Activity   Alcohol Use Never     Social History     Substance and Sexual Activity   Drug Use No     E-Cigarette/Vaping   • E-Cigarette Use Never User      E-Cigarette/Vaping Substances   • Nicotine No    • THC No    • CBD No    • Flavoring No    • Other No    • Unknown No      Social History     Tobacco Use   Smoking Status Every Day   • Packs/day: 0 25   • Types: Cigarettes   Smokeless Tobacco Never   Tobacco Comments    [place] quiting smoking handouts     Family History:   Family History   Problem Relation Age of Onset   • Heart disease Father    • Heart attack Father    • Diabetes Maternal Grandmother    • Diabetes Maternal Grandfather    • Diabetes Paternal Grandmother    • Hypertension Family    • Hyperlipidemia Family        Meds/Allergies   current meds:   Current Facility-Administered Medications   Medication Dose Route Frequency   • acetaminophen (TYLENOL) tablet 650 mg  650 mg Oral Q6H PRN   • ALPRAZolam (XANAX) tablet 1 mg  1 mg Oral BID PRN   • amLODIPine (NORVASC) tablet 10 mg  10 mg Oral Daily   • aspirin (ECOTRIN LOW STRENGTH) EC tablet 81 mg  81 mg Oral Daily   • finasteride (PROSCAR) tablet 5 mg  5 mg Oral Daily   • glycerin-hypromellose- (ARTIFICIAL TEARS) ophthalmic solution 1 drop  1 drop Both Eyes Q4H PRN   • hydrALAZINE (APRESOLINE) injection 10 mg  10 mg Intravenous Q6H PRN   • magnesium Oxide (MAG-OX) tablet 400 mg  400 mg Oral Daily   • metoprolol succinate (TOPROL-XL) 24 hr tablet 25 mg  25 mg Oral Daily   • nicotine (NICODERM CQ) 14 mg/24hr TD 24 hr patch 1 patch  1 patch Transdermal Daily   • pancrelipase (Lip-Prot-Amyl) (CREON) delayed release capsule 24,000 Units  24,000 Units Oral TID With Meals   • pantoprazole (PROTONIX) EC tablet 40 mg  40 mg Oral BID AC   • potassium chloride (K-DUR,KLOR-CON) CR tablet 20 mEq  20 mEq Oral Once   • pravastatin (PRAVACHOL) tablet 40 mg  40 mg Oral HS   • sodium chloride tablet 1 g  1 g Oral BID With Meals   • tamsulosin (FLOMAX) capsule 0 4 mg  0 4 mg Oral Daily     Facility-Administered Medications Ordered in Other Encounters   Medication Dose Route Frequency   • propofol (DIPRIVAN) 200 MG/20ML bolus injection   Intravenous PRN   • sodium chloride 0 9 % infusion   Intravenous Continuous PRN         No Known Allergies    Objective   Temp:  [97 5 °F (36 4 °C)-98 5 °F (36 9 °C)] 98 5 °F (36 9 °C)  HR:  [] 88  Resp:  [16] 16  BP: (124-189)/(68-96) 189/90    Intake/Output Summary (Last 24 hours) at 6/12/2023 1158  Last data filed at 6/12/2023 1100  Gross per 24 hour   Intake 0 ml   Output 947 ml   Net -947 ml       Physical Exam    Lab Results:   CBC:   Lab Results   Component Value Date    HCT 29 0 (L) 06/12/2023    HGB 9 4 (L) 06/12/2023    MCH 27 3 06/12/2023    MCHC 32 4 06/12/2023    MCV 84 06/12/2023    MPV 9 0 06/12/2023    NRBC 0 06/12/2023     06/12/2023    RBC 3 44 (L) 06/12/2023    RDW 19 5 (H) 06/12/2023    WBC 6 01 06/12/2023   , BMP:  Lab Results   Component Value Date    AGAP 7 06/12/2023    BUN 9 06/12/2023    CALCIUM 8 3 (L) 06/12/2023    CL 95 (L) 06/12/2023    CO2 33 (H) 06/12/2023    CREATININE 0 72 06/12/2023    EGFR 91 06/12/2023    GLUC 94 06/12/2023    K 3 1 (L) 06/12/2023    SODIUM 135 06/12/2023     Imaging Studies: I have personally reviewed pertinent reports  EKG, Pathology, and Other Studies: I have personally reviewed pertinent reports  Counseling / Coordination of Care  Total floor / unit time spent today 15 minutes  Greater than 50% of total time was spent with the patient and / or family counseling and / or coordination of care   A description of the counseling / coordination of care: 30mins

## 2023-06-13 ENCOUNTER — TELEPHONE (OUTPATIENT)
Dept: NEPHROLOGY | Facility: CLINIC | Age: 73
End: 2023-06-13

## 2023-06-13 ENCOUNTER — HOME HEALTH ADMISSION (OUTPATIENT)
Dept: HOME HEALTH SERVICES | Facility: HOME HEALTHCARE | Age: 73
End: 2023-06-13
Payer: MEDICARE

## 2023-06-13 VITALS
DIASTOLIC BLOOD PRESSURE: 83 MMHG | RESPIRATION RATE: 18 BRPM | OXYGEN SATURATION: 97 % | HEIGHT: 62 IN | WEIGHT: 86.4 LBS | HEART RATE: 81 BPM | BODY MASS INDEX: 15.9 KG/M2 | TEMPERATURE: 97.7 F | SYSTOLIC BLOOD PRESSURE: 147 MMHG

## 2023-06-13 LAB
ANION GAP SERPL CALCULATED.3IONS-SCNC: 7 MMOL/L (ref 4–13)
ANION GAP SERPL CALCULATED.3IONS-SCNC: 8 MMOL/L (ref 4–13)
BASOPHILS # BLD AUTO: 0.02 THOUSANDS/ÂΜL (ref 0–0.1)
BASOPHILS NFR BLD AUTO: 0 % (ref 0–1)
BUN SERPL-MCNC: 13 MG/DL (ref 5–25)
BUN SERPL-MCNC: 14 MG/DL (ref 5–25)
CALCIUM SERPL-MCNC: 8.2 MG/DL (ref 8.4–10.2)
CALCIUM SERPL-MCNC: 8.2 MG/DL (ref 8.4–10.2)
CHLORIDE SERPL-SCNC: 97 MMOL/L (ref 96–108)
CHLORIDE SERPL-SCNC: 97 MMOL/L (ref 96–108)
CO2 SERPL-SCNC: 27 MMOL/L (ref 21–32)
CO2 SERPL-SCNC: 30 MMOL/L (ref 21–32)
CREAT SERPL-MCNC: 0.76 MG/DL (ref 0.6–1.3)
CREAT SERPL-MCNC: 0.77 MG/DL (ref 0.6–1.3)
EOSINOPHIL # BLD AUTO: 0.13 THOUSAND/ÂΜL (ref 0–0.61)
EOSINOPHIL NFR BLD AUTO: 2 % (ref 0–6)
ERYTHROCYTE [DISTWIDTH] IN BLOOD BY AUTOMATED COUNT: 19.2 % (ref 11.6–15.1)
GFR SERPL CREATININE-BSD FRML MDRD: 89 ML/MIN/1.73SQ M
GFR SERPL CREATININE-BSD FRML MDRD: 89 ML/MIN/1.73SQ M
GLUCOSE SERPL-MCNC: 102 MG/DL (ref 65–140)
GLUCOSE SERPL-MCNC: 110 MG/DL (ref 65–140)
HCT VFR BLD AUTO: 27.6 % (ref 36.5–49.3)
HGB BLD-MCNC: 8.9 G/DL (ref 12–17)
IMM GRANULOCYTES # BLD AUTO: 0.04 THOUSAND/UL (ref 0–0.2)
IMM GRANULOCYTES NFR BLD AUTO: 1 % (ref 0–2)
LYMPHOCYTES # BLD AUTO: 1.08 THOUSANDS/ÂΜL (ref 0.6–4.47)
LYMPHOCYTES NFR BLD AUTO: 16 % (ref 14–44)
MAGNESIUM SERPL-MCNC: 1.4 MG/DL (ref 1.9–2.7)
MCH RBC QN AUTO: 27 PG (ref 26.8–34.3)
MCHC RBC AUTO-ENTMCNC: 32.2 G/DL (ref 31.4–37.4)
MCV RBC AUTO: 84 FL (ref 82–98)
MONOCYTES # BLD AUTO: 0.81 THOUSAND/ÂΜL (ref 0.17–1.22)
MONOCYTES NFR BLD AUTO: 12 % (ref 4–12)
NEUTROPHILS # BLD AUTO: 4.89 THOUSANDS/ÂΜL (ref 1.85–7.62)
NEUTS SEG NFR BLD AUTO: 69 % (ref 43–75)
NRBC BLD AUTO-RTO: 0 /100 WBCS
PLATELET # BLD AUTO: 161 THOUSANDS/UL (ref 149–390)
PMV BLD AUTO: 8.8 FL (ref 8.9–12.7)
POTASSIUM SERPL-SCNC: 3 MMOL/L (ref 3.5–5.3)
POTASSIUM SERPL-SCNC: 3.7 MMOL/L (ref 3.5–5.3)
RBC # BLD AUTO: 3.3 MILLION/UL (ref 3.88–5.62)
SODIUM SERPL-SCNC: 132 MMOL/L (ref 135–147)
SODIUM SERPL-SCNC: 134 MMOL/L (ref 135–147)
WBC # BLD AUTO: 6.97 THOUSAND/UL (ref 4.31–10.16)

## 2023-06-13 PROCEDURE — 83735 ASSAY OF MAGNESIUM: CPT | Performed by: INTERNAL MEDICINE

## 2023-06-13 PROCEDURE — 99232 SBSQ HOSP IP/OBS MODERATE 35: CPT | Performed by: INTERNAL MEDICINE

## 2023-06-13 PROCEDURE — 97166 OT EVAL MOD COMPLEX 45 MIN: CPT

## 2023-06-13 PROCEDURE — 97530 THERAPEUTIC ACTIVITIES: CPT

## 2023-06-13 PROCEDURE — 80048 BASIC METABOLIC PNL TOTAL CA: CPT | Performed by: INTERNAL MEDICINE

## 2023-06-13 PROCEDURE — 85025 COMPLETE CBC W/AUTO DIFF WBC: CPT | Performed by: PHYSICIAN ASSISTANT

## 2023-06-13 PROCEDURE — 51703 INSERT BLADDER CATH COMPLEX: CPT | Performed by: PHYSICIAN ASSISTANT

## 2023-06-13 PROCEDURE — NC001 PR NO CHARGE: Performed by: PHYSICIAN ASSISTANT

## 2023-06-13 PROCEDURE — 97116 GAIT TRAINING THERAPY: CPT

## 2023-06-13 PROCEDURE — 97163 PT EVAL HIGH COMPLEX 45 MIN: CPT

## 2023-06-13 PROCEDURE — 99239 HOSP IP/OBS DSCHRG MGMT >30: CPT | Performed by: PHYSICIAN ASSISTANT

## 2023-06-13 PROCEDURE — 80048 BASIC METABOLIC PNL TOTAL CA: CPT | Performed by: NURSE PRACTITIONER

## 2023-06-13 RX ORDER — AMLODIPINE BESYLATE 10 MG/1
10 TABLET ORAL DAILY
Qty: 30 TABLET | Refills: 0 | Status: SHIPPED | OUTPATIENT
Start: 2023-06-14

## 2023-06-13 RX ORDER — SODIUM CHLORIDE 1 G/1
1 TABLET ORAL
Qty: 90 TABLET | Refills: 0 | Status: SHIPPED | OUTPATIENT
Start: 2023-06-13

## 2023-06-13 RX ORDER — CEFTRIAXONE 1 G/50ML
1000 INJECTION, SOLUTION INTRAVENOUS EVERY 24 HOURS
Status: DISCONTINUED | OUTPATIENT
Start: 2023-06-13 | End: 2023-06-13 | Stop reason: HOSPADM

## 2023-06-13 RX ORDER — POTASSIUM CHLORIDE 20 MEQ/1
40 TABLET, EXTENDED RELEASE ORAL EVERY 4 HOURS
Status: COMPLETED | OUTPATIENT
Start: 2023-06-13 | End: 2023-06-13

## 2023-06-13 RX ORDER — NITROFURANTOIN MACROCRYSTALS 100 MG/1
100 CAPSULE ORAL 2 TIMES DAILY
Qty: 14 CAPSULE | Refills: 0 | Status: SHIPPED | OUTPATIENT
Start: 2023-06-13 | End: 2023-06-20

## 2023-06-13 RX ORDER — SODIUM CHLORIDE 1 G/1
1 TABLET ORAL
Status: DISCONTINUED | OUTPATIENT
Start: 2023-06-13 | End: 2023-06-13 | Stop reason: HOSPADM

## 2023-06-13 RX ORDER — MAGNESIUM SULFATE HEPTAHYDRATE 40 MG/ML
2 INJECTION, SOLUTION INTRAVENOUS ONCE
Status: COMPLETED | OUTPATIENT
Start: 2023-06-13 | End: 2023-06-13

## 2023-06-13 RX ORDER — SODIUM CHLORIDE 1 G/1
1 TABLET ORAL
Qty: 90 TABLET | Refills: 0 | Status: CANCELLED | OUTPATIENT
Start: 2023-06-13

## 2023-06-13 RX ORDER — CEFDINIR 300 MG/1
300 CAPSULE ORAL EVERY 12 HOURS SCHEDULED
Qty: 20 CAPSULE | Refills: 0 | Status: CANCELLED | OUTPATIENT
Start: 2023-06-13 | End: 2023-06-23

## 2023-06-13 RX ADMIN — ACETAMINOPHEN 650 MG: 325 TABLET, FILM COATED ORAL at 16:12

## 2023-06-13 RX ADMIN — FINASTERIDE 5 MG: 5 TABLET, FILM COATED ORAL at 09:06

## 2023-06-13 RX ADMIN — CEFTRIAXONE 1000 MG: 1 INJECTION, SOLUTION INTRAVENOUS at 07:30

## 2023-06-13 RX ADMIN — MAGNESIUM SULFATE HEPTAHYDRATE 2 G: 2 INJECTION, SOLUTION INTRAVENOUS at 07:29

## 2023-06-13 RX ADMIN — PANTOPRAZOLE SODIUM 40 MG: 40 TABLET, DELAYED RELEASE ORAL at 06:04

## 2023-06-13 RX ADMIN — SODIUM CHLORIDE 1 G: 1 TABLET ORAL at 07:29

## 2023-06-13 RX ADMIN — MAGNESIUM OXIDE TAB 400 MG (241.3 MG ELEMENTAL MG) 400 MG: 400 (241.3 MG) TAB at 09:06

## 2023-06-13 RX ADMIN — PANTOPRAZOLE SODIUM 40 MG: 40 TABLET, DELAYED RELEASE ORAL at 16:12

## 2023-06-13 RX ADMIN — ACETAMINOPHEN 650 MG: 325 TABLET, FILM COATED ORAL at 05:25

## 2023-06-13 RX ADMIN — SODIUM CHLORIDE 1 G: 1 TABLET ORAL at 16:12

## 2023-06-13 RX ADMIN — METOPROLOL SUCCINATE 25 MG: 50 TABLET, EXTENDED RELEASE ORAL at 09:06

## 2023-06-13 RX ADMIN — POTASSIUM CHLORIDE 40 MEQ: 1500 TABLET, EXTENDED RELEASE ORAL at 07:29

## 2023-06-13 RX ADMIN — ASPIRIN 81 MG: 81 TABLET, COATED ORAL at 09:06

## 2023-06-13 RX ADMIN — PANCRELIPASE 24000 UNITS: 24000; 76000; 120000 CAPSULE, DELAYED RELEASE PELLETS ORAL at 16:12

## 2023-06-13 RX ADMIN — PANCRELIPASE 24000 UNITS: 24000; 76000; 120000 CAPSULE, DELAYED RELEASE PELLETS ORAL at 07:29

## 2023-06-13 RX ADMIN — POTASSIUM CHLORIDE 40 MEQ: 1500 TABLET, EXTENDED RELEASE ORAL at 10:43

## 2023-06-13 RX ADMIN — TAMSULOSIN HYDROCHLORIDE 0.4 MG: 0.4 CAPSULE ORAL at 09:06

## 2023-06-13 RX ADMIN — PANCRELIPASE 24000 UNITS: 24000; 76000; 120000 CAPSULE, DELAYED RELEASE PELLETS ORAL at 12:28

## 2023-06-13 RX ADMIN — AMLODIPINE BESYLATE 10 MG: 5 TABLET ORAL at 09:06

## 2023-06-13 NOTE — ASSESSMENT & PLAN NOTE
Lab Results   Component Value Date    CREATININE 0 77 06/13/2023    CREATININE 0 76 06/13/2023    CREATININE 0 72 06/12/2023    EGFR 89 06/13/2023    EGFR 89 06/13/2023    EGFR 91 06/12/2023   · Baseline creatinine 0 8-1 2  · Creatinine stable  · Trend BMP

## 2023-06-13 NOTE — CASE MANAGEMENT
Case Management Discharge Planning Note    Patient name Александр Millan  Location /-01 MRN [id number]  : 1950 Date 2023       Current Admission Date: 2023  Current Admission Diagnosis:Hyponatremia   Patient Active Problem List    Diagnosis Date Noted   • Severe protein-calorie malnutrition ([facility] ) 2023   • SIADH (syndrome of inappropriate ADH production) ([place] [address] )    • Common bile duct dilation 2023   • Hyponatremia 2022   • Hypokalemia 2022   • Urinary retention 2022   • Hypertension 2022   • Iron deficiency anemia 2022   • Protein-calorie malnutrition, unspecified severity ([facility] ) 06/15/2022   • Depression, recurrent ([facility] ) 06/15/2022   • Chronic kidney disease, stage 3 ([facility] ) 2022   • History of endovascular stent graft for abdominal aortic aneurysm (AAA) 2021   • Stenosis of other vascular prosthetic devices, implants and grafts, subsequent encounter 2021   • Neurogenic claudication 2021   • 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 ([facility] ) 2021   • Other chronic pancreatitis ([facility] ) 09/10/2018   • Smoking 09/10/2018   • Epigastric pain 2018   • Diarrhea 2018   • AAA (abdominal aortic aneurysm) without rupture ([facility] ) 2018      LOS (days): 6  Geometric Mean LOS (GMLOS) (days): 5 10  Days to GMLOS:-0 7     OBJECTIVE:  Risk of Unplanned Readmission Score: 19 36         Current admission status: Inpatient   Preferred Pharmacy:   [address] [place] [address] [id number] Marily [place], [address] [place] [id number]  Phone: [phone] Fax: [phone]    Primary Care Provider: Alejandro Espinal MD    Primary Insurance: MEDICARE  Secondary Insurance: [facility]    DISCHARGE DETAILS:                                [address]         Is the patient interested in [facility] at discharge?: Yes  [address] requested[de-identified] [address] Name[de-identified] [address] [facility]  Home Health Follow-Up Provider[de-identified] PCP  Home Health Services Needed[de-identified] Urinary Incontinence Catheter Management  Homebound Criteria Met[de-identified] Requires the Assistance of Another Person for Safe Ambulation or to Leave the Home  Supporting Clincal Findings[de-identified] Limited Endurance                 Continuing to follow patient  Met with patient, discussed his need for VNA to follow as he will be going home with a croft  He is agreeable  Referral to VNA 's within 10 mile radius made  Wait availability and patient preference                                        IMM Given (Date):: 06/13/23  IMM Given to[de-identified] Patient

## 2023-06-13 NOTE — ASSESSMENT & PLAN NOTE
· H/o gastric ulcer   · Received IV iron x1  · GI following  · S/p EGD/[place] revealing persistent bleeding based ulcer in gastric body, possibly smaller compared to prior pictures  Small hiatal hernia  Normal duodenum and esophagus  Biopsies taken of ulcer  Colonoscopy with diverticulosis and small hemorrhoids, no polyps or malignancies  · GI recommends resumption of regular diet and increasing Protonix to twice a day  · Follow-up in the office in 2 months  Repeat EGD advised in 3 months to document complete ulcer healing

## 2023-06-13 NOTE — PROGRESS NOTES
NEPHROLOGY PROGRESS NOTE   Ludwin Mcguire 75 y o  male MRN: [id number]  Unit/Bed#: -01 Encounter: [id number]  Reason for Consult: Acute on chronic hyponatremia  ASSESSMENT/PLAN:  Acute on chronic hyponatremia: Suspect SIADH due to SSRI  -Baseline sodium 130-1 35 since 2014   -Presents with sodium of 120 on 6/7   -Repeat a m  sodium level 134   -Received treatment with isotonic saline and Samsca  -Work-up: Urine osmolality 324, urine sodium 35, TSH level normal   -CT with no obvious masses or concern for malignancy   -Currently not on fluid restriction, would recommend placing on 1 5 L/day fluid restriction  Reports drinking 3 cups of water this morning   -Continue salt tablets 1 g twice daily   -Consider addition of low-dose torsemide   -Will need follow-up with renal at discharge  Message sent to office to arrange  Blood pressure: With fluctuation   -Continue on Toprol-XL 25 mg daily and amlodipine 10 mg daily   -Avoid hypotension or high fluctuations with blood pressure  Hypokalemia: Noted hypomagnesemia   -Repeat a m  potassium 3 0   -Continue magnesium oxide 400 mg daily   -Continue to monitor and replace as needed  Urinary retention: Status post coudé catheter placement by urology team   -Continue Flomax and Proscar   -Will need catheter at discharge  Dilated intra and extrahepatic bile ducts: Status post EGD with biopsy which showed diverticulosis with small hemorrhoids  -GI team is following  Disposition: Requiring additional stay due to medical needs  SUBJECTIVE:  The patient is resting in his bedside chair  He denies chest discomfort or shortness of breath  He denies nausea, vomiting, diarrhea  He had Nicole catheter placed this morning  He reports drinking a lot of water  He reports drinking 3 white cups of water this morning along with coffee  We discussed fluid restriction      OBJECTIVE:  Current Weight: Weight - Scale: 39 2 kg (86 lb 6 4 oz)  Vitals: 06/12/23 1245 06/12/23 1536 06/12/23 2143 06/13/23 0730   BP: 160/81 101/60 138/79 147/83   BP Location:    Right arm   Pulse: 80  90 81   Resp: 16   18   Temp:  98 2 °F (36 8 °C) 97 9 °F (36 6 °C) 97 7 °F (36 5 °C)   TempSrc:  Temporal  Temporal   SpO2: 96%  96% 97%   Weight:       Height:           Intake/Output Summary (Last 24 hours) at 6/13/2023 1125  Last data filed at 6/13/2023 0730  Gross per 24 hour   Intake 500 ml   Output 2373 ml   Net -1873 ml     General: NAD  Skin: warm, dry, intact, no rash  HEENT: Moist mucous membranes, sclera anicteric, normocephalic, atraumatic  Neck: No apparent JVD appreciated  Chest: lung sounds clear B/L, on RA   CVS:Regular rate and rhythm, no murmer   Abdomen: Soft, round, non-tender, +BS, Nicole catheter present with clear yellow urine  Extremities: No B/L LE edema present  Neuro: alert and oriented  Psych: appropriate mood and affect     Medications:    Current Facility-Administered Medications:   •  acetaminophen (TYLENOL) tablet 650 mg, 650 mg, Oral, Q6H PRN, Chencho Hutchison MD, 650 mg at 06/13/23 4030  •  ALPRAZolam [place]) tablet 1 mg, 1 mg, Oral, BID PRN, Chencho Hutchison MD, 1 mg at 06/12/23 2148  •  amLODIPine (NORVASC) tablet 10 mg, 10 mg, Oral, Daily, Chencho Hutchiosn MD, 10 mg at 06/13/23 0787  •  aspirin (ECOTRIN LOW STRENGTH) EC tablet 81 mg, 81 mg, Oral, Daily, Chencho Hutchison MD, 81 mg at 06/13/23 4200  •  cefTRIAXone (ROCEPHIN) IVPB (premix in dextrose) 1,000 mg 50 mL, 1,000 mg, Intravenous, Q24H, Adalberto Hoyt MD, Last Rate: 100 mL/hr at 06/13/23 0730, 1,000 mg at 06/13/23 0730  •  finasteride (PROSCAR) tablet 5 mg, 5 mg, Oral, Daily, Chencho Hutchison MD, 5 mg at 06/13/23 1696  •  glycerin-hypromellose- (ARTIFICIAL TEARS) ophthalmic solution 1 drop, 1 drop, Both Eyes, Q4H PRN, Chencho Hutchison MD  •  hydrALAZINE (APRESOLINE) injection 10 mg, 10 mg, Intravenous, Q6H PRN, Chencho Hutchison MD, 10 mg at 06/07/23 3453  •  magnesium Oxide (MAG-OX) tablet 400 mg, 400 mg, Oral, Daily, Daniel Randolph MD, 400 mg at 06/13/23 4579  •  metoprolol succinate (TOPROL-XL) 24 hr tablet 25 mg, 25 mg, Oral, Daily, Daniel Randolph MD, 25 mg at 06/13/23 0509  •  nicotine (NICODERM CQ) 14 mg/24hr TD 24 hr patch 1 patch, 1 patch, Transdermal, Daily, Daniel Randolph MD, 1 patch at 06/09/23 8130  •  pancrelipase (Lip-Prot-Amyl) (CREON) delayed release capsule 24,000 Units, 24,000 Units, Oral, TID With Meals, Daniel Randolph MD, 24,000 Units at 06/13/23 0729  •  pantoprazole (PROTONIX) EC tablet 40 mg, 40 mg, Oral, BID AC, Daniel Randolph MD, 40 mg at 06/13/23 6101  •  pravastatin (PRAVACHOL) tablet 40 mg, 40 mg, Oral, HS, Daniel Randolph MD, 40 mg at 06/12/23 2148  •  sodium chloride tablet 1 g, 1 g, Oral, BID With Meals, Daniel Randolph MD, 1 g at 06/13/23 9189  •  tamsulosin (FLOMAX) capsule 0 4 mg, 0 4 mg, Oral, Daily, Daniel Randolph MD, 0 4 mg at 06/13/23 8287    Laboratory Results:  Results from last 7 days   Lab Units 06/13/23  0503 06/12/23  0542 06/11/23  0550 06/11/23  0514 06/10/23  0405 06/09/23  0542   ALK PHOS U/L  --   --  61  --  76 66   ALT U/L  --   --  12  --  13 12   AST U/L  --   --  17  --  24 25   BUN mg/dL 14 9 14  --  16 14   CALCIUM mg/dL 8 2* 8 3* 8 6  --  9 0 9 2   CHLORIDE mmol/L 97 95* 97  --  95* 96   CO2 mmol/L 30 33* 31  --  29 28   CREATININE mg/dL 0 76 0 72 0 74  --  0 90 0 87   HEMATOCRIT % 27 6* 29 0*  --  29 2* 32 5* 31 0*   HEMOGLOBIN g/dL 8 9* 9 4*  --  9 6* 10 4* 10 1*   POTASSIUM mmol/L 3 0* 3 1* 3 5  --  3 6 3 6   MAGNESIUM mg/dL 1 4* 1 5*  --   --   --   --    PLATELETS Thousands/uL 161 162  --  157 205 231   SODIUM mmol/L 134* 135 133*  --  131* 131*   WBC Thousand/uL 6 97 6 01  --  7 54 8 88 7 76

## 2023-06-13 NOTE — PLAN OF CARE
Problem: Potential for Falls  Goal: Patient will remain free of falls  Description: INTERVENTIONS:  - Educate patient/family on patient safety including physical limitations  - Instruct patient to call for assistance with activity   - Consult OT/PT to assist with strengthening/mobility   - Keep Call bell within reach  - Keep bed low and locked with side rails adjusted as appropriate  - Keep care items and personal belongings within reach  - Initiate and maintain comfort rounds  - Make Fall Risk Sign visible to staff  - Offer Toileting every 2 Hours, in advance of need  - Initiate/Maintain bed/ chair alarm  - Obtain necessary fall risk management equipment: walker  - Apply yellow socks and bracelet for high fall risk patients  - Consider moving patient to room near nurses station  Outcome: Progressing     Problem: MOBILITY - ADULT  Goal: Maintain or return to baseline ADL function  Description: INTERVENTIONS:  - Educate patient/family on patient safety including physical limitations  - Instruct patient to call for assistance with activity   - Consult OT/PT to assist with strengthening/mobility   - Keep Call bell within reach  - Keep bed low and locked with side rails adjusted as appropriate  - Keep care items and personal belongings within reach  - Initiate and maintain comfort rounds  - Make Fall Risk Sign visible to staff  - Offer Toileting every 2 Hours, in advance of need  - Initiate/Maintain bed/ chair alarm  - Obtain necessary fall risk management equipment: walker  - Apply yellow socks and bracelet for high fall risk patients  - Consider moving patient to room near nurses station  Outcome: Progressing  Goal: Maintains/Returns to pre admission functional level  Description: INTERVENTIONS:  - Perform BMAT or MOVE assessment daily    - Set and communicate daily mobility goal to care team and patient/family/caregiver     - Collaborate with rehabilitation services on mobility goals if consulted  - Perform Range of Motion 3 times a day  - Reposition patient every 2 hours    - Dangle patient 3 times a day  - Stand patient 3 times a day  - Ambulate patient 3 times a day  - Out of bed to chair 3 times a day   - Out of bed for meals 3 times a day  - Out of bed for toileting  - Record patient progress and toleration of activity level   Outcome: Progressing     Problem: PAIN - ADULT  Goal: Verbalizes/displays adequate comfort level or baseline comfort level  Description: Interventions:  - Encourage patient to monitor pain and request assistance  - Assess pain using appropriate pain scale  - Administer analgesics based on type and severity of pain and evaluate response  - Implement non-pharmacological measures as appropriate and evaluate response  - Consider cultural and social influences on pain and pain management  - Notify physician/advanced practitioner if interventions unsuccessful or patient reports new pain  Outcome: Progressing     Problem: INFECTION - ADULT  Goal: Absence or prevention of progression during hospitalization  Description: INTERVENTIONS:  - Assess and monitor for signs and symptoms of infection  - Monitor lab/diagnostic results  - Monitor all insertion sites, i e  indwelling lines, tubes, and drains  - Monitor endotracheal if appropriate and nasal secretions for changes in amount and color  - [place] appropriate cooling/warming therapies per order  - Administer medications as ordered  - Instruct and encourage patient and family to use good hand hygiene technique  - Identify and instruct in appropriate isolation precautions for identified infection/condition  Outcome: Progressing  Goal: Absence of fever/infection during neutropenic period  Description: INTERVENTIONS:  - Monitor WBC    Outcome: Progressing     Problem: SAFETY ADULT  Goal: Patient will remain free of falls  Description: INTERVENTIONS:  - Educate patient/family on patient safety including physical limitations  - Instruct patient to call for assistance with activity   - Consult OT/PT to assist with strengthening/mobility   - Keep Call bell within reach  - Keep bed low and locked with side rails adjusted as appropriate  - Keep care items and personal belongings within reach  - Initiate and maintain comfort rounds  - Make Fall Risk Sign visible to staff  - Offer Toileting every 2 Hours, in advance of need  - Initiate/Maintain bed/ chair alarm  - Obtain necessary fall risk management equipment: walker  - Apply yellow socks and bracelet for high fall risk patients  - Consider moving patient to room near nurses station  Outcome: Progressing  Goal: Maintain or return to baseline ADL function  Description: INTERVENTIONS:  - Educate patient/family on patient safety including physical limitations  - Instruct patient to call for assistance with activity   - Consult OT/PT to assist with strengthening/mobility   - Keep Call bell within reach  - Keep bed low and locked with side rails adjusted as appropriate  - Keep care items and personal belongings within reach  - Initiate and maintain comfort rounds  - Make Fall Risk Sign visible to staff  - Offer Toileting every 2 Hours, in advance of need  - Initiate/Maintain bed/ chair alarm  - Obtain necessary fall risk management equipment: walker  - Apply yellow socks and bracelet for high fall risk patients  - Consider moving patient to room near nurses station  Outcome: Progressing  Goal: Maintains/Returns to pre admission functional level  Description: INTERVENTIONS:  - Perform BMAT or MOVE assessment daily    - Set and communicate daily mobility goal to care team and patient/family/caregiver  - Collaborate with rehabilitation services on mobility goals if consulted  - Perform Range of Motion 3 times a day  - Reposition patient every 2 hours    - Dangle patient 3 times a day  - Stand patient 3 times a day  - Ambulate patient 3 times a day  - Out of bed to chair 3 times a day   - Out of bed for meals 3 times a day  - Out of bed for toileting  - Record patient progress and toleration of activity level   Outcome: Progressing     Problem: DISCHARGE PLANNING  Goal: Discharge to home or other facility with appropriate resources  Description: INTERVENTIONS:  - Identify barriers to discharge w/patient and caregiver  - Arrange for needed discharge resources and transportation as appropriate  - Identify discharge learning needs (meds, wound care, etc )  - Arrange for interpretive services to assist at discharge as needed  - Refer to Case Management Department for coordinating discharge planning if the patient needs post-hospital services based on physician/advanced practitioner order or complex needs related to functional status, cognitive ability, or social support system  Outcome: Progressing     Problem: Knowledge Deficit  Goal: Patient/family/caregiver demonstrates understanding of disease process, treatment plan, medications, and discharge instructions  Description: Complete learning assessment and assess knowledge base    Interventions:  - Provide teaching at level of understanding  - Provide teaching via preferred learning methods  Outcome: Progressing     Problem: Prexisting or High Potential for Compromised Skin Integrity  Goal: Skin integrity is maintained or improved  Description: INTERVENTIONS:  - Identify patients at risk for skin breakdown  - Assess and monitor skin integrity  - Assess and monitor nutrition and hydration status  - Monitor labs   - Assess for incontinence   - Turn and reposition patient  - Assist with mobility/ambulation  - Relieve pressure over bony prominences  - Avoid friction and shearing  - Provide appropriate hygiene as needed including keeping skin clean and dry  - Evaluate need for skin moisturizer/barrier cream  - Collaborate with interdisciplinary team   - Patient/family teaching  - Consider wound care consult   Outcome: Progressing     Problem: Nutrition/Hydration-ADULT  Goal: Nutrient/Hydration intake appropriate for improving, restoring or maintaining nutritional needs  Description: Monitor and assess patient's nutrition/hydration status for malnutrition  Collaborate with interdisciplinary team and initiate plan and interventions as ordered  Monitor patient's weight and dietary intake as ordered or per policy  Utilize nutrition screening tool and intervene as necessary  Determine patient's food preferences and provide high-protein, high-caloric foods as appropriate       INTERVENTIONS:  - Monitor oral intake, urinary output, labs, and treatment plans  - Assess nutrition and hydration status and recommend course of action  - Evaluate amount of meals eaten  - Assist patient with eating if necessary   - Allow adequate time for meals  - Recommend/ encourage appropriate diets, oral nutritional supplements, and vitamin/mineral supplements  - Order, calculate, and assess calorie counts as needed  - Recommend, monitor, and adjust tube feedings and TPN/PPN based on assessed needs  - Assess need for intravenous fluids  - Provide specific nutrition/hydration education as appropriate  - Include patient/family/caregiver in decisions related to nutrition  Outcome: Progressing

## 2023-06-13 NOTE — PHYSICAL THERAPY NOTE
PHYSICAL THERAPY EVALUATION NOTE      Patient Name: Rivera Mendoza  HWFMJ'D Date: 2023    AGE:   75 y o  Mrn:   [id number]  ADMIT DX:  Hyponatremia [E87 1]  Common bile duct dilatation [K83 8]  Abnormal CT scan [R93 89]  Headache [R51 9]    Past Medical History:   Diagnosis Date    Anxiety     Rhodes's esophagus     Chronic neck pain     Depression     GERD (gastroesophageal reflux disease)     H/O abdominal aortic aneurysm 4 5 cm    Hyperlipidemia     Hypertension     Pancreatic pseudocyst     Vertigo      Length Of Stay: 6  PHYSICAL THERAPY EVALUATION :   Patient's identity confirmed via 2 patient identifiers (full name and ) at start of session       23 0951   PT Last Visit   PT Visit Date 23   Note Type   Note type Evaluation   Pain Assessment   Pain Assessment Tool 0-10   Pain Score No Pain   Home Living   Type of Home Mobile home   Home Layout One level;Stairs to enter with rails; Ramped entrance  (5 MARLEN vs ramp)   Bathroom Shower/Tub Tub/shower unit   Bathroom Toilet Standard   Home Equipment Walker;Cane   Additional Comments Pt does not use AD PTA   Prior Function   Level of [place] Independent with ADLs; Independent with functional mobility; Independent with IADLS   Lives With Significant other   Receives Help From Family;Friend(s)   IADLs Independent with driving;Family/Friend/Other provides meals; Family/Friend/Other provides medication management   Falls in the last 6 months 0   Vocational Retired   General   Family/Caregiver Present No   Cognition   Overall Cognitive Status WFL   Arousal/Participation Alert   Attention Within functional limits   Orientation Level Oriented X4   Memory Within functional limits   Following Commands Follows all commands and directions without difficulty   Comments Pt pleasant and agreeable to participate in PT eval, reports he has not been OOB except to use the "commode during admission   Subjective   Subjective \"It feels so good to get up\"   RLE Assessment   RLE Assessment WFL   LLE Assessment   LLE Assessment WFL   Vision-Basic Assessment   Current Vision Wears glasses all the time   Visual History   (has light sensitivity)   Bed Mobility   Supine to Sit 5  Supervision   Transfers   Sit to Stand 5  Supervision   Additional items Assist x 1   Stand to Sit 5  Supervision   Additional items Assist x 1   Ambulation/Elevation   Gait pattern Decreased foot clearance; Short stride   Gait Assistance 5  Supervision   Additional items Assist x 1   Assistive Device None   Distance 10 ft   Ambulation/Elevation Additional Comments see tx session for further mobility   Balance   Static Sitting Fair +   Static Standing Fair   Ambulatory Fair -   Activity Tolerance   Activity Tolerance Other (Comment)  (limited due to dizziness, drop in BP)   Medical Staff Made Aware BRIAN Bee   Nurse Made Aware ADRIANO Jin   Assessment   Problem List Decreased strength; Impaired balance;Decreased endurance;Decreased mobility; Impaired vision   Assessment Galdino Keane is a 75 y o  Male who presents to [address] [place] [address] on 6/7/2023 from HOME w/ c/o HA, blurred vision, MAK and diagnosis of hyponatremia  Orders for PT eval and treat received  Pt presents w/ comorbidities of CKD stage 3, HTN, AAA, depression  At baseline, pt mobilizes I w/ no AD, and reports 0 falls in the last 6 months  Upon evaluation, pt presents w/ the following deficits: weakness, impaired balance and decreased endurance  Upon eval, pt requires S for bed mobility, S for transfers, and S for gait  Based on this PT evaluation today, patient's discharge recommendation is for Level IV  Given the above findings from this evaluation, at this time this patient does not require skilled inpatient PT for the remainder of this admission  Will D/C patient from PT caseload, please reconsult if any changes or needs arise     Goals   Patient Goals to go home (and " not a nursing home)   Recommendation   UB Rehab Discharge Recommendation (PT/OT) Level 4   Additional Comments Pt may benefit from temporary use of RW upon initial DC home   AM-PAC Basic Mobility Inpatient   Turning in Flat Bed Without Bedrails 4   Lying on Back to Sitting on Edge of Flat Bed Without Bedrails 3   Moving Bed to Chair 3   Standing Up From Chair Using Arms 3   Walk in Room 3   Climb 3-5 Stairs With Railing 3   Basic Mobility Inpatient Raw Score 19   Basic Mobility Standardized Score 42 48   Highest Level Of Mobility   JH-HLM Goal 6: Walk 10 steps or more   JH-HLM Achieved 7: Walk 25 feet or more   Additional Treatment Session   Start Time 1000   End Time 1008   Treatment Assessment Pt seen for PT intervention w/ focus on further mobility  Pt requesting to use RW for functional mobility  Pt is able to perform STS w/ S  Pt is then able to ambulate 60 ft around room w/ RW w/ S without LOB or instability  Pt returned to recliner chair at end of session  Encouraged pt to continue to use RW for mobility until he feels more confident in mobility   Equipment Use RW   Additional Treatment Day 1   End of Consult   Patient Position at End of Consult Bedside chair;Bed/Chair alarm activated; All needs within reach       The patient's AM-PAC Basic Mobility Inpatient Short Form Raw Score is 19, Standardized Score is 42 48  A standardized score greater than 38 32 (raw score of 16) suggests the patient may benefit from discharge to home which may not coincide with above PT recommendations  However please refer to therapist recommendation for discharge planning given other factors that may influence destination  Given the above findings from this evaluation, at this time this patient does not require skilled inpatient PT for the remainder of this admission  Will D/C patient from PT caseload, please reconsult if any changes or needs arise        Howard Martins, PT, DPT

## 2023-06-13 NOTE — ASSESSMENT & PLAN NOTE
· Chronic, follows with urology  · Maintained on Flomax and Proscar  · CT abdomen/pelvis with urinary bladder wall thickening -appears chronic  UA with bacteruria but pt denies any urinary symptoms    Afebrile, no leukocytosis  · Patient required straight cath x2 thus far, overnight 2 attempts were made and urology had to straight cath with coudé  · Since traumatic cath, patient complaining of suprapubic pain and urethral pain accompanied by urgency  · Urology consulted, recommend ultimate Nicole placement per retention protocol with outpatient trial to void following discharge  · UA is positive for UTI  · Start on Rocephin - transition to oral Macrobid for discharge  · Follow up on final culture results outpatient

## 2023-06-13 NOTE — DISCHARGE SUMMARY
[place]  Discharge- Lakisha Tee 1950, 75 y o  male MRN: [id number]  Unit/Bed#: -01 Encounter: [id number]  Primary Care Provider: Bautista Johns MD   Date and time admitted to hospital: 6/7/2023 11:16 AM    * Hyponatremia  Assessment & Plan  · Presented to the emergency department with lightheadedness, headache  Did have a couple episodes of diarrhea PTA  · Sodium found to be 120 on presentation  Appears that patient had admission in 2022 for hyponatremia  · At that time had been started on salt tabs/torsemide, hctz was discontinued and was placed on fluid restriction  · Admits to poor intake in terms of food but does feel he has adequate fluid intake  · Home regimen includes salt tabs 1 g twice daily in addition to torsemide 5 mg daily  · Patient is also on Celexa which can be contributing, discontinued  · Consulted nephrology  · S/p 1 8% saline infusion then transitioned off gtt  · Dosed Samsca x1 on 6/8 and dosed again x1 on 6/9 with improvement of Na  · Resumed on fluid restriction of 1 5L per day and home salt tabs of 1g, increased to TID  · Stable from Nephrology perspective: repeat BMP in 1 week, follow up with Nephro in 2-3 weeks  · PT/OT eval pending given weakness and frailty - home with VNA service    Common bile duct dilation  Assessment & Plan  · Noted on CT abdomen/pelvis: new distention of the central intrahepatic/extrahepatic bile ducts  Consider MRCP if clinically warranted  · LFTs and bilirubin WNL, CA 19-9 slightly elevated at 58  · MRCP with minimally dilated CBD, no ERCP needed  · GI following  · Trend LFTs/bili    Hypertension  Assessment & Plan  · Blood pressure elevated while in the ED  · On Lopressor and Norvasc    Urinary retention  Assessment & Plan  · Chronic, follows with urology  · Maintained on Flomax and Proscar  · CT abdomen/pelvis with urinary bladder wall thickening -appears chronic    UA with bacteruria but pt denies any urinary symptoms  Afebrile, no leukocytosis  · Patient required straight cath x2 thus far, overnight 2 attempts were made and urology had to straight cath with coudé  · Since traumatic cath, patient complaining of suprapubic pain and urethral pain accompanied by urgency  · Urology consulted, recommend ultimate Nicole placement per retention protocol with outpatient trial to void following discharge  · UA is positive for UTI  · Start on Rocephin - transition to oral for discharge  · Follow up on final culture results outpatient    Hypokalemia  Assessment & Plan  · Potassium supplementation  · Monitor and replace electrolytes as needed    Iron deficiency anemia  Assessment & Plan  · H/o gastric ulcer   · Received IV iron x1  · GI following  · S/p EGD/[place] revealing persistent bleeding based ulcer in gastric body, possibly smaller compared to prior pictures  Small hiatal hernia  Normal duodenum and esophagus  Biopsies taken of ulcer  Colonoscopy with diverticulosis and small hemorrhoids, no polyps or malignancies  · GI recommends resumption of regular diet and increasing Protonix to twice a day  · Follow-up in the office in 2 months  Repeat EGD advised in 3 months to document complete ulcer healing  Protein-calorie malnutrition, unspecified severity ([facility] )  Assessment & Plan  Malnutrition Findings:   · Adult Malnutrition type: Chronic illnessAdmits to poor appetite  · Consult nutrition    BMI Findings:  Adult BMI Classifications: Underweight < 18 5        Body mass index is 15 8 kg/m²         Chronic kidney disease, stage 3 [facility])  Assessment & Plan  Lab Results   Component Value Date    CREATININE 0 77 06/13/2023    CREATININE 0 76 06/13/2023    CREATININE 0 72 06/12/2023    EGFR 89 06/13/2023    EGFR 89 06/13/2023    EGFR 91 06/12/2023   · Baseline creatinine 0 8-1 2  · Creatinine stable  · Trend BMP    Medical Problems     Resolved Problems  Date Reviewed: 6/13/2023   None       Discharging Physician / Practitioner: Tan Butterfield PA-C  PCP: Martínez Escobar MD  Admission Date:   Admission Orders (From admission, onward)     Ordered        06/07/23 1501  INPATIENT ADMISSION  Once                      Discharge Date: 06/13/23    Consultations During Hospital Stay:  · Nephrology  · Urology  · Gastroenterology    Procedures Performed:   · EGD 6/12:  · Persistent bleeding based ulcer in gastric body may be slightly smaller compared to pictures  Biopsies taken  · Small hiatal hernia  · Normal duodenum  · Normal esophagus    · Colonoscopy 6/12:  • Diverticulosis  • Small hemorrhoids    · Nicole catheter placement 6/13    Significant Findings / Test Results:   · CXR 6/7: No acute cardiopulmonary disease  · CT abdomen pelvis 6/7:  · New distention of the central intrahepatic and extrahepatic bile ducts through the ampulla  Common duct measures up to 14 mm in caliber  Correlate for clinical and laboratory features of obstructive cholangiopathy  Recommend gastroenterology consultation  Consider MRCP, if clinically warranted  · Moderate excess stool in the colon without obstruction or colonic inflammation  · Urinary bladder wall thickening appears chronic and unchanged when accounting for difference in distention  However, if the patient has suprapubic pain, consider urinalysis to assess for evidence of superimposed infectious cystitis  · MRI brain 6/8:   · No acute intracranial abnormality  · Likely chronic right maxillary sinus disease  · MRI abdomen 6/9:   · Mildly dilated extrahepatic common bile duct  No intrahepatic biliary ductal dilatation  No evidence of choledocholithiasis or obstructing biliary lesion  · Diffuse signal loss in the liver on fluid sensitive sequences and in phase images suggestive of increased iron deposition  Incidental Findings:     · See above    Test Results Pending at Discharge (will require follow up):    · Biopsy results from EGD/Colon  · Final urine culture results     Outpatient "Tests Requested:  · BMP  · EGD 3 months    Complications:  None    Reason for Admission: Hyponatremia    Hospital Course:   Viji Foster is a 75 y o  male patient with a past medical history of AAA status postrepair, hypertension, pancreatic pseudocyst who originally presented to the hospital on 6/7/2023 due to dizziness and headache  Patient Dorst poor appetite prior to admission  Patient found to have hyponatremia of 120 on presentation  Patient does take salt tabs outpatient in addition to torsemide 5 mg daily  Nephrology was consulted  Findings consistent with SIADH, started on 1 8% normal saline ultimately started on fluid restriction and salt tabs resumed  Patient will require outpatient follow-up for ongoing management of hyponatremia  Patient did suffer from urinary retention, continued on Flomax and Proscar, required multiple straight caths and Nicole catheter was placed  Will require outpatient urology follow-up  GI was consulted for dilatation of intrahepatic and hepatic ducts on CT scan  MRCP, EGD, Colonoscopy results as above  Hemodynamically stable at time of discharge and appropriate for outpatient follow-up with specialists and PCP  Will return home with VNA services  Please see above list of diagnoses and related plan for additional information  Condition at Discharge: stable    Discharge Day Visit / Exam:   Subjective: Patient frustrated with being unable to sleep inpatient, wants to be discharged home as soon as possible  Denies any discomfort from the Nicole site, just wants to leave  Vitals: Blood Pressure: 147/83 (06/13/23 0730)  Pulse: 81 (06/13/23 0730)  Temperature: 97 7 °F (36 5 °C) (06/13/23 0730)  Temp Source: Temporal (06/13/23 0730)  Respirations: 18 (06/13/23 0730)  Height: 5' 2\" (157 5 cm) (06/07/23 1653)  Weight - Scale: 39 2 kg (86 lb 6 4 oz) (06/07/23 1653)  SpO2: 97 % (06/13/23 0730)  Exam:   Physical Exam  Vitals and nursing note reviewed   " Constitutional:       General: He is not in acute distress  Appearance: Normal appearance  He is well-developed  HENT:      Head: Normocephalic and atraumatic  Eyes:      General: No scleral icterus  Conjunctiva/sclera: Conjunctivae normal    Cardiovascular:      Rate and Rhythm: Normal rate and regular rhythm  Heart sounds: No murmur heard  Pulmonary:      Effort: Pulmonary effort is normal       Breath sounds: No wheezing, rhonchi or rales  Abdominal:      General: There is no distension  Palpations: Abdomen is soft  Skin:     General: Skin is warm and dry  Neurological:      General: No focal deficit present  Mental Status: He is alert  Psychiatric:         Mood and Affect: Mood normal         Discussion with Family: Attempted to update  (daughter) via phone  Left voicemail  Discharge instructions/Information to patient and family:   See after visit summary for information provided to patient and family  Provisions for Follow-Up Care:  See after visit summary for information related to follow-up care and any pertinent home health orders  Disposition:   Home with VNA Services (Reminder: Complete face to face encounter)    Planned Readmission: None     Discharge Statement:  I spent 65 minutes discharging the patient  This time was spent on the day of discharge  I had direct contact with the patient on the day of discharge  Greater than 50% of the total time was spent examining patient, answering all patient questions, arranging and discussing plan of care with patient as well as directly providing post-discharge instructions  Additional time then spent on discharge activities  Discharge Medications:  See after visit summary for reconciled discharge medications provided to patient and/or family        **Please Note: This note may have been constructed using a voice recognition system**

## 2023-06-13 NOTE — ASSESSMENT & PLAN NOTE
Lab Results   Component Value Date    CREATININE 0 76 06/13/2023    CREATININE 0 72 06/12/2023    CREATININE 0 74 06/11/2023    EGFR 89 06/13/2023    EGFR 91 06/12/2023    EGFR 90 06/11/2023   · Baseline creatinine 0 8-1 2  · Creatinine stable  · Trend BMP

## 2023-06-13 NOTE — CASE MANAGEMENT
Case Management Discharge Planning Note    Patient name Sandro Guard  Location /-01 MRN [id number]  : 1950 Date 2023       Current Admission Date: 2023  Current Admission Diagnosis:Hyponatremia   Patient Active Problem List    Diagnosis Date Noted   • Severe protein-calorie malnutrition ([facility] ) 2023   • SIADH (syndrome of inappropriate ADH production) ([place] [address] )    • Common bile duct dilation 2023   • Hyponatremia 2022   • Hypokalemia 2022   • Urinary retention 2022   • Hypertension 2022   • Iron deficiency anemia 2022   • Protein-calorie malnutrition, unspecified severity ([place] [address] ) 06/15/2022   • Depression, recurrent ([place] [address] ) 06/15/2022   • Chronic kidney disease, stage 3 ([facility]  ) 2022   • History of endovascular stent graft for abdominal aortic aneurysm (AAA) 2021   • Stenosis of other vascular prosthetic devices, implants and grafts, subsequent encounter 2021   • Neurogenic claudication 2021   • 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 ([place] [address] ) 2021   • Other chronic pancreatitis ([place] [address] ) 09/10/2018   • Smoking 09/10/2018   • Epigastric pain 2018   • Diarrhea 2018   • AAA (abdominal aortic aneurysm) without rupture ([facility] ) 2018      LOS (days): 6  Geometric Mean LOS (GMLOS) (days): 5 10  Days to GMLOS:-0 8     OBJECTIVE:  Risk of Unplanned Readmission Score: 19 36         Current admission status: Inpatient   Preferred Pharmacy:   [id number] [address] [address]  Phone: [phone] Fax: [phone]    Primary Care Provider: Shaji Meneses MD    Primary Insurance: MEDICARE  Secondary Insurance: [address] [place] [address] DETAILS:List of accepting providers given to patient  Hs preference is [facility]   [facility] reserved on Aidin and their contact information added to patient's AVS

## 2023-06-13 NOTE — PROGRESS NOTES
-- Patient: Kate Soliman  -- MRN: [id number]  -- Aidin Request ID: [id number]  -- Level of care reserved: [address] [place] [address]  -- Partner Reserved: [facility], [place], [address] [place] [address] [phone]  -- Clinical needs requested:  -- Geography searched: [id number]  -- Start of Service:  -- Request sent: 10:27am EDT on 6/13/2023 by Denzel Willis  -- Partner reserved: 11:56am EDT on 6/13/2023 by Denzel Willis  -- Choice list shared:

## 2023-06-13 NOTE — OCCUPATIONAL THERAPY NOTE
Occupational Therapy Evaluation & Treatment     Patient Name: Vielka Millard  [id number] Date: 6/13/2023  Problem List  Principal Problem:    Hyponatremia  Active Problems:    Chronic kidney disease, stage 3 (HCC)    Protein-calorie malnutrition, unspecified severity ([address] )    Iron deficiency anemia    Hypokalemia    Urinary retention    Hypertension    Common bile duct dilation    Past Medical History  Past Medical History:   Diagnosis Date    Anxiety     Rhodes's esophagus     Chronic neck pain     Depression     GERD (gastroesophageal reflux disease)     H/O abdominal aortic aneurysm 4 5 cm    Hyperlipidemia     Hypertension     Pancreatic pseudocyst     Vertigo      Past Surgical History  Past Surgical History:   Procedure Laterality Date    COLONOSCOPY  09/30/2014    IR LOWER EXTREMITY / INTERVENTION  01/04/2019    MA ESOPHAGOGASTRODUODENOSCOPY TRANSORAL DIAGNOSTIC N/A 06/22/2018    Procedure: EGD AND COLONOSCOPY;  Surgeon: Lawrence Jimenez MD;  Location: [facility]; Service: Gastroenterology    MA EVASC RPR DPLMNT AORTO-AORTIC NDGFT N/A 02/09/2018    Procedure: REPAIR ANEURYSM ENDOVASCULAR ABDOMINAL AORTIC  (EVAR); Surgeon: Tiffany Medellin MD;  Location: [facility];  Service: Vascular    UPPER GASTROINTESTINAL ENDOSCOPY               06/13/23 0938   OT Last Visit   OT Visit Date 06/13/23   Note Type   Note type Evaluation   Pain Assessment   Pain Assessment Tool 0-10   Pain Score No Pain   Restrictions/Precautions   Weight Bearing Precautions Per Order No   Home Living   Type of Home Mobile home  (5 MARLEN from back, from the front a steep ramp)   Home Layout Stairs to enter with rails; Ramped entrance; One level  (5 MARLEN from back, from the front a steep ramp)   Bathroom Shower/Tub Tub/shower unit   Bathroom Toilet Standard   Bathroom Equipment Shower chair;Grab bars in [facility];Walker   Additional Comments Does not use   Prior Function   Level of [place] Independent with ADLs; Independent "with functional mobility; Independent with IADLS  (Shared homemaking tasks with SO & her sister)   Lives With Significant other  (SO's sister & her BF)   Receives Help From Family;Friend(s)   IADLs Independent with driving;Family/Friend/Other provides meals; Family/Friend/Other provides medication management   Falls in the last 6 months 0   Vocational Retired   Lifestyle   Autonomy Independent with ADLs   Reciprocal Relationships Lives with SO & her sister & sisters BF  SO & sister do homemkaing tasks, sisters BF does medication management, pt (+)    Service to Others Retired    General   Family/Caregiver Present No   Subjective   Subjective \"I would love to get walking, I hate this vasquez bed! \"   ADL   Eating Assistance 7  Independent   Grooming Assistance 5  Supervision/Setup   UB Bathing Assistance 5  Supervision/Setup   LB Bathing Assistance 5  Supervision/Setup   UB Dressing Assistance 5  Supervision/Setup   LB Dressing Assistance 5  Supervision/Setup   Toileting Assistance  5  Supervision/Setup   Bed Mobility   Supine to Sit 5  Supervision   Additional items HOB elevated   Transfers   Sit to Stand 5  Supervision   Stand to Sit 5  Supervision   Stand pivot 5  Supervision   Additional Comments Pt able to tolerate standing x2 min for BP reading, however, required seated rest break prior to mobility session  Pt performed stand pivot prior to seated rest break  Rest break x5 min  BP c/o mild dizziness upon sitting, systolic    When in stance BP dropped to 119, however, NAD with symptoms resolving   Functional Mobility   Additional Comments See tx session for functional mobility   Balance   Static Sitting Good   Dynamic Sitting Fair +   Static Standing Fair   Dynamic Standing Fair   Ambulatory Fair   Activity Tolerance   Activity Tolerance Patient tolerated treatment well   Medical Staff Made Aware PT Yuliana   Nurse Made Aware ADRIANO Baker   RUE Assessment   RUE Assessment WFL   LUE Assessment   LUE " Assessment WFL   Vision-Basic Assessment   Current Vision Wears glasses all the time   Visual History Other (Comment)  (Light sensitivity)   Cognition   Overall Cognitive Status WFL   Arousal/Participation Alert   Attention Within functional limits   Orientation Level Oriented X4   Memory Within functional limits   Following Commands Follows all commands and directions without difficulty   Assessment   Prognosis Good   Assessment Pt is a 75 y o  male seen for OT evaluation at [facility], admitted 6/7/2023 w/ Hyponatremia  OT completed expanded review of pt's medical and social history  Comorbidities affecting pt's functional performance at time of assessment include: CKD stage 3, malnutrition, urinary retention, AAA, hx of depression  Prior to admission, pt was living with his SO, his SO's sister, and the sister SO  Pt was independent with ADLs & mobility with no DME, A for IADLs  Upon evaluation, pt presents to OT at functional baseline  Based on findings, pt is of moderate complexity  The patient's raw score on the AM-PAC Daily Activity inpatient short form is 21, standardized score is 44 27, greater than 39 4  Patients at this level are likely to benefit from DC to home  Please refer to the recommendation of the Occupational Therapist for safe DC planning  At this time, OT recommendations at time of discharge are DC with level 4 resources  No further acute OT needs indicated at this time - Recommend pt continue to be OOB for meals, ambulation to/from BR, perform self care tasks, and mobility in hallway with nursing  D/C from OT caseload with above recommendations     Goals   Patient Goals Pt wants to go home & not a nursing home   Plan   OT Frequency Eval only   Recommendation   UB Rehab Discharge Recommendation (PT/OT) Level 4   AM-PAC Daily Activity Inpatient   Lower Body Dressing 3   Bathing 3   Toileting 3   Upper Body Dressing 4   Grooming 4   Eating 4   Daily Activity Raw Score 21   Daily Activity Standardized Score (Calc for Raw Score >=11) 44 27   AM-PAC Applied Cognition Inpatient   Following a Speech/Presentation 4   Understanding Ordinary Conversation 4   Taking Medications 4   Remembering Where Things Are Placed or Put Away 4   Remembering List of 4-5 Errands 4   Taking Care of Complicated Tasks 4   Applied Cognition Raw Score 24   Applied Cognition Standardized Score 62 21   Additional Treatment Session   Start Time 3848   End Time 1006   Treatment Assessment Pt agreeable to further occupational therapy session addressing functional mobility  Pt requesting to utilize RW  Pt performed sit > stand with S  Pt performed approx 30 ft of functional mobility with S & RW  Pt completed additional 20 ft of mobility with S & no DME  Pt performed stand > sit with S  Pt left seated in recliner with all needs in reach, chair alarm on, RN informed  End of Consult   Education Provided Yes   Patient Position at End of Consult Bedside chair; All needs within reach;Bed/Chair alarm activated   Nurse Communication Nurse aware of consult     Millie Perez OTR/L

## 2023-06-13 NOTE — ASSESSMENT & PLAN NOTE
· Presented to the emergency department with lightheadedness, headache  Did have a couple episodes of diarrhea PTA  · Sodium found to be 120 on presentation    Appears that patient had admission in 2022 for hyponatremia  · At that time had been started on salt tabs/torsemide, hctz was discontinued and was placed on fluid restriction  · Admits to poor intake in terms of food but does feel he has adequate fluid intake  · Home regimen includes salt tabs 1 g twice daily in addition to torsemide 5 mg daily  · Patient is also on Celexa which can be contributing, discontinued  · Consulted nephrology  · S/p 1 8% saline infusion then transitioned off gtt  · Dosed Samsca x1 on 6/8 and dosed again x1 on 6/9 with improvement of Na  · Resumed on fluid restriction of 1 5L per day and home salt tabs of 1g BID  · Sodium this morning is 134  · Stable from Nephrology perspective: repeat BMP in 1 week, follow up with Nephro in 2-3 weeks  · PT/OT eval pending given weakness and frailty

## 2023-06-13 NOTE — ASSESSMENT & PLAN NOTE
· Presented to the emergency department with lightheadedness, headache  Did have a couple episodes of diarrhea PTA  · Sodium found to be 120 on presentation    Appears that patient had admission in 2022 for hyponatremia  · At that time had been started on salt tabs/torsemide, hctz was discontinued and was placed on fluid restriction  · Admits to poor intake in terms of food but does feel he has adequate fluid intake  · Home regimen includes salt tabs 1 g twice daily in addition to torsemide 5 mg daily  · Patient is also on Celexa which can be contributing, discontinued  · Consulted nephrology  · S/p 1 8% saline infusion then transitioned off gtt  · Dosed Samsca x1 on 6/8 and dosed again x1 on 6/9 with improvement of Na  · Resumed on fluid restriction of 1 5L per day and home salt tabs of 1g, increased to TID  · Stable from Nephrology perspective: repeat BMP in 1 week, follow up with Nephro in 2-3 weeks  · PT/OT eval pending given weakness and frailty - home with VNA service

## 2023-06-13 NOTE — PLAN OF CARE
Problem: MOBILITY - ADULT  Goal: Maintain or return to baseline ADL function  Description: INTERVENTIONS:  - Educate patient/family on patient safety including physical limitations  - Instruct patient to call for assistance with activity   - Consult OT/PT to assist with strengthening/mobility   - Keep Call bell within reach  - Keep bed low and locked with side rails adjusted as appropriate  - Keep care items and personal belongings within reach  - Initiate and maintain comfort rounds  - Make Fall Risk Sign visible to staff  - Offer Toileting every 2 Hours, in advance of need  - Initiate/Maintain bed/ chair alarm  - Obtain necessary fall risk management equipment: walker  - Apply yellow socks and bracelet for high fall risk patients  - Consider moving patient to room near nurses station  Outcome: Progressing  Goal: Maintains/Returns to pre admission functional level  Description: INTERVENTIONS:  - Perform BMAT or MOVE assessment daily    - Set and communicate daily mobility goal to care team and patient/family/caregiver     - Collaborate with rehabilitation services on mobility goals if consulted  - Out of bed for toileting  - Record patient progress and toleration of activity level   Outcome: Progressing     Problem: SAFETY ADULT  Goal: Maintain or return to baseline ADL function  Description: INTERVENTIONS:  - Educate patient/family on patient safety including physical limitations  - Instruct patient to call for assistance with activity   - Consult OT/PT to assist with strengthening/mobility   - Keep Call bell within reach  - Keep bed low and locked with side rails adjusted as appropriate  - Keep care items and personal belongings within reach  - Initiate and maintain comfort rounds  - Make Fall Risk Sign visible to staff  - Offer Toileting every 2 Hours, in advance of need  - Initiate/Maintain bed/ chair alarm  - Obtain necessary fall risk management equipment: walker  - Apply yellow socks and bracelet for high fall risk patients  - Consider moving patient to room near nurses station  Outcome: Progressing  Goal: Maintains/Returns to pre admission functional level  Description: INTERVENTIONS:  - Perform BMAT or MOVE assessment daily    - Set and communicate daily mobility goal to care team and patient/family/caregiver     - Collaborate with rehabilitation services on mobility goals if consulted  - Out of bed for toileting  - Record patient progress and toleration of activity level   Outcome: Progressing

## 2023-06-13 NOTE — PROGRESS NOTES
[place]  Progress Note  Name: Fran Fatima  MRN: [id number]  Unit/Bed#: -01 I Date of Admission: 6/7/2023   Date of Service: 6/13/2023 I Hospital Day: 6    Assessment/Plan   * Hyponatremia  Assessment & Plan  · Presented to the emergency department with lightheadedness, headache  Did have a couple episodes of diarrhea PTA  · Sodium found to be 120 on presentation  Appears that patient had admission in 2022 for hyponatremia  · At that time had been started on salt tabs/torsemide, hctz was discontinued and was placed on fluid restriction  · Admits to poor intake in terms of food but does feel he has adequate fluid intake  · Home regimen includes salt tabs 1 g twice daily in addition to torsemide 5 mg daily  · Patient is also on Celexa which can be contributing, discontinued  · Consulted nephrology  · S/p 1 8% saline infusion then transitioned off gtt  · Dosed Samsca x1 on 6/8 and dosed again x1 on 6/9 with improvement of Na  · Resumed on fluid restriction of 1 5L per day and home salt tabs of 1g BID  · Sodium this morning is 134  · Stable from Nephrology perspective: repeat BMP in 1 week, follow up with Nephro in 2-3 weeks  · PT/OT eval pending given weakness and frailty     Common bile duct dilation  Assessment & Plan  · Noted on CT abdomen/pelvis: new distention of the central intrahepatic/extrahepatic bile ducts  Consider MRCP if clinically warranted  · LFTs and bilirubin WNL, CA 19-9 slightly elevated at 58  · MRCP with minimally dilated CBD, no ERCP needed  · GI following  · Trend LFTs/bili    Hypertension  Assessment & Plan  · Blood pressure elevated while in the ED  · On Lopressor and Norvasc    Urinary retention  Assessment & Plan  · Chronic, follows with urology  · Maintained on Flomax and Proscar  · CT abdomen/pelvis with urinary bladder wall thickening -appears chronic  UA with bacteruria but pt denies any urinary symptoms    Afebrile, no leukocytosis  · Patient required straight cath x2 thus far, overnight 2 attempts were made and urology had to straight cath with coudé  · Since traumatic cath, patient complaining of suprapubic pain and urethral pain accompanied by urgency  · Urology consulted, recommend ultimate Nicole placement per retention protocol with outpatient trial to void following discharge  · UA is positive for UTI  · Start on Rocephin - transition to oral for discharge  · Follow up on final culture results outpatient    Hypokalemia  Assessment & Plan  Potassium supplementation  Monitor and replace electrolytes as needed    Iron deficiency anemia  Assessment & Plan  · H/o gastric ulcer   · Hgb 9 6 today  · Received IV iron x1 yesterday  · GI following  · S/p EGD/[facility] revealing persistent bleeding based ulcer in gastric body, possibly smaller compared to prior pictures  Small hiatal hernia  Normal duodenum and esophagus  Biopsies taken of ulcer  Colonoscopy with diverticulosis and small hemorrhoids, no polyps or malignancies  · GI recommends resumption of regular diet and increasing Protonix to twice a day  · Follow-up in the office in 2 months  Repeat EGD advised in 3 months to document complete ulcer healing  Protein-calorie malnutrition, unspecified severity ([facility] [address] )  Assessment & Plan  Malnutrition Findings:   · Adult Malnutrition type: Chronic illnessAdmits to poor appetite  · Consult nutrition    BMI Findings:  Adult BMI Classifications: Underweight < 18 5        Body mass index is 15 8 kg/m²  Chronic kidney disease, stage 3 [facility])  Assessment & Plan  Lab Results   Component Value Date    CREATININE 0 76 06/13/2023    CREATININE 0 72 06/12/2023    CREATININE 0 74 06/11/2023    EGFR 89 06/13/2023    EGFR 91 06/12/2023    EGFR 90 06/11/2023   · Baseline creatinine 0 8-1 2  · Creatinine stable  · Trend BMP             VTE Pharmacologic Prophylaxis: VTE Score: 2 Low Risk (Score 0-2) - Encourage Ambulation      Patient Centered Rounds: I performed bedside rounds with nursing staff today  Discussions with Specialists or Other Care Team Provider: Discussed with nephrology, await 2 PM labs prior to determining discharge    Education and Discussions with Family / Patient: Attempted to update  (daughter) via phone  Left voicemail  Total Time Spent on Date of Encounter in care of patient: 35 minutes This time was spent on one or more of the following: performing physical exam; counseling and coordination of care; obtaining or reviewing history; documenting in the medical record; reviewing/ordering tests, medications or procedures; communicating with other healthcare professionals and discussing with patient's family/caregivers  Current Length of Stay: 6 day(s)  Current Patient Status: Inpatient   Certification Statement: The patient will continue to require additional inpatient hospital stay due to pending repeat BMP  Discharge Plan: Anticipate discharge later today or tomorrow to home with home services  Code Status: Level 3 - DNAR and DNI    Subjective:   Patient upset with being unable to sleep while admitted, wants to be discharged  Denies any pain from Nicole catheter site  Objective:     Vitals:   Temp (24hrs), Av 9 °F (36 6 °C), Min:97 7 °F (36 5 °C), Max:98 2 °F (36 8 °C)    Temp:  [97 7 °F (36 5 °C)-98 2 °F (36 8 °C)] 97 7 °F (36 5 °C)  HR:  [81-90] 81  Resp:  [18] 18  BP: (101-147)/(60-83) 147/83  SpO2:  [96 %-97 %] 97 %  Body mass index is 15 8 kg/m²  Input and Output Summary (last 24 hours): Intake/Output Summary (Last 24 hours) at 2023 1313  Last data filed at 2023 0730  Gross per 24 hour   Intake 400 ml   Output 1863 ml   Net -1463 ml       Physical Exam:   Physical Exam  Vitals and nursing note reviewed  Constitutional:       General: He is not in acute distress  Appearance: Normal appearance  He is well-developed  HENT:      Head: Normocephalic and atraumatic     Eyes: General: No scleral icterus  Conjunctiva/sclera: Conjunctivae normal    Cardiovascular:      Rate and Rhythm: Normal rate and regular rhythm  Heart sounds: No murmur heard  Pulmonary:      Effort: Pulmonary effort is normal       Breath sounds: No wheezing, rhonchi or rales  Abdominal:      General: There is no distension  Palpations: Abdomen is soft  Genitourinary:     Comments: Nicole in place  Skin:     General: Skin is warm and dry  Neurological:      General: No focal deficit present  Mental Status: He is alert  Psychiatric:         Mood and Affect: Mood normal         Additional Data:     Labs:  Results from last 7 days   Lab Units 06/13/23  0503 06/12/23  0542 06/11/23  0514   BANDS PCT %  --   --  2   EOS PCT % 2   < > 2   HEMATOCRIT % 27 6*   < > 29 2*   HEMOGLOBIN g/dL 8 9*   < > 9 6*   LYMPHS PCT % 16   < >  --    LYMPHO PCT %  --   --  8*   MONOS PCT % 12   < >  --    MONO PCT %  --   --  4   NEUTROS PCT % 69   < >  --    PLATELETS Thousands/uL 161   < > 157   WBC Thousand/uL 6 97   < > 7 54    < > = values in this interval not displayed  Results from last 7 days   Lab Units 06/13/23  0503 06/12/23  0542 06/11/23  0550   ANION GAP mmol/L 7   < > 5   ALBUMIN g/dL  --   --  3 1*   ALK PHOS U/L  --   --  61   ALT U/L  --   --  12   AST U/L  --   --  17   BUN mg/dL 14   < > 14   CALCIUM mg/dL 8 2*   < > 8 6   CHLORIDE mmol/L 97   < > 97   CO2 mmol/L 30   < > 31   CREATININE mg/dL 0 76   < > 0 74   GLUCOSE RANDOM mg/dL 102   < > 102   POTASSIUM mmol/L 3 0*   < > 3 5   SODIUM mmol/L 134*   < > 133*   TOTAL BILIRUBIN mg/dL  --   --  0 29    < > = values in this interval not displayed                         Lines/Drains:  Invasive Devices     Peripheral Intravenous Line  Duration           Peripheral IV 06/11/23 Left;Ventral (anterior) Forearm 2 days          Drain  Duration           Urethral Catheter Coude;Non-latex 16 Fr  <1 day              Urinary Catheter:  Goal for removal: N/A- Discharging with Nicole               Imaging: Reviewed radiology reports from this admission including: MRI abdomen/MRCP and procedure reports    Recent Cultures (last 7 days):         Last 24 Hours Medication List:   Current Facility-Administered Medications   Medication Dose Route Frequency Provider Last Rate   • acetaminophen  650 mg Oral Q6H PRN Timothy Lopez MD     • ALPRAZolam  1 mg Oral BID PRN Timothy Lopez MD     • amLODIPine  10 mg Oral Daily Timothy Lopez MD     • aspirin  81 mg Oral Daily Timothy Lopez MD     • cefTRIAXone  1,000 mg Intravenous Q24H Vargas Hale MD 1,000 mg (06/13/23 0730)   • finasteride  5 mg Oral Daily Timothy Lopez MD     • glycerin-hypromellose-  1 drop Both Eyes Q4H PRN Timothy Lopez MD     • hydrALAZINE  10 mg Intravenous Q6H PRN Timothy Lopez MD     • magnesium Oxide  400 mg Oral Daily Timothy Lopez MD     • metoprolol succinate  25 mg Oral Daily Timothy Lopez MD     • nicotine  1 patch Transdermal Daily Timothy Lopez MD     • pancrelipase (Lip-Prot-Amyl)  24,000 Units Oral TID With Meals Timothy Lopez MD     • pantoprazole  40 mg Oral BID [facility] Timothy Lopez MD     • pravastatin  40 mg Oral HS Timothy Lopez MD     • sodium chloride  1 g Oral BID With Meals Timothy Lopez MD     • tamsulosin  0 4 mg Oral Daily Timothy Lopez MD          Today, Patient Was Seen By: Jayro Moreno PA-C    **Please Note: This note may have been constructed using a voice recognition system  **

## 2023-06-13 NOTE — PROCEDURES
Universal Protocol:  Procedure performed by: (patient's nurse Fabiola Gamez)  Consent: Verbal consent obtained  Consent given by: patient  Patient understanding: patient states understanding of the procedure being performed  Patient identity confirmed: verbally with patient and provided demographic data      Bladder catheterization    Date/Time: 6/13/2023 6:59 AM    Performed by: Aura Hutchison PA-C  Authorized by: Aura Hutchison PA-C    Patient location:  Bedside  Consent:     Consent given by:  Patient  Universal protocol:     Procedure explained and questions answered to patient or proxy's satisfaction: yes      Patient identity confirmed:  Verbally with patient and provided demographic data  Pre-procedure details:     Procedure purpose:  Therapeutic    Preparation: Patient was prepped and draped in usual sterile fashion    Anesthesia (see MAR for exact dosages): Anesthesia method:  None  Procedure details:     Bladder irrigation: no      Catheter insertion:  Indwelling    Approach: natural orifice      Catheter type:  Coude    Catheter size:  16 Fr    Successful placement: yes      Urine characteristics:  Clear and yellow    Provider performed due to:  Complicated insertion and nurse unable to complete  Post-procedure details:     Patient tolerance of procedure: Tolerated well, no immediate complications  Comments:      Discussed with Urology [facility]  Initially plan was for straight cath of patient with coude, however, urine did not fully empty after 200cc output and bladder scan still showed 400cc  Nicole left in place  Urine continued to flow following

## 2023-06-14 ENCOUNTER — HOME CARE VISIT (OUTPATIENT)
Dept: HOME HEALTH SERVICES | Facility: HOME HEALTHCARE | Age: 73
End: 2023-06-14

## 2023-06-14 ENCOUNTER — TRANSITIONAL CARE MANAGEMENT (OUTPATIENT)
Dept: INTERNAL MEDICINE CLINIC | Facility: OTHER | Age: 73
End: 2023-06-14

## 2023-06-14 ENCOUNTER — TELEPHONE (OUTPATIENT)
Dept: INTERNAL MEDICINE CLINIC | Facility: OTHER | Age: 73
End: 2023-06-14

## 2023-06-14 DIAGNOSIS — K25.9 MULTIPLE GASTRIC ULCERS: ICD-10-CM

## 2023-06-14 DIAGNOSIS — Z71.89 COMPLEX CARE COORDINATION: Primary | ICD-10-CM

## 2023-06-14 RX ORDER — OMEPRAZOLE 40 MG/1
40 CAPSULE, DELAYED RELEASE ORAL DAILY
Qty: 180 CAPSULE | Refills: 0 | Status: SHIPPED | OUTPATIENT
Start: 2023-06-14

## 2023-06-14 NOTE — TELEPHONE ENCOUNTER
Spoke with patient and scheduled hospital follow up for 7/18/23 with Satish Kelley in the [facility]  Patient is on wait life  Mailed labs to be done and appointment reminder card

## 2023-06-15 ENCOUNTER — PATIENT OUTREACH (OUTPATIENT)
Dept: INTERNAL MEDICINE CLINIC | Facility: CLINIC | Age: 73
End: 2023-06-15

## 2023-06-15 LAB — BACTERIA UR CULT: ABNORMAL

## 2023-06-15 NOTE — PROGRESS NOTES
Spoke with Ania Park  He is doing good has his medications and no questions about discharge instructions Does not like having to see all these doctors he states Reminded him of PCP appointment on 6/21/23  [facility] home [facility] will start tomorrow  I will check back in 2-3 weeks

## 2023-06-16 ENCOUNTER — HOME CARE VISIT (OUTPATIENT)
Dept: HOME HEALTH SERVICES | Facility: HOME HEALTHCARE | Age: 73
End: 2023-06-16
Payer: MEDICARE

## 2023-06-16 VITALS
HEART RATE: 80 BPM | RESPIRATION RATE: 20 BRPM | SYSTOLIC BLOOD PRESSURE: 102 MMHG | DIASTOLIC BLOOD PRESSURE: 50 MMHG | OXYGEN SATURATION: 97 % | TEMPERATURE: 97.3 F

## 2023-06-16 PROCEDURE — 88305 TISSUE EXAM BY PATHOLOGIST: CPT | Performed by: PATHOLOGY

## 2023-06-16 PROCEDURE — G0299 HHS/HOSPICE OF RN EA 15 MIN: HCPCS

## 2023-06-16 PROCEDURE — 400013 VN SOC

## 2023-06-16 PROCEDURE — 88342 IMHCHEM/IMCYTCHM 1ST ANTB: CPT | Performed by: PATHOLOGY

## 2023-06-16 PROCEDURE — 10330081 VN NO-PAY CLAIM PROCEDURE

## 2023-06-16 PROCEDURE — 88313 SPECIAL STAINS GROUP 2: CPT | Performed by: PATHOLOGY

## 2023-06-19 ENCOUNTER — HOME CARE VISIT (OUTPATIENT)
Dept: HOME HEALTH SERVICES | Facility: HOME HEALTHCARE | Age: 73
End: 2023-06-19
Payer: MEDICARE

## 2023-06-19 ENCOUNTER — TELEPHONE (OUTPATIENT)
Dept: GASTROENTEROLOGY | Facility: CLINIC | Age: 73
End: 2023-06-19

## 2023-06-19 NOTE — TELEPHONE ENCOUNTER
----- Message from Carlos Garza MD sent at 6/19/2023 12:32 PM EDT -----  Discussed with patient    Follow-up in the office Wounds

## 2023-06-20 ENCOUNTER — HOME CARE VISIT (OUTPATIENT)
Dept: HOME HEALTH SERVICES | Facility: HOME HEALTHCARE | Age: 73
End: 2023-06-20
Payer: MEDICARE

## 2023-06-20 ENCOUNTER — APPOINTMENT (OUTPATIENT)
Dept: LAB | Facility: HOSPITAL | Age: 73
End: 2023-06-20
Payer: MEDICARE

## 2023-06-20 VITALS
OXYGEN SATURATION: 100 % | TEMPERATURE: 98.7 F | SYSTOLIC BLOOD PRESSURE: 103 MMHG | DIASTOLIC BLOOD PRESSURE: 70 MMHG | RESPIRATION RATE: 16 BRPM | HEART RATE: 79 BPM

## 2023-06-20 DIAGNOSIS — R33.9 URINE RETENTION: ICD-10-CM

## 2023-06-20 DIAGNOSIS — E22.2 SIADH (SYNDROME OF INAPPROPRIATE ADH PRODUCTION) (HCC): ICD-10-CM

## 2023-06-20 LAB
ANION GAP SERPL CALCULATED.3IONS-SCNC: 4 MMOL/L
BUN SERPL-MCNC: 10 MG/DL (ref 5–25)
CALCIUM SERPL-MCNC: 8.3 MG/DL (ref 8.3–10.1)
CHLORIDE SERPL-SCNC: 107 MMOL/L (ref 96–108)
CO2 SERPL-SCNC: 26 MMOL/L (ref 21–32)
CREAT SERPL-MCNC: 0.73 MG/DL (ref 0.6–1.3)
GFR SERPL CREATININE-BSD FRML MDRD: 91 ML/MIN/1.73SQ M
GLUCOSE P FAST SERPL-MCNC: 115 MG/DL (ref 65–99)
POTASSIUM SERPL-SCNC: 4.4 MMOL/L (ref 3.5–5.3)
SODIUM SERPL-SCNC: 137 MMOL/L (ref 135–147)

## 2023-06-20 PROCEDURE — G0299 HHS/HOSPICE OF RN EA 15 MIN: HCPCS

## 2023-06-20 PROCEDURE — 80048 BASIC METABOLIC PNL TOTAL CA: CPT

## 2023-06-20 PROCEDURE — 36415 COLL VENOUS BLD VENIPUNCTURE: CPT

## 2023-06-21 ENCOUNTER — OFFICE VISIT (OUTPATIENT)
Dept: INTERNAL MEDICINE CLINIC | Facility: CLINIC | Age: 73
End: 2023-06-21
Payer: MEDICARE

## 2023-06-21 VITALS
OXYGEN SATURATION: 99 % | TEMPERATURE: 97.8 F | SYSTOLIC BLOOD PRESSURE: 112 MMHG | WEIGHT: 87 LBS | HEART RATE: 69 BPM | DIASTOLIC BLOOD PRESSURE: 78 MMHG | BODY MASS INDEX: 16.01 KG/M2 | HEIGHT: 62 IN

## 2023-06-21 DIAGNOSIS — I10 HYPERTENSION: ICD-10-CM

## 2023-06-21 DIAGNOSIS — E78.5 HYPERLIPIDEMIA: ICD-10-CM

## 2023-06-21 DIAGNOSIS — E87.1 HYPONATREMIA: Primary | ICD-10-CM

## 2023-06-21 DIAGNOSIS — K86.1 CHRONIC PANCREATITIS (HCC): ICD-10-CM

## 2023-06-21 PROCEDURE — 99495 TRANSJ CARE MGMT MOD F2F 14D: CPT | Performed by: INTERNAL MEDICINE

## 2023-06-21 NOTE — PROGRESS NOTES
"Assessment/Plan:    TCM follow up   After  Being hospitalized  For  hyponatremia     Diagnoses and all orders for this visit:    Hyponatremia    Hyperlipidemia    Chronic pancreatitis ([facility] [address] )    Hypertension          Subjective:      Patient ID: Leigh Perera is a 75 y o  male  HPI    The following portions of the patient's history were reviewed and updated as appropriate: allergies, current medications, past family history, past medical history, past social history, past surgical history, and problem list     Review of Systems   Constitutional: Negative  HENT: Negative for dental problem, drooling, ear discharge and ear pain  Eyes: Negative for discharge, redness and itching  Respiratory: Negative for apnea, cough and wheezing  Cardiovascular: Negative for chest pain and palpitations  Gastrointestinal: Negative for abdominal pain, blood in stool, diarrhea and vomiting  Endocrine: Negative for polydipsia, polyphagia and polyuria  Genitourinary: Negative for decreased urine volume, dysuria and frequency  Musculoskeletal: Negative for arthralgias, myalgias and neck stiffness  Skin: Negative for pallor and wound  Allergic/Immunologic: Negative for environmental allergies and food allergies  Neurological: Negative for facial asymmetry, light-headedness, numbness and headaches  Hematological: Negative for adenopathy  Does not bruise/bleed easily  Psychiatric/Behavioral: Negative for agitation, behavioral problems and confusion  Objective:      /78 (BP Location: Left arm, Patient Position: Sitting, Cuff Size: Standard)   Pulse 69   Temp 97 8 °F (36 6 °C) (Temporal)   Ht 5' 2\" (1 575 m)   Wt 39 5 kg (87 lb)   SpO2 99% Comment: room air  BMI 15 91 kg/m²          Physical Exam  Constitutional:       Appearance: Normal appearance  HENT:      Head: Normocephalic        Nose: Nose normal       Mouth/Throat:      Mouth: Mucous membranes are moist    Eyes:      Pupils: " Pupils are equal, round, and reactive to light  Cardiovascular:      Rate and Rhythm: Regular rhythm  Heart sounds: Normal heart sounds  Pulmonary:      Breath sounds: Normal breath sounds  Abdominal:      Palpations: Abdomen is soft  Musculoskeletal:         General: No swelling  Cervical back: Neck supple  Skin:     General: Skin is warm  Neurological:      General: No focal deficit present  Mental Status: He is alert and oriented to person, place, and time     Psychiatric:         Mood and Affect: Mood normal        Hyponatremia  Due  To  SIADH  Controlled with  Water restriction  And  Salt  Tablets    Hypertension   Controlled with  Current  meds    Chronic  Pancreatitis    Stable      Fup  Landen Camarena MD FACP

## 2023-06-23 ENCOUNTER — HOME CARE VISIT (OUTPATIENT)
Dept: HOME HEALTH SERVICES | Facility: HOME HEALTHCARE | Age: 73
End: 2023-06-23
Payer: MEDICARE

## 2023-06-23 ENCOUNTER — TELEPHONE (OUTPATIENT)
Dept: HOME HEALTH SERVICES | Facility: HOME HEALTHCARE | Age: 73
End: 2023-06-23

## 2023-06-27 ENCOUNTER — HOME CARE VISIT (OUTPATIENT)
Dept: HOME HEALTH SERVICES | Facility: HOME HEALTHCARE | Age: 73
End: 2023-06-27
Payer: MEDICARE

## 2023-06-28 ENCOUNTER — CONSULT (OUTPATIENT)
Dept: CARDIOLOGY CLINIC | Facility: CLINIC | Age: 73
End: 2023-06-28
Payer: MEDICARE

## 2023-06-28 VITALS
HEIGHT: 62 IN | WEIGHT: 94.2 LBS | HEART RATE: 73 BPM | SYSTOLIC BLOOD PRESSURE: 110 MMHG | DIASTOLIC BLOOD PRESSURE: 72 MMHG | BODY MASS INDEX: 17.34 KG/M2

## 2023-06-28 DIAGNOSIS — Z95.828 HISTORY OF ENDOVASCULAR STENT GRAFT FOR ABDOMINAL AORTIC ANEURYSM (AAA): ICD-10-CM

## 2023-06-28 DIAGNOSIS — I10 PRIMARY HYPERTENSION: Primary | ICD-10-CM

## 2023-06-28 DIAGNOSIS — F17.200 SMOKING: ICD-10-CM

## 2023-06-28 DIAGNOSIS — I10 HYPERTENSION, UNSPECIFIED TYPE: ICD-10-CM

## 2023-06-28 PROCEDURE — 99203 OFFICE O/P NEW LOW 30 MIN: CPT | Performed by: INTERNAL MEDICINE

## 2023-06-28 NOTE — PROGRESS NOTES
Cardiology Follow Up    200 [facility]  1950  [phone]  100 [facility],42-10 CATH LAB  [facility] [place] [address] [place] [address]  [phone]    1  Primary hypertension        2  Smoking        3  History of endovascular stent graft for abdominal aortic aneurysm (AAA)        4  Hypertension, unspecified type  Ambulatory Referral to Cardiology          Interval History: Cardiology consultation  Multiple records Reviewed, imaging was reviewed when available  40-year-old male who is chronically ill, history of severe vascular disease  Recently admitted to the hospital with weakness and severe hyponatremia  She was diagnosed with SIADH  Patient does have history of vascular disease, status post EVAR in 2019, 5 6 cm aneurysm infrarenal CT scan during his recent admission revealed no changes in size, sac is 34 mm, no endoleak's were reported  He does have increased velocities on the left iliac limb, suggesting stenosis  History of percutaneous revascularization in 2019 in that area  Patient is currently on aspirin, he did have a EGD during the last admission that revealed gastric ulcer with bleeding  He is currently on PPI therapy  He had a pharmacological stress in 2018 that suggested no ischemia ejection fraction 93%  Echocardiogram at that time was also unremarkable  EKG during his last admission was also normal   His lipids last year total cholesterol 120, HDL 56, LDL 37, he is on medium intensity statin therapy  Smoker  And suffers from significant malnutrition, BMI is 15  The patient ambulation is limited  At this level activity denies any chest pain, no orthopnea no PND      Patient Active Problem List   Diagnosis   • AAA (abdominal aortic aneurysm) without rupture (HCC)   • Epigastric pain   • Diarrhea   • Other chronic pancreatitis ([facility] [address] )   • Smoking   • 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 ([facility] )   • History of endovascular stent graft for abdominal aortic aneurysm (AAA)   • Stenosis of other vascular prosthetic devices, implants and grafts, subsequent encounter   • Neurogenic claudication   • Chronic kidney disease, stage 3 (HCC)   • Protein-calorie malnutrition, unspecified severity (HCC)   • Depression, recurrent (HCC)   • Iron deficiency anemia   • Hyponatremia   • Hypokalemia   • Urinary retention   • Hypertension   • Common bile duct dilation   • SIADH (syndrome of inappropriate ADH production) ([facility] )   • Severe protein-calorie malnutrition (HCC)     Past Medical History:   Diagnosis Date   • Anxiety    • Rhodes's esophagus    • Chronic neck pain    • Depression    • GERD (gastroesophageal reflux disease)    • H/O abdominal aortic aneurysm 4 5 cm   • Hyperlipidemia    • Hypertension    • Pancreatic pseudocyst    • Vertigo      Social History     Socioeconomic History   • Marital status:      Spouse name: Not on file   • Number of children: Not on file   • Years of education: Not on file   • Highest education level: Not on file   Occupational History   • Not on file   Tobacco Use   • Smoking status: Every Day     Packs/day: 1 00     Types: Cigarettes     Start date: 1962   • Smokeless tobacco: Never   Vaping Use   • Vaping Use: Never used   Substance and Sexual Activity   • Alcohol use: Never   • Drug use: No   • Sexual activity: Not Currently   Other Topics Concern   • Not on file   Social History Narrative   • Not on file     Social Determinants of Health     Financial Resource Strain: Not on file   Food Insecurity: No Food Insecurity (6/8/2023)    Hunger Vital Sign    • Worried About Running Out of Food in the Last Year: Never true    • Ran Out of Food in the Last Year: Never true   Transportation Needs: No Transportation Needs (6/8/2023)    PRAPARE - Transportation    • Lack of Transportation (Medical): No    • Lack of Transportation (Non-Medical):  No   Physical Activity: Not on file   Stress: Not on file Social Connections: Not on file   Intimate Partner Violence: Not on file   Housing Stability: Low Risk  (6/8/2023)    Housing Stability Vital Sign    • Unable to Pay for Housing in the Last Year: No    • Number of Places Lived in the Last Year: 1    • Unstable Housing in the Last Year: No      Family History   Problem Relation Age of Onset   • Heart disease Father    • Heart attack Father    • Diabetes Maternal Grandmother    • Diabetes Maternal Grandfather    • Diabetes Paternal Grandmother    • Hypertension Family    • Hyperlipidemia Family      Past Surgical History:   Procedure Laterality Date   • COLONOSCOPY  09/30/2014   • IR LOWER EXTREMITY / INTERVENTION  01/04/2019   • WY ESOPHAGOGASTRODUODENOSCOPY TRANSORAL DIAGNOSTIC N/A 06/22/2018    Procedure: EGD AND COLONOSCOPY;  Surgeon: Lindsey Ha MD;  Location: [facility]; Service: Gastroenterology   • WY EVASC RPR DPLMNT AORTO-AORTIC NDGFT N/A 02/09/2018    Procedure: REPAIR ANEURYSM ENDOVASCULAR ABDOMINAL AORTIC  (EVAR);   Surgeon: Jared Rodriguez MD;  Location: [facility];  Service: Vascular   • UPPER GASTROINTESTINAL ENDOSCOPY         Current Outpatient Medications:   •  ALPRAZolam (XANAX) 1 mg tablet, Take 1 tablet (1 mg total) by mouth 2 (two) times a day as needed (transient anxiety), Disp: 60 tablet, Rfl: 0  •  amLODIPine (NORVASC) 10 mg tablet, Take 1 tablet (10 mg total) by mouth daily Do not start before June 14, 2023 , Disp: 30 tablet, Rfl: 0  •  aspirin (ECOTRIN LOW STRENGTH) 81 mg EC tablet, Take 81 mg by mouth daily, Disp: , Rfl:   •  dicyclomine (BENTYL) 10 mg capsule, TAKE 1 CAPSULE BY MOUTH 3 TIMES A DAY AS NEEDED (ABDOMINAL CRAMPING/ SPASMS), Disp: 270 capsule, Rfl: 2  •  finasteride (PROSCAR) 5 mg tablet, Take 1 tablet (5 mg total) by mouth daily, Disp: 30 tablet, Rfl: 1  •  magnesium oxide (MAG-OX) 400 mg, Take 1 tablet (400 mg total) by mouth daily, Disp: 30 tablet, Rfl: 1  •  metoprolol succinate (TOPROL-XL) 25 mg 24 hr tablet, Take 1 tablet (25 mg total) by mouth daily, Disp: 90 tablet, Rfl: 1  •  Multiple Vitamins-Minerals (CENTRUM SILVER PO), Take 1 tablet by mouth daily  , Disp: , Rfl:   •  omeprazole (PriLOSEC) 40 MG capsule, Take 1 capsule (40 mg total) by mouth daily, Disp: 180 capsule, Rfl: 0  •  pancrelipase, Lip-Prot-Amyl, (Creon) 12,000 units capsule, Take 2 capsules (24,000 Units total) by mouth 3 (three) times a day with meals, Disp: 540 capsule, Rfl: 1  •  pravastatin (PRAVACHOL) 40 mg tablet, Take 1 tablet (40 mg total) by mouth daily at bedtime, Disp: 90 tablet, Rfl: 2  •  Restasis 0 05 % ophthalmic emulsion, , Disp: , Rfl:   •  sodium chloride 1 g tablet, Take 1 tablet (1 g total) by mouth 3 (three) times a day with meals, Disp: 90 tablet, Rfl: 0  •  tamsulosin (FLOMAX) 0 4 mg, Take 1 capsule (0 4 mg total) by mouth daily, Disp: 90 capsule, Rfl: 1  •  Wheat Dextrin (BENEFIBER PO), Take by mouth daily as needed  , Disp: , Rfl:   •  magnesium oxide (MAG-OX) 400 mg tablet, Take 1 tablet (400 mg total) by mouth daily (Patient not taking: Reported on 6/21/2023), Disp: 30 tablet, Rfl: 1  No Known Allergies    Labs:  Appointment on 06/20/2023   Component Date Value   • Sodium 06/20/2023 137    • Potassium 06/20/2023 4 4    • Chloride 06/20/2023 107    • CO2 06/20/2023 26    • ANION GAP 06/20/2023 4    • BUN 06/20/2023 10    • Creatinine 06/20/2023 0 73    • Glucose, Fasting 06/20/2023 115 (H)    • Calcium 06/20/2023 8 3    • eGFR 06/20/2023 91    No results displayed because visit has over 200 results        Appointment on 05/22/2023   Component Date Value   • Sodium 05/22/2023 126 (L)    • Potassium 05/22/2023 4 1    • Chloride 05/22/2023 94 (L)    • CO2 05/22/2023 28    • ANION GAP 05/22/2023 4    • BUN 05/22/2023 10    • Creatinine 05/22/2023 0 88    • Glucose, Fasting 05/22/2023 97    • Calcium 05/22/2023 8 9    • Corrected Calcium 05/22/2023 9 5    • AST 05/22/2023 14    • ALT 05/22/2023 18    • Alkaline Phosphatase 05/22/2023 86    • Total Protein 05/22/2023 6 7    • Albumin 05/22/2023 3 2 (L)    • Total Bilirubin 05/22/2023 0 30    • eGFR 05/22/2023 84    • TSH 3RD GENERATON 05/22/2023 1 798    • Free T4 05/22/2023 0 86    • WBC 05/22/2023 5 59    • RBC 05/22/2023 3 88    • Hemoglobin 05/22/2023 10 5 (L)    • Hematocrit 05/22/2023 32 0 (L)    • MCV 05/22/2023 83    • MCH 05/22/2023 27 1    • MCHC 05/22/2023 32 8    • RDW 05/22/2023 20 2 (H)    • MPV 05/22/2023 9 1    • Platelets 19/23/6238 346    • nRBC 05/22/2023 0    • Neutrophils Relative 05/22/2023 69    • Immat GRANS % 05/22/2023 1    • Lymphocytes Relative 05/22/2023 17    • Monocytes Relative 05/22/2023 10    • Eosinophils Relative 05/22/2023 2    • Basophils Relative 05/22/2023 1    • Neutrophils Absolute 05/22/2023 3 93    • Immature Grans Absolute 05/22/2023 0 05    • Lymphocytes Absolute 05/22/2023 0 92    • Monocytes Absolute 05/22/2023 0 53    • Eosinophils Absolute 05/22/2023 0 13    • Basophils Absolute 05/22/2023 0 03    • Iron Saturation 05/22/2023 6 (L)    • TIBC 05/22/2023 372    • Iron 05/22/2023 21 (L)    • Ferritin 05/22/2023 12 (L)    • Retic Ct Abs 05/22/2023 60,200    • Retic Ct Pct 05/22/2023 1 52    Admission on 01/12/2023, Discharged on 01/12/2023   Component Date Value   • WBC 01/12/2023 6 78    • RBC 01/12/2023 4 12    • Hemoglobin 01/12/2023 10 3 (L)    • Hematocrit 01/12/2023 31 7 (L)    • MCV 01/12/2023 77 (L)    • MCH 01/12/2023 25 0 (L)    • MCHC 01/12/2023 32 5    • RDW 01/12/2023 15 8 (H)    • MPV 01/12/2023 9 0    • Platelets 40/72/5706 254    • nRBC 01/12/2023 0    • Neutrophils Relative 01/12/2023 77 (H)    • Immat GRANS % 01/12/2023 0    • Lymphocytes Relative 01/12/2023 15    • Monocytes Relative 01/12/2023 8    • Eosinophils Relative 01/12/2023 0    • Basophils Relative 01/12/2023 0    • Neutrophils Absolute 01/12/2023 5 20    • Immature Grans Absolute 01/12/2023 0 03    • Lymphocytes Absolute 01/12/2023 0 99    • Monocytes Absolute 01/12/2023 0 51 • Eosinophils Absolute 01/12/2023 0 03    • Basophils Absolute 01/12/2023 0 02    • Sodium 01/12/2023 130 (L)    • Potassium 01/12/2023 3 5    • Chloride 01/12/2023 92 (L)    • CO2 01/12/2023 27    • ANION GAP 01/12/2023 11    • BUN 01/12/2023 11    • Creatinine 01/12/2023 1 03    • Glucose 01/12/2023 99    • Calcium 01/12/2023 9 1    • eGFR 01/12/2023 71    • hs TnI 0hr 01/12/2023 9    • NT-proBNP 01/12/2023 184 (H)    • Color, UA 01/12/2023 Colorless    • Clarity, UA 01/12/2023 Clear    • Specific Gravity, UA 01/12/2023 <1 005 (L)    • pH, UA 01/12/2023 7 5    • Leukocytes, UA 01/12/2023 Negative    • Nitrite, UA 01/12/2023 Negative    • Protein, UA 01/12/2023 Negative    • Glucose, UA 01/12/2023 Negative    • Ketones, UA 01/12/2023 Trace (A)    • Urobilinogen, UA 01/12/2023 <2 0    • Bilirubin, UA 01/12/2023 Negative    • Occult Blood, UA 01/12/2023 Negative    • hs TnI 2hr 01/12/2023 11    • Delta 2hr hsTnI 01/12/2023 2    Hospital Outpatient Visit on 01/12/2023   Component Date Value   • Ventricular Rate 01/12/2023 87    • Atrial Rate 01/12/2023 87    • GA Interval 01/12/2023 132    • QRSD Interval 01/12/2023 96    • QT Interval 01/12/2023 378    • QTC Interval 01/12/2023 454    • P Axis 01/12/2023 77    • QRS Axis 01/12/2023 85    • T Wave Axis 01/12/2023 77      Imaging: Colonoscopy    Result Date: 6/12/2023  Narrative: Table formatting from the original result was not included  [address] [facility] [address] [id number] [phone] DATE OF SERVICE: 6/12/23 PHYSICIAN(S): Attending: Twila Hodges MD Fellow: No Staff Documented INDICATION: Iron deficiency anemia, unspecified iron deficiency anemia type Personal history of colon polyps POST-OP DIAGNOSIS: See the impression below  HISTORY: Prior colonoscopy: Less than 3 years ago   It is being repeated at an interval of less than 3 years because: Last colonoscopy had inadequate bowel prep BOWEL PREPARATION: Golytely/Colyte/Trilyte PREPROCEDURE: Informed consent was obtained for the procedure, including sedation  Risks including but not limited to bleeding, infection, perforation, adverse drug reaction and aspiration were explained in detail  Also explained about less than 100% sensitivity with the exam and other alternatives  The patient was placed in the left lateral decubitus position  Procedure: Colonoscopy DETAILS OF PROCEDURE: Patient was taken to the procedure room where a time out was performed to confirm correct patient and correct procedure  The patient underwent monitored anesthesia care, which was administered by an anesthesia professional  The patient's blood pressure, heart rate, level of consciousness, oxygen, respirations and ECG were monitored throughout the procedure  A digital rectal exam was performed  The scope was introduced through the anus and advanced to the cecum  Retroflexion was performed in the cecum and rectum  The quality of bowel preparation was evaluated using the [facility] Bowel Preparation Scale with scores of: right colon = 2, transverse colon = 2, left colon = 2  The total BBPS score was 6  Bowel prep was adequate  The patient experienced no blood loss  The procedure was not difficult  The patient tolerated the procedure well  There were no apparent adverse events   ANESTHESIA INFORMATION: ASA: III Anesthesia Type: IV Sedation with Anesthesia MEDICATIONS: No administrations occurring from 1139 to 1219 on 06/12/23 FINDINGS: Few pancolonic diverticula Internal small hemorrhoids EVENTS: Procedure Events Event Event Time ENDO SCOPE OUT TIME 6/12/2023 11:56 AM ENDO CECUM REACHED 6/12/2023 12:10 PM ENDO SCOPE OUT TIME 6/12/2023 12:16 PM SPECIMENS: ID Type Source Tests Collected by Time Destination 1 : biopsy of gastric ulcer Tissue Stomach TISSUE EXAM Taqueria Bardales MD 6/12/2023 11:54 AM  EQUIPMENT: Colonoscope -PCF [id number]     Impression: Diverticulosis Small hemorrhoids No polyps or malignancies RECOMMENDATION: Resume regular diet and medications  No further screening colonoscopies necessary  Age greater than 72    Leia Smith MD     EGD    Result Date: 6/12/2023  Narrative: Table formatting from the original result was not included  [address] [facility] [id number] [phone] DATE OF SERVICE: 6/12/23 PHYSICIAN(S): Attending: Leia Smith MD Fellow: No Staff Documented INDICATION: Iron deficiency anemia, unspecified iron deficiency anemia type POST-OP DIAGNOSIS: See the impression below  PREPROCEDURE: Informed consent was obtained for the procedure, including sedation  Risks of perforation, hemorrhage, adverse drug reaction and aspiration were discussed  The patient was placed in the left lateral decubitus position  Patient was explained about the risks and benefits of the procedure  Risks including but not limited to bleeding, infection, and perforation were explained in detail  Also explained about less than 100% sensitivity with the exam and other alternatives  PROCEDURE: EGD DETAILS OF PROCEDURE: Patient was taken to the procedure room where a time out was performed to confirm correct patient and correct procedure  The patient underwent monitored anesthesia care, which was administered by an anesthesia professional  The patient's blood pressure, heart rate, level of consciousness, respirations and oxygen were monitored throughout the procedure  The scope was advanced to the second part of the duodenum  Retroflexion was performed in the fundus  The patient experienced no blood loss  The procedure was not difficult  The patient tolerated the procedure well  There were no apparent adverse events   ANESTHESIA INFORMATION: ASA: III Anesthesia Type: IV Sedation with Anesthesia MEDICATIONS: No administrations occurring from 1139 to 1219 on 06/12/23 FINDINGS: Single ulcer in the body of the stomach; performed cold forceps biopsy Small hiatal hernia The duodenum appeared normal  The esophagus appeared normal  SPECIMENS: ID Type Source Tests Collected by Time Destination 1 : biopsy of gastric ulcer Tissue Stomach TISSUE EXAM Jeanie Arechiga MD 6/12/2023 11:54 AM      Impression: Persistent bleeding based ulcer in gastric body may be slightly smaller compared to pictures  Biopsies taken Small hiatal hernia Normal duodenum Normal esophagus RECOMMENDATION: Resume regular diet and medications Increase Protonix to twice a day Call the biopsy in 1 to 2 weeks Okay to discharge from GI standpoint We will arrange follow-up office visit in 2 months Should have repeat EGD in 3 months to document complete ulcer healing   Jeanie Arechiga MD     MRI abdomen w wo contrast and mrcp    Result Date: 6/11/2023  Narrative: MRI OF THE ABDOMEN WITH AND WITHOUT CONTRAST WITH MRCP INDICATION: 75 years / Male  R93 2: Abnormal findings on diagnostic imaging of liver and biliary tract  COMPARISON: CT scan of the abdomen pelvis dated June 7, 2023  TECHNIQUE:  Multiplanar/multisequence MRI of the abdomen with 3D MRCP was performed before and after administration of contrast  IV Contrast:  5 mL of Gadobutrol injection (SINGLE-DOSE) FINDINGS: LOWER CHEST:   Unremarkable  LIVER: Normal in size and morphology    No suspicious mass  The hepatic veins and portal veins are patent  There is signal loss in the liver on T2 weighted images and on in phase images suggestive of increased iron deposition  BILE DUCTS: Common bile duct is mildly dilated, measuring up to approximately 1 cm  No evidence of choledocholithiasis or obstructing mass  No intrahepatic biliary ductal dilatation  GALLBLADDER: Mostly collapsed  No gallstones identified  Increased enhancement of the gallbladder wall of doubtful clinical significance  PANCREAS:  Unremarkable  ADRENAL GLANDS:  Normal  SPLEEN:  Normal  KIDNEYS/PROXIMAL URETERS:  No hydroureteronephrosis  No suspicious renal mass   BOWEL: Distention of the stomach containing fluid and debris may be on the basis of gastroparesis  No obstructing lesion  PERITONEUM/RETROPERITONEUM:  No ascites  LYMPH NODES:  No abdominal lymphadenopathy  VASCULAR STRUCTURES: Status post endovascular stent graft repair of an abdominal aortic aneurysm  The excluded aneurysm sac measures approximately 2 8 cm  No perigraft flow  ABDOMINAL WALL:  Unremarkable  OSSEOUS STRUCTURES:  No suspicious osseous lesion  Impression: Mildly dilated extrahepatic common bile duct  No intrahepatic biliary ductal dilatation  No evidence of choledocholithiasis or obstructing biliary lesion  Diffuse signal loss in the liver on fluid sensitive sequences and in phase images suggestive of increased iron deposition  Workstation performed: [id number]     MRI brain wo contrast    Result Date: 6/8/2023  Narrative: MRI BRAIN WITHOUT CONTRAST INDICATION: ha,   COMPARISON:   8/19/2022  TECHNIQUE:  Multiplanar, multisequence imaging of the brain was performed  IMAGE QUALITY:  Diagnostic  FINDINGS: BRAIN PARENCHYMA:  There is no discrete mass, mass effect or midline shift  There is no intracranial hemorrhage  There is no evidence of acute infarction and diffusion imaging is unremarkable  Few small scattered hyperintensities on T2/FLAIR imaging are  noted in the periventricular and subcortical white matter demonstrating an appearance that is statistically most likely to represent trace microangiopathic change  VENTRICLES:  Normal for the patient's age  SELLA AND PITUITARY GLAND:  Normal  ORBITS: Post bilateral lens replacement  PARANASAL SINUSES: Stable complete opacification of the right maxillary sinus  VASCULATURE:  Evaluation of the major intracranial vasculature demonstrates appropriate flow voids  CALVARIUM AND SKULL BASE:  Normal  EXTRACRANIAL SOFT TISSUES:  Normal      Impression: No acute intracranial abnormality  Likely chronic right maxillary sinus disease   Workstation performed: [id number] X-ray chest 1 view portable    Result Date: 6/7/2023  Narrative: CHEST INDICATION:   chest pain  COMPARISON: Chest radiograph January 12, 2023 EXAM PERFORMED/VIEWS:  XR CHEST PORTABLE FINDINGS: Cardiomediastinal silhouette appears unremarkable  The lungs are clear  No pneumothorax or pleural effusion  Osseous structures appear within normal limits for patient age  Impression: No acute cardiopulmonary disease  Workstation performed: [id number]     CT abdomen pelvis with contrast    Result Date: 6/7/2023  Narrative: CT ABDOMEN AND PELVIS WITH IV CONTRAST INDICATION:   Periumbilical pain and diarrhea    COMPARISON: CTs dated 1/12/2023, 8/18/2022, 11/25/2017, and 9/10/2013  ERCP dated 10/13/2014  TECHNIQUE:  CT examination of the abdomen and pelvis was performed  Dual energy technique was used and virtual noncontrast sequence was also generated and reviewed  Multiplanar 2D reformatted images were created from the source data  This examination, like all CT scans performed in the [facility], was performed utilizing techniques to minimize radiation dose exposure, including the use of iterative reconstruction and automated exposure control  Radiation dose length product (DLP) for this visit:  226 77 mGy-cm IV Contrast:  80 mL of iohexol (OMNIPAQUE) Enteric Contrast:  Enteric contrast was not administered  FINDINGS: ABDOMEN LOWER CHEST: Emphysematous changes and scarring at the lung bases  No acute findings in the visualized lower chest  LIVER/BILIARY TREE: Size and contour are normal  No focal hepatic masses  Minimal central intrahepatic biliary ductal prominence  Common duct is distended up to 14 mm  This represents an acute increase in caliber--previously 5 mm in January  GALLBLADDER:  No calcified gallstones  No pericholecystic inflammatory change  SPLEEN:  Unremarkable  PANCREAS: 2 mm circumscribed cystic focus at the anterior pancreatic body (301/33) without high risk or worrisome features  This is unchanged since at least 2013, obviating need for additional follow-up  No suspicious pancreatic masses or ductal dilatation  ADRENAL GLANDS: Lobular hypertrophy of both adrenal glands--unchanged  No concerning adrenal nodules on either side  KIDNEYS/URETERS: Subcentimeter hypoenhancing lesions are too small to characterize but are Bosniak II benign lesions by 2019 revised criteria  No suspicious parenchymal masses, perinephric collections, urolithiasis, or hydronephrosis  STOMACH AND BOWEL: Stomach is unremarkable  No dilated or inflamed loops of small bowel  Moderate excess stool burden in the colon  Gas and stool extends to the rectum  No evident colonic inflammation  APPENDIX:  No findings to suggest appendicitis  ABDOMINOPELVIC CAVITY:  No ascites  No pneumoperitoneum  No lymphadenopathy  VESSELS: Infrarenal abdominal aortic treated by aortobiiliac endograft  No change from recent prior  No evidence for endoleak  No acute vascular abnormalities  PELVIS REPRODUCTIVE ORGANS:  Unremarkable for patient's age  URINARY BLADDER: Bladder wall appears circumferentially thickened and trabeculated, though this is a chronic finding  No perivesicular fat stranding  ABDOMINAL WALL/INGUINAL REGIONS:  Unremarkable  OSSEOUS STRUCTURES:  No acute fracture or destructive osseous lesion  Impression: 1  New distention of the central intrahepatic and extrahepatic bile ducts through the ampulla  Common duct measures up to 14 mm in caliber  Correlate for clinical and laboratory features of obstructive cholangiopathy  Recommend gastroenterology consultation  Consider MRCP, if clinically warranted  2   Moderate excess stool in the colon without obstruction or colonic inflammation  3   Urinary bladder wall thickening appears chronic and unchanged when accounting for difference in distention  However, if the patient has suprapubic pain, consider urinalysis to assess for evidence of superimposed infectious cystitis   Note: The study was marked in [facility] for immediate notification  Imaging follow-up reminder notification was scheduled in the electronic medical record  Workstation performed: [id number]       Review of Systems:  Review of Systems   Constitutional: Positive for fatigue  Negative for diaphoresis and fever  HENT: Positive for hearing loss  Negative for nosebleeds  Eyes: Positive for visual disturbance  Respiratory: Negative for apnea, shortness of breath, wheezing and stridor  Cardiovascular: Negative for chest pain, palpitations and leg swelling  Gastrointestinal: Positive for abdominal pain  Negative for anal bleeding and blood in stool  Endocrine: Negative for cold intolerance and heat intolerance  Genitourinary: Positive for difficulty urinating  Negative for hematuria  Musculoskeletal: Positive for arthralgias and gait problem  Negative for myalgias  Skin: Negative for pallor and rash  Allergic/Immunologic: Negative for immunocompromised state  Neurological: Positive for weakness  Negative for syncope and speech difficulty  Hematological: Does not bruise/bleed easily  Psychiatric/Behavioral: Negative for sleep disturbance  The patient is not nervous/anxious  Physical Exam:  Physical Exam  Vitals reviewed  Constitutional:       General: He is not in acute distress  Appearance: He is ill-appearing (c chronically ill, fragile appearing malnourished)  He is not toxic-appearing or diaphoretic  HENT:      Head: Normocephalic  Eyes:      General: No scleral icterus  Neck:      Vascular: No carotid bruit  Cardiovascular:      Rate and Rhythm: Normal rate and regular rhythm  Heart sounds: Normal heart sounds  No murmur heard  No friction rub  No gallop  Pulmonary:      Effort: No respiratory distress  Breath sounds: No stridor  Wheezing present  No rhonchi or rales  Abdominal:      General: Abdomen is flat  Palpations: Abdomen is soft  Tenderness:  There is no abdominal tenderness  Musculoskeletal:      Right lower leg: No edema  Left lower leg: No edema  Skin:     General: Skin is warm and dry  Capillary Refill: Capillary refill takes less than 2 seconds  Coloration: Skin is not jaundiced or pale  Findings: No bruising or erythema  Neurological:      Mental Status: He is alert and oriented to person, place, and time  Psychiatric:         Mood and Affect: Mood normal          Discussion/Summary: Hypertension, essential, well-controlled on medical regimen  In fact the patient does need occasional blood pressures are on the low side 100/70  There were no changes in medications  He is currently on albuterol salt intake regimen 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 but the blood pressure is stable  Also on aspirin therapy because of vascular disease, which was continued despite he is peptic ulcer disease, appears to be controlled  He does have a follow-up EGD  Possibly an echocardiogram   No new recommendations  Follow-up if any abnormalities or symptoms develop    This note was completed in part utilizing [employer] direct voice recognition software  Grammatical errors, random word insertion, spelling mistakes, and incomplete sentences may be an occasional consequence of the system secondary to software limitations, ambient noise and hardware issues  At the time of dictation, efforts were made to edit, clarify and /or correct errors  Please read the chart carefully and recognize, using context, where substitutions have occurred  If you have any questions or concerns about the context, text or information contained within the body of this dictation, please contact myself, the provider, for further clarification

## 2023-06-30 ENCOUNTER — HOME CARE VISIT (OUTPATIENT)
Dept: HOME HEALTH SERVICES | Facility: HOME HEALTHCARE | Age: 73
End: 2023-06-30
Payer: MEDICARE

## 2023-06-30 VITALS
DIASTOLIC BLOOD PRESSURE: 68 MMHG | RESPIRATION RATE: 16 BRPM | OXYGEN SATURATION: 98 % | WEIGHT: 92 LBS | BODY MASS INDEX: 16.83 KG/M2 | HEART RATE: 69 BPM | TEMPERATURE: 97.8 F | SYSTOLIC BLOOD PRESSURE: 100 MMHG

## 2023-06-30 PROCEDURE — G0299 HHS/HOSPICE OF RN EA 15 MIN: HCPCS

## 2023-07-05 ENCOUNTER — HOME CARE VISIT (OUTPATIENT)
Dept: HOME HEALTH SERVICES | Facility: HOME HEALTHCARE | Age: 73
End: 2023-07-05
Payer: MEDICARE

## 2023-07-05 VITALS
OXYGEN SATURATION: 98 % | SYSTOLIC BLOOD PRESSURE: 112 MMHG | HEART RATE: 72 BPM | RESPIRATION RATE: 16 BRPM | DIASTOLIC BLOOD PRESSURE: 72 MMHG | TEMPERATURE: 98.1 F

## 2023-07-05 PROCEDURE — G0299 HHS/HOSPICE OF RN EA 15 MIN: HCPCS

## 2023-07-05 NOTE — CASE COMMUNICATION
This patient has an appointment 7/18/23. is this a voiding trial? will he need his catheter removed by [facility] nurses prior to the visit?     Thank you,   Regina Claire, RN

## 2023-07-06 ENCOUNTER — PATIENT OUTREACH (OUTPATIENT)
Dept: INTERNAL MEDICINE CLINIC | Facility: CLINIC | Age: 73
End: 2023-07-06

## 2023-07-06 ENCOUNTER — TELEPHONE (OUTPATIENT)
Dept: UROLOGY | Facility: AMBULATORY SURGERY CENTER | Age: 73
End: 2023-07-06

## 2023-07-06 NOTE — TELEPHONE ENCOUNTER
PT under care of: Nanda      Pt last seen: 01/24/23     PT calling today because/symptoms are: Pt was in the hospital at the 06/07/23 and a catheter was placed and he needs to make an appointment to have it removed.       PT can be reached at: [phone]

## 2023-07-06 NOTE — PROGRESS NOTES
Spoke briefly with Marian Gabriel. [address] got on phone reported he had some wheezing yesterday but he is smoking. She will reach out to PCP after next visit if she feels he needs inhaler. He is drinking one ensure a day. Appetite fair he said.    I will check back in 1-2 weeks

## 2023-07-07 ENCOUNTER — HOME CARE VISIT (OUTPATIENT)
Dept: HOME HEALTH SERVICES | Facility: HOME HEALTHCARE | Age: 73
End: 2023-07-07
Payer: MEDICARE

## 2023-07-07 NOTE — TELEPHONE ENCOUNTER
Bette Kern and scheduled for void trial next Thursday @ 8:30 and 2:30 - if [facility] can remove in am only needs to come in the afternoon

## 2023-07-10 ENCOUNTER — TELEPHONE (OUTPATIENT)
Dept: NEPHROLOGY | Facility: CLINIC | Age: 73
End: 2023-07-10

## 2023-07-11 ENCOUNTER — HOSPITAL ENCOUNTER (OUTPATIENT)
Dept: NON INVASIVE DIAGNOSTICS | Age: 73
Discharge: HOME | End: 2023-07-11
Payer: MEDICARE

## 2023-07-11 VITALS
HEIGHT: 62 IN | WEIGHT: 92 LBS | HEART RATE: 72 BPM | BODY MASS INDEX: 16.93 KG/M2 | DIASTOLIC BLOOD PRESSURE: 72 MMHG | SYSTOLIC BLOOD PRESSURE: 112 MMHG

## 2023-07-11 DIAGNOSIS — Z95.828 HISTORY OF ENDOVASCULAR STENT GRAFT FOR ABDOMINAL AORTIC ANEURYSM (AAA): ICD-10-CM

## 2023-07-11 DIAGNOSIS — I10 PRIMARY HYPERTENSION: ICD-10-CM

## 2023-07-11 DIAGNOSIS — F17.200 SMOKING: ICD-10-CM

## 2023-07-11 LAB
AORTIC ROOT: 3.5 CM
APICAL FOUR CHAMBER EJECTION FRACTION: 64 %
ASCENDING AORTA: 3.6 CM
E WAVE DECELERATION TIME: 172 MS
FRACTIONAL SHORTENING: 37 % (ref 28–44)
INTERVENTRICULAR SEPTUM IN DIASTOLE (PARASTERNAL SHORT AXIS VIEW): 0.8 CM
INTERVENTRICULAR SEPTUM: 0.8 CM (ref 0.6–1.1)
LAAS-AP2: 9.3 CM2
LAAS-AP4: 14.2 CM2
LEFT ATRIUM SIZE: 2.5 CM
LEFT ATRIUM VOLUME (MOD BIPLANE): 28 ML
LEFT INTERNAL DIMENSION IN SYSTOLE: 2.2 CM (ref 2.1–4)
LEFT VENTRICLE DIASTOLIC VOLUME (MOD BIPLANE): 76 ML
LEFT VENTRICLE SYSTOLIC VOLUME (MOD BIPLANE): 22 ML
LEFT VENTRICULAR INTERNAL DIMENSION IN DIASTOLE: 3.5 CM (ref 3.5–6)
LEFT VENTRICULAR POSTERIOR WALL IN END DIASTOLE: 0.6 CM
LEFT VENTRICULAR STROKE VOLUME: 34 ML
LV EF: 71 %
LVSV (TEICH): 34 ML
MV E'TISSUE VEL-SEP: 9 CM/S
MV PEAK A VEL: 0.54 M/S
MV PEAK E VEL: 36 CM/S
MV STENOSIS PRESSURE HALF TIME: 50 MS
MV VALVE AREA P 1/2 METHOD: 4.4 CM2
RA PRESSURE ESTIMATED: 5 MMHG
RIGHT ATRIUM AREA SYSTOLE A4C: 13.6 CM2
RIGHT VENTRICLE ID DIMENSION: 3.2 CM
RV PSP: 47 MMHG
SL CV LEFT ATRIUM LENGTH A2C: 3.7 CM
SL CV LV EF: 55
SL CV PED ECHO LEFT VENTRICLE DIASTOLIC VOLUME (MOD BIPLANE) 2D: 50 ML
SL CV PED ECHO LEFT VENTRICLE SYSTOLIC VOLUME (MOD BIPLANE) 2D: 16 ML
TR MAX PG: 42 MMHG
TR PEAK VELOCITY: 3.3 M/S
TRICUSPID ANNULAR PLANE SYSTOLIC EXCURSION: 1.5 CM
TRICUSPID VALVE PEAK REGURGITATION VELOCITY: 3.26 M/S

## 2023-07-11 PROCEDURE — 93306 TTE W/DOPPLER COMPLETE: CPT

## 2023-07-11 PROCEDURE — 93306 TTE W/DOPPLER COMPLETE: CPT | Performed by: INTERNAL MEDICINE

## 2023-07-12 ENCOUNTER — TELEPHONE (OUTPATIENT)
Dept: LAB | Facility: HOSPITAL | Age: 73
End: 2023-07-12

## 2023-07-12 ENCOUNTER — HOME CARE VISIT (OUTPATIENT)
Dept: HOME HEALTH SERVICES | Facility: HOME HEALTHCARE | Age: 73
End: 2023-07-12
Payer: MEDICARE

## 2023-07-12 VITALS
SYSTOLIC BLOOD PRESSURE: 110 MMHG | RESPIRATION RATE: 16 BRPM | OXYGEN SATURATION: 99 % | DIASTOLIC BLOOD PRESSURE: 70 MMHG | TEMPERATURE: 98.5 F | HEART RATE: 73 BPM

## 2023-07-12 DIAGNOSIS — F41.9 ANXIETY: ICD-10-CM

## 2023-07-12 DIAGNOSIS — N40.1 BENIGN PROSTATIC HYPERPLASIA WITH LOWER URINARY TRACT SYMPTOMS, SYMPTOM DETAILS UNSPECIFIED: ICD-10-CM

## 2023-07-12 PROCEDURE — G0299 HHS/HOSPICE OF RN EA 15 MIN: HCPCS

## 2023-07-12 RX ORDER — TAMSULOSIN HYDROCHLORIDE 0.4 MG/1
0.4 CAPSULE ORAL DAILY
Qty: 90 CAPSULE | Refills: 0 | Status: SHIPPED | OUTPATIENT
Start: 2023-07-12

## 2023-07-12 RX ORDER — ALPRAZOLAM 1 MG/1
1 TABLET ORAL 2 TIMES DAILY PRN
Qty: 60 TABLET | Refills: 0 | Status: SHIPPED | OUTPATIENT
Start: 2023-07-12 | End: 2023-08-11

## 2023-07-12 NOTE — TELEPHONE ENCOUNTER
Christelle from [facility] returning call regarding removal oft he catheter tomorrow. She will have to check with the office with the office to see if this can be done. Christelle will call back and let us know if this can be done.

## 2023-07-12 NOTE — TELEPHONE ENCOUNTER
Refill Request     Tamsulosin  Alprazolam     Pharmacy: [facility]     LA: 6/21/23   NA: 8/16/23

## 2023-07-13 ENCOUNTER — TELEPHONE (OUTPATIENT)
Dept: INTERNAL MEDICINE CLINIC | Facility: CLINIC | Age: 73
End: 2023-07-13

## 2023-07-13 ENCOUNTER — PROCEDURE VISIT (OUTPATIENT)
Dept: UROLOGY | Facility: HOSPITAL | Age: 73
End: 2023-07-13
Payer: MEDICARE

## 2023-07-13 VITALS
HEART RATE: 87 BPM | OXYGEN SATURATION: 99 % | WEIGHT: 93.8 LBS | HEIGHT: 62 IN | SYSTOLIC BLOOD PRESSURE: 98 MMHG | BODY MASS INDEX: 17.26 KG/M2 | DIASTOLIC BLOOD PRESSURE: 60 MMHG

## 2023-07-13 DIAGNOSIS — R33.9 URINARY RETENTION: Primary | ICD-10-CM

## 2023-07-13 LAB — POST-VOID RESIDUAL VOLUME, ML POC: 126 ML

## 2023-07-13 PROCEDURE — 51798 US URINE CAPACITY MEASURE: CPT

## 2023-07-13 PROCEDURE — 99211 OFF/OP EST MAY X REQ PHY/QHP: CPT

## 2023-07-13 NOTE — TELEPHONE ENCOUNTER
Patient stated that his visiting nurse advised him to have a chest xray ordered due to having sodium issues. He recently had a catheter removed.

## 2023-07-13 NOTE — PROGRESS NOTES
7/13/2023    Swapna Mcguire  1950  [id number]    Diagnosis      Patient presents for void trial managed by Dr. Mykel Kline  Patient will follow up as needed at his request    Procedure Nicole removal/voiding trial    Nicole catheter removed after deflation of an intact balloon. Patient tolerated well. Encouraged patient to hydrate well and return this afternoon for post void residual.  he knows he may return early if uncomfortable and unable to urinate. Patient agrees to this plan. Patient returned this afternoon. Patient states able to void. . Bladder ultrasound performed and PVR measured 126 ml.            Vitals:    07/13/23 0835   BP: 98/60   Pulse: 87   SpO2: 99%   Weight: 42.5 kg (93 lb 12.8 oz)   Height: 5' 2" (1.575 m)           Kareem Baez RN

## 2023-07-17 ENCOUNTER — LAB (OUTPATIENT)
Dept: LAB | Facility: HOSPITAL | Age: 73
End: 2023-07-17
Payer: MEDICARE

## 2023-07-17 DIAGNOSIS — E87.1 HYPONATREMIA: ICD-10-CM

## 2023-07-17 PROBLEM — E83.42 HYPOMAGNESEMIA: Status: ACTIVE | Noted: 2023-07-17

## 2023-07-17 LAB
ANION GAP SERPL CALCULATED.3IONS-SCNC: 5 MMOL/L
BUN SERPL-MCNC: 6 MG/DL (ref 5–25)
CALCIUM SERPL-MCNC: 8.6 MG/DL (ref 8.3–10.1)
CHLORIDE SERPL-SCNC: 102 MMOL/L (ref 96–108)
CO2 SERPL-SCNC: 27 MMOL/L (ref 21–32)
CREAT SERPL-MCNC: 0.78 MG/DL (ref 0.6–1.3)
GFR SERPL CREATININE-BSD FRML MDRD: 88 ML/MIN/1.73SQ M
GLUCOSE SERPL-MCNC: 86 MG/DL (ref 65–140)
POTASSIUM SERPL-SCNC: 4 MMOL/L (ref 3.5–5.3)
SODIUM SERPL-SCNC: 134 MMOL/L (ref 135–147)

## 2023-07-17 PROCEDURE — 36415 COLL VENOUS BLD VENIPUNCTURE: CPT

## 2023-07-17 PROCEDURE — 80048 BASIC METABOLIC PNL TOTAL CA: CPT

## 2023-07-17 NOTE — RESULT ENCOUNTER NOTE
Labs reviewed. Serum sodium stable at baseline. Results to be reviewed at scheduled follow-up appointment tomorrow.

## 2023-07-17 NOTE — PROGRESS NOTES
Assessment and Plan:    Oxana Romo was seen today for follow-up and chronic kidney disease. Diagnoses and all orders for this visit:    Hyponatremia  -     Renal function panel; Future  -     Magnesium; Future    Primary hypertension    Hypomagnesemia    Urinary retention      Chronic hyponatremia  -Had a recent admission in June to the hospital for hyponatremia, sodium on presentation was 120 proved to 134 at time of discharge  -Etiology was felt to be due to SIADH due to SSRI use  -Work-up during that admission showed a urine osmolality of 324, urine sodium 35, normal TSH, CT imaging without concern for underlying malignancy  -Imaging: Recent CXR without acute process, MRI abdomen with dilated extrahepatic CBD, no evidence of choledocholithiasis or obstructing bilary lesion.   -Was discharged on 1.5 L fluid restrictions and salt tablets 1 g 3 times daily  -Most recent sodium is 134    Blood Pressure  -Office visit BP: 112/80  -Current antihypertensive medications: Norvasc 10 mg daily, Toprol-XL 25 mg daily, sodium chloride tablets 1 g 3 times daily, Flomax  -BP stable, no changes    Hypomagnesemia  -Is on mag oxide 400 mg daily  -Recent potassium is 4.0, will send for mag recently was 1.4 on 6/13, can uptitrate if still low    Urinary retention   -Recently had croft catheter discontiued after successful voiding trial with urology  -On flomax  -urology followup    Hypokalemia  -Patient reports he is on potassium supplements, not on record and patient does not know dose  -I asked him to call office with med doses and lists hen he gets home to update our list     PATIENT INSTRUCTIONS  Follow up with Dr. Serjio Choudhury in 10 months with repeat labs in 4 months. Please call the office with any questions or concerns.     AVOID NSAIDS INCLUDING MOTRIN, IBUPROFEN, ADVIL, ALEVE, NAPROXEN      Reason for Visit: Follow-up and Chronic Kidney Disease    HPI: Vivian Thomas is a 75 y.o. male who is here for follow up of hyponatremia, he had a recent hospitalization in June for hyponatremia. Since that hospitalization he reports he has felt well, has been limiting his fluid intake, denies any dizziness/lightheadedness/lower extremity edema/nausea/vomiting/diarrhea. Reports he has been able to tolerate his sodium chloride tablets well using applesauce. ROS:   Review of Systems   Constitutional: Negative. Respiratory: Negative. Cardiovascular: Negative. Gastrointestinal: Negative. Genitourinary: Negative. Neurological: Negative. A complete 10 point review of systems was performed and found to be negative unless otherwise noted above or in the HPI. Allergies:   Patient has no known allergies.     Medications:     Current Outpatient Medications:   •  ALPRAZolam (XANAX) 1 mg tablet, Take 1 tablet (1 mg total) by mouth 2 (two) times a day as needed (transient anxiety), Disp: 60 tablet, Rfl: 0  •  amLODIPine (NORVASC) 10 mg tablet, Take 1 tablet (10 mg total) by mouth daily Do not start before June 14, 2023., Disp: 30 tablet, Rfl: 0  •  aspirin (ECOTRIN LOW STRENGTH) 81 mg EC tablet, Take 81 mg by mouth daily, Disp: , Rfl:   •  dicyclomine (BENTYL) 10 mg capsule, TAKE 1 CAPSULE BY MOUTH 3 TIMES A DAY AS NEEDED (ABDOMINAL CRAMPING/ SPASMS), Disp: 270 capsule, Rfl: 2  •  finasteride (PROSCAR) 5 mg tablet, Take 1 tablet (5 mg total) by mouth daily, Disp: 30 tablet, Rfl: 1  •  magnesium oxide (MAG-OX) 400 mg, Take 1 tablet (400 mg total) by mouth daily, Disp: 30 tablet, Rfl: 1  •  metoprolol succinate (TOPROL-XL) 25 mg 24 hr tablet, Take 1 tablet (25 mg total) by mouth daily, Disp: 90 tablet, Rfl: 1  •  Multiple Vitamins-Minerals (CENTRUM SILVER PO), Take 1 tablet by mouth daily  , Disp: , Rfl:   •  omeprazole (PriLOSEC) 40 MG capsule, Take 1 capsule (40 mg total) by mouth daily, Disp: 180 capsule, Rfl: 0  •  pancrelipase, Lip-Prot-Amyl, (Creon) 12,000 units capsule, Take 2 capsules (24,000 Units total) by mouth 3 (three) times a day with meals, Disp: 540 capsule, Rfl: 1  •  pravastatin (PRAVACHOL) 40 mg tablet, Take 1 tablet (40 mg total) by mouth daily at bedtime, Disp: 90 tablet, Rfl: 2  •  sodium chloride 1 g tablet, Take 1 tablet (1 g total) by mouth 3 (three) times a day with meals, Disp: 90 tablet, Rfl: 0  •  tamsulosin (FLOMAX) 0.4 mg, Take 1 capsule (0.4 mg total) by mouth daily, Disp: 90 capsule, Rfl: 0  •  Wheat Dextrin (BENEFIBER PO), Take by mouth daily as needed  , Disp: , Rfl:   •  Restasis 0.05 % ophthalmic emulsion, , Disp: , Rfl:     Past Medical History:   Diagnosis Date   • Anxiety    • Rhodes's esophagus    • Chronic neck pain    • Depression    • GERD (gastroesophageal reflux disease)    • H/O abdominal aortic aneurysm 4.5 cm   • Hyperlipidemia    • Hypertension    • Pancreatic pseudocyst    • Vertigo      Past Surgical History:   Procedure Laterality Date   • COLONOSCOPY  09/30/2014   • IR LOWER EXTREMITY / INTERVENTION  01/04/2019   • KS ESOPHAGOGASTRODUODENOSCOPY TRANSORAL DIAGNOSTIC N/A 06/22/2018    Procedure: EGD AND COLONOSCOPY;  Surgeon: Stuart Cramer MD;  Location: [facility]; Service: Gastroenterology   • KS EVASC RPR DPLMNT AORTO-AORTIC NDGFT N/A 02/09/2018    Procedure: REPAIR ANEURYSM ENDOVASCULAR ABDOMINAL AORTIC  (EVAR); Surgeon: Dejah Wilson MD;  Location: [facility];  Service: Vascular   • UPPER GASTROINTESTINAL ENDOSCOPY       Family History   Problem Relation Age of Onset   • Heart disease Father    • Heart attack Father    • Diabetes Maternal Grandmother    • Diabetes Maternal Grandfather    • Diabetes Paternal Grandmother    • Hypertension Family    • Hyperlipidemia Family       reports that he has been smoking cigarettes. He started smoking about 61 years ago. He has been smoking an average of 1 pack per day. He has never used smokeless tobacco. He reports that he does not drink alcohol and does not use drugs.     Physical Exam:   Vitals:    07/18/23 1315   BP: 112/80   BP Location: Left arm   Patient Position: Sitting   Cuff Size: Adult   Weight: 42.3 kg (93 lb 3.2 oz)   Height: 5' 2" (1.575 m)     Body mass index is 17.05 kg/m². Physical Exam  Vitals reviewed. Constitutional:       General: He is not in acute distress. Appearance: Normal appearance. He is not toxic-appearing or diaphoretic. HENT:      Head: Normocephalic and atraumatic. Nose: Nose normal.      Mouth/Throat:      Mouth: Mucous membranes are moist.   Eyes:      General: No scleral icterus. Cardiovascular:      Rate and Rhythm: Normal rate and regular rhythm. Pulmonary:      Effort: Pulmonary effort is normal. No respiratory distress. Breath sounds: No wheezing or rales. Abdominal:      General: Abdomen is flat. Musculoskeletal:      Cervical back: Neck supple. Right lower leg: No edema. Left lower leg: No edema. Skin:     General: Skin is warm and dry. Capillary Refill: Capillary refill takes less than 2 seconds. Neurological:      General: No focal deficit present. Mental Status: He is alert and oriented to person, place, and time. Procedure:  No results found for this or any previous visit. Lab Results   Component Value Date    GLUCOSE 97 10/14/2014    CALCIUM 8.6 07/17/2023     (L) 10/14/2014    K 4.0 07/17/2023    CO2 27 07/17/2023     07/17/2023    BUN 6 07/17/2023    CREATININE 0.78 07/17/2023       Results from last 7 days   Lab Units 07/17/23  0820   POTASSIUM mmol/L 4.0   CHLORIDE mmol/L 102   CO2 mmol/L 27   BUN mg/dL 6   CREATININE mg/dL 0.78   CALCIUM mg/dL 8.6       I have personally reviewed the blood work as stated above and in my note. I have personally reviewed MRI abdomen imaging studies. I have personally reviewed internal medicine, prior nephrology note.

## 2023-07-18 ENCOUNTER — OFFICE VISIT (OUTPATIENT)
Dept: NEPHROLOGY | Facility: HOSPITAL | Age: 73
End: 2023-07-18
Payer: MEDICARE

## 2023-07-18 VITALS
BODY MASS INDEX: 17.15 KG/M2 | DIASTOLIC BLOOD PRESSURE: 80 MMHG | SYSTOLIC BLOOD PRESSURE: 112 MMHG | WEIGHT: 93.2 LBS | HEIGHT: 62 IN

## 2023-07-18 DIAGNOSIS — I10 PRIMARY HYPERTENSION: ICD-10-CM

## 2023-07-18 DIAGNOSIS — E83.42 HYPOMAGNESEMIA: ICD-10-CM

## 2023-07-18 DIAGNOSIS — E87.1 HYPONATREMIA: Primary | ICD-10-CM

## 2023-07-18 DIAGNOSIS — R33.9 URINARY RETENTION: ICD-10-CM

## 2023-07-18 DIAGNOSIS — R33.9 URINE RETENTION: ICD-10-CM

## 2023-07-18 DIAGNOSIS — E22.2 SIADH (SYNDROME OF INAPPROPRIATE ADH PRODUCTION) (HCC): ICD-10-CM

## 2023-07-18 PROCEDURE — 99213 OFFICE O/P EST LOW 20 MIN: CPT | Performed by: NURSE PRACTITIONER

## 2023-07-18 RX ORDER — SODIUM CHLORIDE 1 G/1
1 TABLET ORAL
Qty: 90 TABLET | Refills: 0 | Status: SHIPPED | OUTPATIENT
Start: 2023-07-18 | End: 2023-07-26 | Stop reason: SDUPTHER

## 2023-07-18 NOTE — PATIENT INSTRUCTIONS
Your sodium level is stable at 134, the goal is greater than 130, we will repeat labs in 4 weeks and contact you with the results, continue your sodium chloride (salt) tablets, follow 1.5 L fluid restrictions, we will recheck a magnesium level

## 2023-07-19 ENCOUNTER — PATIENT OUTREACH (OUTPATIENT)
Dept: INTERNAL MEDICINE CLINIC | Facility: CLINIC | Age: 73
End: 2023-07-19

## 2023-07-19 ENCOUNTER — HOME CARE VISIT (OUTPATIENT)
Dept: HOME HEALTH SERVICES | Facility: HOME HEALTHCARE | Age: 73
End: 2023-07-19
Payer: MEDICARE

## 2023-07-19 VITALS
BODY MASS INDEX: 17.01 KG/M2 | WEIGHT: 93 LBS | RESPIRATION RATE: 18 BRPM | SYSTOLIC BLOOD PRESSURE: 130 MMHG | DIASTOLIC BLOOD PRESSURE: 80 MMHG | TEMPERATURE: 98.5 F | OXYGEN SATURATION: 99 % | HEART RATE: 80 BPM

## 2023-07-19 PROCEDURE — G0299 HHS/HOSPICE OF RN EA 15 MIN: HCPCS

## 2023-07-19 NOTE — PROGRESS NOTES
Spoke with Justice Vargas. He is doing good. No longer has croft catheter and is voiding without any issues. Smoking 4-5 cigarettes a day. Visiting nurse thinks he might have COPD will discuss at his PCP appointment in August. He is not coughing. Does get short of breath at times with exertion. He will work on cutting down on his smoking. He would like me to check back again will do so after his PCP appointment.

## 2023-07-20 DIAGNOSIS — N32.0 BLADDER OUTLET OBSTRUCTION: ICD-10-CM

## 2023-07-20 DIAGNOSIS — R33.9 URINARY RETENTION WITH INCOMPLETE BLADDER EMPTYING: ICD-10-CM

## 2023-07-20 DIAGNOSIS — I10 HYPERTENSION: ICD-10-CM

## 2023-07-20 DIAGNOSIS — E87.6 HYPOKALEMIA: ICD-10-CM

## 2023-07-20 RX ORDER — FINASTERIDE 5 MG/1
5 TABLET, FILM COATED ORAL DAILY
Qty: 30 TABLET | Refills: 1 | Status: SHIPPED | OUTPATIENT
Start: 2023-07-20

## 2023-07-20 RX ORDER — MAGNESIUM OXIDE 400 MG/1
400 TABLET ORAL DAILY
Qty: 90 TABLET | Refills: 0 | Status: SHIPPED | OUTPATIENT
Start: 2023-07-20

## 2023-07-20 NOTE — TELEPHONE ENCOUNTER
Refill Request     Magnesium Oxide 400 mg tab  Finasteride 5 mg   Potassium (I do not see it in chart)     Pharmacy: [facility]     LA: 6/21/23  NA: 8/16/23

## 2023-07-26 DIAGNOSIS — R33.9 URINE RETENTION: ICD-10-CM

## 2023-07-26 DIAGNOSIS — E22.2 SIADH (SYNDROME OF INAPPROPRIATE ADH PRODUCTION) (HCC): ICD-10-CM

## 2023-07-26 RX ORDER — SODIUM CHLORIDE 1 G/1
1 TABLET ORAL
Qty: 90 TABLET | Refills: 0 | Status: SHIPPED | OUTPATIENT
Start: 2023-07-26

## 2023-07-26 RX ORDER — SODIUM CHLORIDE 1 G/1
1 TABLET ORAL
Qty: 90 TABLET | Refills: 0 | Status: CANCELLED | OUTPATIENT
Start: 2023-07-26

## 2023-08-02 ENCOUNTER — TELEPHONE (OUTPATIENT)
Age: 73
End: 2023-08-02

## 2023-08-02 ENCOUNTER — TELEPHONE (OUTPATIENT)
Dept: LAB | Facility: HOSPITAL | Age: 73
End: 2023-08-02

## 2023-08-02 NOTE — TELEPHONE ENCOUNTER
Dr Evelyne Quintanilla had wanted to see pt for f/u ov after combo done in June of this year. OV was not scheduled. Pt is due for recall egd in September.   Schedule ov first?

## 2023-08-07 ENCOUNTER — OFFICE VISIT (OUTPATIENT)
Dept: GASTROENTEROLOGY | Facility: CLINIC | Age: 73
End: 2023-08-07
Payer: MEDICARE

## 2023-08-07 ENCOUNTER — TELEPHONE (OUTPATIENT)
Dept: GASTROENTEROLOGY | Facility: CLINIC | Age: 73
End: 2023-08-07

## 2023-08-07 VITALS
HEIGHT: 62 IN | BODY MASS INDEX: 18.07 KG/M2 | WEIGHT: 98.2 LBS | DIASTOLIC BLOOD PRESSURE: 84 MMHG | SYSTOLIC BLOOD PRESSURE: 184 MMHG

## 2023-08-07 DIAGNOSIS — K25.7 CHRONIC GASTRIC ULCER WITHOUT HEMORRHAGE AND WITHOUT PERFORATION: Primary | ICD-10-CM

## 2023-08-07 DIAGNOSIS — E61.1 IRON DEFICIENCY: ICD-10-CM

## 2023-08-07 DIAGNOSIS — Z86.010 HISTORY OF COLON POLYPS: ICD-10-CM

## 2023-08-07 DIAGNOSIS — E44.0 MODERATE PROTEIN-CALORIE MALNUTRITION (HCC): ICD-10-CM

## 2023-08-07 DIAGNOSIS — K21.9 GASTROESOPHAGEAL REFLUX DISEASE WITHOUT ESOPHAGITIS: ICD-10-CM

## 2023-08-07 PROCEDURE — 99214 OFFICE O/P EST MOD 30 MIN: CPT | Performed by: NURSE PRACTITIONER

## 2023-08-07 RX ORDER — FERROUS SULFATE TAB EC 324 MG (65 MG FE EQUIVALENT) 324 (65 FE) MG
324 TABLET DELAYED RESPONSE ORAL DAILY
Qty: 30 TABLET | Refills: 6 | Status: SHIPPED | OUTPATIENT
Start: 2023-08-07

## 2023-08-07 NOTE — TELEPHONE ENCOUNTER
Scheduled date of EGD(as of today): 10/05/23  Physician performing EGD: Dr Flood Sero  Location of EGD: [facility]  Instructions reviewed with patient by: Clifford Sero  Clearances: No

## 2023-08-07 NOTE — PROGRESS NOTES
[address] [place] [address] [facility] - Outpatient Follow-up Note  Qiana Kent 75 y.o. male MRN: [id number]  Encounter: [id number]    ASSESSMENT AND PLAN:      1. Gastric ulcer without hemorrhage and without perforation  Patient had a EGD 6/12/2023 while inpatient at [address] for iron deficiency anemia. EGD revealed single ulcer in the body of the stomach. No hiatal hernia, duodenum and esophagus normal.  Recommended 3-month recall. Patient denies any lightheadedness, dizziness, chest pain or shortness of breath. Denies any melena. Schedule patient for follow-up EGD as recommended at [address] with patient's increased comorbidities. - EGD scheduled at [address] [place] [address]  - CBC and differential; Future    2. Gastroesophageal reflux disease without esophagitis  Patient with history of GERD. Patient states he does not have any acid reflux symptoms at this time patient is currently taking omeprazole 40 mg daily.     -Continue on omeprazole 40 mg daily until at least EGD is completed    3. Iron deficiency  History of iron deficiency anemia with lab work from 6/2023. Patient had not been aware of needing iron supplement daily. Ferrous sulfate 324 mg p.o. daily in the morning. Dispense 30 tablets. Refill x6.    4.  Protein calorie malnutrition  Patient was recommended for daily Ensure after recent hospitalization. Patient states since taking at least 1 Ensure supplement daily he has noticed increase in weight.      -Continue with daily Ensure supplement.  -Weight check with next office visit    5. History of colon polyps  Colonoscopy 6/11/2023; no recall due to age.       Followup Appointment: 3-4 weeks after procedure  ______________________________________________________________________    Chief Complaint   Patient presents with   • Schedule EGD     HPI: 69-year-old male with past medical history of gastric ulcer, GERD, pancreatic insufficiency, common bile duct dilation, hypertension, AAA, CKD stage III, hyperlipidemia, protein calorie malnutrition, hyponatremia and iron deficiency presents for office visit follow hospitalization and EGD that revealed small gastric ulcer. Historical Information   Past Medical History:   Diagnosis Date   • Anxiety    • Rhodes's esophagus    • Chronic neck pain    • Depression    • GERD (gastroesophageal reflux disease)    • H/O abdominal aortic aneurysm 4.5 cm   • Hyperlipidemia    • Hypertension    • Pancreatic pseudocyst    • Vertigo      Past Surgical History:   Procedure Laterality Date   • COLONOSCOPY  09/30/2014   • IR LOWER EXTREMITY / INTERVENTION  01/04/2019   • SD ESOPHAGOGASTRODUODENOSCOPY TRANSORAL DIAGNOSTIC N/A 06/22/2018    Procedure: EGD AND COLONOSCOPY;  Surgeon: Sienna Loredo MD;  [facility]; Service: Gastroenterology   • SD EVASC RPR DPLMNT AORTO-AORTIC NDGFT N/A 02/09/2018    Procedure: REPAIR ANEURYSM ENDOVASCULAR ABDOMINAL AORTIC  (EVAR);   Surgeon: Lisa Mccabe MD;  [facility];  Service: Vascular   • UPPER GASTROINTESTINAL ENDOSCOPY       Social History     Substance and Sexual Activity   Alcohol Use Never     Social History     Substance and Sexual Activity   Drug Use No     Social History     Tobacco Use   Smoking Status Every Day   • Packs/day: 1.00   • Types: Cigarettes   • Start date: 1962   Smokeless Tobacco Never     Family History   Problem Relation Age of Onset   • Heart disease Father    • Heart attack Father    • Diabetes Maternal Grandmother    • Diabetes Maternal Grandfather    • Diabetes Paternal Grandmother    • Hypertension Family    • Hyperlipidemia Family          Current Outpatient Medications:   •  ALPRAZolam (XANAX) 1 mg tablet  •  amLODIPine (NORVASC) 10 mg tablet  •  aspirin (ECOTRIN LOW STRENGTH) 81 mg EC tablet  •  dicyclomine (BENTYL) 10 mg capsule  •  finasteride (PROSCAR) 5 mg tablet  •  magnesium oxide (MAG-OX) 400 mg tablet  • metoprolol succinate (TOPROL-XL) 25 mg 24 hr tablet  •  Multiple Vitamins-Minerals (CENTRUM SILVER PO)  •  omeprazole (PriLOSEC) 40 MG capsule  •  pancrelipase, Lip-Prot-Amyl, (Creon) 12,000 units capsule  •  pravastatin (PRAVACHOL) 40 mg tablet  •  Restasis 0.05 % ophthalmic emulsion  •  sodium chloride 1 g tablet  •  tamsulosin (FLOMAX) 0.4 mg  •  Wheat Dextrin (BENEFIBER PO)  No Known Allergies  Reviewed medications and allergies and updated as indicated    PHYSICAL EXAM:    Blood pressure (!) 184/84, height 5' 2" (1.575 m), weight 44.5 kg (98 lb 3.2 oz). Body mass index is 17.96 kg/m². General Appearance: NAD, cooperative, alert. Malnourished. pallor. Eyes: Anicteric, PERRLA, EOMI  ENT:  Normocephalic, atraumatic, normal mucosa. Neck:  Supple, symmetrical, trachea midline  Resp:  Clear to auscultation bilaterally; no rales, rhonchi or wheezing; respirations unlabored   CV:  S1 S2, Regular rate and rhythm; no murmur, rub, or gallop. GI:  Soft, non-tender, non-distended; normal bowel sounds; no masses, no organomegaly   Rectal: Deferred  Musculoskeletal: No cyanosis, clubbing or edema. Normal ROM.   Skin:  No jaundice, rashes, or lesions   Heme/Lymph: No palpable cervical lymphadenopathy  Psych: Normal affect, good eye contact  Neuro: No gross deficits, AAOx3    Lab Results:   Lab Results   Component Value Date    WBC 6.97 06/13/2023    HGB 8.9 (L) 06/13/2023    HCT 27.6 (L) 06/13/2023    MCV 84 06/13/2023     06/13/2023     Lab Results   Component Value Date     (L) 10/14/2014    K 4.0 07/17/2023     07/17/2023    CO2 27 07/17/2023    ANIONGAP 6 10/14/2014    BUN 6 07/17/2023    CREATININE 0.78 07/17/2023    GLUCOSE 97 10/14/2014    GLUF 115 (H) 06/20/2023    CALCIUM 8.6 07/17/2023    CORRECTEDCA 9.5 05/22/2023    AST 17 06/11/2023    ALT 12 06/11/2023    ALKPHOS 61 06/11/2023    PROT 7.2 10/13/2014    BILITOT 0.37 10/13/2014    EGFR 88 07/17/2023     Lab Results   Component Value Date IRON 39 (L) 06/08/2023    TIBC 399 06/08/2023    FERRITIN 14 (L) 06/08/2023     Lab Results   Component Value Date    LIPASE 15 06/07/2023       Radiology Results:   Echo complete w/ contrast if indicated    Result Date: 7/11/2023  Narrative: •  Left Ventricle: Left ventricular cavity size is normal. Wall thickness is normal. The left ventricular ejection fraction is 55%. Systolic function is normal. Wall motion is normal. Diastolic function is normal for age. •  Tricuspid Valve: There is mild regurgitation. The right ventricular systolic pressure is mildly to moderately elevated. The estimated right ventricular systolic pressure is 15.45 mmHg.

## 2023-08-08 DIAGNOSIS — Z98.890 STATUS POST ENDOVASCULAR ANEURYSM REPAIR (EVAR): Primary | ICD-10-CM

## 2023-08-08 DIAGNOSIS — I71.40 ABDOMINAL AORTIC ANEURYSM (AAA) WITHOUT RUPTURE, UNSPECIFIED PART (HCC): ICD-10-CM

## 2023-08-08 DIAGNOSIS — Z86.79 STATUS POST ENDOVASCULAR ANEURYSM REPAIR (EVAR): Primary | ICD-10-CM

## 2023-08-10 ENCOUNTER — RA CDI HCC (OUTPATIENT)
Dept: OTHER | Facility: HOSPITAL | Age: 73
End: 2023-08-10

## 2023-08-10 NOTE — PROGRESS NOTES
[address] [facility] coding opportunities          Chart Reviewed number of suggestions sent to Provider: 1  I71.4     Patients Insurance     Medicare Insurance: [employer]

## 2023-08-11 DIAGNOSIS — F41.9 ANXIETY: ICD-10-CM

## 2023-08-11 RX ORDER — ALPRAZOLAM 1 MG/1
1 TABLET ORAL 2 TIMES DAILY PRN
Qty: 60 TABLET | Refills: 0 | Status: SHIPPED | OUTPATIENT
Start: 2023-08-11 | End: 2023-09-10

## 2023-08-16 ENCOUNTER — OFFICE VISIT (OUTPATIENT)
Dept: INTERNAL MEDICINE CLINIC | Facility: CLINIC | Age: 73
End: 2023-08-16
Payer: MEDICARE

## 2023-08-16 VITALS
HEIGHT: 62 IN | TEMPERATURE: 98.9 F | BODY MASS INDEX: 17.11 KG/M2 | DIASTOLIC BLOOD PRESSURE: 120 MMHG | OXYGEN SATURATION: 98 % | SYSTOLIC BLOOD PRESSURE: 180 MMHG | WEIGHT: 93 LBS | HEART RATE: 93 BPM

## 2023-08-16 DIAGNOSIS — Z00.00 ENCOUNTER FOR MEDICARE ANNUAL WELLNESS EXAM: Primary | ICD-10-CM

## 2023-08-16 DIAGNOSIS — E22.2 SIADH (SYNDROME OF INAPPROPRIATE ADH PRODUCTION) (HCC): ICD-10-CM

## 2023-08-16 DIAGNOSIS — R33.9 URINE RETENTION: ICD-10-CM

## 2023-08-16 PROCEDURE — G0439 PPPS, SUBSEQ VISIT: HCPCS | Performed by: INTERNAL MEDICINE

## 2023-08-16 RX ORDER — SODIUM CHLORIDE 1 G/1
1 TABLET ORAL
Qty: 90 TABLET | Refills: 0 | Status: SHIPPED | OUTPATIENT
Start: 2023-08-16

## 2023-08-16 NOTE — PROGRESS NOTES
Assessment and Plan:     Problem List Items Addressed This Visit        Endocrine    SIADH (syndrome of inappropriate ADH production) ([address])   Other Visit Diagnoses     Encounter for Medicare annual wellness exam    -  Primary    Urine retention               Preventive health issues were discussed with patient, and age appropriate screening tests were ordered as noted in patient's After Visit Summary. Personalized health advice and appropriate referrals for health education or preventive services given if needed, as noted in patient's After Visit Summary.      History of Present Illness:     Patient presents for a Medicare Wellness Visit    HPI   Patient Care Team:  Efe Winter MD as PCP - LILIANE Torres MD (Vascular Surgery)  MD Torres Mustafa MD as Endoscopist  Gerald Best RN as RN Care Manager     Review of Systems:     Review of Systems     Problem List:     Patient Active Problem List   Diagnosis   • AAA (abdominal aortic aneurysm) without rupture [facility])   • Epigastric pain   • Diarrhea   • Other chronic pancreatitis ([address])   • Smoking   • 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 ([address])   • History of endovascular stent graft for abdominal aortic aneurysm (AAA)   • Stenosis of other vascular prosthetic devices, implants and grafts, subsequent encounter   • Neurogenic claudication   • Chronic kidney disease, stage 3 ([address])   • Protein-calorie malnutrition, unspecified severity ([address])   • Depression, recurrent ([address])   • Iron deficiency anemia   • Hyponatremia   • Hypokalemia   • Urinary retention   • Primary hypertension   • Common bile duct dilation   • SIADH (syndrome of inappropriate ADH production) ([address])   • Severe protein-calorie malnutrition ([address])   • Hypomagnesemia      Past Medical and Surgical History:     Past Medical History:   Diagnosis Date   • Anxiety    • Rhodes's esophagus    • Chronic neck pain    • Depression    • GERD (gastroesophageal reflux disease)    • H/O abdominal aortic aneurysm 4.5 cm   • Hyperlipidemia    • Hypertension    • Pancreatic pseudocyst    • Vertigo      Past Surgical History:   Procedure Laterality Date   • COLONOSCOPY  09/30/2014   • IR LOWER EXTREMITY / INTERVENTION  01/04/2019   • [place] ESOPHAGOGASTRODUODENOSCOPY TRANSORAL DIAGNOSTIC N/A 06/22/2018    Procedure: EGD AND COLONOSCOPY;  Surgeon: Viraj Farfan MD;  [facility]; Service: Gastroenterology   • W[place] EVASC RPR DPLMNT AORTO-AORTIC NDGFT N/A 02/09/2018    Procedure: REPAIR ANEURYSM ENDOVASCULAR ABDOMINAL AORTIC  (EVAR); Surgeon: Mecca Thompson MD;  [facility];  Service: Vascular   • UPPER GASTROINTESTINAL ENDOSCOPY        Family History:     Family History   Problem Relation Age of Onset   • Heart disease Father    • Heart attack Father    • Diabetes Maternal Grandmother    • Diabetes Maternal Grandfather    • Diabetes Paternal Grandmother    • Hypertension Family    • Hyperlipidemia Family       Social History:     Social History     Socioeconomic History   • Marital status:       Spouse name: Not on file   • Number of children: Not on file   • Years of education: Not on file   • Highest education level: Not on file   Occupational History   • Not on file   Tobacco Use   • Smoking status: Every Day     Packs/day: 1.00     Types: Cigarettes     Start date: 1962   • Smokeless tobacco: Never   Vaping Use   • Vaping Use: Never used   Substance and Sexual Activity   • Alcohol use: Never   • Drug use: No   • Sexual activity: Not Currently   Other Topics Concern   • Not on file   Social History Narrative   • Not on file     Social Determinants of Health     Financial Resource Strain: Not on file   Food Insecurity: No Food Insecurity (6/8/2023)    Hunger Vital Sign    • Worried About Running Out of Food in the Last Year: Never true    • Ran Out of Food in the Last Year: Never true   Transportation Needs: No Transportation Needs (6/8/2023)    [employer] • Lack of Transportation (Medical): No    • Lack of Transportation (Non-Medical):  No   Physical Activity: Not on file   Stress: Not on file   Social Connections: Not on file   Intimate Partner Violence: Not on file   Housing Stability: Low Risk  (6/8/2023)    Housing Stability Vital Sign    • Unable to Pay for Housing in the Last Year: No    • Number of Places Lived in the Last Year: 1    • Unstable Housing in the Last Year: No      Medications and Allergies:     Current Outpatient Medications   Medication Sig Dispense Refill   • ALPRAZolam (XANAX) 1 mg tablet Take 1 tablet (1 mg total) by mouth 2 (two) times a day as needed (transient anxiety) 60 tablet 0   • amLODIPine (NORVASC) 10 mg tablet Take 1 tablet (10 mg total) by mouth daily Do not start before June 14, 2023. 30 tablet 0   • aspirin (ECOTRIN LOW STRENGTH) 81 mg EC tablet Take 81 mg by mouth daily     • dicyclomine (BENTYL) 10 mg capsule TAKE 1 CAPSULE BY MOUTH 3 TIMES A DAY AS NEEDED (ABDOMINAL CRAMPING/ SPASMS) 270 capsule 2   • ferrous sulfate 324 (65 Fe) mg Take 1 tablet (324 mg total) by mouth in the morning 30 tablet 6   • finasteride (PROSCAR) 5 mg tablet Take 1 tablet (5 mg total) by mouth daily 30 tablet 1   • magnesium oxide (MAG-OX) 400 mg tablet Take 1 tablet (400 mg total) by mouth daily 90 tablet 0   • metoprolol succinate (TOPROL-XL) 25 mg 24 hr tablet Take 1 tablet (25 mg total) by mouth daily 90 tablet 1   • Multiple Vitamins-Minerals (CENTRUM SILVER PO) Take 1 tablet by mouth daily       • omeprazole (PriLOSEC) 40 MG capsule Take 1 capsule (40 mg total) by mouth daily 180 capsule 0   • pancrelipase, Lip-Prot-Amyl, (Creon) 12,000 units capsule Take 2 capsules (24,000 Units total) by mouth 3 (three) times a day with meals 540 capsule 1   • pravastatin (PRAVACHOL) 40 mg tablet Take 1 tablet (40 mg total) by mouth daily at bedtime 90 tablet 2   • Restasis 0.05 % ophthalmic emulsion      • sodium chloride 1 g tablet Take 1 tablet (1 g total) by mouth 3 (three) times a day with meals 90 tablet 0   • tamsulosin (FLOMAX) 0.4 mg Take 1 capsule (0.4 mg total) by mouth daily 90 capsule 0   • Wheat Dextrin (BENEFIBER PO) Take by mouth daily as needed   (Patient not taking: Reported on 8/7/2023)       No current facility-administered medications for this visit. No Known Allergies   Immunizations:     Immunization History   Administered Date(s) Administered   • COVID-19 PFIZER VACCINE 0.3 ML IM 03/09/2021, 03/31/2021   • INFLUENZA 10/10/2022   • Influenza, high dose seasonal 0.7 mL 10/09/2020, 10/04/2021      Health Maintenance:         Topic Date Due   • Hepatitis C Screening  Completed   • Colorectal Cancer Screening  Discontinued         Topic Date Due   • Pneumococcal Vaccine: 65+ Years (1 - PCV) Never done   • COVID-19 Vaccine (3 - Pfizer series) 05/26/2021   • Influenza Vaccine (1) 09/01/2023      Medicare Screening Tests and Risk Assessments:     Last Medicare Wellness visit information reviewed, patient interviewed and updates made to the record today. Health Risk Assessment:   Patient rates overall health as fair. Patient feels that their physical health rating is same. Patient is satisfied with their life. Eyesight was rated as same. Hearing was rated as much worse. Patient feels that their emotional and mental health rating is same. Patients states they are never, rarely angry. Patient states they are never, rarely unusually tired/fatigued. Pain experienced in the last 7 days has been a lot. Patient's pain rating has been 9/10. Patient states that he has experienced weight loss or gain in last 6 months. Fall Risk Screening: In the past year, patient has experienced: history of falling in past year    Number of falls: 1  Injured during fall?: No    Feels unsteady when standing or walking?: No    Worried about falling?: No      Home Safety:  Patient does not have trouble with stairs inside or outside of their home.  Patient has working smoke alarms and has no working carbon monoxide detector. Home safety hazards include: none. Nutrition:   Current diet is Regular. Medications:   Patient is not currently taking any over-the-counter supplements. Patient is able to manage medications. Activities of Daily Living (ADLs)/Instrumental Activities of Daily Living (IADLs):   Walk and transfer into and out of bed and chair?: Yes  Dress and groom yourself?: Yes    Bathe or shower yourself?: Yes    Feed yourself? Yes  Do your laundry/housekeeping?: No  Manage your money, pay your bills and track your expenses?: Yes  Make your own meals?: No    Do your own shopping?: No    Previous Hospitalizations:   Any hospitalizations or ED visits within the last 12 months?: Yes    How many hospitalizations have you had in the last year?: 1-2    Advance Care Planning:   Living will: Yes    Durable POA for healthcare: Yes    Advanced directive: Yes      PREVENTIVE SCREENINGS      Cardiovascular Screening:    General: Screening Not Indicated and History Lipid Disorder      Diabetes Screening:     General: Screening Current      Colorectal Cancer Screening:     General: Screening Current      Abdominal Aortic Aneurysm (AAA) Screening:    Risk factors include: age between 70-75 yo and tobacco use        General: Screening Not Indicated and History AAA      Lung Cancer Screening:     General: Screening Not Indicated      Hepatitis C Screening:    General: Screening Current    Screening, Brief Intervention, and Referral to Treatment (SBIRT)    Screening  Typical number of drinks in a day: 0  Typical number of drinks in a week: 0  Interpretation: Low risk drinking behavior.     Single Item Drug Screening:  How often have you used an illegal drug (including marijuana) or a prescription medication for non-medical reasons in the past year? never    Single Item Drug Screen Score: 0  Interpretation: Negative screen for possible drug use disorder    No results found.     Physical Exam:     There were no vitals taken for this visit.     Physical Exam     Julieth Ling MD

## 2023-08-17 ENCOUNTER — TELEPHONE (OUTPATIENT)
Dept: NEPHROLOGY | Facility: HOSPITAL | Age: 73
End: 2023-08-17

## 2023-08-17 ENCOUNTER — APPOINTMENT (OUTPATIENT)
Dept: LAB | Facility: HOSPITAL | Age: 73
End: 2023-08-17
Payer: MEDICARE

## 2023-08-17 ENCOUNTER — PATIENT OUTREACH (OUTPATIENT)
Dept: INTERNAL MEDICINE CLINIC | Facility: CLINIC | Age: 73
End: 2023-08-17

## 2023-08-17 DIAGNOSIS — E87.1 HYPONATREMIA: ICD-10-CM

## 2023-08-17 DIAGNOSIS — I10 PRIMARY HYPERTENSION: ICD-10-CM

## 2023-08-17 DIAGNOSIS — K25.7 CHRONIC GASTRIC ULCER WITHOUT HEMORRHAGE AND WITHOUT PERFORATION: ICD-10-CM

## 2023-08-17 DIAGNOSIS — N18.30 STAGE 3 CHRONIC KIDNEY DISEASE, UNSPECIFIED WHETHER STAGE 3A OR 3B CKD (HCC): Primary | ICD-10-CM

## 2023-08-17 LAB
ALBUMIN SERPL BCP-MCNC: 3.4 G/DL (ref 3.5–5)
ANION GAP SERPL CALCULATED.3IONS-SCNC: 5 MMOL/L
BUN SERPL-MCNC: 9 MG/DL (ref 5–25)
CALCIUM ALBUM COR SERPL-MCNC: 9.6 MG/DL (ref 8.3–10.1)
CALCIUM SERPL-MCNC: 9.1 MG/DL (ref 8.3–10.1)
CHLORIDE SERPL-SCNC: 103 MMOL/L (ref 96–108)
CO2 SERPL-SCNC: 29 MMOL/L (ref 21–32)
CREAT SERPL-MCNC: 0.86 MG/DL (ref 0.6–1.3)
GFR SERPL CREATININE-BSD FRML MDRD: 85 ML/MIN/1.73SQ M
GLUCOSE SERPL-MCNC: 118 MG/DL (ref 65–140)
MAGNESIUM SERPL-MCNC: 2 MG/DL (ref 1.6–2.6)
PHOSPHATE SERPL-MCNC: 2.9 MG/DL (ref 2.3–4.1)
POTASSIUM SERPL-SCNC: 4.2 MMOL/L (ref 3.5–5.3)
SODIUM SERPL-SCNC: 137 MMOL/L (ref 135–147)

## 2023-08-17 PROCEDURE — 36415 COLL VENOUS BLD VENIPUNCTURE: CPT

## 2023-08-17 PROCEDURE — 80069 RENAL FUNCTION PANEL: CPT

## 2023-08-17 PROCEDURE — 83735 ASSAY OF MAGNESIUM: CPT

## 2023-08-17 NOTE — TELEPHONE ENCOUNTER
----- Message from Maria Ines Lo, [address] [facility] sent at 8/17/2023  2:33 PM EDT -----  Please call patient let her know I reviewed his most recent lab work. His sodium has improved and is within normal limits at 137 currently. I would like to reduce his salt tablets from 1 g 3 times daily to 1 g twice a day this can be taken with breakfast and dinner. We can recheck a sodium level with a renal function panel in 3 weeks to ensure levels are stable, continue 1.5 L fluid restrictions.

## 2023-08-17 NOTE — PROGRESS NOTES
Spoke with Pasquale Higuera he saw Dr Veronica Hampton and was instructed to stop smoking. I offered smoking cessation program he declined. He stopped once before for one week. He has used patches and chantix in the past also. Reviewed upcoming appointments. Will close from care management at this time. Encouraged to call PCP office with questions.

## 2023-08-18 ENCOUNTER — TELEPHONE (OUTPATIENT)
Dept: NEPHROLOGY | Facility: CLINIC | Age: 73
End: 2023-08-18

## 2023-08-18 NOTE — TELEPHONE ENCOUNTER
Received call from pt regarding message about labs needing to be done. I made pt aware he needs to have renal panel done in 3 weeks to recheck sodium level, pt stated he will have them done via home draw on 9/11. No further questions at this time. Labs will be put out for mail.

## 2023-08-23 ENCOUNTER — HOSPITAL ENCOUNTER (OUTPATIENT)
Dept: NON INVASIVE DIAGNOSTICS | Age: 73
Discharge: HOME | End: 2023-08-23
Payer: MEDICARE

## 2023-08-23 DIAGNOSIS — Z98.890 STATUS POST ENDOVASCULAR ANEURYSM REPAIR (EVAR): ICD-10-CM

## 2023-08-23 DIAGNOSIS — Z86.79 STATUS POST ENDOVASCULAR ANEURYSM REPAIR (EVAR): ICD-10-CM

## 2023-08-23 DIAGNOSIS — I71.40 ABDOMINAL AORTIC ANEURYSM (AAA) WITHOUT RUPTURE, UNSPECIFIED PART (HCC): ICD-10-CM

## 2023-08-23 PROCEDURE — 93923 UPR/LXTR ART STDY 3+ LVLS: CPT

## 2023-08-23 PROCEDURE — 93978 VASCULAR STUDY: CPT

## 2023-08-23 PROCEDURE — 93978 VASCULAR STUDY: CPT | Performed by: SURGERY

## 2023-08-24 ENCOUNTER — TELEPHONE (OUTPATIENT)
Dept: GASTROENTEROLOGY | Facility: CLINIC | Age: 73
End: 2023-08-24

## 2023-08-24 NOTE — TELEPHONE ENCOUNTER
Spoke to the patient in regards to reminding him to make sure he gets his CBC drawn at least 10 days prior to his EGD which is scheduled for October 5 with Dr. Bertin Zabala. Recommended that he have it drawn on or about September 25 in order for the results to be available and treatment plan discussed if needed. Patient wrote down the information. Encouraged him to contact the office with any questions or concerns regarding the lab work and/or his upcoming EGD. He is also scheduled for a follow-up office appointment in the beginning of November.

## 2023-08-31 ENCOUNTER — TELEPHONE (OUTPATIENT)
Dept: LAB | Facility: HOSPITAL | Age: 73
End: 2023-08-31

## 2023-09-08 DIAGNOSIS — F41.9 ANXIETY: ICD-10-CM

## 2023-09-08 RX ORDER — ALPRAZOLAM 1 MG/1
1 TABLET ORAL 2 TIMES DAILY PRN
Qty: 60 TABLET | Refills: 0 | Status: SHIPPED | OUTPATIENT
Start: 2023-09-08 | End: 2023-09-15 | Stop reason: SDUPTHER

## 2023-09-11 ENCOUNTER — APPOINTMENT (OUTPATIENT)
Dept: LAB | Facility: HOSPITAL | Age: 73
End: 2023-09-11
Payer: MEDICARE

## 2023-09-11 DIAGNOSIS — E87.1 HYPONATREMIA: ICD-10-CM

## 2023-09-11 DIAGNOSIS — I10 PRIMARY HYPERTENSION: ICD-10-CM

## 2023-09-11 DIAGNOSIS — N18.30 STAGE 3 CHRONIC KIDNEY DISEASE, UNSPECIFIED WHETHER STAGE 3A OR 3B CKD (HCC): ICD-10-CM

## 2023-09-11 LAB
ALBUMIN SERPL BCP-MCNC: 3.9 G/DL (ref 3.5–5)
ANION GAP SERPL CALCULATED.3IONS-SCNC: 6 MMOL/L
BASOPHILS # BLD AUTO: 0.05 THOUSANDS/ÂΜL (ref 0–0.1)
BASOPHILS NFR BLD AUTO: 1 % (ref 0–1)
BUN SERPL-MCNC: 9 MG/DL (ref 5–25)
CALCIUM SERPL-MCNC: 9 MG/DL (ref 8.4–10.2)
CHLORIDE SERPL-SCNC: 97 MMOL/L (ref 96–108)
CO2 SERPL-SCNC: 29 MMOL/L (ref 21–32)
CREAT SERPL-MCNC: 0.81 MG/DL (ref 0.6–1.3)
EOSINOPHIL # BLD AUTO: 0.09 THOUSAND/ÂΜL (ref 0–0.61)
EOSINOPHIL NFR BLD AUTO: 1 % (ref 0–6)
ERYTHROCYTE [DISTWIDTH] IN BLOOD BY AUTOMATED COUNT: 14.5 % (ref 11.6–15.1)
GFR SERPL CREATININE-BSD FRML MDRD: 87 ML/MIN/1.73SQ M
GLUCOSE SERPL-MCNC: 111 MG/DL (ref 65–140)
HCT VFR BLD AUTO: 41.6 % (ref 36.5–49.3)
HGB BLD-MCNC: 13.9 G/DL (ref 12–17)
IMM GRANULOCYTES # BLD AUTO: 0.03 THOUSAND/UL (ref 0–0.2)
IMM GRANULOCYTES NFR BLD AUTO: 0 % (ref 0–2)
LYMPHOCYTES # BLD AUTO: 1.04 THOUSANDS/ÂΜL (ref 0.6–4.47)
LYMPHOCYTES NFR BLD AUTO: 15 % (ref 14–44)
MCH RBC QN AUTO: 33.4 PG (ref 26.8–34.3)
MCHC RBC AUTO-ENTMCNC: 33.4 G/DL (ref 31.4–37.4)
MCV RBC AUTO: 100 FL (ref 82–98)
MONOCYTES # BLD AUTO: 0.64 THOUSAND/ÂΜL (ref 0.17–1.22)
MONOCYTES NFR BLD AUTO: 9 % (ref 4–12)
NEUTROPHILS # BLD AUTO: 5.1 THOUSANDS/ÂΜL (ref 1.85–7.62)
NEUTS SEG NFR BLD AUTO: 74 % (ref 43–75)
NRBC BLD AUTO-RTO: 0 /100 WBCS
PHOSPHATE SERPL-MCNC: 3 MG/DL (ref 2.3–4.1)
PLATELET # BLD AUTO: 223 THOUSANDS/UL (ref 149–390)
PMV BLD AUTO: 10.5 FL (ref 8.9–12.7)
POTASSIUM SERPL-SCNC: 4.2 MMOL/L (ref 3.5–5.3)
RBC # BLD AUTO: 4.16 MILLION/UL (ref 3.88–5.62)
SODIUM SERPL-SCNC: 132 MMOL/L (ref 135–147)
WBC # BLD AUTO: 6.95 THOUSAND/UL (ref 4.31–10.16)

## 2023-09-11 PROCEDURE — 80069 RENAL FUNCTION PANEL: CPT

## 2023-09-11 PROCEDURE — 85025 COMPLETE CBC W/AUTO DIFF WBC: CPT

## 2023-09-11 PROCEDURE — 36415 COLL VENOUS BLD VENIPUNCTURE: CPT

## 2023-09-12 ENCOUNTER — TELEPHONE (OUTPATIENT)
Dept: NEPHROLOGY | Facility: CLINIC | Age: 73
End: 2023-09-12

## 2023-09-12 DIAGNOSIS — N18.30 STAGE 3 CHRONIC KIDNEY DISEASE, UNSPECIFIED WHETHER STAGE 3A OR 3B CKD (HCC): Primary | ICD-10-CM

## 2023-09-12 DIAGNOSIS — I10 PRIMARY HYPERTENSION: ICD-10-CM

## 2023-09-12 NOTE — TELEPHONE ENCOUNTER
----- Message from Eloisa Elizalde, [address] [place] [address] sent at 9/12/2023 11:54 AM EDT -----  Please call patient and let him know his most recent sodium level is 132, it is mildly decreased from earlier in August.  We will make no changes for now he should continue taking his salt tablets and following 1.5 L fluid restrictions, if his sodium level drops further we will increase his salt tablets.   We will recheck a renal function panel in 4 weeks

## 2023-09-12 NOTE — TELEPHONE ENCOUNTER
Patient is aware and requested to mail lab order to his home address. No further questions at this time. Lab have been ordered and mailed to patient home address.

## 2023-09-13 ENCOUNTER — TELEPHONE (OUTPATIENT)
Dept: OTHER | Facility: HOSPITAL | Age: 73
End: 2023-09-13

## 2023-09-13 NOTE — TELEPHONE ENCOUNTER
Spoke to the patient in regards to his results of his CBC. Patient had an EGD completed back in June which noted a single ulcer in the body of the stomach. Pantoprazole was increased to twice a day and recommended follow-up EGD in 3 months to document complete ulcer healing. Patient's CBC that is requested prior to next EGD noted a hemoglobin of 13.9. His previous hemoglobin on 6/13 was 8.9. Explained to patient that he has a follow-up EGD on 10/5 at the [facility] [address] [facility]. Also noted to him that they will be calling him with any preprocedure information.

## 2023-09-15 DIAGNOSIS — F41.9 ANXIETY: ICD-10-CM

## 2023-09-15 RX ORDER — ALPRAZOLAM 1 MG/1
1 TABLET ORAL 2 TIMES DAILY PRN
Qty: 60 TABLET | Refills: 0 | Status: SHIPPED | OUTPATIENT
Start: 2023-09-15 | End: 2023-10-15

## 2023-09-18 ENCOUNTER — TELEPHONE (OUTPATIENT)
Dept: INTERNAL MEDICINE CLINIC | Facility: CLINIC | Age: 73
End: 2023-09-18

## 2023-09-18 NOTE — TELEPHONE ENCOUNTER
Patient called twice for a refill on citalopram 20 mg   I do not see this on his meds list  08/16/23    11/15/23

## 2023-09-19 DIAGNOSIS — F32.A DEPRESSION: Primary | ICD-10-CM

## 2023-09-19 RX ORDER — CITALOPRAM 20 MG/1
20 TABLET ORAL DAILY
Qty: 30 TABLET | Refills: 5 | Status: SHIPPED | OUTPATIENT
Start: 2023-09-19 | End: 2023-12-18

## 2023-09-19 NOTE — TELEPHONE ENCOUNTER
Refill  For Citalopram.  Could not find in patient chart, called patient to verify the refill is for Citalopram   LA:  8-16-23   NA:  11-15-23

## 2023-09-29 ENCOUNTER — TELEPHONE (OUTPATIENT)
Dept: GASTROENTEROLOGY | Facility: CLINIC | Age: 73
End: 2023-09-29

## 2023-09-29 DIAGNOSIS — R33.9 URINARY RETENTION WITH INCOMPLETE BLADDER EMPTYING: ICD-10-CM

## 2023-09-29 DIAGNOSIS — N40.1 BENIGN PROSTATIC HYPERPLASIA WITH LOWER URINARY TRACT SYMPTOMS, SYMPTOM DETAILS UNSPECIFIED: ICD-10-CM

## 2023-09-29 DIAGNOSIS — N32.0 BLADDER OUTLET OBSTRUCTION: ICD-10-CM

## 2023-09-29 RX ORDER — TAMSULOSIN HYDROCHLORIDE 0.4 MG/1
0.4 CAPSULE ORAL DAILY
Qty: 90 CAPSULE | Refills: 0 | Status: SHIPPED | OUTPATIENT
Start: 2023-09-29

## 2023-09-29 RX ORDER — FINASTERIDE 5 MG/1
5 TABLET, FILM COATED ORAL DAILY
Qty: 30 TABLET | Refills: 1 | Status: SHIPPED | OUTPATIENT
Start: 2023-09-29

## 2023-09-29 NOTE — TELEPHONE ENCOUNTER
Procedure confirmed  Endoscopy     Via: Spoke with patient. Instructions given: Mail     Prep Given: N/A    Call the office if there are any questions.

## 2023-09-29 NOTE — TELEPHONE ENCOUNTER
Refill Request     Finasteride 5 mg   Tamsulosin     Pharmacy: [facility]     LA: 8/16/23  NA: 11/15/23

## 2023-10-05 ENCOUNTER — HOSPITAL ENCOUNTER (OUTPATIENT)
Dept: GASTROENTEROLOGY | Facility: HOSPITAL | Age: 73
Setting detail: OUTPATIENT SURGERY
Discharge: HOME | End: 2023-10-05
Payer: COMMERCIAL

## 2023-10-05 ENCOUNTER — ANESTHESIA EVENT (OUTPATIENT)
Dept: GASTROENTEROLOGY | Facility: HOSPITAL | Age: 73
End: 2023-10-05

## 2023-10-05 ENCOUNTER — TELEPHONE (OUTPATIENT)
Dept: LAB | Facility: HOSPITAL | Age: 73
End: 2023-10-05

## 2023-10-05 ENCOUNTER — ANESTHESIA (OUTPATIENT)
Dept: GASTROENTEROLOGY | Facility: HOSPITAL | Age: 73
End: 2023-10-05

## 2023-10-05 VITALS
TEMPERATURE: 98.7 F | RESPIRATION RATE: 22 BRPM | DIASTOLIC BLOOD PRESSURE: 93 MMHG | HEART RATE: 59 BPM | OXYGEN SATURATION: 95 % | SYSTOLIC BLOOD PRESSURE: 162 MMHG

## 2023-10-05 DIAGNOSIS — K25.7 CHRONIC GASTRIC ULCER WITHOUT HEMORRHAGE AND WITHOUT PERFORATION: ICD-10-CM

## 2023-10-05 PROCEDURE — 43235 EGD DIAGNOSTIC BRUSH WASH: CPT | Performed by: INTERNAL MEDICINE

## 2023-10-05 RX ORDER — SODIUM CHLORIDE, SODIUM LACTATE, POTASSIUM CHLORIDE, CALCIUM CHLORIDE 600; 310; 30; 20 MG/100ML; MG/100ML; MG/100ML; MG/100ML
INJECTION, SOLUTION INTRAVENOUS CONTINUOUS PRN
Status: DISCONTINUED | OUTPATIENT
Start: 2023-10-05 | End: 2023-10-05

## 2023-10-05 RX ORDER — LIDOCAINE HYDROCHLORIDE 10 MG/ML
INJECTION, SOLUTION EPIDURAL; INFILTRATION; INTRACAUDAL; PERINEURAL AS NEEDED
Status: DISCONTINUED | OUTPATIENT
Start: 2023-10-05 | End: 2023-10-05

## 2023-10-05 RX ORDER — PROPOFOL 10 MG/ML
INJECTION, EMULSION INTRAVENOUS AS NEEDED
Status: DISCONTINUED | OUTPATIENT
Start: 2023-10-05 | End: 2023-10-05

## 2023-10-05 RX ORDER — GLYCOPYRROLATE 0.2 MG/ML
INJECTION INTRAMUSCULAR; INTRAVENOUS AS NEEDED
Status: DISCONTINUED | OUTPATIENT
Start: 2023-10-05 | End: 2023-10-05

## 2023-10-05 RX ADMIN — GLYCOPYRROLATE 0.2 MCG: 0.2 INJECTION INTRAMUSCULAR; INTRAVENOUS at 08:15

## 2023-10-05 RX ADMIN — LIDOCAINE HYDROCHLORIDE 25 MG: 10 INJECTION, SOLUTION EPIDURAL; INFILTRATION; INTRACAUDAL; PERINEURAL at 08:15

## 2023-10-05 RX ADMIN — PROPOFOL 100 MG: 10 INJECTION, EMULSION INTRAVENOUS at 08:15

## 2023-10-05 RX ADMIN — SODIUM CHLORIDE, SODIUM LACTATE, POTASSIUM CHLORIDE, AND CALCIUM CHLORIDE: .6; .31; .03; .02 INJECTION, SOLUTION INTRAVENOUS at 07:39

## 2023-10-05 NOTE — ANESTHESIA POSTPROCEDURE EVALUATION
Post-Op Assessment Note    CV Status:  Stable  Pain Score: 0    Pain management: adequate  Multimodal analgesia used between 6 hours prior to anesthesia start to PACU discharge    Mental Status:  Alert and awake   Hydration Status:  Euvolemic and stable   PONV Controlled:  Controlled   Airway Patency:  Patent   Two or more mitigation strategies used for obstructive sleep apnea   Post Op Vitals Reviewed: Yes      Staff: CRNA         No notable events documented.     BP  167/84   Temp   98   Pulse  68   Resp   12   SpO2  96

## 2023-10-05 NOTE — H&P
History and Physical - [facility]  Sushila Liriano 75 y.o. male MRN: [id number]    HPI: Sushila Liriano is a 75 y.o. male who presents for upper endoscopy to assess healing of gastric ulcer diagnosed over the summer    REVIEW OF SYSTEMS: Per the HPI, and otherwise unremarkable. Historical Information   Past Medical History:   Diagnosis Date    Anxiety     Rhodes's esophagus     Chronic neck pain     Depression     GERD (gastroesophageal reflux disease)     H/O abdominal aortic aneurysm 4.5 cm    Hyperlipidemia     Hypertension     Pancreatic pseudocyst     Vertigo      Past Surgical History:   Procedure Laterality Date    COLONOSCOPY  09/30/2014    IR LOWER EXTREMITY / INTERVENTION  01/04/2019    NE ESOPHAGOGASTRODUODENOSCOPY TRANSORAL DIAGNOSTIC N/A 06/22/2018    Procedure: EGD AND COLONOSCOPY;  Surgeon: Jose Reynolds MD;  [facility]; Service: Gastroenterology    NE EVASC RPR DPLMNT AORTO-AORTIC NDGFT N/A 02/09/2018    Procedure: REPAIR ANEURYSM ENDOVASCULAR ABDOMINAL AORTIC  (EVAR);   Surgeon: Chuck Infante MD;  [facility];  Service: Vascular    UPPER GASTROINTESTINAL ENDOSCOPY       Social History   Social History     Substance and Sexual Activity   Alcohol Use Never     Social History     Substance and Sexual Activity   Drug Use No     Social History     Tobacco Use   Smoking Status Every Day    Packs/day: 1.00    Types: Cigarettes    Start date: 1962   Smokeless Tobacco Never     Family History   Problem Relation Age of Onset    Heart disease Father     Heart attack Father     Diabetes Maternal Grandmother     Diabetes Maternal Grandfather     Diabetes Paternal Grandmother     Hypertension Family     Hyperlipidemia Family        Meds/Allergies       Current Outpatient Medications:     ALPRAZolam (XANAX) 1 mg tablet    amLODIPine (NORVASC) 10 mg tablet    aspirin (ECOTRIN LOW STRENGTH) 81 mg EC tablet    citalopram (CeleXA) 20 mg tablet    dicyclomine (BENTYL) 10 mg capsule    ferrous sulfate 324 (65 Fe) mg    finasteride (PROSCAR) 5 mg tablet    magnesium oxide (MAG-OX) 400 mg tablet    metoprolol succinate (TOPROL-XL) 25 mg 24 hr tablet    Multiple Vitamins-Minerals (CENTRUM SILVER PO)    omeprazole (PriLOSEC) 40 MG capsule    pancrelipase, Lip-Prot-Amyl, (Creon) 12,000 units capsule    pravastatin (PRAVACHOL) 40 mg tablet    Restasis 0.05 % ophthalmic emulsion    sodium chloride 1 g tablet    tamsulosin (FLOMAX) 0.4 mg    Wheat Dextrin (BENEFIBER PO)  No current facility-administered medications for this encounter. Facility-Administered Medications Ordered in Other Encounters:     lactated ringers infusion, , Intravenous, Continuous PRN, New Bag at 10/05/23 0739    No Known Allergies    Objective     BP (!) 225/116   Pulse 67   Temp 98.7 °F (37.1 °C) (Temporal)   Resp 20   SpO2 97%     PHYSICAL EXAM    Gen: NAD AAOx3  Head: Normocephalic, Atraumatic  CV: S1S2 RRR no m/r/g  CHEST: Clear b/l no c/r/w  ABD: soft, +BS NT/ND  EXT: no edema    ASSESSMENT/PLAN:  This is a 75y.o. year old male here for upper endoscopy, and he is stable and optimized for his procedure.

## 2023-10-05 NOTE — ANESTHESIA PREPROCEDURE EVALUATION
Procedure:  EGD    Relevant Problems   CARDIO   (+) AAA (abdominal aortic aneurysm) without rupture (HCC)   (+) Primary hypertension      GI/HEPATIC   (+) Other chronic pancreatitis (HCC)      /RENAL   (+) Chronic kidney disease, stage 3 (HCC)      HEMATOLOGY   (+) Iron deficiency anemia      MUSCULOSKELETAL   (+) Neurogenic claudication      NEURO/PSYCH   (+) Depression, recurrent (HCC)   (+) Neurogenic claudication      PULMONARY   (+) Smoking       Left Ventricle: Left ventricular cavity size is normal. Wall thickness  is normal. The left ventricular ejection fraction is 55%. Systolic  function is normal. Wall motion is normal. Diastolic function is normal  for age. Tricuspid Valve: There is mild regurgitation. The right ventricular  systolic pressure is mildly to moderately elevated. The estimated right  ventricular systolic pressure is 88.90 mmHg. Anesthesia Plan  ASA Score- 3     Anesthesia Type- IV sedation with anesthesia with ASA Monitors. Additional Monitors:     Airway Plan:            Plan Factors-    Chart reviewed. Induction- intravenous. Postoperative Plan-     Informed Consent- Anesthetic plan and risks discussed with patient. I personally reviewed this patient with the CRNA. Discussed and agreed on the Anesthesia Plan with the CRNA. Jayde Burgess

## 2023-10-06 ENCOUNTER — TELEPHONE (OUTPATIENT)
Dept: LAB | Facility: HOSPITAL | Age: 73
End: 2023-10-06

## 2023-10-09 DIAGNOSIS — F41.9 ANXIETY: ICD-10-CM

## 2023-10-09 RX ORDER — ALPRAZOLAM 1 MG/1
1 TABLET ORAL 2 TIMES DAILY PRN
Qty: 60 TABLET | Refills: 0 | Status: SHIPPED | OUTPATIENT
Start: 2023-10-09 | End: 2023-11-08

## 2023-10-09 NOTE — TELEPHONE ENCOUNTER
Refill Request     Alprazolam 1 mg     Pharmacy: [facility]     LA: 8/16/23  NA: 11/15/23

## 2023-10-11 ENCOUNTER — APPOINTMENT (OUTPATIENT)
Dept: LAB | Facility: HOSPITAL | Age: 73
End: 2023-10-11
Payer: COMMERCIAL

## 2023-10-11 DIAGNOSIS — I10 PRIMARY HYPERTENSION: ICD-10-CM

## 2023-10-11 DIAGNOSIS — N18.30 STAGE 3 CHRONIC KIDNEY DISEASE, UNSPECIFIED WHETHER STAGE 3A OR 3B CKD (HCC): ICD-10-CM

## 2023-10-11 LAB
ALBUMIN SERPL BCP-MCNC: 3.3 G/DL (ref 3.5–5)
ANION GAP SERPL CALCULATED.3IONS-SCNC: 10 MMOL/L
BUN SERPL-MCNC: 8 MG/DL (ref 5–25)
CALCIUM ALBUM COR SERPL-MCNC: 9.1 MG/DL (ref 8.3–10.1)
CALCIUM SERPL-MCNC: 8.5 MG/DL (ref 8.4–10.2)
CHLORIDE SERPL-SCNC: 100 MMOL/L (ref 96–108)
CO2 SERPL-SCNC: 29 MMOL/L (ref 21–32)
CREAT SERPL-MCNC: 0.86 MG/DL (ref 0.6–1.3)
GFR SERPL CREATININE-BSD FRML MDRD: 85 ML/MIN/1.73SQ M
GLUCOSE P FAST SERPL-MCNC: 119 MG/DL (ref 65–99)
PHOSPHATE SERPL-MCNC: 2.9 MG/DL (ref 2.3–4.1)
POTASSIUM SERPL-SCNC: 3.3 MMOL/L (ref 3.5–5.3)
SODIUM SERPL-SCNC: 139 MMOL/L (ref 135–147)

## 2023-10-11 PROCEDURE — 80069 RENAL FUNCTION PANEL: CPT

## 2023-10-11 PROCEDURE — 36415 COLL VENOUS BLD VENIPUNCTURE: CPT

## 2023-10-13 ENCOUNTER — TELEPHONE (OUTPATIENT)
Dept: NEPHROLOGY | Facility: CLINIC | Age: 73
End: 2023-10-13

## 2023-10-13 DIAGNOSIS — I10 PRIMARY HYPERTENSION: ICD-10-CM

## 2023-10-13 DIAGNOSIS — N18.30 STAGE 3 CHRONIC KIDNEY DISEASE, UNSPECIFIED WHETHER STAGE 3A OR 3B CKD (HCC): Primary | ICD-10-CM

## 2023-10-13 NOTE — TELEPHONE ENCOUNTER
----- Message from [place], [place] sent at 10/12/2023  3:28 PM EDT -----  Potassium level dropped. Is he taking potassium supplements? (Listed in cierra's note but not on his medication list.)  If so, please let me know the dose and add this to the medication list.  Please have patient eat a high potassium diet. Repeat bmp in a few weeks.

## 2023-10-16 ENCOUNTER — TELEPHONE (OUTPATIENT)
Dept: NEPHROLOGY | Facility: CLINIC | Age: 73
End: 2023-10-16

## 2023-10-16 DIAGNOSIS — N18.30 STAGE 3 CHRONIC KIDNEY DISEASE, UNSPECIFIED WHETHER STAGE 3A OR 3B CKD (HCC): Primary | ICD-10-CM

## 2023-10-16 NOTE — TELEPHONE ENCOUNTER
rMedication Refills   Person Calling In  Name if not patient Patient    Medication name Potassium Chloride    Medication Dosage  Medication Frequency Not sure    [address]   Phone: [phone]  Fax: [phone] - This is a new [address]

## 2023-10-23 ENCOUNTER — TELEPHONE (OUTPATIENT)
Dept: NEPHROLOGY | Facility: CLINIC | Age: 73
End: 2023-10-23

## 2023-10-23 NOTE — TELEPHONE ENCOUNTER
----- Message from Leora Hernandez, [address] [facility] sent at 10/23/2023  9:52 AM EDT -----  Please call patient let him know his most recent potassium was 3.7 which is within normal limits. He does not require potassium supplementation at this time. His most recent sodium level is 136 as well.

## 2023-10-25 DIAGNOSIS — R33.9 URINE RETENTION: ICD-10-CM

## 2023-10-25 DIAGNOSIS — E22.2 SIADH (SYNDROME OF INAPPROPRIATE ADH PRODUCTION) (HCC): ICD-10-CM

## 2023-10-25 DIAGNOSIS — R10.30 LOWER ABDOMINAL PAIN: ICD-10-CM

## 2023-10-25 RX ORDER — DICYCLOMINE HYDROCHLORIDE 10 MG/1
CAPSULE ORAL
Qty: 270 CAPSULE | Refills: 2 | Status: SHIPPED | OUTPATIENT
Start: 2023-10-25

## 2023-10-25 RX ORDER — SODIUM CHLORIDE 1 G/1
1 TABLET ORAL
Qty: 90 TABLET | Refills: 0 | Status: SHIPPED | OUTPATIENT
Start: 2023-10-25

## 2023-10-25 NOTE — TELEPHONE ENCOUNTER
Refill Request     Dicyclomine 10 mg   Sodium Chloride 1g     Pharmacy: [id number]     LA: 8/16/23  NA: 11/16/23

## 2023-11-01 ENCOUNTER — OFFICE VISIT (OUTPATIENT)
Dept: GASTROENTEROLOGY | Facility: CLINIC | Age: 73
End: 2023-11-01
Payer: COMMERCIAL

## 2023-11-01 VITALS
BODY MASS INDEX: 16.75 KG/M2 | DIASTOLIC BLOOD PRESSURE: 88 MMHG | HEIGHT: 62 IN | SYSTOLIC BLOOD PRESSURE: 156 MMHG | WEIGHT: 91 LBS

## 2023-11-01 DIAGNOSIS — E61.1 IRON DEFICIENCY: ICD-10-CM

## 2023-11-01 DIAGNOSIS — K21.9 GASTROESOPHAGEAL REFLUX DISEASE, UNSPECIFIED WHETHER ESOPHAGITIS PRESENT: ICD-10-CM

## 2023-11-01 DIAGNOSIS — R63.4 UNINTENDED WEIGHT LOSS: ICD-10-CM

## 2023-11-01 DIAGNOSIS — E46 PROTEIN-CALORIE MALNUTRITION, UNSPECIFIED SEVERITY (HCC): ICD-10-CM

## 2023-11-01 DIAGNOSIS — Z87.11 HX OF GASTRIC ULCER: Primary | ICD-10-CM

## 2023-11-01 DIAGNOSIS — D50.9 IRON DEFICIENCY ANEMIA: Primary | ICD-10-CM

## 2023-11-01 DIAGNOSIS — Z86.010 HISTORY OF COLON POLYPS: ICD-10-CM

## 2023-11-01 PROCEDURE — 99214 OFFICE O/P EST MOD 30 MIN: CPT | Performed by: NURSE PRACTITIONER

## 2023-11-01 RX ORDER — FERROUS SULFATE 325(65) MG
325 TABLET ORAL
Qty: 90 TABLET | Refills: 0 | Status: SHIPPED | OUTPATIENT
Start: 2023-11-01 | End: 2023-11-03 | Stop reason: SDUPTHER

## 2023-11-01 RX ORDER — FERROUS SULFATE 325(65) MG
325 TABLET ORAL
COMMUNITY
End: 2023-11-01 | Stop reason: SDUPTHER

## 2023-11-01 NOTE — PROGRESS NOTES
[facility] - Outpatient Follow-up Note  Ra Mendenhall 75 y.o. male MRN: [id number]  Encounter: [id number]    ASSESSMENT AND PLAN:      1. Unintended weight loss  Patient states he feels he has adequate appetite however patient's weight same since office visit 8/7. EGD and colonoscopy were unremarkable. Discussed with patient increase in protein/calorie drinks to twice daily. Consider malnutrition versus malabsorption.    -Monitor weight  -Consider nutrition consult  - Ambulatory Referral to Gastroenterology    2. Hx of gastric ulcer  Gastric ulcer noted from EGD 6/12 however with EGD 10/5, ulcer healed. Patient continues on omeprazole 40 mg daily. 3. Gastroesophageal reflux disease, unspecified whether esophagitis present  Patient states his acid reflux symptoms are well controlled with omeprazole 40 mg daily. He denies any breakthrough or alarm symptoms. 4. Iron deficiency  Patient is currently taking oral iron supplement. Recent iron panel 6/8/2023 noted iron deficiency. Hemoglobin stable at 13.9. Monitor iron panel with next office visit. - CBC and differential; Future  - Iron Panel (Includes Ferritin, Iron Sat%, Iron, and TIBC); Future    5. Protein-calorie malnutrition, unspecified severity ([address])  Discussed with patient increasing Ensure calorie drink to twice daily if he can tolerate. Patient maintaining same weight since office visit 8/7 at 91 pounds. Weight check with next office visit. Consider nutrition consult. - Comprehensive metabolic panel    6.  History of colon polyps  Colonoscopy 6/2023; no recall due to age      Followup Appointment: 3 months  ______________________________________________________________________      HPI:    79-year-old male accompanied by his friend Ashley Hummel and with past medical history of gastric ulcer, GERD, pancreatic insufficiency, common bile duct dilatation, hypertension, AAA, CKD stage III, hyperlipidemia, protein calorie malnutrition, hyponatremia and iron deficiency presents for follow-up post EGD. EGD on 10/5/2023 was a follow-up to EGD 6/12 which discovered patient had persistent bleeding based ulcer in the gastric body. Follow-up EGD 10/5 noted mild granular and nodular mucosa, hiatal hernia grade 2. Recommended decreasing PPI to daily. On exam, patient denies nausea, vomiting or abdominal pain. States his acid reflux are well controlled with omeprazole 40 mg daily. States his weight is stable however patient weighs 91 pounds. Discussed with patient increasing protein supplement to twice daily. Patient states he is having daily normal bowel movements however due to iron have really dark to black stools. Denies hematochezia. Recent iron panel 6/2023 noted iron sat 10, TIBC 399, iron 39, ferritin 14. Hemoglobin 13.9 on 9/11. Baseline through hospitalization 9-10.4. Colonoscopy 6/12/2023; diverticulosis, small hemorrhoid, no recall due to age. Historical Information   Past Medical History:   Diagnosis Date    Anxiety     Rhodes's esophagus     Chronic neck pain     Depression     GERD (gastroesophageal reflux disease)     H/O abdominal aortic aneurysm 4.5 cm    Hyperlipidemia     Hypertension     Pancreatic pseudocyst     Vertigo      Past Surgical History:   Procedure Laterality Date    COLONOSCOPY  09/30/2014    IR LOWER EXTREMITY / INTERVENTION  01/04/2019    [place] ESOPHAGOGASTRODUODENOSCOPY TRANSORAL DIAGNOSTIC N/A 06/22/2018    Procedure: EGD AND COLONOSCOPY;  Surgeon: Loraine Olson MD;  Location: [facility]; Service: Gastroenterology    [place] EVASC RPR DPLMNT AORTO-AORTIC NDGFT N/A 02/09/2018    Procedure: REPAIR ANEURYSM ENDOVASCULAR ABDOMINAL AORTIC  (EVAR);   Surgeon: Rajesh Cagle MD;  Location: [facility];  Service: Vascular    UPPER GASTROINTESTINAL ENDOSCOPY       Social History     Substance and Sexual Activity   Alcohol Use Never     Social History     Substance and Sexual Activity   Drug Use No Social History     Tobacco Use   Smoking Status Every Day    Packs/day: 1.00    Types: Cigarettes    Start date: 1962   Smokeless Tobacco Never     Family History   Problem Relation Age of Onset    Heart disease Father     Heart attack Father     Diabetes Maternal Grandmother     Diabetes Maternal Grandfather     Diabetes Paternal Grandmother     Hypertension Family     Hyperlipidemia Family          Current Outpatient Medications:     ALPRAZolam (XANAX) 1 mg tablet    amLODIPine (NORVASC) 10 mg tablet    aspirin (ECOTRIN LOW STRENGTH) 81 mg EC tablet    citalopram (CeleXA) 20 mg tablet    dicyclomine (BENTYL) 10 mg capsule    finasteride (PROSCAR) 5 mg tablet    magnesium oxide (MAG-OX) 400 mg tablet    metoprolol succinate (TOPROL-XL) 25 mg 24 hr tablet    Multiple Vitamins-Minerals (CENTRUM SILVER PO)    omeprazole (PriLOSEC) 40 MG capsule    pancrelipase, Lip-Prot-Amyl, (Creon) 12,000 units capsule    pravastatin (PRAVACHOL) 40 mg tablet    Restasis 0.05 % ophthalmic emulsion    sodium chloride 1 g tablet    tamsulosin (FLOMAX) 0.4 mg    ferrous sulfate 325 (65 Fe) mg tablet    Wheat Dextrin (BENEFIBER PO)  No Known Allergies  Reviewed medications and allergies and updated as indicated    PHYSICAL EXAM:    Blood pressure 156/88, height 5' 2" (1.575 m), weight 41.3 kg (91 lb). Body mass index is 16.64 kg/m². General Appearance: NAD, cooperative, alert, underweight, under nourished  Eyes: Anicteric, PERRLA, EOMI  ENT:  Normocephalic, atraumatic, normal mucosa. Neck:  Supple, symmetrical, trachea midline  Resp:  Clear to auscultation bilaterally; no rales, rhonchi or wheezing; respirations unlabored   CV:  S1 S2, Regular rate and rhythm; no murmur, rub, or gallop. GI:  Soft, non-tender, non-distended; normal bowel sounds; no masses, no organomegaly   Rectal: Deferred  Musculoskeletal: No cyanosis, clubbing or edema. Normal ROM.   Skin:  No jaundice, rashes, or lesions   Heme/Lymph: No palpable cervical lymphadenopathy  Psych: Normal affect, good eye contact  Neuro: No gross deficits, AAOx3    Lab Results:   Lab Results   Component Value Date    WBC 6.95 09/11/2023    HGB 13.9 09/11/2023    HCT 41.6 09/11/2023     (H) 09/11/2023     09/11/2023     Lab Results   Component Value Date     (L) 10/14/2014    K 3.3 (L) 10/11/2023     10/11/2023    CO2 29 10/11/2023    ANIONGAP 6 10/14/2014    BUN 8 10/11/2023    CREATININE 0.86 10/11/2023    GLUCOSE 97 10/14/2014    GLUF 119 (H) 10/11/2023    CALCIUM 8.5 10/11/2023    CORRECTEDCA 9.1 10/11/2023    AST 17 06/11/2023    ALT 12 06/11/2023    ALKPHOS 61 06/11/2023    PROT 7.2 10/13/2014    BILITOT 0.37 10/13/2014    EGFR 85 10/11/2023     Lab Results   Component Value Date    IRON 39 (L) 06/08/2023    TIBC 399 06/08/2023    FERRITIN 14 (L) 06/08/2023     Lab Results   Component Value Date    LIPASE 15 06/07/2023       Radiology Results:   EGD    Result Date: 10/5/2023  Narrative: Table formatting from the original result was not included. [id number] [address] [facility],[address] [address] [phone] DATE OF SERVICE: 10/05/23 PHYSICIAN(S): Attending: Real Barnes DO Fellow: No Staff Documented INDICATION: Chronic gastric ulcer without hemorrhage and without perforation POST-OP DIAGNOSIS: See the impression below. PREPROCEDURE: Informed consent was obtained for the procedure, including sedation. Risks of perforation, hemorrhage, adverse drug reaction and aspiration were discussed. The patient was placed in the left lateral decubitus position. Patient was explained about the risks and benefits of the procedure. Risks including but not limited to bleeding, infection, and perforation were explained in detail. Also explained about less than 100% sensitivity with the exam and other alternatives.  PROCEDURE: EGD DETAILS OF PROCEDURE: Patient was taken to the procedure room where a time out was performed to confirm correct patient and correct procedure. The patient underwent monitored anesthesia care, which was administered by an anesthesia professional. The patient's blood pressure, ECG and oxygen were monitored throughout the procedure. The scope was advanced to the second part of the duodenum. Retroflexion was performed in the cardia and fundus. Prior to the procedure, the patient's H. Pylori status was negative. The patient experienced no blood loss. The procedure was not difficult. The patient tolerated the procedure well. There were no apparent adverse events. ANESTHESIA INFORMATION: ASA: III Anesthesia Type: IV Sedation with Anesthesia MEDICATIONS: No administrations occurring from 0803 to 0821 on 10/05/23 FINDINGS: Mild granular and nodular mucosa Hiatal hernia:  Hill classification: Grade II SPECIMENS: * No specimens in log *     Impression: Mild granular and nodular mucosa Hiatal hernia RECOMMENDATION:  Follow up with me in clinic  As needed.  Decrease pantoprazole to daily Stop iron    Josetta Signs, DO

## 2023-11-03 ENCOUNTER — OFFICE VISIT (OUTPATIENT)
Dept: INTERNAL MEDICINE CLINIC | Facility: CLINIC | Age: 73
End: 2023-11-03
Payer: COMMERCIAL

## 2023-11-03 VITALS
TEMPERATURE: 98.3 F | OXYGEN SATURATION: 97 % | HEIGHT: 62 IN | HEART RATE: 90 BPM | WEIGHT: 89.8 LBS | SYSTOLIC BLOOD PRESSURE: 172 MMHG | BODY MASS INDEX: 16.52 KG/M2 | DIASTOLIC BLOOD PRESSURE: 94 MMHG

## 2023-11-03 DIAGNOSIS — I10 HYPERTENSION: ICD-10-CM

## 2023-11-03 DIAGNOSIS — E78.5 HYPERLIPIDEMIA: ICD-10-CM

## 2023-11-03 DIAGNOSIS — D50.9 IRON DEFICIENCY ANEMIA: ICD-10-CM

## 2023-11-03 DIAGNOSIS — Z86.2 HISTORY OF IRON DEFICIENCY ANEMIA: ICD-10-CM

## 2023-11-03 DIAGNOSIS — Z23 ENCOUNTER FOR IMMUNIZATION: ICD-10-CM

## 2023-11-03 DIAGNOSIS — K86.1 CHRONIC PANCREATITIS (HCC): Primary | ICD-10-CM

## 2023-11-03 PROCEDURE — 90662 IIV NO PRSV INCREASED AG IM: CPT

## 2023-11-03 PROCEDURE — 99213 OFFICE O/P EST LOW 20 MIN: CPT | Performed by: INTERNAL MEDICINE

## 2023-11-03 PROCEDURE — G0008 ADMIN INFLUENZA VIRUS VAC: HCPCS

## 2023-11-03 RX ORDER — FERROUS SULFATE 325(65) MG
325 TABLET ORAL
Qty: 90 TABLET | Refills: 0 | Status: SHIPPED | OUTPATIENT
Start: 2023-11-03 | End: 2024-02-01

## 2023-11-03 NOTE — PROGRESS NOTES
Assessment/Plan:    Follow up Hypertension, Hyperlipidemia,  chronic  pancreatitis      Diagnoses and all orders for this visit:    Chronic pancreatitis ([address])    History of iron deficiency anemia    Hypertension    Hyperlipidemia    Iron deficiency anemia  -     ferrous sulfate 325 (65 Fe) mg tablet; Take 1 tablet (325 mg total) by mouth daily with breakfast          Subjective:      Patient ID: Ra Mendenhall is a 75 y.o. male. HPI    The following portions of the patient's history were reviewed and updated as appropriate: allergies, current medications, past family history, past medical history, past social history, past surgical history, and problem list.    Review of Systems   Constitutional: Negative. HENT:  Negative for dental problem, drooling, ear discharge and ear pain. Eyes:  Negative for discharge, redness and itching. Respiratory:  Negative for apnea, cough and wheezing. Cardiovascular:  Negative for chest pain and palpitations. Gastrointestinal:  Negative for abdominal pain, blood in stool, diarrhea and vomiting. Endocrine: Negative for polydipsia, polyphagia and polyuria. Genitourinary:  Negative for decreased urine volume, dysuria and frequency. Musculoskeletal:  Negative for arthralgias, myalgias and neck stiffness. Skin:  Negative for pallor and wound. Allergic/Immunologic: Negative for environmental allergies and food allergies. Neurological:  Negative for facial asymmetry, light-headedness, numbness and headaches. Hematological:  Negative for adenopathy. Does not bruise/bleed easily. Psychiatric/Behavioral:  Negative for agitation, behavioral problems and confusion.           Objective:      BP (!) 172/94 (BP Location: Left arm, Patient Position: Sitting, Cuff Size: Standard)   Pulse 90   Temp 98.3 °F (36.8 °C) (Temporal)   Ht 5' 2" (1.575 m)   Wt 40.7 kg (89 lb 12.8 oz)   SpO2 97% Comment: RA  BMI 16.42 kg/m²          Physical Exam  Constitutional: Appearance: Normal appearance. HENT:      Head: Normocephalic. Nose: Nose normal.      Mouth/Throat:      Mouth: Mucous membranes are moist.   Eyes:      Pupils: Pupils are equal, round, and reactive to light. Cardiovascular:      Rate and Rhythm: Regular rhythm. Heart sounds: Normal heart sounds. Pulmonary:      Breath sounds: Normal breath sounds. Abdominal:      Palpations: Abdomen is soft. Musculoskeletal:         General: No swelling. Cervical back: Neck supple. Skin:     General: Skin is warm. Neurological:      General: No focal deficit present. Mental Status: He is alert and oriented to person, place, and time. Psychiatric:         Mood and Affect: Mood normal.       Chronic  Pancreatitis    with  progressive  weight  loss  On  pancreatic  enzymes    Hypertension  on  Amlodipine  and  Metoprolol    Hyperlipidemia   controlled  with  statin     Blood  work   ordered. Fup  4 months.        Aldair Stout MD.FACP

## 2023-11-06 ENCOUNTER — OFFICE VISIT (OUTPATIENT)
Dept: VASCULAR SURGERY | Facility: CLINIC | Age: 73
End: 2023-11-06
Payer: COMMERCIAL

## 2023-11-06 VITALS
HEIGHT: 62 IN | OXYGEN SATURATION: 99 % | BODY MASS INDEX: 16.93 KG/M2 | WEIGHT: 92 LBS | DIASTOLIC BLOOD PRESSURE: 92 MMHG | SYSTOLIC BLOOD PRESSURE: 152 MMHG | HEART RATE: 84 BPM

## 2023-11-06 DIAGNOSIS — Z95.828 HISTORY OF ENDOVASCULAR STENT GRAFT FOR ABDOMINAL AORTIC ANEURYSM (AAA): ICD-10-CM

## 2023-11-06 DIAGNOSIS — Z98.890 STATUS POST ENDOVASCULAR ANEURYSM REPAIR (EVAR): ICD-10-CM

## 2023-11-06 DIAGNOSIS — R09.89 PROMINENT POPLITEAL PULSE: Primary | ICD-10-CM

## 2023-11-06 DIAGNOSIS — Z86.79 STATUS POST ENDOVASCULAR ANEURYSM REPAIR (EVAR): ICD-10-CM

## 2023-11-06 PROCEDURE — 99213 OFFICE O/P EST LOW 20 MIN: CPT | Performed by: SURGERY

## 2023-11-06 RX ORDER — LOTEPREDNOL ETABONATE 5 MG/ML
SUSPENSION/ DROPS OPHTHALMIC
COMMUNITY
Start: 2023-10-24

## 2023-11-06 NOTE — ASSESSMENT & PLAN NOTE
History of EVAR c/b right LIZZIE dissection with EIA stenting in 2018 with Dr. 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 Previously reported thigh discomfort with walking which he states has resolved. He has chronic back pain. He denies claudication, rest pain or tissue loss. EVAR duplex - patent endograft without endoleak, residual sac 3.9cm, elevated velocities in the left limb, right SANFORD 1.16, left SANFORD 1.06 with GTP in healing range bilaterally  CTAP 6/2023 (obtained for periumbilical pain/diarrhea) - no significant residual flow limiting stenoses noted in limbs, stents appeared widely patent    -We discussed the pathophysiology of aneurysmal disease, continued management/surveillance after EVAR with EIA stenting bilaterally for dissection. He denies any symptoms currently. -Recommend continued surveillance with EVAR duplex in 6months given mildly elevated velocities in the left limb with history of EIA stenting for dissection. No significant flow limiting stenoses noted within the left iliac stent on CTAP from June. -Recommend smoking cessation in relation to aneurysmal degeneration and progression of atherosclerotic disease.

## 2023-11-06 NOTE — PATIENT INSTRUCTIONS
Prominent popliteal pulse  Prominent popliteal pulses. No previous evaluation for lower extremity aneurysms. Will obtain initial screening LEAD. 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 Previously reported thigh discomfort with walking which he states has resolved. He has chronic back pain. He denies claudication, rest pain or tissue loss. EVAR duplex - patent endograft without endoleak, residual sac 3.9cm, elevated velocities in the left limb, right SANFORD 1.16, left SANFORD 1.06 with GTP in healing range bilaterally  CTAP 6/2023 (obtained for periumbilical pain/diarrhea) - no significant residual flow limiting stenoses noted in limbs, stents appeared widely patent     -We discussed the pathophysiology of aneurysmal disease, continued management/surveillance after EVAR with EIA stenting bilaterally for dissection. He denies any symptoms currently. -Recommend continued surveillance with EVAR duplex in 6months given mildly elevated velocities in the left limb with history of EIA stenting for dissection. No significant flow limiting stenoses noted within the left iliac stent on CTAP from June. -Recommend smoking cessation in relation to aneurysmal degeneration and progression of atherosclerotic disease.

## 2023-11-06 NOTE — ASSESSMENT & PLAN NOTE
Prominent popliteal pulses. No previous evaluation for lower extremity aneurysms. Will obtain initial screening LEAD.

## 2023-11-06 NOTE — PROGRESS NOTES
Assessment/Plan:    Prominent popliteal pulse  Prominent popliteal pulses. No previous evaluation for lower extremity aneurysms. Will obtain initial screening LEAD. 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 Previously reported thigh discomfort with walking which he states has resolved. He has chronic back pain. He denies claudication, rest pain or tissue loss. EVAR duplex - patent endograft without endoleak, residual sac 3.9cm, elevated velocities in the left limb, right SANFORD 1.16, left SANFORD 1.06 with GTP in healing range bilaterally  CTAP 6/2023 (obtained for periumbilical pain/diarrhea) - no significant residual flow limiting stenoses noted in limbs, stents appeared widely patent    -We discussed the pathophysiology of aneurysmal disease, continued management/surveillance after EVAR with EIA stenting bilaterally for dissection. He denies any symptoms currently. -Recommend continued surveillance with EVAR duplex in 6months given mildly elevated velocities in the left limb with history of EIA stenting for dissection. No significant flow limiting stenoses noted within the left iliac stent on CTAP from June. -Recommend smoking cessation in relation to aneurysmal degeneration and progression of atherosclerotic disease. Diagnoses and all orders for this visit:    Prominent popliteal pulse  -     VAS lower limb arterial duplex, complete bilateral; Future    Status post endovascular aneurysm repair (EVAR)  -     VAS evar endovascular aortic repair duplex;  Future    History of endovascular stent graft for abdominal aortic aneurysm (AAA)    Other orders  -     loteprednol etabonate (LOTEMAX) 0.5 % ophthalmic suspension; instill 1 drop INTO AFFECTED EYE(S) four times a day for 2 weeks . ..  (REFER TO PRESCRIPTION NOTES). I have spent 25 minutes with Patient  today in which greater than 50% of this time was spent in counseling/coordination of care regarding Intructions for management, Importance of tx compliance and Impressions. Subjective:      Patient ID: Indira Orozco is a 75 y.o. male. Patient is here today for a 1 year f/u exam and to review results of a EVAR duplex done 8/28/2023. Patient suffers of chronic low back pain. He denies any abd pain or any abd pain after eating. Patient is taking ASA 81 mg and Pravastatin. Patient is a currently smoking 4-5 cigarettes daily. HPI  Mr. Stanley Artist is a 76yo male smoker with GERD, history of AAA s/p EVAR 2018, 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, chronic neurogenic claudication and back pain. Denies any claudication at this time, rest pain or tissue loss. No abdominal pain, chronic back pain was is unchanged. The following portions of the patient's history were reviewed and updated as appropriate: allergies, current medications, past family history, past medical history, past social history, past surgical history, and problem list.    Review of Systems   Constitutional: Negative. HENT: Negative. Eyes: Negative. Respiratory: Negative. Cardiovascular: Negative. Gastrointestinal: Negative. Endocrine: Negative. Genitourinary: Negative. Musculoskeletal:  Positive for back pain. Skin: Negative. Allergic/Immunologic: Negative. Neurological: Negative. Hematological: Negative. Psychiatric/Behavioral: Negative. I have personally reviewed the ROS entered by MA and agree as documented.     Objective:      /92 (BP Location: Left arm, Patient Position: Sitting, Cuff Size: Standard)   Pulse 84   Ht 5' 2" (1.575 m)   Wt 41.7 kg (92 lb)   SpO2 99%   BMI 16.83 kg/m²          Physical Exam  Constitutional:       Appearance: Normal appearance. HENT:      Head: Normocephalic and atraumatic. Cardiovascular:      Rate and Rhythm: Normal rate. Pulses:           Femoral pulses are 2+ on the right side and 2+ on the left side. Popliteal pulses are 2+ on the right side and 2+ on the left side. Dorsalis pedis pulses are 1+ on the right side and 1+ on the left side. Posterior tibial pulses are 1+ on the left side. Pulmonary:      Effort: Pulmonary effort is normal.   Abdominal:      General: There is no distension. Palpations: Abdomen is soft. Tenderness: There is no abdominal tenderness. Musculoskeletal:         General: Normal range of motion. Cervical back: Normal range of motion and neck supple. Skin:     General: Skin is warm and dry. Capillary Refill: Capillary refill takes less than 2 seconds. Neurological:      General: No focal deficit present. Mental Status: He is alert and oriented to person, place, and time. Psychiatric:         Mood and Affect: Mood normal.         Behavior: Behavior normal.         Thought Content:  Thought content normal.         Judgment: Judgment normal.

## 2023-11-08 DIAGNOSIS — K25.9 MULTIPLE GASTRIC ULCERS: ICD-10-CM

## 2023-11-08 DIAGNOSIS — F41.9 ANXIETY: ICD-10-CM

## 2023-11-08 DIAGNOSIS — E78.5 HYPERLIPIDEMIA, UNSPECIFIED: ICD-10-CM

## 2023-11-08 RX ORDER — ALPRAZOLAM 1 MG/1
1 TABLET ORAL 2 TIMES DAILY PRN
Qty: 60 TABLET | Refills: 0 | Status: SHIPPED | OUTPATIENT
Start: 2023-11-08 | End: 2023-12-08

## 2023-11-08 RX ORDER — PRAVASTATIN SODIUM 40 MG
40 TABLET ORAL
Qty: 90 TABLET | Refills: 2 | Status: SHIPPED | OUTPATIENT
Start: 2023-11-08

## 2023-11-24 DIAGNOSIS — I10 ESSENTIAL HYPERTENSION: ICD-10-CM

## 2023-11-24 DIAGNOSIS — E87.6 HYPOKALEMIA: ICD-10-CM

## 2023-11-24 DIAGNOSIS — I10 HYPERTENSION: ICD-10-CM

## 2023-11-24 DIAGNOSIS — N32.0 BLADDER OUTLET OBSTRUCTION: ICD-10-CM

## 2023-11-24 DIAGNOSIS — R33.9 URINARY RETENTION WITH INCOMPLETE BLADDER EMPTYING: ICD-10-CM

## 2023-11-24 RX ORDER — FINASTERIDE 5 MG/1
5 TABLET, FILM COATED ORAL DAILY
Qty: 30 TABLET | Refills: 1 | Status: SHIPPED | OUTPATIENT
Start: 2023-11-24

## 2023-11-24 RX ORDER — METOPROLOL SUCCINATE 25 MG/1
25 TABLET, EXTENDED RELEASE ORAL DAILY
Qty: 90 TABLET | Refills: 1 | Status: SHIPPED | OUTPATIENT
Start: 2023-11-24

## 2023-11-24 RX ORDER — MAGNESIUM OXIDE 400 MG/1
400 TABLET ORAL DAILY
Qty: 90 TABLET | Refills: 0 | Status: SHIPPED | OUTPATIENT
Start: 2023-11-24

## 2023-11-28 DIAGNOSIS — E22.2 SIADH (SYNDROME OF INAPPROPRIATE ADH PRODUCTION) (HCC): ICD-10-CM

## 2023-11-28 DIAGNOSIS — R33.9 URINE RETENTION: ICD-10-CM

## 2023-11-28 RX ORDER — SODIUM CHLORIDE 1 G/1
1 TABLET ORAL
Qty: 270 TABLET | Refills: 1 | Status: SHIPPED | OUTPATIENT
Start: 2023-11-28

## 2023-12-07 NOTE — ASSESSMENT & PLAN NOTE
· Chronic, follows with urology  · Maintained on Flomax and Proscar  · CT abdomen/pelvis with urinary bladder wall thickening -appears chronic  UA with bacteruria but pt denies any urinary symptoms    Afebrile, no leukocytosis  · Patient required straight cath x2 thus far, overnight 2 attempts were made and urology had to straight cath with coudé  · Since traumatic cath, patient complaining of suprapubic pain and urethral pain accompanied by urgency  · Urology consulted, recommend ultimate Nicole placement per retention protocol with outpatient trial to void following discharge  · UA is positive for UTI  · Start on Rocephin - transition to oral for discharge  · Follow up on final culture results outpatient Prescription called to pharmacy/Take over the counter pain medication

## 2023-12-11 DIAGNOSIS — K25.9 MULTIPLE GASTRIC ULCERS: ICD-10-CM

## 2023-12-11 DIAGNOSIS — F41.9 ANXIETY: ICD-10-CM

## 2023-12-11 RX ORDER — ALPRAZOLAM 1 MG/1
1 TABLET ORAL 2 TIMES DAILY PRN
Qty: 60 TABLET | Refills: 0 | Status: SHIPPED | OUTPATIENT
Start: 2023-12-11 | End: 2024-01-10

## 2023-12-11 RX ORDER — OMEPRAZOLE 40 MG/1
40 CAPSULE, DELAYED RELEASE ORAL DAILY
Qty: 180 CAPSULE | Refills: 0 | Status: SHIPPED | OUTPATIENT
Start: 2023-12-11

## 2023-12-11 NOTE — TELEPHONE ENCOUNTER
Refill Request     Alprazolam 1 mg (Qty 60)   Omeprazole 40 mg (Qty 90)    Pharmacy: [facility]     LA: 11/3/23  NA: 3/15/24

## 2023-12-15 DIAGNOSIS — K86.89 PANCREATIC INSUFFICIENCY: ICD-10-CM

## 2023-12-15 RX ORDER — PANCRELIPASE 60000; 12000; 38000 [USP'U]/1; [USP'U]/1; [USP'U]/1
24000 CAPSULE, DELAYED RELEASE PELLETS ORAL
Qty: 540 CAPSULE | Refills: 1 | Status: SHIPPED | OUTPATIENT
Start: 2023-12-15

## 2024-01-01 ENCOUNTER — APPOINTMENT (EMERGENCY)
Dept: CT IMAGING | Facility: HOSPITAL | Age: 74
DRG: 871 | End: 2024-01-01
Payer: COMMERCIAL

## 2024-01-01 ENCOUNTER — APPOINTMENT (EMERGENCY)
Dept: RADIOLOGY | Facility: HOSPITAL | Age: 74
DRG: 871 | End: 2024-01-01
Payer: COMMERCIAL

## 2024-01-01 ENCOUNTER — APPOINTMENT (INPATIENT)
Dept: CT IMAGING | Facility: HOSPITAL | Age: 74
DRG: 871 | End: 2024-01-01
Payer: COMMERCIAL

## 2024-01-01 ENCOUNTER — APPOINTMENT (INPATIENT)
Dept: RADIOLOGY | Facility: HOSPITAL | Age: 74
DRG: 871 | End: 2024-01-01
Payer: COMMERCIAL

## 2024-01-01 ENCOUNTER — HOSPICE ADMISSION (OUTPATIENT)
Dept: HOME HOSPICE | Facility: HOSPICE | Age: 74
End: 2024-01-01
Payer: MEDICARE

## 2024-01-01 ENCOUNTER — HOSPITAL ENCOUNTER (INPATIENT)
Facility: HOSPITAL | Age: 74
LOS: 5 days | DRG: 871 | End: 2024-12-11
Attending: EMERGENCY MEDICINE | Admitting: INTERNAL MEDICINE
Payer: COMMERCIAL

## 2024-01-01 ENCOUNTER — HOME CARE VISIT (OUTPATIENT)
Dept: HOME HEALTH SERVICES | Facility: HOME HEALTHCARE | Age: 74
End: 2024-01-01
Payer: MEDICARE

## 2024-01-01 VITALS
RESPIRATION RATE: 18 BRPM | BODY MASS INDEX: 15.13 KG/M2 | HEIGHT: 62 IN | OXYGEN SATURATION: 91 % | TEMPERATURE: 98 F | SYSTOLIC BLOOD PRESSURE: 141 MMHG | DIASTOLIC BLOOD PRESSURE: 94 MMHG | WEIGHT: 82.23 LBS | HEART RATE: 95 BPM

## 2024-01-01 DIAGNOSIS — J96.00 ACUTE RESPIRATORY FAILURE (HCC): Primary | ICD-10-CM

## 2024-01-01 DIAGNOSIS — J18.9 RIGHT LOWER LOBE PNEUMONIA: ICD-10-CM

## 2024-01-01 LAB
2HR DELTA HS TROPONIN: -1 NG/L
4HR DELTA HS TROPONIN: -1 NG/L
ACHR BIND AB SER-SCNC: <0.03 NMOL/L (ref 0–0.24)
ALBUMIN SERPL BCG-MCNC: 2.7 G/DL (ref 3.5–5)
ALBUMIN SERPL BCG-MCNC: 2.7 G/DL (ref 3.5–5)
ALBUMIN SERPL BCG-MCNC: 2.8 G/DL (ref 3.5–5)
ALBUMIN SERPL BCG-MCNC: 3.1 G/DL (ref 3.5–5)
ALP SERPL-CCNC: 100 U/L (ref 34–104)
ALP SERPL-CCNC: 110 U/L (ref 34–104)
ALP SERPL-CCNC: 112 U/L (ref 34–104)
ALP SERPL-CCNC: 123 U/L (ref 34–104)
ALT SERPL W P-5'-P-CCNC: 45 U/L (ref 7–52)
ALT SERPL W P-5'-P-CCNC: 54 U/L (ref 7–52)
ALT SERPL W P-5'-P-CCNC: 55 U/L (ref 7–52)
ALT SERPL W P-5'-P-CCNC: 60 U/L (ref 7–52)
ANION GAP SERPL CALCULATED.3IONS-SCNC: 10 MMOL/L (ref 4–13)
ANION GAP SERPL CALCULATED.3IONS-SCNC: 11 MMOL/L (ref 4–13)
ANION GAP SERPL CALCULATED.3IONS-SCNC: 14 MMOL/L (ref 4–13)
ANION GAP SERPL CALCULATED.3IONS-SCNC: 8 MMOL/L (ref 4–13)
ANION GAP SERPL CALCULATED.3IONS-SCNC: 9 MMOL/L (ref 4–13)
APTT PPP: 37 SECONDS (ref 23–34)
AST SERPL W P-5'-P-CCNC: 23 U/L (ref 13–39)
AST SERPL W P-5'-P-CCNC: 30 U/L (ref 13–39)
AST SERPL W P-5'-P-CCNC: 39 U/L (ref 13–39)
AST SERPL W P-5'-P-CCNC: 46 U/L (ref 13–39)
ATRIAL RATE: 95 BPM
ATRIAL RATE: 98 BPM
BACTERIA BLD CULT: ABNORMAL
BACTERIA BLD CULT: NORMAL
BACTERIA SPT RESP CULT: ABNORMAL
BACTERIA UR QL AUTO: ABNORMAL /HPF
BASE EXCESS BLDA CALC-SCNC: 4 MMOL/L (ref -2–3)
BASOPHILS # BLD AUTO: 0.04 THOUSANDS/ÂΜL (ref 0–0.1)
BASOPHILS # BLD AUTO: 0.11 THOUSANDS/ÂΜL (ref 0–0.1)
BASOPHILS # BLD AUTO: 0.13 THOUSANDS/ÂΜL (ref 0–0.1)
BASOPHILS # BLD MANUAL: 0 THOUSAND/UL (ref 0–0.1)
BASOPHILS NFR BLD AUTO: 0 % (ref 0–1)
BASOPHILS NFR BLD AUTO: 1 % (ref 0–1)
BASOPHILS NFR BLD AUTO: 1 % (ref 0–1)
BASOPHILS NFR MAR MANUAL: 0 % (ref 0–1)
BILIRUB DIRECT SERPL-MCNC: 0.08 MG/DL (ref 0–0.2)
BILIRUB DIRECT SERPL-MCNC: 0.11 MG/DL (ref 0–0.2)
BILIRUB SERPL-MCNC: 0.35 MG/DL (ref 0.2–1)
BILIRUB SERPL-MCNC: 0.36 MG/DL (ref 0.2–1)
BILIRUB SERPL-MCNC: 0.38 MG/DL (ref 0.2–1)
BILIRUB SERPL-MCNC: 0.65 MG/DL (ref 0.2–1)
BILIRUB UR QL STRIP: NEGATIVE
BNP SERPL-MCNC: 198 PG/ML (ref 0–100)
BUN SERPL-MCNC: 18 MG/DL (ref 5–25)
BUN SERPL-MCNC: 18 MG/DL (ref 5–25)
BUN SERPL-MCNC: 25 MG/DL (ref 5–25)
BUN SERPL-MCNC: 26 MG/DL (ref 5–25)
BUN SERPL-MCNC: 26 MG/DL (ref 5–25)
BUN SERPL-MCNC: 27 MG/DL (ref 5–25)
BUN SERPL-MCNC: 27 MG/DL (ref 5–25)
CA-I BLD-SCNC: 0.92 MMOL/L (ref 1.12–1.32)
CA-I BLD-SCNC: 0.95 MMOL/L (ref 1.12–1.32)
CA-I BLD-SCNC: 0.99 MMOL/L (ref 1.12–1.32)
CA-I BLD-SCNC: 1.05 MMOL/L (ref 1.12–1.32)
CA-I BLD-SCNC: 1.11 MMOL/L (ref 1.12–1.32)
CA-I BLD-SCNC: 1.13 MMOL/L (ref 1.12–1.32)
CALCIUM ALBUM COR SERPL-MCNC: 8.7 MG/DL (ref 8.3–10.1)
CALCIUM ALBUM COR SERPL-MCNC: 8.8 MG/DL (ref 8.3–10.1)
CALCIUM SERPL-MCNC: 7.7 MG/DL (ref 8.4–10.2)
CALCIUM SERPL-MCNC: 7.8 MG/DL (ref 8.4–10.2)
CALCIUM SERPL-MCNC: 7.8 MG/DL (ref 8.4–10.2)
CALCIUM SERPL-MCNC: 7.9 MG/DL (ref 8.4–10.2)
CALCIUM SERPL-MCNC: 8 MG/DL (ref 8.4–10.2)
CALCIUM SERPL-MCNC: 8.1 MG/DL (ref 8.4–10.2)
CALCIUM SERPL-MCNC: 8.1 MG/DL (ref 8.4–10.2)
CARDIAC TROPONIN I PNL SERPL HS: 10 NG/L (ref ?–50)
CARDIAC TROPONIN I PNL SERPL HS: 10 NG/L (ref ?–50)
CARDIAC TROPONIN I PNL SERPL HS: 11 NG/L (ref ?–50)
CHLORIDE SERPL-SCNC: 101 MMOL/L (ref 96–108)
CHLORIDE SERPL-SCNC: 102 MMOL/L (ref 96–108)
CHLORIDE SERPL-SCNC: 104 MMOL/L (ref 96–108)
CHLORIDE SERPL-SCNC: 105 MMOL/L (ref 96–108)
CHLORIDE SERPL-SCNC: 107 MMOL/L (ref 96–108)
CHLORIDE SERPL-SCNC: 109 MMOL/L (ref 96–108)
CHLORIDE SERPL-SCNC: 95 MMOL/L (ref 96–108)
CLARITY UR: CLEAR
CO2 SERPL-SCNC: 24 MMOL/L (ref 21–32)
CO2 SERPL-SCNC: 26 MMOL/L (ref 21–32)
CO2 SERPL-SCNC: 28 MMOL/L (ref 21–32)
CO2 SERPL-SCNC: 29 MMOL/L (ref 21–32)
CO2 SERPL-SCNC: 29 MMOL/L (ref 21–32)
CO2 SERPL-SCNC: 31 MMOL/L (ref 21–32)
CO2 SERPL-SCNC: 34 MMOL/L (ref 21–32)
COLOR UR: YELLOW
CREAT SERPL-MCNC: 0.53 MG/DL (ref 0.6–1.3)
CREAT SERPL-MCNC: 0.53 MG/DL (ref 0.6–1.3)
CREAT SERPL-MCNC: 0.54 MG/DL (ref 0.6–1.3)
CREAT SERPL-MCNC: 0.54 MG/DL (ref 0.6–1.3)
CREAT SERPL-MCNC: 0.59 MG/DL (ref 0.6–1.3)
CREAT SERPL-MCNC: 0.61 MG/DL (ref 0.6–1.3)
CREAT SERPL-MCNC: 0.64 MG/DL (ref 0.6–1.3)
EOSINOPHIL # BLD AUTO: 0.01 THOUSAND/ÂΜL (ref 0–0.61)
EOSINOPHIL # BLD AUTO: 0.08 THOUSAND/ÂΜL (ref 0–0.61)
EOSINOPHIL # BLD AUTO: 0.09 THOUSAND/ÂΜL (ref 0–0.61)
EOSINOPHIL # BLD MANUAL: 0 THOUSAND/UL (ref 0–0.4)
EOSINOPHIL NFR BLD AUTO: 0 % (ref 0–6)
EOSINOPHIL NFR BLD AUTO: 0 % (ref 0–6)
EOSINOPHIL NFR BLD AUTO: 1 % (ref 0–6)
EOSINOPHIL NFR BLD MANUAL: 0 % (ref 0–6)
ERYTHROCYTE [DISTWIDTH] IN BLOOD BY AUTOMATED COUNT: 13.4 % (ref 11.6–15.1)
ERYTHROCYTE [DISTWIDTH] IN BLOOD BY AUTOMATED COUNT: 13.5 % (ref 11.6–15.1)
ERYTHROCYTE [DISTWIDTH] IN BLOOD BY AUTOMATED COUNT: 13.6 % (ref 11.6–15.1)
FLUAV RNA RESP QL NAA+PROBE: NEGATIVE
FLUBV RNA RESP QL NAA+PROBE: NEGATIVE
GFR SERPL CREATININE-BSD FRML MDRD: 102 ML/MIN/1.73SQ M
GFR SERPL CREATININE-BSD FRML MDRD: 102 ML/MIN/1.73SQ M
GFR SERPL CREATININE-BSD FRML MDRD: 103 ML/MIN/1.73SQ M
GFR SERPL CREATININE-BSD FRML MDRD: 103 ML/MIN/1.73SQ M
GFR SERPL CREATININE-BSD FRML MDRD: 95 ML/MIN/1.73SQ M
GFR SERPL CREATININE-BSD FRML MDRD: 97 ML/MIN/1.73SQ M
GFR SERPL CREATININE-BSD FRML MDRD: 99 ML/MIN/1.73SQ M
GLUCOSE SERPL-MCNC: 122 MG/DL (ref 65–140)
GLUCOSE SERPL-MCNC: 122 MG/DL (ref 65–140)
GLUCOSE SERPL-MCNC: 124 MG/DL (ref 65–140)
GLUCOSE SERPL-MCNC: 127 MG/DL (ref 65–140)
GLUCOSE SERPL-MCNC: 131 MG/DL (ref 65–140)
GLUCOSE SERPL-MCNC: 133 MG/DL (ref 65–140)
GLUCOSE SERPL-MCNC: 134 MG/DL (ref 65–140)
GLUCOSE SERPL-MCNC: 134 MG/DL (ref 65–140)
GLUCOSE SERPL-MCNC: 139 MG/DL (ref 65–140)
GLUCOSE SERPL-MCNC: 147 MG/DL (ref 65–140)
GLUCOSE UR STRIP-MCNC: NEGATIVE MG/DL
GRAM STN SPEC: ABNORMAL
HCO3 BLDA-SCNC: 26.8 MMOL/L (ref 24–30)
HCT VFR BLD AUTO: 29.8 % (ref 36.5–49.3)
HCT VFR BLD AUTO: 31.1 % (ref 36.5–49.3)
HCT VFR BLD AUTO: 31.9 % (ref 36.5–49.3)
HCT VFR BLD AUTO: 32.8 % (ref 36.5–49.3)
HCT VFR BLD AUTO: 33.5 % (ref 36.5–49.3)
HCT VFR BLD CALC: 33 % (ref 36.5–49.3)
HGB BLD-MCNC: 10.5 G/DL (ref 12–17)
HGB BLD-MCNC: 10.5 G/DL (ref 12–17)
HGB BLD-MCNC: 10.6 G/DL (ref 12–17)
HGB BLD-MCNC: 11.2 G/DL (ref 12–17)
HGB BLD-MCNC: 9.9 G/DL (ref 12–17)
HGB BLDA-MCNC: 11.2 G/DL (ref 12–17)
HGB UR QL STRIP.AUTO: NEGATIVE
IMM GRANULOCYTES # BLD AUTO: 0.23 THOUSAND/UL (ref 0–0.2)
IMM GRANULOCYTES NFR BLD AUTO: 1 % (ref 0–2)
INR PPP: 1.18 (ref 0.85–1.19)
KETONES UR STRIP-MCNC: NEGATIVE MG/DL
L PNEUMO1 AG UR QL IA.RAPID: NEGATIVE
LACTATE SERPL-SCNC: 1.7 MMOL/L (ref 0.5–2)
LEUKOCYTE ESTERASE UR QL STRIP: ABNORMAL
LIPASE SERPL-CCNC: 8 U/L (ref 11–82)
LYMPHOCYTES # BLD AUTO: 0.32 THOUSANDS/ÂΜL (ref 0.6–4.47)
LYMPHOCYTES # BLD AUTO: 0.33 THOUSANDS/ÂΜL (ref 0.6–4.47)
LYMPHOCYTES # BLD AUTO: 0.41 THOUSAND/UL (ref 0.6–4.47)
LYMPHOCYTES # BLD AUTO: 0.41 THOUSANDS/ÂΜL (ref 0.6–4.47)
LYMPHOCYTES # BLD AUTO: 2 % (ref 14–44)
LYMPHOCYTES NFR BLD AUTO: 2 % (ref 14–44)
MAGNESIUM SERPL-MCNC: 1.2 MG/DL (ref 1.9–2.7)
MAGNESIUM SERPL-MCNC: 1.6 MG/DL (ref 1.9–2.7)
MAGNESIUM SERPL-MCNC: 1.7 MG/DL (ref 1.9–2.7)
MAGNESIUM SERPL-MCNC: 1.7 MG/DL (ref 1.9–2.7)
MAGNESIUM SERPL-MCNC: 2.1 MG/DL (ref 1.9–2.7)
MAGNESIUM SERPL-MCNC: 2.2 MG/DL (ref 1.9–2.7)
MAGNESIUM SERPL-MCNC: 2.4 MG/DL (ref 1.9–2.7)
MCH RBC QN AUTO: 33.4 PG (ref 26.8–34.3)
MCH RBC QN AUTO: 33.5 PG (ref 26.8–34.3)
MCH RBC QN AUTO: 33.7 PG (ref 26.8–34.3)
MCH RBC QN AUTO: 33.8 PG (ref 26.8–34.3)
MCH RBC QN AUTO: 33.9 PG (ref 26.8–34.3)
MCHC RBC AUTO-ENTMCNC: 32.3 G/DL (ref 31.4–37.4)
MCHC RBC AUTO-ENTMCNC: 32.9 G/DL (ref 31.4–37.4)
MCHC RBC AUTO-ENTMCNC: 33.2 G/DL (ref 31.4–37.4)
MCHC RBC AUTO-ENTMCNC: 33.4 G/DL (ref 31.4–37.4)
MCHC RBC AUTO-ENTMCNC: 33.8 G/DL (ref 31.4–37.4)
MCV RBC AUTO: 100 FL (ref 82–98)
MCV RBC AUTO: 101 FL (ref 82–98)
MCV RBC AUTO: 102 FL (ref 82–98)
MCV RBC AUTO: 102 FL (ref 82–98)
MCV RBC AUTO: 104 FL (ref 82–98)
MONOCYTES # BLD AUTO: 0.41 THOUSAND/UL (ref 0–1.22)
MONOCYTES # BLD AUTO: 0.53 THOUSAND/ÂΜL (ref 0.17–1.22)
MONOCYTES # BLD AUTO: 0.78 THOUSAND/ÂΜL (ref 0.17–1.22)
MONOCYTES # BLD AUTO: 0.89 THOUSAND/ÂΜL (ref 0.17–1.22)
MONOCYTES NFR BLD AUTO: 3 % (ref 4–12)
MONOCYTES NFR BLD AUTO: 4 % (ref 4–12)
MONOCYTES NFR BLD AUTO: 5 % (ref 4–12)
MONOCYTES NFR BLD: 2 % (ref 4–12)
MRSA NOSE QL CULT: NORMAL
NEUTROPHILS # BLD AUTO: 15.77 THOUSANDS/ÂΜL (ref 1.85–7.62)
NEUTROPHILS # BLD AUTO: 17.84 THOUSANDS/ÂΜL (ref 1.85–7.62)
NEUTROPHILS # BLD AUTO: 18.82 THOUSANDS/ÂΜL (ref 1.85–7.62)
NEUTROPHILS # BLD MANUAL: 19.44 THOUSAND/UL (ref 1.85–7.62)
NEUTS BAND NFR BLD MANUAL: 9 % (ref 0–8)
NEUTS SEG NFR BLD AUTO: 87 % (ref 43–75)
NEUTS SEG NFR BLD AUTO: 90 % (ref 43–75)
NEUTS SEG NFR BLD AUTO: 92 % (ref 43–75)
NEUTS SEG NFR BLD AUTO: 94 % (ref 43–75)
NITRITE UR QL STRIP: NEGATIVE
NON-SQ EPI CELLS URNS QL MICRO: ABNORMAL /HPF
NRBC BLD AUTO-RTO: 0 /100 WBCS
P AXIS: 74 DEGREES
P AXIS: 80 DEGREES
PCO2 BLD: 28 MMOL/L (ref 21–32)
PCO2 BLD: 34.1 MM HG (ref 42–50)
PH BLD: 7.5 [PH] (ref 7.3–7.4)
PH UR STRIP.AUTO: 6 [PH]
PHOSPHATE SERPL-MCNC: 2.6 MG/DL (ref 2.3–4.1)
PHOSPHATE SERPL-MCNC: 2.7 MG/DL (ref 2.3–4.1)
PHOSPHATE SERPL-MCNC: 2.8 MG/DL (ref 2.3–4.1)
PHOSPHATE SERPL-MCNC: 2.8 MG/DL (ref 2.3–4.1)
PHOSPHATE SERPL-MCNC: 3.3 MG/DL (ref 2.3–4.1)
PHOSPHATE SERPL-MCNC: 3.5 MG/DL (ref 2.3–4.1)
PLATELET # BLD AUTO: 336 THOUSANDS/UL (ref 149–390)
PLATELET # BLD AUTO: 364 THOUSANDS/UL (ref 149–390)
PLATELET # BLD AUTO: 378 THOUSANDS/UL (ref 149–390)
PLATELET # BLD AUTO: 389 THOUSANDS/UL (ref 149–390)
PLATELET # BLD AUTO: 405 THOUSANDS/UL (ref 149–390)
PLATELET BLD QL SMEAR: ADEQUATE
PLATELET BLD QL SMEAR: ADEQUATE
PMV BLD AUTO: 9.2 FL (ref 8.9–12.7)
PMV BLD AUTO: 9.3 FL (ref 8.9–12.7)
PMV BLD AUTO: 9.4 FL (ref 8.9–12.7)
PMV BLD AUTO: 9.4 FL (ref 8.9–12.7)
PMV BLD AUTO: 9.6 FL (ref 8.9–12.7)
PO2 BLD: 42 MM HG (ref 35–45)
POTASSIUM BLD-SCNC: 5.1 MMOL/L (ref 3.5–5.3)
POTASSIUM SERPL-SCNC: 2.9 MMOL/L (ref 3.5–5.3)
POTASSIUM SERPL-SCNC: 3.1 MMOL/L (ref 3.5–5.3)
POTASSIUM SERPL-SCNC: 3.1 MMOL/L (ref 3.5–5.3)
POTASSIUM SERPL-SCNC: 3.4 MMOL/L (ref 3.5–5.3)
POTASSIUM SERPL-SCNC: 3.6 MMOL/L (ref 3.5–5.3)
POTASSIUM SERPL-SCNC: 3.6 MMOL/L (ref 3.5–5.3)
POTASSIUM SERPL-SCNC: 3.9 MMOL/L (ref 3.5–5.3)
PR INTERVAL: 110 MS
PR INTERVAL: 114 MS
PROCALCITONIN SERPL-MCNC: 0.26 NG/ML
PROCALCITONIN SERPL-MCNC: 0.64 NG/ML
PROCALCITONIN SERPL-MCNC: 0.8 NG/ML
PROCALCITONIN SERPL-MCNC: 0.92 NG/ML
PROT SERPL-MCNC: 5.5 G/DL (ref 6.4–8.4)
PROT SERPL-MCNC: 6 G/DL (ref 6.4–8.4)
PROT SERPL-MCNC: 6.1 G/DL (ref 6.4–8.4)
PROT SERPL-MCNC: 6.5 G/DL (ref 6.4–8.4)
PROT UR STRIP-MCNC: ABNORMAL MG/DL
PROTHROMBIN TIME: 15.5 SECONDS (ref 12.3–15)
QRS AXIS: 88 DEGREES
QRS AXIS: 89 DEGREES
QRSD INTERVAL: 90 MS
QRSD INTERVAL: 96 MS
QT INTERVAL: 312 MS
QT INTERVAL: 316 MS
QTC INTERVAL: 392 MS
QTC INTERVAL: 403 MS
RBC # BLD AUTO: 2.92 MILLION/UL (ref 3.88–5.62)
RBC # BLD AUTO: 3.11 MILLION/UL (ref 3.88–5.62)
RBC # BLD AUTO: 3.13 MILLION/UL (ref 3.88–5.62)
RBC # BLD AUTO: 3.17 MILLION/UL (ref 3.88–5.62)
RBC # BLD AUTO: 3.32 MILLION/UL (ref 3.88–5.62)
RBC #/AREA URNS AUTO: ABNORMAL /HPF
RBC MORPH BLD: NORMAL
RBC MORPH BLD: NORMAL
RSV RNA RESP QL NAA+PROBE: NEGATIVE
S EPIDERMIDIS DNA BLD POS QL NAA+NON-PRB: DETECTED
S EPIDERMIDIS DNA BLD POS QL NAA+NON-PRB: DETECTED
S PNEUM AG UR QL: NEGATIVE
SAO2 % BLD FROM PO2: 83 % (ref 60–85)
SARS-COV-2 RNA RESP QL NAA+PROBE: NEGATIVE
SODIUM BLD-SCNC: 129 MMOL/L (ref 136–145)
SODIUM SERPL-SCNC: 135 MMOL/L (ref 135–147)
SODIUM SERPL-SCNC: 136 MMOL/L (ref 135–147)
SODIUM SERPL-SCNC: 139 MMOL/L (ref 135–147)
SODIUM SERPL-SCNC: 142 MMOL/L (ref 135–147)
SODIUM SERPL-SCNC: 146 MMOL/L (ref 135–147)
SODIUM SERPL-SCNC: 147 MMOL/L (ref 135–147)
SODIUM SERPL-SCNC: 151 MMOL/L (ref 135–147)
SP GR UR STRIP.AUTO: 1.02 (ref 1–1.03)
SPECIMEN SOURCE: ABNORMAL
T WAVE AXIS: 67 DEGREES
T WAVE AXIS: 76 DEGREES
UROBILINOGEN UR STRIP-ACNC: <2 MG/DL
VANCOMYCIN SERPL-MCNC: 12.1 UG/ML (ref 10–20)
VENTRICULAR RATE: 95 BPM
VENTRICULAR RATE: 98 BPM
WBC # BLD AUTO: 17.52 THOUSAND/UL (ref 4.31–10.16)
WBC # BLD AUTO: 18.2 THOUSAND/UL (ref 4.31–10.16)
WBC # BLD AUTO: 18.97 THOUSAND/UL (ref 4.31–10.16)
WBC # BLD AUTO: 20.25 THOUSAND/UL (ref 4.31–10.16)
WBC # BLD AUTO: 20.35 THOUSAND/UL (ref 4.31–10.16)
WBC #/AREA URNS AUTO: ABNORMAL /HPF

## 2024-01-01 PROCEDURE — 0241U HB NFCT DS VIR RESP RNA 4 TRGT: CPT | Performed by: EMERGENCY MEDICINE

## 2024-01-01 PROCEDURE — 85025 COMPLETE CBC W/AUTO DIFF WBC: CPT

## 2024-01-01 PROCEDURE — 36415 COLL VENOUS BLD VENIPUNCTURE: CPT | Performed by: EMERGENCY MEDICINE

## 2024-01-01 PROCEDURE — 84145 PROCALCITONIN (PCT): CPT | Performed by: NURSE PRACTITIONER

## 2024-01-01 PROCEDURE — 80048 BASIC METABOLIC PNL TOTAL CA: CPT

## 2024-01-01 PROCEDURE — 92611 MOTION FLUOROSCOPY/SWALLOW: CPT

## 2024-01-01 PROCEDURE — 83735 ASSAY OF MAGNESIUM: CPT

## 2024-01-01 PROCEDURE — 84145 PROCALCITONIN (PCT): CPT | Performed by: EMERGENCY MEDICINE

## 2024-01-01 PROCEDURE — 99291 CRITICAL CARE FIRST HOUR: CPT | Performed by: EMERGENCY MEDICINE

## 2024-01-01 PROCEDURE — 94760 N-INVAS EAR/PLS OXIMETRY 1: CPT

## 2024-01-01 PROCEDURE — 99239 HOSP IP/OBS DSCHRG MGMT >30: CPT | Performed by: INTERNAL MEDICINE

## 2024-01-01 PROCEDURE — NC001 PR NO CHARGE

## 2024-01-01 PROCEDURE — 92610 EVALUATE SWALLOWING FUNCTION: CPT

## 2024-01-01 PROCEDURE — 84100 ASSAY OF PHOSPHORUS: CPT

## 2024-01-01 PROCEDURE — 82330 ASSAY OF CALCIUM: CPT

## 2024-01-01 PROCEDURE — 80202 ASSAY OF VANCOMYCIN: CPT | Performed by: NURSE PRACTITIONER

## 2024-01-01 PROCEDURE — 84132 ASSAY OF SERUM POTASSIUM: CPT

## 2024-01-01 PROCEDURE — 82947 ASSAY GLUCOSE BLOOD QUANT: CPT

## 2024-01-01 PROCEDURE — 87154 CUL TYP ID BLD PTHGN 6+ TRGT: CPT | Performed by: EMERGENCY MEDICINE

## 2024-01-01 PROCEDURE — 87205 SMEAR GRAM STAIN: CPT | Performed by: NURSE PRACTITIONER

## 2024-01-01 PROCEDURE — 83880 ASSAY OF NATRIURETIC PEPTIDE: CPT | Performed by: EMERGENCY MEDICINE

## 2024-01-01 PROCEDURE — 86041 ACETYLCHOLN RCPTR BNDNG ANTB: CPT | Performed by: INTERNAL MEDICINE

## 2024-01-01 PROCEDURE — 84145 PROCALCITONIN (PCT): CPT

## 2024-01-01 PROCEDURE — 85007 BL SMEAR W/DIFF WBC COUNT: CPT

## 2024-01-01 PROCEDURE — 71275 CT ANGIOGRAPHY CHEST: CPT

## 2024-01-01 PROCEDURE — 80053 COMPREHEN METABOLIC PANEL: CPT | Performed by: EMERGENCY MEDICINE

## 2024-01-01 PROCEDURE — 87081 CULTURE SCREEN ONLY: CPT | Performed by: NURSE PRACTITIONER

## 2024-01-01 PROCEDURE — 99232 SBSQ HOSP IP/OBS MODERATE 35: CPT | Performed by: INTERNAL MEDICINE

## 2024-01-01 PROCEDURE — 85027 COMPLETE CBC AUTOMATED: CPT

## 2024-01-01 PROCEDURE — 87449 NOS EACH ORGANISM AG IA: CPT | Performed by: NURSE PRACTITIONER

## 2024-01-01 PROCEDURE — 80076 HEPATIC FUNCTION PANEL: CPT

## 2024-01-01 PROCEDURE — 71045 X-RAY EXAM CHEST 1 VIEW: CPT

## 2024-01-01 PROCEDURE — 73590 X-RAY EXAM OF LOWER LEG: CPT

## 2024-01-01 PROCEDURE — 80053 COMPREHEN METABOLIC PANEL: CPT

## 2024-01-01 PROCEDURE — 94002 VENT MGMT INPAT INIT DAY: CPT

## 2024-01-01 PROCEDURE — 82803 BLOOD GASES ANY COMBINATION: CPT

## 2024-01-01 PROCEDURE — 94150 VITAL CAPACITY TEST: CPT

## 2024-01-01 PROCEDURE — 87040 BLOOD CULTURE FOR BACTERIA: CPT | Performed by: EMERGENCY MEDICINE

## 2024-01-01 PROCEDURE — 94669 MECHANICAL CHEST WALL OSCILL: CPT

## 2024-01-01 PROCEDURE — 94640 AIRWAY INHALATION TREATMENT: CPT

## 2024-01-01 PROCEDURE — 96365 THER/PROPH/DIAG IV INF INIT: CPT

## 2024-01-01 PROCEDURE — 93005 ELECTROCARDIOGRAM TRACING: CPT

## 2024-01-01 PROCEDURE — 85025 COMPLETE CBC W/AUTO DIFF WBC: CPT | Performed by: NURSE PRACTITIONER

## 2024-01-01 PROCEDURE — 93010 ELECTROCARDIOGRAM REPORT: CPT | Performed by: INTERNAL MEDICINE

## 2024-01-01 PROCEDURE — 99223 1ST HOSP IP/OBS HIGH 75: CPT | Performed by: NURSE PRACTITIONER

## 2024-01-01 PROCEDURE — 74230 X-RAY XM SWLNG FUNCJ C+: CPT

## 2024-01-01 PROCEDURE — 82948 REAGENT STRIP/BLOOD GLUCOSE: CPT

## 2024-01-01 PROCEDURE — 85730 THROMBOPLASTIN TIME PARTIAL: CPT | Performed by: EMERGENCY MEDICINE

## 2024-01-01 PROCEDURE — 96367 TX/PROPH/DG ADDL SEQ IV INF: CPT

## 2024-01-01 PROCEDURE — 83690 ASSAY OF LIPASE: CPT | Performed by: NURSE PRACTITIONER

## 2024-01-01 PROCEDURE — 86042 ACETYLCHOLN RCPTR BLCKG ANTB: CPT | Performed by: INTERNAL MEDICINE

## 2024-01-01 PROCEDURE — 83735 ASSAY OF MAGNESIUM: CPT | Performed by: NURSE PRACTITIONER

## 2024-01-01 PROCEDURE — 99291 CRITICAL CARE FIRST HOUR: CPT | Performed by: INTERNAL MEDICINE

## 2024-01-01 PROCEDURE — 80048 BASIC METABOLIC PNL TOTAL CA: CPT | Performed by: NURSE PRACTITIONER

## 2024-01-01 PROCEDURE — 84100 ASSAY OF PHOSPHORUS: CPT | Performed by: NURSE PRACTITIONER

## 2024-01-01 PROCEDURE — 84484 ASSAY OF TROPONIN QUANT: CPT | Performed by: NURSE PRACTITIONER

## 2024-01-01 PROCEDURE — 92526 ORAL FUNCTION THERAPY: CPT

## 2024-01-01 PROCEDURE — 72125 CT NECK SPINE W/O DYE: CPT

## 2024-01-01 PROCEDURE — 99285 EMERGENCY DEPT VISIT HI MDM: CPT

## 2024-01-01 PROCEDURE — 85610 PROTHROMBIN TIME: CPT | Performed by: EMERGENCY MEDICINE

## 2024-01-01 PROCEDURE — 83605 ASSAY OF LACTIC ACID: CPT | Performed by: EMERGENCY MEDICINE

## 2024-01-01 PROCEDURE — 94664 DEMO&/EVAL PT USE INHALER: CPT

## 2024-01-01 PROCEDURE — 85014 HEMATOCRIT: CPT

## 2024-01-01 PROCEDURE — 80076 HEPATIC FUNCTION PANEL: CPT | Performed by: NURSE PRACTITIONER

## 2024-01-01 PROCEDURE — 84484 ASSAY OF TROPONIN QUANT: CPT | Performed by: EMERGENCY MEDICINE

## 2024-01-01 PROCEDURE — 81001 URINALYSIS AUTO W/SCOPE: CPT | Performed by: NURSE PRACTITIONER

## 2024-01-01 PROCEDURE — 85025 COMPLETE CBC W/AUTO DIFF WBC: CPT | Performed by: EMERGENCY MEDICINE

## 2024-01-01 PROCEDURE — 70450 CT HEAD/BRAIN W/O DYE: CPT

## 2024-01-01 PROCEDURE — 84295 ASSAY OF SERUM SODIUM: CPT

## 2024-01-01 RX ORDER — POTASSIUM CHLORIDE 14.9 MG/ML
20 INJECTION INTRAVENOUS ONCE
Status: COMPLETED | OUTPATIENT
Start: 2024-01-01 | End: 2024-01-01

## 2024-01-01 RX ORDER — CALCIUM GLUCONATE 20 MG/ML
2 INJECTION, SOLUTION INTRAVENOUS ONCE
Status: COMPLETED | OUTPATIENT
Start: 2024-01-01 | End: 2024-01-01

## 2024-01-01 RX ORDER — HYDROCORTISONE SODIUM SUCCINATE 100 MG/2ML
50 INJECTION INTRAMUSCULAR; INTRAVENOUS EVERY 8 HOURS SCHEDULED
Status: DISCONTINUED | OUTPATIENT
Start: 2024-01-01 | End: 2024-01-01

## 2024-01-01 RX ORDER — MINERAL OIL AND PETROLATUM 150; 830 MG/G; MG/G
OINTMENT OPHTHALMIC
Start: 2024-01-01 | End: 2024-12-13

## 2024-01-01 RX ORDER — MAGNESIUM SULFATE HEPTAHYDRATE 40 MG/ML
2 INJECTION, SOLUTION INTRAVENOUS ONCE
Status: COMPLETED | OUTPATIENT
Start: 2024-01-01 | End: 2024-01-01

## 2024-01-01 RX ORDER — ONDANSETRON 2 MG/ML
4 INJECTION INTRAMUSCULAR; INTRAVENOUS EVERY 4 HOURS PRN
Status: DISCONTINUED | OUTPATIENT
Start: 2024-01-01 | End: 2024-01-01 | Stop reason: HOSPADM

## 2024-01-01 RX ORDER — BUDESONIDE 0.5 MG/2ML
0.5 INHALANT ORAL
Status: DISCONTINUED | OUTPATIENT
Start: 2024-01-01 | End: 2024-01-01

## 2024-01-01 RX ORDER — LORAZEPAM 2 MG/ML
0.5 INJECTION INTRAMUSCULAR EVERY 4 HOURS PRN
Status: DISCONTINUED | OUTPATIENT
Start: 2024-01-01 | End: 2024-01-01 | Stop reason: HOSPADM

## 2024-01-01 RX ORDER — ENOXAPARIN SODIUM 100 MG/ML
40 INJECTION SUBCUTANEOUS DAILY
Status: DISCONTINUED | OUTPATIENT
Start: 2024-01-01 | End: 2024-01-01

## 2024-01-01 RX ORDER — LIDOCAINE 50 MG/G
1 PATCH TOPICAL DAILY
Status: DISCONTINUED | OUTPATIENT
Start: 2024-01-01 | End: 2024-01-01

## 2024-01-01 RX ORDER — LEVALBUTEROL INHALATION SOLUTION 1.25 MG/3ML
1.25 SOLUTION RESPIRATORY (INHALATION)
Status: DISCONTINUED | OUTPATIENT
Start: 2024-01-01 | End: 2024-01-01

## 2024-01-01 RX ORDER — SODIUM CHLORIDE 1 G/1
1 TABLET ORAL
Status: DISCONTINUED | OUTPATIENT
Start: 2024-01-01 | End: 2024-01-01

## 2024-01-01 RX ORDER — FINASTERIDE 5 MG/1
5 TABLET, FILM COATED ORAL DAILY
Status: DISCONTINUED | OUTPATIENT
Start: 2024-01-01 | End: 2024-01-01

## 2024-01-01 RX ORDER — SODIUM CHLORIDE, SODIUM GLUCONATE, SODIUM ACETATE, POTASSIUM CHLORIDE, MAGNESIUM CHLORIDE, SODIUM PHOSPHATE, DIBASIC, AND POTASSIUM PHOSPHATE .53; .5; .37; .037; .03; .012; .00082 G/100ML; G/100ML; G/100ML; G/100ML; G/100ML; G/100ML; G/100ML
50 INJECTION, SOLUTION INTRAVENOUS CONTINUOUS
Status: DISCONTINUED | OUTPATIENT
Start: 2024-01-01 | End: 2024-01-01

## 2024-01-01 RX ORDER — SODIUM CHLORIDE FOR INHALATION 3 %
4 VIAL, NEBULIZER (ML) INHALATION
Status: DISCONTINUED | OUTPATIENT
Start: 2024-01-01 | End: 2024-01-01

## 2024-01-01 RX ORDER — VANCOMYCIN HYDROCHLORIDE 750 MG/150ML
17.5 INJECTION, SOLUTION INTRAVENOUS EVERY 12 HOURS
Status: DISCONTINUED | OUTPATIENT
Start: 2024-01-01 | End: 2024-01-01

## 2024-01-01 RX ORDER — HYDROCORTISONE SODIUM SUCCINATE 100 MG/2ML
50 INJECTION INTRAMUSCULAR; INTRAVENOUS EVERY 6 HOURS SCHEDULED
Status: DISCONTINUED | OUTPATIENT
Start: 2024-01-01 | End: 2024-01-01

## 2024-01-01 RX ORDER — LORAZEPAM 2 MG/ML
0.25 INJECTION INTRAMUSCULAR ONCE
Status: COMPLETED | OUTPATIENT
Start: 2024-01-01 | End: 2024-01-01

## 2024-01-01 RX ORDER — CHLORHEXIDINE GLUCONATE ORAL RINSE 1.2 MG/ML
15 SOLUTION DENTAL EVERY 12 HOURS SCHEDULED
Status: DISCONTINUED | OUTPATIENT
Start: 2024-01-01 | End: 2024-01-01

## 2024-01-01 RX ORDER — METOPROLOL SUCCINATE 25 MG/1
25 TABLET, EXTENDED RELEASE ORAL DAILY
Status: DISCONTINUED | OUTPATIENT
Start: 2024-01-01 | End: 2024-01-01

## 2024-01-01 RX ORDER — LORAZEPAM 1 MG/1
1 TABLET ORAL EVERY 6 HOURS PRN
Start: 2024-01-01 | End: 2024-12-13

## 2024-01-01 RX ORDER — ALBUTEROL SULFATE 2.5 MG/3ML
2 SOLUTION RESPIRATORY (INHALATION) ONCE
Status: COMPLETED | OUTPATIENT
Start: 2024-01-01 | End: 2024-01-01

## 2024-01-01 RX ORDER — GLYCOPYRROLATE 0.2 MG/ML
0.1 INJECTION INTRAMUSCULAR; INTRAVENOUS EVERY 4 HOURS PRN
Status: DISCONTINUED | OUTPATIENT
Start: 2024-01-01 | End: 2024-01-01 | Stop reason: HOSPADM

## 2024-01-01 RX ORDER — FORMOTEROL FUMARATE DIHYDRATE 20 UG/2ML
20 SOLUTION RESPIRATORY (INHALATION)
Status: DISCONTINUED | OUTPATIENT
Start: 2024-01-01 | End: 2024-01-01

## 2024-01-01 RX ORDER — CEFTRIAXONE 1 G/50ML
1000 INJECTION, SOLUTION INTRAVENOUS EVERY 24 HOURS
Status: DISCONTINUED | OUTPATIENT
Start: 2024-01-01 | End: 2024-01-01

## 2024-01-01 RX ORDER — DEXTROSE, SODIUM CHLORIDE, SODIUM LACTATE, POTASSIUM CHLORIDE, AND CALCIUM CHLORIDE 5; .6; .31; .03; .02 G/100ML; G/100ML; G/100ML; G/100ML; G/100ML
50 INJECTION, SOLUTION INTRAVENOUS CONTINUOUS
Status: DISCONTINUED | OUTPATIENT
Start: 2024-01-01 | End: 2024-01-01

## 2024-01-01 RX ORDER — CITALOPRAM HYDROBROMIDE 20 MG/1
20 TABLET ORAL DAILY
Status: DISCONTINUED | OUTPATIENT
Start: 2024-01-01 | End: 2024-01-01

## 2024-01-01 RX ORDER — VANCOMYCIN HYDROCHLORIDE 1 G/200ML
25 INJECTION, SOLUTION INTRAVENOUS ONCE
Status: COMPLETED | OUTPATIENT
Start: 2024-01-01 | End: 2024-01-01

## 2024-01-01 RX ORDER — AMLODIPINE BESYLATE 5 MG/1
10 TABLET ORAL DAILY
Status: DISCONTINUED | OUTPATIENT
Start: 2024-01-01 | End: 2024-01-01

## 2024-01-01 RX ORDER — LOTEPREDNOL ETABONATE 5 MG/ML
2 SUSPENSION/ DROPS OPHTHALMIC 4 TIMES DAILY
Status: DISCONTINUED | OUTPATIENT
Start: 2024-01-01 | End: 2024-01-01

## 2024-01-01 RX ORDER — MAGNESIUM SULFATE HEPTAHYDRATE 40 MG/ML
4 INJECTION, SOLUTION INTRAVENOUS ONCE
Status: COMPLETED | OUTPATIENT
Start: 2024-01-01 | End: 2024-01-01

## 2024-01-01 RX ORDER — CEFTRIAXONE 1 G/50ML
1000 INJECTION, SOLUTION INTRAVENOUS ONCE
Status: COMPLETED | OUTPATIENT
Start: 2024-01-01 | End: 2024-01-01

## 2024-01-01 RX ORDER — PRAVASTATIN SODIUM 40 MG
40 TABLET ORAL
Status: DISCONTINUED | OUTPATIENT
Start: 2024-01-01 | End: 2024-01-01

## 2024-01-01 RX ORDER — LORAZEPAM 2 MG/ML
1 INJECTION INTRAMUSCULAR
Status: COMPLETED | OUTPATIENT
Start: 2024-01-01 | End: 2024-01-01

## 2024-01-01 RX ORDER — NICOTINE 21 MG/24HR
21 PATCH, TRANSDERMAL 24 HOURS TRANSDERMAL DAILY
Status: DISCONTINUED | OUTPATIENT
Start: 2024-01-01 | End: 2024-01-01

## 2024-01-01 RX ORDER — METOPROLOL TARTRATE 1 MG/ML
2.5 INJECTION, SOLUTION INTRAVENOUS EVERY 6 HOURS
Status: DISCONTINUED | OUTPATIENT
Start: 2024-01-01 | End: 2024-01-01

## 2024-01-01 RX ORDER — ACETAMINOPHEN 325 MG/1
650 TABLET ORAL EVERY 6 HOURS PRN
Status: DISCONTINUED | OUTPATIENT
Start: 2024-01-01 | End: 2024-01-01

## 2024-01-01 RX ORDER — BISACODYL 10 MG
10 SUPPOSITORY, RECTAL RECTAL DAILY PRN
Status: DISCONTINUED | OUTPATIENT
Start: 2024-01-01 | End: 2024-01-01 | Stop reason: HOSPADM

## 2024-01-01 RX ORDER — DEXTROSE MONOHYDRATE 50 MG/ML
75 INJECTION, SOLUTION INTRAVENOUS CONTINUOUS
Status: DISCONTINUED | OUTPATIENT
Start: 2024-01-01 | End: 2024-01-01

## 2024-01-01 RX ORDER — POTASSIUM CHLORIDE 1500 MG/1
40 TABLET, EXTENDED RELEASE ORAL ONCE
Status: COMPLETED | OUTPATIENT
Start: 2024-01-01 | End: 2024-01-01

## 2024-01-01 RX ORDER — BISACODYL 10 MG
10 SUPPOSITORY, RECTAL RECTAL DAILY PRN
Start: 2024-01-01 | End: 2024-12-13

## 2024-01-01 RX ORDER — PANTOPRAZOLE SODIUM 40 MG/10ML
40 INJECTION, POWDER, LYOPHILIZED, FOR SOLUTION INTRAVENOUS
Status: DISCONTINUED | OUTPATIENT
Start: 2024-01-01 | End: 2024-01-01

## 2024-01-01 RX ORDER — HALOPERIDOL 5 MG/ML
0.5 INJECTION INTRAMUSCULAR EVERY 2 HOUR PRN
Status: DISCONTINUED | OUTPATIENT
Start: 2024-01-01 | End: 2024-01-01 | Stop reason: HOSPADM

## 2024-01-01 RX ORDER — MINERAL OIL AND PETROLATUM 150; 830 MG/G; MG/G
OINTMENT OPHTHALMIC
Status: DISCONTINUED | OUTPATIENT
Start: 2024-01-01 | End: 2024-01-01

## 2024-01-01 RX ORDER — PANTOPRAZOLE SODIUM 40 MG/1
40 TABLET, DELAYED RELEASE ORAL
Status: DISCONTINUED | OUTPATIENT
Start: 2024-01-01 | End: 2024-01-01

## 2024-01-01 RX ORDER — MINERAL OIL AND PETROLATUM 150; 830 MG/G; MG/G
OINTMENT OPHTHALMIC
Status: DISCONTINUED | OUTPATIENT
Start: 2024-01-01 | End: 2024-01-01 | Stop reason: HOSPADM

## 2024-01-01 RX ORDER — ASPIRIN 81 MG/1
81 TABLET ORAL DAILY
Status: DISCONTINUED | OUTPATIENT
Start: 2024-01-01 | End: 2024-01-01

## 2024-01-01 RX ORDER — FERROUS SULFATE 325(65) MG
325 TABLET ORAL
Status: DISCONTINUED | OUTPATIENT
Start: 2024-01-01 | End: 2024-01-01

## 2024-01-01 RX ADMIN — LEVALBUTEROL HYDROCHLORIDE 1.25 MG: 1.25 SOLUTION RESPIRATORY (INHALATION) at 13:50

## 2024-01-01 RX ADMIN — LIDOCAINE 5% 1 PATCH: 700 PATCH TOPICAL at 14:33

## 2024-01-01 RX ADMIN — BUDESONIDE 0.5 MG: 0.5 INHALANT RESPIRATORY (INHALATION) at 07:58

## 2024-01-01 RX ADMIN — MORPHINE SULFATE 2 MG: 2 INJECTION, SOLUTION INTRAMUSCULAR; INTRAVENOUS at 15:30

## 2024-01-01 RX ADMIN — IPRATROPIUM BROMIDE 0.5 MG: 0.5 SOLUTION RESPIRATORY (INHALATION) at 19:43

## 2024-01-01 RX ADMIN — BUDESONIDE 0.5 MG: 0.5 INHALANT RESPIRATORY (INHALATION) at 19:28

## 2024-01-01 RX ADMIN — CHLORHEXIDINE GLUCONATE 15 ML: 1.2 RINSE ORAL at 08:31

## 2024-01-01 RX ADMIN — IPRATROPIUM BROMIDE 0.5 MG: 0.5 SOLUTION RESPIRATORY (INHALATION) at 02:26

## 2024-01-01 RX ADMIN — Medication 500 MG: at 01:14

## 2024-01-01 RX ADMIN — CHLORHEXIDINE GLUCONATE 15 ML: 1.2 RINSE ORAL at 08:03

## 2024-01-01 RX ADMIN — METOPROLOL SUCCINATE 25 MG: 25 TABLET, EXTENDED RELEASE ORAL at 08:32

## 2024-01-01 RX ADMIN — VANCOMYCIN HYDROCHLORIDE 750 MG: 750 INJECTION, SOLUTION INTRAVENOUS at 09:32

## 2024-01-01 RX ADMIN — LEVALBUTEROL HYDROCHLORIDE 1.25 MG: 1.25 SOLUTION RESPIRATORY (INHALATION) at 07:50

## 2024-01-01 RX ADMIN — HYDROCORTISONE SODIUM SUCCINATE 50 MG: 100 INJECTION, POWDER, FOR SOLUTION INTRAMUSCULAR; INTRAVENOUS at 11:34

## 2024-01-01 RX ADMIN — MORPHINE SULFATE 2 MG: 2 INJECTION, SOLUTION INTRAMUSCULAR; INTRAVENOUS at 20:49

## 2024-01-01 RX ADMIN — ENOXAPARIN SODIUM 40 MG: 40 INJECTION SUBCUTANEOUS at 08:03

## 2024-01-01 RX ADMIN — DEXTROSE 75 ML/HR: 5 SOLUTION INTRAVENOUS at 06:13

## 2024-01-01 RX ADMIN — IPRATROPIUM BROMIDE 0.5 MG: 0.5 SOLUTION RESPIRATORY (INHALATION) at 07:50

## 2024-01-01 RX ADMIN — POTASSIUM CHLORIDE 20 MEQ: 14.9 INJECTION, SOLUTION INTRAVENOUS at 12:52

## 2024-01-01 RX ADMIN — MORPHINE SULFATE 2 MG: 2 INJECTION, SOLUTION INTRAMUSCULAR; INTRAVENOUS at 18:12

## 2024-01-01 RX ADMIN — LEVALBUTEROL HYDROCHLORIDE 1.25 MG: 1.25 SOLUTION RESPIRATORY (INHALATION) at 02:32

## 2024-01-01 RX ADMIN — METOPROLOL TARTRATE 2.5 MG: 5 INJECTION INTRAVENOUS at 03:13

## 2024-01-01 RX ADMIN — CHLORHEXIDINE GLUCONATE 15 ML: 1.2 RINSE ORAL at 23:45

## 2024-01-01 RX ADMIN — LEVALBUTEROL HYDROCHLORIDE 1.25 MG: 1.25 SOLUTION RESPIRATORY (INHALATION) at 07:29

## 2024-01-01 RX ADMIN — IPRATROPIUM BROMIDE 0.5 MG: 0.5 SOLUTION RESPIRATORY (INHALATION) at 19:25

## 2024-01-01 RX ADMIN — PRAVASTATIN SODIUM 40 MG: 40 TABLET ORAL at 23:45

## 2024-01-01 RX ADMIN — POTASSIUM CHLORIDE 20 MEQ: 14.9 INJECTION, SOLUTION INTRAVENOUS at 17:00

## 2024-01-01 RX ADMIN — POTASSIUM CHLORIDE 20 MEQ: 14.9 INJECTION, SOLUTION INTRAVENOUS at 09:32

## 2024-01-01 RX ADMIN — ONDANSETRON 4 MG: 2 INJECTION INTRAMUSCULAR; INTRAVENOUS at 12:20

## 2024-01-01 RX ADMIN — HYDROCORTISONE SODIUM SUCCINATE 50 MG: 100 INJECTION, POWDER, FOR SOLUTION INTRAMUSCULAR; INTRAVENOUS at 06:38

## 2024-01-01 RX ADMIN — HYDROCORTISONE SODIUM SUCCINATE 50 MG: 100 INJECTION, POWDER, FOR SOLUTION INTRAMUSCULAR; INTRAVENOUS at 00:55

## 2024-01-01 RX ADMIN — BUDESONIDE 0.5 MG: 0.5 INHALANT RESPIRATORY (INHALATION) at 08:00

## 2024-01-01 RX ADMIN — HYDROCORTISONE SODIUM SUCCINATE 50 MG: 100 INJECTION, POWDER, FOR SOLUTION INTRAMUSCULAR; INTRAVENOUS at 05:01

## 2024-01-01 RX ADMIN — CEFTRIAXONE 1000 MG: 1 INJECTION, SOLUTION INTRAVENOUS at 18:06

## 2024-01-01 RX ADMIN — SODIUM CHLORIDE, SODIUM GLUCONATE, SODIUM ACETATE, POTASSIUM CHLORIDE, MAGNESIUM CHLORIDE, SODIUM PHOSPHATE, DIBASIC, AND POTASSIUM PHOSPHATE 50 ML/HR: .53; .5; .37; .037; .03; .012; .00082 INJECTION, SOLUTION INTRAVENOUS at 11:34

## 2024-01-01 RX ADMIN — IPRATROPIUM BROMIDE 0.5 MG: 0.5 SOLUTION RESPIRATORY (INHALATION) at 02:32

## 2024-01-01 RX ADMIN — METOPROLOL TARTRATE 2.5 MG: 5 INJECTION INTRAVENOUS at 21:12

## 2024-01-01 RX ADMIN — IPRATROPIUM BROMIDE 0.5 MG: 0.5 SOLUTION RESPIRATORY (INHALATION) at 02:19

## 2024-01-01 RX ADMIN — DEXTRAN 70, GLYCERIN, HYPROMELLOSE 1 DROP: 1; 2; 3 SOLUTION/ DROPS OPHTHALMIC at 08:37

## 2024-01-01 RX ADMIN — CHLORHEXIDINE GLUCONATE 15 ML: 1.2 RINSE ORAL at 09:33

## 2024-01-01 RX ADMIN — HYDROCORTISONE SODIUM SUCCINATE 50 MG: 100 INJECTION, POWDER, FOR SOLUTION INTRAMUSCULAR; INTRAVENOUS at 14:03

## 2024-01-01 RX ADMIN — SODIUM CHLORIDE SOLN NEBU 3% 4 ML: 3 NEBU SOLN at 07:50

## 2024-01-01 RX ADMIN — CHLORHEXIDINE GLUCONATE 15 ML: 1.2 RINSE ORAL at 21:10

## 2024-01-01 RX ADMIN — IOHEXOL 80 ML: 350 INJECTION, SOLUTION INTRAVENOUS at 22:41

## 2024-01-01 RX ADMIN — FORMOTEROL FUMARATE 20 MCG: 20 SOLUTION RESPIRATORY (INHALATION) at 19:29

## 2024-01-01 RX ADMIN — FINASTERIDE 5 MG: 5 TABLET, FILM COATED ORAL at 08:32

## 2024-01-01 RX ADMIN — ENOXAPARIN SODIUM 40 MG: 40 INJECTION SUBCUTANEOUS at 08:33

## 2024-01-01 RX ADMIN — CALCIUM GLUCONATE 2 G: 20 INJECTION, SOLUTION INTRAVENOUS at 06:46

## 2024-01-01 RX ADMIN — NICOTINE 21 MG: 21 PATCH, EXTENDED RELEASE TRANSDERMAL at 08:02

## 2024-01-01 RX ADMIN — POTASSIUM CHLORIDE 20 MEQ: 14.9 INJECTION, SOLUTION INTRAVENOUS at 10:25

## 2024-01-01 RX ADMIN — DEXTRAN 70, GLYCERIN, HYPROMELLOSE 1 DROP: 1; 2; 3 SOLUTION/ DROPS OPHTHALMIC at 14:32

## 2024-01-01 RX ADMIN — SODIUM CHLORIDE SOLN NEBU 3% 4 ML: 3 NEBU SOLN at 07:58

## 2024-01-01 RX ADMIN — METOPROLOL TARTRATE 2.5 MG: 5 INJECTION INTRAVENOUS at 03:39

## 2024-01-01 RX ADMIN — BUDESONIDE 0.5 MG: 0.5 INHALANT RESPIRATORY (INHALATION) at 19:43

## 2024-01-01 RX ADMIN — HYDROCORTISONE SODIUM SUCCINATE 50 MG: 100 INJECTION, POWDER, FOR SOLUTION INTRAMUSCULAR; INTRAVENOUS at 12:16

## 2024-01-01 RX ADMIN — DEXTRAN 70, GLYCERIN, HYPROMELLOSE 1 DROP: 1; 2; 3 SOLUTION/ DROPS OPHTHALMIC at 08:05

## 2024-01-01 RX ADMIN — METOPROLOL TARTRATE 2.5 MG: 5 INJECTION INTRAVENOUS at 15:13

## 2024-01-01 RX ADMIN — LEVALBUTEROL HYDROCHLORIDE 1.25 MG: 1.25 SOLUTION RESPIRATORY (INHALATION) at 02:26

## 2024-01-01 RX ADMIN — PANTOPRAZOLE SODIUM 40 MG: 40 INJECTION, POWDER, FOR SOLUTION INTRAVENOUS at 09:36

## 2024-01-01 RX ADMIN — VANCOMYCIN HYDROCHLORIDE 1000 MG: 1 INJECTION, SOLUTION INTRAVENOUS at 21:40

## 2024-01-01 RX ADMIN — FORMOTEROL FUMARATE 20 MCG: 20 SOLUTION RESPIRATORY (INHALATION) at 19:43

## 2024-01-01 RX ADMIN — DEXTROSE, SODIUM CHLORIDE, SODIUM LACTATE, POTASSIUM CHLORIDE, AND CALCIUM CHLORIDE 50 ML/HR: 5; .6; .31; .03; .02 INJECTION, SOLUTION INTRAVENOUS at 15:26

## 2024-01-01 RX ADMIN — LEVALBUTEROL HYDROCHLORIDE 1.25 MG: 1.25 SOLUTION RESPIRATORY (INHALATION) at 13:39

## 2024-01-01 RX ADMIN — MAGNESIUM SULFATE HEPTAHYDRATE 2 G: 2 INJECTION, SOLUTION INTRAVENOUS at 07:58

## 2024-01-01 RX ADMIN — LORAZEPAM 1 MG: 2 INJECTION INTRAMUSCULAR; INTRAVENOUS at 20:49

## 2024-01-01 RX ADMIN — DEXTRAN 70, GLYCERIN, HYPROMELLOSE 1 DROP: 1; 2; 3 SOLUTION/ DROPS OPHTHALMIC at 20:19

## 2024-01-01 RX ADMIN — METOPROLOL TARTRATE 2.5 MG: 5 INJECTION INTRAVENOUS at 09:33

## 2024-01-01 RX ADMIN — FORMOTEROL FUMARATE 20 MCG: 20 SOLUTION RESPIRATORY (INHALATION) at 07:59

## 2024-01-01 RX ADMIN — DEXTRAN 70, GLYCERIN, HYPROMELLOSE 1 DROP: 1; 2; 3 SOLUTION/ DROPS OPHTHALMIC at 21:08

## 2024-01-01 RX ADMIN — ENOXAPARIN SODIUM 40 MG: 40 INJECTION SUBCUTANEOUS at 08:04

## 2024-01-01 RX ADMIN — SODIUM CHLORIDE SOLN NEBU 3% 4 ML: 3 NEBU SOLN at 07:59

## 2024-01-01 RX ADMIN — IPRATROPIUM BROMIDE 0.5 MG: 0.5 SOLUTION RESPIRATORY (INHALATION) at 19:28

## 2024-01-01 RX ADMIN — IPRATROPIUM BROMIDE 0.5 MG: 0.5 SOLUTION RESPIRATORY (INHALATION) at 13:39

## 2024-01-01 RX ADMIN — POTASSIUM CHLORIDE 20 MEQ: 14.9 INJECTION, SOLUTION INTRAVENOUS at 07:58

## 2024-01-01 RX ADMIN — CHLORHEXIDINE GLUCONATE 15 ML: 1.2 RINSE ORAL at 08:04

## 2024-01-01 RX ADMIN — SODIUM CHLORIDE SOLN NEBU 3% 4 ML: 3 NEBU SOLN at 19:29

## 2024-01-01 RX ADMIN — ASPIRIN 81 MG: 81 TABLET, COATED ORAL at 08:32

## 2024-01-01 RX ADMIN — LEVALBUTEROL HYDROCHLORIDE 1.25 MG: 1.25 SOLUTION RESPIRATORY (INHALATION) at 07:59

## 2024-01-01 RX ADMIN — CHLORHEXIDINE GLUCONATE 15 ML: 1.2 RINSE ORAL at 20:28

## 2024-01-01 RX ADMIN — LEVALBUTEROL HYDROCHLORIDE 1.25 MG: 1.25 SOLUTION RESPIRATORY (INHALATION) at 02:00

## 2024-01-01 RX ADMIN — MAGNESIUM SULFATE HEPTAHYDRATE 4 G: 40 INJECTION, SOLUTION INTRAVENOUS at 01:22

## 2024-01-01 RX ADMIN — LIDOCAINE 5% 1 PATCH: 700 PATCH TOPICAL at 08:03

## 2024-01-01 RX ADMIN — CEFTRIAXONE 1000 MG: 1 INJECTION, SOLUTION INTRAVENOUS at 19:04

## 2024-01-01 RX ADMIN — LEVALBUTEROL HYDROCHLORIDE 1.25 MG: 1.25 SOLUTION RESPIRATORY (INHALATION) at 19:43

## 2024-01-01 RX ADMIN — THIAMINE HYDROCHLORIDE 500 MG: 100 INJECTION, SOLUTION INTRAMUSCULAR; INTRAVENOUS at 21:39

## 2024-01-01 RX ADMIN — SODIUM CHLORIDE SOLN NEBU 3% 4 ML: 3 NEBU SOLN at 19:43

## 2024-01-01 RX ADMIN — POTASSIUM CHLORIDE 20 MEQ: 14.9 INJECTION, SOLUTION INTRAVENOUS at 08:00

## 2024-01-01 RX ADMIN — FORMOTEROL FUMARATE 20 MCG: 20 SOLUTION RESPIRATORY (INHALATION) at 19:25

## 2024-01-01 RX ADMIN — MAGNESIUM SULFATE HEPTAHYDRATE 2 G: 40 INJECTION, SOLUTION INTRAVENOUS at 09:33

## 2024-01-01 RX ADMIN — AZITHROMYCIN MONOHYDRATE 500 MG: 500 INJECTION, POWDER, LYOPHILIZED, FOR SOLUTION INTRAVENOUS at 19:45

## 2024-01-01 RX ADMIN — IPRATROPIUM BROMIDE 0.5 MG: 0.5 SOLUTION RESPIRATORY (INHALATION) at 02:00

## 2024-01-01 RX ADMIN — LEVALBUTEROL HYDROCHLORIDE 1.25 MG: 1.25 SOLUTION RESPIRATORY (INHALATION) at 14:39

## 2024-01-01 RX ADMIN — CALCIUM GLUCONATE 2 G: 20 INJECTION, SOLUTION INTRAVENOUS at 09:32

## 2024-01-01 RX ADMIN — ACETAMINOPHEN 650 MG: 325 TABLET, FILM COATED ORAL at 02:54

## 2024-01-01 RX ADMIN — LORAZEPAM 1 MG: 2 INJECTION INTRAMUSCULAR; INTRAVENOUS at 18:11

## 2024-01-01 RX ADMIN — BISACODYL 10 MG: 10 SUPPOSITORY RECTAL at 16:34

## 2024-01-01 RX ADMIN — CEFTRIAXONE 1000 MG: 1 INJECTION, SOLUTION INTRAVENOUS at 18:00

## 2024-01-01 RX ADMIN — BUDESONIDE 0.5 MG: 0.5 INHALANT RESPIRATORY (INHALATION) at 08:17

## 2024-01-01 RX ADMIN — LIDOCAINE 5% 1 PATCH: 700 PATCH TOPICAL at 08:01

## 2024-01-01 RX ADMIN — MAGNESIUM SULFATE HEPTAHYDRATE 2 G: 40 INJECTION, SOLUTION INTRAVENOUS at 06:34

## 2024-01-01 RX ADMIN — SODIUM CHLORIDE SOLN NEBU 3% 4 ML: 3 NEBU SOLN at 19:25

## 2024-01-01 RX ADMIN — SODIUM CHLORIDE SOLN NEBU 3% 4 ML: 3 NEBU SOLN at 13:50

## 2024-01-01 RX ADMIN — LORAZEPAM 0.25 MG: 2 INJECTION INTRAMUSCULAR; INTRAVENOUS at 19:39

## 2024-01-01 RX ADMIN — SODIUM CHLORIDE SOLN NEBU 3% 4 ML: 3 NEBU SOLN at 14:39

## 2024-01-01 RX ADMIN — HYDROCORTISONE SODIUM SUCCINATE 50 MG: 100 INJECTION, POWDER, FOR SOLUTION INTRAMUSCULAR; INTRAVENOUS at 17:29

## 2024-01-01 RX ADMIN — THIAMINE HYDROCHLORIDE 500 MG: 100 INJECTION, SOLUTION INTRAMUSCULAR; INTRAVENOUS at 08:04

## 2024-01-01 RX ADMIN — LEVALBUTEROL HYDROCHLORIDE 1.25 MG: 1.25 SOLUTION RESPIRATORY (INHALATION) at 02:19

## 2024-01-01 RX ADMIN — IPRATROPIUM BROMIDE 0.5 MG: 0.5 SOLUTION RESPIRATORY (INHALATION) at 07:58

## 2024-01-01 RX ADMIN — POTASSIUM CHLORIDE 20 MEQ: 14.9 INJECTION, SOLUTION INTRAVENOUS at 11:34

## 2024-01-01 RX ADMIN — SODIUM CHLORIDE 1 G: 1 TABLET ORAL at 12:17

## 2024-01-01 RX ADMIN — METOPROLOL TARTRATE 2.5 MG: 5 INJECTION INTRAVENOUS at 09:03

## 2024-01-01 RX ADMIN — POTASSIUM PHOSPHATE, MONOBASIC AND POTASSIUM PHOSPHATE, DIBASIC 12 MMOL: 224; 236 INJECTION, SOLUTION, CONCENTRATE INTRAVENOUS at 09:32

## 2024-01-01 RX ADMIN — CALCIUM GLUCONATE 2 G: 20 INJECTION, SOLUTION INTRAVENOUS at 15:24

## 2024-01-01 RX ADMIN — LEVALBUTEROL HYDROCHLORIDE 1.25 MG: 1.25 SOLUTION RESPIRATORY (INHALATION) at 19:28

## 2024-01-01 RX ADMIN — FORMOTEROL FUMARATE 20 MCG: 20 SOLUTION RESPIRATORY (INHALATION) at 07:58

## 2024-01-01 RX ADMIN — METOPROLOL TARTRATE 2.5 MG: 5 INJECTION INTRAVENOUS at 15:27

## 2024-01-01 RX ADMIN — METOPROLOL TARTRATE 2.5 MG: 5 INJECTION INTRAVENOUS at 15:37

## 2024-01-01 RX ADMIN — IPRATROPIUM BROMIDE 0.5 MG: 0.5 SOLUTION RESPIRATORY (INHALATION) at 23:10

## 2024-01-01 RX ADMIN — HYDROCORTISONE SODIUM SUCCINATE 50 MG: 100 INJECTION, POWDER, FOR SOLUTION INTRAMUSCULAR; INTRAVENOUS at 23:02

## 2024-01-01 RX ADMIN — METOPROLOL TARTRATE 2.5 MG: 5 INJECTION INTRAVENOUS at 21:11

## 2024-01-01 RX ADMIN — SODIUM CHLORIDE, SODIUM GLUCONATE, SODIUM ACETATE, POTASSIUM CHLORIDE, MAGNESIUM CHLORIDE, SODIUM PHOSPHATE, DIBASIC, AND POTASSIUM PHOSPHATE 50 ML/HR: .53; .5; .37; .037; .03; .012; .00082 INJECTION, SOLUTION INTRAVENOUS at 07:06

## 2024-01-01 RX ADMIN — POTASSIUM CHLORIDE 20 MEQ: 14.9 INJECTION, SOLUTION INTRAVENOUS at 06:30

## 2024-01-01 RX ADMIN — AMLODIPINE BESYLATE 10 MG: 5 TABLET ORAL at 08:32

## 2024-01-01 RX ADMIN — FERROUS SULFATE TAB 325 MG (65 MG ELEMENTAL FE) 325 MG: 325 (65 FE) TAB at 08:32

## 2024-01-01 RX ADMIN — ONDANSETRON 4 MG: 2 INJECTION INTRAMUSCULAR; INTRAVENOUS at 16:25

## 2024-01-01 RX ADMIN — PANCRELIPASE 24000 UNITS: 120000; 24000; 76000 CAPSULE, DELAYED RELEASE PELLETS ORAL at 12:17

## 2024-01-01 RX ADMIN — LEVALBUTEROL HYDROCHLORIDE 1.25 MG: 1.25 SOLUTION RESPIRATORY (INHALATION) at 19:25

## 2024-01-01 RX ADMIN — CALCIUM GLUCONATE 2 G: 20 INJECTION, SOLUTION INTRAVENOUS at 07:57

## 2024-01-01 RX ADMIN — IPRATROPIUM BROMIDE 0.5 MG: 0.5 SOLUTION RESPIRATORY (INHALATION) at 07:29

## 2024-01-01 RX ADMIN — THIAMINE HYDROCHLORIDE 500 MG: 100 INJECTION, SOLUTION INTRAMUSCULAR; INTRAVENOUS at 15:25

## 2024-01-01 RX ADMIN — BUDESONIDE 0.5 MG: 0.5 INHALANT RESPIRATORY (INHALATION) at 19:25

## 2024-01-01 RX ADMIN — ENOXAPARIN SODIUM 40 MG: 40 INJECTION SUBCUTANEOUS at 09:33

## 2024-01-01 RX ADMIN — POTASSIUM CHLORIDE 40 MEQ: 1500 TABLET, EXTENDED RELEASE ORAL at 23:45

## 2024-01-01 RX ADMIN — FORMOTEROL FUMARATE 20 MCG: 20 SOLUTION RESPIRATORY (INHALATION) at 08:17

## 2024-01-01 RX ADMIN — THIAMINE HYDROCHLORIDE 500 MG: 100 INJECTION, SOLUTION INTRAMUSCULAR; INTRAVENOUS at 12:22

## 2024-01-01 RX ADMIN — IPRATROPIUM BROMIDE 0.5 MG: 0.5 SOLUTION RESPIRATORY (INHALATION) at 13:50

## 2024-01-01 RX ADMIN — MULTIPLE VITAMINS W/ MINERALS TAB 1 TABLET: TAB ORAL at 08:32

## 2024-01-01 RX ADMIN — PANTOPRAZOLE SODIUM 40 MG: 40 INJECTION, POWDER, FOR SOLUTION INTRAVENOUS at 07:33

## 2024-01-01 RX ADMIN — DEXTRAN 70, GLYCERIN, HYPROMELLOSE 1 DROP: 1; 2; 3 SOLUTION/ DROPS OPHTHALMIC at 21:43

## 2024-01-01 RX ADMIN — NICOTINE 21 MG: 21 PATCH, EXTENDED RELEASE TRANSDERMAL at 09:33

## 2024-01-01 RX ADMIN — DEXTRAN 70, GLYCERIN, HYPROMELLOSE 1 DROP: 1; 2; 3 SOLUTION/ DROPS OPHTHALMIC at 08:04

## 2024-01-01 RX ADMIN — PANTOPRAZOLE SODIUM 40 MG: 40 INJECTION, POWDER, FOR SOLUTION INTRAVENOUS at 08:03

## 2024-01-01 RX ADMIN — HYDROCORTISONE SODIUM SUCCINATE 50 MG: 100 INJECTION, POWDER, FOR SOLUTION INTRAMUSCULAR; INTRAVENOUS at 17:59

## 2024-01-01 RX ADMIN — WHITE PETROLATUM 57.7 %-MINERAL OIL 31.9 % EYE OINTMENT 1 APPLICATION: at 21:09

## 2024-01-01 RX ADMIN — METOPROLOL TARTRATE 2.5 MG: 5 INJECTION INTRAVENOUS at 20:53

## 2024-01-01 RX ADMIN — LEVALBUTEROL HYDROCHLORIDE 1.25 MG: 1.25 SOLUTION RESPIRATORY (INHALATION) at 23:10

## 2024-01-01 RX ADMIN — IPRATROPIUM BROMIDE 0.5 MG: 0.5 SOLUTION RESPIRATORY (INHALATION) at 07:59

## 2024-01-01 RX ADMIN — SODIUM CHLORIDE 1 G: 1 TABLET ORAL at 08:32

## 2024-01-01 RX ADMIN — LORAZEPAM 1 MG: 2 INJECTION INTRAMUSCULAR; INTRAVENOUS at 12:21

## 2024-01-01 RX ADMIN — CHLORHEXIDINE GLUCONATE 15 ML: 1.2 RINSE ORAL at 21:41

## 2024-01-01 RX ADMIN — HYDROCORTISONE SODIUM SUCCINATE 50 MG: 100 INJECTION, POWDER, FOR SOLUTION INTRAMUSCULAR; INTRAVENOUS at 21:09

## 2024-01-01 RX ADMIN — MORPHINE SULFATE 2 MG: 2 INJECTION, SOLUTION INTRAMUSCULAR; INTRAVENOUS at 08:33

## 2024-01-01 RX ADMIN — CEFTRIAXONE 1000 MG: 1 INJECTION, SOLUTION INTRAVENOUS at 18:14

## 2024-01-01 RX ADMIN — HYDROCORTISONE SODIUM SUCCINATE 50 MG: 100 INJECTION, POWDER, FOR SOLUTION INTRAMUSCULAR; INTRAVENOUS at 05:02

## 2024-01-01 RX ADMIN — POTASSIUM CHLORIDE 20 MEQ: 14.9 INJECTION, SOLUTION INTRAVENOUS at 15:26

## 2024-01-01 RX ADMIN — IPRATROPIUM BROMIDE 0.5 MG: 0.5 SOLUTION RESPIRATORY (INHALATION) at 14:39

## 2024-01-01 RX ADMIN — METOPROLOL TARTRATE 2.5 MG: 5 INJECTION INTRAVENOUS at 04:37

## 2024-01-01 RX ADMIN — CITALOPRAM HYDROBROMIDE 20 MG: 20 TABLET ORAL at 08:32

## 2024-01-01 RX ADMIN — SODIUM CHLORIDE 1000 ML: 0.9 INJECTION, SOLUTION INTRAVENOUS at 19:06

## 2024-01-01 RX ADMIN — LEVALBUTEROL HYDROCHLORIDE 1.25 MG: 1.25 SOLUTION RESPIRATORY (INHALATION) at 07:58

## 2024-01-01 RX ADMIN — PANTOPRAZOLE SODIUM 40 MG: 40 TABLET, DELAYED RELEASE ORAL at 05:06

## 2024-01-01 RX ADMIN — METOPROLOL TARTRATE 2.5 MG: 5 INJECTION INTRAVENOUS at 08:45

## 2024-01-01 RX ADMIN — PANCRELIPASE 24000 UNITS: 120000; 24000; 76000 CAPSULE, DELAYED RELEASE PELLETS ORAL at 08:32

## 2024-01-01 RX ADMIN — SODIUM CHLORIDE SOLN NEBU 3% 4 ML: 3 NEBU SOLN at 13:39

## 2024-01-08 DIAGNOSIS — F41.9 ANXIETY: ICD-10-CM

## 2024-01-08 DIAGNOSIS — N40.1 BENIGN PROSTATIC HYPERPLASIA WITH LOWER URINARY TRACT SYMPTOMS, SYMPTOM DETAILS UNSPECIFIED: ICD-10-CM

## 2024-01-08 RX ORDER — ALPRAZOLAM 1 MG/1
1 TABLET ORAL 2 TIMES DAILY PRN
Qty: 60 TABLET | Refills: 0 | Status: SHIPPED | OUTPATIENT
Start: 2024-01-08 | End: 2024-02-07

## 2024-01-08 RX ORDER — TAMSULOSIN HYDROCHLORIDE 0.4 MG/1
0.4 CAPSULE ORAL DAILY
Qty: 90 CAPSULE | Refills: 0 | Status: SHIPPED | OUTPATIENT
Start: 2024-01-08

## 2024-01-26 ENCOUNTER — TELEPHONE (OUTPATIENT)
Dept: LAB | Facility: HOSPITAL | Age: 74
End: 2024-01-26

## 2024-01-26 DIAGNOSIS — N32.0 BLADDER OUTLET OBSTRUCTION: ICD-10-CM

## 2024-01-26 DIAGNOSIS — R33.9 URINARY RETENTION WITH INCOMPLETE BLADDER EMPTYING: ICD-10-CM

## 2024-01-26 RX ORDER — FINASTERIDE 5 MG/1
5 TABLET, FILM COATED ORAL DAILY
Qty: 30 TABLET | Refills: 1 | Status: SHIPPED | OUTPATIENT
Start: 2024-01-26

## 2024-02-06 DIAGNOSIS — E78.5 HYPERLIPIDEMIA, UNSPECIFIED: ICD-10-CM

## 2024-02-06 DIAGNOSIS — F41.9 ANXIETY: ICD-10-CM

## 2024-02-06 RX ORDER — PRAVASTATIN SODIUM 40 MG
40 TABLET ORAL
Qty: 90 TABLET | Refills: 2 | Status: SHIPPED | OUTPATIENT
Start: 2024-02-06

## 2024-02-06 RX ORDER — ALPRAZOLAM 1 MG/1
1 TABLET ORAL 2 TIMES DAILY PRN
Qty: 60 TABLET | Refills: 0 | Status: SHIPPED | OUTPATIENT
Start: 2024-02-06 | End: 2024-03-07

## 2024-02-12 ENCOUNTER — APPOINTMENT (OUTPATIENT)
Dept: LAB | Facility: HOSPITAL | Age: 74
End: 2024-02-12
Payer: COMMERCIAL

## 2024-02-12 DIAGNOSIS — E61.1 IRON DEFICIENCY: ICD-10-CM

## 2024-02-12 DIAGNOSIS — N18.30 STAGE 3 CHRONIC KIDNEY DISEASE, UNSPECIFIED WHETHER STAGE 3A OR 3B CKD (HCC): ICD-10-CM

## 2024-02-12 LAB
ALBUMIN SERPL BCP-MCNC: 3.2 G/DL (ref 3.5–5)
ALP SERPL-CCNC: 78 U/L (ref 34–104)
ALT SERPL W P-5'-P-CCNC: 13 U/L (ref 7–52)
ANION GAP SERPL CALCULATED.3IONS-SCNC: 9 MMOL/L
AST SERPL W P-5'-P-CCNC: 15 U/L (ref 13–39)
BASOPHILS # BLD AUTO: 0.03 THOUSANDS/ÂΜL (ref 0–0.1)
BASOPHILS NFR BLD AUTO: 0 % (ref 0–1)
BILIRUB SERPL-MCNC: 0.48 MG/DL (ref 0.2–1)
BUN SERPL-MCNC: 16 MG/DL (ref 5–25)
CALCIUM ALBUM COR SERPL-MCNC: 9 MG/DL (ref 8.3–10.1)
CALCIUM SERPL-MCNC: 8.4 MG/DL (ref 8.4–10.2)
CHLORIDE SERPL-SCNC: 95 MMOL/L (ref 96–108)
CO2 SERPL-SCNC: 34 MMOL/L (ref 21–32)
CREAT SERPL-MCNC: 1.1 MG/DL (ref 0.6–1.3)
EOSINOPHIL # BLD AUTO: 0.02 THOUSAND/ÂΜL (ref 0–0.61)
EOSINOPHIL NFR BLD AUTO: 0 % (ref 0–6)
ERYTHROCYTE [DISTWIDTH] IN BLOOD BY AUTOMATED COUNT: 13.2 % (ref 11.6–15.1)
FERRITIN SERPL-MCNC: 71 NG/ML (ref 24–336)
GFR SERPL CREATININE-BSD FRML MDRD: 65 ML/MIN/1.73SQ M
GLUCOSE SERPL-MCNC: 101 MG/DL (ref 65–140)
HCT VFR BLD AUTO: 36.8 % (ref 36.5–49.3)
HGB BLD-MCNC: 12.4 G/DL (ref 12–17)
IMM GRANULOCYTES # BLD AUTO: 0.03 THOUSAND/UL (ref 0–0.2)
IMM GRANULOCYTES NFR BLD AUTO: 0 % (ref 0–2)
IRON SATN MFR SERPL: 6 % (ref 15–50)
IRON SERPL-MCNC: 14 UG/DL (ref 50–212)
LYMPHOCYTES # BLD AUTO: 0.64 THOUSANDS/ÂΜL (ref 0.6–4.47)
LYMPHOCYTES NFR BLD AUTO: 9 % (ref 14–44)
MCH RBC QN AUTO: 34.2 PG (ref 26.8–34.3)
MCHC RBC AUTO-ENTMCNC: 33.7 G/DL (ref 31.4–37.4)
MCV RBC AUTO: 101 FL (ref 82–98)
MONOCYTES # BLD AUTO: 0.69 THOUSAND/ÂΜL (ref 0.17–1.22)
MONOCYTES NFR BLD AUTO: 10 % (ref 4–12)
NEUTROPHILS # BLD AUTO: 5.79 THOUSANDS/ÂΜL (ref 1.85–7.62)
NEUTS SEG NFR BLD AUTO: 81 % (ref 43–75)
NRBC BLD AUTO-RTO: 0 /100 WBCS
PLATELET # BLD AUTO: 220 THOUSANDS/UL (ref 149–390)
PMV BLD AUTO: 10.6 FL (ref 8.9–12.7)
POTASSIUM SERPL-SCNC: 3.2 MMOL/L (ref 3.5–5.3)
PROT SERPL-MCNC: 6.1 G/DL (ref 6.4–8.4)
RBC # BLD AUTO: 3.63 MILLION/UL (ref 3.88–5.62)
SODIUM SERPL-SCNC: 138 MMOL/L (ref 135–147)
TIBC SERPL-MCNC: 231 UG/DL (ref 250–450)
UIBC SERPL-MCNC: 217 UG/DL (ref 155–355)
WBC # BLD AUTO: 7.2 THOUSAND/UL (ref 4.31–10.16)

## 2024-02-12 PROCEDURE — 85025 COMPLETE CBC W/AUTO DIFF WBC: CPT

## 2024-02-12 PROCEDURE — 83540 ASSAY OF IRON: CPT

## 2024-02-12 PROCEDURE — 82728 ASSAY OF FERRITIN: CPT

## 2024-02-12 PROCEDURE — 83550 IRON BINDING TEST: CPT

## 2024-02-14 ENCOUNTER — OFFICE VISIT (OUTPATIENT)
Dept: GASTROENTEROLOGY | Facility: CLINIC | Age: 74
End: 2024-02-14
Payer: COMMERCIAL

## 2024-02-14 VITALS
WEIGHT: 91 LBS | DIASTOLIC BLOOD PRESSURE: 88 MMHG | BODY MASS INDEX: 16.75 KG/M2 | HEIGHT: 62 IN | SYSTOLIC BLOOD PRESSURE: 138 MMHG

## 2024-02-14 DIAGNOSIS — Z86.010 HISTORY OF COLON POLYPS: ICD-10-CM

## 2024-02-14 DIAGNOSIS — E61.1 IRON DEFICIENCY: ICD-10-CM

## 2024-02-14 DIAGNOSIS — R63.4 WEIGHT LOSS: Primary | ICD-10-CM

## 2024-02-14 DIAGNOSIS — K21.9 GASTROESOPHAGEAL REFLUX DISEASE, UNSPECIFIED WHETHER ESOPHAGITIS PRESENT: ICD-10-CM

## 2024-02-14 DIAGNOSIS — Z87.11 HX OF GASTRIC ULCER: ICD-10-CM

## 2024-02-14 DIAGNOSIS — E46 PROTEIN-CALORIE MALNUTRITION, UNSPECIFIED SEVERITY (HCC): ICD-10-CM

## 2024-02-14 PROBLEM — Z93.6 OTHER ARTIFICIAL OPENINGS OF URINARY TRACT STATUS (HCC): Status: ACTIVE | Noted: 2024-02-14

## 2024-02-14 PROCEDURE — 99214 OFFICE O/P EST MOD 30 MIN: CPT | Performed by: NURSE PRACTITIONER

## 2024-02-14 NOTE — PATIENT INSTRUCTIONS
Protein drink supplement, 1 a day.  They do make regular drinks versus the normal vanilla chocolate or strawberry.

## 2024-02-14 NOTE — PROGRESS NOTES
[facility] - Outpatient Follow-up Note  Ross Mcguire 73 y.o. male MRN: [id number]  Encounter: [id number]    ASSESSMENT AND PLAN:      1. Weight loss  2. Protein-calorie malnutrition, unspecified severity (HCC)  Patient's weight for office visit today is 91 pounds and patient's weight on 11/1/2023 was 91 pounds as well.  Discussed making sure he does frequent small meals which may be tolerated much better than larger meals.  Also noted trying to get at least 1 protein shake or drink in per day.     -Continue to monitor weight with follow-up office visit.  - Comprehensive metabolic panel    3. Gastroesophageal reflux disease, unspecified whether esophagitis present  Patient continues on omeprazole 40 mg daily and denies acid reflux symptoms or breakthrough symptoms.    4. Hx of gastric ulcer  Noted from EGD 10/5/2023 healed previous gastric ulcer.  Does continue on omeprazole daily for GERD symptoms.    5. Iron deficiency  Most recent iron panel 2/12/2024 noted iron deficiency likely related to CKD 3, chronic disease.  It has been taking iron supplement twice daily.  Will repeat iron panel to determine if patient can just take 1 tablet daily and or every other day.  Patient does state he thinks the iron affects his GI tract.  Discussed possibly contributing to constipation however did discuss adequate fiber and fluids.    - CBC and differential; Future  - Iron Panel (Includes Ferritin, Iron Sat%, Iron, and TIBC); Future    6. History of colon polyps  Colonoscopy 6/2023; no recall with age      Followup Appointment: 3 months  ______________________________________________________________________      HPI:    73-year-old male accompanied by his friend Temo and with past medical history of gastric ulcer, GERD, pancreatic insufficiency, common bile duct dilatation, hypertension, AAA, CKD stage III, hyperlipidemia, protein calorie malnutrition, hyponatremia, weight loss and  iron deficiency presents for follow-up office visit 11/1/2023.  Patient has actually done very well maintaining his weight.  He is 91 pounds with office visit today and he was also 91 pounds with office visit on 11/1/2023.  Instructed patient again to adhere to frequent small meals and he should be getting at least 1 protein shake per day.  Patient states that he is not having any acid reflux symptoms while taking the omeprazole 40 mg daily.  Patient's most recent iron panel; iron 14, iron sat 6, TIBC 231, ferritin 71.  Noted iron deficiency likely due to chronic disease, CKD 3.  Hemoglobin 12.4 with most recent lab work.  On exam, patient denies nausea, vomiting or abdominal pain.  States he is moving his bowels however does sometimes flip-flop from constipation to diarrhea.  Denies hematochezia however notes he does have dark to black stools with taking the iron twice daily.  Patient notes he does continue to have 4 to 5 cigarettes/day.  EGD 10/5/2023 noted hiatal hernia.  Patient did have healed gastric ulcer noted from previous EGD.  Colonoscopy 6/2023, noted polyps, no recall with age.    Historical Information   Past Medical History:   Diagnosis Date    Anxiety     Rhodes's esophagus     Chronic neck pain     Depression     GERD (gastroesophageal reflux disease)     H/O abdominal aortic aneurysm 4.5 cm    Hyperlipidemia     Hypertension     Pancreatic pseudocyst     Vertigo      Past Surgical History:   Procedure Laterality Date    COLONOSCOPY  09/30/2014    IR LOWER EXTREMITY / INTERVENTION  01/04/2019    NJ ESOPHAGOGASTRODUODENOSCOPY TRANSORAL DIAGNOSTIC N/A 06/22/2018    Procedure: EGD AND COLONOSCOPY;  Surgeon: Quinton Garcia MD;  Location: [facility];  Service: Gastroenterology    NJ EVASC RPR DPLMNT AORTO-AORTIC NDGFT N/A 02/09/2018    Procedure: REPAIR ANEURYSM ENDOVASCULAR ABDOMINAL AORTIC  (EVAR);  Surgeon: Jeremy Jackson MD;  Location: [facility];  Service: Vascular    UPPER GASTROINTESTINAL ENDOSCOPY    "    Social History     Substance and Sexual Activity   Alcohol Use Never     Social History     Substance and Sexual Activity   Drug Use No     Social History     Tobacco Use   Smoking Status Every Day    Current packs/day: 1.00    Average packs/day: 1 pack/day for 62.1 years (62.1 ttl pk-yrs)    Types: Cigarettes    Start date: 1962   Smokeless Tobacco Never     Family History   Problem Relation Age of Onset    Heart disease Father     Heart attack Father     Diabetes Maternal Grandmother     Diabetes Maternal Grandfather     Diabetes Paternal Grandmother     Hypertension Family     Hyperlipidemia Family          Current Outpatient Medications:     ALPRAZolam (XANAX) 1 mg tablet    amLODIPine (NORVASC) 10 mg tablet    aspirin (ECOTRIN LOW STRENGTH) 81 mg EC tablet    citalopram (CeleXA) 20 mg tablet    dicyclomine (BENTYL) 10 mg capsule    ferrous sulfate 325 (65 Fe) mg tablet    finasteride (PROSCAR) 5 mg tablet    loteprednol etabonate (LOTEMAX) 0.5 % ophthalmic suspension    magnesium oxide (MAG-OX) 400 mg tablet    metoprolol succinate (TOPROL-XL) 25 mg 24 hr tablet    Multiple Vitamins-Minerals (CENTRUM SILVER PO)    omeprazole (PriLOSEC) 40 MG capsule    pancrelipase, Lip-Prot-Amyl, (Creon) 12,000 units capsule    pravastatin (PRAVACHOL) 40 mg tablet    Restasis 0.05 % ophthalmic emulsion    sodium chloride 1 g tablet    tamsulosin (FLOMAX) 0.4 mg    Wheat Dextrin (BENEFIBER PO)  No Known Allergies  Reviewed medications and allergies and updated as indicated    PHYSICAL EXAM:    Blood pressure 138/88, height 5' 2\" (1.575 m), weight 41.3 kg (91 lb). Body mass index is 16.64 kg/m².    General Appearance: NAD, cooperative, alert, under nourished  Eyes: Anicteric, PERRLA, EOMI  ENT:  Normocephalic, atraumatic, normal mucosa.    Neck:  Supple, symmetrical, trachea midline  Resp:  Clear to auscultation bilaterally; no rales, rhonchi or wheezing; respirations unlabored   CV:  S1 S2, Regular rate and rhythm; no " murmur, rub, or gallop.  GI:  Soft, non-tender, non-distended; normal bowel sounds; no masses, no organomegaly   Rectal: Deferred  Musculoskeletal: No cyanosis, clubbing or edema. Normal ROM.  Skin:  No jaundice, rashes, or lesions   Heme/Lymph: No palpable cervical lymphadenopathy  Psych: Normal affect, good eye contact  Neuro: No gross deficits, AAOx3    Lab Results:   Lab Results   Component Value Date    WBC 7.20 02/12/2024    HGB 12.4 02/12/2024    HCT 36.8 02/12/2024     (H) 02/12/2024     02/12/2024     Lab Results   Component Value Date     (L) 10/14/2014    K 3.2 (L) 02/12/2024    CL 95 (L) 02/12/2024    CO2 34 (H) 02/12/2024    ANIONGAP 6 10/14/2014    BUN 16 02/12/2024    CREATININE 1.10 02/12/2024    GLUCOSE 97 10/14/2014    GLUF 119 (H) 10/11/2023    CALCIUM 8.4 02/12/2024    CORRECTEDCA 9.0 02/12/2024    AST 15 02/12/2024    ALT 13 02/12/2024    ALKPHOS 78 02/12/2024    PROT 7.2 10/13/2014    BILITOT 0.37 10/13/2014    EGFR 65 02/12/2024     Lab Results   Component Value Date    IRON 14 (L) 02/12/2024    TIBC 231 (L) 02/12/2024    FERRITIN 71 02/12/2024     Lab Results   Component Value Date    LIPASE 15 06/07/2023       Radiology Results:   No results found.

## 2024-02-15 DIAGNOSIS — E83.42 HYPOMAGNESEMIA: ICD-10-CM

## 2024-02-15 DIAGNOSIS — E87.1 HYPONATREMIA: ICD-10-CM

## 2024-02-15 DIAGNOSIS — I10 PRIMARY HYPERTENSION: ICD-10-CM

## 2024-02-15 DIAGNOSIS — N18.30 STAGE 3 CHRONIC KIDNEY DISEASE, UNSPECIFIED WHETHER STAGE 3A OR 3B CKD (HCC): Primary | ICD-10-CM

## 2024-02-15 NOTE — TELEPHONE ENCOUNTER
----- Message from Zabrina Mcgregor PA-C sent at 2/14/2024  2:43 PM EST -----  Can we please find out if patient is taking potassium supplementation and if so how much?  Nothing noted on med list but per Randall's note patient said he was taking it.  If not taking anything, please start 20meq daily and repeat BMP in 7-10 days.

## 2024-02-19 RX ORDER — POTASSIUM CHLORIDE 750 MG/1
20 CAPSULE, EXTENDED RELEASE ORAL DAILY
Qty: 90 CAPSULE | Refills: 3 | Status: SHIPPED | OUTPATIENT
Start: 2024-02-19

## 2024-02-21 ENCOUNTER — TELEPHONE (OUTPATIENT)
Dept: NEPHROLOGY | Facility: CLINIC | Age: 74
End: 2024-02-21

## 2024-02-21 ENCOUNTER — TELEPHONE (OUTPATIENT)
Dept: LAB | Facility: HOSPITAL | Age: 74
End: 2024-02-21

## 2024-02-21 NOTE — TELEPHONE ENCOUNTER
MIGUEL to return call to schedule 10m follow up with Randall which will take us to May. (Recall list)

## 2024-02-26 ENCOUNTER — APPOINTMENT (OUTPATIENT)
Dept: LAB | Facility: HOSPITAL | Age: 74
End: 2024-02-26
Payer: COMMERCIAL

## 2024-02-26 DIAGNOSIS — N18.30 STAGE 3 CHRONIC KIDNEY DISEASE, UNSPECIFIED WHETHER STAGE 3A OR 3B CKD (HCC): ICD-10-CM

## 2024-02-26 DIAGNOSIS — E61.1 IRON DEFICIENCY: ICD-10-CM

## 2024-02-26 DIAGNOSIS — I10 PRIMARY HYPERTENSION: ICD-10-CM

## 2024-02-26 DIAGNOSIS — E83.42 HYPOMAGNESEMIA: ICD-10-CM

## 2024-02-26 DIAGNOSIS — E87.1 HYPONATREMIA: ICD-10-CM

## 2024-02-26 LAB
ALBUMIN SERPL BCP-MCNC: 3.8 G/DL (ref 3.5–5)
ALP SERPL-CCNC: 88 U/L (ref 34–104)
ALT SERPL W P-5'-P-CCNC: 19 U/L (ref 7–52)
ANION GAP SERPL CALCULATED.3IONS-SCNC: 15 MMOL/L
AST SERPL W P-5'-P-CCNC: 21 U/L (ref 13–39)
BASOPHILS # BLD AUTO: 0.06 THOUSANDS/ÂΜL (ref 0–0.1)
BASOPHILS NFR BLD AUTO: 1 % (ref 0–1)
BILIRUB SERPL-MCNC: 0.54 MG/DL (ref 0.2–1)
BUN SERPL-MCNC: 19 MG/DL (ref 5–25)
CALCIUM SERPL-MCNC: 9.3 MG/DL (ref 8.4–10.2)
CHLORIDE SERPL-SCNC: 95 MMOL/L (ref 96–108)
CO2 SERPL-SCNC: 28 MMOL/L (ref 21–32)
CREAT SERPL-MCNC: 0.85 MG/DL (ref 0.6–1.3)
EOSINOPHIL # BLD AUTO: 0.06 THOUSAND/ÂΜL (ref 0–0.61)
EOSINOPHIL NFR BLD AUTO: 1 % (ref 0–6)
ERYTHROCYTE [DISTWIDTH] IN BLOOD BY AUTOMATED COUNT: 13.5 % (ref 11.6–15.1)
FERRITIN SERPL-MCNC: 70 NG/ML (ref 24–336)
GFR SERPL CREATININE-BSD FRML MDRD: 85 ML/MIN/1.73SQ M
GLUCOSE SERPL-MCNC: 86 MG/DL (ref 65–140)
HCT VFR BLD AUTO: 40.7 % (ref 36.5–49.3)
HGB BLD-MCNC: 13 G/DL (ref 12–17)
IMM GRANULOCYTES # BLD AUTO: 0.07 THOUSAND/UL (ref 0–0.2)
IMM GRANULOCYTES NFR BLD AUTO: 1 % (ref 0–2)
IRON SATN MFR SERPL: 20 % (ref 15–50)
IRON SERPL-MCNC: 64 UG/DL (ref 50–212)
LYMPHOCYTES # BLD AUTO: 1 THOUSANDS/ÂΜL (ref 0.6–4.47)
LYMPHOCYTES NFR BLD AUTO: 8 % (ref 14–44)
MCH RBC QN AUTO: 33.4 PG (ref 26.8–34.3)
MCHC RBC AUTO-ENTMCNC: 31.9 G/DL (ref 31.4–37.4)
MCV RBC AUTO: 105 FL (ref 82–98)
MONOCYTES # BLD AUTO: 0.59 THOUSAND/ÂΜL (ref 0.17–1.22)
MONOCYTES NFR BLD AUTO: 5 % (ref 4–12)
NEUTROPHILS # BLD AUTO: 10.25 THOUSANDS/ÂΜL (ref 1.85–7.62)
NEUTS SEG NFR BLD AUTO: 84 % (ref 43–75)
NRBC BLD AUTO-RTO: 0 /100 WBCS
PLATELET # BLD AUTO: 427 THOUSANDS/UL (ref 149–390)
PMV BLD AUTO: 10.1 FL (ref 8.9–12.7)
POTASSIUM SERPL-SCNC: 3.7 MMOL/L (ref 3.5–5.3)
PROT SERPL-MCNC: 6.6 G/DL (ref 6.4–8.4)
RBC # BLD AUTO: 3.89 MILLION/UL (ref 3.88–5.62)
SODIUM SERPL-SCNC: 138 MMOL/L (ref 135–147)
TIBC SERPL-MCNC: 322 UG/DL (ref 250–450)
UIBC SERPL-MCNC: 258 UG/DL (ref 155–355)
WBC # BLD AUTO: 12.03 THOUSAND/UL (ref 4.31–10.16)

## 2024-02-26 PROCEDURE — 85025 COMPLETE CBC W/AUTO DIFF WBC: CPT

## 2024-02-26 PROCEDURE — 82728 ASSAY OF FERRITIN: CPT

## 2024-02-26 PROCEDURE — 83540 ASSAY OF IRON: CPT

## 2024-02-26 PROCEDURE — 83550 IRON BINDING TEST: CPT

## 2024-02-27 ENCOUNTER — TELEPHONE (OUTPATIENT)
Dept: GASTROENTEROLOGY | Facility: CLINIC | Age: 74
End: 2024-02-27

## 2024-02-27 NOTE — TELEPHONE ENCOUNTER
Spoke to the patient in regards to his lab work received.  CBC essentially normal except patient did have a slight increase in white count.  Asked patient if he was feeling well because 2 weeks prior his CBC have been normal.  Patient states he is feeling fine however he did have an episode where he had not had a bowel movement for 3 days.  He contributes that to he had been taking the iron supplement twice daily.  Discussed with patient that his current iron panel is very good and he can actually take his medication every other day however he needs to add increased fiber to his diet, stool softener at bedtime and recommended he use MiraLAX as needed however if he has not had a bowel movement within 3 days he needs to take the MiraLAX daily until bowel movement occurs.  Patient does have follow-up appointment and will also have follow-up lab work completed prior to next appointment to evaluate his iron level.  Patient understands and agrees with treatment plan.

## 2024-03-04 DIAGNOSIS — E87.6 HYPOKALEMIA: ICD-10-CM

## 2024-03-04 DIAGNOSIS — I10 HYPERTENSION: ICD-10-CM

## 2024-03-04 RX ORDER — MAGNESIUM OXIDE 400 MG/1
400 TABLET ORAL DAILY
Qty: 90 TABLET | Refills: 0 | Status: SHIPPED | OUTPATIENT
Start: 2024-03-04

## 2024-03-08 ENCOUNTER — RA CDI HCC (OUTPATIENT)
Dept: OTHER | Facility: HOSPITAL | Age: 74
End: 2024-03-08

## 2024-03-08 DIAGNOSIS — F41.9 ANXIETY: ICD-10-CM

## 2024-03-08 PROBLEM — I12.9 HYPERTENSIVE KIDNEY DISEASE WITH CHRONIC KIDNEY DISEASE: Status: ACTIVE | Noted: 2022-08-19

## 2024-03-08 PROBLEM — I12.9 HYPERTENSIVE KIDNEY DISEASE WITH CHRONIC KIDNEY DISEASE: Status: ACTIVE | Noted: 2024-03-08

## 2024-03-08 RX ORDER — ALPRAZOLAM 1 MG/1
1 TABLET ORAL 2 TIMES DAILY PRN
Qty: 60 TABLET | Refills: 0 | Status: SHIPPED | OUTPATIENT
Start: 2024-03-08 | End: 2024-04-07

## 2024-03-08 NOTE — PROGRESS NOTES
HCC coding opportunities          Chart Reviewed number of suggestions sent to Provider: 1  I12.9     Patients Insurance     Medicare Insurance: [facility] Medicare Advantage

## 2024-03-11 DIAGNOSIS — F32.A DEPRESSION: ICD-10-CM

## 2024-03-11 RX ORDER — CITALOPRAM 20 MG/1
20 TABLET ORAL DAILY
Qty: 90 TABLET | Refills: 0 | Status: SHIPPED | OUTPATIENT
Start: 2024-03-11 | End: 2024-06-09

## 2024-03-15 ENCOUNTER — OFFICE VISIT (OUTPATIENT)
Dept: INTERNAL MEDICINE CLINIC | Facility: CLINIC | Age: 74
End: 2024-03-15
Payer: COMMERCIAL

## 2024-03-15 VITALS
DIASTOLIC BLOOD PRESSURE: 84 MMHG | HEIGHT: 62 IN | SYSTOLIC BLOOD PRESSURE: 175 MMHG | HEART RATE: 88 BPM | TEMPERATURE: 98.4 F | WEIGHT: 89 LBS | BODY MASS INDEX: 16.38 KG/M2 | OXYGEN SATURATION: 96 %

## 2024-03-15 DIAGNOSIS — K21.9 GERD (GASTROESOPHAGEAL REFLUX DISEASE): ICD-10-CM

## 2024-03-15 DIAGNOSIS — E78.5 HYPERLIPIDEMIA: ICD-10-CM

## 2024-03-15 DIAGNOSIS — I10 HYPERTENSION: Primary | ICD-10-CM

## 2024-03-15 DIAGNOSIS — K86.1 CHRONIC PANCREATITIS (HCC): ICD-10-CM

## 2024-03-15 DIAGNOSIS — F41.9 ANXIETY: ICD-10-CM

## 2024-03-15 DIAGNOSIS — R05.9 COUGH IN ADULT: ICD-10-CM

## 2024-03-15 LAB
SARS-COV-2 AG UPPER RESP QL IA: POSITIVE
SL AMB POCT RAPID FLU A: NEGATIVE
SL AMB POCT RAPID FLU B: NEGATIVE
VALID CONTROL: ABNORMAL

## 2024-03-15 PROCEDURE — G2211 COMPLEX E/M VISIT ADD ON: HCPCS | Performed by: INTERNAL MEDICINE

## 2024-03-15 PROCEDURE — 87804 INFLUENZA ASSAY W/OPTIC: CPT | Performed by: INTERNAL MEDICINE

## 2024-03-15 PROCEDURE — 99213 OFFICE O/P EST LOW 20 MIN: CPT | Performed by: INTERNAL MEDICINE

## 2024-03-15 PROCEDURE — 87811 SARS-COV-2 COVID19 W/OPTIC: CPT | Performed by: INTERNAL MEDICINE

## 2024-03-15 NOTE — PROGRESS NOTES
Assessment/Plan:      Follow up  Hypertension, GERD, Chronic Pancreatitis, Hyperlipidemia, chronic  diarrhea from  chronic  pancreatitis.    Diagnoses and all orders for this visit:    Hypertension    GERD (gastroesophageal reflux disease)    Chronic pancreatitis (HCC)    Hyperlipidemia    Anxiety    Cough in adult  -     POCT Rapid Covid Ag  -     POCT rapid flu A and B          Subjective:      Patient ID: Ross Mcguire is a 74 y.o. male.    Cough  Pertinent negatives include no chest pain, ear pain, eye redness, headaches, myalgias or wheezing. There is no history of environmental allergies.   Diarrhea   Associated symptoms include coughing. Pertinent negatives include no abdominal pain, arthralgias, headaches, myalgias or vomiting.       The following portions of the patient's history were reviewed and updated as appropriate: allergies, current medications, past family history, past medical history, past social history, past surgical history, and problem list.    Review of Systems   Constitutional: Negative.    HENT:  Negative for dental problem, drooling, ear discharge and ear pain.    Eyes:  Negative for discharge, redness and itching.   Respiratory:  Positive for cough. Negative for apnea and wheezing.    Cardiovascular:  Negative for chest pain and palpitations.   Gastrointestinal:  Positive for diarrhea. Negative for abdominal pain, blood in stool and vomiting.   Endocrine: Negative for polydipsia, polyphagia and polyuria.   Genitourinary:  Negative for decreased urine volume, dysuria and frequency.   Musculoskeletal:  Negative for arthralgias, myalgias and neck stiffness.   Skin:  Negative for pallor and wound.   Allergic/Immunologic: Negative for environmental allergies and food allergies.   Neurological:  Negative for facial asymmetry, light-headedness, numbness and headaches.   Hematological:  Negative for adenopathy. Does not bruise/bleed easily.   Psychiatric/Behavioral:  Negative for  "agitation, behavioral problems and confusion.          Objective:      BP (!) 175/84 (BP Location: Left arm, Patient Position: Sitting, Cuff Size: Standard)   Pulse 88   Temp 98.4 °F (36.9 °C) (Temporal)   Ht 5' 2\" (1.575 m)   Wt 40.4 kg (89 lb)   SpO2 96%   BMI 16.28 kg/m²          Physical Exam  Constitutional:       Appearance: Normal appearance.   HENT:      Head: Normocephalic.      Nose: Nose normal.      Mouth/Throat:      Mouth: Mucous membranes are moist.   Eyes:      Pupils: Pupils are equal, round, and reactive to light.   Cardiovascular:      Rate and Rhythm: Regular rhythm.      Heart sounds: Normal heart sounds.   Pulmonary:      Breath sounds: Normal breath sounds.   Abdominal:      Palpations: Abdomen is soft.   Musculoskeletal:         General: No swelling.      Cervical back: Neck supple.   Skin:     General: Skin is warm.   Neurological:      General: No focal deficit present.      Mental Status: He is alert and oriented to person, place, and time.   Psychiatric:         Mood and Affect: Mood normal.       Progressive weight loss from  chronic  pancreatitis  and  steatorrhea  . ON  pancreatic enzymes    Hypertension   stable on  current  meds    Hyperlipidemia  stable    COVID  positive  cough.  Robitussin DM     Follow up  GI  for the  worsening  of diarrhea and progressive weight loss.      Fup  4 months.     Juan J Camarena MD.FACp  "

## 2024-03-19 DIAGNOSIS — K86.89 PANCREATIC INSUFFICIENCY: ICD-10-CM

## 2024-03-19 RX ORDER — PANCRELIPASE 60000; 12000; 38000 [USP'U]/1; [USP'U]/1; [USP'U]/1
24000 CAPSULE, DELAYED RELEASE PELLETS ORAL
Qty: 540 CAPSULE | Refills: 1 | Status: SHIPPED | OUTPATIENT
Start: 2024-03-19

## 2024-04-01 ENCOUNTER — HOSPITAL ENCOUNTER (OUTPATIENT)
Dept: NON INVASIVE DIAGNOSTICS | Age: 74
Discharge: HOME | End: 2024-04-01
Payer: COMMERCIAL

## 2024-04-01 DIAGNOSIS — Z86.79 STATUS POST ENDOVASCULAR ANEURYSM REPAIR (EVAR): ICD-10-CM

## 2024-04-01 DIAGNOSIS — R09.89 PROMINENT POPLITEAL PULSE: ICD-10-CM

## 2024-04-01 DIAGNOSIS — R33.9 URINARY RETENTION WITH INCOMPLETE BLADDER EMPTYING: ICD-10-CM

## 2024-04-01 DIAGNOSIS — N40.1 BENIGN PROSTATIC HYPERPLASIA WITH LOWER URINARY TRACT SYMPTOMS, SYMPTOM DETAILS UNSPECIFIED: ICD-10-CM

## 2024-04-01 DIAGNOSIS — N32.0 BLADDER OUTLET OBSTRUCTION: ICD-10-CM

## 2024-04-01 DIAGNOSIS — Z98.890 STATUS POST ENDOVASCULAR ANEURYSM REPAIR (EVAR): ICD-10-CM

## 2024-04-01 PROCEDURE — 93978 VASCULAR STUDY: CPT

## 2024-04-01 PROCEDURE — 93923 UPR/LXTR ART STDY 3+ LVLS: CPT

## 2024-04-01 PROCEDURE — 93925 LOWER EXTREMITY STUDY: CPT | Performed by: SURGERY

## 2024-04-01 PROCEDURE — 93922 UPR/L XTREMITY ART 2 LEVELS: CPT | Performed by: SURGERY

## 2024-04-01 PROCEDURE — 93925 LOWER EXTREMITY STUDY: CPT

## 2024-04-01 PROCEDURE — 93978 VASCULAR STUDY: CPT | Performed by: SURGERY

## 2024-04-01 RX ORDER — FINASTERIDE 5 MG/1
5 TABLET, FILM COATED ORAL DAILY
Qty: 30 TABLET | Refills: 1 | Status: SHIPPED | OUTPATIENT
Start: 2024-04-01

## 2024-04-01 RX ORDER — TAMSULOSIN HYDROCHLORIDE 0.4 MG/1
0.4 CAPSULE ORAL DAILY
Qty: 90 CAPSULE | Refills: 2 | Status: SHIPPED | OUTPATIENT
Start: 2024-04-01

## 2024-04-08 DIAGNOSIS — F41.9 ANXIETY: ICD-10-CM

## 2024-04-08 RX ORDER — ALPRAZOLAM 1 MG/1
1 TABLET ORAL 2 TIMES DAILY PRN
Qty: 60 TABLET | Refills: 0 | Status: SHIPPED | OUTPATIENT
Start: 2024-04-08 | End: 2024-05-08

## 2024-04-29 DIAGNOSIS — N32.0 BLADDER OUTLET OBSTRUCTION: ICD-10-CM

## 2024-04-29 DIAGNOSIS — R33.9 URINARY RETENTION WITH INCOMPLETE BLADDER EMPTYING: ICD-10-CM

## 2024-04-30 RX ORDER — FINASTERIDE 5 MG/1
5 TABLET, FILM COATED ORAL DAILY
Qty: 30 TABLET | Refills: 1 | Status: SHIPPED | OUTPATIENT
Start: 2024-04-30

## 2024-05-06 DIAGNOSIS — D50.9 IRON DEFICIENCY ANEMIA: ICD-10-CM

## 2024-05-06 DIAGNOSIS — F41.9 ANXIETY: ICD-10-CM

## 2024-05-06 RX ORDER — ALPRAZOLAM 1 MG/1
1 TABLET ORAL 2 TIMES DAILY PRN
Qty: 60 TABLET | Refills: 0 | Status: SHIPPED | OUTPATIENT
Start: 2024-05-06 | End: 2024-06-05

## 2024-05-06 RX ORDER — FERROUS SULFATE 325(65) MG
325 TABLET ORAL
Qty: 90 TABLET | Refills: 0 | Status: SHIPPED | OUTPATIENT
Start: 2024-05-06 | End: 2024-08-04

## 2024-05-13 ENCOUNTER — TELEPHONE (OUTPATIENT)
Dept: NEPHROLOGY | Facility: CLINIC | Age: 74
End: 2024-05-13

## 2024-05-13 DIAGNOSIS — E83.42 HYPOMAGNESEMIA: ICD-10-CM

## 2024-05-13 DIAGNOSIS — E87.1 HYPONATREMIA: ICD-10-CM

## 2024-05-13 DIAGNOSIS — N18.30 STAGE 3 CHRONIC KIDNEY DISEASE, UNSPECIFIED WHETHER STAGE 3A OR 3B CKD (HCC): Primary | ICD-10-CM

## 2024-05-13 NOTE — TELEPHONE ENCOUNTER
----- Message from DEACON Mojica sent at 5/13/2024 12:58 PM EDT -----  MOE PAREDES please  ----- Message -----  From: Charles Fabian  Sent: 5/13/2024  12:39 PM EDT  To: DEACON Mojica    Scheduled 5/24 would you like any labs prior ?

## 2024-05-17 ENCOUNTER — TELEPHONE (OUTPATIENT)
Age: 74
End: 2024-05-17

## 2024-05-17 ENCOUNTER — TELEPHONE (OUTPATIENT)
Dept: LAB | Facility: HOSPITAL | Age: 74
End: 2024-05-17

## 2024-05-17 DIAGNOSIS — I10 ESSENTIAL HYPERTENSION: ICD-10-CM

## 2024-05-17 DIAGNOSIS — D50.9 IRON DEFICIENCY ANEMIA, UNSPECIFIED IRON DEFICIENCY ANEMIA TYPE: Primary | ICD-10-CM

## 2024-05-17 NOTE — TELEPHONE ENCOUNTER
Patients GI provider:  JERI Nunez    Number to return call: [phone]    Reason for call: Jennifer from [facility] called in stating that pt called in to schedule for his blood work with us. Order has not been placed to check his iron levels per JERI Toledo last note. Requesting to have that ordered placed so pt can keep his appt with them.     Scheduled procedure/appointment date if applicable: Appt  6/13/24

## 2024-05-28 DIAGNOSIS — E87.6 HYPOKALEMIA: ICD-10-CM

## 2024-05-28 DIAGNOSIS — I10 HYPERTENSION: ICD-10-CM

## 2024-05-28 DIAGNOSIS — I10 ESSENTIAL HYPERTENSION: ICD-10-CM

## 2024-05-28 NOTE — TELEPHONE ENCOUNTER
Medication: magnesium oxide (MAG-OX) 400 mg tablet     Dose/Frequency: Take 1 tablet (400 mg total) by mouth daily     Quantity: 90    Pharmacy: [id number] - [place] - [address] [facility] [place]     Office:   [x] PCP/Provider - Juan J Camarena MD   [] Speciality/Provider -     Does the patient have enough for 3 days?   [x] Yes   [] No - Send as HP to POD    Medication: metoprolol succinate (TOPROL-XL) 25 mg 24 hr tablet     Dose/Frequency: Take 1 tablet (25 mg total) by mouth daily     Quantity: 90    Pharmacy: [id number] - [place] - [address] [facility]     Office:   [x] PCP/Provider - Juan J Camarena MD   [] Speciality/Provider -     Does the patient have enough for 3 days?   [x] Yes   [] No - Send as HP to POD

## 2024-05-30 RX ORDER — MAGNESIUM OXIDE 400 MG/1
400 TABLET ORAL DAILY
Qty: 90 TABLET | Refills: 0 | Status: SHIPPED | OUTPATIENT
Start: 2024-05-30

## 2024-05-30 RX ORDER — METOPROLOL SUCCINATE 25 MG/1
25 TABLET, EXTENDED RELEASE ORAL DAILY
Qty: 90 TABLET | Refills: 1 | Status: SHIPPED | OUTPATIENT
Start: 2024-05-30

## 2024-05-31 DIAGNOSIS — R33.9 URINE RETENTION: ICD-10-CM

## 2024-05-31 DIAGNOSIS — E22.2 SIADH (SYNDROME OF INAPPROPRIATE ADH PRODUCTION) (HCC): ICD-10-CM

## 2024-06-01 RX ORDER — SODIUM CHLORIDE 1 G/1
1 TABLET ORAL
Qty: 270 TABLET | Refills: 1 | Status: SHIPPED | OUTPATIENT
Start: 2024-06-01

## 2024-06-03 ENCOUNTER — APPOINTMENT (OUTPATIENT)
Dept: LAB | Facility: HOSPITAL | Age: 74
End: 2024-06-03
Payer: COMMERCIAL

## 2024-06-03 DIAGNOSIS — E83.42 HYPOMAGNESEMIA: ICD-10-CM

## 2024-06-03 DIAGNOSIS — N18.30 STAGE 3 CHRONIC KIDNEY DISEASE, UNSPECIFIED WHETHER STAGE 3A OR 3B CKD (HCC): ICD-10-CM

## 2024-06-03 DIAGNOSIS — D50.9 IRON DEFICIENCY ANEMIA, UNSPECIFIED IRON DEFICIENCY ANEMIA TYPE: ICD-10-CM

## 2024-06-03 DIAGNOSIS — E87.1 HYPONATREMIA: ICD-10-CM

## 2024-06-03 LAB
ALBUMIN SERPL BCP-MCNC: 3.9 G/DL (ref 3.5–5)
ANION GAP SERPL CALCULATED.3IONS-SCNC: 9 MMOL/L (ref 4–13)
BASOPHILS # BLD AUTO: 0.06 THOUSANDS/ÂΜL (ref 0–0.1)
BASOPHILS NFR BLD AUTO: 1 % (ref 0–1)
BUN SERPL-MCNC: 15 MG/DL (ref 5–25)
CALCIUM SERPL-MCNC: 8.9 MG/DL (ref 8.4–10.2)
CHLORIDE SERPL-SCNC: 97 MMOL/L (ref 96–108)
CO2 SERPL-SCNC: 27 MMOL/L (ref 21–32)
CREAT SERPL-MCNC: 1.03 MG/DL (ref 0.6–1.3)
EOSINOPHIL # BLD AUTO: 0.2 THOUSAND/ÂΜL (ref 0–0.61)
EOSINOPHIL NFR BLD AUTO: 2 % (ref 0–6)
ERYTHROCYTE [DISTWIDTH] IN BLOOD BY AUTOMATED COUNT: 14 % (ref 11.6–15.1)
FERRITIN SERPL-MCNC: 41 NG/ML (ref 24–336)
GFR SERPL CREATININE-BSD FRML MDRD: 70 ML/MIN/1.73SQ M
GLUCOSE SERPL-MCNC: 83 MG/DL (ref 65–140)
HCT VFR BLD AUTO: 36.9 % (ref 36.5–49.3)
HGB BLD-MCNC: 12.1 G/DL (ref 12–17)
IMM GRANULOCYTES # BLD AUTO: 0.06 THOUSAND/UL (ref 0–0.2)
IMM GRANULOCYTES NFR BLD AUTO: 1 % (ref 0–2)
IRON SATN MFR SERPL: 33 % (ref 15–50)
IRON SERPL-MCNC: 116 UG/DL (ref 50–212)
LYMPHOCYTES # BLD AUTO: 1.13 THOUSANDS/ÂΜL (ref 0.6–4.47)
LYMPHOCYTES NFR BLD AUTO: 12 % (ref 14–44)
MCH RBC QN AUTO: 34.5 PG (ref 26.8–34.3)
MCHC RBC AUTO-ENTMCNC: 32.8 G/DL (ref 31.4–37.4)
MCV RBC AUTO: 105 FL (ref 82–98)
MONOCYTES # BLD AUTO: 0.94 THOUSAND/ÂΜL (ref 0.17–1.22)
MONOCYTES NFR BLD AUTO: 10 % (ref 4–12)
NEUTROPHILS # BLD AUTO: 6.87 THOUSANDS/ÂΜL (ref 1.85–7.62)
NEUTS SEG NFR BLD AUTO: 74 % (ref 43–75)
NRBC BLD AUTO-RTO: 0 /100 WBCS
PHOSPHATE SERPL-MCNC: 3.5 MG/DL (ref 2.3–4.1)
PLATELET # BLD AUTO: 272 THOUSANDS/UL (ref 149–390)
PMV BLD AUTO: 9.5 FL (ref 8.9–12.7)
POTASSIUM SERPL-SCNC: 4.4 MMOL/L (ref 3.5–5.3)
RBC # BLD AUTO: 3.51 MILLION/UL (ref 3.88–5.62)
SODIUM SERPL-SCNC: 133 MMOL/L (ref 135–147)
TIBC SERPL-MCNC: 347 UG/DL (ref 250–450)
UIBC SERPL-MCNC: 231 UG/DL (ref 155–355)
WBC # BLD AUTO: 9.26 THOUSAND/UL (ref 4.31–10.16)

## 2024-06-03 PROCEDURE — 80069 RENAL FUNCTION PANEL: CPT

## 2024-06-03 PROCEDURE — 83540 ASSAY OF IRON: CPT

## 2024-06-03 PROCEDURE — 83550 IRON BINDING TEST: CPT

## 2024-06-03 PROCEDURE — 82728 ASSAY OF FERRITIN: CPT

## 2024-06-03 PROCEDURE — 85025 COMPLETE CBC W/AUTO DIFF WBC: CPT

## 2024-06-03 PROCEDURE — 36415 COLL VENOUS BLD VENIPUNCTURE: CPT

## 2024-06-04 ENCOUNTER — TELEPHONE (OUTPATIENT)
Dept: NEPHROLOGY | Facility: CLINIC | Age: 74
End: 2024-06-04

## 2024-06-05 DIAGNOSIS — F41.9 ANXIETY: ICD-10-CM

## 2024-06-05 RX ORDER — ALPRAZOLAM 1 MG/1
1 TABLET ORAL 2 TIMES DAILY PRN
Qty: 60 TABLET | Refills: 0 | Status: SHIPPED | OUTPATIENT
Start: 2024-06-05 | End: 2024-07-05

## 2024-06-05 NOTE — TELEPHONE ENCOUNTER
Reason for call:   [x] Refill   [] Prior Auth  [] Other:     Office:   [x] PCP/Provider -   [] Specialty/Provider -     Medication: ALPRAZolam (XANAX) 1 mg tablet     Dose/Frequency: 1 mg, Oral, 2 times daily PRN     Quantity: 60    Pharmacy: [id number] - [place],     Does the patient have enough for 3 days?   [] Yes   [x] No - Send as HP to POD

## 2024-06-07 ENCOUNTER — OFFICE VISIT (OUTPATIENT)
Dept: VASCULAR SURGERY | Facility: CLINIC | Age: 74
End: 2024-06-07
Payer: COMMERCIAL

## 2024-06-07 VITALS
HEIGHT: 62 IN | SYSTOLIC BLOOD PRESSURE: 196 MMHG | DIASTOLIC BLOOD PRESSURE: 110 MMHG | OXYGEN SATURATION: 96 % | HEART RATE: 79 BPM | WEIGHT: 95 LBS | BODY MASS INDEX: 17.48 KG/M2

## 2024-06-07 DIAGNOSIS — I12.9 HYPERTENSIVE RENAL DISEASE, STAGE 1 THROUGH STAGE 4 OR UNSPECIFIED CHRONIC KIDNEY DISEASE: ICD-10-CM

## 2024-06-07 DIAGNOSIS — R09.89 PROMINENT POPLITEAL PULSE: ICD-10-CM

## 2024-06-07 DIAGNOSIS — Z98.890 S/P ENDOVASCULAR ANEURYSM REPAIR: Primary | ICD-10-CM

## 2024-06-07 DIAGNOSIS — Z95.828 HISTORY OF ENDOVASCULAR STENT GRAFT FOR ABDOMINAL AORTIC ANEURYSM (AAA): ICD-10-CM

## 2024-06-07 DIAGNOSIS — Z86.79 S/P ENDOVASCULAR ANEURYSM REPAIR: Primary | ICD-10-CM

## 2024-06-07 DIAGNOSIS — R33.9 URINE RETENTION: ICD-10-CM

## 2024-06-07 PROCEDURE — 99213 OFFICE O/P EST LOW 20 MIN: CPT | Performed by: SURGERY

## 2024-06-07 RX ORDER — AMLODIPINE BESYLATE 10 MG/1
10 TABLET ORAL DAILY
Qty: 90 TABLET | Refills: 0 | Status: SHIPPED | OUTPATIENT
Start: 2024-06-07 | End: 2024-09-05

## 2024-06-07 NOTE — ASSESSMENT & PLAN NOTE
Uncontrolled HTN on metoprolol. His norvasc was not refilled and he has uncontrolled BP (196/110 today). Prescription refill provided. Advised to resume taking his norvasc. He has a follow-up appointment arranged with his PCP. Instructed to monitor his BP daily and update his PCP on next appt.

## 2024-06-07 NOTE — ASSESSMENT & PLAN NOTE
Prominent popliteal pulse. LEAD obtained demonstrating normal caliber popliteal arteries with triphasic waveforms throughout. No further testing required.

## 2024-06-07 NOTE — LETTER
June 7, 2024     Juan J Camarena MD  [address] [facility] [address]    Patient: Ross Mcguire   YOB: 1950   Date of Visit: 6/7/2024       Dear Dr. Camarena:    Below are the relevant portions of my assessment and plan of care.     History of endovascular stent graft for abdominal aortic aneurysm (AAA)  Assessment & Plan:  History of EVAR c/b right LIZZIE dissection with EIA stenting in 2018 with Dr. 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 He has chronic back pain. He denies claudication, rest pain or tissue loss.     EVAR duplex - residual sac size 3.8x3.6cm, elevated velocities in left iliac limb, SANFORD: Rt - 1.03 (Prior 1.16) and Lt - 1.09 (Prior 1.06), GTP within healing range     -We discussed the pathophysiology of aneurysmal disease, continued management/surveillance protocol.   -Stable AAA sac size and unchanged velocities in left iliac limb. Post-op intervention CTAP demonstrated widely patent limbs/stents last year.  -Recommend continued surveillance with EVAR duplex in 6 months.        Prominent popliteal pulse  Assessment & Plan:  Prominent popliteal pulse. LEAD obtained demonstrating normal caliber popliteal arteries with triphasic waveforms throughout. No further testing required.  Hypertensive renal disease, stage 1 through stage 4 or unspecified chronic kidney disease  Assessment & Plan:  Uncontrolled HTN on metoprolol. His norvasc was not refilled and he has uncontrolled BP (196/110 today). Prescription refill provided. Advised to resume taking his norvasc. He has a follow-up appointment arranged with his PCP. Instructed to monitor his BP daily and update his PCP on next appt.       If you have questions, please do not hesitate to call me. I look forward to following Ross along with you.          Sincerely,        Bri Angel MD        CC: No Recipients

## 2024-06-07 NOTE — PROGRESS NOTES
Ambulatory Visit  Name: Ross Mcguire      : 1950      MRN: [id number]  Encounter Provider: Bri Angel MD  Encounter Date: 2024   Encounter department: [facility] [place]    Assessment & Plan   1. S/P endovascular aneurysm repair  -      VAS EVAR DUPLEX EVALUATION; Future; Expected date: 2024  2. Urine retention  -     amLODIPine (NORVASC) 10 mg tablet; Take 1 tablet (10 mg total) by mouth daily  3. SIADH (syndrome of inappropriate ADH production) (HCC)  -     amLODIPine (NORVASC) 10 mg tablet; Take 1 tablet (10 mg total) by mouth daily  4. 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 History of endovascular stent graft for abdominal aortic aneurysm (AAA)  Assessment & Plan:  History of EVAR c/b right LIZZIE dissection with EIA stenting in 2018 with Dr. 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 He has chronic back pain. He denies claudication, rest pain or tissue loss.    EVAR duplex - residual sac size 3.8x3.6cm, elevated velocities in left iliac limb, SANFORD: Rt - 1.03 (Prior 1.16) and Lt - 1.09 (Prior 1.06), GTP within healing range    -We discussed the pathophysiology of aneurysmal disease, continued management/surveillance protocol.   -Stable AAA sac size and unchanged velocities in left iliac limb. Post-op intervention CTAP demonstrated widely patent limbs/stents last year.  -Recommend continued surveillance with EVAR duplex in 6 months.       6. Prominent popliteal pulse  Assessment & Plan:  Prominent popliteal pulse. LEAD obtained demonstrating normal caliber popliteal arteries with triphasic waveforms throughout. No further testing required.  7. Hypertensive renal disease, stage 1 through stage 4 or unspecified chronic kidney disease  Assessment & Plan:  Uncontrolled HTN on metoprolol. His norvasc was not  "refilled and he has uncontrolled BP (196/110 today). Prescription refill provided. Advised to resume taking his norvasc. He has a follow-up appointment arranged with his PCP. Instructed to monitor his BP daily and update his PCP on next appt.        History of Present Illness     See assessment & plan    Patient presents for review of RADHA and EVAR done 4/1/24. He denies abdominal pain, pain after eating or low back pain. He denies pain with walking or wounds of BLE. He is taking ASA 81 mg and Pravastatin. He is former smoker.     Review of Systems   Constitutional: Negative.    HENT:          [place]   Eyes: Negative.    Respiratory: Negative.     Cardiovascular: Negative.    Gastrointestinal: Negative.    Endocrine: Negative.    Genitourinary: Negative.    Musculoskeletal:         Ambulates with cane   Skin: Negative.    Allergic/Immunologic: Negative.    Neurological: Negative.    Hematological: Negative.    Psychiatric/Behavioral: Negative.       I have personally reviewed the ROS entered by MA and agree as documented.      Objective     BP (!) 196/110 (BP Location: Left arm, Patient Position: Sitting, Cuff Size: Standard)   Pulse 79   Ht 5' 2\" (1.575 m)   Wt 43.1 kg (95 lb)   SpO2 96%   BMI 17.38 kg/m²     Physical Exam  Constitutional:       Appearance: Normal appearance.   HENT:      Head: Normocephalic and atraumatic.   Cardiovascular:      Rate and Rhythm: Normal rate.      Pulses:           Dorsalis pedis pulses are 1+ on the right side and 2+ on the left side.        Posterior tibial pulses are 1+ on the right side and 1+ on the left side.   Pulmonary:      Effort: Pulmonary effort is normal.   Abdominal:      General: There is no distension.      Palpations: Abdomen is soft.      Tenderness: There is no abdominal tenderness.   Musculoskeletal:         General: Normal range of motion.      Cervical back: Normal range of motion and neck supple.   Skin:     General: Skin is warm and dry.      Capillary " Refill: Capillary refill takes less than 2 seconds.   Neurological:      General: No focal deficit present.      Mental Status: He is alert and oriented to person, place, and time.   Psychiatric:         Mood and Affect: Mood normal.         Behavior: Behavior normal.         Thought Content: Thought content normal.         Judgment: Judgment normal.       Administrative Statements   I have spent a total time of 25 minutes on 06/07/24 In caring for this patient including Diagnostic results, Risks and benefits of tx options, Instructions for management, Patient and family education, Importance of tx compliance, Risk factor reductions, Impressions, Counseling / Coordination of care, Documenting in the medical record, Reviewing / ordering tests, medicine, procedures  , and Obtaining or reviewing history  .

## 2024-06-07 NOTE — PATIENT INSTRUCTIONS
History of endovascular stent graft for abdominal aortic aneurysm (AAA)  Assessment & Plan:  History of EVAR c/b right LIZZIE dissection with EIA stenting in 2018 with Dr. 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 He has chronic back pain. He denies claudication, rest pain or tissue loss.     EVAR duplex - residual sac size 3.8x3.6cm, elevated velocities in left iliac limb, SANFORD: Rt - 1.03 (Prior 1.16) and Lt - 1.09 (Prior 1.06), GTP within healing range     -We discussed the pathophysiology of aneurysmal disease, continued management/surveillance protocol.   -Stable AAA sac size and unchanged velocities in left iliac limb. Post-op intervention CTAP demonstrated widely patent limbs/stents last year.  -Recommend continued surveillance with EVAR duplex in 6 months.        Prominent popliteal pulse  Assessment & Plan:  Prominent popliteal pulse. LEAD obtained demonstrating normal caliber popliteal arteries with triphasic waveforms throughout. No further testing required.  Hypertensive renal disease, stage 1 through stage 4 or unspecified chronic kidney disease  Assessment & Plan:  Uncontrolled HTN on metoprolol. His norvasc was not refilled and he has uncontrolled BP (196/110 today). Prescription refill provided. Advised to resume taking his norvasc. He has a follow-up appointment arranged with his PCP. Instructed to monitor his BP daily and update his PCP on next appt.

## 2024-06-07 NOTE — ASSESSMENT & PLAN NOTE
History of EVAR c/b right LIZZIE dissection with EIA stenting in 2018 with Dr. 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 He has chronic back pain. He denies claudication, rest pain or tissue loss.    EVAR duplex - residual sac size 3.8x3.6cm, elevated velocities in left iliac limb, SANFORD: Rt - 1.03 (Prior 1.16) and Lt - 1.09 (Prior 1.06), GTP within healing range    -We discussed the pathophysiology of aneurysmal disease, continued management/surveillance protocol.   -Stable AAA sac size and unchanged velocities in left iliac limb. Post-op intervention CTAP demonstrated widely patent limbs/stents last year.  -Recommend continued surveillance with EVAR duplex in 6 months.

## 2024-06-10 DIAGNOSIS — F32.A DEPRESSION: ICD-10-CM

## 2024-06-10 RX ORDER — CITALOPRAM 20 MG/1
20 TABLET ORAL DAILY
Qty: 90 TABLET | Refills: 0 | Status: SHIPPED | OUTPATIENT
Start: 2024-06-10 | End: 2024-09-08

## 2024-06-10 NOTE — TELEPHONE ENCOUNTER
Reason for call:   [x] Refill   [] Prior Auth  [] Other:     Office:   [x] PCP/Provider -   [] Specialty/Provider -     Medication: Citalopram     Dose/Frequency: 20 mg tablet taken by mouth once daily     Quantity: 90    Pharmacy: [id number] - [place] - [address] [facility] [phone]     Does the patient have enough for 3 days?   [x] Yes   [] No - Send as HP to POD

## 2024-06-13 ENCOUNTER — OFFICE VISIT (OUTPATIENT)
Dept: GASTROENTEROLOGY | Facility: CLINIC | Age: 74
End: 2024-06-13
Payer: COMMERCIAL

## 2024-06-13 ENCOUNTER — APPOINTMENT (OUTPATIENT)
Dept: LAB | Facility: HOSPITAL | Age: 74
End: 2024-06-13

## 2024-06-13 VITALS
SYSTOLIC BLOOD PRESSURE: 160 MMHG | HEIGHT: 62 IN | DIASTOLIC BLOOD PRESSURE: 92 MMHG | WEIGHT: 94 LBS | BODY MASS INDEX: 17.3 KG/M2

## 2024-06-13 DIAGNOSIS — Z86.010 HISTORY OF COLON POLYPS: ICD-10-CM

## 2024-06-13 DIAGNOSIS — E63.9 POOR NUTRITION: ICD-10-CM

## 2024-06-13 DIAGNOSIS — Z87.11 HX OF GASTRIC ULCER: ICD-10-CM

## 2024-06-13 DIAGNOSIS — D50.9 IRON DEFICIENCY ANEMIA, UNSPECIFIED IRON DEFICIENCY ANEMIA TYPE: Primary | ICD-10-CM

## 2024-06-13 DIAGNOSIS — R63.4 WEIGHT LOSS, ABNORMAL: ICD-10-CM

## 2024-06-13 DIAGNOSIS — R19.7 DIARRHEA, UNSPECIFIED TYPE: ICD-10-CM

## 2024-06-13 DIAGNOSIS — K21.9 GASTROESOPHAGEAL REFLUX DISEASE, UNSPECIFIED WHETHER ESOPHAGITIS PRESENT: ICD-10-CM

## 2024-06-13 PROCEDURE — 99214 OFFICE O/P EST MOD 30 MIN: CPT | Performed by: NURSE PRACTITIONER

## 2024-06-13 PROCEDURE — G2211 COMPLEX E/M VISIT ADD ON: HCPCS | Performed by: NURSE PRACTITIONER

## 2024-06-13 NOTE — PROGRESS NOTES
[facility] - Outpatient Follow-up Note  Ross Mcguire 74 y.o. male MRN: [id number]  Encounter: [id number]    ASSESSMENT AND PLAN:      1. Diarrhea, unspecified type  Patient states he has had liquid diarrhea since after his last office visit.  Patient states he has been taking Imodium as needed with success.  Patient states he is having up to approximately 6 liquid bowel movements per day.  Patient denies any recent antibiotic use.  Consider infectious versus inflammatory.  Instructed patient not to take any more Imodium until stool studies are retrieved.  Discussed with patient trialing cholestyramine however patient would like to not add another medication to his regimen and his okay with continuing on Imodium as needed.  Also discussed with patient after his friend had mentioned he drinks a few bottles of Gatorade per day.  With the high sugar content would be concerned that may also be contributing to his diarrhea.  Patient also does take Creon for exocrine pancreatic insufficiency.  Will also test to see how his levels are.  May need to adjust his Creon dosing.    - Clostridium difficile toxin by PCR with EIA; Future  - Stool Enteric Bacterial Panel by PCR  - Calprotectin,Fecal; Future  - Pancreatic elastase, fecal; Future  - Giardia antigen  - Ova and parasite examination  - FECAL LEUKOCYTES; Future    2. Iron deficiency anemia, unspecified iron deficiency anemia type  Patient is currently taking ferrous sulfate 325 mg tablet daily.  His recent iron panel was normal for the second time.  Discussed with patient taking the iron supplement now every other day and will repeat lab work in 3 months with follow-up office visit.    - CBC and differential; Future  - Iron Panel (Includes Ferritin, Iron Sat%, Iron, and TIBC); Future    3. Gastroesophageal reflux disease, unspecified whether esophagitis present  Patient states he is not having any acid reflux symptoms with  current regimen of medication.  Patient takes omeprazole 40 mg daily.    4. Weight loss  5. Poor nutrition  Patient states since last office visit he has doing his best to eat better calories.  States he is quit smoking since last office visit.  He also states he is taking nutrition protein drinks daily.  Patient has gained 3 pounds since last office visit on 2/14/2024.  Current weight is 94.  Weight at last office visit was 91.    -Weight check with follow-up office visit    6. Hx of gastric ulcer  Noted from EGD 10/5/2023, healed previous gastric ulcer.  Patient continues on omeprazole daily for GERD symptoms.    7. History of colon polyps  Colonoscopy 6/2023; no recall with age      Followup Appointment: 3 months  ______________________________________________________________________      HPI:    74-year-old male accompanied by his friend Temo, past medical history of gastric ulcer, GERD, pancreatic insufficiency, common bile duct dilatation, hypertension, AAA, CKD stage III, hyperlipidemia, protein calorie malnutrition, hyponatremia, weight loss and iron deficiency presents for follow-up and medication review.  Patient's iron panel since last office visit is now normal.  Patient has had normal iron panels now since February of this year.  He has been taking daily iron supplementation.  Discussed taking it every other day and reevaluating with 3-month lab work.    Patient states since last office visit he is been having increased diarrhea.  States he has approximately 6 liquid bowel movements per day.  Patient states he has not been on any antibiotics recently.  States he is actually feeling pretty good and he has gained 3 pounds since last office visit.  He has also quit smoking.  Congratulated him on his achievement.    On exam, patient denies nausea, vomiting or abdominal pain.  States his acid reflux symptoms are well-controlled with medication.  Denies hematochezia however does note his stools are very dark  to black and patient is taking daily iron supplements.  Hemoglobin is stable at 12.1.  No overt GI bleeding noted.    Discussed with patient obtaining stool studies before taking any more Imodium.  Discussed using cholestyramine however patient would prefer not to add another medication to his large regimen already.  He is comfortable just using Imodium as needed.    Patient's last colonoscopy 6/2023 with noted history of polyps, no recall with age.    Historical Information   Past Medical History:   Diagnosis Date    Anxiety     Rhodes's esophagus     Chronic neck pain     Depression     GERD (gastroesophageal reflux disease)     H/O abdominal aortic aneurysm 4.5 cm    Hyperlipidemia     Hypertension     Pancreatic pseudocyst     Vertigo      Past Surgical History:   Procedure Laterality Date    COLONOSCOPY  09/30/2014    IR LOWER EXTREMITY / INTERVENTION  01/04/2019    CA ESOPHAGOGASTRODUODENOSCOPY TRANSORAL DIAGNOSTIC N/A 06/22/2018    Procedure: EGD AND COLONOSCOPY;  Surgeon: Quinton Garcia MD;  Location: [facility];  Service: Gastroenterology    CA EVASC RPR DPLMNT AORTO-AORTIC NDGFT N/A 02/09/2018    Procedure: REPAIR ANEURYSM ENDOVASCULAR ABDOMINAL AORTIC  (EVAR);  Surgeon: Jeremy Jackson MD;  Location: [facility];  Service: Vascular    UPPER GASTROINTESTINAL ENDOSCOPY       Social History     Substance and Sexual Activity   Alcohol Use Never     Social History     Substance and Sexual Activity   Drug Use No     Social History     Tobacco Use   Smoking Status Former    Current packs/day: 1.00    Average packs/day: 1 pack/day for 62.4 years (62.4 ttl pk-yrs)    Types: Cigarettes    Start date: 1962   Smokeless Tobacco Never     Family History   Problem Relation Age of Onset    Heart disease Father     Heart attack Father     Diabetes Maternal Grandmother     Diabetes Maternal Grandfather     Diabetes Paternal Grandmother     Hypertension Family     Hyperlipidemia Family          Current Outpatient Medications:      "ALPRAZolam (XANAX) 1 mg tablet    amLODIPine (NORVASC) 10 mg tablet    aspirin (ECOTRIN LOW STRENGTH) 81 mg EC tablet    citalopram (CeleXA) 20 mg tablet    dicyclomine (BENTYL) 10 mg capsule    ferrous sulfate 325 (65 Fe) mg tablet    finasteride (PROSCAR) 5 mg tablet    loteprednol etabonate (LOTEMAX) 0.5 % ophthalmic suspension    magnesium oxide (MAG-OX) 400 mg tablet    metoprolol succinate (TOPROL-XL) 25 mg 24 hr tablet    Multiple Vitamins-Minerals (CENTRUM SILVER PO)    omeprazole (PriLOSEC) 40 MG capsule    potassium chloride (MICRO-K) 10 MEQ CR capsule    pravastatin (PRAVACHOL) 40 mg tablet    Restasis 0.05 % ophthalmic emulsion    sodium chloride 1 g tablet    tamsulosin (FLOMAX) 0.4 mg    Wheat Dextrin (BENEFIBER PO)    pancrelipase, Lip-Prot-Amyl, (Creon) 12,000 units capsule  No Known Allergies  Reviewed medications and allergies and updated as indicated    PHYSICAL EXAM:    Blood pressure 160/92, height 5' 2\" (1.575 m), weight 42.6 kg (94 lb). Body mass index is 17.19 kg/m².    Normal exam    General Appearance: NAD, cooperative, alert  Eyes: Anicteric, PERRLA, EOMI  ENT:  Normocephalic, atraumatic, normal mucosa.    Neck:  Supple, symmetrical, trachea midline  Resp:  Clear to auscultation bilaterally; no rales, rhonchi or wheezing; respirations unlabored   CV:  S1 S2, Regular rate and rhythm; no murmur, rub, or gallop.  GI:  Soft, non-tender, non-distended; normal bowel sounds; no masses, no organomegaly   Rectal: Deferred  Musculoskeletal: No cyanosis, clubbing or edema. Normal ROM.  Patient ambulates with single-point cane.  Skin:  No jaundice, rashes, or lesions   Heme/Lymph: No palpable cervical lymphadenopathy  Psych: Normal affect, good eye contact  Neuro: No gross deficits, AAOx3    Lab Results:   Lab Results   Component Value Date    WBC 9.26 06/03/2024    HGB 12.1 06/03/2024    HCT 36.9 06/03/2024     (H) 06/03/2024     06/03/2024     Lab Results   Component Value Date    NA " 134 (L) 10/14/2014    K 4.4 06/03/2024    CL 97 06/03/2024    CO2 27 06/03/2024    ANIONGAP 6 10/14/2014    BUN 15 06/03/2024    CREATININE 1.03 06/03/2024    GLUCOSE 97 10/14/2014    GLUF 119 (H) 10/11/2023    CALCIUM 8.9 06/03/2024    CORRECTEDCA 9.0 02/12/2024    AST 21 02/26/2024    ALT 19 02/26/2024    ALKPHOS 88 02/26/2024    PROT 7.2 10/13/2014    BILITOT 0.37 10/13/2014    EGFR 70 06/03/2024     Lab Results   Component Value Date    IRON 116 06/03/2024    TIBC 347 06/03/2024    FERRITIN 41 06/03/2024     Lab Results   Component Value Date    LIPASE 15 06/07/2023       Radiology Results:   No results found.

## 2024-06-14 ENCOUNTER — APPOINTMENT (OUTPATIENT)
Dept: LAB | Facility: HOSPITAL | Age: 74
End: 2024-06-14
Payer: COMMERCIAL

## 2024-06-14 DIAGNOSIS — R19.7 DIARRHEA, UNSPECIFIED TYPE: ICD-10-CM

## 2024-06-14 PROCEDURE — 87493 C DIFF AMPLIFIED PROBE: CPT

## 2024-06-14 PROCEDURE — 87209 SMEAR COMPLEX STAIN: CPT | Performed by: NURSE PRACTITIONER

## 2024-06-14 PROCEDURE — 87177 OVA AND PARASITES SMEARS: CPT | Performed by: NURSE PRACTITIONER

## 2024-06-14 PROCEDURE — 87329 GIARDIA AG IA: CPT | Performed by: NURSE PRACTITIONER

## 2024-06-14 PROCEDURE — 83993 ASSAY FOR CALPROTECTIN FECAL: CPT

## 2024-06-14 PROCEDURE — 89055 LEUKOCYTE ASSESSMENT FECAL: CPT

## 2024-06-14 PROCEDURE — 82653 EL-1 FECAL QUANTITATIVE: CPT

## 2024-06-14 PROCEDURE — 87505 NFCT AGENT DETECTION GI: CPT | Performed by: NURSE PRACTITIONER

## 2024-06-15 LAB
C COLI+JEJUNI TUF STL QL NAA+PROBE: NEGATIVE
C DIFF TOX B TCDB STL QL NAA+PROBE: NEGATIVE
EC STX1+STX2 GENES STL QL NAA+PROBE: NEGATIVE
G LAMBLIA AG STL QL IA: NEGATIVE
SALMONELLA SP SPAO STL QL NAA+PROBE: NEGATIVE
SHIGELLA SP+EIEC IPAH STL QL NAA+PROBE: NEGATIVE
WBC SPEC QL GRAM STN: NORMAL

## 2024-06-17 LAB — ELASTASE PANC STL-MCNT: 147 UG ELAST./G

## 2024-06-18 DIAGNOSIS — K25.9 MULTIPLE GASTRIC ULCERS: ICD-10-CM

## 2024-06-18 RX ORDER — OMEPRAZOLE 40 MG/1
40 CAPSULE, DELAYED RELEASE ORAL DAILY
Qty: 90 CAPSULE | Refills: 1 | Status: SHIPPED | OUTPATIENT
Start: 2024-06-18

## 2024-06-18 NOTE — TELEPHONE ENCOUNTER
Reason for call:   [x] Refill   [] Prior Auth  [] Other:     Office:   [x] PCP/Provider - Juan J Camarena    [] Specialty/Provider -     Medication:     omeprazole (PriLOSEC) 40 MG capsule       Dose/Frequency:     Take 1 capsule (40 mg total) by mouth daily       Quantity: #90    Pharmacy: [id number] - [place] - [phone]     Does the patient have enough for 3 days?   [x] Yes   [] No - Send as HP to POD

## 2024-06-20 ENCOUNTER — TELEPHONE (OUTPATIENT)
Dept: OTHER | Facility: HOSPITAL | Age: 74
End: 2024-06-20

## 2024-06-20 LAB — CALPROTECTIN STL-MCNT: 403 UG/G (ref 0–120)

## 2024-06-20 NOTE — TELEPHONE ENCOUNTER
Spoke to the patient in regards to his stool studies that have returned since his last office visit.  Noted elevation in fecal calprotectin and same pancreatic insufficiency noted previous which patient is already on Creon for.  Patient states since last office visit his bowel movements are much better and he is having formed bowel movements he does use Imodium as needed to help with loose bowel movements.  Patient denies any abdominal pain or nausea vomiting.  Rectal bleeding or melena.  Encouraged patient to contact the office with any questions or concerns between now and his follow-up office visit.  Patient understands and agrees with treatment plan.

## 2024-07-05 DIAGNOSIS — F41.9 ANXIETY: ICD-10-CM

## 2024-07-05 RX ORDER — ALPRAZOLAM 1 MG/1
1 TABLET ORAL 2 TIMES DAILY PRN
Qty: 60 TABLET | Refills: 0 | Status: SHIPPED | OUTPATIENT
Start: 2024-07-05 | End: 2024-08-04

## 2024-07-05 NOTE — TELEPHONE ENCOUNTER
Medication: alprazolam    Dose/Frequency: 1 mg, 1 tablet by mouth 2x a day as needed    Quantity: 60    Pharmacy: [id number] - [place] - [address] [facility]     Office:   [x] PCP/Provider -   [] Speciality/Provider -     Does the patient have enough for 3 days?   [] Yes   [x] No - Send as HP to POD

## 2024-07-22 DIAGNOSIS — R33.9 URINARY RETENTION WITH INCOMPLETE BLADDER EMPTYING: ICD-10-CM

## 2024-07-22 DIAGNOSIS — N32.0 BLADDER OUTLET OBSTRUCTION: ICD-10-CM

## 2024-07-22 NOTE — TELEPHONE ENCOUNTER
Reason for call:   [x] Refill   [] Prior Auth  [] Other:     Office:   [x] PCP/Provider - ESTEFANI BOURGEOIS [facility] MIGUEL BALLESTEROS / Juan J Camarena MD   [] Specialty/Provider -     Medication:       Does the patient have enough for 3 days?   [x] Yes   [] No - Send as HP to POD

## 2024-07-23 RX ORDER — FINASTERIDE 5 MG/1
5 TABLET, FILM COATED ORAL DAILY
Qty: 90 TABLET | Refills: 1 | Status: SHIPPED | OUTPATIENT
Start: 2024-07-23

## 2024-07-26 ENCOUNTER — TELEPHONE (OUTPATIENT)
Dept: NEPHROLOGY | Facility: CLINIC | Age: 74
End: 2024-07-26

## 2024-07-26 NOTE — TELEPHONE ENCOUNTER
LVM to return call to reschedule 9/13/24 appt with Randall. If pt calls back please transfer to office staff.

## 2024-07-29 DIAGNOSIS — E87.1 HYPONATREMIA: ICD-10-CM

## 2024-07-29 DIAGNOSIS — R10.30 LOWER ABDOMINAL PAIN: ICD-10-CM

## 2024-07-29 DIAGNOSIS — N18.30 STAGE 3 CHRONIC KIDNEY DISEASE, UNSPECIFIED WHETHER STAGE 3A OR 3B CKD (HCC): ICD-10-CM

## 2024-07-29 DIAGNOSIS — E83.42 HYPOMAGNESEMIA: ICD-10-CM

## 2024-07-29 DIAGNOSIS — I10 PRIMARY HYPERTENSION: ICD-10-CM

## 2024-07-29 RX ORDER — DICYCLOMINE HYDROCHLORIDE 10 MG/1
CAPSULE ORAL
Qty: 300 CAPSULE | Refills: 1 | Status: SHIPPED | OUTPATIENT
Start: 2024-07-29

## 2024-07-30 RX ORDER — POTASSIUM CHLORIDE 750 MG/1
20 CAPSULE, EXTENDED RELEASE ORAL DAILY
Qty: 90 CAPSULE | Refills: 3 | Status: SHIPPED | OUTPATIENT
Start: 2024-07-30

## 2024-08-05 DIAGNOSIS — F41.9 ANXIETY: ICD-10-CM

## 2024-08-05 RX ORDER — ALPRAZOLAM 1 MG/1
1 TABLET ORAL 2 TIMES DAILY PRN
Qty: 60 TABLET | Refills: 0 | Status: SHIPPED | OUTPATIENT
Start: 2024-08-05 | End: 2024-09-04

## 2024-08-05 NOTE — TELEPHONE ENCOUNTER
Medication:     ALPRAZolam (XANAX)        Dose/Frequency: 1 mg    Quantity: 60 Tablet    Pharmacy: [id number] - [place] - [address] [facility]      Office:   [x] PCP/Provider -   [] Speciality/Provider -     Does the patient have enough for 3 days?   [] Yes   [x] No - Send as HP to POD

## 2024-08-14 ENCOUNTER — TELEPHONE (OUTPATIENT)
Dept: LAB | Facility: HOSPITAL | Age: 74
End: 2024-08-14

## 2024-08-21 ENCOUNTER — OFFICE VISIT (OUTPATIENT)
Dept: INTERNAL MEDICINE CLINIC | Facility: CLINIC | Age: 74
End: 2024-08-21
Payer: COMMERCIAL

## 2024-08-21 VITALS
WEIGHT: 98 LBS | HEART RATE: 70 BPM | OXYGEN SATURATION: 99 % | DIASTOLIC BLOOD PRESSURE: 90 MMHG | SYSTOLIC BLOOD PRESSURE: 170 MMHG | HEIGHT: 62 IN | BODY MASS INDEX: 18.03 KG/M2 | TEMPERATURE: 98 F

## 2024-08-21 DIAGNOSIS — Z00.00 ENCOUNTER FOR MEDICARE ANNUAL WELLNESS EXAM: Primary | ICD-10-CM

## 2024-08-21 PROCEDURE — G0439 PPPS, SUBSEQ VISIT: HCPCS | Performed by: INTERNAL MEDICINE

## 2024-08-21 NOTE — PROGRESS NOTES
Ambulatory Visit  Name: Ross Mcguire      : 1950      MRN: [id number]  Encounter Provider: Juan J Camarena MD  Encounter Date: 2024   Encounter department: [facility]    Assessment & Plan   1. Encounter for Medicare annual wellness exam       Preventive health issues were discussed with patient, and age appropriate screening tests were ordered as noted in patient's After Visit Summary. Personalized health advice and appropriate referrals for health education or preventive services given if needed, as noted in patient's After Visit Summary.    History of Present Illness     HPI   Patient Care Team:  Juan J Camarena MD as PCP - General  LILIANE Wynn MD (Vascular Surgery)  MD Quinton Castro MD as Endoscopist    Review of Systems   Constitutional: Negative.    HENT:  Negative for dental problem, drooling, ear discharge and ear pain.    Eyes:  Negative for discharge, redness and itching.   Respiratory:  Negative for apnea, cough and wheezing.    Cardiovascular:  Negative for chest pain and palpitations.   Gastrointestinal:  Negative for abdominal pain, blood in stool, diarrhea and vomiting.   Endocrine: Negative for polydipsia, polyphagia and polyuria.   Genitourinary:  Negative for decreased urine volume, dysuria and frequency.   Musculoskeletal:  Negative for arthralgias, myalgias and neck stiffness.   Skin:  Negative for pallor and wound.   Allergic/Immunologic: Negative for environmental allergies and food allergies.   Neurological:  Negative for facial asymmetry, light-headedness, numbness and headaches.   Hematological:  Negative for adenopathy. Does not bruise/bleed easily.   Psychiatric/Behavioral:  Negative for agitation, behavioral problems and confusion.      Medical History Reviewed by provider this encounter:       Annual Wellness Visit Questionnaire   Last Medicare Wellness visit information reviewed,  patient interviewed and updates made to the record today.      Health Risk Assessment:   Patient rates overall health as fair. Patient feels that their physical health rating is much worse. Patient is satisfied with their life. Eyesight was rated as same. Hearing was rated as much worse. Patient feels that their emotional and mental health rating is same. Patients states they are sometimes angry. Patient states they are often unusually tired/fatigued. Pain experienced in the last 7 days has been a lot. Patient's pain rating has been 7/10. Patient states that he has experienced no weight loss or gain in last 6 months.     Fall Risk Screening:   In the past year, patient has experienced: no history of falling in past year      Home Safety:  Patient does not have trouble with stairs inside or outside of their home. Patient has working smoke alarms and has working carbon monoxide detector. Home safety hazards include: none.     Nutrition:   Current diet is Regular.     Medications:   Patient is not currently taking any over-the-counter supplements. Patient is able to manage medications.     Activities of Daily Living (ADLs)/Instrumental Activities of Daily Living (IADLs):   Walk and transfer into and out of bed and chair?: Yes  Dress and groom yourself?: Yes    Bathe or shower yourself?: Yes    Feed yourself? Yes  Do your laundry/housekeeping?: No  Manage your money, pay your bills and track your expenses?: No  Make your own meals?: No    Do your own shopping?: No    Previous Hospitalizations:   Any hospitalizations or ED visits within the last 12 months?: No      Advance Care Planning:   Living will: Yes    Durable POA for healthcare: Yes    Advanced directive: Yes    Five wishes given: No      PREVENTIVE SCREENINGS      Cardiovascular Screening:    General: Screening Not Indicated and History Lipid Disorder      Diabetes Screening:     General: Screening Current      Prostate Cancer Screening:    General: Screening  "Not Indicated      Abdominal Aortic Aneurysm (AAA) Screening:    Risk factors include: age between 65-76 yo and tobacco use        General: Screening Not Indicated and History AAA      Hepatitis C Screening:    General: Screening Current    Screening, Brief Intervention, and Referral to Treatment (SBIRT)    Screening  Typical number of drinks in a day: 0  Typical number of drinks in a week: 0  Interpretation: Low risk drinking behavior.    Single Item Drug Screening:  How often have you used an illegal drug (including marijuana) or a prescription medication for non-medical reasons in the past year? never    Single Item Drug Screen Score: 0  Interpretation: Negative screen for possible drug use disorder    Social Determinants of Health     Financial Resource Strain: Low Risk  (8/16/2023)    Overall Financial Resource Strain (CARDIA)    • Difficulty of Paying Living Expenses: Not hard at all   Food Insecurity: No Food Insecurity (8/21/2024)    Hunger Vital Sign    • Worried About Running Out of Food in the Last Year: Never true    • Ran Out of Food in the Last Year: Never true   Transportation Needs: No Transportation Needs (8/21/2024)    PRAPARE - Transportation    • Lack of Transportation (Medical): No    • Lack of Transportation (Non-Medical): No   Housing Stability: Low Risk  (8/21/2024)    Housing Stability Vital Sign    • Unable to Pay for Housing in the Last Year: No    • Number of Times Moved in the Last Year: 0    • Homeless in the Last Year: No   Utilities: Not At Risk (8/21/2024)    [facility] Utilities    • Threatened with loss of utilities: No     No results found.    Objective     /90 (BP Location: Left arm, Patient Position: Sitting, Cuff Size: Standard)   Pulse 70   Temp 98 °F (36.7 °C) (Temporal)   Ht 5' 2\" (1.575 m)   Wt 44.5 kg (98 lb)   SpO2 99%   BMI 17.92 kg/m²     Physical Exam  Vitals and nursing note reviewed.   Constitutional:       General: He is not in acute distress.     Appearance: " He is well-developed. He is ill-appearing.   HENT:      Head: Normocephalic and atraumatic.   Eyes:      Conjunctiva/sclera: Conjunctivae normal.   Cardiovascular:      Rate and Rhythm: Normal rate and regular rhythm.      Heart sounds: No murmur heard.  Pulmonary:      Effort: Pulmonary effort is normal. No respiratory distress.      Breath sounds: Normal breath sounds.   Abdominal:      Palpations: Abdomen is soft.      Tenderness: There is no abdominal tenderness.   Musculoskeletal:         General: No swelling.      Cervical back: Neck supple.   Skin:     General: Skin is warm and dry.      Capillary Refill: Capillary refill takes less than 2 seconds.   Neurological:      Mental Status: He is alert.   Psychiatric:         Mood and Affect: Mood normal.

## 2024-08-26 ENCOUNTER — APPOINTMENT (OUTPATIENT)
Dept: LAB | Facility: HOSPITAL | Age: 74
End: 2024-08-26
Payer: COMMERCIAL

## 2024-08-26 DIAGNOSIS — D50.9 IRON DEFICIENCY ANEMIA, UNSPECIFIED IRON DEFICIENCY ANEMIA TYPE: ICD-10-CM

## 2024-08-26 LAB
BASOPHILS # BLD AUTO: 0.06 THOUSANDS/ÂΜL (ref 0–0.1)
BASOPHILS NFR BLD AUTO: 1 % (ref 0–1)
EOSINOPHIL # BLD AUTO: 0.2 THOUSAND/ÂΜL (ref 0–0.61)
EOSINOPHIL NFR BLD AUTO: 3 % (ref 0–6)
ERYTHROCYTE [DISTWIDTH] IN BLOOD BY AUTOMATED COUNT: 13.4 % (ref 11.6–15.1)
FERRITIN SERPL-MCNC: 42 NG/ML (ref 24–336)
HCT VFR BLD AUTO: 37.1 % (ref 36.5–49.3)
HGB BLD-MCNC: 12 G/DL (ref 12–17)
IMM GRANULOCYTES # BLD AUTO: 0.07 THOUSAND/UL (ref 0–0.2)
IMM GRANULOCYTES NFR BLD AUTO: 1 % (ref 0–2)
IRON SATN MFR SERPL: 25 % (ref 15–50)
IRON SERPL-MCNC: 79 UG/DL (ref 50–212)
LYMPHOCYTES # BLD AUTO: 1.17 THOUSANDS/ÂΜL (ref 0.6–4.47)
LYMPHOCYTES NFR BLD AUTO: 16 % (ref 14–44)
MCH RBC QN AUTO: 33.4 PG (ref 26.8–34.3)
MCHC RBC AUTO-ENTMCNC: 32.3 G/DL (ref 31.4–37.4)
MCV RBC AUTO: 103 FL (ref 82–98)
MONOCYTES # BLD AUTO: 0.76 THOUSAND/ÂΜL (ref 0.17–1.22)
MONOCYTES NFR BLD AUTO: 11 % (ref 4–12)
NEUTROPHILS # BLD AUTO: 4.93 THOUSANDS/ÂΜL (ref 1.85–7.62)
NEUTS SEG NFR BLD AUTO: 68 % (ref 43–75)
NRBC BLD AUTO-RTO: 0 /100 WBCS
PLATELET # BLD AUTO: 251 THOUSANDS/UL (ref 149–390)
PMV BLD AUTO: 9.6 FL (ref 8.9–12.7)
RBC # BLD AUTO: 3.59 MILLION/UL (ref 3.88–5.62)
TIBC SERPL-MCNC: 321 UG/DL (ref 250–450)
UIBC SERPL-MCNC: 242 UG/DL (ref 155–355)
WBC # BLD AUTO: 7.19 THOUSAND/UL (ref 4.31–10.16)

## 2024-08-26 PROCEDURE — 85025 COMPLETE CBC W/AUTO DIFF WBC: CPT

## 2024-08-26 PROCEDURE — 83550 IRON BINDING TEST: CPT

## 2024-08-26 PROCEDURE — 83540 ASSAY OF IRON: CPT

## 2024-08-26 PROCEDURE — 36415 COLL VENOUS BLD VENIPUNCTURE: CPT

## 2024-08-26 PROCEDURE — 82728 ASSAY OF FERRITIN: CPT

## 2024-08-29 DIAGNOSIS — R33.9 URINE RETENTION: ICD-10-CM

## 2024-08-29 RX ORDER — AMLODIPINE BESYLATE 10 MG/1
10 TABLET ORAL DAILY
Qty: 90 TABLET | Refills: 1 | Status: SHIPPED | OUTPATIENT
Start: 2024-08-29

## 2024-08-30 DIAGNOSIS — F32.A DEPRESSION: ICD-10-CM

## 2024-08-30 DIAGNOSIS — I10 HYPERTENSION: ICD-10-CM

## 2024-08-30 DIAGNOSIS — E87.6 HYPOKALEMIA: ICD-10-CM

## 2024-08-30 RX ORDER — CITALOPRAM HYDROBROMIDE 20 MG/1
20 TABLET ORAL DAILY
Qty: 90 TABLET | Refills: 1 | Status: SHIPPED | OUTPATIENT
Start: 2024-08-30 | End: 2025-02-26

## 2024-08-30 RX ORDER — MAGNESIUM OXIDE 400 MG/1
400 TABLET ORAL DAILY
Qty: 90 TABLET | Refills: 1 | Status: SHIPPED | OUTPATIENT
Start: 2024-08-30

## 2024-08-30 NOTE — TELEPHONE ENCOUNTER
Reason for call:   [x] Refill   [] Prior Auth  [] Other:     Office:   [x] PCP/Provider - Amanda [facility] [address]  [] Specialty/Provider -     citalopram (CeleXA) 20 mg tablet Take 1 tablet (20 mg total) by mouth daily       magnesium oxide (MAG-OX) 400 mg tablet Take 1 tablet (400 mg total) by mouth daily     Pharmacy:  [id number] - [place] - [address] [place] [address]     Does the patient have enough for 3 days?   [] Yes   [x] No - Send as HP to POD

## 2024-09-03 DIAGNOSIS — F41.9 ANXIETY: ICD-10-CM

## 2024-09-03 RX ORDER — ALPRAZOLAM 1 MG
1 TABLET ORAL 2 TIMES DAILY PRN
Qty: 60 TABLET | Refills: 0 | Status: SHIPPED | OUTPATIENT
Start: 2024-09-03 | End: 2024-10-03

## 2024-09-03 NOTE — TELEPHONE ENCOUNTER
Reason for call:   [x] Refill   [] Prior Auth  [] Other:     Office:   [x] PCP/Provider -   [] Specialty/Provider -     Medication: XANAX    Dose/Frequency: 1 MG    Quantity: 60    Pharmacy:   [id number] - [place] - [address] [place] [id number] [address]  Phone: [phone]  Fax: [phone]  SHIRA #: --     Does the patient have enough for 3 days?   [] Yes   [x] No - Send as HP to POD

## 2024-09-05 ENCOUNTER — OFFICE VISIT (OUTPATIENT)
Dept: GASTROENTEROLOGY | Facility: CLINIC | Age: 74
End: 2024-09-05
Payer: COMMERCIAL

## 2024-09-05 VITALS
HEIGHT: 62 IN | DIASTOLIC BLOOD PRESSURE: 74 MMHG | WEIGHT: 97 LBS | SYSTOLIC BLOOD PRESSURE: 127 MMHG | BODY MASS INDEX: 17.85 KG/M2

## 2024-09-05 DIAGNOSIS — R63.4 WEIGHT LOSS, ABNORMAL: ICD-10-CM

## 2024-09-05 DIAGNOSIS — Z87.11 HISTORY OF GASTRIC ULCER: ICD-10-CM

## 2024-09-05 DIAGNOSIS — R19.7 DIARRHEA, UNSPECIFIED TYPE: ICD-10-CM

## 2024-09-05 DIAGNOSIS — Z86.010 HISTORY OF COLON POLYPS: ICD-10-CM

## 2024-09-05 DIAGNOSIS — K21.9 GASTROESOPHAGEAL REFLUX DISEASE, UNSPECIFIED WHETHER ESOPHAGITIS PRESENT: ICD-10-CM

## 2024-09-05 DIAGNOSIS — D50.9 IRON DEFICIENCY ANEMIA, UNSPECIFIED IRON DEFICIENCY ANEMIA TYPE: Primary | ICD-10-CM

## 2024-09-05 PROCEDURE — G2211 COMPLEX E/M VISIT ADD ON: HCPCS | Performed by: NURSE PRACTITIONER

## 2024-09-05 PROCEDURE — 99214 OFFICE O/P EST MOD 30 MIN: CPT | Performed by: NURSE PRACTITIONER

## 2024-09-05 NOTE — PROGRESS NOTES
Formerly [facility] - Outpatient Follow-up Note  Ross Mcguire 74 y.o. male MRN: [id number]  Encounter: [id number]    ASSESSMENT AND PLAN:      1. Iron deficiency anemia, unspecified iron deficiency anemia type  Patient follow-up for iron deficiency anemia.  Patient continues to take iron oral supplement every other day and continues to show improvement with iron deficiency anemia.  Patient states he is always fatigued so not likely just an iron related issue.  Recent lab work; ferritin 42, iron 79, iron sat 25, TIBC 321, hemoglobin 12.  Will continue with current regiment and reevaluate in 5 months or after the holidays if patient continues to show progress may be able to decrease iron intake slightly even more or discontinue and continue reevaluating.    -CBC  -Iron panel    2. Diarrhea, unspecified type  Patient states his bowel movements are relatively normal however does have some breakthrough diarrhea at times.  Patient does take pancrelipase for treatment of EPI.  He does state his diarrhea sometimes he feels it is due to dietary discretion.  Continue to monitor and have discussed if worsening will trial cholestyramine.    3. Gastroesophageal reflux disease, unspecified whether esophagitis present  Patient denies any acid reflux symptoms at this time.  He continues on omeprazole 40 mg daily.  May look to decrease dosing with next office visit.    4. Weight loss, abnormal  Weight 97 pounds.  Patient had gained 3 pounds since his June office visit.  Patient was 91 pounds in February of this year.  Encourage patient to partake in 1-2 protein shakes per day.  Continue to monitor weight with follow-up this visit.      5. History of gastric ulcer  Noted from EGD 10/5/2023; healed previous gastric ulcer.  Patient continues on omeprazole daily for GERD symptoms.    6. History of colon polyps  Colonoscopy 6/2023; no recall with age      Followup Appointment: 5 months/after  holidays  ______________________________________________________________________      HPI:    74-year-old male accompanied by his friend Temo, past medical history of gastric ulcer, GERD, pancreatic insufficiency, common bile duct dilatation, hypertension, AAA, CKD stage III, hyperlipidemia, protein calorie malnutrition, hyponatremia, weight loss and iron deficiency anemia presents for follow-up and medication review.    Since last office visit patient continues to note episodic diarrhea however states he believes his dietary discretion related.  Patient's most recent lab work does show he is progressing well with low normal iron panel; ferritin 42, iron 79, iron sat 25, TIBC 321, hemoglobin 12.0.  Discussed with patient continuing on the every other day iron supplement until follow-up after holidays.  Patient denies any GI complications at this time.  States he is having okay bowel movements with no hematochezia or melena noted.  Patient's weight is pretty stable at this time however he did lose 1 pound since last office visit in June of this year.  Again encouraged patient to partake in 1-2 protein drinks per day and seek foods to increase his caloric intake.  Patient denies any acid reflux symptoms with medication omeprazole 40 mg daily and states he continues to take pancrelipase enzymes for treatment of EPI.    Historical Information   Past Medical History:   Diagnosis Date    Anxiety     Rhodes's esophagus     Chronic neck pain     Depression     GERD (gastroesophageal reflux disease)     H/O abdominal aortic aneurysm 4.5 cm    Hyperlipidemia     Hypertension     Pancreatic pseudocyst     Vertigo      Past Surgical History:   Procedure Laterality Date    COLONOSCOPY  09/30/2014    IR LOWER EXTREMITY / INTERVENTION  01/04/2019    TN ESOPHAGOGASTRODUODENOSCOPY TRANSORAL DIAGNOSTIC N/A 06/22/2018    Procedure: EGD AND COLONOSCOPY;  Surgeon: Quinton Garcia MD;  Location: [facility];  Service: Gastroenterology    TN  EVASC RPR DPLMNT AORTO-AORTIC NDGFT N/A 02/09/2018    Procedure: REPAIR ANEURYSM ENDOVASCULAR ABDOMINAL AORTIC  (EVAR);  Surgeon: Jeremy Jackson MD;  Location: [facility];  Service: Vascular    UPPER GASTROINTESTINAL ENDOSCOPY       Social History     Substance and Sexual Activity   Alcohol Use Never     Social History     Substance and Sexual Activity   Drug Use No     Social History     Tobacco Use   Smoking Status Former    Current packs/day: 1.00    Average packs/day: 1 pack/day for 62.7 years (62.7 ttl pk-yrs)    Types: Cigarettes    Start date: 1962   Smokeless Tobacco Never     Family History   Problem Relation Age of Onset    Heart disease Father     Heart attack Father     Diabetes Maternal Grandmother     Diabetes Maternal Grandfather     Diabetes Paternal Grandmother     Hypertension Family     Hyperlipidemia Family          Current Outpatient Medications:     ALPRAZolam (XANAX) 1 mg tablet    amLODIPine (NORVASC) 10 mg tablet    aspirin (ECOTRIN LOW STRENGTH) 81 mg EC tablet    citalopram (CeleXA) 20 mg tablet    dicyclomine (BENTYL) 10 mg capsule    ferrous sulfate 325 (65 Fe) mg tablet    finasteride (PROSCAR) 5 mg tablet    loteprednol etabonate (LOTEMAX) 0.5 % ophthalmic suspension    magnesium oxide (MAG-OX) 400 mg tablet    metoprolol succinate (TOPROL-XL) 25 mg 24 hr tablet    Multiple Vitamins-Minerals (CENTRUM SILVER PO)    omeprazole (PriLOSEC) 40 MG capsule    pancrelipase, Lip-Prot-Amyl, (Creon) 12,000 units capsule    potassium chloride (MICRO-K) 10 MEQ CR capsule    pravastatin (PRAVACHOL) 40 mg tablet    Restasis 0.05 % ophthalmic emulsion    sodium chloride 1 g tablet    tamsulosin (FLOMAX) 0.4 mg    Wheat Dextrin (BENEFIBER PO)  No Known Allergies  Reviewed medications and allergies and updated as indicated    PHYSICAL EXAM:    There were no vitals taken for this visit. There is no height or weight on file to calculate BMI.    Normal exam    General Appearance: NAD, cooperative,  alert  Eyes: Anicteric, PERRLA, EOMI  ENT:  Normocephalic, atraumatic, normal mucosa.    Neck:  Supple, symmetrical, trachea midline  Resp:  Clear to auscultation bilaterally; no rales, rhonchi or wheezing; respirations unlabored   CV:  S1 S2, Regular rate and rhythm; no murmur, rub, or gallop.  GI:  Soft, non-tender, non-distended; normal bowel sounds; no masses, no organomegaly   Rectal: Deferred  Musculoskeletal: No cyanosis, clubbing or edema. Normal ROM.  Skin:  No jaundice, rashes, or lesions   Heme/Lymph: No palpable cervical lymphadenopathy  Psych: Normal affect, good eye contact  Neuro: No gross deficits, AAOx3    Lab Results:   Lab Results   Component Value Date    WBC 7.19 08/26/2024    HGB 12.0 08/26/2024    HCT 37.1 08/26/2024     (H) 08/26/2024     08/26/2024     Lab Results   Component Value Date     (L) 10/14/2014    K 4.4 06/03/2024    CL 97 06/03/2024    CO2 27 06/03/2024    ANIONGAP 6 10/14/2014    BUN 15 06/03/2024    CREATININE 1.03 06/03/2024    GLUCOSE 97 10/14/2014    GLUF 119 (H) 10/11/2023    CALCIUM 8.9 06/03/2024    CORRECTEDCA 9.0 02/12/2024    AST 21 02/26/2024    ALT 19 02/26/2024    ALKPHOS 88 02/26/2024    PROT 7.2 10/13/2014    BILITOT 0.37 10/13/2014    EGFR 70 06/03/2024     Lab Results   Component Value Date    IRON 79 08/26/2024    TIBC 321 08/26/2024    FERRITIN 42 08/26/2024     Lab Results   Component Value Date    LIPASE 15 06/07/2023       Radiology Results:   No results found.

## 2024-09-06 NOTE — PROGRESS NOTES
NEPHROLOGY OFFICE VISIT   Ross Mcguire 74 y.o. male MRN: [id number]  9/12/2024    Reason for Visit: Follow-up for management of hyponatremia    INTERVAL HISTORY and SUBJECTIVE:    I had the pleasure of seeing Ross today in the renal clinic.  He is a 74-year-old male with a history of  hypertension, AAA, hyperlipidemia, hyponatremia, iron deficiency anemia, GERD, gastric ulcer, colon polyps, protein calorie malnutrition.  He was last seen in the nephrology clinic July 2023.  He was seen in the past during hospitalization with only 1 follow-up appointment for management of hyponatremia.    Last labs obtained June 2024.      Since last visit no significant health changes.  Reports anxiety which makes it difficult for him to sleep although his family member Temo states that he goes to bed at 5 in the afternoon and gets up at 5 AM.  Patient does report that he gets up frequently to urinate through the night.  Reports dry mouth.  Takes diphenhydramine to sleep at night which may be contributing.  On antianxiety medications.  Uses Ensure for diet supplementation.  Reports early satiety.    ASSESSMENT AND PLAN:    Low solute intake may contribute although you have some  Etiology: Felt to be due to SIADH in the setting of SSRI use.  May be a component related to low solute intake.  Baseline sodium level: Generally within the range of 132-139  Goal sodium level, keep sodium level greater than 130  Prior workup: Urine osmolality 324, urine sodium 35.  No evidence of malignancy on imaging.  No thyroid abnormality.  Management: On salt tablets and fluid restriction  Current status: Last sodium level 133 which is in an acceptable range and at baseline for Ross.    Recommendations:  Continue current regimen of salt tablets and fluid restriction  Discussed diet supplement-using Ensure  Discussed electrolyte based hydrating agent such as propel and Gatorade instead of drinking all water  Recommended Biotene for  mouth comfort since he complains of chronic dry mouth     Renal:  Baseline creatinine near 0.85 to 1.1 mg/dL  Last GFR in the 70s.  Urinalysis negative for protein and RBCs  Renal function stable    Hypertension/volume:  Antihypertensive medications: Norvasc 10 mg daily, Toprol-XL 25 mg daily.  For management of hyponatremia on salt tablets  Due to BPH on Flomax  No changes at this time.  Blood pressure acceptable.  Patient monitors blood pressure at home and reports it is elevated along with his family member Temo who was present for the visit today although he is not taking his blood pressure appropriately therefore I reviewed proper method of obtaining accurate readings and given written information.  Continue home blood pressure monitoring and review with next visit    Urinary retention:  Follows with urology  On Flomax  Reports nocturia  Feels he is emptying his bladder    Iron deficiency anemia:   Follows with GI  Recent iron panel: Iron saturation 25%, ferritin 42.    On every other day iron supplement Ross    PATIENT INSTRUCTIONS:    Patient Instructions   Thank you for the visit today. You were seen today for monitoring of sodium level.     Recommendations:  Biotene may help dry mouth  Propel, gatorade or pedialyte instead of drinking all plain water  Keep drinking ensure to help with intake and to support nutrition  Follow-up blood work in 6 months  Follow-up appointment in 1 year.  Blood work and urine studies prior to the appointment  No change in medication.  Continue home blood pressure monitoring.  Please make sure you rest 5 to 10 minutes prior to taking your measurement as discussed.  Please call if home blood pressure remains consistently greater than 140/80     Obtain home blood pressure readings as follows:  In the morning please take blood pressure reading before medications.   Please sit quietly at least 5 minutes before taking blood pressure reading.  Make sure your arm is supported at  heart level.  Even if you measure your blood pressure standing up your arm should be supported at heart level.    In the evening please take blood pressure reading between 7-10 p.m. prior to any evening medications and at least an hour after eating dinner.  Sit quietly for at least 5 minutes.    Document readings twice a day for 1 week prior to office visit   Document heart rate along with blood pressure reading   General instructions:    Make sure your sitting comfortably with back supported and both feet on the floor.   Do not cross your legs.  Do not talk during blood pressure measurement.  Avoid coffee or caffeine at least 30 minutes prior to measurement.   Do not take blood pressure measurement within 30 minutes of exercising.  Do not smoke cigarettes.  If you are taking a reading while standing make sure your arm is supported at heart level and not dangling at your side.  Make sure the cuff is properly positioned above the crease at your elbow joint-   Check  guidelines  Additional Information can be found at the following  https://www.heart.org/en/health-topics/high-blood-pressure/understanding-blood-pressure-readings/monitoring-your-blood-pressure-at-home  https://targetbp.org/tools_downloads/self-measured-blood-pressure-video/       Orders Placed This Encounter   Procedures    Basic metabolic panel     Standing Status:   Future     Standing Expiration Date:   9/12/2025    Comprehensive metabolic panel     This is a patient instruction: Patient fasting for 8 hours or longer recommended.     Standing Status:   Future     Standing Expiration Date:   12/1/2025    Urinalysis with microscopic     Standing Status:   Future     Standing Expiration Date:   12/1/2025    Magnesium     Standing Status:   Future     Standing Expiration Date:   12/1/2025    CBC     Standing Status:   Future     Standing Expiration Date:   12/1/2025       OBJECTIVE:  Current Weight: Weight - Scale: 44.8 kg (98 lb 12.8  "oz)  Vitals:    09/12/24 1532 09/12/24 1555   BP:  138/78   BP Location:  Left arm   Patient Position:  Sitting   Cuff Size:  Standard   Pulse:  72   Weight: 44.8 kg (98 lb 12.8 oz)    Height: 5' 2\" (1.575 m)     Body mass index is 18.07 kg/m².      REVIEW OF SYSTEMS:    Review of Systems   Constitutional:  Positive for fatigue (Reports  fatigue.  Does not sleep well.  Anxiety issues.). Negative for activity change, appetite change, chills and fever.   HENT:  Negative for ear pain and sore throat.    Eyes:  Negative for pain and visual disturbance.   Respiratory:  Negative for cough and shortness of breath.    Cardiovascular:  Negative for chest pain, palpitations and leg swelling.   Gastrointestinal:  Negative for abdominal pain, constipation, diarrhea, nausea and vomiting.         reports early satiety   Genitourinary:  Negative for dysuria and hematuria.        Reports frequent urination at night, nocturia   Musculoskeletal:  Positive for arthralgias and gait problem. Negative for back pain.   Skin:  Negative for color change and rash.   Allergic/Immunologic: Negative for immunocompromised state.   Neurological:  Positive for weakness. Negative for dizziness, seizures, syncope, light-headedness and headaches.   Hematological:  Does not bruise/bleed easily.   Psychiatric/Behavioral:  Positive for sleep disturbance. Negative for agitation and behavioral problems. The patient is nervous/anxious.    All other systems reviewed and are negative.      PHYSICAL EXAM:      Physical Exam  Constitutional:       General: He is not in acute distress.     Appearance: He is well-developed. He is not ill-appearing, toxic-appearing or diaphoretic.   HENT:      Head: Normocephalic and atraumatic.      Nose: Nose normal. No congestion.      Mouth/Throat:      Mouth: Mucous membranes are moist.   Eyes:      General: No scleral icterus.        Right eye: No discharge.         Left eye: No discharge.      Extraocular Movements: " Extraocular movements intact.      Conjunctiva/sclera: Conjunctivae normal.   Neck:      Thyroid: No thyromegaly.      Vascular: No JVD.      Trachea: No tracheal deviation.   Cardiovascular:      Rate and Rhythm: Normal rate and regular rhythm.      Heart sounds: No murmur heard.     No friction rub. No gallop.   Pulmonary:      Effort: Pulmonary effort is normal. No respiratory distress.      Breath sounds: No stridor. Examination of the right-lower field reveals decreased breath sounds. Examination of the left-lower field reveals decreased breath sounds. Decreased breath sounds present. No wheezing, rhonchi or rales.   Abdominal:      General: Bowel sounds are normal. There is no distension.      Palpations: Abdomen is soft. There is no mass.      Tenderness: There is no abdominal tenderness. There is no guarding or rebound.   Musculoskeletal:         General: No swelling, tenderness or signs of injury. Normal range of motion.      Cervical back: Normal range of motion and neck supple.      Right lower leg: No edema.      Left lower leg: No edema.   Skin:     General: Skin is warm and dry.      Capillary Refill: Capillary refill takes less than 2 seconds.      Coloration: Skin is not jaundiced or pale.      Findings: No erythema or rash.   Neurological:      Mental Status: He is alert and oriented to person, place, and time.   Psychiatric:         Mood and Affect: Mood normal.         Behavior: Behavior normal.         Thought Content: Thought content normal.         Judgment: Judgment normal.     Follow-up for management of hyponatremia    Medications:    Current Outpatient Medications:     ALPRAZolam (XANAX) 1 mg tablet, Take 1 tablet (1 mg total) by mouth 2 (two) times a day as needed (transient anxiety), Disp: 60 tablet, Rfl: 0    amLODIPine (NORVASC) 10 mg tablet, take 1 tablet by mouth once daily, Disp: 90 tablet, Rfl: 1    aspirin (ECOTRIN LOW STRENGTH) 81 mg EC tablet, Take 81 mg by mouth daily, Disp: ,  "Rfl:     citalopram (CeleXA) 20 mg tablet, Take 1 tablet (20 mg total) by mouth daily, Disp: 90 tablet, Rfl: 1    dicyclomine (BENTYL) 10 mg capsule, One  cap   three time  daily, Disp: 300 capsule, Rfl: 1    ferrous sulfate 325 (65 Fe) mg tablet, Take 1 tablet (325 mg total) by mouth daily with breakfast, Disp: 90 tablet, Rfl: 0    finasteride (PROSCAR) 5 mg tablet, Take 1 tablet (5 mg total) by mouth daily, Disp: 90 tablet, Rfl: 1    loteprednol etabonate (LOTEMAX) 0.5 % ophthalmic suspension, , Disp: , Rfl:     magnesium oxide (MAG-OX) 400 mg tablet, Take 1 tablet (400 mg total) by mouth daily, Disp: 90 tablet, Rfl: 1    metoprolol succinate (TOPROL-XL) 25 mg 24 hr tablet, Take 1 tablet (25 mg total) by mouth daily, Disp: 90 tablet, Rfl: 1    Multiple Vitamins-Minerals (CENTRUM SILVER PO), Take 1 tablet by mouth daily, Disp: , Rfl:     omeprazole (PriLOSEC) 40 MG capsule, Take 1 capsule (40 mg total) by mouth daily, Disp: 90 capsule, Rfl: 1    pancrelipase, Lip-Prot-Amyl, (Creon) 12,000 units capsule, Take 2 capsules (24,000 Units total) by mouth 3 (three) times a day with meals, Disp: 540 capsule, Rfl: 1    potassium chloride (MICRO-K) 10 MEQ CR capsule, take 2 capsules by mouth daily, Disp: 90 capsule, Rfl: 3    pravastatin (PRAVACHOL) 40 mg tablet, Take 1 tablet (40 mg total) by mouth daily at bedtime, Disp: 90 tablet, Rfl: 2    Restasis 0.05 % ophthalmic emulsion, , Disp: , Rfl:     sodium chloride 1 g tablet, Take 1 tablet (1 g total) by mouth 3 (three) times a day with meals (Patient not taking: Reported on 8/21/2024), Disp: 270 tablet, Rfl: 1    tamsulosin (FLOMAX) 0.4 mg, Take 1 capsule (0.4 mg total) by mouth daily (Patient not taking: Reported on 9/12/2024), Disp: 90 capsule, Rfl: 2    Wheat Dextrin (BENEFIBER PO), Take by mouth daily as needed (Patient not taking: Reported on 8/21/2024), Disp: , Rfl:     Laboratory Results:        Invalid input(s): \"ALBUMIN\"    Results for orders placed or performed in " visit on 08/26/24   CBC and differential   Result Value Ref Range    WBC 7.19 4.31 - 10.16 Thousand/uL    RBC 3.59 (L) 3.88 - 5.62 Million/uL    Hemoglobin 12.0 12.0 - 17.0 g/dL    Hematocrit 37.1 36.5 - 49.3 %     (H) 82 - 98 fL    MCH 33.4 26.8 - 34.3 pg    MCHC 32.3 31.4 - 37.4 g/dL    RDW 13.4 11.6 - 15.1 %    MPV 9.6 8.9 - 12.7 fL    Platelets 251 149 - 390 Thousands/uL    nRBC 0 /100 WBCs    Segmented % 68 43 - 75 %    Immature Grans % 1 0 - 2 %    Lymphocytes % 16 14 - 44 %    Monocytes % 11 4 - 12 %    Eosinophils Relative 3 0 - 6 %    Basophils Relative 1 0 - 1 %    Absolute Neutrophils 4.93 1.85 - 7.62 Thousands/µL    Absolute Immature Grans 0.07 0.00 - 0.20 Thousand/uL    Absolute Lymphocytes 1.17 0.60 - 4.47 Thousands/µL    Absolute Monocytes 0.76 0.17 - 1.22 Thousand/µL    Eosinophils Absolute 0.20 0.00 - 0.61 Thousand/µL    Basophils Absolute 0.06 0.00 - 0.10 Thousands/µL   TIBC Panel (incl. Iron, TIBC, % Iron Saturation)   Result Value Ref Range    Iron Saturation 25 15 - 50 %    TIBC 321 250 - 450 ug/dL    Iron 79 50 - 212 ug/dL    UIBC 242 155 - 355 ug/dL   Ferritin   Result Value Ref Range    Ferritin 42 24 - 336 ng/mL

## 2024-09-12 ENCOUNTER — OFFICE VISIT (OUTPATIENT)
Dept: NEPHROLOGY | Facility: HOSPITAL | Age: 74
End: 2024-09-12
Payer: COMMERCIAL

## 2024-09-12 VITALS
WEIGHT: 98.8 LBS | HEART RATE: 72 BPM | SYSTOLIC BLOOD PRESSURE: 138 MMHG | DIASTOLIC BLOOD PRESSURE: 78 MMHG | BODY MASS INDEX: 18.18 KG/M2 | HEIGHT: 62 IN

## 2024-09-12 DIAGNOSIS — E22.2 SIADH (SYNDROME OF INAPPROPRIATE ADH PRODUCTION) (HCC): Primary | ICD-10-CM

## 2024-09-12 DIAGNOSIS — E87.1 HYPONATREMIA: ICD-10-CM

## 2024-09-12 DIAGNOSIS — E83.42 HYPOMAGNESEMIA: ICD-10-CM

## 2024-09-12 DIAGNOSIS — F33.9 DEPRESSION, RECURRENT (HCC): ICD-10-CM

## 2024-09-12 DIAGNOSIS — D50.8 OTHER IRON DEFICIENCY ANEMIA: ICD-10-CM

## 2024-09-12 DIAGNOSIS — E46 PROTEIN-CALORIE MALNUTRITION, UNSPECIFIED SEVERITY (HCC): ICD-10-CM

## 2024-09-12 PROBLEM — N18.30 CHRONIC KIDNEY DISEASE, STAGE 3 (HCC): Status: RESOLVED | Noted: 2022-02-07 | Resolved: 2024-09-12

## 2024-09-12 PROCEDURE — 99214 OFFICE O/P EST MOD 30 MIN: CPT | Performed by: NURSE PRACTITIONER

## 2024-09-12 NOTE — PATIENT INSTRUCTIONS
Thank you for the visit today. You were seen today for monitoring of sodium level.     Recommendations:  Biotene may help dry mouth  Propel, gatorade or pedialyte instead of drinking all plain water  Keep drinking ensure to help with intake and to support nutrition  Follow-up blood work in 6 months  Follow-up appointment in 1 year.  Blood work and urine studies prior to the appointment  No change in medication.  Continue home blood pressure monitoring.  Please make sure you rest 5 to 10 minutes prior to taking your measurement as discussed.  Please call if home blood pressure remains consistently greater than 140/80     Obtain home blood pressure readings as follows:  In the morning please take blood pressure reading before medications.   Please sit quietly at least 5 minutes before taking blood pressure reading.  Make sure your arm is supported at heart level.  Even if you measure your blood pressure standing up your arm should be supported at heart level.    In the evening please take blood pressure reading between 7-10 p.m. prior to any evening medications and at least an hour after eating dinner.  Sit quietly for at least 5 minutes.    Document readings twice a day for 1 week prior to office visit   Document heart rate along with blood pressure reading   General instructions:    Make sure your sitting comfortably with back supported and both feet on the floor.   Do not cross your legs.  Do not talk during blood pressure measurement.  Avoid coffee or caffeine at least 30 minutes prior to measurement.   Do not take blood pressure measurement within 30 minutes of exercising.  Do not smoke cigarettes.  If you are taking a reading while standing make sure your arm is supported at heart level and not dangling at your side.  Make sure the cuff is properly positioned above the crease at your elbow joint-   Check  guidelines  Additional Information can be found at the  following  https://www.heart.org/en/health-topics/high-blood-pressure/understanding-blood-pressure-readings/monitoring-your-blood-pressure-at-home  https://targetbp.org/tools_downloads/self-measured-blood-pressure-video/

## 2024-09-20 DIAGNOSIS — K86.89 PANCREATIC INSUFFICIENCY: ICD-10-CM

## 2024-09-20 RX ORDER — PANCRELIPASE 60000; 12000; 38000 [USP'U]/1; [USP'U]/1; [USP'U]/1
24000 CAPSULE, DELAYED RELEASE PELLETS ORAL
Qty: 540 CAPSULE | Refills: 1 | Status: SHIPPED | OUTPATIENT
Start: 2024-09-20

## 2024-09-20 NOTE — TELEPHONE ENCOUNTER
Reason for call:   [x] Refill   [] Prior Auth  [] Other:     Office:   [x] PCP/Provider - Juan J Camarena MD  [] Specialty/Provider -     Medication: pancrelipase, Lip-Prot-Amyl, (Creon) 12,000 units capsule     Dose/Frequency: 24,000 Units, Oral, 3 times daily with meals     Quantity: 540    Pharmacy: [id number] - [place] - [address]     Does the patient have enough for 3 days?   [x] Yes   [] No - Send as HP to POD

## 2024-10-01 DIAGNOSIS — F41.9 ANXIETY: ICD-10-CM

## 2024-10-01 NOTE — TELEPHONE ENCOUNTER
Medication: ALPRAZolam (XANAX) 1 mg tablet     Dose/Frequency: Take 1 tablet (1 mg total) by mouth 2 (two) times a day as needed    Quantity: 60 tabs    Pharmacy: [id number] - [place] - [address] [facility] [phone]     Office:   [x] PCP/Provider - Juan J Camarena MD   [] Speciality/Provider -     Does the patient have enough for 3 days?   [] Yes   [x] No - Send as HP to POD

## 2024-10-02 RX ORDER — ALPRAZOLAM 1 MG
1 TABLET ORAL 2 TIMES DAILY PRN
Qty: 60 TABLET | Refills: 0 | Status: SHIPPED | OUTPATIENT
Start: 2024-10-02 | End: 2024-11-01

## 2024-10-07 DIAGNOSIS — D50.9 IRON DEFICIENCY ANEMIA: ICD-10-CM

## 2024-10-07 NOTE — TELEPHONE ENCOUNTER
Reason for call:   [x] Refill   [] Prior Auth  [] Other:     Office:   [x] PCP/Provider -   [] Specialty/Provider -     Medication:     ferrous sulfate 325 (65 Fe) mg tablet  325 mg, Oral, Daily with breakfast             Summary: Take 1 tablet (325 mg total) by mouth daily with breakfast        Quantity: 90 tablet     Pharmacy: [id number] - [place] - [address] [place] [address]     Does the patient have enough for 3 days?   [x] Yes   [] No - Send as HP to POD

## 2024-10-09 RX ORDER — FERROUS SULFATE 325(65) MG
325 TABLET ORAL
Qty: 90 TABLET | Refills: 1 | Status: SHIPPED | OUTPATIENT
Start: 2024-10-09 | End: 2025-01-07

## 2024-10-29 DIAGNOSIS — F41.9 ANXIETY: ICD-10-CM

## 2024-10-29 RX ORDER — ALPRAZOLAM 1 MG/1
1 TABLET ORAL 2 TIMES DAILY PRN
Qty: 60 TABLET | Refills: 0 | Status: SHIPPED | OUTPATIENT
Start: 2024-10-29 | End: 2024-11-28

## 2024-10-29 NOTE — TELEPHONE ENCOUNTER
Medication: ALPRAZolam (XANAX) 1 mg tablet     Dose/Frequency: Take 1 tablet (1 mg total) by mouth 2 (two) times a day as needed (transient anxiety     Quantity: 60 tablet     Pharmacy:  [id number] - [place] - [id number] [facility]     Office:   [x] PCP/Provider -   [] Speciality/Provider -     Does the patient have enough for 3 days?   [] Yes   [x] No - Send as HP to POD

## 2024-11-25 DIAGNOSIS — F41.9 ANXIETY: ICD-10-CM

## 2024-11-25 NOTE — TELEPHONE ENCOUNTER
Reason for call:   [x] Refill   [] Prior Auth  [] Other:     Office:   [x] PCP/Provider - Dr Camarena   [] Specialty/Provider -     Medication: alprazolam     Dose/Frequency: 1 mg take 2 times daily as needed    Quantity: 60    Pharmacy: [facility] [place]      Does the patient have enough for 3 days?   [] Yes   [x] No - Send as HP to POD- will run out tomorrow

## 2024-11-26 RX ORDER — ALPRAZOLAM 1 MG/1
1 TABLET ORAL 2 TIMES DAILY PRN
Qty: 60 TABLET | Refills: 0 | Status: ON HOLD | OUTPATIENT
Start: 2024-11-26 | End: 2024-12-26

## 2024-11-29 ENCOUNTER — OFFICE VISIT (OUTPATIENT)
Dept: INTERNAL MEDICINE CLINIC | Facility: CLINIC | Age: 74
End: 2024-11-29
Payer: COMMERCIAL

## 2024-11-29 VITALS
DIASTOLIC BLOOD PRESSURE: 74 MMHG | WEIGHT: 87.2 LBS | TEMPERATURE: 97.1 F | HEIGHT: 62 IN | HEART RATE: 56 BPM | SYSTOLIC BLOOD PRESSURE: 130 MMHG | OXYGEN SATURATION: 96 % | BODY MASS INDEX: 16.05 KG/M2

## 2024-11-29 DIAGNOSIS — G95.19 OTHER VASCULAR MYELOPATHIES (HCC): ICD-10-CM

## 2024-11-29 DIAGNOSIS — R33.9 URINARY RETENTION WITH INCOMPLETE BLADDER EMPTYING: ICD-10-CM

## 2024-11-29 DIAGNOSIS — I10 ESSENTIAL HYPERTENSION: ICD-10-CM

## 2024-11-29 DIAGNOSIS — N40.0 BPH (BENIGN PROSTATIC HYPERPLASIA): ICD-10-CM

## 2024-11-29 DIAGNOSIS — N32.0 BLADDER OUTLET OBSTRUCTION: ICD-10-CM

## 2024-11-29 DIAGNOSIS — K86.1 CHRONIC PANCREATITIS (HCC): ICD-10-CM

## 2024-11-29 DIAGNOSIS — I74.5 EMBOLISM AND THROMBOSIS OF ILIAC ARTERY (HCC): ICD-10-CM

## 2024-11-29 DIAGNOSIS — Z93.6 OTHER ARTIFICIAL OPENINGS OF URINARY TRACT STATUS (HCC): ICD-10-CM

## 2024-11-29 DIAGNOSIS — D50.9 CHRONIC IRON DEFICIENCY ANEMIA: ICD-10-CM

## 2024-11-29 DIAGNOSIS — Z23 ENCOUNTER FOR IMMUNIZATION: ICD-10-CM

## 2024-11-29 DIAGNOSIS — I10 HYPERTENSION: Primary | ICD-10-CM

## 2024-11-29 PROCEDURE — 90662 IIV NO PRSV INCREASED AG IM: CPT

## 2024-11-29 PROCEDURE — 99213 OFFICE O/P EST LOW 20 MIN: CPT | Performed by: INTERNAL MEDICINE

## 2024-11-29 PROCEDURE — G0008 ADMIN INFLUENZA VIRUS VAC: HCPCS

## 2024-11-29 RX ORDER — FINASTERIDE 5 MG/1
5 TABLET, FILM COATED ORAL DAILY
Qty: 90 TABLET | Refills: 1 | Status: ON HOLD | OUTPATIENT
Start: 2024-11-29

## 2024-11-29 RX ORDER — METOPROLOL SUCCINATE 25 MG/1
25 TABLET, EXTENDED RELEASE ORAL DAILY
Qty: 90 TABLET | Refills: 1 | Status: ON HOLD | OUTPATIENT
Start: 2024-11-29

## 2024-11-29 NOTE — PROGRESS NOTES
Assessment/Plan:    Follow up  Hypertension, Chronic Pancreatitis, BPH Chronic iron  deficiency  anemia ,BPH     Diagnoses and all orders for this visit:    Hypertension    Chronic pancreatitis (HCC)    BPH (benign prostatic hyperplasia)    Chronic iron deficiency anemia    Urinary retention with incomplete bladder emptying    Bladder outlet obstruction    Essential hypertension    Encounter for immunization  -     influenza vaccine, high-dose, PF 0.5 mL (Fluzone High Dose)          Subjective: Coomplaining  of  postural  hypertension, weakness      Patient ID: Ross Mcguire is a 74 y.o. male.    HPI    The following portions of the patient's history were reviewed and updated as appropriate: allergies, current medications, past family history, past medical history, past social history, past surgical history, and problem list.    Review of Systems   Constitutional: Negative.    HENT:  Negative for dental problem, drooling, ear discharge and ear pain.    Eyes:  Negative for discharge, redness and itching.   Respiratory:  Negative for apnea, cough and wheezing.    Cardiovascular:  Negative for chest pain and palpitations.   Gastrointestinal:  Negative for abdominal pain, blood in stool, diarrhea and vomiting.   Endocrine: Negative for polydipsia, polyphagia and polyuria.   Genitourinary:  Negative for decreased urine volume, dysuria and frequency.   Musculoskeletal:  Negative for arthralgias, myalgias and neck stiffness.   Skin:  Negative for pallor and wound.   Allergic/Immunologic: Negative for environmental allergies and food allergies.   Neurological:  Negative for facial asymmetry, light-headedness, numbness and headaches.   Hematological:  Negative for adenopathy. Does not bruise/bleed easily.   Psychiatric/Behavioral:  Negative for agitation, behavioral problems and confusion.          Objective:      /74 (BP Location: Left arm, Patient Position: Sitting, Cuff Size: Standard)   Pulse 56   Temp  "(!) 97.1 °F (36.2 °C) (Temporal)   Ht 5' 2\" (1.575 m)   Wt 39.6 kg (87 lb 3.2 oz)   SpO2 96%   BMI 15.95 kg/m²          Physical Exam  Constitutional:       Appearance: Normal appearance.   HENT:      Head: Normocephalic.      Nose: Nose normal.      Mouth/Throat:      Mouth: Mucous membranes are moist.   Eyes:      Pupils: Pupils are equal, round, and reactive to light.   Cardiovascular:      Rate and Rhythm: Regular rhythm.      Heart sounds: Normal heart sounds.   Pulmonary:      Breath sounds: Normal breath sounds.   Abdominal:      Palpations: Abdomen is soft.   Musculoskeletal:         General: No swelling.      Cervical back: Neck supple.   Skin:     General: Skin is warm.   Neurological:      General: No focal deficit present.      Mental Status: He is alert and oriented to person, place, and time.   Psychiatric:         Mood and Affect: Mood normal.       Problem#1  Hypertension  Controlled  with  Amlodipine  10mg and  Metoprolol    Problem#2  Chronic  Pancreatitis   stable   Problem#3  BPH  symptoms   stable  Problem#4  Chronic  Anemia    Fup  3 months.     Juan J Camarena MD.FACP  "

## 2024-12-04 DIAGNOSIS — E22.2 SIADH (SYNDROME OF INAPPROPRIATE ADH PRODUCTION) (HCC): ICD-10-CM

## 2024-12-04 DIAGNOSIS — R33.9 URINE RETENTION: ICD-10-CM

## 2024-12-04 RX ORDER — SODIUM CHLORIDE 1 G/1
1 TABLET ORAL
Qty: 270 TABLET | Refills: 1 | Status: SHIPPED | OUTPATIENT
Start: 2024-12-04 | End: 2024-12-11

## 2024-12-04 NOTE — TELEPHONE ENCOUNTER
Reason for call:   [x] Refill   [] Prior Auth  [] Other:     Office:   [x] PCP/Provider - : Juan J Camarena MD   [] Specialty/Provider -     Medication: sodium chloride 1 g     Dose/Frequency: 1 tab TID     Quantity: 270 tabs    Pharmacy: [id number] - [place] - [id number] [facility]      Does the patient have enough for 3 days?   [] Yes   [x] No - Send as HP to POD

## 2024-12-06 PROBLEM — R74.01 TRANSAMINITIS: Status: ACTIVE | Noted: 2024-12-06

## 2024-12-06 PROBLEM — J18.9 RIGHT LOWER LOBE PNEUMONIA: Status: ACTIVE | Noted: 2024-01-01

## 2024-12-06 PROBLEM — R65.10 SIRS (SYSTEMIC INFLAMMATORY RESPONSE SYNDROME) (HCC): Status: ACTIVE | Noted: 2024-01-01

## 2024-12-06 PROBLEM — J96.01 ACUTE HYPOXIC RESPIRATORY FAILURE (HCC): Status: ACTIVE | Noted: 2024-01-01
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
## 2024-12-06 PROBLEM — E46 PROTEIN-CALORIE MALNUTRITION, UNSPECIFIED SEVERITY (HCC): Status: RESOLVED | Noted: 2022-06-15 | Resolved: 2024-01-01

## 2024-12-06 NOTE — ED PROVIDER NOTES
Time reflects when diagnosis was documented in both MDM as applicable and the Disposition within this note       Time User Action Codes Description Comment    12/6/2024  6:30 PM Jazmin Richardson Add [J18.9] Right lower lobe pneumonia     12/6/2024  6:30 PM Jazmin Richardson Add [J96.00] Acute respiratory failure (HCC)     12/6/2024  9:58 PM Siri Hodges Modify [J18.9] Right lower lobe pneumonia     12/6/2024  9:58 PM Siri Hodges Modify [J96.00] Acute respiratory failure (HCC)           ED Disposition       ED Disposition   Admit    Condition   Stable    Date/Time   Fri Dec 6, 2024  9:59 PM    Comment   Case was discussed with Critical Care and the patient's admission status was agreed to be Admission Status: inpatient status to the service of Dr. Valentine .               Assessment & Plan       Medical Decision Making  74 year old male presents for revaluation of acute respiratory failure associated with worsening URI symptoms and generalized weakness.  Patient placed on CPAP by EMS with improvement.  Placed on BiPAP on arrival.  CXR concerning for right sided pneumonia.  SIRS present.  Patient started on rocephini and azithromycin.  Patient initially denied injury; however, on family arrival, I was informed that patient did strike his head.  CTH and C-spine negative for traumatic process.  Patient able to be weaned from BiPAP to high flow while in the ED.  Admitted for further evaluation and management.     Amount and/or Complexity of Data Reviewed  Labs: ordered.  Radiology: ordered and independent interpretation performed.    Risk  Prescription drug management.        ED Course as of 12/06/24 2159   Fri Dec 06, 2024   2002 Family at bedside who reports patient had struck his head and left knee during fall yesterday.  CTH and C-spine added.   2003 Patient transitioned from bipap to high flow.  Tolerating well       Medications   albuterol (FOR EMS ONLY) (2.5 mg/3 mL) 0.083 % inhalation solution 5 mg (0  mg Does not apply Given to EMS 12/6/24 1806)   cefTRIAXone (ROCEPHIN) IVPB (premix in dextrose) 1,000 mg 50 mL (0 mg Intravenous Stopped 12/6/24 2007)   azithromycin (ZITHROMAX) 500 mg in sodium chloride 0.9% 250mL IVPB 500 mg (500 mg Intravenous New Bag 12/6/24 1945)   sodium chloride 0.9 % bolus 1,000 mL (0 mL Intravenous Stopped 12/6/24 2006)       ED Risk Strat Scores                           SBIRT 22yo+      Flowsheet Row Most Recent Value   Initial Alcohol Screen: US AUDIT-C     1. How often do you have a drink containing alcohol? 0 Filed at: 12/06/2024 1800   2. How many drinks containing alcohol do you have on a typical day you are drinking?  0 Filed at: 12/06/2024 1800   3a. Male UNDER 65: How often do you have five or more drinks on one occasion? 0 Filed at: 12/06/2024 1800   3b. FEMALE Any Age, or MALE 65+: How often do you have 4 or more drinks on one occassion? 0 Filed at: 12/06/2024 1800   Audit-C Score 0 Filed at: 12/06/2024 1800   JAYMIE: How many times in the past year have you...    Used an illegal drug or used a prescription medication for non-medical reasons? Never Filed at: 12/06/2024 1800                            History of Present Illness       Chief Complaint   Patient presents with    Respiratory Distress     Pt to er via ems from home with reports of respiratory distress that has been going on for the past week. Cpap upon arrival. Patient stated that he fell twice yesterday due to weakness, no head strike, no loc, takes thinners        Past Medical History:   Diagnosis Date    Anxiety     Rhodes's esophagus     Chronic kidney disease, stage 3 (HCC) 02/07/2022    Chronic neck pain     Depression     GERD (gastroesophageal reflux disease)     H/O abdominal aortic aneurysm 4.5 cm    Hyperlipidemia     Hypertension     Pancreatic pseudocyst     Vertigo       Past Surgical History:   Procedure Laterality Date    COLONOSCOPY  09/30/2014    IR LOWER EXTREMITY / INTERVENTION  01/04/2019    DC  ESOPHAGOGASTRODUODENOSCOPY TRANSORAL DIAGNOSTIC N/A 06/22/2018    Procedure: EGD AND COLONOSCOPY;  Surgeon: Quinton Garcia MD;  Location: [facility];  Service: Gastroenterology    ID EVASC RPR DPLMNT AORTO-AORTIC NDGFT N/A 02/09/2018    Procedure: REPAIR ANEURYSM ENDOVASCULAR ABDOMINAL AORTIC  (EVAR);  Surgeon: Jeremy Jackson MD;  Location: [facility];  Service: Vascular    UPPER GASTROINTESTINAL ENDOSCOPY        Family History   Problem Relation Age of Onset    Heart disease Father     Heart attack Father     Diabetes Maternal Grandmother     Diabetes Maternal Grandfather     Diabetes Paternal Grandmother     Hypertension Family     Hyperlipidemia Family     Colon cancer Neg Hx     Colon polyps Neg Hx       Social History     Tobacco Use    Smoking status: Former     Current packs/day: 1.00     Average packs/day: 1 pack/day for 62.9 years (62.9 ttl pk-yrs)     Types: Cigarettes     Start date: 1962    Smokeless tobacco: Never   Vaping Use    Vaping status: Never Used   Substance Use Topics    Alcohol use: Never    Drug use: No      E-Cigarette/Vaping    E-Cigarette Use Never User       E-Cigarette/Vaping Substances    Nicotine No     THC No     CBD No     Flavoring No     Other No     Unknown No       I have reviewed and agree with the history as documented.     74 year old male brought by EMS for evaluation of respiratory distress.  Patient had been feeling unwell for the past week with cough, congestion and fatigue.  He had fallen onto his knees yesterday, but denies head strike or LOC.  Smoker.  No known history of COPD or asthma.  Patient has been generally weak.  Shortness of breath became more severe today.  He was placed on CPAP by EMS and given 2 albuterol nebulizer treatments prior to arrival.          Review of Systems        Objective       ED Triage Vitals   Temperature Pulse Blood Pressure Respirations SpO2 Patient Position - Orthostatic VS   12/06/24 1808 12/06/24 1759 12/06/24 1801 12/06/24 1759 12/06/24  1759 --   97.5 °F (36.4 °C) 100 151/85 (!) 38 99 %       Temp Source Heart Rate Source BP Location FiO2 (%) Pain Score    12/06/24 1808 12/06/24 1759 -- 12/06/24 1816 --    Temporal Monitor  35       Vitals      Date and Time Temp Pulse SpO2 Resp BP Pain Score FACES Pain Rating User   12/06/24 2130 -- 87 96 % 26 148/74 -- -- TH 12/06/24 1900 -- 87 96 % 26 149/85 -- -- TH   12/06/24 1808 97.5 °F (36.4 °C) -- -- -- -- -- -- ELF   12/06/24 1801 -- -- -- -- 151/85 -- -- HR   12/06/24 1759 -- 100 99 % 38 -- -- -- HR            Physical Exam  Vitals and nursing note reviewed.   HENT:      Head: Normocephalic and atraumatic.   Cardiovascular:      Rate and Rhythm: Normal rate and regular rhythm.      Pulses: Normal pulses.      Heart sounds: Normal heart sounds.   Pulmonary:      Effort: Tachypnea, accessory muscle usage and respiratory distress present.      Breath sounds: Examination of the right-lower field reveals rales. Rales present.   Musculoskeletal:         General: Deformity (palpable prominance of proximal fibula.  No significant edema.  No overlying skin changes) present.      Right lower leg: No edema.      Left lower leg: No edema.   Skin:     General: Skin is warm and dry.   Neurological:      Mental Status: He is alert.         Results Reviewed       Procedure Component Value Units Date/Time    UA w Reflex to Microscopic w Reflex to Culture [[id number]]     Lab Status: No result Specimen: Urine     HS Troponin I 2hr [[id number]]  (Normal) Collected: 12/06/24 2033    Lab Status: Final result Specimen: Blood from Arm, Right Updated: 12/06/24 2059     hs TnI 2hr 10 ng/L      Delta 2hr hsTnI -1 ng/L     Comprehensive metabolic panel [[id number]]  (Abnormal) Collected: 12/06/24 1813    Lab Status: Final result Specimen: Blood from Arm, Right Updated: 12/06/24 2012     Sodium 135 mmol/L      Potassium 3.4 mmol/L      Chloride 95 mmol/L      CO2 26 mmol/L      ANION GAP 14 mmol/L      BUN 18 mg/dL      Creatinine  0.64 mg/dL      Glucose 122 mg/dL      Calcium 8.1 mg/dL      Corrected Calcium 8.8 mg/dL      AST 30 U/L      ALT 60 U/L      Alkaline Phosphatase 123 U/L      Total Protein 6.5 g/dL      Albumin 3.1 g/dL      Total Bilirubin 0.65 mg/dL      eGFR 95 ml/min/1.73sq m     Narrative:      National Kidney Disease Foundation guidelines for Chronic Kidney Disease (CKD):     Stage 1 with normal or high GFR (GFR > 90 mL/min/1.73 square meters)    Stage 2 Mild CKD (GFR = 60-89 mL/min/1.73 square meters)    Stage 3A Moderate CKD (GFR = 45-59 mL/min/1.73 square meters)    Stage 3B Moderate CKD (GFR = 30-44 mL/min/1.73 square meters)    Stage 4 Severe CKD (GFR = 15-29 mL/min/1.73 square meters)    Stage 5 End Stage CKD (GFR <15 mL/min/1.73 square meters)  Note: GFR calculation is accurate only with a steady state creatinine    HS Troponin I 4hr [[id number]]     Lab Status: No result Specimen: Blood     FLU/RSV/COVID - if FLU/RSV clinically relevant [[id number]]  (Normal) Collected: 12/06/24 1919    Lab Status: Final result Specimen: Nares from Nose Updated: 12/06/24 2000     SARS-CoV-2 Negative     INFLUENZA A PCR Negative     INFLUENZA B PCR Negative     RSV PCR Negative    Narrative:      This test has been performed using the CoV-2/Flu/RSV plus assay on the [employer] GeneXpert platform. This test has been validated by the  and verified by the performing laboratory.     This test is designed to amplify and detect the following: nucleocapsid (N), envelope (E), and RNA-dependent RNA polymerase (RdRP) genes of the SARS-CoV-2 genome; matrix (M), basic polymerase (PB2), and acidic protein (PA) segments of the influenza A genome; matrix (M) and non-structural protein (NS) segments of the influenza B genome, and the nucleocapsid genes of RSV A and RSV B.     Positive results are indicative of the presence of Flu A, Flu B, RSV, and/or SARS-CoV-2 RNA. Positive results for SARS-CoV-2 or suspected novel influenza should be  reported to state, local, or federal health departments according to local reporting requirements.      All results should be assessed in conjunction with clinical presentation and other laboratory markers for clinical management.     FOR PEDIATRIC PATIENTS - copy/paste COVID Guidelines URL to browser: [email or web address]       Blood culture #1 [[id number]] Collected: 12/06/24 1914    Lab Status: In process Specimen: Blood from Arm, Left Updated: 12/06/24 1920    B-Type Natriuretic Peptide(BNP) [[id number]]  (Abnormal) Collected: 12/06/24 1813    Lab Status: Final result Specimen: Blood from Arm, Right Updated: 12/06/24 1901      pg/mL     POCT Blood Gas (CG8+) [[id number]]  (Abnormal) Collected: 12/06/24 1807    Lab Status: Final result Specimen: Venous Updated: 12/06/24 1858     ph, Foreign ISTAT 7.504     pCO2, Foreign i-STAT 34.1 mm HG      pO2, Foreign i-STAT 42.0 mm HG      BE, i-STAT 4 mmol/L      HCO3, Foreign i-STAT 26.8 mmol/L      CO2, i-STAT 28 mmol/L      O2 Sat, i-STAT 83 %      SODIUM, I-STAT 129 mmol/l      Potassium, i-STAT 5.1 mmol/L      Calcium, Ionized i-STAT 0.95 mmol/L      Hct, i-STAT 33 %      Hgb, i-STAT 11.2 g/dl      Glucose, i-STAT 131 mg/dl      Specimen Type VENOUS    Procalcitonin [[id number]]  (Abnormal) Collected: 12/06/24 1813    Lab Status: Final result Specimen: Blood from Arm, Right Updated: 12/06/24 1854     Procalcitonin 0.64 ng/ml     HS Troponin 0hr (reflex protocol) [[id number]]  (Normal) Collected: 12/06/24 1813    Lab Status: Final result Specimen: Blood from Arm, Right Updated: 12/06/24 1850     hs TnI 0hr 11 ng/L     Protime-INR [[id number]]  (Abnormal) Collected: 12/06/24 1813    Lab Status: Final result Specimen: Blood from Arm, Right Updated: 12/06/24 1847     Protime 15.5 seconds      INR 1.18    Narrative:      INR Therapeutic Range    Indication                                             INR Range      Atrial Fibrillation                                                2.0-3.0  Hypercoagulable State                                    2.0.2.3  Left Ventricular Asist Device                            2.0-3.0  Mechanical Heart Valve                                  -    Aortic(with afib, MI, embolism, HF, LA enlargement,    and/or coagulopathy)                                     2.0-3.0 (2.5-3.5)     Mitral                                                             2.5-3.5  Prosthetic/Bioprosthetic Heart Valve               2.0-3.0  Venous thromboembolism (VTE: VT, PE        2.0-3.0    APTT [[id number]]  (Abnormal) Collected: 12/06/24 1813    Lab Status: Final result Specimen: Blood from Arm, Right Updated: 12/06/24 1847     PTT 37 seconds     Lactic acid [[id number]]  (Normal) Collected: 12/06/24 1813    Lab Status: Final result Specimen: Blood from Arm, Right Updated: 12/06/24 1843     LACTIC ACID 1.7 mmol/L     Narrative:      Result may be elevated if tourniquet was used during collection.    CBC and differential [[id number]]  (Abnormal) Collected: 12/06/24 1813    Lab Status: Final result Specimen: Blood from Arm, Right Updated: 12/06/24 1837     WBC 17.52 Thousand/uL      RBC 3.32 Million/uL      Hemoglobin 11.2 g/dL      Hematocrit 33.5 %       fL      MCH 33.7 pg      MCHC 33.4 g/dL      RDW 13.4 %      MPV 9.4 fL      Platelets 364 Thousands/uL      nRBC 0 /100 WBCs      Segmented % 90 %      Immature Grans % 1 %      Lymphocytes % 2 %      Monocytes % 5 %      Eosinophils Relative 1 %      Basophils Relative 1 %      Absolute Neutrophils 15.77 Thousands/µL      Absolute Immature Grans 0.23 Thousand/uL      Absolute Lymphocytes 0.41 Thousands/µL      Absolute Monocytes 0.89 Thousand/µL      Eosinophils Absolute 0.09 Thousand/µL      Basophils Absolute 0.13 Thousands/µL     Narrative:      This is an appended report.  These results have been appended to a previously verified report.    Blood culture #2 [[id number]] Collected:  12/06/24 1813    Lab Status: In process Specimen: Blood from Arm, Right Updated: 12/06/24 1824            CT head without contrast   Final Interpretation by Dain Santos DO (12/06 2121)      No acute intracranial abnormality. Stable chronic, complete opacification of the right maxillary sinus.                  Workstation performed: [id number]         CT cervical spine without contrast   Final Interpretation by Dain Santos DO (12/06 2123)      No cervical spine fracture or traumatic malalignment.                  Workstation performed: [id number]         XR tibia fibula 2 views LEFT   ED Interpretation by Jazmin Richardson MD (12/06 2044)   No acute fractures or dislocations      XR chest portable   ED Interpretation by Jazmin Richardson MD (12/06 1829)   Right lower lobe pneumonia      CT chest wo contrast    (Results Pending)       ECG 12 Lead Documentation Only    Date/Time: 12/6/2024 6:04 PM    Performed by: Jazmin Richardson MD  Authorized by: Jazmin Richardson MD    Indications / Diagnosis:  Respiratory failure  ECG reviewed by me, the ED Provider: yes    Patient location:  ED  Previous ECG:     Previous ECG:  Compared to current    Comparison ECG info:  6/7/23 normal ekg    Similarity:  No change  Interpretation:     Interpretation: normal    Rate:     ECG rate:  98    ECG rate assessment: normal    Rhythm:     Rhythm: sinus rhythm    Ectopy:     Ectopy: none    QRS:     QRS axis:  Normal    QRS intervals:  Normal  Conduction:     Conduction: normal    ST segments:     ST segments:  Normal  T waves:     T waves: normal    CriticalCare Time    Date/Time: 12/6/2024 9:54 PM    Performed by: Jazmin Richardson MD  Authorized by: Jazmin Richardson MD    Critical care provider statement:     Critical care time (minutes):  35    Critical care time was exclusive of:  Separately billable procedures and treating other patients and teaching time     Critical care was necessary to treat or prevent imminent or life-threatening deterioration of the following conditions:  Respiratory failure    Critical care was time spent personally by me on the following activities:  Obtaining history from patient or surrogate, development of treatment plan with patient or surrogate, evaluation of patient's response to treatment, examination of patient, ordering and performing treatments and interventions, ordering and review of laboratory studies, ordering and review of radiographic studies and re-evaluation of patient's condition      ED Medication and Procedure Management   Prior to Admission Medications   Prescriptions Last Dose Informant Patient Reported? Taking?   ALPRAZolam (XANAX) 1 mg tablet  Self No No   Sig: Take 1 tablet (1 mg total) by mouth 2 (two) times a day as needed (transient anxiety)   Multiple Vitamins-Minerals (CENTRUM SILVER PO)  Self Yes No   Sig: Take 1 tablet by mouth daily   Restasis 0.05 % ophthalmic emulsion  Self Yes No   Wheat Dextrin (BENEFIBER PO)  Self Yes No   Sig: Take by mouth daily as needed   Patient not taking: Reported on 8/21/2024   amLODIPine (NORVASC) 10 mg tablet  Self No No   Sig: take 1 tablet by mouth once daily   aspirin (ECOTRIN LOW STRENGTH) 81 mg EC tablet  Self Yes No   Sig: Take 81 mg by mouth daily   citalopram (CeleXA) 20 mg tablet  Self No No   Sig: Take 1 tablet (20 mg total) by mouth daily   dicyclomine (BENTYL) 10 mg capsule  Self No No   Sig: One  cap   three time  daily   ferrous sulfate 325 (65 Fe) mg tablet  Self No No   Sig: Take 1 tablet (325 mg total) by mouth daily with breakfast   finasteride (PROSCAR) 5 mg tablet   No No   Sig: Take 1 tablet (5 mg total) by mouth daily   loteprednol etabonate (LOTEMAX) 0.5 % ophthalmic suspension  Self Yes No   magnesium oxide (MAG-OX) 400 mg tablet  Self No No   Sig: Take 1 tablet (400 mg total) by mouth daily   metoprolol succinate (TOPROL-XL) 25 mg 24 hr tablet   No No    Sig: Take 1 tablet (25 mg total) by mouth daily   omeprazole (PriLOSEC) 40 MG capsule  Self No No   Sig: Take 1 capsule (40 mg total) by mouth daily   pancrelipase, Lip-Prot-Amyl, (Creon) 12,000 units capsule  Self No No   Sig: Take 2 capsules (24,000 Units total) by mouth 3 (three) times a day with meals   potassium chloride (MICRO-K) 10 MEQ CR capsule  Self No No   Sig: take 2 capsules by mouth daily   pravastatin (PRAVACHOL) 40 mg tablet  Self No No   Sig: Take 1 tablet (40 mg total) by mouth daily at bedtime   sodium chloride 1 g tablet   No No   Sig: Take 1 tablet (1 g total) by mouth 3 (three) times a day with meals   tamsulosin (FLOMAX) 0.4 mg  Self No No   Sig: Take 1 capsule (0.4 mg total) by mouth daily   Patient not taking: Reported on 9/12/2024      Facility-Administered Medications: None     Patient's Medications   Discharge Prescriptions    No medications on file     No discharge procedures on file.  ED SEPSIS DOCUMENTATION   Time reflects when diagnosis was documented in both MDM as applicable and the Disposition within this note       Time User Action Codes Description Comment    12/6/2024  6:30 PM Jazmin Richardson Add [J18.9] Right lower lobe pneumonia     12/6/2024  6:30 PM Jazmin Richardson Add [J96.00] Acute respiratory failure (HCC)     12/6/2024  9:58 PM Siri Hodges Modify [J18.9] Right lower lobe pneumonia     12/6/2024  9:58 PM Siri Hodges Modify [J96.00] Acute respiratory failure (HCC)            Initial Sepsis Screening       Row Name 12/06/24 0172                Is the patient's history suggestive of a new or worsening infection? Yes (Proceed)  -EE        Suspected source of infection pneumonia  -EE        Indicate SIRS criteria Tachycardia > 90 bpm;Tachypnea > 20 resp per min;Leukocytosis (WBC > 96921 IJL) OR Leukopenia (WBC <4000 IJL) OR Bandemia (WBC >10% bands)  -EE        Are two or more of the above signs & symptoms of infection both present and new to the patient? Yes  "(Proceed)  -        Assess for evidence of organ dysfunction: Are any of the below criteria present within 6 hours of suspected infection and SIRS criteria that are NOT considered to be chronic conditions? New need for invasive/non-invasive ventilation  -EE        Date of presentation of severe sepsis 12/06/24  -EE        Time of presentation of severe sepsis 1830  -        Sepsis Note: Click \"NEXT\" below (NOT \"close\") to generate sepsis note based on above information. --                  User Key  (r) = Recorded By, (t) = Taken By, (c) = Cosigned By      Initials Name Provider Type     Jazmin Richardson MD Physician                       Jazmin Richardson MD  12/06/24 6617    "

## 2024-12-07 PROBLEM — J43.9 EMPHYSEMA LUNG (HCC): Status: ACTIVE | Noted: 2024-12-07

## 2024-12-07 PROBLEM — R13.10 DYSPHAGIA: Status: ACTIVE | Noted: 2024-01-01

## 2024-12-07 NOTE — ASSESSMENT & PLAN NOTE
Quit smoking 3 months ago  States has been smoking since age 12, as much as 2ppd while he was employed as a    NRT if requested

## 2024-12-07 NOTE — RESPIRATORY THERAPY NOTE
12/07/24 0753   Respiratory Assessment   Assessment Type Post-treatment   General Appearance Alert;Awake   Respiratory Pattern Shallow;Tachypneic;Labored   Chest Assessment Chest expansion symmetrical   Bilateral Breath Sounds Coarse;Rhonchi   Cough Non-productive;Congested;Moist;Weak   Resp Comments Pt had increased WOB and was belly breathing,. Pt said that he was very short of breath. Placed on HFNC 35%/55L to support WOB. Pt is very coarse but is unable to cough up secretions even after vest treatment.   O2 Device HFNC   Oxygen Therapy/Pulse Ox   O2 Device High flow nasal cannula   O2 Therapy Oxygen humidified   FiO2 (%) 35   O2 Flow Rate (L/min) 55 L/min   SpO2 93 %   SpO2 Activity At Rest   $ Pulse Oximetry Spot Check Charge Completed

## 2024-12-07 NOTE — ASSESSMENT & PLAN NOTE
"Follows with Dr. Angel as OP, last kaylee 6/4/2024  Per Dr. 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 Angel   "

## 2024-12-07 NOTE — ASSESSMENT & PLAN NOTE
Follows as OP with [facility]   Denies abd pain, nausea, vomiting on admission   Cont home Creon   Check lipase for completeness

## 2024-12-07 NOTE — ASSESSMENT & PLAN NOTE
AEB tachypnea, leukocytosis   Likely in the setting of RLL PNA as above   LA 1.7, procal 0.64  ER management -- 1L NS, Zithromax, Ctx   Flu, Covid, RSV negative     Plan:   Abx D1: Ctx D1  BC x2, MRSA, strep, legionella, sputum pending   Check UA  Remains HD stable   Trend endpoints

## 2024-12-07 NOTE — ASSESSMENT & PLAN NOTE
Likely in the setting of RLL PNA   Required CPAP by EMS en route, transitioned to bipap 14/8 in ER and eventually weaned to HFNC   VBG on admission -- 7.5/34/42/26  12/6 CXR concerning for RLL infiltrate   12/6 CTA Chest (Vrad) --no PE, severe emphysema, secretions in the trachea and bilateral mainstem bronchi, extensive mucus plugging throughout the right lower lobe, moderate right pleural effusion    Plan:   Currently requiring 6L MFNC  Per discussion with patient on admission, he is DNR/DNI   Attempt to wean supplemental O2 for goal spo2 > 90%   Aggressive pulmonary toileting with vest therapy, scheduled xopenex and atrovent   Noted weak cough with poor effort with prompting on admission   Disease-specific treatments as below

## 2024-12-07 NOTE — ASSESSMENT & PLAN NOTE
Very mild elevation in his ALT and alk phos on admission   Denies abd pain   Trend on serial draws

## 2024-12-07 NOTE — ASSESSMENT & PLAN NOTE
Follows with Nephrology as OP   Likely in the setting of SSRI use and low solute intake     Plan:   sNa 135 on admission   Cont home salt tabs tid   Serial BMP

## 2024-12-07 NOTE — ASSESSMENT & PLAN NOTE
Patient reports falls at home after bending down to  a washcloth and falling forward, striking his L forehead on the floor   He denies LOC  Lives at home with 3 friends   12/6 CTH neg   12/6 CT Cspine neg   12/6 CTA chest pending     Plan:   Fall precautions   Will need PT/OT when appropriate

## 2024-12-07 NOTE — H&P
"H&P - Critical Care/ICU   Name: Ross Mcguire 74 y.o. male I MRN: [id number]  Unit/Bed#: -01 SDU I Date of Admission: 12/6/2024   Date of Service: 12/6/2024 I Hospital Day: 0       Assessment & Plan  Acute hypoxic respiratory failure (HCC)  Likely in the setting of RLL PNA   Required CPAP by EMS en route, transitioned to bipap 14/8 in ER and eventually weaned to HFNC   Currently requiring HFNC 50L 50% with spo2 96-97%, RR 30s  VBG on admission -- 7.5/34/42/26  12/6 CXR concerning for RLL infiltrate   12/6 CTA Chest pending     Plan:   Per discussion with patient on admission, he is DNR/DNI   Attempt to wean supplemental O2 for goal spo2 > 90%   Aggressive pulmonary toileting with vest therapy, scheduled xopenex and atrovent   Noted weak cough with poor effort with prompting on admission   Disease-specific treatments as below   Right lower lobe pneumonia  12/6 CXR -- RLL infiltrate  Hypoxic, tachypneic on admission with leukocytosis as below   Abx as below   SIRS (systemic inflammatory response syndrome) (HCC)  AEB tachypnea, leukocytosis   Likely in the setting of RLL PNA as above   LA 1.7, procal 0.64  ER management -- 1L NS, Zithromax, Ctx   Flu, Covid, RSV negative     Plan:   Abx D1: Ctx D1  BC x2, MRSA, strep, legionella, sputum pending   Check UA  Remains HD stable   Trend endpoints   GERD (gastroesophageal reflux disease)  Cont home PPI  Other chronic pancreatitis (HCC)  [facility]   Denies abd pain, nausea, vomiting on admission   Cont home Creon   Check lipase for completeness  Smoking  Quit smoking 3 months ago  States has been smoking since age 12, as much as 2ppd while he was employed as a    NRT if requested   History of endovascular stent graft for abdominal aortic aneurysm (AAA)  Follows with Dr. Angel as OP, last kaylee 6/4/2024  Per Dr. 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 Jeanne CONTRERAS (syndrome of inappropriate ADH production) ([facility])  Follows with Nephrology as OP   Likely in the setting of SSRI use and low solute intake     Plan:   sNa 135 on admission   Cont home salt tabs tid   Serial BMP   Severe protein-calorie malnutrition (HCC)  Admission weight 39.6kg   Appreciate nutrition input   Benign essential hypertension  Cont home Norvasc, metoprolol with holds   Goal SBP <160  Fall  Patient reports falls at home after bending down to  a washcloth and falling forward, striking his L forehead on the floor   He denies LOC  Lives at home with 3 friends   12/6 CTH neg   12/6 CT Cspine neg   12/6 CTA chest pending     Plan:   Fall precautions   Will need PT/OT when appropriate   Transaminitis  Very mild elevation in his ALT and alk phos on admission   Denies abd pain   Trend on serial draws   Disposition: Stepdown Level 1    History of Present Illness   Ross KENT Bernadinethao is a 74 y.o. with a past medical history of hypertension, severe protein calorie malnutrition, SIADH on home sodium tabs, AAA s/p EVAR, smoker having recently quit 3 months ago, chronic pancreatitis, GERD who presents as an admission for acute hypoxic respiratory failure in the setting of right lower lobe pneumonia.  The patient states that for the past week, he has had an increasing cough with production of green-yellow sputum and shortness of breath.  He denies chest pain, headache, abdominal pain, nausea, vomiting, diarrhea, fevers, sore throat, runny nose.  He does state that one of the 3 friends he lives with has been ill with a cough for several days to weeks.  Ross also shares that sometime this week, while bending down to  a washcloth, he fell forward, striking his left forehead on the floor, though denies " loss of consciousness or other injury.  In the ER, had a chest x-ray concerning for right lower lobe infiltrate, negative CT head and CT C-spine.  He was given 1 L normal saline, ceftriaxone and Zithromax.  On exam he is awake alert and oriented, appears tachypneic on high flow nasal cannula with SpO2 96% and rhonchi throughout.  He is admitted to stepdown 1 under critical care for continued management.    History obtained from chart review and the patient.  Review of Systems: Review of Systems   Constitutional:  Negative for chills, diaphoresis, fatigue and fever.   HENT: Negative.     Respiratory:  Positive for cough (productive) and shortness of breath.    Cardiovascular:  Negative for chest pain, palpitations and leg swelling.   Gastrointestinal: Negative.    Genitourinary: Negative.    Musculoskeletal: Negative.    Neurological: Negative.        Historical Information   Past Medical History:  No date: Anxiety  No date: Rhodes's esophagus  02/07/2022: Chronic kidney disease, stage 3 (HCC)  No date: Chronic neck pain  No date: Depression  No date: GERD (gastroesophageal reflux disease)  4.5 cm: H/O abdominal aortic aneurysm  No date: Hyperlipidemia  No date: Hypertension  No date: Pancreatic pseudocyst  No date: Vertigo Past Surgical History:  09/30/2014: COLONOSCOPY  01/04/2019: IR LOWER EXTREMITY / INTERVENTION  06/22/2018: CO ESOPHAGOGASTRODUODENOSCOPY TRANSORAL DIAGNOSTIC; N/A      Comment:  Procedure: EGD AND COLONOSCOPY;  Surgeon: Quinton Garcia MD;  Location: [facility];  Service: Gastroenterology  02/09/2018: CO EVASC RPR DPLMNT AORTO-AORTIC NDGFT; N/A      Comment:  Procedure: REPAIR ANEURYSM ENDOVASCULAR ABDOMINAL AORTIC               (EVAR);  Surgeon: Jeremy Jackson MD;  Location: [facility];                Service: Vascular  No date: UPPER GASTROINTESTINAL ENDOSCOPY   Current Outpatient Medications   Medication Instructions    ALPRAZolam (XANAX) 1 mg, Oral, 2 times daily PRN    amLODIPine  (NORVASC) 10 mg, Oral, Daily    aspirin (ECOTRIN LOW STRENGTH) 81 mg, Daily    citalopram (CELEXA) 20 mg, Oral, Daily    dicyclomine (BENTYL) 10 mg capsule One  cap   three time  daily    ferrous sulfate 325 mg, Oral, Daily with breakfast    finasteride (PROSCAR) 5 mg, Oral, Daily    loteprednol etabonate (LOTEMAX) 0.5 % ophthalmic suspension     magnesium oxide (MAG-OX) 400 mg, Oral, Daily    metoprolol succinate (TOPROL-XL) 25 mg, Oral, Daily    Multiple Vitamins-Minerals (CENTRUM SILVER PO) 1 tablet, Daily    omeprazole (PRILOSEC) 40 mg, Oral, Daily    pancrelipase, Lip-Prot-Amyl, (Creon) 12,000 units capsule 24,000 Units, Oral, 3 times daily with meals    potassium chloride (MICRO-K) 10 MEQ CR capsule 20 mEq, Oral, Daily    pravastatin (PRAVACHOL) 40 mg, Oral, Daily at bedtime    Restasis 0.05 % ophthalmic emulsion No dose, route, or frequency recorded.    sodium chloride 1 g, Oral, 3 times daily with meals    tamsulosin (FLOMAX) 0.4 mg, Oral, Daily    Wheat Dextrin (BENEFIBER PO) Daily PRN    No Known Allergies   Social History     Tobacco Use    Smoking status: Former     Current packs/day: 1.00     Average packs/day: 1 pack/day for 62.9 years (62.9 ttl pk-yrs)     Types: Cigarettes     Start date: 1962    Smokeless tobacco: Never   Vaping Use    Vaping status: Never Used   Substance Use Topics    Alcohol use: Never    Drug use: No    Family History   Problem Relation Age of Onset    Heart disease Father     Heart attack Father     Diabetes Maternal Grandmother     Diabetes Maternal Grandfather     Diabetes Paternal Grandmother     Hypertension Family     Hyperlipidemia Family     Colon cancer Neg Hx     Colon polyps Neg Hx           Objective :                   Vitals I/O      Most Recent Min/Max in 24hrs   Temp 97.5 °F (36.4 °C) Temp  Min: 97.5 °F (36.4 °C)  Max: 97.5 °F (36.4 °C)   Pulse 87 Pulse  Min: 87  Max: 100   Resp (!) 26 Resp  Min: 26  Max: 38   /74 BP  Min: 148/74  Max: 151/85   O2 Sat 96 %  SpO2  Min: 96 %  Max: 99 %      Intake/Output Summary (Last 24 hours) at 12/6/2024 2258  Last data filed at 12/6/2024 2045  Gross per 24 hour   Intake 1300 ml   Output --   Net 1300 ml       No diet orders on file    Invasive Monitoring           Physical Exam   Physical Exam  Vitals and nursing note reviewed.   Skin:     General: Skin is warm.      Capillary Refill: Capillary refill takes less than 2 seconds.      Coloration: Skin is not pale.   HENT:      Head: Normocephalic and atraumatic.      Mouth/Throat:      Lips: Pink.      Mouth: Mucous membranes are dry.   Cardiovascular:      Rate and Rhythm: Normal rate and regular rhythm.   Musculoskeletal:      Right lower leg: No edema.      Left lower leg: No edema.   Abdominal: General: Abdomen is flat. Bowel sounds are normal.      Palpations: Abdomen is soft.      Tenderness: There is no abdominal tenderness.   Constitutional:       General: He is awake. He is not in acute distress.     Appearance: He is well-developed. He is cachectic. He is ill-appearing.      Interventions: Nasal cannula in place.   Pulmonary:      Effort: Tachypnea present. No accessory muscle usage, prolonged expiration, respiratory distress or accessory muscle usage.      Breath sounds: Rhonchi present.      Comments: Weak cough on command  Psychiatric:         Mood and Affect: Affect is flat.         Behavior: Behavior is cooperative.   Neurological:      Mental Status: He is alert and oriented to person, place, and time.   Genitourinary/Anorectal:     Comments: Voiding independently          Diagnostic Studies        Lab Results: I have reviewed the following results:     Medications:  Scheduled PRN   [START ON 12/7/2024] cefTRIAXone, 1,000 mg, Q24H  ipratropium, 0.5 mg, Q6H  levalbuterol, 1.25 mg, Q6H          Continuous          Labs:   CBC    Recent Labs     12/06/24  1807 12/06/24  1813   WBC  --  17.52*   HGB 11.2* 11.2*   HCT 33* 33.5*   PLT  --  364     BMP    Recent Labs      12/06/24  1807 12/06/24  1813   SODIUM  --  135   K  --  3.4*   CL  --  95*   CO2 28 26   AGAP  --  14*   BUN  --  18   CREATININE  --  0.64   CALCIUM  --  8.1*       Coags    Recent Labs     12/06/24  1813   INR 1.18   PTT 37*        Additional Electrolytes  Recent Labs     12/06/24  1807   CAIONIZED 0.95*          Blood Gas    No recent results  No recent results LFTs  Recent Labs     12/06/24  1813   ALT 60*   AST 30   ALKPHOS 123*   ALB 3.1*   TBILI 0.65       Infectious  Recent Labs     12/06/24  1813   PROCALCITONI 0.64*     Glucose  Recent Labs     12/06/24  1813   GLUC 122

## 2024-12-07 NOTE — PLAN OF CARE
Problem: Potential for Falls  Goal: Patient will remain free of falls  Description: INTERVENTIONS:  - Educate patient/family on patient safety including physical limitations  - Instruct patient to call for assistance with activity   - Consult OT/PT to assist with strengthening/mobility   - Keep Call bell within reach  - Keep bed low and locked with side rails adjusted as appropriate  - Keep care items and personal belongings within reach  - Initiate and maintain comfort rounds  - Make Fall Risk Sign visible to staff  - Offer Toileting every 2 Hours, in advance of need  - Initiate/Maintain bed alarm  - Apply yellow socks and bracelet for high fall risk patients  - Consider moving patient to room near nurses station  Outcome: Progressing     Problem: Prexisting or High Potential for Compromised Skin Integrity  Goal: Skin integrity is maintained or improved  Description: INTERVENTIONS:  - Identify patients at risk for skin breakdown  - Assess and monitor skin integrity  - Assess and monitor nutrition and hydration status  - Monitor labs   - Assess for incontinence   - Turn and reposition patient  - Assist with mobility/ambulation  - Relieve pressure over bony prominences  - Avoid friction and shearing  - Provide appropriate hygiene as needed including keeping skin clean and dry  - Evaluate need for skin moisturizer/barrier cream  - Collaborate with interdisciplinary team   - Patient/family teaching  - Consider wound care consult   Outcome: Progressing     Problem: RESPIRATORY - ADULT  Goal: Achieves optimal ventilation and oxygenation  Description: INTERVENTIONS:  - Assess for changes in respiratory status  - Assess for changes in mentation and behavior  - Position to facilitate oxygenation and minimize respiratory effort  - Oxygen administered by appropriate delivery if ordered  - Initiate smoking cessation education as indicated  - Encourage broncho-pulmonary hygiene including cough, deep breathe, Incentive  Spirometry  - Assess the need for suctioning and aspirate as needed  - Assess and instruct to report SOB or any respiratory difficulty  - Respiratory Therapy support as indicated  Outcome: Progressing     Problem: SKIN/TISSUE INTEGRITY - ADULT  Goal: Skin Integrity remains intact(Skin Breakdown Prevention)  Description: Assess:  -Perform Terry assessment every shift  -Clean and moisturize skin every 2 hours  -Inspect skin when repositioning, toileting, and assisting with ADLS  -Assess extremities for adequate circulation and sensation     Bed Management:  -Have minimal linens on bed & keep smooth, unwrinkled  -Change linens as needed when moist or perspiring  -Avoid sitting or lying in one position for more than 2 hours while in bed  -Keep HOB at 30degrees     Toileting:  -Offer bedside commode  -Assess for incontinence every 2 hours    Activity:  -Mobilize patient 3 times a day  -Encourage activity and walks on unit  -Encourage or provide ROM exercises   -Turn and reposition patient every 2 Hours  -Use appropriate equipment to lift or move patient in bed  -Instruct/ Assist with weight shifting every 2 hours when out of bed in chair  -Consider limitation of chair time 2 hour intervals    Skin Care:  -Avoid use of baby powder, tape, friction and shearing, hot water or constrictive clothing  -Relieve pressure over bony prominences using allevyn  -Do not massage red bony areas    Outcome: Progressing  Goal: Incision(s), wounds(s) or drain site(s) healing without S/S of infection  Description: INTERVENTIONS  - Assess and document dressing, incision, wound bed, drain sites and surrounding tissue  - Provide patient and family education  - Perform skin care/dressing changes every shift  Outcome: Progressing  Goal: Pressure injury heals and does not worsen  Description: Interventions:  - Implement low air loss mattress or specialty surface (Criteria met)  - Apply silicone foam dressing  - Instruct/assist with weight  shifting every 60 minutes when in chair   - Limit chair time to 2 hour intervals  - Use special pressure reducing interventions such as cushion when in chair   - Apply fecal or urinary incontinence containment device   - Perform passive or active ROM every 2 hours  - Turn and reposition patient & offload bony prominences every 2 hours   - Utilize friction reducing device or surface for transfers   - Consider nutrition services referral as needed  Outcome: Progressing     Problem: MUSCULOSKELETAL - ADULT  Goal: Maintain or return mobility to safest level of function  Description: INTERVENTIONS:  - Assess patient's ability to carry out ADLs; assess patient's baseline for ADL function and identify physical deficits which impact ability to perform ADLs (bathing, care of mouth/teeth, toileting, grooming, dressing, etc.)  - Assess/evaluate cause of self-care deficits   - Assess range of motion  - Assess patient's mobility  - Assess patient's need for assistive devices and provide as appropriate  - Encourage maximum independence but intervene and supervise when necessary  - Involve family in performance of ADLs  - Assess for home care needs following discharge   - Consider OT consult to assist with ADL evaluation and planning for discharge  - Provide patient education as appropriate  Outcome: Progressing  Goal: Maintain proper alignment of affected body part  Description: INTERVENTIONS:  - Support, maintain and protect limb and body alignment  - Provide patient/ family with appropriate education  Outcome: Progressing

## 2024-12-07 NOTE — CASE MANAGEMENT
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) 2024     Status post endovascular aneurysm repair (EVAR) 2023     Hypomagnesemia 2023     Severe protein-calorie malnutrition (HCC) 2023     SIADH (syndrome of inappropriate ADH production) ([facility])      Common bile duct dilation 2023     Hyponatremia 2022     Hypokalemia 2022     Urinary retention 2022     Hypertensive kidney disease with chronic kidney disease 2022     Iron deficiency anemia 2022     Depression, recurrent (HCC) 06/15/2022     History of endovascular stent graft for abdominal aortic aneurysm (AAA) 2021     Prominent popliteal pulse 2021     Neurogenic claudication 2021     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     Other chronic pancreatitis (HCC) 09/10/2018     Smoking 09/10/2018     Epigastric pain 2018     GERD (gastroesophageal reflux disease) 2018     Diarrhea 2018     AAA (abdominal aortic aneurysm) without rupture (HCC) 2018     Aortic aneurysm (HCC) 2013     Benign essential hypertension 2013       LOS (days): 1  Geometric Mean LOS (GMLOS) (days):   Days to GMLOS:     OBJECTIVE:    Risk of Unplanned Readmission Score: 20.65         Current admission status: Inpatient       Preferred Pharmacy:   [id number] - [place], [place] - [address] [facility]  [address] [facility] [address]  Phone: [phone] Fax: [phone]    Primary Care Provider: Juan J Camarena MD    Primary Insurance: [id number]  Secondary Insurance:     ASSESSMENT:  Active Health Care Proxies       Hailey Cotton Alternate Health Care Agent - Child   Primary Phone: [phone] (Home)  Work Phone: [phone]                 Advance Directives  Does patient have a Health Care POA?: Yes  Does patient have Advance Directives?: Yes  Advance Directives: Power of  for health care, Living will  Primary Contact: Hailey Cotton         Readmission Root Cause  30 Day Readmission: No    Patient Information  Admitted from:: Home  Mental Status: Alert  During Assessment patient was accompanied by: Daughter  Assessment information provided by:: Patient, Daughter  Primary Caregiver: Spouse  Caregiver's Name:: Dang  Caregiver's Relationship to Patient:: Family Member  Support Systems: Spouse/significant other, Family members, Self  County of Residence: [facility] entry access options. Select all that apply.: [place]  Type of Current Residence: [facility]  Living Arrangements: Lives w/ Spouse/significant other, Lives w/ Family members  Is patient a ?: No    Activities of Daily Living Prior to Admission  Functional Status: Independent  Completes ADLs independently?: Yes  Ambulates independently?: Yes  Does patient use assisted devices?: Yes  Assisted Devices (DME) used: Shower Chair, Straight Cane, Walker  Does patient currently own DME?: Yes  What DME does the patient currently own?: Walker, Straight Cane, Shower Chair, Other (Comment) (Pt's family was not sure if pt gave wheelchair away to family.)  Does patient have a history of Outpatient Therapy (PT/OT)?: No  Does the patient have a history of Short-Term Rehab?: No  Does patient have a history of HHC?: Yes  Does patient currently have HHC?:  No         Patient Information Continued  Income Source: SSI/SSD  Does patient have prescription coverage?: Yes  Does patient receive dialysis treatments?: No  Does patient have a history of substance abuse?: No  Does patient have a history of Mental Health Diagnosis?: No         Means of Transportation  Means of Transport to Appts:: Drives Self          DISCHARGE DETAILS:    Discharge planning discussed with:: Pt and pt's daughter, Sherlyn  Freedom of Choice: Yes     CM contacted family/caregiver?: Yes  Were Treatment Team discharge recommendations reviewed with patient/caregiver?: Yes  Did patient/caregiver verbalize understanding of patient care needs?: Yes  Were patient/caregiver advised of the risks associated with not following Treatment Team discharge recommendations?: Yes    Contacts  Patient Contacts: Hailey Cotton  Relationship to Patient:: Family  Contact Method: Phone  Phone Number: [phone]  Reason/Outcome: Discharge Planning    Requested Home Health Care         Is the patient interested in HHC at discharge?: No    DME Referral Provided  Referral made for DME?: No                                                           Additional Comments: CM met with pt and his two daughters Sherlyn to discern discharge needs. Pt lives with BRIDGER Little's sister and  in a trailer home, [address]. Pt reports being independent with ADLs and walking PTA. Drives. Dang helps pt with errands. Uses and owns cane, walker, and shower chair. No STR or OPPT hx. Hx of HHC, but not current. No inpatient psych or D&A treatment. Has both a medical POA and living will in chart. [facility]. Family to provide transport at discharge.

## 2024-12-07 NOTE — ASSESSMENT & PLAN NOTE
Likely in the setting of RLL PNA   Required CPAP by EMS en route, transitioned to bipap 14/8 in ER and eventually weaned to HFNC   Currently requiring HFNC 50L 50% with spo2 96-97%, RR 30s  VBG on admission -- 7.5/34/42/26  12/6 CXR concerning for RLL infiltrate   12/6 CTA Chest pending     Plan:   Per discussion with patient on admission, he is DNR/DNI   Attempt to wean supplemental O2 for goal spo2 > 90%   Aggressive pulmonary toileting with vest therapy, scheduled xopenex and atrovent   Noted weak cough with poor effort with prompting on admission   Disease-specific treatments as below

## 2024-12-07 NOTE — SPEECH THERAPY NOTE
Speech Language/Pathology  Speech-Language Pathology Bedside Swallow Evaluation      Patient Name: Ross Mcguire    Today's Date: 12/7/2024     Problem List  Principal Problem:    Acute hypoxic respiratory failure (HCC)  Active Problems:    GERD (gastroesophageal reflux disease)    Other chronic pancreatitis (HCC)    Smoking    History of endovascular stent graft for abdominal aortic aneurysm (AAA)    SIADH (syndrome of inappropriate ADH production) (HCC)    Severe protein-calorie malnutrition (HCC)    Benign essential hypertension    SIRS (systemic inflammatory response syndrome) (HCC)    Right lower lobe pneumonia    Fall    Transaminitis      Past Medical History  Past Medical History:   Diagnosis Date    Anxiety     Rhodes's esophagus     Chronic kidney disease, stage 3 (HCC) 02/07/2022    Chronic neck pain     Depression     GERD (gastroesophageal reflux disease)     H/O abdominal aortic aneurysm 4.5 cm    Hyperlipidemia     Hypertension     Pancreatic pseudocyst     Vertigo        Past Surgical History  Past Surgical History:   Procedure Laterality Date    COLONOSCOPY  09/30/2014    IR LOWER EXTREMITY / INTERVENTION  01/04/2019    IA ESOPHAGOGASTRODUODENOSCOPY TRANSORAL DIAGNOSTIC N/A 06/22/2018    Procedure: EGD AND COLONOSCOPY;  Surgeon: Quinton Garcia MD;  Location: [facility];  Service: Gastroenterology    IA EVASC RPR DPLMNT AORTO-AORTIC NDGFT N/A 02/09/2018    Procedure: REPAIR ANEURYSM ENDOVASCULAR ABDOMINAL AORTIC  (EVAR);  Surgeon: Jeremy Jackson MD;  Location: [facility];  Service: Vascular    UPPER GASTROINTESTINAL ENDOSCOPY         Summary   Pt presents w/ s/s suggestive of oral dysphagia, difficult to determine severity at this time, and suspected at least mild-mod pharyngeal dysphagia.     Complete labial seal for retrieval and containment of all materials, no anterior bolus loss present. Timely rotary mastication of ice chips w/ complete bolus breakdown and adequate bolus transfer. No oral  residue noted. Suspected delayed swallow initiation and reduced hyolaryngeal elevation to palpation. O2 decreased from 92 to 90 w/ all materials. Pt w/ increased RR to 55-65 w/ ice chips, to 48-50 w/ thin liquids. Immediate weak cough response w/ thin liquids via cup sip w/ WVQ unable to clear w/ multiple cough attempts.     Education initiated w/ pt/family regarding strategies to optimize swallow safety despite NPO status, pt will need to manage secretions, including frequent/thorough oral care. Pt/family verbalized understanding and agreement, denies questions or concerns at this time.    Pt w/ increased risk of aspiration and adverse pulmonary sequelae 2/2 abn lung imaging, increased O2 requirement, overt s/s of aspiration, and multiple medical co-morbidities. SLP to f/u for potential diet initiation/completion of VFSS as able and appropriate.       Recommended Diet: strict NPO   Recommended Form of Meds: non-oral/via IV    Other Recommendations: Continue frequent oral care        Current Medical Status  Pt is a 74 y.o. male  with a past medical history of hypertension, severe protein calorie malnutrition, SIADH on home sodium tabs, AAA s/p EVAR, smoker having recently quit 3 months ago, chronic pancreatitis, GERD who presents as an admission for acute hypoxic respiratory failure in the setting of right lower lobe pneumonia.  The patient states that for the past week, he has had an increasing cough with production of green-yellow sputum and shortness of breath.  He denies chest pain, headache, abdominal pain, nausea, vomiting, diarrhea, fevers, sore throat, runny nose.  He does state that one of the 3 friends he lives with has been ill with a cough for several days to weeks.  Ross also shares that sometime this week, while bending down to  a washcloth, he fell forward, striking his left forehead on the floor, though denies loss of consciousness or other injury.  In the ER, had a chest x-ray concerning  for right lower lobe infiltrate, negative CT head and CT C-spine.  He was given 1 L normal saline, ceftriaxone and Zithromax.  On exam he is awake alert and oriented, appears tachypneic on high flow nasal cannula with SpO2 96% and rhonchi throughout.  He is admitted to stepdown 1 under critical care for continued management.     Current Precautions:  Fall  Aspiration     Allergies:  No known food allergies    Past medical history:  Please see H&P for details    Special Studies:  12/6/24 CTA chest pe study:  No pulmonary embolism is seen. Prominence of the central pulmonary arteries and right ventricle suggest pulmonary hypertension/increased right heart pressures.     Superimposed on pulmonary emphysematous changes, small right pleural effusion. Dense consolidation of the right lower lobe, with left-to-right mediastinal shift; suspect a component of volume loss, superimposed infection considered as well.     In addition, there is attenuation of multiple small airways in the right lower lobe which could be related to aspiration, inflammation, and/or infection and scattered ill-defined groundglass and alveolar opacities in the periphery of the right midlung;   superimposed infection also considered in the appropriate clinical setting. Correlation with the patient symptoms and laboratory values recommended.     Left upper lobe nodule measures 5 mm in size (axial image 51, series 2);  based on current Fleischner Society 2017 Guidelines on incidental pulmonary nodule, optional follow-up CT at 12 months can be considered.    12/6/24 CT cervical spine without contrast:  No cervical spine fracture or traumatic malalignment.     12/6/24 CT head without contrast:  No acute intracranial abnormality. Stable chronic, complete opacification of the right maxillary sinus.     12/6/24 XR chest portable:  Moderate right effusion with extensive right base pneumonia.     Severe emphysema.     See subsequent chest CT.    History of  VFSS:  N/A     Social/Education/Vocational Hx:  Pt lives with family    Swallow Information   Current Diet: NPO, sips w/ meds   Baseline Diet: regular diet and thin liquids w/ choice of softer solids per pt and family present       Baseline Assessment   Behavior/Cognition: alert  Speech/Language Status: able to participate in conversation and able to follow commands  Patient Positioning: upright in bed  Pain Status/Interventions/Response to Interventions: No report of or nonverbal indications of pain.       Oral Mechanism Exam  Facial: symmetrical  Labial: WFL  Lingual: WFL  Velum: symmetrical  Mandible: adequate ROM  Dentition: full dentures  Vocal quality:clear/adequate   Volitional Cough: weak   Respiratory Status: on HFNC 55 L with an FiO2 of  35 %      Consistencies Assessed and Performance   Consistencies Administered: ice chips and thin liquids- deferred higher level trials 2/2 increased risk of aspiration/choking and adverse pulmonary sequelae     Oral Stage:   Complete labial seal for retrieval and containment of all materials, no anterior bolus loss present. Timely rotary mastication of ice chips w/ complete bolus breakdown and adequate bolus transfer. No oral residue noted.     Pharyngeal Stage:   Suspected delayed swallow initiation and reduced hyolaryngeal elevation to palpation. O2 decreased from 92 to 90 w/ all materials. Pt w/ increased RR to 55-65 w/ ice chips, to 48-50 w/ thin liquids. Immediate weak cough response w/ thin liquids via cup sip w/ WVQ unable to clear w/ multiple cough attempts.     Esophageal Concerns: none reported    Summary and Recommendations (see above)    Results Reviewed with: patient, RN, MD, and family     Treatment Recommended: as able and appropriate for potential diet initiation/completion of VFSS     Dysphagia LTG  -Patient will demonstrate safe and effective oral intake (without overt s/s significant oral/pharyngeal dysphagia including s/s penetration or aspiration) for  the highest appropriate diet level.     Short Term Goals:  -Pt will tolerate least restrictive diet with no significant s/s oral or pharyngeal dysphagia across 1-3 diagnostic session/s    -Patient will tolerate trials of upgraded food and/or liquid texture with no significant s/s of oral or pharyngeal dysphagia including aspiration across 1-3 diagnostic sessions     -Patient will comply with a Video/Modified Barium Swallow study for more complete assessment of swallowing anatomy/physiology/aspiration risk and to assess efficacy of treatment techniques so as to best guide treatment plan when medically appropriate/pending decreased O2 requirement     Speech Therapy Prognosis   Prognosis: fair    Prognosis Considerations: age, medical status, prior medical history, and respiratory status

## 2024-12-07 NOTE — RESPIRATORY THERAPY NOTE
RT Protocol Note  Ross Mcguire 74 y.o. male MRN: [id number]  Unit/Bed#: -01 SDU Encounter: [id number]    Assessment    Principal Problem:    Acute hypoxic respiratory failure (HCC)  Active Problems:    GERD (gastroesophageal reflux disease)    Other chronic pancreatitis (HCC)    Smoking    History of endovascular stent graft for abdominal aortic aneurysm (AAA)    SIADH (syndrome of inappropriate ADH production) (HCC)    Severe protein-calorie malnutrition (HCC)    Benign essential hypertension    SIRS (systemic inflammatory response syndrome) (HCC)    Right lower lobe pneumonia    Fall    Transaminitis      Home Pulmonary Medications:  NA       Past Medical History:   Diagnosis Date    Anxiety     Rhodes's esophagus     Chronic kidney disease, stage 3 (HCC) 02/07/2022    Chronic neck pain     Depression     GERD (gastroesophageal reflux disease)     H/O abdominal aortic aneurysm 4.5 cm    Hyperlipidemia     Hypertension     Pancreatic pseudocyst     Vertigo      Social History     Socioeconomic History    Marital status:      Spouse name: None    Number of children: None    Years of education: None    Highest education level: None   Occupational History    None   Tobacco Use    Smoking status: Former     Current packs/day: 1.00     Average packs/day: 1 pack/day for 62.9 years (62.9 ttl pk-yrs)     Types: Cigarettes     Start date: 1962    Smokeless tobacco: Never   Vaping Use    Vaping status: Never Used   Substance and Sexual Activity    Alcohol use: Never    Drug use: No    Sexual activity: Not Currently   Other Topics Concern    None   Social History Narrative    None     Social Drivers of Health     Financial Resource Strain: Low Risk  (8/16/2023)    Overall Financial Resource Strain (CARDIA)     Difficulty of Paying Living Expenses: Not hard at all   Food Insecurity: No Food Insecurity (8/21/2024)    Nursing - Inadequate Food Risk Classification     Worried About Running Out of Food in  "the Last Year: Never true     Ran Out of Food in the Last Year: Never true     Ran Out of Food in the Last Year: Not on file   Transportation Needs: No Transportation Needs (8/21/2024)    PRAPARE - Transportation     Lack of Transportation (Medical): No     Lack of Transportation (Non-Medical): No   Physical Activity: Not on file   Stress: Not on file   Social Connections: Not on file   Intimate Partner Violence: Not on file   Housing Stability: Low Risk  (8/21/2024)    Housing Stability Vital Sign     Unable to Pay for Housing in the Last Year: No     Number of Times Moved in the Last Year: 0     Homeless in the Last Year: No       Subjective         Objective    Physical Exam:   Assessment Type: During-treatment  General Appearance: Alert, Awake  Respiratory Pattern: Tachypneic  Chest Assessment: Chest expansion symmetrical  Bilateral Breath Sounds: Diminished, Coarse  Cough: Non-productive, Congested, Moist  O2 Device: HFNC    Vitals:  Blood pressure 133/84, pulse 94, temperature 99.2 °F (37.3 °C), temperature source Axillary, resp. rate (!) 30, height 5' 2\" (1.575 m), weight 39.7 kg (87 lb 8.4 oz), SpO2 98%.          Imaging and other studies: Results Review Statement: No pertinent imaging studies reviewed.    O2 Device: HFNC     Plan    Respiratory Plan: Mild Distress pathway  Airway Clearance Plan: Percussive Vest     Resp Comments: PT has smoking HX, no other pulmonary HX documented. PT takes no home TX/requires no home O2 currently. PT will recieve vest therapy to increase sputum production. PT is admitted to the ICU with acute resp. failure.   "

## 2024-12-07 NOTE — PLAN OF CARE
Problem: Potential for Falls  Goal: Patient will remain free of falls  Description: INTERVENTIONS:  - Educate patient/family on patient safety including physical limitations  - Instruct patient to call for assistance with activity   - Consult OT/PT to assist with strengthening/mobility   - Keep Call bell within reach  - Keep bed low and locked with side rails adjusted as appropriate  - Keep care items and personal belongings within reach  - Initiate and maintain comfort rounds  - Make Fall Risk Sign visible to staff  - Apply yellow socks and bracelet for high fall risk patients  - Consider moving patient to room near nurses station  Outcome: Progressing     Problem: Prexisting or High Potential for Compromised Skin Integrity  Goal: Skin integrity is maintained or improved  Description: INTERVENTIONS:  - Identify patients at risk for skin breakdown  - Assess and monitor skin integrity  - Assess and monitor nutrition and hydration status  - Monitor labs   - Assess for incontinence   - Turn and reposition patient  - Assist with mobility/ambulation  - Relieve pressure over bony prominences  - Avoid friction and shearing  - Provide appropriate hygiene as needed including keeping skin clean and dry  - Evaluate need for skin moisturizer/barrier cream  - Collaborate with interdisciplinary team   - Patient/family teaching  - Consider wound care consult   Outcome: Progressing     Problem: RESPIRATORY - ADULT  Goal: Achieves optimal ventilation and oxygenation  Description: INTERVENTIONS:  - Assess for changes in respiratory status  - Assess for changes in mentation and behavior  - Position to facilitate oxygenation and minimize respiratory effort  - Oxygen administered by appropriate delivery if ordered  - Initiate smoking cessation education as indicated  - Encourage broncho-pulmonary hygiene including cough, deep breathe, Incentive Spirometry  - Assess the need for suctioning and aspirate as needed  - Assess and instruct  to report SOB or any respiratory difficulty  - Respiratory Therapy support as indicated  Outcome: Progressing     Problem: SKIN/TISSUE INTEGRITY - ADULT  Goal: Skin Integrity remains intact(Skin Breakdown Prevention)  Description: Assess:  -Assess extremities for adequate circulation and sensation     Bed Management:  -Have minimal linens on bed & keep smooth, unwrinkled  -Change linens as needed when moist or perspiring      Toileting:  -Offer bedside commode    Activity:  -Encourage activity and walks on unit  -Encourage or provide ROM exercises   -Use appropriate equipment to lift or move patient in bed    Skin Care:  -Avoid use of baby powder, tape, friction and shearing, hot water or constrictive clothing  -Do not massage red bony areas      Outcome: Progressing  Goal: Incision(s), wounds(s) or drain site(s) healing without S/S of infection  Description: INTERVENTIONS  - Assess and document dressing, incision, wound bed, drain sites and surrounding tissue  - Provide patient and family education  Outcome: Progressing  Goal: Pressure injury heals and does not worsen  Description: Interventions:  - Implement low air loss mattress or specialty surface (Criteria met)  - Apply silicone foam dressing  - Consider nutrition services referral as needed  Outcome: Progressing     Problem: MUSCULOSKELETAL - ADULT  Goal: Maintain or return mobility to safest level of function  Description: INTERVENTIONS:  - Assess patient's ability to carry out ADLs; assess patient's baseline for ADL function and identify physical deficits which impact ability to perform ADLs (bathing, care of mouth/teeth, toileting, grooming, dressing, etc.)  - Assess/evaluate cause of self-care deficits   - Assess range of motion  - Assess patient's mobility  - Assess patient's need for assistive devices and provide as appropriate  - Encourage maximum independence but intervene and supervise when necessary  - Involve family in performance of ADLs  - Assess  for home care needs following discharge   - Consider OT consult to assist with ADL evaluation and planning for discharge  - Provide patient education as appropriate  Outcome: Progressing  Goal: Maintain proper alignment of affected body part  Description: INTERVENTIONS:  - Support, maintain and protect limb and body alignment  - Provide patient/ family with appropriate education  Outcome: Progressing     Problem: Nutrition/Hydration-ADULT  Goal: Nutrient/Hydration intake appropriate for improving, restoring or maintaining nutritional needs  Description: Monitor and assess patient's nutrition/hydration status for malnutrition. Collaborate with interdisciplinary team and initiate plan and interventions as ordered.  Monitor patient's weight and dietary intake as ordered or per policy. Utilize nutrition screening tool and intervene as necessary. Determine patient's food preferences and provide high-protein, high-caloric foods as appropriate.     INTERVENTIONS:  - Monitor oral intake, urinary output, labs, and treatment plans  - Assess nutrition and hydration status and recommend course of action  - Evaluate amount of meals eaten  - Assist patient with eating if necessary   - Allow adequate time for meals  - Recommend/ encourage appropriate diets, oral nutritional supplements, and vitamin/mineral supplements  - Order, calculate, and assess calorie counts as needed  - Recommend, monitor, and adjust tube feedings and TPN/PPN based on assessed needs  - Assess need for intravenous fluids  - Provide specific nutrition/hydration education as appropriate  - Include patient/family/caregiver in decisions related to nutrition  Outcome: Progressing

## 2024-12-07 NOTE — PLAN OF CARE
Problem: Potential for Falls  Goal: Patient will remain free of falls  Description: INTERVENTIONS:  - Educate patient/family on patient safety including physical limitations  - Instruct patient to call for assistance with activity   - Consult OT/PT to assist with strengthening/mobility   - Keep Call bell within reach  - Keep bed low and locked with side rails adjusted as appropriate  - Keep care items and personal belongings within reach  - Initiate and maintain comfort rounds  - Make Fall Risk Sign visible to staff  - Offer Toileting every 2 Hours, in advance of need  - Initiate/Maintain bed alarm  - Apply yellow socks and bracelet for high fall risk patients  - Consider moving patient to room near nurses station  12/7/2024 0405 by Noemi Aldridge RN  Outcome: Progressing    Problem: Nutrition/Hydration-ADULT  Goal: Nutrient/Hydration intake appropriate for improving, restoring or maintaining nutritional needs  Description: Monitor and assess patient's nutrition/hydration status for malnutrition. Collaborate with interdisciplinary team and initiate plan and interventions as ordered.  Monitor patient's weight and dietary intake as ordered or per policy. Utilize nutrition screening tool and intervene as necessary. Determine patient's food preferences and provide high-protein, high-caloric foods as appropriate.     INTERVENTIONS:  - Monitor oral intake, urinary output, labs, and treatment plans  - Assess nutrition and hydration status and recommend course of action  - Evaluate amount of meals eaten  - Assist patient with eating if necessary   - Allow adequate time for meals  - Recommend/ encourage appropriate diets, oral nutritional supplements, and vitamin/mineral supplements  - Order, calculate, and assess calorie counts as needed  - Recommend, monitor, and adjust tube feedings and TPN/PPN based on assessed needs  - Assess need for intravenous fluids  - Provide specific nutrition/hydration education as appropriate  -  Include patient/family/caregiver in decisions related to nutrition  12/7/2024 0405 by Noemi Aldridge RN  Outcome: Progressing  12/7/2024 0405 by Noemi Aldridge RN  Outcome: Progressing     Problem: MUSCULOSKELETAL - ADULT  Goal: Maintain or return mobility to safest level of function  Description: INTERVENTIONS:  - Assess patient's ability to carry out ADLs; assess patient's baseline for ADL function and identify physical deficits which impact ability to perform ADLs (bathing, care of mouth/teeth, toileting, grooming, dressing, etc.)  - Assess/evaluate cause of self-care deficits   - Assess range of motion  - Assess patient's mobility  - Assess patient's need for assistive devices and provide as appropriate  - Encourage maximum independence but intervene and supervise when necessary  - Involve family in performance of ADLs  - Assess for home care needs following discharge   - Consider OT consult to assist with ADL evaluation and planning for discharge  - Provide patient education as appropriate  12/7/2024 0405 by Noemi Aldridge RN  Outcome: Progressing  12/7/2024 0405 by Noemi Aldridge RN  Outcome: Progressing  12/7/2024 0031 by Noemi Aldridge RN  Outcome: Progressing  Goal: Maintain proper alignment of affected body part  Description: INTERVENTIONS:  - Support, maintain and protect limb and body alignment  - Provide patient/ family with appropriate education  12/7/2024 0405 by Noemi Aldridge RN  Outcome: Progressing  12/7/2024 0405 by Noemi Aldridge RN  Outcome: Progressing  12/7/2024 0031 by Noemi Aldridge RN  Outcome: Progressing     Problem: SKIN/TISSUE INTEGRITY - ADULT  Goal: Skin Integrity remains intact(Skin Breakdown Prevention)  Description: Assess:  -Perform Terry assessment every shift  -Clean and moisturize skin every 2 hours  -Inspect skin when repositioning, toileting, and assisting with ADLS  -Assess extremities for adequate circulation and sensation     Bed Management:  -Have minimal linens on bed & keep smooth,  unwrinkled  -Change linens as needed when moist or perspiring  -Avoid sitting or lying in one position for more than 2 hours while in bed  -Keep HOB at 30degrees     Toileting:  -Offer bedside commode  -Assess for incontinence every 2 hours  Activity:  -Mobilize patient 3 times a day  -Encourage activity and walks on unit  -Encourage or provide ROM exercises   -Turn and reposition patient every 2 Hours  -Use appropriate equipment to lift or move patient in bed  -Instruct/ Assist with weight shifting every 2 when out of bed in chair  -Consider limitation of chair time 2 hour intervals    Skin Care:  -Avoid use of baby powder, tape, friction and shearing, hot water or constrictive clothing  -Relieve pressure over bony prominences using allevyn  -Do not massage red bony areas    Outcome: Progressing  Goal: Incision(s), wounds(s) or drain site(s) healing without S/S of infection  Description: INTERVENTIONS  - Assess and document dressing, incision, wound bed, drain sites and surrounding tissue  - Provide patient and family education  - Perform skin care/dressing changes every shift  12/7/2024 0405 by Noemi Aldridge RN  Outcome: Progressing  12/7/2024 0405 by Noemi Aldridge RN  Outcome: Progressing  12/7/2024 0031 by Noemi Aldridge RN  Outcome: Progressing  Goal: Pressure injury heals and does not worsen  Description: Interventions:  - Implement low air loss mattress or specialty surface (Criteria met)  - Apply silicone foam dressing  - Instruct/assist with weight shifting every 120 minutes when in chair   - Limit chair time to 2 hour intervals  - Use special pressure reducing interventions such as cushion when in chair   - Apply fecal or urinary incontinence containment device   - Perform passive or active ROM every 2 hours  - Turn and reposition patient & offload bony prominences every 2 hours   - Utilize friction reducing device or surface for transfers   - Consider nutrition services referral as needed  12/7/2024 0405 by  Noemi Aldridge RN  Outcome: Progressing     Problem: RESPIRATORY - ADULT  Goal: Achieves optimal ventilation and oxygenation  Description: INTERVENTIONS:  - Assess for changes in respiratory status  - Assess for changes in mentation and behavior  - Position to facilitate oxygenation and minimize respiratory effort  - Oxygen administered by appropriate delivery if ordered  - Initiate smoking cessation education as indicated  - Encourage broncho-pulmonary hygiene including cough, deep breathe, Incentive Spirometry  - Assess the need for suctioning and aspirate as needed  - Assess and instruct to report SOB or any respiratory difficulty  - Respiratory Therapy support as indicated  12/7/2024 0405 by Noemi Aldridge RN  Outcome: Progressing  12/7/2024 0405 by Noemi Aldridge RN  Outcome: Progressing  12/7/2024 0031 by Noemi Aldridge RN  Outcome: Progressing     Problem: Prexisting or High Potential for Compromised Skin Integrity  Goal: Skin integrity is maintained or improved  Description: INTERVENTIONS:  - Identify patients at risk for skin breakdown  - Assess and monitor skin integrity  - Assess and monitor nutrition and hydration status  - Monitor labs   - Assess for incontinence   - Turn and reposition patient  - Assist with mobility/ambulation  - Relieve pressure over bony prominences  - Avoid friction and shearing  - Provide appropriate hygiene as needed including keeping skin clean and dry  - Evaluate need for skin moisturizer/barrier cream  - Collaborate with interdisciplinary team   - Patient/family teaching  - Consider wound care consult   12/7/2024 0405 by Noemi Aldridge RN  Outcome: Progressing  12/7/2024 0405 by Noemi Aldridge RN  Outcome: Progressing  12/7/2024 0031 by Noemi Aldridge RN  Outcome: Progressing     Problem: Potential for Falls  Goal: Patient will remain free of falls  Description: INTERVENTIONS:  - Educate patient/family on patient safety including physical limitations  - Instruct patient to call for  assistance with activity   - Consult OT/PT to assist with strengthening/mobility   - Keep Call bell within reach  - Keep bed low and locked with side rails adjusted as appropriate  - Keep care items and personal belongings within reach  - Initiate and maintain comfort rounds  - Make Fall Risk Sign visible to staff  - Offer Toileting every 2 Hours, in advance of need  - Initiate/Maintain bed alarm  - Apply yellow socks and bracelet for high fall risk patients  - Consider moving patient to room near nurses station  12/7/2024 0405 by Noemi Aldridge RN  Outcome: Progressing  12/7/2024 0405 by Noemi Aldridge, RN  Outcome: Progressing  12/7/2024 0031 by Noemi Aldridge, RN  Outcome: Progressing

## 2024-12-07 NOTE — ASSESSMENT & PLAN NOTE
AEB tachypnea, leukocytosis   Likely in the setting of RLL PNA as above   LA 1.7, procal 0.64  ER management -- 1L NS, Zithromax, Ctx   Flu, Covid, RSV negative     Plan:   Abx D2: Ctx D2  BC x2, MRSA, strep, legionella, sputum pending   Check UA  Remains HD stable   Trend endpoints

## 2024-12-07 NOTE — PROGRESS NOTES
Progress Note - Critical Care/ICU   Name: Ross Mcguire 74 y.o. male I MRN: [id number]  Unit/Bed#: -01 SDU I Date of Admission: 12/6/2024   Date of Service: 12/7/2024 I Hospital Day: 1      Assessment & Plan  Acute hypoxic respiratory failure (HCC)  Likely in the setting of RLL PNA   Required CPAP by EMS en route, transitioned to bipap 14/8 in ER and eventually weaned to HFNC   VBG on admission -- 7.5/34/42/26  12/6 CXR concerning for RLL infiltrate   12/6 CTA Chest ([facility]) --no PE, severe emphysema, secretions in the trachea and bilateral mainstem bronchi, extensive mucus plugging throughout the right lower lobe, moderate right pleural effusion    Plan:   Currently requiring 6L MFNC  Per discussion with patient on admission, he is DNR/DNI   Attempt to wean supplemental O2 for goal spo2 > 90%   Aggressive pulmonary toileting with vest therapy, scheduled xopenex and atrovent   Noted weak cough with poor effort with prompting on admission   Disease-specific treatments as below   Right lower lobe pneumonia  12/6 CXR -- RLL infiltrate  Hypoxic, tachypneic on admission with leukocytosis as below   Abx as below   SIRS (systemic inflammatory response syndrome) (HCC)  AEB tachypnea, leukocytosis   Likely in the setting of RLL PNA as above   LA 1.7, procal 0.64  ER management -- 1L NS, Zithromax, Ctx   Flu, Covid, RSV negative     Plan:   Abx D2: Ctx D2  BC x2, MRSA, strep, legionella, sputum pending   Check UA  Remains HD stable   Trend endpoints   GERD (gastroesophageal reflux disease)  Cont home PPI  Other chronic pancreatitis (HCC)  Follows as OP with [facility]   Denies abd pain, nausea, vomiting on admission   Cont home Creon   Check lipase for completeness  Smoking  Quit smoking 3 months ago  States has been smoking since age 12, as much as 2ppd while he was employed as a    NRT if requested   History of endovascular stent graft for abdominal aortic aneurysm (AAA)  Follows with Dr. Angel as OP,  "last kaylee 6/4/2024  Per Dr. 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 Angel   SIADH (syndrome of inappropriate ADH production) ([facility])  Follows with Nephrology as OP   Likely in the setting of SSRI use and low solute intake     Plan:   sNa 135 on admission   Cont home salt tabs tid   Serial BMP   Severe protein-calorie malnutrition (HCC)  Admission weight 39.6kg   Appreciate nutrition input   Benign essential hypertension  Cont home Norvasc, metoprolol with holds   Goal SBP <160  Fall  Patient reports falls at home after bending down to  a washcloth and falling forward, striking his L forehead on the floor   He denies LOC  Lives at home with 3 friends   12/6 CTH neg   12/6 CT Cspine neg   12/6 CTA chest pending     Plan:   Fall precautions   Will need PT/OT when appropriate   Transaminitis  Very mild elevation in his ALT and alk phos on admission   Denies abd pain   Trend on serial draws   Disposition: Stepdown Level 1    ICU Core Measures     A: Assess, Prevent, and Manage Pain Has pain been assessed? Yes  Need for changes to pain regimen? No   B: Both SAT/SAT  N/A   C: Choice of Sedation RASS Goal: N/A patient not on sedation  Need for changes to sedation or analgesia regimen? NA   D: Delirium CAM-ICU: Negative   E: Early Mobility  Plan for early mobility? Yes   F: Family Engagement Plan for family engagement today? Yes       Antibiotic Review: Awaiting culture results.       Prophylaxis:  VTE VTE covered by:  enoxaparin, Subcutaneous       Stress Ulcer  covered byomeprazole (PriLOSEC) 40 MG capsule [[id number]] (Long-Term Med), pantoprazole (PROTONIX) EC tablet 40 mg [[id number]]         24 Hour Events : He has been weaned to 4L NC. HE endorses " no further complaints.   Subjective   Review of Systems: See HPI for Review of Systems    Objective :                   Vitals I/O      Most Recent Min/Max in 24hrs   Temp 98.2 °F (36.8 °C) Temp  Min: 97.5 °F (36.4 °C)  Max: 99.2 °F (37.3 °C)   Pulse 101 Pulse  Min: 87  Max: 106   Resp (!) 42 Resp  Min: 26  Max: 46   /79 BP  Min: 133/84  Max: 157/82   O2 Sat 94 % SpO2  Min: 93 %  Max: 99 %      Intake/Output Summary (Last 24 hours) at 12/7/2024 043  Last data filed at 12/7/2024 0423  Gross per 24 hour   Intake 1300 ml   Output 310 ml   Net 990 ml       Diet Regular; Regular House    Invasive Monitoring           Physical Exam   Physical Exam  Vitals and nursing note reviewed.   Skin:     General: Skin is warm.      Capillary Refill: Capillary refill takes less than 2 seconds.   HENT:      Head: Normocephalic and atraumatic.      Mouth/Throat:      Lips: Pink.      Mouth: Mucous membranes are dry.   Cardiovascular:      Rate and Rhythm: Normal rate and regular rhythm.   Musculoskeletal:      Right lower leg: No edema.      Left lower leg: No edema.   Abdominal: General: Abdomen is flat. Bowel sounds are normal.      Palpations: Abdomen is soft.      Tenderness: There is no abdominal tenderness.   Constitutional:       General: He is awake. He is not in acute distress.     Appearance: He is cachectic. He is ill-appearing.      Interventions: Nasal cannula in place.   Pulmonary:      Effort: Tachypnea present. No accessory muscle usage, prolonged expiration, respiratory distress or accessory muscle usage.      Breath sounds: Rhonchi present.   Psychiatric:         Mood and Affect: Affect is flat.         Behavior: Behavior is cooperative.   Neurological:      Mental Status: He is alert.      GCS: GCS eye subscore is 4. GCS verbal subscore is 5. GCS motor subscore is 6.   Genitourinary/Anorectal:     Comments: Voiding independently         Diagnostic Studies        Lab Results: I have reviewed the following  results:     Medications:  Scheduled PRN   amLODIPine, 10 mg, Daily  aspirin, 81 mg, Daily  cefTRIAXone, 1,000 mg, Q24H  chlorhexidine, 15 mL, Q12H POLLY  citalopram, 20 mg, Daily  enoxaparin, 40 mg, Daily  ferrous sulfate, 325 mg, Daily With Breakfast  finasteride, 5 mg, Daily  ipratropium, 0.5 mg, Q6H  levalbuterol, 1.25 mg, Q6H  magnesium sulfate, 4 g, Once  metoprolol succinate, 25 mg, Daily  multivitamin-minerals, 1 tablet, Daily  pancrelipase (Lip-Prot-Amyl), 24,000 Units, TID With Meals  pantoprazole, 40 mg, Early Morning  pravastatin, 40 mg, HS  sodium chloride, 1 g, TID With Meals      acetaminophen, 650 mg, Q6H PRN       Continuous          Labs:   CBC    Recent Labs     12/06/24  1813 12/07/24  0416   WBC 17.52* 20.35*   HGB 11.2* 9.9*   HCT 33.5* 29.8*    336     BMP    Recent Labs     12/06/24  1807 12/06/24  1813   SODIUM  --  135   K  --  3.4*   CL  --  95*   CO2 28 26   AGAP  --  14*   BUN  --  18   CREATININE  --  0.64   CALCIUM  --  8.1*       Coags    Recent Labs     12/06/24  1813   INR 1.18   PTT 37*        Additional Electrolytes  Recent Labs     12/06/24  1807 12/06/24  2319   MG  --  1.2*   PHOS  --  3.5   CAIONIZED 0.95*  --           Blood Gas    No recent results  No recent results LFTs  Recent Labs     12/06/24  1813   ALT 60*   AST 30   ALKPHOS 123*   ALB 3.1*   TBILI 0.65       Infectious  Recent Labs     12/06/24  1813   PROCALCITONI 0.64*     Glucose  Recent Labs     12/06/24  1813   GLUC 122

## 2024-12-07 NOTE — ASSESSMENT & PLAN NOTE
12/6 CXR -- RLL infiltrate  Hypoxic, tachypneic on admission with leukocytosis as below   Abx as below

## 2024-12-08 NOTE — ASSESSMENT & PLAN NOTE
Likely in the setting of RLL PNA   Required CPAP by EMS en route, transitioned to bipap 14/8 in ER and eventually weaned to HFNC   VBG on admission -- 7.5/34/42/26  12/6 CXR concerning for RLL infiltrate   12/6 CTA Chest ([facility]) --no PE, severe emphysema, secretions in the trachea and bilateral mainstem bronchi, extensive mucus plugging throughout the right lower lobe, moderate right pleural effusion  Yesterday, increase in supplemental O2 to HFNC after vest therapy, which has since been d/c'ed     Plan:   Currently requiring HFNC 55L 35-40%  Attempt to wean supplemental O2 for goal spo2 > 90%   Aggressive pulmonary toileting with vest therapy, scheduled xopenex and atrovent   As poor tolerance of vest therapy, cont 3% saline nebs   Noted weak cough with poor effort with prompting on admission   Disease-specific treatments as below

## 2024-12-08 NOTE — ASSESSMENT & PLAN NOTE
Cont home Norvasc, metoprolol with holds   Home regimen on hold for NPO   Cont IV lopressor 2.5 q6h   Goal SBP <160

## 2024-12-08 NOTE — ASSESSMENT & PLAN NOTE
12/6 CTA chest -- no PE, severe emphysema, attenuation of multiple small airways in the right lower lobe which could be related to aspiration, inflammation, and/or infection and scattered ill-defined groundglass and alveolar opacities in the periphery of the right midlung   Patient reports significant smoking history     Plan:   Hydrocortisone added for severe PNA 12/7  Cont xopenex, atrovent, pulmicort, perforomist   Will need OP f/u with Pulm and PFTs

## 2024-12-08 NOTE — PROGRESS NOTES
Ross Mcguire is a 74 y.o. male who is currently ordered Vancomycin IV with management by the Pharmacy Consult service.  Relevant clinical data and objective / subjective history reviewed.  Vancomycin Assessment:  Indication and Goal AUC/Trough: Pneumonia (goal -600, trough >10), -600, trough >10  Clinical Status: stable  Micro:     Renal Function:  SCr: 0.54 mg/dL  CrCl: 65 mL/min  Renal replacement: Not on dialysis  Days of Therapy: 2  Current Dose: 750mg IV Q12H  Vancomycin Plan:  New Dosing: no change  Estimated AUC: 495 mcg*hr/mL  Estimated Trough: 11.9 mcg/mL  Next Level: 0600 on 12/15  Renal Function Monitoring: Daily BMP and UOP  Pharmacy will continue to follow closely for s/sx of nephrotoxicity, infusion reactions and appropriateness of therapy.  BMP and CBC will be ordered per protocol. We will continue to follow the patient’s culture results and clinical progress daily.    Jake Patel, Pharmacist

## 2024-12-08 NOTE — PROGRESS NOTES
Ross Mcguire is a 74 y.o. male who is currently ordered Vancomycin IV with management by the Pharmacy Consult service.  Relevant clinical data and objective / subjective history reviewed.  Vancomycin Assessment:  Indication and Goal AUC/Trough: Pneumonia (goal -600, trough >10), -600, trough >10  Clinical Status: New start  Micro:     Renal Function:  SCr: 0.53 mg/dL  CrCl: 65.5 mL/min  Renal replacement: Not on dialysis  Days of Therapy: 1  Current Dose: 1000mg IV loading dose  Vancomycin Plan:  New Dosinmg IV Q12H  Estimated AUC: 501 mcg*hr/mL  Estimated Trough: 12.1 mcg/mL  Next Level: Random trough with morning labs at approximately 0600 on 24  Renal Function Monitoring: Daily BMP and UOP  Pharmacy will continue to follow closely for s/sx of nephrotoxicity, infusion reactions and appropriateness of therapy.  BMP and CBC will be ordered per protocol. We will continue to follow the patient’s culture results and clinical progress daily.    Ghazal Hickman, Pharmacist

## 2024-12-08 NOTE — PROGRESS NOTES
Progress Note - Critical Care/ICU   Name: Ross Mcguire 74 y.o. male I MRN: [id number]  Unit/Bed#: -01 SDU I Date of Admission: 12/6/2024   Date of Service: 12/8/2024 I Hospital Day: 2      Assessment & Plan  Acute hypoxic respiratory failure (HCC)  Likely in the setting of RLL PNA   Required CPAP by EMS en route, transitioned to bipap 14/8 in ER and eventually weaned to HFNC   VBG on admission -- 7.5/34/42/26  12/6 CXR concerning for RLL infiltrate   12/6 CTA Chest (Vrad) --no PE, severe emphysema, secretions in the trachea and bilateral mainstem bronchi, extensive mucus plugging throughout the right lower lobe, moderate right pleural effusion  Yesterday, increase in supplemental O2 to HFNC after vest therapy, which has since been d/c'ed     Plan:   Currently requiring HFNC 55L 35-40%  Attempt to wean supplemental O2 for goal spo2 > 90%   Aggressive pulmonary toileting with vest therapy, scheduled xopenex and atrovent   As poor tolerance of vest therapy, cont 3% saline nebs   Noted weak cough with poor effort with prompting on admission   Disease-specific treatments as below   Right lower lobe pneumonia  12/6 CXR -- RLL infiltrate  Hypoxic, tachypneic on admission with leukocytosis as below   Abx as below   SIRS (systemic inflammatory response syndrome) (HCC)  AEB tachypnea, leukocytosis   Likely in the setting of RLL PNA as above   LA 1.7, procal 0.64  ER management -- 1L NS, Zithromax, Ctx   Flu, Covid, RSV negative   BC 1/2 GPC in clusters, will add Vanc until BCID results  Strep, legionella negative     Plan:   Abx D3: Ctx D3, Vanc D2  Hydrocortisone 50mg q6h for severe PNA D2  Remains HD stable   Trend endpoints   GERD (gastroesophageal reflux disease)  Cont home PPI via IV for NPO  Other chronic pancreatitis (HCC)  Follows as OP with [facility]   Denies abd pain, nausea, vomiting on admission   Cont home Creon   Check lipase for completeness  Smoking  Quit smoking 3 months ago  States has been  "smoking since age 12, as much as 2ppd while he was employed as a    NRT if requested   History of endovascular stent graft for abdominal aortic aneurysm (AAA)  Follows with Dr. Angel as OP, last kaylee 6/4/2024  Per Dr. 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 Angel   SIADH (syndrome of inappropriate ADH production) ([facility])  Follows with Nephrology as OP   Likely in the setting of SSRI use and low solute intake     Plan:   Most recent sNa 136  Cont home salt tabs tid   Serial BMP   Severe protein-calorie malnutrition (HCC)  Admission weight 39.6kg   Appreciate nutrition input   Benign essential hypertension  Cont home Norvasc, metoprolol with holds   Home regimen on hold for NPO   Cont IV lopressor 2.5 q6h   Goal SBP <160  Fall  Patient reports falls at home after bending down to  a washcloth and falling forward, striking his L forehead on the floor   He denies LOC  Lives at home with 3 friends   12/6 CTH neg   12/6 CT Cspine neg   12/6 CTA chest -- no PE, severe emphysema, attenuation of multiple small airways in the right lower lobe which could be related to aspiration, inflammation, and/or infection and scattered ill-defined groundglass and alveolar opacities in the periphery of the right midlung     Plan:   Fall precautions   Will need PT/OT when appropriate   Transaminitis  Very mild elevation in his ALT and alk phos on admission   Denies abd pain   Improving on serial draws   Emphysema lung (HCC)  12/6 CTA chest -- no PE, severe emphysema, attenuation of multiple small airways in the right lower lobe which could be related to aspiration, inflammation, and/or infection and scattered ill-defined groundglass and alveolar opacities in " the periphery of the right midlung   Patient reports significant smoking history     Plan:   Hydrocortisone added for severe PNA 12/7  Cont xopenex, atrovent, pulmicort, perforomist   Will need OP f/u with Pulm and PFTs   Dysphagia  12/7 failed speech eval and made strict NPO   Appreciate Speech input   Disposition: Stepdown Level 1    ICU Core Measures     A: Assess, Prevent, and Manage Pain Has pain been assessed? Yes  Need for changes to pain regimen? No   B: Both SAT/SAT  N/A   C: Choice of Sedation RASS Goal: N/A patient not on sedation  Need for changes to sedation or analgesia regimen? NA   D: Delirium CAM-ICU: Negative   E: Early Mobility  Plan for early mobility? Yes   F: Family Engagement Plan for family engagement today? Yes       Antibiotic Review: Awaiting culture results.       Prophylaxis:  VTE VTE covered by:  enoxaparin, Subcutaneous, 40 mg at 12/07/24 0833       Stress Ulcer  covered byomeprazole (PriLOSEC) 40 MG capsule [[id number]] (Long-Term Med), pantoprazole (PROTONIX) injection 40 mg [[id number]]         24 Hour Events : Yesterday morning, after vest therapy, Ross had increased WOB, tachypnea and was placed on HFNC. Vest therapy was discontinued and replaced with hypertonic saline nebs. Given the new finding of emphysema on his CT chest, he was started on pulmicort and perforomist in addition to his other nebulizer regimen. He was started on hydrocortisone 50mg q6h for severe PNA. He unfortunately failed a swallow evaluation with Speech and has been made strict NPO. He continues on HFNC and his only complaint is that he feels overall fatigued and thirsty.     Subjective   Review of Systems: See HPI for Review of Systems    Objective :                   Vitals I/O      Most Recent Min/Max in 24hrs   Temp 98.4 °F (36.9 °C) Temp  Min: 97.7 °F (36.5 °C)  Max: 98.7 °F (37.1 °C)   Pulse 90 Pulse  Min: 90  Max: 106   Resp (!) 25 Resp  Min: 25  Max: 40   /79 BP  Min: 125/79  Max: 157/79   O2  Sat 94 % SpO2  Min: 90 %  Max: 96 %      Intake/Output Summary (Last 24 hours) at 12/8/2024 0406  Last data filed at 12/8/2024 0110  Gross per 24 hour   Intake 600 ml   Output 1722 ml   Net -1122 ml       Diet NPO; Sips with meds    Invasive Monitoring           Physical Exam   Physical Exam  Vitals and nursing note reviewed.   HENT:      Head: Normocephalic and atraumatic.      Mouth/Throat:      Lips: Pink.      Mouth: Mucous membranes are dry.   Cardiovascular:      Rate and Rhythm: Normal rate and regular rhythm.   Musculoskeletal:      Right lower leg: No edema.      Left lower leg: No edema.   Abdominal: General: Abdomen is flat. Bowel sounds are normal.      Palpations: Abdomen is soft.      Tenderness: There is no abdominal tenderness.   Constitutional:       General: He is awake. He is not in acute distress.     Appearance: He is cachectic. He is ill-appearing.      Interventions: Nasal cannula in place.   Pulmonary:      Effort: No accessory muscle usage, prolonged expiration, respiratory distress or accessory muscle usage.      Breath sounds: Examination of the right-lower field reveals decreased breath sounds. Examination of the left-lower field reveals decreased breath sounds. Decreased breath sounds present.      Comments: HFNC 55L 40%  Psychiatric:         Behavior: Behavior is cooperative.   Neurological:      Mental Status: He is alert and oriented to person, place, and time.   Genitourinary/Anorectal:     Comments: Voiding independently          Diagnostic Studies        Lab Results: I have reviewed the following results:     Medications:  Scheduled PRN   [Held by provider] amLODIPine, 10 mg, Daily  [Held by provider] aspirin, 81 mg, Daily  budesonide, 0.5 mg, Q12H  cefTRIAXone, 1,000 mg, Q24H  chlorhexidine, 15 mL, Q12H POLLY  [Held by provider] citalopram, 20 mg, Daily  enoxaparin, 40 mg, Daily  [Held by provider] ferrous sulfate, 325 mg, Daily With Breakfast  [Held by provider] finasteride, 5 mg,  Daily  formoterol, 20 mcg, Q12H  hydrocortisone sodium succinate, 50 mg, Q6H POLLY  ipratropium, 0.5 mg, Q6H  levalbuterol, 1.25 mg, Q6H  metoprolol, 2.5 mg, Q6H  [Held by provider] metoprolol succinate, 25 mg, Daily  [Held by provider] multivitamin-minerals, 1 tablet, Daily  [Held by provider] pancrelipase (Lip-Prot-Amyl), 24,000 Units, TID With Meals  pantoprazole, 40 mg, Q24H POLLY  [Held by provider] pravastatin, 40 mg, HS  sodium chloride, 4 mL, TID  [Held by provider] sodium chloride, 1 g, TID With Meals  vancomycin, 17.5 mg/kg, Q12H      acetaminophen, 650 mg, Q6H PRN       Continuous          Labs:   CBC    Recent Labs     12/06/24 1813 12/07/24  0416   WBC 17.52* 20.35*   HGB 11.2* 9.9*   HCT 33.5* 29.8*    336     BMP    Recent Labs     12/06/24 1813 12/07/24  0416   SODIUM 135 136   K 3.4* 3.9   CL 95* 102   CO2 26 24   AGAP 14* 10   BUN 18 18   CREATININE 0.64 0.53*   CALCIUM 8.1* 7.8*       Coags    Recent Labs     12/06/24 1813   INR 1.18   PTT 37*        Additional Electrolytes  Recent Labs     12/06/24  1807 12/06/24  2319 12/07/24  0416   MG  --  1.2* 2.4   PHOS  --  3.5 2.8   CAIONIZED 0.95*  --   --           Blood Gas    No recent results  No recent results LFTs  Recent Labs     12/06/24  1813 12/07/24  0416   ALT 60* 45   AST 30 23   ALKPHOS 123* 100   ALB 3.1* 2.8*   TBILI 0.65 0.36       Infectious  Recent Labs     12/06/24 1813 12/07/24  0416   PROCALCITONI 0.64* 0.92*     Glucose  Recent Labs     12/06/24  1813 12/07/24  0416   GLUC 122 122

## 2024-12-08 NOTE — PLAN OF CARE
Problem: Potential for Falls  Goal: Patient will remain free of falls  Description: INTERVENTIONS:  - Educate patient/family on patient safety including physical limitations  - Instruct patient to call for assistance with activity   - Consult OT/PT to assist with strengthening/mobility   - Keep Call bell within reach  - Keep bed low and locked with side rails adjusted as appropriate  - Keep care items and personal belongings within reach  - Initiate and maintain comfort rounds  - Make Fall Risk Sign visible to staff  - Apply yellow socks and bracelet for high fall risk patients  - Consider moving patient to room near nurses station  Outcome: Progressing     Problem: Prexisting or High Potential for Compromised Skin Integrity  Goal: Skin integrity is maintained or improved  Description: INTERVENTIONS:  - Identify patients at risk for skin breakdown  - Assess and monitor skin integrity  - Assess and monitor nutrition and hydration status  - Monitor labs   - Assess for incontinence   - Turn and reposition patient  - Assist with mobility/ambulation  - Relieve pressure over bony prominences  - Avoid friction and shearing  - Provide appropriate hygiene as needed including keeping skin clean and dry  - Evaluate need for skin moisturizer/barrier cream  - Collaborate with interdisciplinary team   - Patient/family teaching  - Consider wound care consult   Outcome: Progressing     Problem: RESPIRATORY - ADULT  Goal: Achieves optimal ventilation and oxygenation  Description: INTERVENTIONS:  - Assess for changes in respiratory status  - Assess for changes in mentation and behavior  - Position to facilitate oxygenation and minimize respiratory effort  - Oxygen administered by appropriate delivery if ordered  - Initiate smoking cessation education as indicated  - Encourage broncho-pulmonary hygiene including cough, deep breathe, Incentive Spirometry  - Assess the need for suctioning and aspirate as needed  - Assess and instruct  to report SOB or any respiratory difficulty  - Respiratory Therapy support as indicated  Outcome: Progressing     Problem: SKIN/TISSUE INTEGRITY - ADULT  Goal: Skin Integrity remains intact(Skin Breakdown Prevention)  Description: Assess:  -Assess extremities for adequate circulation and sensation     Bed Management:  -Have minimal linens on bed & keep smooth, unwrinkled  -Change linens as needed when moist or perspiring      Toileting:  -Offer bedside commode      Activity:  -Encourage activity and walks on unit  -Encourage or provide ROM exercises   -Use appropriate equipment to lift or move patient in bed      Skin Care:  -Avoid use of baby powder, tape, friction and shearing, hot water or constrictive clothing  -Do not massage red bony areas    Outcome: Progressing  Goal: Incision(s), wounds(s) or drain site(s) healing without S/S of infection  Description: INTERVENTIONS  - Assess and document dressing, incision, wound bed, drain sites and surrounding tissue  - Provide patient and family education  Outcome: Progressing  Goal: Pressure injury heals and does not worsen  Description: Interventions:  - Implement low air loss mattress or specialty surface (Criteria met)  - Apply silicone foam dressing  - Apply fecal or urinary incontinence containment device   - Consider nutrition services referral as needed  Outcome: Progressing     Problem: MUSCULOSKELETAL - ADULT  Goal: Maintain or return mobility to safest level of function  Description: INTERVENTIONS:  - Assess patient's ability to carry out ADLs; assess patient's baseline for ADL function and identify physical deficits which impact ability to perform ADLs (bathing, care of mouth/teeth, toileting, grooming, dressing, etc.)  - Assess/evaluate cause of self-care deficits   - Assess range of motion  - Assess patient's mobility  - Assess patient's need for assistive devices and provide as appropriate  - Encourage maximum independence but intervene and supervise when  necessary  - Involve family in performance of ADLs  - Assess for home care needs following discharge   - Consider OT consult to assist with ADL evaluation and planning for discharge  - Provide patient education as appropriate  Outcome: Progressing  Goal: Maintain proper alignment of affected body part  Description: INTERVENTIONS:  - Support, maintain and protect limb and body alignment  - Provide patient/ family with appropriate education  Outcome: Progressing     Problem: Nutrition/Hydration-ADULT  Goal: Nutrient/Hydration intake appropriate for improving, restoring or maintaining nutritional needs  Description: Monitor and assess patient's nutrition/hydration status for malnutrition. Collaborate with interdisciplinary team and initiate plan and interventions as ordered.  Monitor patient's weight and dietary intake as ordered or per policy. Utilize nutrition screening tool and intervene as necessary. Determine patient's food preferences and provide high-protein, high-caloric foods as appropriate.     INTERVENTIONS:  - Monitor oral intake, urinary output, labs, and treatment plans  - Assess nutrition and hydration status and recommend course of action  - Evaluate amount of meals eaten  - Assist patient with eating if necessary   - Allow adequate time for meals  - Recommend/ encourage appropriate diets, oral nutritional supplements, and vitamin/mineral supplements  - Order, calculate, and assess calorie counts as needed  - Recommend, monitor, and adjust tube feedings and TPN/PPN based on assessed needs  - Assess need for intravenous fluids  - Provide specific nutrition/hydration education as appropriate  - Include patient/family/caregiver in decisions related to nutrition  Outcome: Progressing

## 2024-12-08 NOTE — ASSESSMENT & PLAN NOTE
Very mild elevation in his ALT and alk phos on admission   Denies abd pain   Improving on serial draws

## 2024-12-08 NOTE — MALNUTRITION/BMI
This medical record reflects one or more clinical indicators suggestive of malnutrition and underweight.    Malnutrition Findings:   Adult Malnutrition type: Chronic illness  Adult Degree of Malnutrition: Other severe protein calorie malnutrition  Malnutrition Characteristics: Fat loss, Muscle loss, Inadequate energy, Weight loss                360 Statement: Chronic severe malnutrition related to dysphagia, inadequate oral intake as evidenced by < 75% estimated energy intake > 1 mo; 14.3% weight loss x 3 mo (8/21/24 98lb, 12/8/24 84lb); severe fat loss to orbitals, buccal pads, triceps, thoracic region; severe muscle loss to temporalis, pectoralis, deltoid, interroseous muscles. Treated with: Initiation of diet per MD/SLP as appropriate noting dysphagia. If unable to initiation oral diet within 72 h consider enteral nutrition, will provide EN recs as indicated. Recommend daily weights for nutrition monitoring. Will consider supplementation once oral diet initiated.    BMI Findings:  Adult BMI Classifications: Underweight < 18.5        Body mass index is 15.52 kg/m².     See Nutrition note dated 12/8/24 for additional details.  Completed nutrition assessment is viewable in the nutrition documentation.

## 2024-12-08 NOTE — ASSESSMENT & PLAN NOTE
Patient reports falls at home after bending down to  a washcloth and falling forward, striking his L forehead on the floor   He denies LOC  Lives at home with 3 friends   12/6 CTH neg   12/6 CT Cspine neg   12/6 CTA chest -- no PE, severe emphysema, attenuation of multiple small airways in the right lower lobe which could be related to aspiration, inflammation, and/or infection and scattered ill-defined groundglass and alveolar opacities in the periphery of the right midlung     Plan:   Fall precautions   Will need PT/OT when appropriate

## 2024-12-08 NOTE — PLAN OF CARE
Problem: Potential for Falls  Goal: Patient will remain free of falls  Description: INTERVENTIONS:  - Educate patient/family on patient safety including physical limitations  - Instruct patient to call for assistance with activity   - Consult OT/PT to assist with strengthening/mobility   - Keep Call bell within reach  - Keep bed low and locked with side rails adjusted as appropriate  - Keep care items and personal belongings within reach  - Initiate and maintain comfort rounds  - Make Fall Risk Sign visible to staff  - Offer Toileting every 2 Hours, in advance of need  - Initiate/Maintain bed alarm  - Apply yellow socks and bracelet for high fall risk patients  - Consider moving patient to room near nurses station  Outcome: Progressing     Problem: Prexisting or High Potential for Compromised Skin Integrity  Goal: Skin integrity is maintained or improved  Description: INTERVENTIONS:  - Identify patients at risk for skin breakdown  - Assess and monitor skin integrity  - Assess and monitor nutrition and hydration status  - Monitor labs   - Assess for incontinence   - Turn and reposition patient  - Assist with mobility/ambulation  - Relieve pressure over bony prominences  - Avoid friction and shearing  - Provide appropriate hygiene as needed including keeping skin clean and dry  - Evaluate need for skin moisturizer/barrier cream  - Collaborate with interdisciplinary team   - Patient/family teaching  - Consider wound care consult   Outcome: Progressing     Problem: RESPIRATORY - ADULT  Goal: Achieves optimal ventilation and oxygenation  Description: INTERVENTIONS:  - Assess for changes in respiratory status  - Assess for changes in mentation and behavior  - Position to facilitate oxygenation and minimize respiratory effort  - Oxygen administered by appropriate delivery if ordered  - Initiate smoking cessation education as indicated  - Encourage broncho-pulmonary hygiene including cough, deep breathe, Incentive  Spirometry  - Assess the need for suctioning and aspirate as needed  - Assess and instruct to report SOB or any respiratory difficulty  - Respiratory Therapy support as indicated  Outcome: Progressing     Problem: SKIN/TISSUE INTEGRITY - ADULT  Goal: Skin Integrity remains intact(Skin Breakdown Prevention)  Description: Assess:  -Perform Terry assessment every shift  -Clean and moisturize skin every shift  -Inspect skin when repositioning, toileting, and assisting with ADLS  -Assess extremities for adequate circulation and sensation     Bed Management:  -Have minimal linens on bed & keep smooth, unwrinkled  -Change linens as needed when moist or perspiring  -Avoid sitting or lying in one position for more than 2 hours while in bed  -Keep HOB at 30 degrees     Toileting:  -Offer bedside commode  -Assess for incontinence every 2hr   -Use incontinent care products after each incontinent episode such as brief    Activity:  -Mobilize patient 3 times a day  -Encourage activity and walks on unit  -Encourage or provide ROM exercises   -Turn and reposition patient every 2 Hours  -Use appropriate equipment to lift or move patient in bed  -Instruct/ Assist with weight shifting every 30min  when out of bed in chair  -Consider limitation of chair time 2 hour intervals    Skin Care:  -Avoid use of baby powder, tape, friction and shearing, hot water or constrictive clothing  -Relieve pressure over bony prominences using allevyn   -Do not massage red bony areas    Goal: Incision(s), wounds(s) or drain site(s) healing without S/S of infection  Description: INTERVENTIONS  - Assess and document dressing, incision, wound bed, drain sites and surrounding tissue  - Provide patient and family education  Outcome: Progressing  Goal: Pressure injury heals and does not worsen  Description: Interventions:  - Implement low air loss mattress or specialty surface (Criteria met)  - Apply silicone foam dressing  - Instruct/assist with weight  shifting every 30 minutes when in chair   - Limit chair time to 2 hour intervals  - Use special pressure reducing interventions such as waffle cushion when in chair   - Apply fecal or urinary incontinence containment device   - Perform passive or active ROM every shift  - Turn and reposition patient & offload bony prominences every 2 hours   - Utilize friction reducing device or surface for transfers   - Consider consults to  interdisciplinary teams such as nutrition   - Use incontinent care products after each incontinent episode such as barrier cream   - Consider nutrition services referral as needed  Outcome: Progressing     Problem: MUSCULOSKELETAL - ADULT  Goal: Maintain or return mobility to safest level of function  Description: INTERVENTIONS:  - Assess patient's ability to carry out ADLs; assess patient's baseline for ADL function and identify physical deficits which impact ability to perform ADLs (bathing, care of mouth/teeth, toileting, grooming, dressing, etc.)  - Assess/evaluate cause of self-care deficits   - Assess range of motion  - Assess patient's mobility  - Assess patient's need for assistive devices and provide as appropriate  - Encourage maximum independence but intervene and supervise when necessary  - Involve family in performance of ADLs  - Assess for home care needs following discharge   - Consider OT consult to assist with ADL evaluation and planning for discharge  - Provide patient education as appropriate  Outcome: Progressing  Goal: Maintain proper alignment of affected body part  Description: INTERVENTIONS:  - Support, maintain and protect limb and body alignment  - Provide patient/ family with appropriate education  Outcome: Progressing     Problem: Nutrition/Hydration-ADULT  Goal: Nutrient/Hydration intake appropriate for improving, restoring or maintaining nutritional needs  Description: Monitor and assess patient's nutrition/hydration status for malnutrition. Collaborate with  interdisciplinary team and initiate plan and interventions as ordered.  Monitor patient's weight and dietary intake as ordered or per policy. Utilize nutrition screening tool and intervene as necessary. Determine patient's food preferences and provide high-protein, high-caloric foods as appropriate.     INTERVENTIONS:  - Monitor oral intake, urinary output, labs, and treatment plans  - Assess nutrition and hydration status and recommend course of action  - Evaluate amount of meals eaten  - Assist patient with eating if necessary   - Allow adequate time for meals  - Recommend/ encourage appropriate diets, oral nutritional supplements, and vitamin/mineral supplements  - Order, calculate, and assess calorie counts as needed  - Recommend, monitor, and adjust tube feedings and TPN/PPN based on assessed needs  - Assess need for intravenous fluids  - Provide specific nutrition/hydration education as appropriate  - Include patient/family/caregiver in decisions related to nutrition  Outcome: Progressing

## 2024-12-08 NOTE — UTILIZATION REVIEW
Initial Clinical Review    Admission: Date/Time/Statement:   Admission Orders (From admission, onward)       Ordered        12/06/24 2158  Inpatient Admission  Once                          Orders Placed This Encounter   Procedures    Inpatient Admission     Standing Status:   Standing     Number of Occurrences:   1     Level of Care:   Level 1 Stepdown [13]     Estimated length of stay:   More than 2 Midnights     Certification:   I certify that inpatient services are medically necessary for this patient for a duration of greater than two midnights. See H&P and MD Progress Notes for additional information about the patient's course of treatment.     ED Arrival Information       Expected   -    Arrival   12/6/2024 17:55    Acuity   Emergent              Means of arrival   Ambulance    Escorted by   [facility] ([place]    Service   Critical Care/ICU    Admission type   Emergency              Arrival complaint   respiratory failure             Chief Complaint   Patient presents with    Respiratory Distress     Pt to er via ems from home with reports of respiratory distress that has been going on for the past week. Cpap upon arrival. Patient stated that he fell twice yesterday due to weakness, no head strike, no loc, takes thinners        Initial Presentation: 74 y.o. male to ED via EMS from home  Present to ED with acute hypoxic respiratory failure in the setting of right lower lobe pneumonia.  The patient states that for the past week, he has had an increasing cough with production of green-yellow sputum and shortness of breath.  Required CPAP by EMS en route, transitioned to bipap 14/8 in ER and eventually weaned to HFNC   PMHX hypertension, severe protein calorie malnutrition, SIADH on home sodium tabs, AAA s/p EVAR, smoker having recently quit 3 months ago, chronic pancreatitis, GERD   Admitted to Level 1 Stepdown with DX: Acute hypoxic respiratory failure   on exam: T 100; chrissie; tachypneic; Currently requiring  HFNC 50L 50% with spo2 96-97%, RR 30s; lungs with  rhonchi throughout. WBC 17.52; K 3.4; AG 14; procal 0.64; VBG  7.5/34/42/26   CXR concerning for RLL infiltrate   PLAN: cont iv abx; cont DuoNebs; monitor labs; neuro checks; Cardiopulmonary monitoring; titrate O2; aspiration precautions; NPO; f/u CTA; f/u blood / sputum cx; f/u MRSA, strep, legionella; f/u UA; PT/ OT eval - tx        Date: 12/7/24      Day 2   Exam: tachypnea; tachy; on HFNC 6L; lungs with rhonchi; WBC 20.35; Heme 9.9; Mg 1.2; albumin 2.8; procal 0.92. CTA Chest ([place]) --no PE, severe emphysema, secretions in the trachea and bilateral mainstem bronchi, extensive mucus plugging throughout the right lower lobe, moderate right pleural effusion   Plan: cont iv abx; cont DuoNebs; start solucortef iv; start lopressor iv Q6h; recd Mg iv x1; monitor labs; neuro checks; Cardiopulmonary monitoring; titrate O2; aspiration precautions; NPO; f/u blood / sputum cx; f/u MRSA, strep, legionella; f/u UA; f/u PT/ OT recom      Date: 12/8/24     Day 3: Has surpassed a 2nd midnight with active treatments and services.  Yesterday morning, after vest therapy, Ross had increased WOB, tachypnea and was placed on HFNC. Vest therapy was discontinued and replaced with hypertonic saline nebs. Given the new finding of emphysema on his CT chest, he was started on pulmicort and perforomist in addition to his other nebulizer regimen. He was started on hydrocortisone 50mg q6h for severe PNA. He unfortunately failed a swallow evaluation with Speech and has been made strict NPO.   Exam: tachy; tachypnea; Currently requiring HFNC 55L 35-40%   Mg 1.6; procal 0.80; WBC 20.25  Plan: cont iv abx; cont DuoNebs; conf solucortef iv; cont lopressor iv Q6h; recd Mg iv x1; recd Ca gluc iv x1; recd KCL iv x2; recd Pot phos iv x1; monitor labs; neuro checks; Cardiopulmonary monitoring; titrate O2; aspiration precautions; NPO; f/u blood / sputum cx; f/u MRSA, strep, legionella; f/u UA; f/u PT/  OT recom      ED Treatment-Medication Administration from 12/06/2024 1755 to 12/06/2024 6043         Date/Time Order Dose Route Action     12/06/2024 1806 albuterol (FOR EMS ONLY) (2.5 mg/3 mL) 0.083 % inhalation solution 5 mg 0 mg Does not apply Given to EMS     12/06/2024 1904 cefTRIAXone (ROCEPHIN) IVPB (premix in dextrose) 1,000 mg 50 mL 1,000 mg Intravenous New Bag     12/06/2024 1945 azithromycin (ZITHROMAX) 500 mg in sodium chloride 0.9% 250mL IVPB 500 mg 500 mg Intravenous New Bag     12/06/2024 1906 sodium chloride 0.9 % bolus 1,000 mL 1,000 mL Intravenous New Bag     12/06/2024 2241 iohexol (OMNIPAQUE) 350 MG/ML injection (MULTI-DOSE) 80 mL 80 mL Intravenous Given            Scheduled Medications:  [Held by provider] amLODIPine, 10 mg, Oral, Daily  [Held by provider] aspirin, 81 mg, Oral, Daily  budesonide, 0.5 mg, Nebulization, Q12H  cefTRIAXone, 1,000 mg, Intravenous, Q24H  chlorhexidine, 15 mL, Mouth/Throat, Q12H POLLY  [Held by provider] citalopram, 20 mg, Oral, Daily  enoxaparin, 40 mg, Subcutaneous, Daily  [Held by provider] ferrous sulfate, 325 mg, Oral, Daily With Breakfast  [Held by provider] finasteride, 5 mg, Oral, Daily  formoterol, 20 mcg, Nebulization, Q12H  hydrocortisone sodium succinate, 50 mg, Intravenous, Q6H POLLY  ipratropium, 0.5 mg, Nebulization, Q6H  levalbuterol, 1.25 mg, Nebulization, Q6H  metoprolol, 2.5 mg, Intravenous, Q6H  [Held by provider] metoprolol succinate, 25 mg, Oral, Daily  [Held by provider] multivitamin-minerals, 1 tablet, Oral, Daily  [Held by provider] pancrelipase (Lip-Prot-Amyl), 24,000 Units, Oral, TID With Meals  pantoprazole, 40 mg, Intravenous, Q24H POLLY  potassium phosphate, 12 mmol, Intravenous, Once  (12/8 recd)   [Held by provider] pravastatin, 40 mg, Oral, HS  Sodium chloride, 1 g, Oral, TID With Meals    magnesium sulfate 4 g/100 mL IVPB (premix) 4 g  Dose: 4 g  Freq: Once Route: IV  Last Dose: Stopped (12/07/24 0508)  Start: 12/07/24 0115 End: 12/07/24  0508    calcium gluconate 2 g in sodium chloride 0.9% 100 mL (premix)  Dose: 2 g  Freq: Once Route: IV  Last Dose: 2 g (12/08/24 0932)  Start: 12/08/24 0700 End: 12/08/24 1032    magnesium sulfate 2 g/50 mL IVPB (premix) 2 g  Dose: 2 g  Freq: Once Route: IV  Last Dose: 2 g (12/08/24 0933)  Start: 12/08/24 0700 End: 12/08/24 1133    potassium chloride 20 mEq IVPB (premix)  Dose: 20 mEq  Freq: Once Route: IV  Last Dose: 20 mEq (12/08/24 0932)  Start: 12/08/24 0700 End: 12/08/24 1132    potassium chloride 20 mEq IVPB (premix)  Dose: 20 mEq  Freq: Once Route: IV  Last Dose: 20 mEq (12/08/24 1134)  Start: 12/08/24 0900    Continuous IV Infusions:  multi-electrolyte, 50 mL/hr, Intravenous, Continuous      PRN Meds:  acetaminophen, 650 mg, Oral, Q6H PRN      ED Triage Vitals   Temperature Pulse Respirations Blood Pressure SpO2 Pain Score   12/06/24 1808 12/06/24 1759 12/06/24 1759 12/06/24 1801 12/06/24 1759 12/06/24 2300   97.5 °F (36.4 °C) 100 (!) 38 151/85 99 % No Pain     Weight (last 2 days)       Date/Time Weight    12/08/24 0600 38.5 (84.88)    12/07/24 0600 38.9 (85.76)    12/06/24 2257 39.7 (87.52)            Vital Signs (last 3 days)       Date/Time Temp Pulse Resp BP MAP (mmHg) SpO2 FiO2 (%) Calculated FIO2 (%) - Nasal Cannula O2 Flow Rate (L/min) Nasal Cannula O2 Flow Rate (L/min) O2 Device O2 Interface Device Patient Position - Orthostatic VS Aguilar Coma Scale Score Pain    12/08/24 1200 -- -- -- -- -- -- -- -- -- -- -- -- -- 15 --    12/08/24 1141 -- -- -- -- -- 96 % -- 52 -- 8 L/min Mid flow nasal cannula -- -- -- --    12/08/24 1050 -- -- -- -- -- 92 % -- 60 -- 10 L/min Mid flow nasal cannula  -- -- -- --    12/08/24 0936 -- 108 26 141/83 107 91 % -- -- -- -- -- -- -- -- --    12/08/24 0802 -- -- -- -- -- 92 % -- -- -- -- -- -- -- -- --    12/08/24 0801 -- -- -- -- -- 91 % 40 -- 55 L/min -- High flow nasal cannula -- -- -- --    12/08/24 0800 -- -- -- -- -- -- -- -- -- -- -- -- -- 15 No Pain    12/08/24  0700 98.1 °F (36.7 °C) 97 23 163/81 114 91 % -- 40 -- 5 L/min High flow nasal cannula -- Lying -- --    12/08/24 0445 98.1 °F (36.7 °C) 90 27 147/81 106 93 % -- -- -- -- High flow nasal cannula -- Lying -- --    12/08/24 0400 -- -- -- -- -- -- -- -- -- -- -- -- -- 15 --    12/08/24 0231 -- -- -- -- -- -- -- -- -- -- -- HFNC prongs -- -- --    12/08/24 0230 -- -- -- -- -- -- 40 -- 55 L/min -- High flow nasal cannula -- -- -- --    12/08/24 0030 -- -- -- -- -- -- -- -- -- -- -- -- -- 15 --    12/08/24 0000 -- 90 25 149/79 108 94 % -- -- -- -- -- -- -- -- --    12/07/24 2307 -- -- -- -- -- -- 40 -- 55 L/min -- High flow nasal cannula HFNC prongs -- -- --    12/07/24 2300 98.4 °F (36.9 °C) 90 25 145/78 105 94 % -- -- -- -- -- -- -- -- --    12/07/24 2200 -- 93 27 149/79 108 93 % -- -- -- -- -- -- -- -- --    12/07/24 2100 -- 92 27 146/83 109 93 % -- -- -- -- -- -- -- -- --    12/07/24 2030 -- -- -- -- -- -- -- -- -- -- -- -- -- 15 No Pain    12/07/24 1946 -- -- -- -- -- -- 40 -- 55 L/min -- High flow nasal cannula HFNC prongs -- -- --    12/07/24 1900 98.7 °F (37.1 °C) 91 29 147/78 105 93 % -- -- -- -- -- -- Lying -- --    12/07/24 1857 98.7 °F (37.1 °C) -- -- -- -- -- -- -- -- -- -- -- -- -- --    12/07/24 1800 -- 92 26 157/79 111 96 % -- -- -- -- -- -- -- -- --    12/07/24 1700 -- 91 29 125/79 96 93 % -- -- -- -- -- -- -- -- --    12/07/24 1603 -- 93 32 140/74 101 94 % 35 -- 55 L/min -- High flow nasal cannula HFNC prongs Lying -- --    12/07/24 1600 -- -- -- -- -- -- -- -- -- -- -- -- -- 15 --    12/07/24 1500 -- 97 32 143/76 104 95 % -- -- -- -- -- -- -- -- --    12/07/24 1400 -- 98 34 145/71 102 92 % -- -- -- -- -- -- -- -- --    12/07/24 1341 -- -- -- -- -- 92 % -- -- -- -- -- -- -- -- --    12/07/24 1300 -- 91 35 140/73 101 92 % -- -- -- -- -- -- -- -- --    12/07/24 1200 97.7 °F (36.5 °C) 92 38 151/81 110 90 % -- -- -- -- High flow nasal cannula -- Lying 15 --    12/07/24 1100 -- 90 33 137/74 100 91 % -- -- --  -- -- -- -- -- --    12/07/24 1054 -- -- -- -- -- 93 % 35 -- 55 L/min -- High flow nasal cannula HFNC prongs -- -- --    12/07/24 1000 -- 92 34 138/73 99 93 % -- -- -- -- -- -- -- -- --    12/07/24 0900 -- 106 39 139/76 105 91 % -- -- -- -- -- -- -- -- --    12/07/24 0800 98.6 °F (37 °C) 103 38 138/77 100 94 % 35 -- 55 L/min -- High flow nasal cannula -- Lying 15 --    12/07/24 0753 -- -- -- -- -- 93 % 35 -- 55 L/min -- High flow nasal cannula HFNC prongs -- -- --    12/07/24 0733 -- -- -- -- -- 92 % -- 40 -- 5 L/min Nasal cannula -- -- -- --    12/07/24 0600 -- 102 38 151/80 110 91 % -- -- -- -- -- -- -- -- --    12/07/24 0500 -- 104 38 154/78 109 93 % -- -- -- -- -- -- -- -- --    12/07/24 0450 -- 96 40 -- -- 95 % -- -- 4 L/min -- Nasal cannula -- -- -- --    12/07/24 0400 98.2 °F (36.8 °C) 101 42 156/79 109 94 % -- -- 6 L/min -- Mid flow nasal cannula -- Lying 15 4    12/07/24 0337 -- 106 40 -- -- 93 % -- -- -- -- -- -- -- -- --    12/07/24 0300 -- 106 44 155/82 112 96 % -- -- 7 L/min -- Mid flow nasal cannula -- -- -- --    12/07/24 0254 -- -- -- -- -- -- -- -- -- -- -- -- -- -- 8    12/07/24 0232 -- -- -- -- -- -- -- -- 8 L/min -- Mid flow nasal cannula -- -- -- --    12/07/24 0205 -- 100 46 157/82 112 93 % -- -- 8 L/min -- -- -- -- -- --    12/07/24 0200 -- 100 42 157/82 112 96 % -- -- -- -- -- -- -- -- --    12/07/24 0150 -- 98 39 -- -- 98 % -- -- 8 L/min -- Mid flow nasal cannula -- -- -- --    12/07/24 0100 -- 101 41 154/84 113 98 % -- -- -- -- -- -- -- -- --    12/07/24 0015 -- 102 41 151/85 112 97 % -- -- -- -- -- -- -- -- --    12/07/24 0000 -- 102 40 148/82 108 96 % -- -- -- -- -- -- -- -- --    12/06/24 2346 -- 100 44 150/81 106 99 % -- -- 10 L/min -- Mid flow nasal cannula MFNC prongs -- -- --    12/06/24 2345 -- 99 42 150/81 106 98 % -- -- -- -- -- -- -- -- --    12/06/24 2330 -- 100 38 139/85 104 98 % -- -- -- -- -- -- -- -- --    12/06/24 2326 -- -- -- -- -- -- -- -- -- -- -- -- -- -- No Pain     12/06/24 2315 -- 97 42 139/79 102 99 % -- -- -- -- -- -- -- -- --    12/06/24 2310 -- -- -- -- -- -- 50 -- 50 L/min -- High flow nasal cannula HFNC prongs -- -- --    12/06/24 2300 -- 96 42 138/77 101 98 % 50 -- 50 L/min -- High flow nasal cannula -- -- 15 No Pain    12/06/24 2257 99.2 °F (37.3 °C) 94 30 133/84 100 98 % 50 -- 50 L/min -- High flow nasal cannula -- Lying -- --    12/06/24 2203 -- -- -- -- -- -- -- -- -- -- -- -- -- 15 --    12/06/24 2130 -- 87 26 148/74 105 96 % 50 -- -- -- High flow nasal cannula -- -- -- --    12/06/24 1915 -- -- -- -- -- -- 50 -- 50 L/min -- High flow nasal cannula HFNC prongs -- -- --    12/06/24 1900 -- 87 26 149/85 111 96 % 50 -- -- -- High flow nasal cannula -- -- -- --    12/06/24 1841 -- -- -- -- -- -- -- -- -- -- BiPAP -- -- -- --    12/06/24 1816 -- -- -- -- -- -- 35 -- -- -- BiPAP -- -- -- --    12/06/24 1814 -- -- -- -- -- -- -- -- -- -- -- Full face mask -- -- --    12/06/24 1808 97.5 °F (36.4 °C) -- -- -- -- -- -- -- -- -- -- -- -- -- --    12/06/24 1801 -- -- -- 151/85 -- -- -- -- -- -- -- -- -- -- --    12/06/24 1759 -- 100 38 -- -- 99 % -- -- -- -- CPAP -- -- -- --              Pertinent Labs/Diagnostic Test Results:   Radiology:  CTA chest pe study   Final Interpretation by Antoni Santos DO (12/07 0712)      No pulmonary embolism is seen. Prominence of the central pulmonary arteries and right ventricle suggest pulmonary hypertension/increased right heart pressures.      Superimposed on pulmonary emphysematous changes, small right pleural effusion. Dense consolidation of the right lower lobe, with left-to-right mediastinal shift; suspect a component of volume loss, superimposed infection considered as well.      In addition, there is attenuation of multiple small airways in the right lower lobe which could be related to aspiration, inflammation, and/or infection and scattered ill-defined groundglass and alveolar opacities in the periphery of the right  midlung;    superimposed infection also considered in the appropriate clinical setting. Correlation with the patient symptoms and laboratory values recommended.      Left upper lobe nodule measures 5 mm in size (axial image 51, series 2);  based on current Fleischner Society 2017 Guidelines on incidental pulmonary nodule, optional follow-up CT at 12 months can be considered.      Other findings as above.      Findings are consistent with the preliminary report from [facility] which was provided shortly after completion of the exam.         Workstation performed: [id number]         CT head without contrast   Final Interpretation by Dain Santos DO (12/06 2121)      No acute intracranial abnormality. Stable chronic, complete opacification of the right maxillary sinus.                  Workstation performed: [id number]         CT cervical spine without contrast   Final Interpretation by Dain Santos DO (12/06 2123)      No cervical spine fracture or traumatic malalignment.                  Workstation performed: [id number]         XR tibia fibula 2 views LEFT   ED Interpretation by Jazmin Richardson MD (12/06 2044)   No acute fractures or dislocations      Final Interpretation by Steve Ramirez MD (12/07 0706)      No acute osseous abnormality.            Computerized Assisted Algorithm (CAA) may have been used to analyze all applicable images.               Workstation performed: [id number]         XR chest portable   ED Interpretation by Jazmin Richardson MD (12/06 1829)   Right lower lobe pneumonia      Final Interpretation by Scarelt Barrera MD (12/07 0611)      Moderate right effusion with extensive right base pneumonia.      Severe emphysema.      See subsequent chest CT.            Workstation performed: [id number]           Cardiology:  ECG 12 lead   Final Result by Tamie Palumbo MD (12/06 2322)   Normal sinus rhythm   Normal ECG   When compared  with ECG of 06-Dec-2024 18:04, (unconfirmed)   Fusion complexes are no longer Present   Confirmed by Tamie Palumbo ([id number]) on 12/6/2024 11:22:23 PM      ECG 12 lead   Final Result by Tamie Palumbo MD (12/06 2322)   Sinus rhythm with short AR with Fusion complexes   Otherwise normal ECG   When compared with ECG of 07-Jun-2023 12:02,   Fusion complexes are now Present   Confirmed by Tamie Palumbo ([id number]) on 12/6/2024 11:22:21 PM          Results from last 7 days   Lab Units 12/06/24  1919   SARS-COV-2  Negative     Results from last 7 days   Lab Units 12/08/24 0439 12/07/24 0416 12/06/24 1813 12/06/24  1807   WBC Thousand/uL 20.25* 20.35* 17.52*  --    HEMOGLOBIN g/dL 10.5* 9.9* 11.2*  --    I STAT HEMOGLOBIN g/dl  --   --   --  11.2*   HEMATOCRIT % 31.1* 29.8* 33.5*  --    HEMATOCRIT, ISTAT %  --   --   --  33*   PLATELETS Thousands/uL 389 336 364  --    TOTAL NEUT ABS Thousands/µL  --  18.82* 15.77*  --    BANDS PCT % 9*  --   --   --         Results from last 7 days   Lab Units 12/08/24 0439 12/07/24 0416 12/06/24  2319 12/06/24 1813 12/06/24  1807   SODIUM mmol/L 139 136  --  135  --    POTASSIUM mmol/L 3.1* 3.9  --  3.4*  --    CHLORIDE mmol/L 101 102  --  95*  --    CO2 mmol/L 29 24  --  26  --    CO2, I-STAT mmol/L  --   --   --   --  28   ANION GAP mmol/L 9 10  --  14*  --    BUN mg/dL 25 18  --  18  --    CREATININE mg/dL 0.54* 0.53*  --  0.64  --    EGFR ml/min/1.73sq m 102 103  --  95  --    CALCIUM mg/dL 8.0* 7.8*  --  8.1*  --    CALCIUM, IONIZED mmol/L 1.11*  --   --   --   --    CALCIUM, IONIZED, ISTAT mmol/L  --   --   --   --  0.95*   MAGNESIUM mg/dL 1.6* 2.4 1.2*  --   --    PHOSPHORUS mg/dL 2.8 2.8 3.5  --   --      Results from last 7 days   Lab Units 12/07/24  0416 12/06/24  1813   AST U/L 23 30   ALT U/L 45 60*   ALK PHOS U/L 100 123*   TOTAL PROTEIN g/dL 5.5* 6.5   ALBUMIN g/dL 2.8* 3.1*   TOTAL BILIRUBIN mg/dL 0.36 0.65   BILIRUBIN DIRECT mg/dL 0.08  --         Results from last  7 days   Lab Units 12/08/24  0439 12/07/24  0416 12/06/24  1813   GLUCOSE RANDOM mg/dL 124 122 122        Results from last 7 days   Lab Units 12/06/24  1807   PH, CASSANDRA I-STAT  7.504*   PCO2, CASSANDRA ISTAT mm HG 34.1*   PO2, CASSANDRA ISTAT mm HG 42.0   HCO3, CASSANDRA ISTAT mmol/L 26.8   I STAT BASE EXC mmol/L 4*   I STAT O2 SAT % 83        Results from last 7 days   Lab Units 12/06/24  2319 12/06/24  2033 12/06/24  1813   HS TNI 0HR ng/L  --   --  11   HS TNI 2HR ng/L  --  10  --    HSTNI D2 ng/L  --  -1  --    HS TNI 4HR ng/L 10  --   --    HSTNI D4 ng/L -1  --   --         Results from last 7 days   Lab Units 12/06/24  1813   PROTIME seconds 15.5*   INR  1.18   PTT seconds 37*        Results from last 7 days   Lab Units 12/08/24  0439 12/07/24 0416 12/06/24  1813   PROCALCITONIN ng/ml 0.80* 0.92* 0.64*     Results from last 7 days   Lab Units 12/06/24  1813   LACTIC ACID mmol/L 1.7        Results from last 7 days   Lab Units 12/06/24  1813   BNP pg/mL 198*        Results from last 7 days   Lab Units 12/06/24  2319   LIPASE u/L 8*        Results from last 7 days   Lab Units 12/06/24  2212   CLARITY UA  Clear   COLOR UA  Yellow   SPEC GRAV UA  1.025   PH UA  6.0   GLUCOSE UA mg/dl Negative   KETONES UA mg/dl Negative   BLOOD UA  Negative   PROTEIN UA mg/dl Trace*   NITRITE UA  Negative   BILIRUBIN UA  Negative   UROBILINOGEN UA (BE) mg/dl <2.0   LEUKOCYTES UA  Moderate*   WBC UA /hpf 4-10*   RBC UA /hpf None Seen   BACTERIA UA /hpf Moderate*   EPITHELIAL CELLS WET PREP /hpf Occasional     Results from last 7 days   Lab Units 12/07/24  0423 12/06/24  1919   STREP PNEUMONIAE ANTIGEN, URINE  Negative  --    LEGIONELLA URINARY ANTIGEN  Negative  --    INFLUENZA A PCR   --  Negative   INFLUENZA B PCR   --  Negative   RSV PCR   --  Negative        Results from last 7 days   Lab Units 12/06/24  1914 12/06/24  1813   BLOOD CULTURE  No Growth at 24 hrs.  --    GRAM STAIN RESULT   --  Gram positive cocci in clusters*        Results from  last 7 days   Lab Units 12/08/24  0439   VANCOMYCIN RM ug/mL 12.1       Past Medical History:   Diagnosis Date    Anxiety     Rhodes's esophagus     Chronic kidney disease, stage 3 (HCC) 02/07/2022    Chronic neck pain     Depression     GERD (gastroesophageal reflux disease)     H/O abdominal aortic aneurysm 4.5 cm    Hyperlipidemia     Hypertension     Pancreatic pseudocyst     Vertigo      Present on Admission:   Benign essential hypertension   Smoking   Severe protein-calorie malnutrition (HCC)   Other chronic pancreatitis (HCC)   SIRS (systemic inflammatory response syndrome) (HCC)   Acute hypoxic respiratory failure (HCC)   Right lower lobe pneumonia   Fall   Transaminitis   GERD (gastroesophageal reflux disease)   SIADH (syndrome of inappropriate ADH production) (HCC)   Emphysema lung (HCC)   Dysphagia      Admitting Diagnosis: Acute respiratory failure (HCC) [J96.00]  Respiratory distress [R06.03]  Right lower lobe pneumonia [J18.9]  Age/Sex: 74 y.o. male    Network Utilization Review Department  ATTENTION: Please call with any questions or concerns to [phone] and carefully listen to the prompts so that you are directed to the right person. All voicemails are confidential.   For Discharge needs, contact Care Management DC Support Team at [phone] opt. [phone]  Send all requests for admission clinical reviews, approved or denied determinations and any other requests to dedicated fax number below belonging to the campus where the patient is receiving treatment. List of dedicated fax numbers for the Facilities:  FACILITY NAME UR FAX NUMBER   ADMISSION DENIALS (Administrative/Medical Necessity) [phone]   DISCHARGE SUPPORT TEAM (NETWORK) [phone]   PARENT CHILD HEALTH (Maternity/NICU/Pediatrics) [phone]   [facility] [phone]   [facility] [phone]   [facility] [phone]   [facility]  [place] [phone]   [facility] [phone]   [facility] [phone]   [facility] [phone]   [facility] [phone]   [facility] [phone]   [facility] [phone]   [facility] [phone]   [facility] [phone]

## 2024-12-08 NOTE — ASSESSMENT & PLAN NOTE
Follows with Nephrology as OP   Likely in the setting of SSRI use and low solute intake     Plan:   Most recent sNa 136  Cont home salt tabs tid   Serial BMP

## 2024-12-08 NOTE — ASSESSMENT & PLAN NOTE
AEB tachypnea, leukocytosis   Likely in the setting of RLL PNA as above   LA 1.7, procal 0.64  ER management -- 1L NS, Zithromax, Ctx   Flu, Covid, RSV negative   BC 1/2 GPC in clusters, will add Vanc until BCID results  Strep, legionella negative     Plan:   Abx D3: Ctx D3, Vanc D2  Hydrocortisone 50mg q6h for severe PNA D2  Remains HD stable   Trend endpoints

## 2024-12-09 PROBLEM — A41.9 SEPSIS (HCC): Status: ACTIVE | Noted: 2024-01-01

## 2024-12-09 NOTE — PROGRESS NOTES
Progress Note - Critical Care/ICU   Name: Ross Mcguire 74 y.o. male I MRN: [id number]  Unit/Bed#: -01 SDU I Date of Admission: 12/6/2024   Date of Service: 12/9/2024 I Hospital Day: 3      Assessment & Plan  Acute hypoxic respiratory failure (HCC)  Likely in the setting of RLL PNA   Required CPAP by EMS en route, transitioned to bipap 14/8 in ER and eventually weaned to HFNC   VBG on admission -- 7.5/34/42/26  12/6 CXR concerning for RLL infiltrate   12/6 CTA Chest (Vrad) --no PE, severe emphysema, secretions in the trachea and bilateral mainstem bronchi, extensive mucus plugging throughout the right lower lobe, moderate right pleural effusion  Yesterday, increase in supplemental O2 to HFNC after vest therapy, which has since been d/c'ed     Plan:   Currently requiring 6L NC  Attempt to wean supplemental O2 for goal spo2 > 90%   Aggressive pulmonary toileting with vest therapy, scheduled xopenex and atrovent   As poor tolerance of vest therapy, cont 3% saline nebs   Noted weak cough with poor effort with prompting on admission   Disease-specific treatments as below   Right lower lobe pneumonia  12/6 CXR -- RLL infiltrate  Hypoxic, tachypneic on admission with leukocytosis as below   Abx as below   SIRS (systemic inflammatory response syndrome) (HCC)  AEB tachypnea, leukocytosis   Likely in the setting of RLL PNA as above   LA 1.7, procal 0.64  ER management -- 1L NS, Zithromax, Ctx   Flu, Covid, RSV negative   BC 1/2 staph epi, BC 1/2 NG x24h  Strep, legionella negative     Plan:   Abx D4: Ctx D4, Vanc D3  Consider d/c Vanc given BCID results   Hydrocortisone 50mg q6h for severe PNA D3  Consider steroid wean today   Remains HD stable   Trend endpoints   GERD (gastroesophageal reflux disease)  Cont home PPI via IV for NPO  Other chronic pancreatitis (HCC)  Follows as OP with [facility]   Denies abd pain, nausea, vomiting on admission   Cont home Creon   Lipase 8  Smoking  Quit smoking 3 months  "ago  States has been smoking since age 12, as much as 2ppd while he was employed as a    NRT if requested   History of endovascular stent graft for abdominal aortic aneurysm (AAA)  Follows with Dr. Angel as OP, last kaylee 6/4/2024  Per Dr. 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 Angel   SIADH (syndrome of inappropriate ADH production) ([facility])  Follows with Nephrology as OP   Likely in the setting of SSRI use and low solute intake     Plan:   Most recent sNa 142  Salt tabs on hold for NPO  Serial BMP   Severe protein-calorie malnutrition (HCC)  Admission weight 39.6kg   Appreciate nutrition input   Currently NPO for dysphagia  Benign essential hypertension  Cont home Norvasc, metoprolol with holds   Home regimen on hold for NPO   Cont IV lopressor 2.5 q6h while NPO  Goal SBP <160  Fall  Patient reports falls at home after bending down to  a washcloth and falling forward, striking his L forehead on the floor   He denies LOC  Lives at home with 3 friends   12/6 CTH neg   12/6 CT Cspine neg   12/6 CTA chest -- no PE, severe emphysema, attenuation of multiple small airways in the right lower lobe which could be related to aspiration, inflammation, and/or infection and scattered ill-defined groundglass and alveolar opacities in the periphery of the right midlung     Plan:   Fall precautions   Will need PT/OT when appropriate   12/8 was OOBTC with assistance of staff and tolerated well   Transaminitis  Very mild elevation in his ALT and alk phos on admission   Denies abd pain   Improving on serial draws   Emphysema lung (HCC)  12/6 CTA chest -- no PE, severe emphysema, attenuation of multiple small airways in the right lower lobe which could " be related to aspiration, inflammation, and/or infection and scattered ill-defined groundglass and alveolar opacities in the periphery of the right midlung   Patient reports significant smoking history     Plan:   Hydrocortisone added for severe PNA 12/7  Cont xopenex, atrovent, pulmicort, perforomist   Will need OP f/u with Pulm and PFTs   Dysphagia  12/7 failed speech eval and made strict NPO   Appreciate Speech input   Disposition: Stepdown Level 1    ICU Core Measures     A: Assess, Prevent, and Manage Pain Has pain been assessed? Yes  Need for changes to pain regimen? No   B: Both SAT/SAT  N/A   C: Choice of Sedation RASS Goal: N/A patient not on sedation  Need for changes to sedation or analgesia regimen? NA   D: Delirium CAM-ICU: Negative   E: Early Mobility  Plan for early mobility? Yes   F: Family Engagement Plan for family engagement today? Yes       Antibiotic Review: Awaiting culture results.       Prophylaxis:  VTE VTE covered by:  enoxaparin, Subcutaneous, 40 mg at 12/08/24 0933       Stress Ulcer  covered byomeprazole (PriLOSEC) 40 MG capsule [[id number]] (Long-Term Med), pantoprazole (PROTONIX) injection 40 mg [[id number]]         24 Hour Events : Yesterday, Ross was OOBTC and weaned to 8L MFNC. He continues to be NPO for dysphagia. He endorses no complaints.   Subjective   Review of Systems: See HPI for Review of Systems    Objective :                   Vitals I/O      Most Recent Min/Max in 24hrs   Temp 98.3 °F (36.8 °C) Temp  Min: 97.9 °F (36.6 °C)  Max: 98.3 °F (36.8 °C)   Pulse (!) 106 Pulse  Min: 92  Max: 108   Resp (!) 29 Resp  Min: 23  Max: 38   BP (!) 173/94 BP  Min: 141/83  Max: 173/94   O2 Sat 94 % SpO2  Min: 91 %  Max: 97 %      Intake/Output Summary (Last 24 hours) at 12/9/2024 0455  Last data filed at 12/9/2024 0415  Gross per 24 hour   Intake 1634.16 ml   Output 2175 ml   Net -540.84 ml       Diet NPO    Invasive Monitoring           Physical Exam   Physical Exam  Vitals and nursing  note reviewed.   Skin:     General: Skin is warm.      Capillary Refill: Capillary refill takes less than 2 seconds.      Coloration: Skin is not pale.   HENT:      Head: Normocephalic and atraumatic.      Mouth/Throat:      Lips: Pink.      Mouth: Mucous membranes are dry.   Cardiovascular:      Rate and Rhythm: Normal rate and regular rhythm.   Musculoskeletal:      Right lower leg: No edema.      Left lower leg: No edema.   Abdominal: General: Abdomen is flat. Bowel sounds are normal.      Palpations: Abdomen is soft.      Tenderness: There is no abdominal tenderness.   Constitutional:       General: He is awake. He is not in acute distress.     Appearance: He is cachectic. He is ill-appearing.      Interventions: Nasal cannula in place.   Pulmonary:      Effort: Pulmonary effort is normal.      Breath sounds: Examination of the right-middle field reveals decreased breath sounds. Examination of the left-middle field reveals decreased breath sounds. Examination of the right-lower field reveals decreased breath sounds. Examination of the left-lower field reveals decreased breath sounds. Decreased breath sounds present.   Psychiatric:         Mood and Affect: Mood and affect normal.         Behavior: Behavior is cooperative.   Neurological:      General: No focal deficit present.      Mental Status: He is alert and oriented to person, place, and time.   Genitourinary/Anorectal:     Comments: Voiding independently          Diagnostic Studies        Lab Results: I have reviewed the following results:     Medications:  Scheduled PRN   [Held by provider] amLODIPine, 10 mg, Daily  Artificial Tears, 1 drop, Q12H POLLY  [Held by provider] aspirin, 81 mg, Daily  budesonide, 0.5 mg, Q12H  cefTRIAXone, 1,000 mg, Q24H  chlorhexidine, 15 mL, Q12H POLLY  [Held by provider] citalopram, 20 mg, Daily  enoxaparin, 40 mg, Daily  [Held by provider] ferrous sulfate, 325 mg, Daily With Breakfast  [Held by provider] finasteride, 5 mg,  Daily  formoterol, 20 mcg, Q12H  hydrocortisone sodium succinate, 50 mg, Q6H POLLY  ipratropium, 0.5 mg, Q6H  levalbuterol, 1.25 mg, Q6H  lidocaine, 1 patch, Daily  loteprednol etabonate, 2 drop, 4x Daily  metoprolol, 2.5 mg, Q6H  [Held by provider] metoprolol succinate, 25 mg, Daily  [Held by provider] multivitamin-minerals, 1 tablet, Daily  [Held by provider] pancrelipase (Lip-Prot-Amyl), 24,000 Units, TID With Meals  pantoprazole, 40 mg, Q24H POLLY  [Held by provider] pravastatin, 40 mg, HS  sodium chloride, 4 mL, TID  [Held by provider] sodium chloride, 1 g, TID With Meals      acetaminophen, 650 mg, Q6H PRN       Continuous    multi-electrolyte, 50 mL/hr, Last Rate: 50 mL/hr (12/08/24 1134)         Labs:   CBC    Recent Labs     12/08/24 0439   WBC 20.25*   HGB 10.5*   HCT 31.1*      BANDSPCT 9*     BMP    Recent Labs     12/08/24  0439 12/08/24  1522   SODIUM 139 142   K 3.1* 3.6    104   CO2 29 28   AGAP 9 10   BUN 25 26*   CREATININE 0.54* 0.59*   CALCIUM 8.0* 8.1*       Coags    No recent results     Additional Electrolytes  Recent Labs     12/08/24  0439 12/08/24  1522   MG 1.6* 2.2   PHOS 2.8 2.7   CAIONIZED 1.11* 1.13          Blood Gas    No recent results  No recent results LFTs  No recent results      Infectious  Recent Labs     12/08/24  0439   PROCALCITONI 0.80*     Glucose  Recent Labs     12/08/24  0439 12/08/24  1522   GLUC 124 133

## 2024-12-09 NOTE — PLAN OF CARE
Problem: Prexisting or High Potential for Compromised Skin Integrity  Goal: Skin integrity is maintained or improved  Description: INTERVENTIONS:  - Identify patients at risk for skin breakdown  - Assess and monitor skin integrity  - Assess and monitor nutrition and hydration status  - Monitor labs   - Assess for incontinence   - Turn and reposition patient  - Assist with mobility/ambulation  - Relieve pressure over bony prominences  - Avoid friction and shearing  - Provide appropriate hygiene as needed including keeping skin clean and dry  - Evaluate need for skin moisturizer/barrier cream  - Collaborate with interdisciplinary team   - Patient/family teaching  - Consider wound care consult   Outcome: Progressing     Problem: RESPIRATORY - ADULT  Goal: Achieves optimal ventilation and oxygenation  Description: INTERVENTIONS:  - Assess for changes in respiratory status  - Assess for changes in mentation and behavior  - Position to facilitate oxygenation and minimize respiratory effort  - Oxygen administered by appropriate delivery if ordered  - Initiate smoking cessation education as indicated  - Encourage broncho-pulmonary hygiene including cough, deep breathe, Incentive Spirometry  - Assess the need for suctioning and aspirate as needed  - Assess and instruct to report SOB or any respiratory difficulty  - Respiratory Therapy support as indicated  Outcome: Progressing     Problem: Nutrition/Hydration-ADULT  Goal: Nutrient/Hydration intake appropriate for improving, restoring or maintaining nutritional needs  Description: Monitor and assess patient's nutrition/hydration status for malnutrition. Collaborate with interdisciplinary team and initiate plan and interventions as ordered.  Monitor patient's weight and dietary intake as ordered or per policy. Utilize nutrition screening tool and intervene as necessary. Determine patient's food preferences and provide high-protein, high-caloric foods as appropriate.      INTERVENTIONS:  - Monitor oral intake, urinary output, labs, and treatment plans  - Assess nutrition and hydration status and recommend course of action  - Evaluate amount of meals eaten  - Assist patient with eating if necessary   - Allow adequate time for meals  - Recommend/ encourage appropriate diets, oral nutritional supplements, and vitamin/mineral supplements  - Order, calculate, and assess calorie counts as needed  - Recommend, monitor, and adjust tube feedings and TPN/PPN based on assessed needs  - Assess need for intravenous fluids  - Provide specific nutrition/hydration education as appropriate  - Include patient/family/caregiver in decisions related to nutrition  Outcome: Progressing

## 2024-12-09 NOTE — ASSESSMENT & PLAN NOTE
12/7 failed speech eval and made strict NPO   12/9 VBS- with severe inability to swallow with signs of aspiration with puree  Strict NPO  Patients living will states no tube feedings- Will need to discuss with family.   Severely malnourished vs neuromuscular weakness- management as stated above

## 2024-12-09 NOTE — ASSESSMENT & PLAN NOTE
Quit smoking 3 months ago  States has been smoking since age 12, as much as 2ppd while he was employed as a    NRT

## 2024-12-09 NOTE — ASSESSMENT & PLAN NOTE
AEB tachypnea, leukocytosis   Likely in the setting of RLL PNA as above   LA 1.7, procal 0.64  ER management -- 1L NS, Zithromax, Ctx   Flu, Covid, RSV negative   BC 1/2 staph epi, BC 1/2 NG x24h  Strep, legionella negative     Plan:   Abx D4: Ctx D4, Vanc D3  Consider d/c Vanc given BCID results   Hydrocortisone 50mg q6h for severe PNA D3  Consider steroid wean today   Remains HD stable   Trend endpoints

## 2024-12-09 NOTE — ASSESSMENT & PLAN NOTE
AEB tachypnea, leukocytosis   Likely in the setting of RLL PNA as above   LA 1.7, procal 0.64  ER management -- 1L NS, Zithromax, Ctx   Flu, Covid, RSV negative   BC 1/2 staph epi, BC 1/2 NG x24h  Strep, legionella negative     Plan:   Abx D4: Ctx D5/7  Vancomycin d/c 12/9 MRSA culture negative   Hydrocortisone 50mg q6h for severe PNA D3  D/C steroids- concern for muscular weakness   Remains HD stable   Trend endpoints

## 2024-12-09 NOTE — ASSESSMENT & PLAN NOTE
Follows with Nephrology as OP   Likely in the setting of SSRI use and low solute intake     Plan:   Most recent sNa 142  Salt tabs on hold for NPO  Serial BMP

## 2024-12-09 NOTE — ACP (ADVANCE CARE PLANNING)
Critical Care Advanced Care Planning Note  Ross Mcguire 74 y.o. male MRN: [id number]  Unit/Bed#: -01 SDU Encounter: [id number]    Ross Mcguire is a 74 y.o. male requiring critical care evaluation and advanced care planning. The patient has chronic comorbidities, including but not limited to HTN, protein calorie malnutrition, SIADH, AAA s/p   AVAR, which is now further complicated by the following acute conditions: acute respiratory failure, sepsis, PNA, Dyphagia.  Due to the severity of the patient's acute condition and/or the extent of chronic conditions, additional conversations pertaining to advanced care planning were required. Today's discussion, which was held over the phone, included  Hailey- patients daughter/POA , and it was established that all stake holders understood the rationale for the advanced care planning. The patient was unable to participate in the discussion due to poor medical insight.    Summary of Discussion:  I provided Hailey a medical update over the phone regarding the patient's improvement in his acute hypoxemic respiratory failure.  I reported that her father's oxygen requirements were decreased and he was currently on 3 L of oxygen.  Plan going forward was to continue broad-spectrum antibiotics pending culture results.    I further discussed that today was hospital day 4 and there was not a definitive plan in place for her father's nutrition.  I reported that previously her father and failed speech evaluation.  SLP reevaluated patient today, 12/9/2024, and her father again failed bedside swallow evaluation.  SLP recommended video barium swallow which showed severely reduced swallowing effort with evidence of aspiration.  SLP recommendations were for strict n.p.o. with aggressive speech therapy at discharge.  I discussed with Hailey that her father was already severely malnourished prior to coming the hospital, and while in hospital the patient has dropped 5 pounds  in the past 4 days.  I discussed the need for enteral access to start nutrition.  I explained the rationale and the procedure for NG tube insertion and also discussed the likely need for PEG tube once more stable from a medical standpoint.  I discussed with Hailey that I discussed the above recommendations with her father and initially he had agreed to the NG tube insertion but later in the day had forgotten the conversation and it was unclear about further medical treatment.  I then discussed with Hailey that while awaiting her callback I reviewed the patient's advance directive and saw that in 2011 the patient at that time had put on his advance directive that he would not want tube feedings of any kind.    Hailey I had a further discussion about her father's wishes as well as his advanced directive.  Ultimately Hailey felt that we should honor her father's wishes when he was of sound mind making these decisions for himself.  She also voiced the opinion that although we could start his nutrition here in the hospital it was unclear to have any benefit to his quality of life.  I discussed if we were not to go forward with NG tube insertion the medical recommendation would be to transition the patient to comfort measures and to consider hospice.  Hailey was in agreement but would like to discuss with her sister Zina before making any final decisions.  I discussed with Hailey her father's current CODE STATUS was DNR, and that if he had further decline while in the hospital or respiratory distress he could still be intubated based on just his CODE STATUS.  Based on our conversation I recommended transitioning her father to DNR/DNI for the time being until the family can come to a definite decision on hospice.  Hailey was in agreement.    MD Lynda abel.    Total time spent, (15) minutes (1330 to 1345).      CODE STATUS: DNR/DNI - Level 3  POA: Yes  POLST:      SIGNATURE: DEACON Mcbride  DATE: December 9, 2024  TIME:  1:40 PM

## 2024-12-09 NOTE — ASSESSMENT & PLAN NOTE
Likely in the setting of RLL PNA   Required CPAP by EMS en route, transitioned to bipap 14/8 in ER and eventually weaned to HFNC   VBG on admission -- 7.5/34/42/26  12/6 CXR concerning for RLL infiltrate   12/6 CTA Chest (Vrad) --no PE, severe emphysema, secretions in the trachea and bilateral mainstem bronchi, extensive mucus plugging throughout the right lower lobe, moderate right pleural effusion  Improvement to oxygenation now down to 6 L NC, but intermittently tachypneic   VC 1.98 L, NIF - 20 ccH2O    Plan:   Currently requiring 6L NC  Attempt to wean supplemental O2 for goal spo2 > 90%   Aggressive pulmonary toileting with vest therapy, scheduled xopenex and atrovent   As poor tolerance of vest therapy, cont 3% saline nebs   Noted weak cough with poor effort with prompting on admission. SLP evaluation with VBS 12/9 severely reduced swallowing capabilities with evidence of aspiration.   Strict NPO  Check Acetylcholine receptor blocker to evaluate for myasthenia gravis   Disease-specific treatments as below   Daily NIF/VC

## 2024-12-09 NOTE — PLAN OF CARE
Problem: Potential for Falls  Goal: Patient will remain free of falls  Description: INTERVENTIONS:  - Educate patient/family on patient safety including physical limitations  - Instruct patient to call for assistance with activity   - Consult OT/PT to assist with strengthening/mobility   - Keep Call bell within reach  - Keep bed low and locked with side rails adjusted as appropriate  - Keep care items and personal belongings within reach  - Initiate and maintain comfort rounds  - Make Fall Risk Sign visible to staff  - Offer Toileting every  Hours, in advance of need  - Initiate/Maintain alarm  - Obtain necessary fall risk management equipment:   - Apply yellow socks and bracelet for high fall risk patients  - Consider moving patient to room near nurses station  Outcome: Progressing     Problem: Prexisting or High Potential for Compromised Skin Integrity  Goal: Skin integrity is maintained or improved  Description: INTERVENTIONS:  - Identify patients at risk for skin breakdown  - Assess and monitor skin integrity  - Assess and monitor nutrition and hydration status  - Monitor labs   - Assess for incontinence   - Turn and reposition patient  - Assist with mobility/ambulation  - Relieve pressure over bony prominences  - Avoid friction and shearing  - Provide appropriate hygiene as needed including keeping skin clean and dry  - Evaluate need for skin moisturizer/barrier cream  - Collaborate with interdisciplinary team   - Patient/family teaching  - Consider wound care consult   Outcome: Progressing     Problem: RESPIRATORY - ADULT  Goal: Achieves optimal ventilation and oxygenation  Description: INTERVENTIONS:  - Assess for changes in respiratory status  - Assess for changes in mentation and behavior  - Position to facilitate oxygenation and minimize respiratory effort  - Oxygen administered by appropriate delivery if ordered  - Initiate smoking cessation education as indicated  - Encourage broncho-pulmonary hygiene  including cough, deep breathe, Incentive Spirometry  - Assess the need for suctioning and aspirate as needed  - Assess and instruct to report SOB or any respiratory difficulty  - Respiratory Therapy support as indicated  Outcome: Progressing     Problem: SKIN/TISSUE INTEGRITY - ADULT  Goal: Skin Integrity remains intact(Skin Breakdown Prevention)  Description: Assess:  -Perform Terry assessment every   -Clean and moisturize skin every   -Inspect skin when repositioning, toileting, and assisting with ADLS  -Assess under medical devices such as  every   -Assess extremities for adequate circulation and sensation     Bed Management:  -Have minimal linens on bed & keep smooth, unwrinkled  -Change linens as needed when moist or perspiring  -Avoid sitting or lying in one position for more than  hours while in bed  -Keep HOB at degrees     Toileting:  -Offer bedside commode  -Assess for incontinence every   -Use incontinent care products after each incontinent episode such as     Activity:  -Mobilize patient  times a day  -Encourage activity and walks on unit  -Encourage or provide ROM exercises   -Turn and reposition patient every  Hours  -Use appropriate equipment to lift or move patient in bed  -Instruct/ Assist with weight shifting every  when out of bed in chair  -Consider limitation of chair time  hour intervals    Skin Care:  -Avoid use of baby powder, tape, friction and shearing, hot water or constrictive clothing  -Relieve pressure over bony prominences using   -Do not massage red bony areas    Next Steps:  -Teach patient strategies to minimize risks such as    -Consider consults to  interdisciplinary teams such as   Outcome: Progressing  Goal: Incision(s), wounds(s) or drain site(s) healing without S/S of infection  Description: INTERVENTIONS  - Assess and document dressing, incision, wound bed, drain sites and surrounding tissue  - Provide patient and family education  - Perform skin care/dressing changes every    Outcome: Progressing  Goal: Pressure injury heals and does not worsen  Description: Interventions:  - Implement low air loss mattress or specialty surface (Criteria met)  - Apply silicone foam dressing  - Instruct/assist with weight shifting every  minutes when in chair   - Limit chair time to  hour intervals  - Use special pressure reducing interventions such as  when in chair   - Apply fecal or urinary incontinence containment device   - Perform passive or active ROM every   - Turn and reposition patient & offload bony prominences every  hours   - Utilize friction reducing device or surface for transfers   - Consider consults to  interdisciplinary teams such as   - Use incontinent care products after each incontinent episode such as  - Consider nutrition services referral as needed  Outcome: Progressing     Problem: MUSCULOSKELETAL - ADULT  Goal: Maintain or return mobility to safest level of function  Description: INTERVENTIONS:  - Assess patient's ability to carry out ADLs; assess patient's baseline for ADL function and identify physical deficits which impact ability to perform ADLs (bathing, care of mouth/teeth, toileting, grooming, dressing, etc.)  - Assess/evaluate cause of self-care deficits   - Assess range of motion  - Assess patient's mobility  - Assess patient's need for assistive devices and provide as appropriate  - Encourage maximum independence but intervene and supervise when necessary  - Involve family in performance of ADLs  - Assess for home care needs following discharge   - Consider OT consult to assist with ADL evaluation and planning for discharge  - Provide patient education as appropriate  Outcome: Progressing  Goal: Maintain proper alignment of affected body part  Description: INTERVENTIONS:  - Support, maintain and protect limb and body alignment  - Provide patient/ family with appropriate education  Outcome: Progressing     Problem: Nutrition/Hydration-ADULT  Goal: Nutrient/Hydration  intake appropriate for improving, restoring or maintaining nutritional needs  Description: Monitor and assess patient's nutrition/hydration status for malnutrition. Collaborate with interdisciplinary team and initiate plan and interventions as ordered.  Monitor patient's weight and dietary intake as ordered or per policy. Utilize nutrition screening tool and intervene as necessary. Determine patient's food preferences and provide high-protein, high-caloric foods as appropriate.     INTERVENTIONS:  - Monitor oral intake, urinary output, labs, and treatment plans  - Assess nutrition and hydration status and recommend course of action  - Evaluate amount of meals eaten  - Assist patient with eating if necessary   - Allow adequate time for meals  - Recommend/ encourage appropriate diets, oral nutritional supplements, and vitamin/mineral supplements  - Order, calculate, and assess calorie counts as needed  - Recommend, monitor, and adjust tube feedings and TPN/PPN based on assessed needs  - Assess need for intravenous fluids  - Provide specific nutrition/hydration education as appropriate  - Include patient/family/caregiver in decisions related to nutrition  Outcome: Progressing

## 2024-12-09 NOTE — ASSESSMENT & PLAN NOTE
Patient reports falls at home after bending down to  a washcloth and falling forward, striking his L forehead on the floor   He denies LOC  Lives at home with 3 friends   12/6 CTH neg   12/6 CT Cspine neg   12/6 CTA chest -- no PE, severe emphysema, attenuation of multiple small airways in the right lower lobe which could be related to aspiration, inflammation, and/or infection and scattered ill-defined groundglass and alveolar opacities in the periphery of the right midlung     Plan:   Fall precautions   Will need PT/OT when appropriate   12/8 was OOBTC with assistance of staff and tolerated well

## 2024-12-09 NOTE — RESPIRATORY THERAPY NOTE
12/09/24 1122   SpO2 97 %   Position Sitting  (in chair)   Vital Capacity   (1.68)   NIF -20 cm H2O     Patient performed VC/ NIF with good efforts.

## 2024-12-09 NOTE — QUICK NOTE
Provided medical update over the phone to patients daughter Zina. Zina was appreciative of medical update but asked that all decision making be deferred to her sister Hailey, the medical POA.    Attempted to call Hailey, no answer. Call back number left.     Medical power of  reviewed- states patient would not want artifical feeding. Will hold off on NGT insertion until discussed with Hailey.     MD Howell aware

## 2024-12-09 NOTE — SPEECH THERAPY NOTE
"Speech Language/Pathology    Speech/Language Pathology Progress Note    Patient Name: Ross Mcguire  Today's Date: 12/9/2024     Problem List  Principal Problem:    Acute hypoxic respiratory failure (HCC)  Active Problems:    GERD (gastroesophageal reflux disease)    Other chronic pancreatitis (HCC)    Smoking    History of endovascular stent graft for abdominal aortic aneurysm (AAA)    SIADH (syndrome of inappropriate ADH production) (HCC)    Severe protein-calorie malnutrition (HCC)    Benign essential hypertension    SIRS (systemic inflammatory response syndrome) (HCC)    Right lower lobe pneumonia    Fall    Transaminitis    Emphysema lung (HCC)    Dysphagia       Past Medical History  Past Medical History:   Diagnosis Date    Anxiety     Rhodes's esophagus     Chronic kidney disease, stage 3 (HCC) 02/07/2022    Chronic neck pain     Depression     GERD (gastroesophageal reflux disease)     H/O abdominal aortic aneurysm 4.5 cm    Hyperlipidemia     Hypertension     Pancreatic pseudocyst     Vertigo         Past Surgical History  Past Surgical History:   Procedure Laterality Date    COLONOSCOPY  09/30/2014    IR LOWER EXTREMITY / INTERVENTION  01/04/2019    NM ESOPHAGOGASTRODUODENOSCOPY TRANSORAL DIAGNOSTIC N/A 06/22/2018    Procedure: EGD AND COLONOSCOPY;  Surgeon: Quinton Garcia MD;  Location: [facility];  Service: Gastroenterology    NM EVASC RPR DPLMNT AORTO-AORTIC NDGFT N/A 02/09/2018    Procedure: REPAIR ANEURYSM ENDOVASCULAR ABDOMINAL AORTIC  (EVAR);  Surgeon: Jeremy Jackson MD;  [facility]: [facility];  Service: Vascular    UPPER GASTROINTESTINAL ENDOSCOPY           Updated History:  Pt last seen by SLP 12/7 recommending strict NPO.   Pt currently requiring 6L NC.    Updated Imaging:  No new imaging.     Subjective:  Pt awake and alert upon arrival, agreeable to PO trials and HOB elevation. Pt denies SOB, stating he is \"just tired\".     Objective:  Materials administered: thin liquids, ice chips, puree " textures     Complete labial seal for retrieval and containment of all materials, no anterior bolus loss present. Min prolonged rotary mastication of ice chips, ultimately functional. Timely oral manipulation of puree textures w/ adequate bolus formation and functional bolus transfer. No oral residue noted. Suspected delayed swallow initiation and reduced hyolaryngeal elevation to palpation. WVQ and delayed weak cough noted w/ ice chips, decreased O2 from 95 to 92 w/ thin liquids via cup sip.     Assessment:  Pt presents w/ s/s suggestive of oropharyngeal dysphagia, unable to determine severity at this time characterized by descriptions above.     Plan/Recommendations:  Continue strict NPO pending completion of VFSS this date   Meds non-oral/via IV  Frequent/thorough oral care   ST to f/u for completion of VFSS later this date

## 2024-12-09 NOTE — ASSESSMENT & PLAN NOTE
Follows as OP with [facility]   Denies abd pain, nausea, vomiting on admission   Cont home Creon when able to take PO  Lipase 8

## 2024-12-09 NOTE — ASSESSMENT & PLAN NOTE
Follows with Nephrology as OP   Likely in the setting of SSRI use and low solute intake     Plan:   Most recent sNa 146  Salt tabs on hold for NPO  Serial BMP

## 2024-12-09 NOTE — ASSESSMENT & PLAN NOTE
Likely in the setting of RLL PNA   Required CPAP by EMS en route, transitioned to bipap 14/8 in ER and eventually weaned to HFNC   VBG on admission -- 7.5/34/42/26  12/6 CXR concerning for RLL infiltrate   12/6 CTA Chest (Vrad) --no PE, severe emphysema, secretions in the trachea and bilateral mainstem bronchi, extensive mucus plugging throughout the right lower lobe, moderate right pleural effusion  Yesterday, increase in supplemental O2 to HFNC after vest therapy, which has since been d/c'ed     Plan:   Currently requiring 6L NC  Attempt to wean supplemental O2 for goal spo2 > 90%   Aggressive pulmonary toileting with vest therapy, scheduled xopenex and atrovent   As poor tolerance of vest therapy, cont 3% saline nebs   Noted weak cough with poor effort with prompting on admission   Disease-specific treatments as below

## 2024-12-09 NOTE — UTILIZATION REVIEW
NOTIFICATION OF INPATIENT ADMISSION   AUTHORIZATION REQUEST   SERVICING FACILITY:   [place] [address]  Tax ID: [id number]  NPI: [id number] ATTENDING PROVIDER:  Attending Name and NPI#: All Howell Md [[id number]]  Address: [address] [place] [address]  Phone: [phone]   ADMISSION INFORMATION:  Place of Service: Inpatient [facility]  Place of Service Code: 21  Inpatient Admission Date/Time: 12/6/24  9:58 PM  Discharge Date/Time: No discharge date for patient encounter.  Admitting Diagnosis Code/Description:  Acute respiratory failure (HCC) [J96.00]  Respiratory distress [R06.03]  Right lower lobe pneumonia [J18.9]     UTILIZATION REVIEW CONTACT:  Tennille Garza, Utilization   Network Utilization Review Department  Phone: [phone]  Fax: [phone]  Email: [email or web address]  Contact for approvals/pending authorizations, clinical reviews, and discharge.     PHYSICIAN ADVISORY SERVICES:  Medical Necessity Denial & Pten-hc-Hfsp Review  Phone: [phone]  Fax: [phone]  Email: [email or web address]     DISCHARGE SUPPORT TEAM:  For Patients Discharge Needs & Updates  Phone: [phone] opt. 2 Fax: [phone]  Email: [email or web address]

## 2024-12-09 NOTE — PROCEDURES
Video Swallow Study      Patient Name: Ross Mcgurie  Today's Date: 12/9/2024        Past Medical History  Past Medical History:   Diagnosis Date    Anxiety     Rhodes's esophagus     Chronic kidney disease, stage 3 (HCC) 02/07/2022    Chronic neck pain     Depression     GERD (gastroesophageal reflux disease)     H/O abdominal aortic aneurysm 4.5 cm    Hyperlipidemia     Hypertension     Pancreatic pseudocyst     Vertigo         Past Surgical History  Past Surgical History:   Procedure Laterality Date    COLONOSCOPY  09/30/2014    IR LOWER EXTREMITY / INTERVENTION  01/04/2019    MD ESOPHAGOGASTRODUODENOSCOPY TRANSORAL DIAGNOSTIC N/A 06/22/2018    Procedure: EGD AND COLONOSCOPY;  Surgeon: Quinton Garcia MD;  [facility];  Service: Gastroenterology    MD EVASC RPR DPLMNT AORTO-AORTIC NDGFT N/A 02/09/2018    Procedure: REPAIR ANEURYSM ENDOVASCULAR ABDOMINAL AORTIC  (EVAR);  Surgeon: Jeremy Jackson MD;  [facility];  Service: Vascular    UPPER GASTROINTESTINAL ENDOSCOPY         Modified (Video) Barium Swallow Study    Summary:  Images are on PACS for review.     Pt presents w/ mod oral dysphagia, severe pharyngeal dysphagia.     Reduced bolus control w/ floor of mouth spillage and premature spill over BOT. Slowed bolus transfer and reduced BOT retraction resulting in mild oral residue on lingual surface and mod residue on BOT. Delayed swallow initiation w/ bolus head in the valleculae w/ puree, bolus head in the pyriforms w/ thin. Severely reduced laryngeal elevation, anterior hyoid excursion and vestibule closure. Incomplete epiglottic inversion w/ tip butting against PPW. Penetration before the swallow contacting the vocal folds w/ SILENT aspiration during the swallow of thin liquids. Delayed cough noted x1 though material was past visible tracheal rings. Severely reduced pharyngeal stripping wave w/ minimal movement visualized resulting in severe pharyngeal  residue in the valleculae and mod-severe in the pyriforms. Chin tuck, HTR and HTL did not aid in eliminating aspiration or aid in clearing pharyngeal residue. Reduced UES opening resulting in scant materials passing through to the esophagus.     VBS findings and recommendations immediately reviewed w/ pt w/ use of images to aid in understanding. Education provided on strategies to optimize swallow safety, despite NPO recommendation given need to manage secretions, including the importance of oral care. Pt verbalized understanding and denied questions at this time.     Recommendations:  Diet/Liquids: NPO   Meds: non-oral  Frequent oral care  Upright position  F/u ST tx: as able and appropriate during inpatient stay, if GOC remain restorative recommend intensive speech therapy upon discharge   Therapy Prognosis: fair/guarded  Prognosis considerations: severity of swallow function, age, current medical status, PMHx   Aspiration Precautions  Reflux Precautions  Consider consult with: Palliative Care, Nutrition   Results reviewed with: pt, nursing, physician, dietician  Repeat MBS as necessary  If a dedicated assessment of the esophagus is desired, consider esophagram/barium swallow or EGD.      Goals:  Dysphagia LTG  -Patient will demonstrate safe and effective oral intake (without overt s/s significant oral/pharyngeal dysphagia including s/s penetration or aspiration) for the highest appropriate diet level following completion of VFSS in 6-8 weeks/as able and appropriate.     Re: Compensatory Strategies  -Patient will utilize trained swallowing maneuver (e.g., supraglottic swallow, Mendelsohn maneuver, effortful swallow, etc.) to facilitate improved airway protection,  tongue base retraction, pharyngeal constriction with 90% accuracy across 3 consecutive therapy sessions.       H&P/pertinent provider notes: (PMH noted above)  Pt is a 74 y.o. male  with a past medical history of hypertension, severe protein calorie  malnutrition, SIADH on home sodium tabs, AAA s/p EVAR, smoker having recently quit 3 months ago, chronic pancreatitis, GERD who presents as an admission for acute hypoxic respiratory failure in the setting of right lower lobe pneumonia.  The patient states that for the past week, he has had an increasing cough with production of green-yellow sputum and shortness of breath.  He denies chest pain, headache, abdominal pain, nausea, vomiting, diarrhea, fevers, sore throat, runny nose.  He does state that one of the 3 friends he lives with has been ill with a cough for several days to weeks.  Ross also shares that sometime this week, while bending down to  a washcloth, he fell forward, striking his left forehead on the floor, though denies loss of consciousness or other injury.  In the ER, had a chest x-ray concerning for right lower lobe infiltrate, negative CT head and CT C-spine.  He was given 1 L normal saline, ceftriaxone and Zithromax.  On exam he is awake alert and oriented, appears tachypneic on high flow nasal cannula with SpO2 96% and rhonchi throughout.  He is admitted to stepdown 1 under critical care for continued management.     Special Studies:  12/6/24 CTA chest pe study:  No pulmonary embolism is seen. Prominence of the central pulmonary arteries and right ventricle suggest pulmonary hypertension/increased right heart pressures.     Superimposed on pulmonary emphysematous changes, small right pleural effusion. Dense consolidation of the right lower lobe, with left-to-right mediastinal shift; suspect a component of volume loss, superimposed infection considered as well.     In addition, there is attenuation of multiple small airways in the right lower lobe which could be related to aspiration, inflammation, and/or infection and scattered ill-defined groundglass and alveolar opacities in the periphery of the right midlung;   superimposed infection also considered in the appropriate clinical  setting. Correlation with the patient symptoms and laboratory values recommended.     Left upper lobe nodule measures 5 mm in size (axial image 51, series 2);  based on current Fleischner Society 2017 Guidelines on incidental pulmonary nodule, optional follow-up CT at 12 months can be considered.     12/6/24 CT cervical spine without contrast:  No cervical spine fracture or traumatic malalignment.      12/6/24 CT head without contrast:  No acute intracranial abnormality. Stable chronic, complete opacification of the right maxillary sinus.      12/6/24 XR chest portable:  Moderate right effusion with extensive right base pneumonia.     Severe emphysema.     See subsequent chest CT.    Previous VBS:  N/A     Does the pt have pain? No  If yes, was nursing made aware/was it addressed? N/A     Swallow Mechanism Exam  Facial: symmetrical  Labial: WFL  Lingual: WFL  Velum: symmetrical  Mandible: adequate ROM  Dentition: full dentures  Vocal quality:clear/adequate   Volitional Cough: weak   Respiratory Status: on 6L NC     Swallow Information   Current Diet: NPO   Baseline Diet:  regular diet and thin liquids w/ choice of softer solids per pt and family during IE       Consistencies Administered:  Pt was viewed sitting upright in the lateral position. Trials administered were: tsp thin liquid x2, tsp thin liquid w/ chin tuck, head turn R and head turn L, tsp pudding, tsp pudding w/ chin tuck, head turn L and head turn R. - deferred other trials 2/2 severity of swallow function.     Oral Phase:  Lip Closure: Adequate   Tongue Control During Bolus Hold: reduced w/ floor of mouth spillage and premature spill over BOT   Bolus Preparation/Mastication: Timely w/ puree   Bolus Transport/Lingual Motion: slowed   Oral Residue: mild on lingual surface, mod residue on BOT   Initiation of the Pharyngeal Swallow: delayed w/ bolus head in the valleculae w/ puree, bolus head in the pyriforms w/ thin     Pharyngeal Phase:  Soft Palate  Elevation: WFL  Laryngeal Elevation: severely reduced   Anterior Hyoid Excursion: severely reduced   Epiglottic Movement: incomplete w/ tip butting against PPW   Laryngeal Vestibular Closure: reduced   Pharyngeal Stripping Wave: severely reduced/minimal movement   PES Opening: reduced   Tongue Base Retraction: reduced   Pharyngeal Residue: severe residue in the valleculae mod-severe in the pyriforms       Penetration/Aspiration:  Thin: tsp- penetration before the swallow d/t pyriform spillover contacting the vocal folds w/ trace aspiration during the swallow- pt w/ delayed cough though material is past visible tracheal rings (PAS-8), HTR- penetration before the swallow d/t pyriform spillover contacting the vocal folds w/ trace SILENT aspiration during the swallow (PAS-8), chin tuck/HTL- penetration before the swallow w/ angelica silent aspiration during the swallow (PAS-8)  Nectar: DNT   Honey: DNT  Puree: no penetration or aspiration (PAS-1) though noted severe retention w/ limited PO passing through UES   Solid: DNT   Response to Aspiration: SILENT, pt w/ delayed cough x1 w/ tsp thin   Strategies/Efficacy: chin tuck, HTR and HTL did not aid in eliminating aspiration or aid in clearing puree from pharynx     8-Point Penetration-Aspiration Scale   1 Material does not enter the airway   2 Material enters the airway, remains above the vocal folds, and is ejected  from the  airway    3 Material enters the airway, remains above the vocal folds, and is not ejected from the airway   4 Material enters the airway, contacts the vocal folds, and is ejected from the airway   5 Material enters the airway, contacts the vocal folds, and is not ejected from the airway    6 Material enters the airway, passes below the vocal folds and is ejected into the larynx or out of the airway    7 Material enters the airway, passes below the vocal folds, and is not ejected from the trachea despite effort    8 Material enters the airway, passes  below the vocal folds, and no effort is made to eject         Screening of Esophageal Phase  Esophageal Clearance: minimal PO passed through UES to complete esophageal scan

## 2024-12-09 NOTE — ASSESSMENT & PLAN NOTE
Cont home Norvasc, metoprolol with holds   Home regimen on hold for NPO   Cont IV lopressor 2.5 q6h while NPO  Goal SBP <160

## 2024-12-09 NOTE — ASSESSMENT & PLAN NOTE
Follows as OP with [facility]   Denies abd pain, nausea, vomiting on admission   Cont home Creon   Lipase 8

## 2024-12-10 NOTE — CASE MANAGEMENT
Case Management Progress Note    Patient name Ross Mcguire  Location  SDU/-01 S* MRN [id number]  : 1950 Date 12/10/2024       LOS (days): 4  Geometric Mean LOS (GMLOS) (days): 4.9  Days to GMLOS:1.1        OBJECTIVE:        Current admission status: Inpatient  Preferred Pharmacy:   [id number] - [place], [place] - [id number] [address]  Phone: [phone] Fax: [phone]    Primary Care Provider: Juan J Camarena MD    Primary Insurance: [employer]  Secondary Insurance:     PROGRESS NOTE:     Hospice liaison to meet with Pt's dtr(Hailey) to discuss hospice and determine level of care for hospice. CM to follow.

## 2024-12-10 NOTE — PHYSICAL THERAPY NOTE
PHYSICAL THERAPY CANCEL     12/10/24 1049   PT Last Visit   PT Visit Date 12/10/24   Note Type   Note type Cancelled Session   Additional Comments Currently having a goals of care discussion with family present. Will hold until decision is made     Angelica Philip            Patient Name: Ross KENT Shayla  Today's Date: 12/10/2024

## 2024-12-10 NOTE — CASE MANAGEMENT
Case Management Progress Note    Patient name Ross Mcguire  Location  SDU/-01 S* MRN [id number]  : 1950 Date 12/10/2024       LOS (days): 4  Geometric Mean LOS (GMLOS) (days): 4.9  Days to GMLOS:1.3        OBJECTIVE:        Current admission status: Inpatient  [facility] Pharmacy:   [id number] - [place], [place] - [id number] [address]  Phone: [phone] Fax: [phone]    Primary Care Provider: Juan J Camarena MD    Primary Insurance: [email or web address]  Secondary Insurance:     PROGRESS NOTE:  CM notified by critical care of hospice consult. Pt's family requesting  Hospice. Referral sent to  Hospice via [facility]. CM to follow.

## 2024-12-10 NOTE — ACP (ADVANCE CARE PLANNING)
Critical Care Advanced Care Planning Note  Ross Mcguire 74 y.o. male MRN: [id number]  Unit/Bed#: -01 SDU Encounter: [id number]    Ross Mcguire is a 74 y.o. male requiring critical care evaluation and advanced care planning. The patient has chronic comorbidities, including but not limited to hypertension, protein calorie malnutrition, SIADH, AAA, chronic smoking, which is now further complicated by the following acute conditions: Dysphagia, pneumonia, respiratory failure.  Due to the severity of the patient's acute condition and/or the extent of chronic conditions, additional conversations pertaining to advanced care planning were required. Today's discussion, which was held in a face-to-face meeting, included the patient's daughters Dayne, Zina, and son-in-law, and it was established that all stake holders understood the rationale for the advanced care planning. The patient was unable to participate in the discussion due to cognitive dysfunction.    Summary of Discussion:  Family wish to discuss further goals of care with the critical care team.  After further discussion among family family now would like more information regarding comfort measures.  I explained in detail the rationale for comfort measures-in that patient was severely malnourished, would likely not regain his ability to swallow based on the severity of his malnutrition, and the need for enteral tube to give artificial feeding which is against the patient's goals of care based on his living will.  I explained that comfort measures meant stopping all procedures/therapies/medications that would potentially cause discomfort or offer no benefit. We would allow Mr. Mcguire to eat and drink whatever he would like.  In the event that  had a life-threatening aspiration event we would give medications to take away pain, air hunger, or anxiety and allow him to pass naturally.  I also explained that if going  forward with comfort measures I would consult case management to place a hospice evaluation for the patient and he likely would be able to go to inpatient hospice.  The daughter's both agree that their father has been suffering for many years that and his periods of lucidity he is stated that if he were to get critically ill he would not want invasive procedures or to be in the hospital for a prolonged period.  Based on these comments I recommended we should pursue comfort measures.  Family was in agreement.    Comfort order set placed, patient changed to level 4 comfort measures only.      Total time spent, (10) minutes (1110 to 1120).      CODE STATUS: COMFORT CARE - Level 4  POA: Yes  POLST:      SIGNATURE: DEACON Mcbride  DATE: December 10, 2024  TIME: 11:24 AM

## 2024-12-10 NOTE — PROGRESS NOTES
Progress Note - Critical Care/ICU   Name: Ross Mcguire 74 y.o. male I MRN: [id number]  Unit/Bed#: -01 SDU I Date of Admission: 12/6/2024   Date of Service: 12/10/2024 I Hospital Day: 4      Assessment & Plan  Acute hypoxic respiratory failure (HCC)  Likely in the setting of RLL PNA   Required CPAP by EMS en route, transitioned to bipap 14/8 in ER and eventually weaned to HFNC   VBG on admission -- 7.5/34/42/26  12/6 CXR concerning for RLL infiltrate   12/6 CTA Chest (Vrad) --no PE, severe emphysema, secretions in the trachea and bilateral mainstem bronchi, extensive mucus plugging throughout the right lower lobe, moderate right pleural effusion  Improvement to oxygenation now down to 6 L NC, but intermittently tachypneic   VC 1.98 L, NIF - 20 ccH2O    Plan:   Currently requiring 6L NC  Attempt to wean supplemental O2 for goal spo2 > 90%   Aggressive pulmonary toileting with vest therapy, scheduled xopenex and atrovent   As poor tolerance of vest therapy, cont 3% saline nebs   Noted weak cough with poor effort with prompting on admission. SLP evaluation with VBS 12/9 severely reduced swallowing capabilities with evidence of aspiration.   Strict NPO  Check Acetylcholine receptor blocker to evaluate for myasthenia gravis   Disease-specific treatments as below   Daily NIF/VC  Right lower lobe pneumonia  12/6 CXR -- RLL infiltrate  Hypoxic, tachypneic on admission with leukocytosis as below   Abx as below   Sepsis (HCC)  AEB tachypnea, leukocytosis   Likely in the setting of RLL PNA as above   LA 1.7, procal 0.64  ER management -- 1L NS, Zithromax, Ctx   Flu, Covid, RSV negative   BC 1/2 staph epi, BC 1/2 NG x24h  Strep, legionella negative     Plan:   Abx D4: Ctx D5/7  Vancomycin d/c 12/9 MRSA culture negative   Hydrocortisone 50mg q6h for severe PNA D3  D/C steroids- concern for muscular weakness   Remains HD stable   Trend endpoints   GERD (gastroesophageal reflux disease)  Cont home PPI via IV for  "NPO  Other chronic pancreatitis (HCC)  Follows as OP with [facility]   Denies abd pain, nausea, vomiting on admission   Cont home Creon when able to take PO  Lipase 8  Smoking  Quit smoking 3 months ago  States has been smoking since age 12, as much as 2ppd while he was employed as a    NRT   History of endovascular stent graft for abdominal aortic aneurysm (AAA)  Follows with Dr. Angel as OP, last kaylee 6/4/2024  Per Dr. 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 Angel   SIADH (syndrome of inappropriate ADH production) ([facility])  Follows with Nephrology as OP   Likely in the setting of SSRI use and low solute intake     Plan:   Most recent sNa 146  Salt tabs on hold for NPO  Serial BMP   Severe protein-calorie malnutrition (HCC)  Admission weight 39.6kg   Appreciate nutrition input   Currently NPO for dysphagia  Benign essential hypertension  Cont home Norvasc, metoprolol with holds   Home regimen on hold for NPO   Cont IV lopressor 2.5 q6h while NPO  Goal SBP <160  Fall  Patient reports falls at home after bending down to  a washcloth and falling forward, striking his L forehead on the floor   He denies LOC  Lives at home with 3 friends   12/6 CTH neg   12/6 CT Cspine neg   12/6 CTA chest -- no PE, severe emphysema, attenuation of multiple small airways in the right lower lobe which could be related to aspiration, inflammation, and/or infection and scattered ill-defined groundglass and alveolar opacities in the periphery of the right midlung     Plan:   Fall precautions   Will need PT/OT when appropriate   12/8 was OOBTC with assistance of staff and tolerated well   Transaminitis  Very mild elevation in his ALT and alk phos on admission "   Denies abd pain   Improving on serial draws   Emphysema lung (HCC)  12/6 CTA chest -- no PE, severe emphysema, attenuation of multiple small airways in the right lower lobe which could be related to aspiration, inflammation, and/or infection and scattered ill-defined groundglass and alveolar opacities in the periphery of the right midlung   Patient reports significant smoking history     Plan:   Hydrocortisone added for severe PNA 12/7  Cont xopenex, atrovent, pulmicort, perforomist   Will need OP f/u with Pulm and PFTs   Dysphagia  12/7 failed speech eval and made strict NPO   12/9 VBS- with severe inability to swallow with signs of aspiration with puree  Strict NPO  Patients living will states no tube feedings- Will need to discuss with family.   Severely malnourished vs neuromuscular weakness- management as stated above   Disposition: Stepdown Level 1    ICU Core Measures     A: Assess, Prevent, and Manage Pain Has pain been assessed? Yes  Need for changes to pain regimen? NA   B: Both SAT/SAT  N/A   C: Choice of Sedation RASS Goal: 0 Alert and Calm or N/A patient not on sedation  Need for changes to sedation or analgesia regimen? NA   D: Delirium CAM-ICU: Positive   E: Early Mobility  Plan for early mobility? NA   F: Family Engagement Plan for family engagement today? Yes       Antibiotic Review: Awaiting culture results.       Prophylaxis:  VTE VTE covered by:  enoxaparin, Subcutaneous, 40 mg at 12/09/24 0803       Stress Ulcer  covered byomeprazole (PriLOSEC) 40 MG capsule [[id number]] (Long-Term Med), pantoprazole (PROTONIX) injection 40 mg [[id number]]         24 Hour Events :     Patient was very fidgety, wanted his medications p.o. to sleep unable to administer gave 0.25 mg of Ativan which improved for a little      Subjective   Review of Systems: Review of Systems   Unable to perform ROS: Acuity of condition   All other systems reviewed and are negative.      Objective :                   Vitals I/O       Most Recent Min/Max in 24hrs   Temp 98.2 °F (36.8 °C) Temp  Min: 97.7 °F (36.5 °C)  Max: 98.8 °F (37.1 °C)   Pulse 103 Pulse  Min: 91  Max: 103   Resp (!) 26 Resp  Min: 18  Max: 32   /86 BP  Min: 140/81  Max: 178/87   O2 Sat 96 % SpO2  Min: 90 %  Max: 97 %      Intake/Output Summary (Last 24 hours) at 12/10/2024 0555  Last data filed at 12/10/2024 0430  Gross per 24 hour   Intake 2131.66 ml   Output 2386 ml   Net -254.34 ml       Diet NPO    Invasive Monitoring           Physical Exam   Physical Exam  Vitals and nursing note reviewed.   Eyes:      Extraocular Movements: Extraocular movements intact.      Pupils: Pupils are equal, round, and reactive to light.   Skin:     General: Skin is cool, dry and not mottled extremities.      Capillary Refill: Capillary refill takes less than 2 seconds.      Coloration: Skin is pale.   HENT:      Head: Normocephalic and atraumatic.   Cardiovascular:      Rate and Rhythm: Regular rhythm. Tachycardia present.      Pulses: Normal pulses.      Heart sounds: Normal heart sounds.   Musculoskeletal:         General: Normal range of motion.      Cervical back: Full passive range of motion without pain, normal range of motion and neck supple.   Abdominal: General: Bowel sounds are normal. There is no distension.      Palpations: Abdomen is soft.   Constitutional:       Appearance: He is well-developed and well-nourished. He is cachectic. He is ill-appearing.      Interventions: Nasal cannula in place.   Pulmonary:      Effort: Tachypnea present.      Breath sounds: Examination of the right-lower field reveals decreased breath sounds and rhonchi. Examination of the left-lower field reveals decreased breath sounds and rhonchi. Decreased breath sounds and rhonchi present. No wheezing.   Neurological:      Mental Status: He is disoriented to place, disoriented to time and disoriented to situation.          Diagnostic Studies        Lab Results: I have reviewed the following results:      Medications:  Scheduled PRN   [Held by provider] amLODIPine, 10 mg, Daily  artificial tear, , HS  Artificial Tears, 1 drop, Q12H POLLY  [Held by provider] aspirin, 81 mg, Daily  budesonide, 0.5 mg, Q12H  cefTRIAXone, 1,000 mg, Q24H  chlorhexidine, 15 mL, Q12H POLLY  [Held by provider] citalopram, 20 mg, Daily  enoxaparin, 40 mg, Daily  [Held by provider] ferrous sulfate, 325 mg, Daily With Breakfast  [Held by provider] finasteride, 5 mg, Daily  formoterol, 20 mcg, Q12H  hydrocortisone sodium succinate, 50 mg, Q8H POLLY  ipratropium, 0.5 mg, Q6H  levalbuterol, 1.25 mg, Q6H  lidocaine, 1 patch, Daily  loteprednol etabonate, 2 drop, 4x Daily  metoprolol, 2.5 mg, Q6H  [Held by provider] metoprolol succinate, 25 mg, Daily  [Held by provider] multivitamin-minerals, 1 tablet, Daily  nicotine, 21 mg, Daily  [Held by provider] pancrelipase (Lip-Prot-Amyl), 24,000 Units, TID With Meals  pantoprazole, 40 mg, Q24H POLLY  [Held by provider] pravastatin, 40 mg, HS  sodium chloride, 4 mL, TID  [Held by provider] sodium chloride, 1 g, TID With Meals  thiamine, 500 mg, TID   Followed by  [START ON 12/12/2024] thiamine, 500 mg, Daily   Followed by  [START ON 12/15/2024] thiamine, 100 mg, Daily      acetaminophen, 500 mg, Q6H PRN       Continuous    dextrose 5% lactated ringer's, 50 mL/hr, Last Rate: 50 mL/hr (12/09/24 1526)         Labs:   CBC    Recent Labs     12/09/24  0507 12/10/24  0427   WBC 18.97* 18.20*   HGB 10.5* 10.6*   HCT 31.9* 32.8*    405*     BMP    Recent Labs     12/09/24  1410 12/10/24  0428   SODIUM 147 151*   K 3.6 3.1*    109*   CO2 29 34*   AGAP 11 8   BUN 27* 27*   CREATININE 0.54* 0.61   CALCIUM 7.9* 7.8*       Coags    No recent results     Additional Electrolytes  Recent Labs     12/09/24  0507 12/09/24  1410 12/10/24  0427 12/10/24  0428   MG 1.7* 2.1  --  1.7*   PHOS 3.3  --   --  2.6   CAIONIZED 0.99* 0.92* 1.05*  --           Blood Gas    No recent results  No recent results LFTs  Recent Labs      12/09/24  0507 12/10/24  0427   ALT 54* 55*   AST 46* 39   ALKPHOS 112* 110*   ALB 2.7* 2.7*   TBILI 0.35 0.38       Infectious  Recent Labs     12/10/24  0427   PROCALCITONI 0.26*     Glucose  Recent Labs     12/08/24  1522 12/09/24  0507 12/09/24  1410 12/10/24  0428   GLUC 133 134 127 139

## 2024-12-10 NOTE — HOSPICE NOTE
Hospice referral received this afternoon  Liaison reached out to daughter Hailey who can meet me at the hospital tomorrow at 11:00.  Will evaluate at that time for hospice inpatient criteria.

## 2024-12-10 NOTE — PLAN OF CARE
Problem: Potential for Falls  Goal: Patient will remain free of falls  Description: INTERVENTIONS:  - Educate patient/family on patient safety including physical limitations  - Instruct patient to call for assistance with activity   - Consult OT/PT to assist with strengthening/mobility   - Keep Call bell within reach  - Keep bed low and locked with side rails adjusted as appropriate  - Keep care items and personal belongings within reach  - Initiate and maintain comfort rounds  - Make Fall Risk Sign visible to staff  - Apply yellow socks and bracelet for high fall risk patients  - Consider moving patient to room near nurses station  Outcome: Progressing     Problem: Prexisting or High Potential for Compromised Skin Integrity  Goal: Skin integrity is maintained or improved  Description: INTERVENTIONS:  - Identify patients at risk for skin breakdown  - Assess and monitor skin integrity  - Assess and monitor nutrition and hydration status  - Monitor labs   - Assess for incontinence   - Turn and reposition patient  - Assist with mobility/ambulation  - Relieve pressure over bony prominences  - Avoid friction and shearing  - Provide appropriate hygiene as needed including keeping skin clean and dry  - Evaluate need for skin moisturizer/barrier cream  - Collaborate with interdisciplinary team   - Patient/family teaching  - Consider wound care consult   Outcome: Progressing     Problem: RESPIRATORY - ADULT  Goal: Achieves optimal ventilation and oxygenation  Description: INTERVENTIONS:  - Assess for changes in respiratory status  - Assess for changes in mentation and behavior  - Position to facilitate oxygenation and minimize respiratory effort  - Oxygen administered by appropriate delivery if ordered  - Initiate smoking cessation education as indicated  - Encourage broncho-pulmonary hygiene including cough, deep breathe, Incentive Spirometry  - Assess the need for suctioning and aspirate as needed  - Assess and instruct  to report SOB or any respiratory difficulty  - Respiratory Therapy support as indicated  Outcome: Progressing     Problem: SKIN/TISSUE INTEGRITY - ADULT  Goal: Skin Integrity remains intact(Skin Breakdown Prevention)  Description: Assess:  -Assess extremities for adequate circulation and sensation     Bed Management:  -Have minimal linens on bed & keep smooth, unwrinkled  -Change linens as needed when moist or perspiring      Toileting:  -Offer bedside commode      Activity:  -Encourage activity and walks on unit  -Encourage or provide ROM exercises   -Use appropriate equipment to lift or move patient in bed      Skin Care:  -Avoid use of baby powder, tape, friction and shearing, hot water or constrictive clothing  -Do not massage red bony areas      Outcome: Progressing  Goal: Incision(s), wounds(s) or drain site(s) healing without S/S of infection  Description: INTERVENTIONS  - Assess and document dressing, incision, wound bed, drain sites and surrounding tissue  - Provide patient and family education  Outcome: Progressing  Goal: Pressure injury heals and does not worsen  Description: Interventions:  - Implement low air loss mattress or specialty surface (Criteria met)  - Apply silicone foam dressing  - Apply fecal or urinary incontinence containment device   - Utilize friction reducing device or surface for transfers   - Consider nutrition services referral as needed  Outcome: Progressing     Problem: MUSCULOSKELETAL - ADULT  Goal: Maintain or return mobility to safest level of function  Description: INTERVENTIONS:  - Assess patient's ability to carry out ADLs; assess patient's baseline for ADL function and identify physical deficits which impact ability to perform ADLs (bathing, care of mouth/teeth, toileting, grooming, dressing, etc.)  - Assess/evaluate cause of self-care deficits   - Assess range of motion  - Assess patient's mobility  - Assess patient's need for assistive devices and provide as appropriate  -  Encourage maximum independence but intervene and supervise when necessary  - Involve family in performance of ADLs  - Assess for home care needs following discharge   - Consider OT consult to assist with ADL evaluation and planning for discharge  - Provide patient education as appropriate  Outcome: Progressing  Goal: Maintain proper alignment of affected body part  Description: INTERVENTIONS:  - Support, maintain and protect limb and body alignment  - Provide patient/ family with appropriate education  Outcome: Progressing     Problem: Nutrition/Hydration-ADULT  Goal: Nutrient/Hydration intake appropriate for improving, restoring or maintaining nutritional needs  Description: Monitor and assess patient's nutrition/hydration status for malnutrition. Collaborate with interdisciplinary team and initiate plan and interventions as ordered.  Monitor patient's weight and dietary intake as ordered or per policy. Utilize nutrition screening tool and intervene as necessary. Determine patient's food preferences and provide high-protein, high-caloric foods as appropriate.     INTERVENTIONS:  - Monitor oral intake, urinary output, labs, and treatment plans  - Assess nutrition and hydration status and recommend course of action  - Evaluate amount of meals eaten  - Assist patient with eating if necessary   - Allow adequate time for meals  - Recommend/ encourage appropriate diets, oral nutritional supplements, and vitamin/mineral supplements  - Order, calculate, and assess calorie counts as needed  - Recommend, monitor, and adjust tube feedings and TPN/PPN based on assessed needs  - Assess need for intravenous fluids  - Provide specific nutrition/hydration education as appropriate  - Include patient/family/caregiver in decisions related to nutrition  Outcome: Progressing

## 2024-12-10 NOTE — PROGRESS NOTES
Progress Note - Critical Care/ICU   Name: Ross Mcguire 74 y.o. male I MRN: [id number]  Unit/Bed#: -01 SDU I Date of Admission: 12/6/2024   Date of Service: 12/10/2024 I Hospital Day: 4       Critical Care Interval Transfer Note:    Brief Hospital Summary: 74-year-old gentleman with past medical history of hypertension, protein calorie malnutrition, SIADH, AAA, smoker. Presented to the hospital 12/6 after a fall, was found to be hypoxic requiring high flow nasal cannula. Imaging showed right lower lobe infiltrate. On hospital day 2 SLP evaluated the patient he failed bedside swallow evaluation. Yesterday patient went down for video barium swallow and has no swallowing capabilities. Unclear etiology, old stroke versus severe malnutrition and deconditioning. Enteral feeds are not in line with the patient's goals of care per his living well and his family understands that he is ill. He was made comfort measures only today. Pending hospice evaluation.     Barriers to discharge:   Pending hospice evaluation     Consults: IP CONSULT TO CASE MANAGEMENT  IP CONSULT TO PHARMACY    Recommended to review admission imaging for incidental findings and document in discharge navigator: Chart reviewed, no known incidental findings noted at this time.      Discharge Plan: Anticipate discharge tomorrow to Hospice    PT Recommendations: Home with home health rehabilitationOT Recommendations: No rehabilitation needs   Patient seen and evaluated by Critical Care today and deemed to be appropriate for transfer to Med Surg. Spoke to MD Real from [facility] to accept transfer. Critical care can be contacted via SecureChat with any questions or concerns. Please use the Critical Care AP Role in Secure Chat for any provider inquires until the patient is transferred out of the ICU or until tomorrow at 0600.

## 2024-12-10 NOTE — PLAN OF CARE
Problem: Potential for Falls  Goal: Patient will remain free of falls  Description: INTERVENTIONS:  - Educate patient/family on patient safety including physical limitations  - Instruct patient to call for assistance with activity   - Consult OT/PT to assist with strengthening/mobility   - Keep Call bell within reach  - Keep bed low and locked with side rails adjusted as appropriate  - Keep care items and personal belongings within reach  - Initiate and maintain comfort rounds  - Make Fall Risk Sign visible to staff  - Offer Toileting every  Hours, in advance of need  - Initiate/Maintain alarm  - Obtain necessary fall risk management equipment:   - Apply yellow socks and bracelet for high fall risk patients  - Consider moving patient to room near nurses station  Outcome: Progressing     Problem: Prexisting or High Potential for Compromised Skin Integrity  Goal: Skin integrity is maintained or improved  Description: INTERVENTIONS:  - Identify patients at risk for skin breakdown  - Assess and monitor skin integrity  - Assess and monitor nutrition and hydration status  - Monitor labs   - Assess for incontinence   - Turn and reposition patient  - Assist with mobility/ambulation  - Relieve pressure over bony prominences  - Avoid friction and shearing  - Provide appropriate hygiene as needed including keeping skin clean and dry  - Evaluate need for skin moisturizer/barrier cream  - Collaborate with interdisciplinary team   - Patient/family teaching  - Consider wound care consult   Outcome: Progressing     Problem: RESPIRATORY - ADULT  Goal: Achieves optimal ventilation and oxygenation  Description: INTERVENTIONS:  - Assess for changes in respiratory status  - Assess for changes in mentation and behavior  - Position to facilitate oxygenation and minimize respiratory effort  - Oxygen administered by appropriate delivery if ordered  - Initiate smoking cessation education as indicated  - Encourage broncho-pulmonary hygiene  including cough, deep breathe, Incentive Spirometry  - Assess the need for suctioning and aspirate as needed  - Assess and instruct to report SOB or any respiratory difficulty  - Respiratory Therapy support as indicated  Outcome: Progressing     Problem: SKIN/TISSUE INTEGRITY - ADULT  Goal: Skin Integrity remains intact(Skin Breakdown Prevention)  Description: Assess:  -Perform Terry assessment every   -Clean and moisturize skin every   -Inspect skin when repositioning, toileting, and assisting with ADLS  -Assess under medical devices such as  every   -Assess extremities for adequate circulation and sensation     Bed Management:  -Have minimal linens on bed & keep smooth, unwrinkled  -Change linens as needed when moist or perspiring  -Avoid sitting or lying in one position for more than  hours while in bed  -Keep HOB at degrees     Toileting:  -Offer bedside commode  -Assess for incontinence every   -Use incontinent care products after each incontinent episode such as     Activity:  -Mobilize patient  times a day  -Encourage activity and walks on unit  -Encourage or provide ROM exercises   -Turn and reposition patient every  Hours  -Use appropriate equipment to lift or move patient in bed  -Instruct/ Assist with weight shifting every when out of bed in chair  -Consider limitation of chair time  hour intervals    Skin Care:  -Avoid use of baby powder, tape, friction and shearing, hot water or constrictive clothing  -Relieve pressure over bony prominences using   -Do not massage red bony areas    Next Steps:  -Teach patient strategies to minimize risks such as    -Consider consults to  interdisciplinary teams such as   Outcome: Progressing  Goal: Incision(s), wounds(s) or drain site(s) healing without S/S of infection  Description: INTERVENTIONS  - Assess and document dressing, incision, wound bed, drain sites and surrounding tissue  - Provide patient and family education  - Perform skin care/dressing changes every    Outcome: Progressing  Goal: Pressure injury heals and does not worsen  Description: Interventions:  - Implement low air loss mattress or specialty surface (Criteria met)  - Apply silicone foam dressing  - Instruct/assist with weight shifting every  minutes when in chair   - Limit chair time to  hour intervals  - Use special pressure reducing interventions such as  when in chair   - Apply fecal or urinary incontinence containment device   - Perform passive or active ROM every   - Turn and reposition patient & offload bony prominences every  hours   - Utilize friction reducing device or surface for transfers   - Consider consults to  interdisciplinary teams such as  - Use incontinent care products after each incontinent episode such a  - Consider nutrition services referral as needed  Outcome: Progressing     Problem: MUSCULOSKELETAL - ADULT  Goal: Maintain or return mobility to safest level of function  Description: INTERVENTIONS:  - Assess patient's ability to carry out ADLs; assess patient's baseline for ADL function and identify physical deficits which impact ability to perform ADLs (bathing, care of mouth/teeth, toileting, grooming, dressing, etc.)  - Assess/evaluate cause of self-care deficits   - Assess range of motion  - Assess patient's mobility  - Assess patient's need for assistive devices and provide as appropriate  - Encourage maximum independence but intervene and supervise when necessary  - Involve family in performance of ADLs  - Assess for home care needs following discharge   - Consider OT consult to assist with ADL evaluation and planning for discharge  - Provide patient education as appropriate  Outcome: Progressing  Goal: Maintain proper alignment of affected body part  Description: INTERVENTIONS:  - Support, maintain and protect limb and body alignment  - Provide patient/ family with appropriate education  Outcome: Progressing     Problem: Nutrition/Hydration-ADULT  Goal: Nutrient/Hydration  intake appropriate for improving, restoring or maintaining nutritional needs  Description: Monitor and assess patient's nutrition/hydration status for malnutrition. Collaborate with interdisciplinary team and initiate plan and interventions as ordered.  Monitor patient's weight and dietary intake as ordered or per policy. Utilize nutrition screening tool and intervene as necessary. Determine patient's food preferences and provide high-protein, high-caloric foods as appropriate.     INTERVENTIONS:  - Monitor oral intake, urinary output, labs, and treatment plans  - Assess nutrition and hydration status and recommend course of action  - Evaluate amount of meals eaten  - Assist patient with eating if necessary   - Allow adequate time for meals  - Recommend/ encourage appropriate diets, oral nutritional supplements, and vitamin/mineral supplements  - Order, calculate, and assess calorie counts as needed  - Recommend, monitor, and adjust tube feedings and TPN/PPN based on assessed needs  - Assess need for intravenous fluids  - Provide specific nutrition/hydration education as appropriate  - Include patient/family/caregiver in decisions related to nutrition  Outcome: Progressing

## 2024-12-11 NOTE — PLAN OF CARE
Problem: Potential for Falls  Goal: Patient will remain free of falls  Description: INTERVENTIONS:  - Educate patient/family on patient safety including physical limitations  - Instruct patient to call for assistance with activity   - Consult OT/PT to assist with strengthening/mobility   - Keep Call bell within reach  - Keep bed low and locked with side rails adjusted as appropriate  - Keep care items and personal belongings within reach  - Initiate and maintain comfort rounds  - Make Fall Risk Sign visible to staff  - Offer Toileting every  Hours, in advance of need  - Initiate/Maintain alarm  - Obtain necessary fall risk management equipment:   - Apply yellow socks and bracelet for high fall risk patients  - Consider moving patient to room near nurses station  Outcome: Progressing     Problem: Prexisting or High Potential for Compromised Skin Integrity  Goal: Skin integrity is maintained or improved  Description: INTERVENTIONS:  - Identify patients at risk for skin breakdown  - Assess and monitor skin integrity  - Assess and monitor nutrition and hydration status  - Monitor labs   - Assess for incontinence   - Turn and reposition patient  - Assist with mobility/ambulation  - Relieve pressure over bony prominences  - Avoid friction and shearing  - Provide appropriate hygiene as needed including keeping skin clean and dry  - Evaluate need for skin moisturizer/barrier cream  - Collaborate with interdisciplinary team   - Patient/family teaching  - Consider wound care consult   Outcome: Progressing     Problem: RESPIRATORY - ADULT  Goal: Achieves optimal ventilation and oxygenation  Description: INTERVENTIONS:  - Assess for changes in respiratory status  - Assess for changes in mentation and behavior  - Position to facilitate oxygenation and minimize respiratory effort  - Oxygen administered by appropriate delivery if ordered  - Initiate smoking cessation education as indicated  - Encourage broncho-pulmonary hygiene  including cough, deep breathe, Incentive Spirometry  - Assess the need for suctioning and aspirate as needed  - Assess and instruct to report SOB or any respiratory difficulty  - Respiratory Therapy support as indicated  Outcome: Not Progressing     Problem: SKIN/TISSUE INTEGRITY - ADULT  Goal: Skin Integrity remains intact(Skin Breakdown Prevention)  Description: Assess:  -Perform Terry assessment every   -Clean and moisturize skin every   -Inspect skin when repositioning, toileting, and assisting with ADLS  -Assess under medical devices such as  every   -Assess extremities for adequate circulation and sensation     Bed Management:  -Have minimal linens on bed & keep smooth, unwrinkled  -Change linens as needed when moist or perspiring  -Avoid sitting or lying in one position for more than  hours while in bed  -Keep HOB at degrees     Toileting:  -Offer bedside commode  -Assess for incontinence every   -Use incontinent care products after each incontinent episode such as     Activity:  -Mobilize patient  times a day  -Encourage activity and walks on unit  -Encourage or provide ROM exercises   -Turn and reposition patient every  Hours  -Use appropriate equipment to lift or move patient in bed  -Instruct/ Assist with weight shifting every  when out of bed in chair  -Consider limitation of chair time  hour intervals    Skin Care:  -Avoid use of baby powder, tape, friction and shearing, hot water or constrictive clothing  -Relieve pressure over bony prominences using   -Do not massage red bony areas    Next Steps:  -Teach patient strategies to minimize risks such as    -Consider consults to  interdisciplinary teams such as  Outcome: Progressing  Goal: Incision(s), wounds(s) or drain site(s) healing without S/S of infection  Description: INTERVENTIONS  - Assess and document dressing, incision, wound bed, drain sites and surrounding tissue  - Provide patient and family education  - Perform skin care/dressing changes  every   Outcome: Progressing  Goal: Pressure injury heals and does not worsen  Description: Interventions:  - Implement low air loss mattress or specialty surface (Criteria met)  - Apply silicone foam dressing  - Instruct/assist with weight shifting every  minutes when in chair   - Limit chair time to  hour intervals  - Use special pressure reducing interventions such as  when in chair   - Apply fecal or urinary incontinence containment device   - Perform passive or active ROM every   - Turn and reposition patient & offload bony prominences every  hours   - Utilize friction reducing device or surface for transfers   - Consider consults to  interdisciplinary teams such as   - Use incontinent care products after each incontinent episode such as  - Consider nutrition services referral as needed  Outcome: Progressing     Problem: MUSCULOSKELETAL - ADULT  Goal: Maintain or return mobility to safest level of function  Description: INTERVENTIONS:  - Assess patient's ability to carry out ADLs; assess patient's baseline for ADL function and identify physical deficits which impact ability to perform ADLs (bathing, care of mouth/teeth, toileting, grooming, dressing, etc.)  - Assess/evaluate cause of self-care deficits   - Assess range of motion  - Assess patient's mobility  - Assess patient's need for assistive devices and provide as appropriate  - Encourage maximum independence but intervene and supervise when necessary  - Involve family in performance of ADLs  - Assess for home care needs following discharge   - Consider OT consult to assist with ADL evaluation and planning for discharge  - Provide patient education as appropriate  Outcome: Not Progressing  Goal: Maintain proper alignment of affected body part  Description: INTERVENTIONS:  - Support, maintain and protect limb and body alignment  - Provide patient/ family with appropriate education  Outcome: Not Progressing     Problem: Nutrition/Hydration-ADULT  Goal:  Nutrient/Hydration intake appropriate for improving, restoring or maintaining nutritional needs  Description: Monitor and assess patient's nutrition/hydration status for malnutrition. Collaborate with interdisciplinary team and initiate plan and interventions as ordered.  Monitor patient's weight and dietary intake as ordered or per policy. Utilize nutrition screening tool and intervene as necessary. Determine patient's food preferences and provide high-protein, high-caloric foods as appropriate.     INTERVENTIONS:  - Monitor oral intake, urinary output, labs, and treatment plans  - Assess nutrition and hydration status and recommend course of action  - Evaluate amount of meals eaten  - Assist patient with eating if necessary   - Allow adequate time for meals  - Recommend/ encourage appropriate diets, oral nutritional supplements, and vitamin/mineral supplements  - Order, calculate, and assess calorie counts as needed  - Recommend, monitor, and adjust tube feedings and TPN/PPN based on assessed needs  - Assess need for intravenous fluids  - Provide specific nutrition/hydration education as appropriate  - Include patient/family/caregiver in decisions related to nutrition  Outcome: Not Progressing

## 2024-12-11 NOTE — ASSESSMENT & PLAN NOTE
Malnutrition Findings:   Adult Malnutrition type: Chronic illness  Adult Degree of Malnutrition: Other severe protein calorie malnutrition  Malnutrition Characteristics: Fat loss, Muscle loss, Inadequate energy, Weight loss                  360 Statement: Chronic severe malnutrition related to dysphagia, inadequate oral intake as evidenced by < 75% estimated energy intake > 1 mo; 14.3% weight loss x 3 mo (8/21/24 98lb, 12/8/24 84lb); severe fat loss to orbitals, buccal pads, triceps, thoracic region; severe muscle loss to temporalis, pectoralis, deltoid, interroseous muscles. Treated with: Initiation of diet per MD/SLP as appropriate noting dysphagia. If unable to initiation oral diet within 72 h consider enteral nutrition, will provide EN recs as indicated. Recommend daily weights for nutrition monitoring. Will consider supplementation once oral diet initiated.    BMI Findings:  Adult BMI Classifications: Underweight < 18.5        Body mass index is 15.04 kg/m².

## 2024-12-11 NOTE — PROGRESS NOTES
Patient:  MATT SOUTH    MRN:  [id number]    Aidin Request ID:  [id number]    Level of care reserved:  Hospice    Partner Reserved:  [facility], [facility], [place], [place] [id number] [phone]    Clinical needs requested:    Geography searched:  [phone]    Start of Service:    Request sent:  12:13pm EST on 12/10/2024 by Hdoan Aguilar    Partner reserved:  12:02pm EST on 12/11/2024 by Hodan Aguilar    Choice list shared:

## 2024-12-11 NOTE — CASE MANAGEMENT
Case Management Progress Note    Patient name Ross Mcguire  Location /-01 MRN [id number]  : 1950 Date 2024       LOS (days): 5  Geometric Mean LOS (GMLOS) (days): 4.9  Days to GMLOS:0.2        OBJECTIVE:        Current admission status: Inpatient  Preferred Pharmacy:   [id number] - [place], [place] - [id number] [address]  Phone: [phone] Fax: [phone]    Primary Care Provider: Juan J Camarena MD    Primary Insurance: [email or web address]  Secondary Insurance:     PROGRESS NOTE:    [facility] can accept Pt this afternoon. Pt's family at bedside and aware. [facility] BLS transport to [facility] at 3:30pm. CM informed Pt's nurse(Leanne) and [facility] of transport time.

## 2024-12-11 NOTE — DISCHARGE SUMMARY
Discharge Summary - Hospitalist   Name: Ross Mcguire 74 y.o. male I MRN: [id number]  Unit/Bed#: -01 I Date of Admission: 12/6/2024   Date of Service: 12/11/2024 I Hospital Day: 5     Assessment & Plan  Acute hypoxic respiratory failure (HCC)  Likely secondary to right lower lobe pneumonia, requiring 6 L of nasal cannula  Patient underwent swallow study-severely reduced swallowing Abilities.  ICU team discussed with family, family opted for comfort measures.  Patient's goals are not aligned with artificial tube feeding.  Patient was evaluated by hospice team and recommended inpatient hospice.  Patient will be discharged to inpatient hospice  GERD (gastroesophageal reflux disease)    Other chronic pancreatitis (HCC)    Smoking    History of endovascular stent graft for abdominal aortic aneurysm (AAA)    SIADH (syndrome of inappropriate ADH production) (HCC)    Severe protein-calorie malnutrition (HCC)  Malnutrition Findings:   Adult Malnutrition type: Chronic illness  Adult Degree of Malnutrition: Other severe protein calorie malnutrition  Malnutrition Characteristics: Fat loss, Muscle loss, Inadequate energy, Weight loss                  360 Statement: Chronic severe malnutrition related to dysphagia, inadequate oral intake as evidenced by < 75% estimated energy intake > 1 mo; 14.3% weight loss x 3 mo (8/21/24 98lb, 12/8/24 84lb); severe fat loss to orbitals, buccal pads, triceps, thoracic region; severe muscle loss to temporalis, pectoralis, deltoid, interroseous muscles. Treated with: Initiation of diet per MD/SLP as appropriate noting dysphagia. If unable to initiation oral diet within 72 h consider enteral nutrition, will provide EN recs as indicated. Recommend daily weights for nutrition monitoring. Will consider supplementation once oral diet initiated.    BMI Findings:  Adult BMI Classifications: Underweight < 18.5        Body mass index is 15.04 kg/m².     Benign essential  hypertension    Sepsis (HCC)    Right lower lobe pneumonia    Fall    Transaminitis    Emphysema lung (HCC)    Dysphagia       Medical Problems       Resolved Problems  Date Reviewed: 12/11/2024   None       Discharging Physician / Practitioner: Chente Castillo MD  PCP: Juan J Camarena MD  Admission Date:   Admission Orders (From admission, onward)       Ordered        12/06/24 2158  Inpatient Admission  Once                          Discharge/Transfer Date: 12/11/24    Disposition:   Transfer to: hospice house      Consultations During Hospital Stay:  pharmacy        Significant Findings / Test Results:   No results found.    No Chest XR results available for this patient.        Complications:  Comfort measures    Reason for Admission:   Chief Complaint   Patient presents with    Respiratory Distress     Pt to er via ems from home with reports of respiratory distress that has been going on for the past week. Cpap upon arrival. Patient stated that he fell twice yesterday due to weakness, no head strike, no loc, takes thinners         Hospital Course:   Ross Mcguire is a 74 y.o. male patient who originally presented to the hospital on 12/6/2024 due to acute hypoxic respiratory failure.   past medical history of hypertension, protein calorie malnutrition, SIADH, AAA, smoker. Presented to the hospital 12/6 after a fall, was found to be hypoxic requiring high flow nasal cannula. Imaging showed right lower lobe infiltrate. On hospital day 2 SLP evaluated the patient he failed bedside swallow evaluation. patient went down for video barium swallow and has no swallowing capabilities. Unclear etiology, old stroke versus severe malnutrition and deconditioning. Enteral feeds are not in line with the patient's goals of care per his living well and his family understands that he is ill. He was made comfort measures only today.  It was evaluated by hospice and recommended inpatient hospice.  Patient will be  "discharged to inpatient hospice      Please see above list of diagnoses and related plan for additional information.     Condition at Discharge: critical    Discharge Day Visit / Exam:   Subjective: No complaints  Vitals: Blood Pressure: 141/94 (12/10/24 1746)  Pulse: 95 (12/10/24 2000)  Temperature: 98 °F (36.7 °C) (12/10/24 1746)  Temp Source: Oral (12/10/24 0702)  Respirations: 18 (12/10/24 2100)  Height: 5' 2\" (157.5 cm) (12/08/24 1040)  Weight - Scale: 37.3 kg (82 lb 3.7 oz) (pt re-weighed) (12/10/24 0702)  SpO2: 91 % (12/10/24 2000)  Physical Exam  Vitals reviewed.   HENT:      Mouth/Throat:      Mouth: Mucous membranes are dry.      Pharynx: Oropharynx is clear.   Cardiovascular:      Rate and Rhythm: Regular rhythm.      Heart sounds: Normal heart sounds.   Pulmonary:      Breath sounds: Rhonchi present.   Abdominal:      Palpations: Abdomen is soft.   Skin:     General: Skin is warm and dry.      Capillary Refill: Capillary refill takes 2 to 3 seconds.         Discussion with Family: Updated  (daughter) at bedside.     Administrative Statements   Discharge Statement:  I have spent a total time of 32 minutes in caring for this patient on the day of the visit/encounter. >30 minutes of time was spent on: Prognosis, Patient and family education, Counseling / Coordination of care, Documenting in the medical record, and Communicating with other healthcare professionals .    **Please Note: This note may have been constructed using a voice recognition system.**  "

## 2024-12-11 NOTE — ASSESSMENT & PLAN NOTE
Likely secondary to right lower lobe pneumonia, requiring 6 L of nasal cannula  Patient underwent swallow study-severely reduced swallowing Abilities.  ICU team discussed with family, family opted for comfort measures.  Patient's goals are not aligned with artificial tube feeding.  Patient was evaluated by hospice team and recommended inpatient hospice.  Patient will be discharged to inpatient hospice

## 2024-12-11 NOTE — HOSPICE NOTE
Patient evaluated this AM and approved for inpatient hospice care.  Daughters Hailey and Zina at bedside.  Both in agreement with transfer to our inpatient unit for acute symptom management.  Consents signed by Daughter/POA Hailey.  Awaiting word on transport time.  RN to please call with report a 1/2 hr prior to leaving  [phone].  Keep IV access and medicate appropriately for the trip.

## 2024-12-12 NOTE — UTILIZATION REVIEW
NOTIFICATION OF ADMISSION DISCHARGE   This is a Notification of Discharge from [facility]. Please be advised that this patient has been discharge from our facility. Below you will find the admission and discharge date and time including the patient’s disposition.   UTILIZATION REVIEW CONTACT:  Tennille Garza  Utilization   Network Utilization Review Department  Phone: [phone] x carefully listen to the prompts. All voicemails are confidential.  Email: [email or web address]     ADMISSION INFORMATION  PRESENTATION DATE: 12/6/2024  5:58 PM  OBERVATION ADMISSION DATE: N/A  INPATIENT ADMISSION DATE: 12/6/24  9:58 PM   DISCHARGE DATE: 12/11/2024  4:05 PM   DISPOSITION:[facility]/Swing [facility]    Network Utilization Review Department  ATTENTION: Please call with any questions or concerns to [phone] and carefully listen to the prompts so that you are directed to the right person. All voicemails are confidential.   For Discharge needs, contact Care Management DC Support Team at [phone] opt. [phone]  Send all requests for admission clinical reviews, approved or denied determinations and any other requests to dedicated fax number below belonging to the campus where the patient is receiving treatment. List of dedicated fax numbers for the Facilities:  FACILITY NAME UR FAX NUMBER   ADMISSION DENIALS (Administrative/Medical Necessity) [phone]   DISCHARGE SUPPORT TEAM ([facility]) [phone]   PARENT CHILD HEALTH (Maternity/NICU/Pediatrics) [phone]   [facility] [phone]   [facility] [phone]   [facility] [phone]   [facility] [phone]   [facility] [phone]   [facility] [phone]   [facility] [phone]   [facility]  [place] [phone]   [facility] [phone]   [facility] [phone]   [facility] [phone]   [facility] ([place], [place]) [phone]

## 2024-12-13 ENCOUNTER — TELEPHONE (OUTPATIENT)
Dept: INTERNAL MEDICINE CLINIC | Facility: CLINIC | Age: 74
End: 2024-12-13

## 2024-12-13 LAB — ACHR BLOCK AB/ACHR TOTAL SFR SER: 16 % (ref 0–25)

## 2024-12-17 ENCOUNTER — TELEPHONE (OUTPATIENT)
Age: 74
End: 2024-12-17

## 2024-12-18 ENCOUNTER — HOME CARE VISIT (OUTPATIENT)
Dept: HOME HOSPICE | Facility: HOSPICE | Age: 74
End: 2024-12-18
Payer: MEDICARE

## 2025-02-23 NOTE — TELEPHONE ENCOUNTER
Reason for call:   [x] Refill   [] Prior Auth  [] Other:     Office:   [x] PCP/Provider - [facility] Skyler Camarena MD   [] Specialty/Provider -     Medication: dicyclomine (BENTYL) 10 mg capsule     Dose/Frequency: One capsule three time daily     Quantity: 270 capsules    Pharmacy: [id number] - [place] - [id number] [facility] [phone]    Does the patient have enough for 3 days?   [] Yes   [x] No - Send as HP to POD    
No

## (undated) DEVICE — SI BRITE TIP 8F 11CM STR: Brand: BRITE TIP

## (undated) DEVICE — STOPCOCK 4 WAY LL HIGH PRESSURE

## (undated) DEVICE — SUT MONOCRYL 4-0 PS-2 18 IN Y496G

## (undated) DEVICE — NON-DEHP HIGH FLOW RATE EXTENSION SET, MALE LUER LOCK ADAPTER

## (undated) DEVICE — TRAY FOLEY 16FR URIMETER SURESTEP

## (undated) DEVICE — STERILE ICS CARDIOVASCULAR PK: Brand: CARDINAL HEALTH

## (undated) DEVICE — IV SET 15 DROP STERILE 0/Y GRAVITY

## (undated) DEVICE — SNAP KOVER: Brand: UNBRANDED

## (undated) DEVICE — INFUSER BAG 500ML

## (undated) DEVICE — SYRINGE KIT,PACKAGED,,150FT,MK 7(ANGIO-ARTERION, 150ML SYR KIT W/QFT,MC)(60729385): Brand: MEDRAD® MARK 7 ARTERION DISPOSABLE SYRINGE 150 ML WITH QUICK FILL TUBE

## (undated) DEVICE — CATH BAL CHARGER 7 X 60MM X 75CM

## (undated) DEVICE — DRAPE EQUIPMENT RF WAND

## (undated) DEVICE — SYRINGE 50ML LL

## (undated) DEVICE — CATH BALLOON STENT GRAFT 12FR 40MM X 65CM

## (undated) DEVICE — ANGIOGRAPHIC CATHETER: Brand: IMAGER™ II

## (undated) DEVICE — MEDI-VAC YANKAUER SUCTION HANDLE W/BULBOUS TIP: Brand: CARDINAL HEALTH

## (undated) DEVICE — SHEATH INTRO DRY SEAL 18FR 33CM

## (undated) DEVICE — SHEATH INTRO DRY SEAL 12FR 33CM

## (undated) DEVICE — REM POLYHESIVE ADULT PATIENT RETURN ELECTRODE: Brand: VALLEYLAB

## (undated) DEVICE — CATH BAL CHARGER 10 X 60MM X 75CM

## (undated) DEVICE — FLUID MANAGEMENT KIT - IR

## (undated) DEVICE — 3M™ STERI-STRIP™ REINFORCED ADHESIVE SKIN CLOSURES, R1547, 1/2 IN X 4 IN (12 MM X 100 MM), 6 STRIPS/ENVELOPE: Brand: 3M™ STERI-STRIP™

## (undated) DEVICE — SYRINGE 20ML LL

## (undated) DEVICE — Device

## (undated) DEVICE — LIGACLIP MCA MULTIPLE CLIP APPLIERS, 20 SMALL CLIPS: Brand: LIGACLIP

## (undated) DEVICE — SUT MONOCRYL 3-0 SH 27 IN Y316H

## (undated) DEVICE — INTENDED FOR TISSUE SEPARATION, AND OTHER PROCEDURES THAT REQUIRE A SHARP SURGICAL BLADE TO PUNCTURE OR CUT.: Brand: BARD-PARKER SAFETY BLADES SIZE 10, STERILE

## (undated) DEVICE — SUTURE BOOTS YELLOW

## (undated) DEVICE — BAG DECANTER

## (undated) DEVICE — INTENDED FOR TISSUE SEPARATION, AND OTHER PROCEDURES THAT REQUIRE A SHARP SURGICAL BLADE TO PUNCTURE OR CUT.: Brand: BARD-PARKER SAFETY BLADES SIZE 11, STERILE

## (undated) DEVICE — VESSEL LOOPS X-RAY DETECTABLE: Brand: DEROYAL

## (undated) DEVICE — ADHESIVE SKN CLSR HISTOACRYL FLEX 0.5ML LF

## (undated) DEVICE — 3M™ IOBAN™ 2 ANTIMICROBIAL INCISE DRAPE 6651EZ: Brand: IOBAN™ 2

## (undated) DEVICE — COVER PROBE INTRAOPERATIVE 6 X 96 IN

## (undated) DEVICE — RADIFOCUS GLIDEWIRE: Brand: GLIDEWIRE

## (undated) DEVICE — PROXIMATE PLUS MD MULTI-DIRECTIONAL RELEASE SKIN STAPLERS CONTAINS 35 STAINLESS STEEL STAPLES APPROXIMATE CLOSED DIMENSIONS: 6.9MM X 3.9MM WIDE: Brand: PROXIMATE

## (undated) DEVICE — X-RAY DETECTABLE SPONGES,16 PLY: Brand: VISTEC

## (undated) DEVICE — DILATOR: Brand: COOK

## (undated) DEVICE — XENOSURE BIOLOGIC PATCH, 0.8CM X 8CM, EIFU
Type: IMPLANTABLE DEVICE | Site: ARTERIAL | Status: NON-FUNCTIONAL
Brand: XENOSURE BIOLOGIC PATCH

## (undated) DEVICE — BUTTON SWITCH PENCIL HOLSTER: Brand: VALLEYLAB

## (undated) DEVICE — 3000CC GUARDIAN II: Brand: GUARDIAN

## (undated) DEVICE — SHEATH INTRO DRY SEAL 16FR 33CM

## (undated) DEVICE — ULTRACLEAN ACCESSORY ELECTRODE 1" (2.54 CM) COATED BLADE: Brand: ULTRACLEAN

## (undated) DEVICE — SURGICEL FIBRILLAR 1 X 2

## (undated) DEVICE — FLEXOR, CHECK-FLO, INTRODUCER SET: Brand: FLEXOR

## (undated) DEVICE — CATH DIAG 5FR .035 70CM PIG CSC 20

## (undated) DEVICE — INTENDED FOR TISSUE SEPARATION, AND OTHER PROCEDURES THAT REQUIRE A SHARP SURGICAL BLADE TO PUNCTURE OR CUT.: Brand: BARD-PARKER ® CARBON RIB-BACK BLADES

## (undated) DEVICE — GUIDEWIRE AMPLATZ .035 180CM 6CM ST SS

## (undated) DEVICE — SUT PROLENE 6-0 BV130 30 IN 8709H

## (undated) DEVICE — CHLORAPREP HI-LITE 26ML ORANGE

## (undated) DEVICE — GLOVE SRG BIOGEL 7.5

## (undated) DEVICE — MICROPUNCTURE 501

## (undated) DEVICE — INFLATION DEVICE BASIX 30ATM

## (undated) DEVICE — DRESSING MEPILEX AG BORDER 4 X 8 IN

## (undated) DEVICE — 4 F TEMPO AQUA 0.038  65CM VER: Brand: TEMPO AQUA

## (undated) DEVICE — PETRI DISH STERILE

## (undated) DEVICE — INTENDED FOR TISSUE SEPARATION, AND OTHER PROCEDURES THAT REQUIRE A SHARP SURGICAL BLADE TO PUNCTURE OR CUT.: Brand: BARD-PARKER SAFETY BLADES SIZE 15, STERILE

## (undated) DEVICE — 1200CC GUARDIAN II: Brand: GUARDIAN

## (undated) DEVICE — STERILE MAJOR GENERAL PACK: Brand: CARDINAL HEALTH